# Patient Record
Sex: FEMALE | Race: BLACK OR AFRICAN AMERICAN | Employment: FULL TIME | URBAN - METROPOLITAN AREA
[De-identification: names, ages, dates, MRNs, and addresses within clinical notes are randomized per-mention and may not be internally consistent; named-entity substitution may affect disease eponyms.]

---

## 2017-01-09 ENCOUNTER — OFFICE VISIT (OUTPATIENT)
Dept: FAMILY MEDICINE CLINIC | Age: 47
End: 2017-01-09

## 2017-01-09 ENCOUNTER — OFFICE VISIT (OUTPATIENT)
Dept: ENDOCRINOLOGY | Age: 47
End: 2017-01-09

## 2017-01-09 VITALS
BODY MASS INDEX: 36.49 KG/M2 | HEIGHT: 65 IN | WEIGHT: 219 LBS | SYSTOLIC BLOOD PRESSURE: 108 MMHG | TEMPERATURE: 97.6 F | DIASTOLIC BLOOD PRESSURE: 78 MMHG | RESPIRATION RATE: 16 BRPM | HEART RATE: 70 BPM | OXYGEN SATURATION: 92 %

## 2017-01-09 VITALS
SYSTOLIC BLOOD PRESSURE: 114 MMHG | HEIGHT: 65 IN | BODY MASS INDEX: 36.19 KG/M2 | WEIGHT: 217.2 LBS | DIASTOLIC BLOOD PRESSURE: 74 MMHG | HEART RATE: 71 BPM

## 2017-01-09 DIAGNOSIS — E66.9 OBESITY (BMI 30-39.9): Primary | ICD-10-CM

## 2017-01-09 DIAGNOSIS — K51.90 ULCERATIVE COLITIS WITHOUT COMPLICATIONS, UNSPECIFIED LOCATION (HCC): ICD-10-CM

## 2017-01-09 DIAGNOSIS — Z79.01 ANTICOAGULATION MONITORING, INR RANGE 2.5-3.5: ICD-10-CM

## 2017-01-09 DIAGNOSIS — M06.4 INFLAMMATORY POLYARTHRITIS (HCC): ICD-10-CM

## 2017-01-09 DIAGNOSIS — Z87.19 HISTORY OF GI BLEED: ICD-10-CM

## 2017-01-09 DIAGNOSIS — I82.431 ACUTE DEEP VEIN THROMBOSIS (DVT) OF POPLITEAL VEIN OF RIGHT LOWER EXTREMITY (HCC): Primary | ICD-10-CM

## 2017-01-09 LAB
INR BLD: 1.9
PT POC: NORMAL SEC
VALID INTERNAL CONTROL?: YES

## 2017-01-09 RX ORDER — PHENTERMINE HYDROCHLORIDE 37.5 MG/1
TABLET ORAL
Qty: 100 TAB | Refills: 5 | Status: SHIPPED | OUTPATIENT
Start: 2017-01-09 | End: 2017-07-12 | Stop reason: SDUPTHER

## 2017-01-09 RX ORDER — WARFARIN SODIUM 5 MG/1
7.5 TABLET ORAL DAILY
Qty: 45 TAB | Refills: 2 | Status: SHIPPED | OUTPATIENT
Start: 2017-01-09 | End: 2017-02-07 | Stop reason: SDUPTHER

## 2017-01-09 RX ORDER — TOPIRAMATE 50 MG/1
50 TABLET, FILM COATED ORAL 2 TIMES DAILY
Qty: 60 TAB | Refills: 5 | Status: SHIPPED | OUTPATIENT
Start: 2017-01-09 | End: 2017-07-12 | Stop reason: SDUPTHER

## 2017-01-09 NOTE — PROGRESS NOTES
Subjective:     Chief Complaint   Patient presents with    Follow-up     PT/INR        She  is a 48 y.o. female who presents for evaluation of:  DVT - admitted for DVT after admission for pouchitis with rectal bleeding. DVT hx and noted after long hospital admission. Confirmed with LE dopplers and r/o PE with CT. Admitted and bridged with Lovenox and Coumadin. Hgb at d/c was 9.3 and has improved to > 11. In hospital, she saw Dr. Conrado Cason (H/O) and Dr. Solomon Mejia (Vasc surgery). Advised against IVC and rec for 3-6 months of anticoag. Given 2nd DVT (both provoked) and overall hx, will opt for 6 month course. Was advised not to use Eliquis or Xarelto d/t difficulty with reversal and one episode of rectal bleeding (presumed from pouchitis). Notes reviewed and overall appears that pouchitis should have low risk of rebleed. Rest per Anticoag tab    Psoriatic Arthritis & UC - Followed by Dr. Elizabeth Martinez. Stopped Embril & MTX for transaminitis and this resolved. Acute reaction at injection site to Humira so was switched back to MTX. Had trial of Remicaide but difficulty with IV access. Discussed options with possibility of getting PICC for remicaide infusion - she would have to have this for ~ 6 weeks. Advised her to discuss options further with Dr. Nacho Quezada and she was able to find an alternative for Psoriatic Arthritis and UC. She will start Cimzia (Certolizumab). ROS  Gen - no fever/chills  Resp - no cough or dyspnea  CV - no chest pain or C[place]  GI - per HPI   - CT showed resolution of R sided ovarian cyst and new large L sided ovarian cyst. US done (scheduled prior to getting CT) and showed L ovarian hemorrhagic cyst and 2 large fibroids in the uterus. Saw Dr. Jeremiah Tamez about this and considering sgy but concerns given abd sgy hx with pouch.   Rest per HPI     Objective:     Vitals:    01/09/17 1024   BP: 108/78   Pulse: 70   Resp: 16   Temp: 97.6 °F (36.4 °C)   TempSrc: Oral   SpO2: 92%   Weight: 219 lb (99.3 kg)   Height: 5' 5\" (1.651 m)     Physical Examination:  General appearance - alert, well appearing, and in no distress  Eyes -sclera anicteric  Chest - clear to auscultation, no wheezes, rales or rhonchi, symmetric air entry  Heart - normal rate, regular rhythm, normal S1, S2, no murmurs, rubs, clicks or gallops  Neurological - alert, oriented, no focal findings or movement disorder noted  Extr - RLE with trace edema     Past Medical History   Diagnosis Date    Asthma 3/12/2010    DDD (degenerative disc disease), lumbar     DJD (degenerative joint disease) 3/12/2010    DJD (degenerative joint disease) of knee     DVT [facility]deep venous thrombosis) [facility])      Rt leg    GERD (gastroesophageal reflux disease)     History of tuberculosis exposure 2013     pt states she has + PPD and was on Rx for 6 months; last dose either 11/13 or 12/13    Inflammatory polyarthritis ([address]      Psoriatic Arthitis    Morbid obesity ([address]     Pseudogout     Skin abrasion 1/28/14     After loosing 125#, Bilateral Inner thigh friction flareup monthly with skin breakdown    Thromboembolus ([address] 2008     Rt leg,  pt states she fell and had a plane ride home and developed blood clots    UC (ulcerative colitis) ([address] 3/12/2010     Past Surgical History   Procedure Laterality Date    Hx tonsillectomy      Hx colectomy  1992     Dr Alejandra Lagos Hx acl reconstruction       Rt    Hx orthopaedic  12/13     Rt foot / toes     Current Outpatient Prescriptions on File Prior to Visit   Medication Sig Dispense Refill    topiramate (TOPAMAX) 50 mg tablet Take 1 Tab by mouth two (2) times a day. 60 Tab 5    phentermine (ADIPEX-P) 37.5 mg tablet Take 1/2 tablet before breakfast and before dinner 100 Tab 5    furosemide (LASIX) 40 mg tablet TAKE 1 TABLET BY MOUTH TWICE DAILY AS NEEDED 180 Tab 0    mometasone (NASONEX) 50 mcg/actuation nasal spray 2 Sprays by Both Nostrils route daily.  1 Container 1    oxyCODONE-acetaminophen (PERCOCET) 5-325 mg per tablet Take 1 Tab by mouth every six (6) hours as needed for Pain. Max Daily Amount: 4 Tabs. ONLY FOR SEVERE PAIN 30 Tab 0    clotrimazole-betamethasone (LOTRISONE) topical cream Apply thin layer to affected area twice daily until 48 hours after resolution 15 g 0    Comp. Stocking,Thigh,Long,X-Sml misc Apply to leg in am and remove at bedtime. Please disp 1 stocking. 1 Each 0    methotrexate (RHEUMATREX) 2.5 mg tablet TAKE 8 TABLETS BY MOUTH ONCE A WEEK  0    ranitidine (ZANTAC) 150 mg tablet Take 150 mg by mouth two (2) times a day.  albuterol (PROVENTIL HFA, VENTOLIN HFA, PROAIR HFA) 90 mcg/actuation inhaler Take 1 Puff by inhalation every four (4) hours as needed for Wheezing. 1 Inhaler 5    acetaminophen (TYLENOL EXTRA STRENGTH) 500 mg tablet Take  by mouth every six (6) hours as needed for Pain. No current facility-administered medications on file prior to visit. Assessment/ Plan:   Cody Rodriguez was seen today for follow-up. Diagnoses and all orders for this visit:    Acute deep vein thrombosis (DVT) of popliteal vein of right lower extremity ([address]  Long term current use of anticoagulant  Hx of GI bleed  - INR subtherapeutic so incr dose while she ct eating appropriate greens  -     AMB POC PT/INR    Ulcerative colitis without complications, unspecified location [facility]) & Psoriatic Arthritis - per Rhuem. Starting trial of Cimzia. Also discussed considering OPIC for remicaide if not improving with Cimzia. I have discussed the diagnosis with the patient and the intended plan as seen in the above orders. The patient has received an after-visit summary and questions were answered concerning future plans. I have discussed medication side effects and warnings with the patient as well. The patient verbalizes understanding and agreement with the plan. Follow-up Disposition:  Return in about 2 weeks (around 1/23/2017), or if symptoms worsen or fail to improve.

## 2017-01-09 NOTE — MR AVS SNAPSHOT
Visit Information Date & Time Provider Department Dept. Phone Encounter #  
 1/9/2017 10:50 AM Monica Smart MD [facility] at [address] [id number] Follow-up Instructions Return in about 2 weeks (around 1/23/2017), or if symptoms worsen or fail to improve. Your Appointments 7/11/2017 10:50 AM  
Follow Up with Raimundo Jefferson MD  
[facility] Appt Note: f/u          dm/ weight loss        6 mon [employer] [address] [id number] [facility] Upcoming Health Maintenance Date Due COLONOSCOPY 6/8/2016 PAP AKA CERVICAL CYTOLOGY 11/19/2017 DTaP/Tdap/Td series (2 - Td) 5/17/2026 Allergies as of 1/9/2017  Review Complete On: 1/9/2017 By: Monica Smart MD  
  
 Severity Noted Reaction Type Reactions Sulfa (Sulfonamide Antibiotics) High 03/12/2010    Anaphylaxis Codeine Medium 03/17/2010    Itching Current Immunizations  Reviewed on 5/2/2016 Name Date Influenza Vaccine (Quad) PF 10/27/2016, 10/30/2015 Influenza Vaccine PF 10/11/2013 Influenza Vaccine Split 12/13/2012, 10/5/2011 Pneumococcal Polysaccharide (PPSV-23) 4/11/2016 11:40 AM  
  
 Not reviewed this visit You Were Diagnosed With   
  
 Codes Comments Acute deep vein thrombosis (DVT) of popliteal vein of right lower extremity (HCC)    -  Primary ICD-10-CM: R09.769 ICD-9-CM: 453.41 Anticoagulation monitoring, INR range 2.5-3.5     ICD-10-CM: Z79.01 
ICD-9-CM: V58.61 History of GI bleed     ICD-10-CM: Z87.19 ICD-9-CM: V12.79 Inflammatory polyarthritis [facility]     ICD-10-CM: M06.4 ICD-9-CM: 714.9 Vitals BP Pulse Temp Resp Height(growth percentile) Weight(growth percentile)  108/78 (BP 1 Location: Right arm, BP Patient Position: Sitting) 70 97.6 °F (36.4 °C) (Oral) 16 5' 5\" (1.651 m) 219 lb (99.3 kg) SpO2 BMI OB Status Smoking Status 92% 36.44 kg/m2 IUD Never Smoker Vitals History BMI and BSA Data Body Mass Index Body Surface Area  
 36.44 kg/m 2 2.13 m 2 Preferred Pharmacy Pharmacy Name Phone [facility], [address] [place] [address] [phone] Your Updated Medication List  
  
   
This list is accurate as of: 1/9/17 11:24 AM.  Always use your most recent med list.  
  
  
  
  
 albuterol 90 mcg/actuation inhaler Commonly known as:  PROVENTIL HFA, VENTOLIN HFA, PROAIR HFA Take 1 Puff by inhalation every four (4) hours as needed for Wheezing. clotrimazole-betamethasone topical cream  
Commonly known as:  Ammy Quarto Apply thin layer to affected area twice daily until 48 hours after resolution Comp. Stocking,Thigh,Long,X-Sml Misc Apply to leg in am and remove at bedtime. Please disp 1 stocking. furosemide 40 mg tablet Commonly known as:  LASIX TAKE 1 TABLET BY MOUTH TWICE DAILY AS NEEDED  
  
 methotrexate 2.5 mg tablet Commonly known as:  RHEUMATREX  
TAKE 8 TABLETS BY MOUTH ONCE A WEEK  
  
 mometasone 50 mcg/actuation nasal spray Commonly known as:  NASONEX  
2 Sprays by Both Nostrils route daily. oxyCODONE-acetaminophen 5-325 mg per tablet Commonly known as:  PERCOCET Take 1 Tab by mouth every six (6) hours as needed for Pain. Max Daily Amount: 4 Tabs. ONLY FOR SEVERE PAIN  
  
 phentermine 37.5 mg tablet Commonly known as:  ADIPEX-P Take 1/2 tablet before breakfast and before dinner  
  
 raNITIdine 150 mg tablet Commonly known as:  ZANTAC Take 150 mg by mouth two (2) times a day. topiramate 50 mg tablet Commonly known as:  TOPAMAX Take 1 Tab by mouth two (2) times a day. TYLENOL EXTRA STRENGTH 500 mg tablet Generic drug:  acetaminophen Take  by mouth every six (6) hours as needed for Pain. warfarin 5 mg tablet Commonly known as:  COUMADIN Take 1.5 Tabs by mouth daily. 7.5 mg Mon-Sat; 10 mg on Sunday January 2017 Details Sun Mon Tue Wed Thu Fri Sat  
  1  
  
  
  
   2  
  
  
  
   3  
  
  
  
   4  
  
  
  
   5  
  
  
  
   6  
  
  
  
   7  
  
  
  
  
  8  
  
  
  
   9  
1.9  
7.5 mg  
See details 10  
  
7.5 mg  
  
   11  
  
10 mg  
  
   12  
  
7.5 mg  
  
   13  
  
10 mg  
  
   14  
  
7.5 mg  
  
  
  15  
  
10 mg  
  
   16  
  
7.5 mg  
  
   17  
  
7.5 mg  
  
   18  
  
10 mg  
  
   19  
  
7.5 mg  
  
   20  
  
10 mg  
  
   21  
  
7.5 mg  
  
  
  22  
  
10 mg  
  
   23  
  
7.5 mg  
  
   24  
  
  
  
   25  
  
  
  
   26  
  
  
  
   27  
  
  
  
   28  
  
  
  
  
  29  
  
  
  
   30  
  
  
  
   31  
  
  
  
      
 Date Details 01/09 This INR check INR: 1.9 Date of next INR:  1/23/2017 How to take your warfarin dose To take:  7.5 mg Take 1.5 of the 5 mg tablets. To take:  10 mg Take 2 of the 5 mg tablets. We Performed the Following AMB POC PT/INR [54974 CPT(R)] Follow-up Instructions Return in about 2 weeks (around 1/23/2017), or if symptoms worsen or fail to improve. Introducing [facility] & HEALTH SERVICES! [employer] introduces [employer] patient portal. Now you can access parts of your medical record, email your doctor's office, and request medication refills online. 1. In your internet browser, go to [email or web address] 2. Click on the First Time User? Click Here link in the Sign In box. You will see the New Member Sign Up page. 3. Enter your SoThree Access Code exactly as it appears below. You will not need to use this code after youve completed the sign-up process. If you do not sign up before the expiration date, you must request a new code. · So[employer] Access Code: [email or web address] Expires: 1/25/2017  2:02 PM 
 
 4. Enter the last four digits of your Social Security Number (xxxx) and Date of Birth (mm/dd/yyyy) as indicated and click Submit. You will be taken to the next sign-up page. 5. Create a Bringrs ID. This will be your Bringrs login ID and cannot be changed, so think of one that is secure and easy to remember. 6. Create a Bringrs password. You can change your password at any time. 7. Enter your Password Reset Question and Answer. This can be used at a later time if you forget your password. 8. Enter your e-mail address. You will receive e-mail notification when new information is available in [address] 9. Click Sign Up. You can now view and download portions of your medical record. 10. Click the Download Summary menu link to download a portable copy of your medical information. If you have questions, please visit the Frequently Asked Questions section of the Bringrs website. Remember, Bringrs is NOT to be used for urgent needs. For medical emergencies, dial 911. Now available from your iPhone and Android! Please provide this summary of care documentation to your next provider. Your primary care clinician is listed as Renee Chatman. If you have any questions after today's visit, please call [phone].

## 2017-01-09 NOTE — MR AVS SNAPSHOT
Visit Information Date & Time Provider Department Dept. Phone Encounter #  
 1/9/2017  8:50 AM Stephy Leija MD [facility] [phone] Follow-up Instructions Return in about 6 months (around 7/9/2017). Your Appointments 1/9/2017 10:50 AM  
ROUTINE CARE with Camryn Smith MD  
[facility] Appt Note: 2wk fu  
 [address] [id number]  
[address] [facility] [address] Upcoming Health Maintenance Date Due COLONOSCOPY 6/8/2016 PAP AKA CERVICAL CYTOLOGY 11/19/2017 DTaP/Tdap/Td series (2 - Td) 5/17/2026 Allergies as of 1/9/2017  Review Complete On: 1/9/2017 By: Stephy Leija MD  
  
 Severity Noted Reaction Type Reactions Sulfa (Sulfonamide Antibiotics) High 03/12/2010    Anaphylaxis Codeine Medium 03/17/2010    Itching Current Immunizations  Reviewed on 5/2/2016 Name Date Influenza Vaccine (Quad) PF 10/27/2016, 10/30/2015 Influenza Vaccine PF 10/11/2013 Influenza Vaccine Split 12/13/2012, 10/5/2011 Pneumococcal Polysaccharide (PPSV-23) 4/11/2016 11:40 AM  
  
 Not reviewed this visit You Were Diagnosed With   
  
 Codes Comments Obesity (BMI 30-39.9)    -  Primary ICD-10-CM: X91.0 ICD-9-CM: 278.00 Vitals BP Pulse Height(growth percentile) Weight(growth percentile) BMI OB Status 114/74 71 5' 5\" (1.651 m) 217 lb 3.2 oz (98.5 kg) 36.14 kg/m2 IUD Smoking Status Never Smoker Vitals History BMI and BSA Data Body Mass Index Body Surface Area  
 36.14 kg/m 2 2.13 m 2 Preferred Pharmacy [facility] Phone [facility] [address] [place] [address] [phone] Your Updated Medication List  
  
   
This list is accurate as of: 1/9/17  9:28 AM.  Always use your most recent med list.  
  
  
  
  
 albuterol 90 mcg/actuation inhaler Commonly known as:  PROVENTIL HFA, VENTOLIN HFA, PROAIR HFA Take 1 Puff by inhalation every four (4) hours as needed for Wheezing. clotrimazole-betamethasone topical cream  
Commonly known as:  Radha Fiddler Apply thin layer to affected area twice daily until 48 hours after resolution Comp. Stocking,Thigh,Long,X-Sml Misc Apply to leg in am and remove at bedtime. Please disp 1 stocking. furosemide 40 mg tablet Commonly known as:  LASIX TAKE 1 TABLET BY MOUTH TWICE DAILY AS NEEDED  
  
 methotrexate 2.5 mg tablet Commonly known as:  RHEUMATREX  
TAKE 8 TABLETS BY MOUTH ONCE A WEEK  
  
 mometasone 50 mcg/actuation nasal spray Commonly known as:  NASONEX  
2 Sprays by Both Nostrils route daily. oxyCODONE-acetaminophen 5-325 mg per tablet Commonly known as:  PERCOCET Take 1 Tab by mouth every six (6) hours as needed for Pain. Max Daily Amount: 4 Tabs. ONLY FOR SEVERE PAIN  
  
 phentermine 37.5 mg tablet Commonly known as:  ADIPEX-P Take 1/2 tablet before breakfast and before dinner  
  
 raNITIdine 150 mg tablet Commonly known as:  ZANTAC Take 150 mg by mouth two (2) times a day. topiramate 50 mg tablet Commonly known as:  TOPAMAX Take 1 Tab by mouth two (2) times a day. TYLENOL EXTRA STRENGTH 500 mg tablet Generic drug:  acetaminophen Take  by mouth every six (6) hours as needed for Pain.  
  
 warfarin 5 mg tablet Commonly known as:  COUMADIN Take 1.5 Tabs by mouth daily. 7.5 mg Mon-Sat; 10 mg on Sunday Prescriptions Printed Refills  
 topiramate (TOPAMAX) 50 mg tablet 5 Sig: Take 1 Tab by mouth two (2) times a day. Class: Print Route: Oral  
 phentermine (ADIPEX-P) 37.5 mg tablet 5 Sig: Take 1/2 tablet before breakfast and before dinner Class: Print Follow-up Instructions Return in about 6 months (around 7/9/2017). Introducing [facility] & HEALTH SERVICES! [employer] introduces [employer] patient portal. Now you can access parts of your medical record, email your doctor's office, and request medication refills online. 1. In your internet browser, go to [email or web address] 2. Click on the First Time User? Click Here link in the Sign In box. You will see the New Member Sign Up page. 3. Enter your [employer] Access Code exactly as it appears below. You will not need to use this code after youve completed the sign-up process. If you do not sign up before the expiration date, you must request a new code. · [employer] Access Code: [email or web address] Expires: 1/25/2017  2:02 PM 
 
4. Enter the last four digits of your Social Security Number (xxxx) and Date of Birth (mm/dd/yyyy) as indicated and click Submit. You will be taken to the next sign-up page. 5. Create a [employer] ID. This will be your [employer] login ID and cannot be changed, so think of one that is secure and easy to remember. 6. Create a [employer] password. You can change your password at any time. 7. Enter your Password Reset Question and Answer. This can be used at a later time if you forget your password. 8. Enter your e-mail address. You will receive e-mail notification when new information is available in [address] 9. Click Sign Up. You can now view and download portions of your medical record. 10. Click the Download Summary menu link to download a portable copy of your medical information. If you have questions, please visit the Frequently Asked Questions section of the [employer] website. Remember, [employer] is NOT to be used for urgent needs. For medical emergencies, dial 911. Now available from your iPhone and Android! Please provide this summary of care documentation to your next provider. Your primary care clinician is listed as Eric Moseley. If you have any questions after today's visit, please call [phone].

## 2017-01-09 NOTE — PROGRESS NOTES
Chief Complaint   Patient presents with    Thyroid Problem     pcp and pharmacy verified    Weight Management   Notes and labs from PCP reviewed  History of Present Illness: Adela Mcgarry is a 48 y.o. female here for follow up of obesity. Pt is being followed by Dr. Isabela Sun (her PCP) who had perscribed for her Phentermine 37.5mg daily and pt note she felt it helped some by curbing her appetite but her PCP was not comfortable continuing the phentermine, so he referred her to me. Prior to our initial visit in January 2015 she had lost from 310 down to 160 but then gained back to 230s. In January 2015 I restarted Phentermine 18.75mg BID and Topiramate 50mg BID to help further her weight loss efforts. She has tolerated this regimen well with no issues of anxiety, CP, SOB, palpitations, N/V, HA, lightheadedness or dizziness. 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 She had also been taken off her Lasix and had some water gain. I continue her phentermine and Topiramate and restarted her on Lasix for edema. Her A1C was 5.3%. Pt has lost 30 pounds since July 2016 from 247 down to 217 and her BMI from 41 to 36. Pt has not been in the hospital since her DVT in May 2016, she is still following with Dr. Mikala Marie for PT/INR for coumadin. She is still seeing Dr. Larry Councilman of Rhematology for Rheumatoid arthritis and Dr. Debbie Esquivel and Wander Meredith of GI for UC. She has had complications to Embril (transaminitis) and Humira (injection site reaction). Right now she is still on the MTX and PRN steroids. She was prescribed steroids a few weeks ago, but she did not take them at this time. She notes Dr. Larry Councilman will be starting an new medication (Cimzia?) soon. Pt notes she has been fighting a \"GI bug\" for about 4-5 days. Pt is still taking the Phentermine 37.5mg, 1/2 tab BID and the Tapiramate 50mg BID.     Pt is eating 3 meals per day plus snacking. She notes that with the GI bug she has not been eating much because of nausea. If she can not keep food down, she has been drinking a protein smoothie. Since she is now on Cumadin she is not able to eat as many leafy greens. Pt was previously followed by the RD to help with nutrition for weight loss at [facility]. Pt has no hx of gastric bypass. Pt has a hx of UC and Inflammatory polyarthropathy,she is followed by her rheumatologist. For her UC she has been followed by Dr. Wander Meredith of GI. Past Medical History   Diagnosis Date    Asthma 3/12/2010    DDD (degenerative disc disease), lumbar     DJD (degenerative joint disease) 3/12/2010    DJD (degenerative joint disease) of knee     [facility]      Rt leg    GERD (gastroesophageal reflux disease)     History of tuberculosis exposure 2013     pt states she has + PPD and was on Rx for 6 months; last dose either 11/13 or 12/13    Inflammatory polyarthritis ([address]      Psoriatic Arthitis    Morbid obesity ([address]     Pseudogout     Skin abrasion 1/28/14     After loosing 125#, Bilateral Inner thigh friction flareup monthly with skin breakdown    Thromboembolus ([address] 2008     Rt leg,  pt states she fell and had a plane ride home and developed blood clots    UC (ulcerative colitis) ([address] 3/12/2010     Past Surgical History   Procedure Laterality Date    Hx tonsillectomy      Hx colectomy  1992     Dr Juan Carlos Dotson Hx acl reconstruction       Rt    Hx orthopaedic  12/13     Rt foot / toes     Current Outpatient Prescriptions   Medication Sig    topiramate (TOPAMAX) 50 mg tablet Take 1 Tab by mouth two (2) times a day.  phentermine (ADIPEX-P) 37.5 mg tablet Take 1/2 tablet before breakfast and before dinner    furosemide (LASIX) 40 mg tablet TAKE 1 TABLET BY MOUTH TWICE DAILY AS NEEDED    mometasone (NASONEX) 50 mcg/actuation nasal spray 2 Sprays by Both Nostrils route daily.     oxyCODONE-acetaminophen (PERCOCET) 5-325 mg per tablet Take 1 Tab by mouth every six (6) hours as needed for Pain. Max Daily Amount: 4 Tabs. ONLY FOR SEVERE PAIN    clotrimazole-betamethasone (LOTRISONE) topical cream Apply thin layer to affected area twice daily until 48 hours after resolution    Comp. Stocking,Thigh,Long,X-Sml misc Apply to leg in am and remove at bedtime. Please disp 1 stocking.  warfarin (COUMADIN) 5 mg tablet Take 1.5 Tabs by mouth daily. 7.5 mg Mon-Sat; 10 mg on Sunday (Patient taking differently: Take 7.5 mg by mouth daily. 7.5 mg Mon-Sat; 10 mg on Sunday and 10 mg on Wednesday)    methotrexate (RHEUMATREX) 2.5 mg tablet TAKE 8 TABLETS BY MOUTH ONCE A WEEK    ranitidine (ZANTAC) 150 mg tablet Take 150 mg by mouth two (2) times a day.  albuterol (PROVENTIL HFA, VENTOLIN HFA, PROAIR HFA) 90 mcg/actuation inhaler Take 1 Puff by inhalation every four (4) hours as needed for Wheezing.  acetaminophen (TYLENOL EXTRA STRENGTH) 500 mg tablet Take  by mouth every six (6) hours as needed for Pain. No current facility-administered medications for this visit. Allergies   Allergen Reactions    Sulfa (Sulfonamide Antibiotics) Anaphylaxis    Codeine Itching     Family History   Problem Relation Age of Onset    Hypertension Mother     Thyroid Disease Mother      Hypothyroid    Diabetes Mother     Elevated Lipids Mother     Alcohol abuse Father      brain tumor    Cancer Father      brain     Social History     Social History    Marital status:      Spouse name: N/A    Number of children: N/A    Years of education: N/A     Occupational History    Not on file.      Social History Main Topics    Smoking status: Never Smoker    Smokeless tobacco: Never Used    Alcohol use No      Comment: occasionally     Drug use: No    Sexual activity: Yes     Partners: Male     Other Topics Concern    Not on file     Social History Narrative     Review of Systems:  - Negative except as noted in HPI    Physical Examination:  Blood pressure 114/74, pulse 71, height 5' 5\" (1.651 m), weight 217 lb 3.2 oz (98.5 kg). - General: pleasant, no distress, good eye contact  - HEENT: no exopthalmos, no periorbital edema, no scleral/conjunctival injection, EOMI, no lid lag or stare  - Cardiovascular: regular, normal rate, normal S1 and S2, no murmurs/rubs/gallops, 2+ dorsalis pedis pulses bilaterally  - Respiratory: clear to auscultation bilaterally  - Musculoskeletal: no proximal muscle weakness in upper or lower extremities  - Integumentary: no acanthosis nigricans, no rashes, no edema,   - Neurological: reflexes 2+ at biceps, no tremor  - Psychiatric: normal mood and affect    Data Reviewed:       Assessment/Plan:   1) Obesity > Pt has lost 30 pounds since July 2016. Pt to continue the phentermine and Topiramate and she was encouraged to keep up the good work with her diet. I will refill her phentermine and topiramate. Pt voices understanding and agreement with the plan. Pain noted and pt was recommended to call her PCP for further evaluation and treatment, as needed    RTC 6 months      Follow-up Disposition:  Return in about 6 months (around 7/9/2017). Copy sent to:  Vivi Story and Nella Richards.

## 2017-01-23 ENCOUNTER — OFFICE VISIT (OUTPATIENT)
Dept: FAMILY MEDICINE CLINIC | Age: 47
End: 2017-01-23

## 2017-01-23 VITALS
OXYGEN SATURATION: 97 % | RESPIRATION RATE: 16 BRPM | DIASTOLIC BLOOD PRESSURE: 79 MMHG | HEART RATE: 93 BPM | TEMPERATURE: 97.7 F | BODY MASS INDEX: 36.99 KG/M2 | HEIGHT: 65 IN | WEIGHT: 222 LBS | SYSTOLIC BLOOD PRESSURE: 119 MMHG

## 2017-01-23 DIAGNOSIS — Z87.19 HISTORY OF GI BLEED: ICD-10-CM

## 2017-01-23 DIAGNOSIS — I82.431 ACUTE DEEP VEIN THROMBOSIS (DVT) OF POPLITEAL VEIN OF RIGHT LOWER EXTREMITY (HCC): Primary | ICD-10-CM

## 2017-01-23 DIAGNOSIS — Z79.01 ANTICOAGULATION MONITORING, INR RANGE 2.5-3.5: ICD-10-CM

## 2017-01-23 DIAGNOSIS — R79.1 SUPRATHERAPEUTIC INR: ICD-10-CM

## 2017-01-23 LAB
INR BLD: 4.4
PT POC: NORMAL SEC
VALID INTERNAL CONTROL?: YES

## 2017-01-23 NOTE — PROGRESS NOTES
Subjective:     Chief Complaint   Patient presents with    Follow-up     PT/INR        She  is a 48 y.o. female who presents for evaluation of:  DVT - admitted for DVT after admission for pouchitis with rectal bleeding. DVT hx and noted after long hospital admission. Confirmed with LE dopplers and r/o PE with CT. Admitted and bridged with Lovenox and Coumadin. Hgb at d/c was 9.3 and has improved to > 11. In hospital, she saw Dr. Eliz Hyman (H/O) and Dr. Sophie Villalta (Vasc surgery). Advised against IVC and rec for 3-6 months of anticoag. Given 2nd DVT (both provoked) and overall hx, will opt for 6 month course. Was advised not to use Eliquis or Xarelto d/t difficulty with reversal and one episode of rectal bleeding (presumed from pouchitis). Notes reviewed and overall appears that pouchitis should have low risk of rebleed. Rest per Anticoag tab    Psoriatic Arthritis & UC - Followed by Dr. Brandon Bennett. Stopped Embril & MTX for transaminitis and this resolved. Acute reaction at injection site to Humira so was switched back to MTX. Had trial of Remicaide but difficulty with IV access. Discussed options with possibility of getting PICC for remicaide infusion - she would have to have this for ~ 6 weeks. Advised her to discuss options further with Dr. Aster Jaimes and she was able to find an alternative for Psoriatic Arthritis and UC. Starting Cimzia (Certolizumab). ROS  Gen - no fever/chills  Resp - no cough or dyspnea  CV - no chest pain or C[place]  GI - per HPI   - CT showed resolution of R sided ovarian cyst and new large L sided ovarian cyst. US done (scheduled prior to getting CT) and showed L ovarian hemorrhagic cyst and 2 large fibroids in the uterus. Saw Dr. Elie Rojo about this and considering sgy but concerns given abd sgy hx with pouch.   Rest per HPI     Objective:     Vitals:    01/23/17 0832   BP: 119/79   Pulse: 93   Resp: 16   Temp: 97.7 °F (36.5 °C)   TempSrc: Oral   SpO2: 97%   Weight: 222 lb (100.7 kg) Height: 5' 5\" (1.651 m)     Physical Examination:  General appearance - alert, well appearing, and in no distress  Eyes -sclera anicteric  Chest - clear to auscultation, no wheezes, rales or rhonchi, symmetric air entry  Heart - normal rate, regular rhythm, normal S1, S2, no murmurs, rubs, clicks or gallops  Neurological - alert, oriented, no focal findings or movement disorder noted  Extr - RLE with trace edema     Past Medical History   Diagnosis Date    Asthma 3/12/2010    DDD (degenerative disc disease), lumbar     DJD (degenerative joint disease) 3/12/2010    DJD (degenerative joint disease) of knee     [facility] thrombosis[facility] ([facility])      Rt leg    GERD (gastroesophageal reflux disease)     History of tuberculosis exposure 2013     pt states she has + PPD and was on Rx for 6 months; last dose either 11/13 or 12/13    Inflammatory polyarthritis ([address]      Psoriatic Arthitis    Morbid obesity ([address]     Pseudogout     Skin abrasion 1/28/14     After loosing 125#, Bilateral Inner thigh friction flareup monthly with skin breakdown    Thromboembolus ([address] 2008     Rt leg,  pt states she fell and had a plane ride home and developed blood clots    UC (ulcerative colitis) ([address] 3/12/2010     Past Surgical History   Procedure Laterality Date    Hx tonsillectomy      Hx colectomy  1992     Dr Satish Eckert Hx acl reconstruction       Rt    Hx orthopaedic  12/13     Rt foot / toes     Current Outpatient Prescriptions on File Prior to Visit   Medication Sig Dispense Refill    topiramate (TOPAMAX) 50 mg tablet Take 1 Tab by mouth two (2) times a day. 60 Tab 5    phentermine (ADIPEX-P) 37.5 mg tablet Take 1/2 tablet before breakfast and before dinner 100 Tab 5    warfarin (COUMADIN) 5 mg tablet Take 1.5 Tabs by mouth daily.  7.5 mg Mon-Sat; 10 mg on Sunday, Wednesday, and Friday 45 Tab 2    furosemide (LASIX) 40 mg tablet TAKE 1 TABLET BY MOUTH TWICE DAILY AS NEEDED 180 Tab 0    mometasone (NASONEX) 50 mcg/actuation nasal spray 2 Sprays by Both Nostrils route daily. 1 Container 1    oxyCODONE-acetaminophen (PERCOCET) 5-325 mg per tablet Take 1 Tab by mouth every six (6) hours as needed for Pain. Max Daily Amount: 4 Tabs. ONLY FOR SEVERE PAIN 30 Tab 0    clotrimazole-betamethasone (LOTRISONE) topical cream Apply thin layer to affected area twice daily until 48 hours after resolution 15 g 0    Comp. Stocking,Thigh,Long,X-Sml misc Apply to leg in am and remove at bedtime. Please disp 1 stocking. 1 Each 0    methotrexate (RHEUMATREX) 2.5 mg tablet TAKE 8 TABLETS BY MOUTH ONCE A WEEK  0    ranitidine (ZANTAC) 150 mg tablet Take 150 mg by mouth two (2) times a day.  albuterol (PROVENTIL HFA, VENTOLIN HFA, PROAIR HFA) 90 mcg/actuation inhaler Take 1 Puff by inhalation every four (4) hours as needed for Wheezing. 1 Inhaler 5    acetaminophen (TYLENOL EXTRA STRENGTH) 500 mg tablet Take  by mouth every six (6) hours as needed for Pain. No current facility-administered medications on file prior to visit. Assessment/ Plan:   Bob Snowden was seen today for follow-up. Diagnoses and all orders for this visit:    Acute deep vein thrombosis (DVT) of popliteal vein of right lower extremity ([address]  Long term current use of anticoagulant  Hx of GI bleed  - INR supratherapeutic but no signs of bleeding so holding dose x 2 days and cutting down on weekly dose  -     AMB POC PT/INR    I have discussed the diagnosis with the patient and the intended plan as seen in the above orders. The patient has received an after-visit summary and questions were answered concerning future plans. I have discussed medication side effects and warnings with the patient as well. The patient verbalizes understanding and agreement with the plan. Follow-up Disposition:  Return in about 1 week (around 1/30/2017), or if symptoms worsen or fail to improve.

## 2017-01-23 NOTE — MR AVS SNAPSHOT
Visit Information Date & Time Provider Department Dept. Phone Encounter #  
 1/23/2017  8:30 AM Camryn Smith MD [facility] at [address] [id number] Follow-up Instructions Return in about 1 week (around 1/30/2017), or if symptoms worsen or fail to improve. Your Appointments 7/11/2017 10:50 AM  
Follow Up with Stephy Leija MD  
[facility] Appt Note: f/u          dm/ weight loss        [id number] [facility] Upcoming Health Maintenance Date Due COLONOSCOPY 6/8/2016 PAP AKA CERVICAL CYTOLOGY 11/19/2017 DTaP/Tdap/Td series (2 - Td) 5/17/2026 Allergies as of 1/23/2017  Review Complete On: 1/23/2017 By: Camryn Smith MD  
  
 Severity Noted Reaction Type Reactions Sulfa (Sulfonamide Antibiotics) High 03/12/2010    Anaphylaxis Codeine Medium 03/17/2010    Itching Current Immunizations  Reviewed on 5/2/2016 Name Date Influenza Vaccine (Quad) PF 10/27/2016, 10/30/2015 Influenza Vaccine PF 10/11/2013 Influenza Vaccine Split 12/13/2012, 10/5/2011 Pneumococcal Polysaccharide (PPSV-23) 4/11/2016 11:40 AM  
  
 Not reviewed this visit You Were Diagnosed With   
  
 Codes Comments Acute deep vein thrombosis (DVT) of popliteal vein of right lower extremity (HCC)    -  Primary ICD-10-CM: Y91.374 ICD-9-CM: 453.41 Anticoagulation monitoring, INR range 2.5-3.5     ICD-10-CM: Z79.01 
ICD-9-CM: V58.61 History of GI bleed     ICD-10-CM: Z87.19 ICD-9-CM: V12.79 Supratherapeutic INR     ICD-10-CM: R79.1 ICD-9-CM: 790.92 Vitals BP Pulse Temp Resp Height(growth percentile) Weight(growth percentile)  119/79 (BP 1 Location: Right arm, BP Patient Position: Sitting) 93 97.7 °F (36.5 °C) (Oral) 16 5' 5\" (1.651 m) 222 lb (100.7 kg) SpO2 BMI OB Status Smoking Status 97% 36.94 kg/m2 IUD Never Smoker Vitals History BMI and BSA Data Body Mass Index Body Surface Area  
 36.94 kg/m 2 2.15 m 2 Preferred Pharmacy Pharmacy Name Phone [facility] [address] [place] [address] [address] [phone] Your Updated Medication List  
  
   
This list is accurate as of: 1/23/17  8:53 AM.  Always use your most recent med list.  
  
  
  
  
 albuterol 90 mcg/actuation inhaler Commonly known as:  PROVENTIL HFA, VENTOLIN HFA, PROAIR HFA Take 1 Puff by inhalation every four (4) hours as needed for Wheezing. clotrimazole-betamethasone topical cream  
Commonly known as:  Gildardo Stalls Apply thin layer to affected area twice daily until 48 hours after resolution Comp. Stocking,Thigh,Long,X-Sml Misc Apply to leg in am and remove at bedtime. Please disp 1 stocking. furosemide 40 mg tablet Commonly known as:  LASIX TAKE 1 TABLET BY MOUTH TWICE DAILY AS NEEDED  
  
 methotrexate 2.5 mg tablet Commonly known as:  RHEUMATREX  
TAKE 8 TABLETS BY MOUTH ONCE A WEEK  
  
 mometasone 50 mcg/actuation nasal spray Commonly known as:  NASONEX  
2 Sprays by Both Nostrils route daily. oxyCODONE-acetaminophen 5-325 mg per tablet Commonly known as:  PERCOCET Take 1 Tab by mouth every six (6) hours as needed for Pain. Max Daily Amount: 4 Tabs. ONLY FOR SEVERE PAIN  
  
 phentermine 37.5 mg tablet Commonly known as:  ADIPEX-P Take 1/2 tablet before breakfast and before dinner  
  
 raNITIdine 150 mg tablet Commonly known as:  ZANTAC Take 150 mg by mouth two (2) times a day. topiramate 50 mg tablet Commonly known as:  TOPAMAX Take 1 Tab by mouth two (2) times a day. TYLENOL EXTRA STRENGTH 500 mg tablet Generic drug:  acetaminophen Take  by mouth every six (6) hours as needed for Pain. warfarin 5 mg tablet Commonly known as:  COUMADIN Take 1.5 Tabs by mouth daily. 7.5 mg Mon-Sat; 10 mg on Sunday, Wednesday, and Friday January 2017 Details Sun Mon Tue Wed Thu Fri Sat  
  1  
  
  
  
   2  
  
  
  
   3  
  
  
  
   4  
  
  
  
   5  
  
  
  
   6  
  
  
  
   7  
  
  
  
  
  8  
  
  
  
   9  
  
  
  
   10  
  
  
  
   11  
  
  
  
   12  
  
  
  
   13  
  
  
  
   14  
  
  
  
  
  15  
  
  
  
   16  
  
  
  
   17  
  
  
  
   18  
  
  
  
   19  
  
  
  
   20  
  
  
  
   21  
  
  
  
  
  22  
  
  
  
   23 4.4 Hold See details 24  
  
7.5 mg  
  
   25  
  
10 mg  
  
   26  
  
7.5 mg  
  
   27  
  
7.5 mg  
  
   28  
  
7.5 mg  
  
  
  29  
  
10 mg  
  
   30  
  
7.5 mg  
  
   31 Date Details 01/23 This INR check INR: 4.4 Date of next INR:  1/30/2017 How to take your warfarin dose To take:  7.5 mg Take 1.5 of the 5 mg tablets. To take:  10 mg Take 2 of the 5 mg tablets. Hold Do not take your warfarin dose. See the Details table to the right for additional instructions. We Performed the Following AMB POC PT/INR [31745 CPT(R)] Follow-up Instructions Return in about 1 week (around 1/30/2017), or if symptoms worsen or fail to improve. Introducing [facility]! [employer] introduces [employer] patient portal. Now you can access parts of your medical record, email your doctor's office, and request medication refills online. 1. In your internet browser, go to [email or web address] 2. Click on the First Time User? Click Here link in the Sign In box. You will see the New Member Sign Up page. 3. Enter your [employer] Access Code exactly as it appears below. You will not need to use this code after youve completed the sign-up process. If you do not sign up before the expiration date, you must request a new code. · [employer] Access Code: [email or web address] Expires: 1/25/2017  2:02 PM 
 
4. Enter the last four digits of your Social Security Number (xxxx) and Date of Birth (mm/dd/yyyy) as indicated and click Submit. You will be taken to the next sign-up page. 5. Create a [employer] ID. This will be your [employer] login ID and cannot be changed, so think of one that is secure and easy to remember. 6. Create a [employer] password. You can change your password at any time. 7. Enter your Password Reset Question and Answer. This can be used at a later time if you forget your password. 8. Enter your e-mail address. You will receive e-mail notification when new information is available in [address] 9. Click Sign Up. You can now view and download portions of your medical record. 10. Click the Download Summary menu link to download a portable copy of your medical information. If you have questions, please visit the Frequently Asked Questions section of the [employer] website. Remember, [employer] is NOT to be used for urgent needs. For medical emergencies, dial 911. Now available from your iPhone and Android! Please provide this summary of care documentation to your next provider. Your primary care clinician is listed as Oriana Verdugo. If you have any questions after today's visit, please call [phone].

## 2017-01-30 ENCOUNTER — OFFICE VISIT (OUTPATIENT)
Dept: FAMILY MEDICINE CLINIC | Age: 47
End: 2017-01-30

## 2017-01-30 VITALS
BODY MASS INDEX: 37.09 KG/M2 | SYSTOLIC BLOOD PRESSURE: 118 MMHG | OXYGEN SATURATION: 100 % | RESPIRATION RATE: 18 BRPM | TEMPERATURE: 97.8 F | WEIGHT: 222.6 LBS | DIASTOLIC BLOOD PRESSURE: 52 MMHG | HEIGHT: 65 IN | HEART RATE: 94 BPM

## 2017-01-30 DIAGNOSIS — Z87.19 HISTORY OF GI BLEED: ICD-10-CM

## 2017-01-30 DIAGNOSIS — Z51.81 ENCOUNTER FOR MONITORING COUMADIN THERAPY: ICD-10-CM

## 2017-01-30 DIAGNOSIS — I82.431 ACUTE DEEP VEIN THROMBOSIS (DVT) OF POPLITEAL VEIN OF RIGHT LOWER EXTREMITY (HCC): Primary | ICD-10-CM

## 2017-01-30 DIAGNOSIS — Z79.01 ENCOUNTER FOR MONITORING COUMADIN THERAPY: ICD-10-CM

## 2017-01-30 LAB
INR BLD: 2.3
PT POC: NORMAL SEC
VALID INTERNAL CONTROL?: YES

## 2017-01-30 NOTE — PROGRESS NOTES
Subjective:     Chief Complaint   Patient presents with    Follow-up     PT/INR        She  is a 48 y.o. female who presents for evaluation of:  DVT - admitted for DVT after admission for pouchitis with rectal bleeding. DVT hx and noted after long hospital admission. Confirmed with LE dopplers and r/o PE with CT. Admitted and bridged with Lovenox and Coumadin. Hgb at d/c was 9.3 and has improved to > 11. In hospital, she saw Dr. Dee Dee Medina (H/O) and Dr. Trena Chang (Vasc surgery). Advised against IVC and rec for 3-6 months of anticoag. Given 2nd DVT (both provoked) and overall hx, will opt for 6 month course. Was advised not to use Eliquis or Xarelto d/t difficulty with reversal and one episode of rectal bleeding (presumed from pouchitis). Notes reviewed and overall appears that pouchitis should have low risk of rebleed. Rest per Anticoag tab    Psoriatic Arthritis & UC - Followed by Dr. Eryn Perera. Stopped Embril & MTX for transaminitis and this resolved. Acute reaction at injection site to Humira so was switched back to MTX. Had trial of Remicaide but difficulty with IV access. Discussed options with possibility of getting PICC for remicaide infusion - she would have to have this for ~ 6 weeks. Advised her to discuss options further with Dr. Carmen Mac and she was able to find an alternative for Psoriatic Arthritis and UC. Started Cimzia (Certolizumab).     ROS  Gen - no fever/chills  Resp - no cough or dyspnea  CV - no chest pain or CARRASQUILLO  GI - per HPI  Rest per HPI     Objective:     Vitals:    01/30/17 0820   BP: 118/52   Pulse: 94   Resp: 18   Temp: 97.8 °F (36.6 °C)   TempSrc: Oral   SpO2: 100%   Weight: 222 lb 9.6 oz (101 kg)   Height: 5' 5\" (1.651 m)     Physical Examination:  General appearance - alert, well appearing, and in no distress  Eyes -sclera anicteric  Chest - clear to auscultation, no wheezes, rales or rhonchi, symmetric air entry  Heart - normal rate, regular rhythm, normal S1, S2, no murmurs, rubs, clicks or gallops  Neurological - alert, oriented, no focal findings or movement disorder noted  Extr - RLE with trace edema     Past Medical History   Diagnosis Date    Asthma 3/12/2010    DDD (degenerative disc disease), lumbar     DJD (degenerative joint disease) 3/12/2010    DJD (degenerative joint disease) of knee     [facility]      Rt leg    GERD (gastroesophageal reflux disease)     History of tuberculosis exposure 2013     pt states she has + PPD and was on Rx for 6 months; last dose either 11/13 or 12/13    Inflammatory polyarthritis ([address]      Psoriatic Arthitis    Morbid obesity ([address]     Pseudogout     Skin abrasion 1/28/14     After loosing 125#, Bilateral Inner thigh friction flareup monthly with skin breakdown    Thromboembolus ([address] 2008     Rt leg,  pt states she fell and had a plane ride home and developed blood clots    UC (ulcerative colitis) ([address] 3/12/2010     Past Surgical History   Procedure Laterality Date    Hx tonsillectomy      Hx colectomy  1992     Dr Juan Carlos Dotson Hx acl reconstruction       Rt    Hx orthopaedic  12/13     Rt foot / toes     Current Outpatient Prescriptions on File Prior to Visit   Medication Sig Dispense Refill    topiramate (TOPAMAX) 50 mg tablet Take 1 Tab by mouth two (2) times a day. 60 Tab 5    phentermine (ADIPEX-P) 37.5 mg tablet Take 1/2 tablet before breakfast and before dinner 100 Tab 5    warfarin (COUMADIN) 5 mg tablet Take 1.5 Tabs by mouth daily. 7.5 mg Mon-Sat; 10 mg on Sunday, Wednesday, and Friday 45 Tab 2    furosemide (LASIX) 40 mg tablet TAKE 1 TABLET BY MOUTH TWICE DAILY AS NEEDED 180 Tab 0    mometasone (NASONEX) 50 mcg/actuation nasal spray 2 Sprays by Both Nostrils route daily.  1 Container 1    clotrimazole-betamethasone (LOTRISONE) topical cream Apply thin layer to affected area twice daily until 48 hours after resolution 15 g 0    methotrexate (RHEUMATREX) 2.5 mg tablet TAKE 8 TABLETS BY MOUTH ONCE A WEEK  0    ranitidine (ZANTAC) 150 mg tablet Take 150 mg by mouth two (2) times a day.  albuterol (PROVENTIL HFA, VENTOLIN HFA, PROAIR HFA) 90 mcg/actuation inhaler Take 1 Puff by inhalation every four (4) hours as needed for Wheezing. 1 Inhaler 5    acetaminophen (TYLENOL EXTRA STRENGTH) 500 mg tablet Take  by mouth every six (6) hours as needed for Pain.  oxyCODONE-acetaminophen (PERCOCET) 5-325 mg per tablet Take 1 Tab by mouth every six (6) hours as needed for Pain. Max Daily Amount: 4 Tabs. ONLY FOR SEVERE PAIN 30 Tab 0    Comp. Stocking,Thigh,Long,X-Sml misc Apply to leg in am and remove at bedtime. Please disp 1 stocking. 1 Each 0     No current facility-administered medications on file prior to visit. Assessment/ Plan:   Manolo Alvares was seen today for follow-up. Diagnoses and all orders for this visit:    Acute deep vein thrombosis (DVT) of popliteal vein of right lower extremity ([address]  Long term current use of anticoagulant  Hx of GI bleed  - INR therapeutic after holding coumadin d/t supratherapeutic levels. -     AMB POC PT/INR    I have discussed the diagnosis with the patient and the intended plan as seen in the above orders. The patient has received an after-visit summary and questions were answered concerning future plans. I have discussed medication side effects and warnings with the patient as well. The patient verbalizes understanding and agreement with the plan. Follow-up Disposition:  Return in about 2 weeks (around 2/13/2017).

## 2017-01-30 NOTE — MR AVS SNAPSHOT
Visit Information Date & Time Provider Department Dept. Phone Encounter #  
 1/30/2017  8:30 AM Beverley Soto MD [facility] at [address] [id number] Follow-up Instructions Return in about 2 weeks (around 2/13/2017). Your Appointments 7/11/2017 10:50 AM  
Follow Up with Tricia Hus MD  
[facility] [address]) Appt Note: f/u          dm/ weight loss        6 mon [employer] [address] [phone] [facility] Upcoming Health Maintenance Date Due COLONOSCOPY 6/8/2016 PAP AKA CERVICAL CYTOLOGY 11/19/2017 DTaP/Tdap/Td series (2 - Td) 5/17/2026 Allergies as of 1/30/2017  Review Complete On: 1/30/2017 By: Shanae Boss LPN Severity Noted Reaction Type Reactions Sulfa (Sulfonamide Antibiotics) High 03/12/2010    Anaphylaxis Codeine Medium 03/17/2010    Itching Current Immunizations  Reviewed on 5/2/2016 Name Date Influenza Vaccine (Quad) PF 10/27/2016, 10/30/2015 Influenza Vaccine PF 10/11/2013 Influenza Vaccine Split 12/13/2012, 10/5/2011 Pneumococcal Polysaccharide (PPSV-23) 4/11/2016 11:40 AM  
  
 Not reviewed this visit You Were Diagnosed With   
  
 Codes Comments Acute deep vein thrombosis (DVT) of popliteal vein of right lower extremity (HCC)    -  Primary ICD-10-CM: Q53.340 ICD-9-CM: 453.41 Encounter for monitoring coumadin therapy     ICD-10-CM: Z51.81, Z79.01 
ICD-9-CM: V58.83, V58.61 History of GI bleed     ICD-10-CM: Z87.19 ICD-9-CM: V12.79 Vitals BP Pulse Temp Resp Height(growth percentile) Weight(growth percentile) 118/52 (BP 1 Location: Left arm, BP Patient Position: Sitting) 94 97.8 °F (36.6 °C) (Oral) 18 5' 5\" (1.651 m) 222 lb 9.6 oz (101 kg) SpO2 BMI OB Status Smoking Status 100% 37.04 kg/m2 IUD Never Smoker Vitals History BMI and BSA Data Body Mass Index Body Surface Area 37.04 kg/m 2 2.15 m 2 Preferred Pharmacy Pharmacy Name Phone [facility] Christiana Agrawal[address] [phone] Your Updated Medication List  
  
   
This list is accurate as of: 1/30/17  8:36 AM.  Always use your most recent med list.  
  
  
  
  
 albuterol 90 mcg/actuation inhaler Commonly known as:  PROVENTIL HFA, VENTOLIN HFA, PROAIR HFA Take 1 Puff by inhalation every four (4) hours as needed for Wheezing. clotrimazole-betamethasone topical cream  
Commonly known as:  Kim Hernandez Apply thin layer to affected area twice daily until 48 hours after resolution Comp. Stocking,Thigh,Long,X-Sml Misc Apply to leg in am and remove at bedtime. Please disp 1 stocking. furosemide 40 mg tablet Commonly known as:  LASIX TAKE 1 TABLET BY MOUTH TWICE DAILY AS NEEDED  
  
 methotrexate 2.5 mg tablet Commonly known as:  RHEUMATREX  
TAKE 8 TABLETS BY MOUTH ONCE A WEEK  
  
 mometasone 50 mcg/actuation nasal spray Commonly known as:  NASONEX  
2 Sprays by Both Nostrils route daily. oxyCODONE-acetaminophen 5-325 mg per tablet Commonly known as:  PERCOCET Take 1 Tab by mouth every six (6) hours as needed for Pain. Max Daily Amount: 4 Tabs. ONLY FOR SEVERE PAIN  
  
 phentermine 37.5 mg tablet Commonly known as:  ADIPEX-P Take 1/2 tablet before breakfast and before dinner  
  
 raNITIdine 150 mg tablet Commonly known as:  ZANTAC Take 150 mg by mouth two (2) times a day. topiramate 50 mg tablet Commonly known as:  TOPAMAX Take 1 Tab by mouth two (2) times a day. TYLENOL EXTRA STRENGTH 500 mg tablet Generic drug:  acetaminophen Take  by mouth every six (6) hours as needed for Pain.  
  
 warfarin 5 mg tablet Commonly known as:  COUMADIN  
 Take 1.5 Tabs by mouth daily. 7.5 mg Mon-Sat; 10 mg on Sunday, Wednesday, and Friday January 2017 Details Sun Mon Tue Wed Thu Fri Sat  
  1  
  
  
  
   2  
  
  
  
   3  
  
  
  
   4  
  
  
  
   5  
  
  
  
   6  
  
  
  
   7  
  
  
  
  
  8  
  
  
  
   9  
  
  
  
   10  
  
  
  
   11  
  
  
  
   12  
  
  
  
   13  
  
  
  
   14  
  
  
  
  
  15  
  
  
  
   16  
  
  
  
   17  
  
  
  
   18  
  
  
  
   19  
  
  
  
   20  
  
  
  
   21  
  
  
  
  
  22  
  
  
  
   23  
  
  
  
   24  
  
  
  
   25  
  
  
  
   26  
  
  
  
   27  
  
  
  
   28  
  
  
  
  
  29  
  
  
  
   30 2.3  
7.5 mg  
See details 31  
  
7.5 mg Date Details 01/30 This INR check INR: 2.3 How to take your warfarin dose To take:  7.5 mg Take 1.5 of the 5 mg tablets. February 2017 Details Lui Tulio Tue Wed Thu Fri Sat  
     1  
  
7.5 mg  
  
   2  
  
7.5 mg  
  
   3  
  
7.5 mg  
  
   4  
  
7.5 mg  
  
  
  5  
  
10 mg  
  
   6  
  
7.5 mg  
  
   7  
  
7.5 mg  
  
   8  
  
7.5 mg  
  
   9  
  
7.5 mg  
  
   10  
  
7.5 mg  
  
   11  
  
7.5 mg  
  
  
  12  
  
10 mg  
  
   13  
  
7.5 mg  
  
   14  
  
  
  
   15  
  
  
  
   16  
  
  
  
   17  
  
  
  
   18  
  
  
  
  
  19  
  
  
  
   20  
  
  
  
   21  
  
  
  
   22  
  
  
  
   23  
  
  
  
   24  
  
  
  
   25  
  
  
  
  
  26  
  
  
  
   27  
  
  
  
   28  
  
  
  
      
 Date Details No additional details Date of next INR:  2/13/2017 How to take your warfarin dose To take:  7.5 mg Take 1.5 of the 5 mg tablets. To take:  10 mg Take 2 of the 5 mg tablets. We Performed the Following AMB POC PT/INR [50713 CPT(R)] Follow-up Instructions Return in about 2 weeks (around 2/13/2017). Introducing [facility] & HEALTH SERVICES!    
 Tori Garrett introduces [employer] patient portal. Now you can access parts of your medical record, email your doctor's office, and request medication refills online. 1. In your internet browser, go to [email or web address] 2. Click on the First Time User? Click Here link in the Sign In box. You will see the New Member Sign Up page. 3. Enter your [employer] Access Code exactly as it appears below. You will not need to use this code after youve completed the sign-up process. If you do not sign up before the expiration date, you must request a new code. · [employer] Access Code: [email or web address] Expires: 4/30/2017  8:20 AM 
 
4. Enter the last four digits of your Social Security Number (xxxx) and Date of Birth (mm/dd/yyyy) as indicated and click Submit. You will be taken to the next sign-up page. 5. Create a [employer] ID. This will be your [employer] login ID and cannot be changed, so think of one that is secure and easy to remember. 6. Create a [employer] password. You can change your password at any time. 7. Enter your Password Reset Question and Answer. This can be used at a later time if you forget your password. 8. Enter your e-mail address. You will receive e-mail notification when new information is available in [address] 9. Click Sign Up. You can now view and download portions of your medical record. 10. Click the Download Summary menu link to download a portable copy of your medical information. If you have questions, please visit the Frequently Asked Questions section of the [employer] website. Remember, [employer] is NOT to be used for urgent needs. For medical emergencies, dial 911. Now available from your iPhone and Android! Please provide this summary of care documentation to your next provider. Your primary care clinician is listed as Milad Hsu. If you have any questions after today's visit, please call [phone].

## 2017-02-06 DIAGNOSIS — I82.431 ACUTE DEEP VEIN THROMBOSIS (DVT) OF POPLITEAL VEIN OF RIGHT LOWER EXTREMITY (HCC): ICD-10-CM

## 2017-02-07 RX ORDER — WARFARIN SODIUM 5 MG/1
TABLET ORAL
Qty: 45 TAB | Refills: 2 | Status: SHIPPED | OUTPATIENT
Start: 2017-02-07 | End: 2017-05-01 | Stop reason: SDUPTHER

## 2017-02-13 ENCOUNTER — OFFICE VISIT (OUTPATIENT)
Dept: FAMILY MEDICINE CLINIC | Age: 47
End: 2017-02-13

## 2017-02-13 VITALS
RESPIRATION RATE: 16 BRPM | HEART RATE: 77 BPM | BODY MASS INDEX: 36.62 KG/M2 | TEMPERATURE: 97.3 F | WEIGHT: 219.8 LBS | HEIGHT: 65 IN | DIASTOLIC BLOOD PRESSURE: 85 MMHG | OXYGEN SATURATION: 95 % | SYSTOLIC BLOOD PRESSURE: 124 MMHG

## 2017-02-13 DIAGNOSIS — Z87.19 HISTORY OF GI BLEED: ICD-10-CM

## 2017-02-13 DIAGNOSIS — I82.431 ACUTE DEEP VEIN THROMBOSIS (DVT) OF POPLITEAL VEIN OF RIGHT LOWER EXTREMITY (HCC): Primary | ICD-10-CM

## 2017-02-13 DIAGNOSIS — Z79.01 ANTICOAGULATION MONITORING, INR RANGE 2.5-3.5: ICD-10-CM

## 2017-02-13 DIAGNOSIS — J06.9 VIRAL URI: ICD-10-CM

## 2017-02-13 LAB
INR BLD: 2.3
PT POC: NORMAL SEC
VALID INTERNAL CONTROL?: YES

## 2017-02-13 RX ORDER — PROMETHAZINE HYDROCHLORIDE AND CODEINE PHOSPHATE 6.25; 1 MG/5ML; MG/5ML
1 SOLUTION ORAL
Qty: 120 ML | Refills: 0 | Status: SHIPPED | OUTPATIENT
Start: 2017-02-13 | End: 2017-03-13 | Stop reason: ALTCHOICE

## 2017-02-13 NOTE — MR AVS SNAPSHOT
Visit Information Date & Time Provider Department Dept. Phone Encounter #  
 2/13/2017  8:30 AM Toya Sailnas MD [facility] at [address] [id number] Follow-up Instructions Return in about 4 weeks (around 3/13/2017), or if symptoms worsen or fail to improve. Your Appointments 7/11/2017 10:50 AM  
Follow Up with Remedios Jara MD  
[facility] Appt Note: f/u          dm/ weight loss        6 mon [employer] [address] [phone] [address] Upcoming Health Maintenance Date Due COLONOSCOPY 6/8/2016 PAP AKA CERVICAL CYTOLOGY 11/19/2017 DTaP/Tdap/Td series (2 - Td) 5/17/2026 Allergies as of 2/13/2017  Review Complete On: 2/13/2017 By: Toya Salinas MD  
  
 Severity Noted Reaction Type Reactions Sulfa (Sulfonamide Antibiotics) High 03/12/2010    Anaphylaxis Codeine Medium 03/17/2010    Itching Current Immunizations  Reviewed on 5/2/2016 Name Date Influenza Vaccine (Quad) PF 10/27/2016, 10/30/2015 Influenza Vaccine PF 10/11/2013 Influenza Vaccine Split 12/13/2012, 10/5/2011 Pneumococcal Polysaccharide (PPSV-23) 4/11/2016 11:40 AM  
  
 Not reviewed this visit You Were Diagnosed With   
  
 Codes Comments Acute deep vein thrombosis (DVT) of popliteal vein of right lower extremity (HCC)    -  Primary ICD-10-CM: C27.961 ICD-9-CM: 453.41 Anticoagulation monitoring, INR range 2.5-3.5     ICD-10-CM: Z79.01 
ICD-9-CM: V58.61 History of GI bleed     ICD-10-CM: Z87.19 ICD-9-CM: V12.79 Vitals BP Pulse Temp Resp Height(growth percentile) Weight(growth percentile) 124/85 (BP 1 Location: Left arm, BP Patient Position: Sitting) 77 97.3 °F (36.3 °C) (Oral) 16 5' 5\" (1.651 m) 219 lb 12.8 oz (99.7 kg) SpO2 BMI OB Status Smoking Status 95% 36.58 kg/m2 IUD Never Smoker Vitals History BMI and BSA Data Body Mass Index Body Surface Area  
 36.58 kg/m 2 2.14 m 2 Preferred Pharmacy Pharmacy Name Phone [facility] Evan Noriega, [address] [place] [address] [phone] Your Updated Medication List  
  
   
This list is accurate as of: 2/13/17  8:39 AM.  Always use your most recent med list.  
  
  
  
  
 albuterol 90 mcg/actuation inhaler Commonly known as:  PROVENTIL HFA, VENTOLIN HFA, PROAIR HFA Take 1 Puff by inhalation every four (4) hours as needed for Wheezing. clotrimazole-betamethasone topical cream  
Commonly known as:  Carito Dexter Apply thin layer to affected area twice daily until 48 hours after resolution Comp. Stocking,Thigh,Long,X-Sml Misc Apply to leg in am and remove at bedtime. Please disp 1 stocking. furosemide 40 mg tablet Commonly known as:  LASIX TAKE 1 TABLET BY MOUTH TWICE DAILY AS NEEDED  
  
 methotrexate 2.5 mg tablet Commonly known as:  RHEUMATREX  
TAKE 8 TABLETS BY MOUTH ONCE A WEEK  
  
 mometasone 50 mcg/actuation nasal spray Commonly known as:  NASONEX  
2 Sprays by Both Nostrils route daily. oxyCODONE-acetaminophen 5-325 mg per tablet Commonly known as:  PERCOCET Take 1 Tab by mouth every six (6) hours as needed for Pain. Max Daily Amount: 4 Tabs. ONLY FOR SEVERE PAIN  
  
 phentermine 37.5 mg tablet Commonly known as:  ADIPEX-P Take 1/2 tablet before breakfast and before dinner  
  
 raNITIdine 150 mg tablet Commonly known as:  ZANTAC Take 150 mg by mouth two (2) times a day. topiramate 50 mg tablet Commonly known as:  TOPAMAX Take 1 Tab by mouth two (2) times a day. TYLENOL EXTRA STRENGTH 500 mg tablet Generic drug:  acetaminophen Take  by mouth every six (6) hours as needed for Pain.  
  
 warfarin 5 mg tablet Commonly known as:  COUMADIN  
 TAKE 1 & 1/2 TABLETS BY MOUTH ON MONDAY THROUGH SATURDAY. TAKE 2 TABLETS BY MOUTH ON SUNDAY February 2017 Details Sun Mon Tue Wed Thu Fri Sat  
     1  
  
  
  
   2  
  
  
  
   3  
  
  
  
   4  
  
  
  
  
  5  
  
  
  
   6  
  
  
  
   7  
  
  
  
   8  
  
  
  
   9  
  
  
  
   10  
  
  
  
   11  
  
  
  
  
  12  
  
  
  
   13 2.3  
7.5 mg  
See details 14  
  
7.5 mg  
  
   15  
  
7.5 mg  
  
   16  
  
7.5 mg  
  
   17  
  
7.5 mg  
  
   18  
  
7.5 mg  
  
  
  19  
  
10 mg  
  
   20  
  
7.5 mg  
  
   21  
  
7.5 mg  
  
   22  
  
7.5 mg  
  
   23  
  
7.5 mg  
  
   24  
  
7.5 mg  
  
   25  
  
7.5 mg  
  
  
  26  
  
10 mg  
  
   27  
  
7.5 mg  
  
   28  
  
7.5 mg Date Details 02/13 This INR check INR: 2.3 How to take your warfarin dose To take:  7.5 mg Take 1.5 of the 5 mg tablets. To take:  10 mg Take 2 of the 5 mg tablets. March 2017 Details Emir Samuel Tue Wed Thu Fri Sat  
     1  
  
7.5 mg  
  
   2  
  
7.5 mg  
  
   3  
  
7.5 mg  
  
   4  
  
7.5 mg  
  
  
  5  
  
10 mg  
  
   6  
  
7.5 mg  
  
   7  
  
7.5 mg  
  
   8  
  
7.5 mg  
  
   9  
  
7.5 mg  
  
   10  
  
7.5 mg  
  
   11  
  
7.5 mg  
  
  
  12  
  
10 mg  
  
   13  
  
7.5 mg  
  
   14  
  
  
  
   15  
  
  
  
   16  
  
  
  
   17  
  
  
  
   18  
  
  
  
  
  19  
  
  
  
   20  
  
  
  
   21  
  
  
  
   22  
  
  
  
   23  
  
  
  
   24  
  
  
  
   25  
  
  
  
  
  26  
  
  
  
   27  
  
  
  
   28  
  
  
  
   29  
  
  
  
   30  
  
  
  
   31  
  
  
  
   
 Date Details No additional details Date of next INR:  3/13/2017 How to take your warfarin dose To take:  7.5 mg Take 1.5 of the 5 mg tablets. To take:  10 mg Take 2 of the 5 mg tablets. We Performed the Following AMB POC PT/INR [86434 CPT(R)] Follow-up Instructions Return in about 4 weeks (around 3/13/2017), or if symptoms worsen or fail to improve. Introducing [facility]! [address] introduces [email or web address] patient portal. Now you can access parts of your medical record, email your doctor's office, and request medication refills online. 1. In your internet browser, go to [email or web address] 2. Click on the First Time User? Click Here link in the Sign In box. You will see the New Member Sign Up page. 3. Enter your [email or web address] Access Code exactly as it appears below. You will not need to use this code after youve completed the sign-up process. If you do not sign up before the expiration date, you must request a new code. · [email or web address] Access Code: [email or web address] Expires: 4/30/2017  8:20 AM 
 
4. Enter the last four digits of your Social Security Number (xxxx) and Date of Birth (mm/dd/yyyy) as indicated and click Submit. You will be taken to the next sign-up page. 5. Create a [email or web address] ID. This will be your [email or web address] login ID and cannot be changed, so think of one that is secure and easy to remember. 6. Create a [email or web address] password. You can change your password at any time. 7. Enter your Password Reset Question and Answer. This can be used at a later time if you forget your password. 8. Enter your e-mail address. You will receive e-mail notification when new information is available in [address] 9. Click Sign Up. You can now view and download portions of your medical record. 10. Click the Download Summary menu link to download a portable copy of your medical information. If you have questions, please visit the Frequently Asked Questions section of the [email or web address] website. Remember, [email or web address] is NOT to be used for urgent needs. For medical emergencies, dial 911. Now available from your iPhone and Android! Please provide this summary of care documentation to your next provider. Your primary care clinician is listed as Damon Cohens. If you have any questions after today's visit, please call [phone].

## 2017-02-13 NOTE — PROGRESS NOTES
Chief Complaint   Patient presents with    Follow-up     PT/INR   Discuss nasal congestion  Nosebleed this morning  Room 4

## 2017-02-13 NOTE — PROGRESS NOTES
Subjective:     Chief Complaint   Patient presents with    Follow-up     PT/INR      She  is a 48 y.o. female who presents for evaluation of:   DVT - admitted for DVT after admission for pouchitis with rectal bleeding. DVT hx and noted after long hospital admission. Confirmed with LE dopplers and r/o PE with CT. Admitted and bridged with Lovenox and Coumadin. Hgb at d/c was 9.3 and has improved to > 11. In hospital, she saw Dr. Soy Tuttle (H/O) and Dr. Yaokv Silva (Vasc surgery). Advised against IVC and rec for 3-6 months of anticoag. Given 2nd DVT (both provoked) and overall hx, will opt for 6 month course. Was advised not to use Eliquis or Xarelto d/t difficulty with reversal and one episode of rectal bleeding (presumed from pouchitis). Notes reviewed and overall appears that pouchitis should have low risk of rebleed. Rest per Anticoag tab    Psoriatic Arthritis & UC - Followed by Dr. Alvaro Schneider. Stopped Embril & MTX for transaminitis and this resolved. Acute reaction at injection site to Humira so was switched back to MTX. Had trial of Remicaide but difficulty with IV access. Discussed options with possibility of getting PICC for remicaide infusion - she would have to have this for ~ 6 weeks. Advised her to discuss options further with Dr. Yaya Prakash and she was able to find an alternative for Psoriatic Arthritis and UC. Started Cimzia (Certolizumab).     ROS  Gen - no fever/chills  ENT - URI sx x 1 week, had f/c initially but resolved, still struggling with coughing and hoarseness, not sleeping well  Resp - no cough or dyspnea  CV - no chest pain or CARRASQUILLO  GI - per HPI  Rest per HPI     Objective:     Vitals:    02/13/17 0829   BP: 124/85   Pulse: 77   Resp: 16   Temp: 97.3 °F (36.3 °C)   TempSrc: Oral   SpO2: 95%   Weight: 219 lb 12.8 oz (99.7 kg)   Height: 5' 5\" (1.651 m)     Physical Examination:  General appearance - alert, well appearing, and in no distress  Eyes -sclera anicteric  ENT - + turbinate swelling with drainage, mild erythema in post pharynx  Chest - clear to auscultation, no wheezes, rales or rhonchi, symmetric air entry  Heart - normal rate, regular rhythm, normal S1, S2, no murmurs, rubs, clicks or gallops  Neurological - alert, oriented, no focal findings or movement disorder noted  Extr - RLE with trace edema     Past Medical History   Diagnosis Date    Asthma 3/12/2010    DDD (degenerative disc disease), lumbar     DJD (degenerative joint disease) 3/12/2010    DJD (degenerative joint disease) of knee     DVT (deep venous thrombosis) (HCC)      Rt leg    GERD (gastroesophageal reflux disease)     History of tuberculosis exposure 2013     pt states she has + PPD and was on Rx for 6 months; last dose either 11/13 or 12/13    Inflammatory polyarthritis ([address]      Psoriatic Arthitis    Morbid obesity ([address]     Pseudogout     Skin abrasion 1/28/14     After loosing 125#, Bilateral Inner thigh friction flareup monthly with skin breakdown    Thromboembolus ([address] 2008     Rt leg,  pt states she fell and had a plane ride home and developed blood clots    UC (ulcerative colitis) ([address] 3/12/2010     Past Surgical History   Procedure Laterality Date    Hx tonsillectomy      Hx colectomy  1992     Dr Sherwin Croft Hx acl reconstruction       Rt    Hx orthopaedic  12/13     Rt foot / toes     Current Outpatient Prescriptions on File Prior to Visit   Medication Sig Dispense Refill    warfarin (COUMADIN) 5 mg tablet TAKE 1 & 1/2 TABLETS BY MOUTH ON MONDAY THROUGH SATURDAY. TAKE 2 TABLETS BY MOUTH ON SUNDAY 45 Tab 2    topiramate (TOPAMAX) 50 mg tablet Take 1 Tab by mouth two (2) times a day. 60 Tab 5    phentermine (ADIPEX-P) 37.5 mg tablet Take 1/2 tablet before breakfast and before dinner 100 Tab 5    furosemide (LASIX) 40 mg tablet TAKE 1 TABLET BY MOUTH TWICE DAILY AS NEEDED 180 Tab 0    mometasone (NASONEX) 50 mcg/actuation nasal spray 2 Sprays by Both Nostrils route daily.  1 Container 1    oxyCODONE-acetaminophen (PERCOCET) 5-325 mg per tablet Take 1 Tab by mouth every six (6) hours as needed for Pain. Max Daily Amount: 4 Tabs. ONLY FOR SEVERE PAIN 30 Tab 0    clotrimazole-betamethasone (LOTRISONE) topical cream Apply thin layer to affected area twice daily until 48 hours after resolution 15 g 0    Comp. Stocking,Thigh,Long,X-Sml misc Apply to leg in am and remove at bedtime. Please disp 1 stocking. 1 Each 0    methotrexate (RHEUMATREX) 2.5 mg tablet TAKE 8 TABLETS BY MOUTH ONCE A WEEK  0    ranitidine (ZANTAC) 150 mg tablet Take 150 mg by mouth two (2) times a day.  albuterol (PROVENTIL HFA, VENTOLIN HFA, PROAIR HFA) 90 mcg/actuation inhaler Take 1 Puff by inhalation every four (4) hours as needed for Wheezing. 1 Inhaler 5    acetaminophen (TYLENOL EXTRA STRENGTH) 500 mg tablet Take  by mouth every six (6) hours as needed for Pain. No current facility-administered medications on file prior to visit. Assessment/ Plan:   Cody Rodriguez was seen today for follow-up. Diagnoses and all orders for this visit:    Acute deep vein thrombosis (DVT) of popliteal vein of right lower extremity ([address]  Long term current use of anticoagulant  Hx of GI bleed  - INR therapeutic  -     AMB POC PT/INR    Viral URI - will use cough syrup, may ct Nasonex and can start Zyrtec to help. No indication for abx. I have discussed the diagnosis with the patient and the intended plan as seen in the above orders. The patient has received an after-visit summary and questions were answered concerning future plans. I have discussed medication side effects and warnings with the patient as well. The patient verbalizes understanding and agreement with the plan. Follow-up Disposition:  Return in about 4 weeks (around 3/13/2017), or if symptoms worsen or fail to improve.

## 2017-03-13 ENCOUNTER — HOSPITAL ENCOUNTER (OUTPATIENT)
Dept: GENERAL RADIOLOGY | Age: 47
Discharge: HOME | End: 2017-03-13
Payer: COMMERCIAL

## 2017-03-13 ENCOUNTER — OFFICE VISIT (OUTPATIENT)
Dept: FAMILY MEDICINE CLINIC | Age: 47
End: 2017-03-13

## 2017-03-13 VITALS
SYSTOLIC BLOOD PRESSURE: 112 MMHG | BODY MASS INDEX: 37.99 KG/M2 | HEIGHT: 65 IN | WEIGHT: 228 LBS | TEMPERATURE: 97.1 F | OXYGEN SATURATION: 92 % | DIASTOLIC BLOOD PRESSURE: 59 MMHG | RESPIRATION RATE: 16 BRPM | HEART RATE: 80 BPM

## 2017-03-13 DIAGNOSIS — I82.431 ACUTE DEEP VEIN THROMBOSIS (DVT) OF POPLITEAL VEIN OF RIGHT LOWER EXTREMITY (HCC): Primary | ICD-10-CM

## 2017-03-13 DIAGNOSIS — M17.0 PRIMARY OSTEOARTHRITIS OF BOTH KNEES: ICD-10-CM

## 2017-03-13 DIAGNOSIS — K51.90 ULCERATIVE COLITIS WITHOUT COMPLICATIONS, UNSPECIFIED LOCATION (HCC): ICD-10-CM

## 2017-03-13 DIAGNOSIS — K91.89 SMALL BOWEL ANASTOMOTIC STRICTURE: ICD-10-CM

## 2017-03-13 DIAGNOSIS — K59.00 CONSTIPATION, UNSPECIFIED CONSTIPATION TYPE: ICD-10-CM

## 2017-03-13 DIAGNOSIS — Z87.19 HISTORY OF GI BLEED: ICD-10-CM

## 2017-03-13 DIAGNOSIS — L40.50 PSORIATIC ARTHRITIS (HCC): ICD-10-CM

## 2017-03-13 DIAGNOSIS — R60.0 BILATERAL LOWER EXTREMITY EDEMA: ICD-10-CM

## 2017-03-13 DIAGNOSIS — K91.30 SMALL BOWEL ANASTOMOTIC STRICTURE: ICD-10-CM

## 2017-03-13 DIAGNOSIS — Z79.01 ANTICOAGULANT LONG-TERM USE: ICD-10-CM

## 2017-03-13 DIAGNOSIS — Z79.899 ENCOUNTER FOR LONG-TERM (CURRENT) USE OF MEDICATIONS: ICD-10-CM

## 2017-03-13 LAB
INR BLD: 2.4
PT POC: NORMAL SEC
VALID INTERNAL CONTROL?: YES

## 2017-03-13 PROCEDURE — 72050 X-RAY EXAM NECK SPINE 4/5VWS: CPT

## 2017-03-13 PROCEDURE — 73562 X-RAY EXAM OF KNEE 3: CPT

## 2017-03-13 RX ORDER — FUROSEMIDE 40 MG/1
TABLET ORAL
Qty: 180 TAB | Refills: 0 | Status: SHIPPED | COMMUNITY
Start: 2017-03-13 | End: 2017-06-01 | Stop reason: SDUPTHER

## 2017-03-13 RX ORDER — POLYETHYLENE GLYCOL 3350 17 G/17G
17 POWDER, FOR SOLUTION ORAL DAILY
Qty: 225 G | Refills: 0 | Status: SHIPPED | OUTPATIENT
Start: 2017-03-13 | End: 2017-06-05

## 2017-03-13 NOTE — MR AVS SNAPSHOT
Visit Information Date & Time Provider Department Dept. Phone Encounter #  
 3/13/2017  8:30 AM Eric Moseley MD [facility] at [address] [id number] Follow-up Instructions Return in about 4 weeks (around 4/10/2017). Your Appointments 7/11/2017 10:50 AM  
Follow Up with MD [facility]) Appt Note: f/u          dm/ weight loss        [address] mon [address] [facility] [id number] [address] [facility] Upcoming Health Maintenance Date Due  
 PAP AKA CERVICAL CYTOLOGY 11/19/2017 DTaP/Tdap/Td series (2 - Td) 5/17/2026 Allergies as of 3/13/2017  Review Complete On: 3/13/2017 By: Eric Moseley MD  
  
 Severity Noted Reaction Type Reactions Sulfa (Sulfonamide Antibiotics) High 03/12/2010    Anaphylaxis Codeine Medium 03/17/2010    Itching Current Immunizations  Reviewed on 5/2/2016 Name Date Influenza Vaccine (Quad) PF 10/27/2016, 10/30/2015 Influenza Vaccine PF 10/11/2013 Influenza Vaccine Split 12/13/2012, 10/5/2011 Pneumococcal Polysaccharide (PPSV-23) 4/11/2016 11:40 AM  
  
 Not reviewed this visit You Were Diagnosed With   
  
 Codes Comments Acute deep vein thrombosis (DVT) of popliteal vein of right lower extremity (HCC)    -  Primary ICD-10-CM: E15.115 ICD-9-CM: 453.41 Anticoagulant long-term use     ICD-10-CM: Z79.01 
ICD-9-CM: V58.61 History of GI bleed     ICD-10-CM: Z87.19 ICD-9-CM: V12.79 Encounter for long-term (current) use of medications     ICD-10-CM: Z79.899 ICD-9-CM: V58.69 Primary osteoarthritis of both knees     ICD-10-CM: M17.0 ICD-9-CM: 715.16 Psoriatic arthritis ([facility]     ICD-10-CM: L40.50 ICD-9-CM: 696.0 Constipation, unspecified constipation type     ICD-10-CM: K59.00 ICD-9-CM: 564.00   
 Ulcerative colitis without complications, unspecified location [facility])     ICD-10-CM: K51.90 ICD-9-CM: 556.9 Small bowel anastomotic stricture     ICD-10-CM: K91.89 ICD-9-CM: 997.49 Edema of right lower extremity     ICD-10-CM: R60.0 ICD-9-CM: 248. 3 Vitals BP Pulse Temp Resp Height(growth percentile) Weight(growth percentile) 112/59 (BP 1 Location: Left arm, BP Patient Position: Sitting) 80 97.1 °F (36.2 °C) (Oral) 16 5' 5\" (1.651 m) 228 lb (103.4 kg) SpO2 BMI OB Status Smoking Status 92% 37.94 kg/m2 IUD Never Smoker Vitals History BMI and BSA Data Body Mass Index Body Surface Area  
 37.94 kg/m 2 2.18 m 2 Preferred Pharmacy Pharmacy Name Phone [facility] [address] [place] [address] [phone] Your Updated Medication List  
  
   
This list is accurate as of: 3/13/17  9:33 AM.  Always use your most recent med list.  
  
  
  
  
 albuterol 90 mcg/actuation inhaler Commonly known as:  PROVENTIL HFA, VENTOLIN HFA, PROAIR HFA Take 1 Puff by inhalation every four (4) hours as needed for Wheezing. Comp. Stocking,Thigh,Long,X-Lrg Misc Apply to leg in am and remove at bedtime. Please disp 1 stocking. Comp. Stocking,Thigh,Long,X-Sml Misc Apply to leg in am and remove at bedtime. Please disp 1 stocking. furosemide 40 mg tablet Commonly known as:  LASIX TAKE 1 TABLET BY MOUTH TWICE DAILY AS NEEDED  
  
 methotrexate 2.5 mg tablet Commonly known as:  RHEUMATREX  
TAKE 8 TABLETS BY MOUTH ONCE A WEEK  
  
 mometasone 50 mcg/actuation nasal spray Commonly known as:  NASONEX  
2 Sprays by Both Nostrils route daily. oxyCODONE-acetaminophen 5-325 mg per tablet Commonly known as:  PERCOCET Take 1 Tab by mouth every six (6) hours as needed for Pain. Max Daily Amount: 4 Tabs. ONLY FOR SEVERE PAIN  
  
 phentermine 37.5 mg tablet Commonly known as:  ADIPEX-P  
 Take 1/2 tablet before breakfast and before dinner  
  
 polyethylene glycol 17 gram/dose powder Commonly known as:  Michelle Edwin Take 17 g by mouth daily. raNITIdine 150 mg tablet Commonly known as:  ZANTAC Take 150 mg by mouth two (2) times a day. topiramate 50 mg tablet Commonly known as:  TOPAMAX Take 1 Tab by mouth two (2) times a day. TYLENOL EXTRA STRENGTH 500 mg tablet Generic drug:  acetaminophen Take  by mouth every six (6) hours as needed for Pain.  
  
 warfarin 5 mg tablet Commonly known as:  COUMADIN  
TAKE 1 & 1/2 TABLETS BY MOUTH ON MONDAY THROUGH SATURDAY. TAKE 2 TABLETS BY MOUTH ON SUNDAY Prescriptions Printed Refills Comp. Stocking,Thigh,Long,X-Lrg misc 0 Sig: Apply to leg in am and remove at bedtime. Please disp 1 stocking. Class: Print Prescriptions Sent to Pharmacy Refills  
 polyethylene glycol (MIRALAX) 17 gram/dose powder 0 Sig: Take 17 g by mouth daily. Class: Normal  
 Pharmacy: [address] [place] [address], [address] [place] [address] Ph #: [phone] Route: Oral  
  
March 2017 Details Sun Mon Tue Wed Thu Fri Sat  
     1  
  
  
  
   2  
  
  
  
   3  
  
  
  
   4  
  
  
  
  
  5  
  
  
  
   6  
  
  
  
   7  
  
  
  
   8  
  
  
  
   9  
  
  
  
   10  
  
  
  
   11  
  
  
  
  
  12  
  
  
  
   13 2.4  
7.5 mg  
See details 14  
  
7.5 mg  
  
   15  
  
7.5 mg  
  
   16  
  
7.5 mg  
  
   17  
  
7.5 mg  
  
   18  
  
7.5 mg  
  
  
  19  
  
10 mg  
  
   20  
  
7.5 mg  
  
   21  
  
7.5 mg  
  
   22  
  
7.5 mg  
  
   23  
  
7.5 mg  
  
   24  
  
7.5 mg  
  
   25  
  
7.5 mg  
  
  
  26  
  
10 mg  
  
   27  
  
7.5 mg  
  
   28  
  
7.5 mg  
  
   29  
  
7.5 mg  
  
   30  
  
7.5 mg  
  
   31  
  
7.5 mg Date Details 03/13 This INR check INR: 2.4 How to take your warfarin dose To take:  7.5 mg Take 1.5 of the 5 mg tablets. To take:  10 mg Take 2 of the 5 mg tablets. April 2017 Details Charles Wall Wed Thu Fri Sat  
        1  
  
7.5 mg  
  
  
  2  
  
10 mg  
  
   3  
  
7.5 mg  
  
   4  
  
7.5 mg  
  
   5  
  
7.5 mg  
  
   6  
  
7.5 mg  
  
   7  
  
7.5 mg  
  
   8  
  
7.5 mg  
  
  
  9  
  
10 mg  
  
   10  
  
7.5 mg  
  
   11  
  
  
  
   12  
  
  
  
   13  
  
  
  
   14  
  
  
  
   15  
  
  
  
  
  16  
  
  
  
   17  
  
  
  
   18  
  
  
  
   19  
  
  
  
   20  
  
  
  
   21  
  
  
  
   22  
  
  
  
  
  23  
  
  
  
   24  
  
  
  
   25  
  
  
  
   26  
  
  
  
   27  
  
  
  
   28  
  
  
  
   29  
  
  
  
  
  30  
  
  
  
        
 Date Details No additional details Date of next INR:  4/10/2017 How to take your warfarin dose To take:  7.5 mg Take 1.5 of the 5 mg tablets. To take:  10 mg Take 2 of the 5 mg tablets. We Performed the Following AMB POC PT/INR [98630 CPT(R)] HEMOGLOBIN [id number] CPT(R)] METABOLIC PANEL, BASIC [40363 CPT(R)] Follow-up Instructions Return in about 4 weeks (around 4/10/2017). To-Do List   
 03/13/2017 Imaging:  XR KNEE LT 3 V   
  
 03/13/2017 Imaging:  XR KNEE RT 3 V   
  
 03/13/2017 Imaging:  XR SPINE CERV 4 OR 5 V Introducing [facility]! Cragford Rojas introduces [employer] patient portal. Now you can access parts of your medical record, email your doctor's office, and request medication refills online. 1. In your internet browser, go to [email or web address] 2. Click on the First Time User? Click Here link in the Sign In box. You will see the New Member Sign Up page. 3. Enter your [employer] Access Code exactly as it appears below. You will not need to use this code after youve completed the sign-up process. If you do not sign up before the expiration date, you must request a new code. · [employer] Access Code: [email or web address] Expires: 4/30/2017  9:20 AM 
 
4. Enter the last four digits of your Social Security Number (xxxx) and Date of Birth (mm/dd/yyyy) as indicated and click Submit. You will be taken to the next sign-up page. 5. Create a [employer] ID. This will be your [employer] login ID and cannot be changed, so think of one that is secure and easy to remember. 6. Create a [employer] password. You can change your password at any time. 7. Enter your Password Reset Question and Answer. This can be used at a later time if you forget your password. 8. Enter your e-mail address. You will receive e-mail notification when new information is available in [address] 9. Click Sign Up. You can now view and download portions of your medical record. 10. Click the Download Summary menu link to download a portable copy of your medical information. If you have questions, please visit the Frequently Asked Questions section of the [employer] website. Remember, [employer] is NOT to be used for urgent needs. For medical emergencies, dial 911. Now available from your iPhone and Android! Please provide this summary of care documentation to your next provider. Your primary care clinician is listed as Yani Woodall. If you have any questions after today's visit, please call [phone].

## 2017-03-13 NOTE — PROGRESS NOTES
Subjective:     Chief Complaint   Patient presents with    Follow-up     PT/INR      She  is a 48 y.o. female who presents for evaluation of:   DVT - admitted for DVT after admission for pouchitis with rectal bleeding. DVT hx and noted after long hospital admission. Confirmed with LE dopplers and r/o PE with CT. Admitted and bridged with Lovenox and Coumadin. Hgb at d/c was 9.3 and has improved to > 11. In hospital, she saw Dr. Arcelia Boss (H/O) and Dr. Leanne Pandya (Vasc surgery). Advised against IVC and rec for 3-6 months of anticoag. Given 2nd DVT (both provoked) and overall hx, will opt for 6 month course. Was advised not to use Eliquis or Xarelto d/t difficulty with reversal and one episode of rectal bleeding (presumed from pouchitis). Notes reviewed and overall appears that pouchitis should have low risk of rebleed. Rest per Anticoag tab    Psoriatic Arthritis & UC - Followed by Dr. Sarah Ca. Stopped Embril & MTX for transaminitis and this resolved. Acute reaction at injection site to Humira so was switched back to MTX. Had trial of Remicaide but difficulty with IV access. Discussed options with possibility of getting PICC for remicaide infusion - she would need this for ~ 6 weeks. Advised her to discuss options further with Dr. Francia Fuchs and she was able to find an alternative for Psoriatic Arthritis and UC. Started Cimzia (Certolizumab). ROS  Gen - no fever/chills  Resp - no cough or dyspnea  CV - no chest pain or CARRASQUILLO  GI - recently having some constipation. + flatus, Not taking pain meds routinely.   Rest per HPI     Objective:     Vitals:    03/13/17 0852   BP: 112/59   Pulse: 80   Resp: 16   Temp: 97.1 °F (36.2 °C)   TempSrc: Oral   SpO2: 92%   Weight: 228 lb (103.4 kg)   Height: 5' 5\" (1.651 m)     Physical Examination:  General appearance - alert, well appearing, and in no distress  Eyes -sclera anicteric  ENT - + turbinate swelling with drainage, mild erythema in post pharynx  Chest - clear to auscultation, no wheezes, rales or rhonchi, symmetric air entry  Heart - normal rate, regular rhythm, normal S1, S2, no murmurs, rubs, clicks or gallops  Neurological - alert, oriented, no focal findings or movement disorder noted  Extr - RLE with trace edema     Past Medical History:   Diagnosis Date    Asthma 3/12/2010    DDD (degenerative disc disease), lumbar     DJD (degenerative joint disease) 3/12/2010    DJD (degenerative joint disease) of knee     [facility]VT [facility]deep venous thrombosis) ([facility]     Rt leg    GERD (gastroesophageal reflux disease)     History of tuberculosis exposure 2013    pt states she has + PPD and was on Rx for 6 months; last dose either 11/13 or 12/13    Inflammatory polyarthritis ([address]     Psoriatic Arthitis    Morbid obesity ([address]     Pseudogout     Skin abrasion 1/28/14    After loosing 125#, Bilateral Inner thigh friction flareup monthly with skin breakdown    Thromboembolus ([address] 2008    Rt leg,  pt states she fell and had a plane ride home and developed blood clots    UC (ulcerative colitis) ([address] 3/12/2010     Past Surgical History:   Procedure Laterality Date    HX ACL RECONSTRUCTION      Rt    Reyna Pollock    Dr Cameron Canavan HX ORTHOPAEDIC  12/13    Rt foot / toes    HX TONSILLECTOMY       Current Outpatient Prescriptions on File Prior to Visit   Medication Sig Dispense Refill    warfarin (COUMADIN) 5 mg tablet TAKE 1 & 1/2 TABLETS BY MOUTH ON MONDAY THROUGH SATURDAY. TAKE 2 TABLETS BY MOUTH ON SUNDAY 45 Tab 2    topiramate (TOPAMAX) 50 mg tablet Take 1 Tab by mouth two (2) times a day. 60 Tab 5    phentermine (ADIPEX-P) 37.5 mg tablet Take 1/2 tablet before breakfast and before dinner 100 Tab 5    furosemide (LASIX) 40 mg tablet TAKE 1 TABLET BY MOUTH TWICE DAILY AS NEEDED 180 Tab 0    mometasone (NASONEX) 50 mcg/actuation nasal spray 2 Sprays by Both Nostrils route daily.  1 Container 1    oxyCODONE-acetaminophen (PERCOCET) 5-325 mg per tablet Take 1 Tab by mouth every six (6) hours as needed for Pain. Max Daily Amount: 4 Tabs. ONLY FOR SEVERE PAIN 30 Tab 0    methotrexate (RHEUMATREX) 2.5 mg tablet TAKE 8 TABLETS BY MOUTH ONCE A WEEK  0    ranitidine (ZANTAC) 150 mg tablet Take 150 mg by mouth two (2) times a day.  albuterol (PROVENTIL HFA, VENTOLIN HFA, PROAIR HFA) 90 mcg/actuation inhaler Take 1 Puff by inhalation every four (4) hours as needed for Wheezing. 1 Inhaler 5    acetaminophen (TYLENOL EXTRA STRENGTH) 500 mg tablet Take  by mouth every six (6) hours as needed for Pain.  clotrimazole-betamethasone (LOTRISONE) topical cream Apply thin layer to affected area twice daily until 48 hours after resolution 15 g 0    Comp. Stocking,Thigh,Long,X-Sml misc Apply to leg in am and remove at bedtime. Please disp 1 stocking. 1 Each 0     No current facility-administered medications on file prior to visit. Assessment/ Plan:   Shaista Vega was seen today for follow-up. Diagnoses and all orders for this visit:    Acute deep vein thrombosis (DVT) of popliteal vein of right lower extremity ([address]  Long term current use of anticoagulant  Hx of GI bleed  - INR therapeutic  -     AMB POC PT/INR    Encounter for long-term (current) use of medications  -     HEMOGLOBIN  -     METABOLIC PANEL, BASIC    Primary osteoarthritis of both knees  Psoriatic arthritis ([facility]  - X-rays ordered, copies to pt to give to Dr. Brandon Bennett  -     XR KNEE LT 3 V; Future  -     XR KNEE RT 3 V; Future  -     XR SPINE CERV 4 OR 5 V; Future    Constipation, unspecified constipation type - will try Miralax acutely. If not improving over next few days, needs to check in with Dr. Greta Kilgore given hx. -     polyethylene glycol (MIRALAX) 17 gram/dose powder; Take 17 g by mouth daily. Ulcerative colitis without complications, unspecified location [facility])  Small bowel anastomotic stricture    Edema of bilat lower extremity  -     Comp. Stocking,Thigh,Long,X-Lrg misc;  Apply to leg in am and remove at bedtime. Please disp 1 stocking. I have discussed the diagnosis with the patient and the intended plan as seen in the above orders. The patient has received an after-visit summary and questions were answered concerning future plans. I have discussed medication side effects and warnings with the patient as well. The patient verbalizes understanding and agreement with the plan. Follow-up Disposition:  Return in about 4 weeks (around 4/10/2017).      **Had Flex sig with Dr. Sia Lugo in 2/2015 and another Flex sig with Dr. Valentina Soto in 4/2016 during hospitalization so does not need another colonoscopy/flex sig any time soon

## 2017-03-13 NOTE — LETTER
3/13/2017 5:33 PM 
 
Ms. Sina Arrieta [id number] [facility] [place] [address] Dear Sina Berkowitz: 
 
Please find your most recent results below. Resulted Orders XR KNEE LT 3 V Narrative EXAM:  XR KNEE RT 3 V, XR KNEE LT 3 V 
 
INDICATION:   Primary osteoarthritis of both knees, Psoriatic arthritis. Medication change COMPARISON: 10.14.2011 right knee. FINDINGS:  
 
Three views of the right knee demonstrate no fracture or other acute osseous or 
articular abnormality. Tricompartmental osteophytosis is present. A right ACL 
interference screw is in place with mild lucency surrounding the screw, but no 
change in position of the screw. There is no effusion. Three views of the left knee demonstrate no fracture or other acute osseous or 
articular abnormality. There is tricompartmental osteophytosis. Irregularity of 
the posterior patella articular surface is also noted. There is no effusion. Impression IMPRESSION:   
 
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 XR SPINE CERV 4 OR 5 V Narrative EXAM:  XR SPINE CERV 4 OR 5 V 
 
INDICATION:  OA, Psoriatic arthritis, neck pain M17.0, L40.50 COMPARISON: 2/27/2008 radiographs. FINDINGS: AP, lateral, swimmer's, bilateral oblique and open mouth odontoid 
views  of the cervical spine were obtained. Bone mineralization of vertebral 
body height remain normal. In the interval, there is demonstration of moderate 
disc space narrowing at C5-6 associated with apposing endplate osteophytes 
including the uncovertebral regions bilaterally, with mild bilateral foraminal 
narrowing. The other disc heights remain normal.  No substantial facet 
arthrosis is shown. There is borderline retrolisthesis at C5-6 again noted and 
unchanged, with otherwise anatomic alignment. The odontoid process is intact and the C1-C2 relationship is normal. No prevertebral soft tissue swelling is 
shown. Impression IMPRESSION: Interval moderate degenerative disc space narrowing at C5-6. RECOMMENDATIONS: 
Knee x-rays show some arthritis bilaterally and your hardware was noted. Neck x-rays show moderate arthritis in the spine. You also have 1 vertebrae that is pushed back further than the others which may be contributing to your pain. Call with any questions.  
 
Thanks, 
Junior Flowers MD

## 2017-03-13 NOTE — LETTER
3/13/2017 5:31 PM 
 
Ms. Buddie Cushing [id number] [facility] [id number] Dear Buddie Cushing: 
 
Please find your most recent results below. Resulted Orders XR SPINE CERV 4 OR 5 V Narrative EXAM:  XR SPINE CERV 4 OR 5 V 
 
INDICATION:  OA, Psoriatic arthritis, neck pain M17.0, L40.50 COMPARISON: 2/27/2008 radiographs. FINDINGS: AP, lateral, swimmer's, bilateral oblique and open mouth odontoid 
views  of the cervical spine were obtained. Bone mineralization of vertebral 
body height remain normal. In the interval, there is demonstration of moderate 
disc space narrowing at C5-6 associated with apposing endplate osteophytes 
including the uncovertebral regions bilaterally, with mild bilateral foraminal 
narrowing. The other disc heights remain normal.  No substantial facet 
arthrosis is shown. There is borderline retrolisthesis at C5-6 again noted and 
unchanged, with otherwise anatomic alignment. The odontoid process is intact 
and the C1-C2 relationship is normal. No prevertebral soft tissue swelling is 
shown. Impression IMPRESSION: Interval moderate degenerative disc space narrowing at C5-6. RECOMMENDATIONS: 
Neck x-rays show moderate arthritis in the spine. You also have 1 vertebrae that is pushed further back than the others which may be partly responsibly for your pain. Please call me if you have any questions: [phone] Sincerely, 
 
 
Elinor Marquez MD

## 2017-03-14 LAB
BUN SERPL-MCNC: 12 MG/DL (ref 6–24)
BUN/CREAT SERPL: 16 (ref 9–23)
CALCIUM SERPL-MCNC: 8.8 MG/DL (ref 8.7–10.2)
CHLORIDE SERPL-SCNC: 98 MMOL/L (ref 96–106)
CO2 SERPL-SCNC: 26 MMOL/L (ref 18–29)
CREAT SERPL-MCNC: 0.76 MG/DL (ref 0.57–1)
GLUCOSE SERPL-MCNC: 87 MG/DL (ref 65–99)
HGB BLD-MCNC: 13.8 G/DL (ref 11.1–15.9)
POTASSIUM SERPL-SCNC: 3.9 MMOL/L (ref 3.5–5.2)
SODIUM SERPL-SCNC: 138 MMOL/L (ref 134–144)

## 2017-04-10 ENCOUNTER — OFFICE VISIT (OUTPATIENT)
Dept: FAMILY MEDICINE CLINIC | Age: 47
End: 2017-04-10

## 2017-04-10 VITALS
BODY MASS INDEX: 37.72 KG/M2 | HEART RATE: 78 BPM | WEIGHT: 226.4 LBS | DIASTOLIC BLOOD PRESSURE: 60 MMHG | OXYGEN SATURATION: 94 % | TEMPERATURE: 98.2 F | HEIGHT: 65 IN | SYSTOLIC BLOOD PRESSURE: 110 MMHG | RESPIRATION RATE: 16 BRPM

## 2017-04-10 DIAGNOSIS — Z87.19 HISTORY OF GI BLEED: ICD-10-CM

## 2017-04-10 DIAGNOSIS — Z79.01 ANTICOAGULATION MONITORING, INR RANGE 2.5-3.5: ICD-10-CM

## 2017-04-10 DIAGNOSIS — I82.431 ACUTE DEEP VEIN THROMBOSIS (DVT) OF POPLITEAL VEIN OF RIGHT LOWER EXTREMITY (HCC): Primary | ICD-10-CM

## 2017-04-10 DIAGNOSIS — M17.0 PRIMARY OSTEOARTHRITIS OF KNEES, BILATERAL: ICD-10-CM

## 2017-04-10 DIAGNOSIS — R60.0 BILATERAL LOWER EXTREMITY EDEMA: ICD-10-CM

## 2017-04-10 LAB
INR BLD: 2.5
PT POC: NORMAL SEC
VALID INTERNAL CONTROL?: YES

## 2017-04-10 NOTE — MR AVS SNAPSHOT
Visit Information Date & Time Provider Department Dept. Phone Encounter #  
 4/10/2017  8:30 AM Jorge A Moctezuma MD [facility] at [address] [id number] Follow-up Instructions Return in about 4 weeks (around 5/8/2017), or if symptoms worsen or fail to improve. Your Appointments 7/11/2017 10:50 AM  
Follow Up with Vivian Estevez MD  
[facility] Appt Note: f/u          dm/ weight loss        6 mon [employer] [phone] [id number] [address] [facility] Upcoming Health Maintenance Date Due  
 PAP AKA CERVICAL CYTOLOGY 11/19/2017 DTaP/Tdap/Td series (2 - Td) 5/17/2026 Allergies as of 4/10/2017  Review Complete On: 4/10/2017 By: Jorge A Moctezuma MD  
  
 Severity Noted Reaction Type Reactions Sulfa (Sulfonamide Antibiotics) High 03/12/2010    Anaphylaxis Codeine Medium 03/17/2010    Itching Current Immunizations  Reviewed on 5/2/2016 Name Date Influenza Vaccine (Quad) PF 10/27/2016, 10/30/2015 Influenza Vaccine PF 10/11/2013 Influenza Vaccine Split 12/13/2012, 10/5/2011 Pneumococcal Polysaccharide (PPSV-23) 4/11/2016 11:40 AM  
  
 Not reviewed this visit You Were Diagnosed With   
  
 Codes Comments Acute deep vein thrombosis (DVT) of popliteal vein of right lower extremity (HCC)    -  Primary ICD-10-CM: U72.602 ICD-9-CM: 453.41 Anticoagulation monitoring, INR range 2.5-3.5     ICD-10-CM: Z79.01 
ICD-9-CM: V58.61 History of GI bleed     ICD-10-CM: Z87.19 ICD-9-CM: V12.79 Bilateral lower extremity edema     ICD-10-CM: R60.0 ICD-9-CM: 782.3 Primary osteoarthritis of knees, bilateral     ICD-10-CM: M17.0 ICD-9-CM: 715.16 Vitals BP Pulse Temp Resp Height(growth percentile) Weight(growth percentile) 110/60 (BP 1 Location: Left arm, BP Patient Position: Sitting) 78 98.2 °F (36.8 °C) (Oral) 16 5' 5\" (1.651 m) 226 lb 6.4 oz (102.7 kg) SpO2 BMI OB Status Smoking Status 94% 37.67 kg/m2 IUD Never Smoker Vitals History BMI and BSA Data Body Mass Index Body Surface Area  
 37.67 kg/m 2 2.17 m 2 Preferred Pharmacy Pharmacy Name Phone [facility] [address] [place] [address] [phone] Your Updated Medication List  
  
   
This list is accurate as of: 4/10/17  9:05 AM.  Always use your most recent med list.  
  
  
  
  
 albuterol 90 mcg/actuation inhaler Commonly known as:  PROVENTIL HFA, VENTOLIN HFA, PROAIR HFA Take 1 Puff by inhalation every four (4) hours as needed for Wheezing. Comp. Stocking,Thigh,Long,X-Lrg Misc Apply to leg in am and remove at bedtime. Please disp 1 stocking. Comp. Stocking,Thigh,Long,X-Sml Misc Apply to leg in am and remove at bedtime. Please disp 1 stocking. furosemide 40 mg tablet Commonly known as:  LASIX TAKE 1 TABLET BY MOUTH TWICE DAILY AS NEEDED  
  
 methotrexate 2.5 mg tablet Commonly known as:  RHEUMATREX  
TAKE 8 TABLETS BY MOUTH ONCE A WEEK  
  
 mometasone 50 mcg/actuation nasal spray Commonly known as:  NASONEX  
2 Sprays by Both Nostrils route daily. oxyCODONE-acetaminophen 5-325 mg per tablet Commonly known as:  PERCOCET Take 1 Tab by mouth every six (6) hours as needed for Pain. Max Daily Amount: 4 Tabs. ONLY FOR SEVERE PAIN  
  
 phentermine 37.5 mg tablet Commonly known as:  ADIPEX-P Take 1/2 tablet before breakfast and before dinner  
  
 polyethylene glycol 17 gram/dose powder Commonly known as:  [place] Cost Take 17 g by mouth daily. raNITIdine 150 mg tablet Commonly known as:  ZANTAC Take 150 mg by mouth two (2) times a day. topiramate 50 mg tablet Commonly known as:  TOPAMAX Take 1 Tab by mouth two (2) times a day. TYLENOL EXTRA STRENGTH 500 mg tablet Generic drug:  acetaminophen Take  by mouth every six (6) hours as needed for Pain.  
  
 warfarin 5 mg tablet Commonly known as:  COUMADIN  
TAKE 1 & 1/2 TABLETS BY MOUTH ON MONDAY THROUGH SATURDAY. TAKE 2 TABLETS BY MOUTH ON SUNDAY April 2017 Details Sun Mon Tue Wed Thu Fri Sat  
        1  
  
  
  
  
  2  
  
  
  
   3  
  
  
  
   4  
  
  
  
   5  
  
  
  
   6  
  
  
  
   7  
  
  
  
   8  
  
  
  
  
  9 10 2.5  
7.5 mg  
See details 11  
  
7.5 mg  
  
   12  
  
7.5 mg  
  
   13  
  
7.5 mg  
  
   14  
  
7.5 mg  
  
   15  
  
7.5 mg  
  
  
  16  
  
10 mg  
  
   17  
  
7.5 mg  
  
   18  
  
7.5 mg  
  
   19  
  
7.5 mg  
  
   20  
  
7.5 mg  
  
   21  
  
7.5 mg  
  
   22  
  
7.5 mg  
  
  
  23  
  
10 mg  
  
   24  
  
7.5 mg  
  
   25  
  
7.5 mg  
  
   26  
  
7.5 mg  
  
   27  
  
7.5 mg  
  
   28  
  
7.5 mg  
  
   29  
  
7.5 mg  
  
  
  30  
  
10 mg Date Details 04/10 This INR check INR: 2.5 How to take your warfarin dose To take:  7.5 mg Take 1.5 of the 5 mg tablets. To take:  10 mg Take 2 of the 5 mg tablets. May 2017 Details Paul Jaquezon Tue Wed Thu Fri Sat  
   1  
  
7.5 mg  
  
   2  
  
7.5 mg  
  
   3  
  
7.5 mg  
  
   4  
  
7.5 mg  
  
   5  
  
7.5 mg  
  
   6  
  
7.5 mg  
  
  
  7  
  
10 mg  
  
   8  
  
7.5 mg  
  
   9  
  
  
  
   10  
  
  
  
   11  
  
  
  
   12  
  
  
  
   13  
  
  
  
  
  14  
  
  
  
   15  
  
  
  
   16  
  
  
  
   17  
  
  
  
   18  
  
  
  
   19  
  
  
  
   20  
  
  
  
  
  21  
  
  
  
   22  
  
  
  
   23  
  
  
  
   24  
  
  
  
   25  
  
  
  
   26  
  
  
  
   27  
  
  
  
  
  28  
  
  
  
   29  
  
  
  
   30  
  
  
  
   31  
  
  
  
     
 Date Details No additional details Date of next INR:  5/8/2017 How to take your warfarin dose To take:  7.5 mg Take 1.5 of the 5 mg tablets. To take:  10 mg Take 2 of the 5 mg tablets. We Performed the Following AMB POC PT/INR [37145 CPT(R)] Follow-up Instructions Return in about 4 weeks (around 5/8/2017), or if symptoms worsen or fail to improve. Introducing [facility]! Yudi Duran introduces [employer] patient portal. Now you can access parts of your medical record, email your doctor's office, and request medication refills online. 1. In your internet browser, go to [email or web address] 2. Click on the First Time User? Click Here link in the Sign In box. You will see the New Member Sign Up page. 3. Enter your [employer] Access Code exactly as it appears below. You will not need to use this code after youve completed the sign-up process. If you do not sign up before the expiration date, you must request a new code. · [employer] Access Code: [email or web address] Expires: 4/30/2017  9:20 AM 
 
4. Enter the last four digits of your Social Security Number (xxxx) and Date of Birth (mm/dd/yyyy) as indicated and click Submit. You will be taken to the next sign-up page. 5. Create a [employer] ID. This will be your [employer] login ID and cannot be changed, so think of one that is secure and easy to remember. 6. Create a [employer] password. You can change your password at any time. 7. Enter your Password Reset Question and Answer. This can be used at a later time if you forget your password. 8. Enter your e-mail address. You will receive e-mail notification when new information is available in [address] 9. Click Sign Up. You can now view and download portions of your medical record. 10. Click the Download Summary menu link to download a portable copy of your medical information. If you have questions, please visit the Frequently Asked Questions section of the [employer] website.  Remember, [employer] is NOT to be used for urgent needs. For medical emergencies, dial 911. Now available from your iPhone and Android! Please provide this summary of care documentation to your next provider. Your primary care clinician is listed as Cecil Miranda. If you have any questions after today's visit, please call [phone].

## 2017-04-10 NOTE — PROGRESS NOTES
Subjective:     Chief Complaint   Patient presents with    Follow-up     PT/INR      She  is a 48 y.o. female who presents for evaluation of:   Since last visit, she was in [place] x 5 days. She returned 1 week ago. Had sig swelling in RLE armani. Did a lot of walking while there. LE edema improved with raising leg and using Lasix. Did not have compression stockings while she was there. Eating high salt load while traveling too. She was having sig tenderness in RLE as well. 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 DVT hx and noted after long hospital admission. Confirmed with LE dopplers and r/o PE with CT. Admitted and bridged with Lovenox and Coumadin. Hgb at d/c was 9.3 and has improved to > 11. In hospital, she saw Dr. Kamla Hi (H/O) and Dr. Юлия Lind (Vasc surgery). Advised against IVC and rec for 3-6 months of anticoag. Given 2nd DVT (both provoked) and overall hx, will opt for 6 month course. Was advised not to use Eliquis or Xarelto d/t difficulty with reversal and one episode of rectal bleeding (presumed from pouchitis). Notes reviewed and overall appears that pouchitis should have low risk of rebleed. Rest per Anticoag tab    Psoriatic Arthritis & UC - Followed by Dr. Newman. Stopped Embril & MTX for transaminitis and this resolved. Acute reaction at injection site to Humira so was switched back to MTX. Had trial of Remicaide but difficulty with IV access. Discussed options with possibility of getting PICC for remicaide infusion - she would need this for ~ 6 weeks. Advised her to discuss options further with Dr. Aracely Kinsey and she was able to find an alternative for Psoriatic Arthritis and UC. Started Cimzia (Certolizumab).     ROS  Gen - no fever/chills  Resp - no cough or dyspnea  CV - no chest pain or CARRASQUILLO  Rest per HPI     Objective:     Vitals:    04/10/17 0834   BP: 110/60   Pulse: 78   Resp: 16   Temp: 98.2 °F (36.8 °C)   TempSrc: Oral   SpO2: 94% Weight: 226 lb 6.4 oz (102.7 kg)   Height: 5' 5\" (1.651 m)     Physical Examination:  General appearance - alert, well appearing, and in no distress  Eyes -sclera anicteric  Chest - clear to auscultation, no wheezes, rales or rhonchi, symmetric air entry  Heart - normal rate, regular rhythm, normal S1, S2, no murmurs, rubs, clicks or gallops  Neurological - alert, oriented, no focal findings or movement disorder noted  Extr - RLE with trace edema, mildly ttp in bilat LE over calf muscles, neg Homans bilat     Past Medical History:   Diagnosis Date    Asthma 3/12/2010    DDD (degenerative disc disease), lumbar     DJD (degenerative joint disease) 3/12/2010    DJD (degenerative joint disease) of knee     [facility]     Rt leg    GERD (gastroesophageal reflux disease)     History of tuberculosis exposure 2013    pt states she has + PPD and was on Rx for 6 months; last dose either 11/13 or 12/13    Inflammatory polyarthritis ([address]     Psoriatic Arthitis    Morbid obesity ([address]     Pseudogout     Skin abrasion 1/28/14    After loosing 125#, Bilateral Inner thigh friction flareup monthly with skin breakdown    Thromboembolus ([address] 2008    Rt leg,  pt states she fell and had a plane ride home and developed blood clots    UC (ulcerative colitis) ([address] 3/12/2010     Past Surgical History:   Procedure Laterality Date    HX ACL RECONSTRUCTION      Rt    Mima Shelby    Dr Karoline Segundo HX ORTHOPAEDIC  12/13    Rt foot / toes    HX TONSILLECTOMY       Current Outpatient Prescriptions on File Prior to Visit   Medication Sig Dispense Refill    furosemide (LASIX) 40 mg tablet TAKE 1 TABLET BY MOUTH TWICE DAILY AS NEEDED 180 Tab 0    polyethylene glycol (MIRALAX) 17 gram/dose powder Take 17 g by mouth daily. 225 g 0    warfarin (COUMADIN) 5 mg tablet TAKE 1 & 1/2 TABLETS BY MOUTH ON MONDAY THROUGH SATURDAY.  TAKE 2 TABLETS BY MOUTH ON SUNDAY 45 Tab 2    topiramate (TOPAMAX) 50 mg tablet Take 1 Tab by mouth two (2) times a day. 60 Tab 5    phentermine (ADIPEX-P) 37.5 mg tablet Take 1/2 tablet before breakfast and before dinner 100 Tab 5    mometasone (NASONEX) 50 mcg/actuation nasal spray 2 Sprays by Both Nostrils route daily. 1 Container 1    oxyCODONE-acetaminophen (PERCOCET) 5-325 mg per tablet Take 1 Tab by mouth every six (6) hours as needed for Pain. Max Daily Amount: 4 Tabs. ONLY FOR SEVERE PAIN 30 Tab 0    methotrexate (RHEUMATREX) 2.5 mg tablet TAKE 8 TABLETS BY MOUTH ONCE A WEEK  0    ranitidine (ZANTAC) 150 mg tablet Take 150 mg by mouth two (2) times a day.  albuterol (PROVENTIL HFA, VENTOLIN HFA, PROAIR HFA) 90 mcg/actuation inhaler Take 1 Puff by inhalation every four (4) hours as needed for Wheezing. 1 Inhaler 5    acetaminophen (TYLENOL EXTRA STRENGTH) 500 mg tablet Take  by mouth every six (6) hours as needed for Pain.  Comp. Stocking,Thigh,Long,X-Lrg misc Apply to leg in am and remove at bedtime. Please disp 1 stocking. 1 Each 0    Comp. Stocking,Thigh,Long,X-Sml misc Apply to leg in am and remove at bedtime. Please disp 1 stocking. 1 Each 0     No current facility-administered medications on file prior to visit. Assessment/ Plan:   Ainsley Theodore was seen today for follow-up. Diagnoses and all orders for this visit:    Acute deep vein thrombosis (DVT) of popliteal vein of right lower extremity ([facility]  Long term current use of anticoagulant  Hx of GI bleed  - INR therapeutic at 2.5 today and 2.4 last month. Does not appear c/w a new DVT at this point so will hold on getting dopplers today. -     AMB POC PT/INR    Bilateral lower extremity edema - restarting compression stockings    Primary osteoarthritis of knees, bilateral    I have discussed the diagnosis with the patient and the intended plan as seen in the above orders. The patient has received an after-visit summary and questions were answered concerning future plans.   I have discussed medication side effects and warnings with the patient as well. The patient verbalizes understanding and agreement with the plan. Follow-up Disposition:  Return in about 4 weeks (around 5/8/2017), or if symptoms worsen or fail to improve.      **Had Flex sig with Dr. Reyna Stauffer in 2/2015 and another Flex sig with Dr. Melani Wise in 4/2016 during hospitalization so does not need another colonoscopy/flex sig any time soon

## 2017-05-01 DIAGNOSIS — I82.431 ACUTE DEEP VEIN THROMBOSIS (DVT) OF POPLITEAL VEIN OF RIGHT LOWER EXTREMITY (HCC): ICD-10-CM

## 2017-05-01 RX ORDER — WARFARIN SODIUM 5 MG/1
TABLET ORAL
Qty: 45 TAB | Refills: 2 | Status: SHIPPED | OUTPATIENT
Start: 2017-05-01 | End: 2017-06-15 | Stop reason: ALTCHOICE

## 2017-05-08 NOTE — TELEPHONE ENCOUNTER
Last OV 1/9/17. Upcoming appt 7/11/17. [facility] only good for 6 mos. She has a Rx for phentermine on hold, but will need a new Rx. No refill needed at this time.

## 2017-05-11 ENCOUNTER — OFFICE VISIT (OUTPATIENT)
Dept: FAMILY MEDICINE CLINIC | Age: 47
End: 2017-05-11

## 2017-05-11 VITALS
TEMPERATURE: 98.4 F | WEIGHT: 224.5 LBS | BODY MASS INDEX: 37.4 KG/M2 | RESPIRATION RATE: 16 BRPM | DIASTOLIC BLOOD PRESSURE: 64 MMHG | SYSTOLIC BLOOD PRESSURE: 122 MMHG | OXYGEN SATURATION: 92 % | HEART RATE: 72 BPM | HEIGHT: 65 IN

## 2017-05-11 DIAGNOSIS — Z87.19 HISTORY OF GI BLEED: ICD-10-CM

## 2017-05-11 DIAGNOSIS — Z79.899 ENCOUNTER FOR LONG-TERM (CURRENT) USE OF MEDICATIONS: ICD-10-CM

## 2017-05-11 DIAGNOSIS — I82.431 ACUTE DEEP VEIN THROMBOSIS (DVT) OF POPLITEAL VEIN OF RIGHT LOWER EXTREMITY (HCC): Primary | ICD-10-CM

## 2017-05-11 DIAGNOSIS — Z79.01 ANTICOAGULATION MONITORING, INR RANGE 2.5-3.5: ICD-10-CM

## 2017-05-11 DIAGNOSIS — M10.9 PODAGRA: ICD-10-CM

## 2017-05-11 DIAGNOSIS — M62.831 MUSCLE SPASM OF CALF: ICD-10-CM

## 2017-05-11 LAB
INR BLD: 2.2
PT POC: NORMAL SEC
VALID INTERNAL CONTROL?: YES

## 2017-05-11 RX ORDER — LANOLIN ALCOHOL/MO/W.PET/CERES
400 CREAM (GRAM) TOPICAL DAILY
Qty: 30 TAB | Refills: 2 | Status: SHIPPED | OUTPATIENT
Start: 2017-05-11 | End: 2017-06-05

## 2017-05-11 NOTE — PROGRESS NOTES
Subjective:     Chief Complaint   Patient presents with    Follow-up     PT/INR      She  is a 48 y.o. female who presents for evaluation of:   DVT - admitted for DVT after admission for pouchitis with rectal bleeding. DVT hx and noted after long hospital admission. Confirmed with LE dopplers and r/o PE with CT. Admitted and bridged with Lovenox and Coumadin. Hgb at d/c was 9.3 and has improved to > 11. In hospital, she saw Dr. Bell Hyman (H/O) and Dr. Tania Palacios (Vasc surgery). Advised against IVC and rec for 3-6 months of anticoag. Given 2nd DVT (both provoked) and overall hx, will opt for 6 month course. Was advised not to use Eliquis or Xarelto d/t difficulty with reversal and one episode of rectal bleeding (presumed from pouchitis). Notes reviewed and overall appears that pouchitis should have low risk of rebleed. Rest per Anticoag tab    Psoriatic Arthritis & UC - Followed by Dr. Rj Kerr. Stopped Embril & MTX for transaminitis and this resolved. Acute reaction at injection site to Humira so was switched back to MTX. Had trial of Remicaide but difficulty with IV access. Discussed options with possibility of getting PICC for remicaide infusion - she would need this for ~ 6 weeks. Advised her to discuss options further with Dr. Alba Gregg and she was able to find an alternative for Psoriatic Arthritis and UC. Started Cimzia (Certolizumab).     ROS  Gen - no fever/chills  Resp - no cough or dyspnea  CV - no chest pain or CARRASQUILLO  Rest per HPI     Objective:     Vitals:    05/11/17 1054   BP: 122/64   Pulse: 72   Resp: 16   Temp: 98.4 °F (36.9 °C)   TempSrc: Oral   SpO2: 92%   Weight: 224 lb 8 oz (101.8 kg)   Height: 5' 5\" (1.651 m)     Physical Examination:  General appearance - alert, well appearing, and in no distress  Eyes -sclera anicteric  Chest - clear to auscultation, no wheezes, rales or rhonchi, symmetric air entry  Heart - normal rate, regular rhythm, normal S1, S2, no murmurs, rubs, clicks or gallops  Neurological - alert, oriented, no focal findings or movement disorder noted  Extr - RLE with trace edema, mildly ttp in bilat LE over calf muscles, neg Homans bilat, R toe with podagra appearance     Past Medical History:   Diagnosis Date    Asthma 3/12/2010    DDD (degenerative disc disease), lumbar     DJD (degenerative joint disease) 3/12/2010    DJD (degenerative joint disease) of knee     DVT (deep venous thrombosis) (HCC)     Rt leg    GERD (gastroesophageal reflux disease)     History of tuberculosis exposure 2013    pt states she has + PPD and was on Rx for 6 months; last dose either 11/13 or 12/13    Inflammatory polyarthritis ([address]     Psoriatic Arthitis    Morbid obesity ([address]     Pseudogout     Skin abrasion 1/28/14    After loosing 125#, Bilateral Inner thigh friction flareup monthly with skin breakdown    Thromboembolus ([address] 2008    Rt leg,  pt states she fell and had a plane ride home and developed blood clots    UC (ulcerative colitis) ([address] 3/12/2010     Past Surgical History:   Procedure Laterality Date    HX ACL RECONSTRUCTION      Rt    [place] Pates    Dr Hector Fowler HX ORTHOPAEDIC  12/13    Rt foot / toes    HX TONSILLECTOMY       Current Outpatient Prescriptions on File Prior to Visit   Medication Sig Dispense Refill    warfarin (COUMADIN) 5 mg tablet TAKE 1 & 1/2 TABLETS BY MOUTH ON MONDAY THROUGH SATURDAY. TAKE 2 TABLETS BY MOUTH ON SUNDAY 45 Tab 2    furosemide (LASIX) 40 mg tablet TAKE 1 TABLET BY MOUTH TWICE DAILY AS NEEDED 180 Tab 0    polyethylene glycol (MIRALAX) 17 gram/dose powder Take 17 g by mouth daily. 225 g 0    topiramate (TOPAMAX) 50 mg tablet Take 1 Tab by mouth two (2) times a day. 60 Tab 5    phentermine (ADIPEX-P) 37.5 mg tablet Take 1/2 tablet before breakfast and before dinner 100 Tab 5    mometasone (NASONEX) 50 mcg/actuation nasal spray 2 Sprays by Both Nostrils route daily.  1 Container 1    oxyCODONE-acetaminophen (PERCOCET) 5-325 mg per tablet Take 1 Tab by mouth every six (6) hours as needed for Pain. Max Daily Amount: 4 Tabs. ONLY FOR SEVERE PAIN 30 Tab 0    methotrexate (RHEUMATREX) 2.5 mg tablet TAKE 8 TABLETS BY MOUTH ONCE A WEEK  0    ranitidine (ZANTAC) 150 mg tablet Take 150 mg by mouth two (2) times a day.  albuterol (PROVENTIL HFA, VENTOLIN HFA, PROAIR HFA) 90 mcg/actuation inhaler Take 1 Puff by inhalation every four (4) hours as needed for Wheezing. 1 Inhaler 5    acetaminophen (TYLENOL EXTRA STRENGTH) 500 mg tablet Take  by mouth every six (6) hours as needed for Pain.  Comp. Stocking,Thigh,Long,X-Lrg misc Apply to leg in am and remove at bedtime. Please disp 1 stocking. 1 Each 0     No current facility-administered medications on file prior to visit. Assessment/ Plan:   Ronald Duenas was seen today for follow-up. Diagnoses and all orders for this visit:    Acute deep vein thrombosis (DVT) of popliteal vein of right lower extremity (HCC)  History of GI bleed  Anticoagulation monitoring, INR range 2.5-3.5  - INR therapeutic  -     AMB POC PT/INR    Muscle spasm of calf  -     magnesium oxide (MAG-OX) 400 mg tablet; Take 1 Tab by mouth daily.  -     HEMOGLOBIN  -     MAGNESIUM  -     METABOLIC PANEL, BASIC    Encounter for long-term (current) use of medications  -     HEMOGLOBIN  -     MAGNESIUM  -     METABOLIC PANEL, BASIC    Podagra - may have some gout vs. Pseudogout. Mild pain today so hold on pain meds. I have discussed the diagnosis with the patient and the intended plan as seen in the above orders. The patient has received an after-visit summary and questions were answered concerning future plans. I have discussed medication side effects and warnings with the patient as well. The patient verbalizes understanding and agreement with the plan. Follow-up Disposition:  Return in about 4 weeks (around 6/8/2017), or if symptoms worsen or fail to improve.

## 2017-05-12 LAB
BUN SERPL-MCNC: 16 MG/DL (ref 6–24)
BUN/CREAT SERPL: 15 (ref 9–23)
CALCIUM SERPL-MCNC: 9.1 MG/DL (ref 8.7–10.2)
CHLORIDE SERPL-SCNC: 102 MMOL/L (ref 96–106)
CO2 SERPL-SCNC: 23 MMOL/L (ref 18–29)
CREAT SERPL-MCNC: 1.1 MG/DL (ref 0.57–1)
GLUCOSE SERPL-MCNC: 84 MG/DL (ref 65–99)
HGB BLD-MCNC: 13.8 G/DL (ref 11.1–15.9)
MAGNESIUM SERPL-MCNC: 2.4 MG/DL (ref 1.6–2.3)
POTASSIUM SERPL-SCNC: 3.8 MMOL/L (ref 3.5–5.2)
SODIUM SERPL-SCNC: 138 MMOL/L (ref 134–144)

## 2017-06-01 ENCOUNTER — TELEPHONE (OUTPATIENT)
Dept: FAMILY MEDICINE CLINIC | Age: 47
End: 2017-06-01

## 2017-06-01 NOTE — TELEPHONE ENCOUNTER
Pls call Domonique @ [phone] [facility]     Calling to see if a pre-op clearance was received yesterday     Pt coming in 6-19-17 for procedure     Will need to come off of her coumadin

## 2017-06-01 NOTE — TELEPHONE ENCOUNTER
Advised Dr Ailin Goldberg out of office until 6/12/17.  Clearance was not received, Vaishnavi Mendez to refax paperwork

## 2017-06-05 ENCOUNTER — HOSPITAL ENCOUNTER (OUTPATIENT)
Dept: PREADMISSION TESTING | Age: 47
Discharge: HOME | End: 2017-06-05
Payer: COMMERCIAL

## 2017-06-05 VITALS
TEMPERATURE: 98.5 F | WEIGHT: 240.08 LBS | HEIGHT: 65 IN | HEART RATE: 72 BPM | DIASTOLIC BLOOD PRESSURE: 63 MMHG | BODY MASS INDEX: 40 KG/M2 | RESPIRATION RATE: 16 BRPM | SYSTOLIC BLOOD PRESSURE: 122 MMHG

## 2017-06-05 LAB
ABO + RH BLD: NORMAL
ANION GAP BLD CALC-SCNC: 4 MMOL/L (ref 5–15)
APPEARANCE UR: CLEAR
BACTERIA URNS QL MICRO: ABNORMAL /HPF
BILIRUB UR QL: NEGATIVE
BLOOD GROUP ANTIBODIES SERPL: NORMAL
BUN SERPL-MCNC: 16 MG/DL (ref 6–20)
BUN/CREAT SERPL: 18 (ref 12–20)
CALCIUM SERPL-MCNC: 8.6 MG/DL (ref 8.5–10.1)
CHLORIDE SERPL-SCNC: 105 MMOL/L (ref 97–108)
CO2 SERPL-SCNC: 28 MMOL/L (ref 21–32)
COLOR UR: ABNORMAL
CREAT SERPL-MCNC: 0.91 MG/DL (ref 0.55–1.02)
EPITH CASTS URNS QL MICRO: ABNORMAL /LPF
ERYTHROCYTE [DISTWIDTH] IN BLOOD BY AUTOMATED COUNT: 13.4 % (ref 11.5–14.5)
GLUCOSE SERPL-MCNC: 89 MG/DL (ref 65–100)
GLUCOSE UR STRIP.AUTO-MCNC: NEGATIVE MG/DL
HCT VFR BLD AUTO: 37.2 % (ref 35–47)
HGB BLD-MCNC: 12.8 G/DL (ref 11.5–16)
HGB UR QL STRIP: ABNORMAL
HYALINE CASTS URNS QL MICRO: ABNORMAL /LPF (ref 0–5)
KETONES UR QL STRIP.AUTO: NEGATIVE MG/DL
LEUKOCYTE ESTERASE UR QL STRIP.AUTO: ABNORMAL
MCH RBC QN AUTO: 32.9 PG (ref 26–34)
MCHC RBC AUTO-ENTMCNC: 34.4 G/DL (ref 30–36.5)
MCV RBC AUTO: 95.6 FL (ref 80–99)
NITRITE UR QL STRIP.AUTO: NEGATIVE
PH UR STRIP: 6 [PH] (ref 5–8)
PLATELET # BLD AUTO: 267 K/UL (ref 150–400)
POTASSIUM SERPL-SCNC: 3.9 MMOL/L (ref 3.5–5.1)
PROT UR STRIP-MCNC: NEGATIVE MG/DL
RBC # BLD AUTO: 3.89 M/UL (ref 3.8–5.2)
RBC #/AREA URNS HPF: ABNORMAL /HPF (ref 0–5)
SODIUM SERPL-SCNC: 137 MMOL/L (ref 136–145)
SP GR UR REFRACTOMETRY: 1.02 (ref 1–1.03)
SPECIMEN EXP DATE BLD: NORMAL
UA: UC IF INDICATED,UAUC: ABNORMAL
UROBILINOGEN UR QL STRIP.AUTO: 0.2 EU/DL (ref 0.2–1)
WBC # BLD AUTO: 6.1 K/UL (ref 3.6–11)
WBC URNS QL MICRO: ABNORMAL /HPF (ref 0–4)

## 2017-06-05 PROCEDURE — 93005 ELECTROCARDIOGRAM TRACING: CPT

## 2017-06-05 PROCEDURE — 86900 BLOOD TYPING SEROLOGIC ABO: CPT | Performed by: OBSTETRICS & GYNECOLOGY

## 2017-06-05 PROCEDURE — 36415 COLL VENOUS BLD VENIPUNCTURE: CPT | Performed by: OBSTETRICS & GYNECOLOGY

## 2017-06-05 PROCEDURE — 87086 URINE CULTURE/COLONY COUNT: CPT | Performed by: OBSTETRICS & GYNECOLOGY

## 2017-06-05 PROCEDURE — 85027 COMPLETE CBC AUTOMATED: CPT | Performed by: OBSTETRICS & GYNECOLOGY

## 2017-06-05 PROCEDURE — 80048 BASIC METABOLIC PNL TOTAL CA: CPT | Performed by: OBSTETRICS & GYNECOLOGY

## 2017-06-05 PROCEDURE — 81001 URINALYSIS AUTO W/SCOPE: CPT | Performed by: OBSTETRICS & GYNECOLOGY

## 2017-06-05 NOTE — PERIOP NOTES
Pt is leaving on 06/06/2017 and returning 06/11/2017. She will be out of the [place]. Called Dr Giorgio Drummond office and voice message left asking when is it okay for patient to stop her coumadin for her hysterectomy.

## 2017-06-05 NOTE — PERIOP NOTES
[facility]  Preoperative Instructions        Surgery Date 06/19/2017          Time of Arrival 0730 am Contact # [phone] home    1. On the day of your surgery, please report to the Surgical Services Registration Desk and sign in at your designated time. The Surgery Center is located to the right of the Emergency Room. 2. You must have someone with you to drive you home. You should not drive a car for 24 hours following surgery. Please make arrangements for a friend or family member to stay with you for the first 24 hours after your surgery. 3. Do not have anything to eat or drink (including water, gum, mints, coffee, juice) after midnight 06/18/2017. This may not apply to medications prescribed by your physician. Please note special instructions, if applicable. If you are currently taking Plavix, Coumadin, or other blood-thinning agents, contact your surgeon for instructions. 4. We recommend you do not drink any alcoholic beverages for 24 hours before and after your surgery. 5. Have a list of all current medications, including vitamins, herbal supplements and any other over the counter medications. Stop all Aspirin and non-steroidal anti-inflammatory drugs (I.e. Advil, Aleve), as directed by your surgeon's office. Stop all vitamins and herbal supplements seven days prior to your surgery. 6. Wear comfortable clothes. Wear glasses instead of contacts. Do not bring any money or jewelry. Please bring picture ID, insurance card, and any prearranged co-payment or hospital payment. Do not wear make-up, particularly mascara the morning of your surgery. Do not wear nail polish, particularly if you are having foot /hand surgery. Wear your hair loose or down, no ponytails, buns, martir pins or clips. All body piercings must be removed.   Please shower with antibacterial soap for three consecutive days before and on the morning of surgery, but do not apply any lotions, powders or deodorants after the shower on the day of surgery. Please use a fresh towels after each shower. Please sleep in clean clothes and change bed linens the night before surgery. Please do not shave for 48 hours prior to surgery. Shaving of the face is acceptable. 7. You should understand that if you do not follow these instructions your surgery may be cancelled. If your physical condition changes (I.e. fever, cold or flu) please contact your surgeon as soon as possible. 8. It is important that you be on time. If a situation occurs where you may be late, please call [phone] (OR Holding Area). 9. If you have any questions and or problems, please call [phone] (Pre-admission Testing). 10. Your surgery time may be subject to change. You will receive a phone call the evening prior if your time changes. 11.  If having outpatient surgery, you must have someone to drive you here, stay with you during the duration of your stay, and to drive you home at time of discharge. Special Instructions:find out when to stop coumadin    MEDICATIONS TO TAKE THE MORNING OF SURGERY WITH A SIP OF WATER:tylenol if needed, albuterol inhaler if needed, nasonex nasal spray, ranitidine(zantac), topamax       I understand a pre-operative phone call will be made to verify my surgery time. In the event that I am not available, I give permission for a message to be left on my answering service and/or with another person?   yes         ___________________      __________   _________    (Signature of Patient)             (Witness)                (Date and Time)

## 2017-06-06 ENCOUNTER — DOCUMENTATION ONLY (OUTPATIENT)
Dept: FAMILY MEDICINE CLINIC | Age: 47
End: 2017-06-06

## 2017-06-06 LAB
ATRIAL RATE: 72 BPM
CALCULATED P AXIS, ECG09: 69 DEGREES
CALCULATED R AXIS, ECG10: 4 DEGREES
CALCULATED T AXIS, ECG11: 20 DEGREES
DIAGNOSIS, 93000: NORMAL
P-R INTERVAL, ECG05: 166 MS
Q-T INTERVAL, ECG07: 372 MS
QRS DURATION, ECG06: 84 MS
QTC CALCULATION (BEZET), ECG08: 407 MS
VENTRICULAR RATE, ECG03: 72 BPM

## 2017-06-06 NOTE — PROGRESS NOTES
This writer received notification regarding patient admission to [facility] scheduled on 6/19/17. Will follow up with patient for transition of care upon discharge.  Patient is scheduled to see PCP for preop clearance on 6/15/17.

## 2017-06-07 LAB
BACTERIA SPEC CULT: NORMAL
CC UR VC: NORMAL
SERVICE CMNT-IMP: NORMAL

## 2017-06-07 RX ORDER — FUROSEMIDE 40 MG/1
TABLET ORAL
Qty: 60 TAB | Refills: 0 | Status: SHIPPED | OUTPATIENT
Start: 2017-06-07 | End: 2017-07-22 | Stop reason: SDUPTHER

## 2017-06-12 NOTE — PERIOP NOTES
LVM w Dr Orville Joyce office requesting notification if MD would like [phone] for urine culture that was sent last week. Office will fax/call back PAT.

## 2017-06-13 NOTE — TELEPHONE ENCOUNTER
Danny Catalan checking back from Pre-Admit testing     Would like to know about pt Coumadin     Best number to reach her is [phone]

## 2017-06-13 NOTE — PERIOP NOTES
Called Dr Brandie Alcazar office and asked when is okay for pt to stop coumadin. Office to leave message for Dr Curt Burger to call back about this.

## 2017-06-15 ENCOUNTER — TELEPHONE (OUTPATIENT)
Dept: FAMILY MEDICINE CLINIC | Age: 47
End: 2017-06-15

## 2017-06-15 ENCOUNTER — OFFICE VISIT (OUTPATIENT)
Dept: FAMILY MEDICINE CLINIC | Age: 47
End: 2017-06-15

## 2017-06-15 VITALS
WEIGHT: 242.6 LBS | HEIGHT: 65 IN | TEMPERATURE: 97.4 F | HEART RATE: 75 BPM | SYSTOLIC BLOOD PRESSURE: 130 MMHG | BODY MASS INDEX: 40.42 KG/M2 | DIASTOLIC BLOOD PRESSURE: 62 MMHG | RESPIRATION RATE: 16 BRPM

## 2017-06-15 DIAGNOSIS — D25.9 UTERINE LEIOMYOMA, UNSPECIFIED LOCATION: ICD-10-CM

## 2017-06-15 DIAGNOSIS — K51.90 ULCERATIVE COLITIS WITHOUT COMPLICATIONS, UNSPECIFIED LOCATION (HCC): ICD-10-CM

## 2017-06-15 DIAGNOSIS — Z01.818 PREOP EXAMINATION: Primary | ICD-10-CM

## 2017-06-15 DIAGNOSIS — Z87.19 HISTORY OF GI BLEED: ICD-10-CM

## 2017-06-15 DIAGNOSIS — Z90.49 S/P COLECTOMY: ICD-10-CM

## 2017-06-15 DIAGNOSIS — Z86.718 HISTORY OF DVT (DEEP VEIN THROMBOSIS): ICD-10-CM

## 2017-06-15 LAB
INR BLD: 2.7
PT POC: NORMAL SECONDS
VALID INTERNAL CONTROL?: YES

## 2017-06-15 NOTE — PROGRESS NOTES
Subjective:     Chief Complaint   Patient presents with    Follow-up     PT/INR      She  is a 48 y.o. female who presents for evaluation of:   Here today to f/u on anti-coag recs with upcoming supracervical hysterectomy with bilat salpingectomy d/t fibroid uterus. She is ready for surgery given pain and what is thought to be an ischemic uterus per notes from Dr. Nickolas Koroma. 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 DVT hx and noted after long hospital admission. Confirmed with LE dopplers and r/o PE with CT. Admitted and bridged with Lovenox and Coumadin. Hgb at d/c was 9.3 and has improved to > 11. In hospital, she saw Dr. Aron Smith (H/O) and Dr. Kevin aGr (Vasc surgery). Advised against IVC and rec for 3-6 months of anticoag. Given 2nd DVT (both provoked) and overall hx, opted for longer course. Was advised not to use Eliquis or Xarelto d/t difficulty with reversal and one episode of rectal bleeding (presumed from pouchitis). Notes reviewed and overall appears that pouchitis should have low risk of rebleed.   Rest per Anticoag tab    ROS  Gen - no fever/chills  Resp - no cough or dyspnea  CV - no chest pain or CARRASQUILLO  Rest per HPI     Objective:     Vitals:    06/15/17 0831   BP: 130/62   Pulse: 75   Resp: 16   Temp: 97.4 °F (36.3 °C)   TempSrc: Oral   Weight: 242 lb 9.6 oz (110 kg)   Height: 5' 5\" (1.651 m)     Physical Examination:  General appearance - alert, well appearing, and in no distress  Eyes -sclera anicteric  Chest - clear to auscultation, no wheezes, rales or rhonchi, symmetric air entry  Heart - normal rate, regular rhythm, normal S1, S2, no murmurs, rubs, clicks or gallops  Neurological - alert, oriented, no focal findings or movement disorder noted  Extr - RLE with trace edema     Past Medical History:   Diagnosis Date    Asthma 3/12/2010    DDD (degenerative disc disease), lumbar     DJD (degenerative joint disease) 3/12/2010    DJD (degenerative joint disease) of knee     [facility])     Rt leg    GERD (gastroesophageal reflux disease)     History of tuberculosis exposure 2013    pt states she has + PPD and was on Rx for 6 months; last dose either 11/13 or 12/13    Inflammatory polyarthritis ([address]     Psoriatic Arthitis    Morbid obesity ([address]     Pseudogout     PUD (peptic ulcer disease)     Skin abrasion 1/28/14    After loosing 125#, Bilateral Inner thigh friction flareup monthly with skin breakdown    Thromboembolus ([address] 2008    Rt leg,  pt states she fell and had a plane ride home and developed blood clots    UC (ulcerative colitis) ([address] 3/12/2010     Past Surgical History:   Procedure Laterality Date    HX ACL RECONSTRUCTION      Rt    Sundeep Amato HX GI      reopening of rectal pouch    HX ORTHOPAEDIC  12/13    Rt foot / toes    HX TONSILLECTOMY       Current Outpatient Prescriptions on File Prior to Visit   Medication Sig Dispense Refill    furosemide (LASIX) 40 mg tablet TAKE 1 TABLET BY MOUTH TWICE DAILY AS NEEDED 60 Tab 0    topiramate (TOPAMAX) 50 mg tablet Take 1 Tab by mouth two (2) times a day. 60 Tab 5    phentermine (ADIPEX-P) 37.5 mg tablet Take 1/2 tablet before breakfast and before dinner 100 Tab 5    oxyCODONE-acetaminophen (PERCOCET) 5-325 mg per tablet Take 1 Tab by mouth every six (6) hours as needed for Pain. Max Daily Amount: 4 Tabs. ONLY FOR SEVERE PAIN 30 Tab 0    methotrexate (RHEUMATREX) 2.5 mg tablet TAKE 8 TABLETS BY MOUTH ONCE A WEEK, takes on Saturdays  0    ranitidine (ZANTAC) 150 mg tablet Take 150 mg by mouth two (2) times a day.  albuterol (PROVENTIL HFA, VENTOLIN HFA, PROAIR HFA) 90 mcg/actuation inhaler Take 1 Puff by inhalation every four (4) hours as needed for Wheezing. 1 Inhaler 5    acetaminophen (TYLENOL EXTRA STRENGTH) 500 mg tablet Take 1,000 mg by mouth every six (6) hours as needed for Pain.  Comp. Stocking,Thigh,Long,X-Lrg misc Apply to leg in am and remove at bedtime. Please disp 1 stocking. 1 Each 0    mometasone (NASONEX) 50 mcg/actuation nasal spray 2 Sprays by Both Nostrils route daily. 1 Container 1     No current facility-administered medications on file prior to visit. Assessment/ Plan:   Keyshawn Middleton was seen today for follow-up. Diagnoses and all orders for this visit:    Preop examination  Fibroid uterus  - given overall hx and duration of anti-coagulation for previous DVT (has had 2 that were both provoked), I am in favor of stopping coumadin today. She will need Lovenox in the hospital after surgery and will need to closely monitor for VTE given 2 previous DVTs. History of DVT (deep vein thrombosis)  History of Pouchitis with bleeding  -     AMB POC PT/INR    Ulcerative colitis without complications, unspecified location (HCC) - stable on meds, following with Rheum. S/p colectomy    I have discussed the diagnosis with the patient and the intended plan as seen in the above orders. The patient has received an after-visit summary and questions were answered concerning future plans. I have discussed medication side effects and warnings with the patient as well. The patient verbalizes understanding and agreement with the plan. Follow-up Disposition:  Return in about 1 week (around 6/22/2017), or if symptoms worsen or fail to improve.

## 2017-06-15 NOTE — PROGRESS NOTES
Chief Complaint   Patient presents with    Follow-up     PT/INR   Discuss Adjusting Coumadin for surgery  Room 4

## 2017-06-15 NOTE — MR AVS SNAPSHOT
Visit Information Date & Time Provider Department Dept. Phone Encounter #  
 6/15/2017  8:30 AM Ambrocio Harmon MD [facility] at [address] [id number] Follow-up Instructions Return in about 1 week (around 6/22/2017), or if symptoms worsen or fail to improve. Your Appointments 7/11/2017 10:50 AM  
Follow Up with Jose Alberto Traylor MD  
[facility]) Appt Note: f/u          dm/ weight loss        6 mon [employer] [phone] [id number] [address] Upcoming Health Maintenance Date Due INFLUENZA AGE 9 TO ADULT 8/1/2017 PAP AKA CERVICAL CYTOLOGY 11/19/2017 DTaP/Tdap/Td series (2 - Td) 5/17/2026 Allergies as of 6/15/2017  Review Complete On: 6/15/2017 By: Ambrocio Harmon MD  
  
 Severity Noted Reaction Type Reactions Sulfa (Sulfonamide Antibiotics) High 03/12/2010    Anaphylaxis Codeine Medium 03/17/2010    Itching Current Immunizations  Reviewed on 5/2/2016 Name Date Influenza Vaccine (Quad) PF 10/27/2016, 10/30/2015 Influenza Vaccine PF 10/11/2013 Influenza Vaccine Split 12/13/2012, 10/5/2011 Pneumococcal Polysaccharide (PPSV-23) 4/11/2016 11:40 AM  
  
 Not reviewed this visit You Were Diagnosed With   
  
 Codes Comments Preop examination    -  Primary ICD-10-CM: K41.685 ICD-9-CM: V72.84 History of DVT (deep vein thrombosis)     ICD-10-CM: J38.762 ICD-9-CM: V12.51 History of GI bleed     ICD-10-CM: Z87.19 ICD-9-CM: V12.79 Anticoagulation monitoring, INR range 2.5-3.5     ICD-10-CM: Z79.01 
ICD-9-CM: V58.61 Ulcerative colitis without complications, unspecified location [facility])     ICD-10-CM: K51.90 ICD-9-CM: 556.9 S/P colectomy     ICD-10-CM: Z90.49 ICD-9-CM: V45.89 Vitals BP Pulse Temp Resp Height(growth percentile) Weight(growth percentile) 130/62 (BP 1 Location: Left arm, BP Patient Position: Sitting) 75 97.4 °F (36.3 °C) (Oral) 16 5' 5\" (1.651 m) 242 lb 9.6 oz (110 kg) BMI OB Status Smoking Status 40.37 kg/m2 IUD Never Smoker Vitals History BMI and BSA Data Body Mass Index Body Surface Area  
 40.37 kg/m 2 2.25 m 2 Preferred Pharmacy Pharmacy Name Phone [facility], [facility] [address] [place] [address] [phone] Your Updated Medication List  
  
   
This list is accurate as of: 6/15/17  8:55 AM.  Always use your most recent med list.  
  
  
  
  
 albuterol 90 mcg/actuation inhaler Commonly known as:  PROVENTIL HFA, VENTOLIN HFA, PROAIR HFA Take 1 Puff by inhalation every four (4) hours as needed for Wheezing. Comp. Stocking,Thigh,Long,X-Lrg Misc Apply to leg in am and remove at bedtime. Please disp 1 stocking. furosemide 40 mg tablet Commonly known as:  LASIX TAKE 1 TABLET BY MOUTH TWICE DAILY AS NEEDED  
  
 methotrexate 2.5 mg tablet Commonly known as:  RHEUMATREX  
TAKE 8 TABLETS BY MOUTH ONCE A WEEK, takes on Saturdays  
  
 mometasone 50 mcg/actuation nasal spray Commonly known as:  NASONEX  
2 Sprays by Both Nostrils route daily. oxyCODONE-acetaminophen 5-325 mg per tablet Commonly known as:  PERCOCET Take 1 Tab by mouth every six (6) hours as needed for Pain. Max Daily Amount: 4 Tabs. ONLY FOR SEVERE PAIN  
  
 phentermine 37.5 mg tablet Commonly known as:  ADIPEX-P Take 1/2 tablet before breakfast and before dinner  
  
 raNITIdine 150 mg tablet Commonly known as:  ZANTAC Take 150 mg by mouth two (2) times a day. topiramate 50 mg tablet Commonly known as:  TOPAMAX Take 1 Tab by mouth two (2) times a day. TYLENOL EXTRA STRENGTH 500 mg tablet Generic drug:  acetaminophen Take 1,000 mg by mouth every six (6) hours as needed for Pain. June 2017 Details Sun Mon Tue Wed Thu Fri Sat  
      1  
  
  
  
   2  
  
  
  
   3  
  
  
  
  
  4  
  
  
  
   5  
  
  
  
   6  
  
  
  
   7  
  
  
  
   8  
  
  
  
   9  
  
  
  
   10  
  
  
  
  
  11  
  
  
  
   12  
  
  
  
   13  
  
  
  
   14  
  
  
  
   15  
  
7.5 mg  
See details 16  
  
7.5 mg  
  
   17  
  
7.5 mg  
  
  
  18  
  
10 mg  
  
   19  
  
7.5 mg  
  
   20  
  
7.5 mg  
  
   21  
  
7.5 mg  
  
   22  
  
7.5 mg  
  
   23  
  
7.5 mg  
  
   24  
  
7.5 mg  
  
  
  25  
  
10 mg  
  
   26  
  
7.5 mg  
  
   27  
  
7.5 mg  
  
   28  
  
7.5 mg  
  
   29  
  
7.5 mg  
  
   30  
  
7.5 mg Date Details 06/15 This INR check INR: 2.7 Date of next INR: No date specified How to take your warfarin dose To take:  7.5 mg Take one and a half of the 5 mg tablets. To take:  10 mg Take two of the 5 mg tablets. We Performed the Following AMB POC PT/INR [58649 CPT(R)] Follow-up Instructions Return in about 1 week (around 6/22/2017), or if symptoms worsen or fail to improve. Introducing [facility]! [employer] introduces [employer] patient portal. Now you can access parts of your medical record, email your doctor's office, and request medication refills online. 1. In your internet browser, go to [email or web address]. [employer] 2. Click on the First Time User? Click Here link in the Sign In box. You will see the New Member Sign Up page. 3. Enter your [employer] Access Code exactly as it appears below. You will not need to use this code after youve completed the sign-up process. If you do not sign up before the expiration date, you must request a new code. · [employer] Access Code: [facility] Expires: 8/9/2017 10:54 AM 
 
4.  Enter the last four digits of your Social Security Number (xxxx) and Date of Birth (mm/dd/yyyy) as indicated and click Submit. You will be taken to the next sign-up page. 5. Create a [employer] ID. This will be your [employer] login ID and cannot be changed, so think of one that is secure and easy to remember. 6. Create a [employer] password. You can change your password at any time. 7. Enter your Password Reset Question and Answer. This can be used at a later time if you forget your password. 8. Enter your e-mail address. You will receive e-mail notification when new information is available in [address] 9. Click Sign Up. You can now view and download portions of your medical record. 10. Click the Download Summary menu link to download a portable copy of your medical information. If you have questions, please visit the Frequently Asked Questions section of the [employer] website. Remember, [employer] is NOT to be used for urgent needs. For medical emergencies, dial 911. Now available from your iPhone and Android! Please provide this summary of care documentation to your next provider. Your primary care clinician is listed as Nancy Rae. If you have any questions after today's visit, please call [phone].

## 2017-06-15 NOTE — TELEPHONE ENCOUNTER
Darby Bhandari calling -  @ [phone] - [facility]      Would like OV note from today's visit w/Dr. Doris Leon      Pt coming in 6-19-17 for procedure        Fax  [phone]

## 2017-06-15 NOTE — PERIOP NOTES
Pt seen today by Dr Stefania Kidd and he is okay to stop Coumadin as of today 06/15/2017. See office note from Dr Stefania Kidd.

## 2017-06-19 ENCOUNTER — ANESTHESIA EVENT (OUTPATIENT)
Dept: SURGERY | Age: 47
DRG: 742 | End: 2017-06-19
Payer: COMMERCIAL

## 2017-06-19 ENCOUNTER — HOSPITAL ENCOUNTER (INPATIENT)
Age: 47
LOS: 8 days | Discharge: HOME | DRG: 742 | End: 2017-06-27
Attending: OBSTETRICS & GYNECOLOGY | Admitting: OBSTETRICS & GYNECOLOGY
Payer: COMMERCIAL

## 2017-06-19 ENCOUNTER — ANESTHESIA (OUTPATIENT)
Dept: SURGERY | Age: 47
DRG: 742 | End: 2017-06-19
Payer: COMMERCIAL

## 2017-06-19 PROBLEM — D25.9 FIBROID UTERUS: Status: ACTIVE | Noted: 2017-06-19

## 2017-06-19 LAB
HCG UR QL: NEGATIVE
INR BLD: 1.2 (ref 0.9–1.2)

## 2017-06-19 PROCEDURE — 74011250636 HC RX REV CODE- 250/636

## 2017-06-19 PROCEDURE — 0UT90ZZ RESECTION OF UTERUS, OPEN APPROACH: ICD-10-PCS | Performed by: OBSTETRICS & GYNECOLOGY

## 2017-06-19 PROCEDURE — 77030018846 HC SOL IRR STRL H20 ICUM -A: Performed by: OBSTETRICS & GYNECOLOGY

## 2017-06-19 PROCEDURE — 88307 TISSUE EXAM BY PATHOLOGIST: CPT | Performed by: OBSTETRICS & GYNECOLOGY

## 2017-06-19 PROCEDURE — 77030002888 HC SUT CHRMC J&J -A: Performed by: OBSTETRICS & GYNECOLOGY

## 2017-06-19 PROCEDURE — 74011000258 HC RX REV CODE- 258: Performed by: OBSTETRICS & GYNECOLOGY

## 2017-06-19 PROCEDURE — 85610 PROTHROMBIN TIME: CPT

## 2017-06-19 PROCEDURE — 77030032490 HC SLV COMPR SCD KNE COVD -B: Performed by: OBSTETRICS & GYNECOLOGY

## 2017-06-19 PROCEDURE — 74011250636 HC RX REV CODE- 250/636: Performed by: OBSTETRICS & GYNECOLOGY

## 2017-06-19 PROCEDURE — 77030034849: Performed by: OBSTETRICS & GYNECOLOGY

## 2017-06-19 PROCEDURE — 77030010507 HC ADH SKN DERMBND J&J -B: Performed by: OBSTETRICS & GYNECOLOGY

## 2017-06-19 PROCEDURE — 76010000131 HC OR TIME 2 TO 2.5 HR: Performed by: OBSTETRICS & GYNECOLOGY

## 2017-06-19 PROCEDURE — 77030002974 HC SUT PLN J&J -A: Performed by: OBSTETRICS & GYNECOLOGY

## 2017-06-19 PROCEDURE — 77030002933 HC SUT MCRYL J&J -A: Performed by: OBSTETRICS & GYNECOLOGY

## 2017-06-19 PROCEDURE — 76210000016 HC OR PH I REC 1 TO 1.5 HR: Performed by: OBSTETRICS & GYNECOLOGY

## 2017-06-19 PROCEDURE — 77030003029 HC SUT VCRL J&J -B: Performed by: OBSTETRICS & GYNECOLOGY

## 2017-06-19 PROCEDURE — 74011250636 HC RX REV CODE- 250/636: Performed by: ANESTHESIOLOGY

## 2017-06-19 PROCEDURE — 77030011264 HC ELECTRD BLD EXT COVD -A: Performed by: OBSTETRICS & GYNECOLOGY

## 2017-06-19 PROCEDURE — 81025 URINE PREGNANCY TEST: CPT

## 2017-06-19 PROCEDURE — 77030011640 HC PAD GRND REM COVD -A: Performed by: OBSTETRICS & GYNECOLOGY

## 2017-06-19 PROCEDURE — 76060000035 HC ANESTHESIA 2 TO 2.5 HR: Performed by: OBSTETRICS & GYNECOLOGY

## 2017-06-19 PROCEDURE — 77030018836 HC SOL IRR NACL ICUM -A: Performed by: OBSTETRICS & GYNECOLOGY

## 2017-06-19 PROCEDURE — 77030002966 HC SUT PDS J&J -A: Performed by: OBSTETRICS & GYNECOLOGY

## 2017-06-19 PROCEDURE — 65270000029 HC RM PRIVATE

## 2017-06-19 PROCEDURE — 77030031139 HC SUT VCRL2 J&J -A: Performed by: OBSTETRICS & GYNECOLOGY

## 2017-06-19 PROCEDURE — 74011250637 HC RX REV CODE- 250/637: Performed by: OBSTETRICS & GYNECOLOGY

## 2017-06-19 PROCEDURE — 77030008684 HC TU ET CUF COVD -B: Performed by: ANESTHESIOLOGY

## 2017-06-19 PROCEDURE — 0DNW0ZZ RELEASE PERITONEUM, OPEN APPROACH: ICD-10-PCS | Performed by: OBSTETRICS & GYNECOLOGY

## 2017-06-19 PROCEDURE — 77030026438 HC STYL ET INTUB CARD -A: Performed by: ANESTHESIOLOGY

## 2017-06-19 PROCEDURE — 74011000250 HC RX REV CODE- 250

## 2017-06-19 RX ORDER — OXYCODONE AND ACETAMINOPHEN 5; 325 MG/1; MG/1
1 TABLET ORAL
Status: DISCONTINUED | OUTPATIENT
Start: 2017-06-19 | End: 2017-06-27 | Stop reason: HOSPADM

## 2017-06-19 RX ORDER — GLYCOPYRROLATE 0.2 MG/ML
INJECTION INTRAMUSCULAR; INTRAVENOUS AS NEEDED
Status: DISCONTINUED | OUTPATIENT
Start: 2017-06-19 | End: 2017-06-19 | Stop reason: HOSPADM

## 2017-06-19 RX ORDER — HYDROMORPHONE HYDROCHLORIDE 1 MG/ML
.2-.5 INJECTION, SOLUTION INTRAMUSCULAR; INTRAVENOUS; SUBCUTANEOUS
Status: DISCONTINUED | OUTPATIENT
Start: 2017-06-19 | End: 2017-06-19 | Stop reason: HOSPADM

## 2017-06-19 RX ORDER — DEXTROSE MONOHYDRATE AND SODIUM CHLORIDE 5; .9 G/100ML; G/100ML
100 INJECTION, SOLUTION INTRAVENOUS CONTINUOUS
Status: DISPENSED | OUTPATIENT
Start: 2017-06-19 | End: 2017-06-20

## 2017-06-19 RX ORDER — WARFARIN SODIUM 5 MG/1
5 TABLET ORAL DAILY
COMMUNITY
End: 2017-07-06 | Stop reason: ALTCHOICE

## 2017-06-19 RX ORDER — SODIUM CHLORIDE 0.9 % (FLUSH) 0.9 %
5-10 SYRINGE (ML) INJECTION AS NEEDED
Status: DISCONTINUED | OUTPATIENT
Start: 2017-06-19 | End: 2017-06-19 | Stop reason: HOSPADM

## 2017-06-19 RX ORDER — DEXAMETHASONE SODIUM PHOSPHATE 4 MG/ML
INJECTION, SOLUTION INTRA-ARTICULAR; INTRALESIONAL; INTRAMUSCULAR; INTRAVENOUS; SOFT TISSUE AS NEEDED
Status: DISCONTINUED | OUTPATIENT
Start: 2017-06-19 | End: 2017-06-19 | Stop reason: HOSPADM

## 2017-06-19 RX ORDER — HYDROMORPHONE HYDROCHLORIDE 2 MG/ML
INJECTION, SOLUTION INTRAMUSCULAR; INTRAVENOUS; SUBCUTANEOUS AS NEEDED
Status: DISCONTINUED | OUTPATIENT
Start: 2017-06-19 | End: 2017-06-19 | Stop reason: HOSPADM

## 2017-06-19 RX ORDER — ZOLPIDEM TARTRATE 5 MG/1
5 TABLET ORAL
Status: DISCONTINUED | OUTPATIENT
Start: 2017-06-19 | End: 2017-06-27 | Stop reason: HOSPADM

## 2017-06-19 RX ORDER — MIDAZOLAM HYDROCHLORIDE 1 MG/ML
INJECTION, SOLUTION INTRAMUSCULAR; INTRAVENOUS AS NEEDED
Status: DISCONTINUED | OUTPATIENT
Start: 2017-06-19 | End: 2017-06-19 | Stop reason: HOSPADM

## 2017-06-19 RX ORDER — TOPIRAMATE 25 MG/1
50 TABLET ORAL 2 TIMES DAILY
Status: DISCONTINUED | OUTPATIENT
Start: 2017-06-19 | End: 2017-06-27 | Stop reason: HOSPADM

## 2017-06-19 RX ORDER — SODIUM CHLORIDE 0.9 % (FLUSH) 0.9 %
5-10 SYRINGE (ML) INJECTION EVERY 8 HOURS
Status: DISCONTINUED | OUTPATIENT
Start: 2017-06-19 | End: 2017-06-27 | Stop reason: HOSPADM

## 2017-06-19 RX ORDER — SODIUM CHLORIDE, SODIUM LACTATE, POTASSIUM CHLORIDE, CALCIUM CHLORIDE 600; 310; 30; 20 MG/100ML; MG/100ML; MG/100ML; MG/100ML
25 INJECTION, SOLUTION INTRAVENOUS CONTINUOUS
Status: DISCONTINUED | OUTPATIENT
Start: 2017-06-19 | End: 2017-06-19 | Stop reason: HOSPADM

## 2017-06-19 RX ORDER — FENTANYL CITRATE 50 UG/ML
25 INJECTION, SOLUTION INTRAMUSCULAR; INTRAVENOUS
Status: DISCONTINUED | OUTPATIENT
Start: 2017-06-19 | End: 2017-06-19 | Stop reason: HOSPADM

## 2017-06-19 RX ORDER — DIPHENHYDRAMINE HYDROCHLORIDE 50 MG/ML
12.5 INJECTION, SOLUTION INTRAMUSCULAR; INTRAVENOUS AS NEEDED
Status: DISCONTINUED | OUTPATIENT
Start: 2017-06-19 | End: 2017-06-19 | Stop reason: HOSPADM

## 2017-06-19 RX ORDER — ONDANSETRON 2 MG/ML
4 INJECTION INTRAMUSCULAR; INTRAVENOUS AS NEEDED
Status: DISCONTINUED | OUTPATIENT
Start: 2017-06-19 | End: 2017-06-19 | Stop reason: HOSPADM

## 2017-06-19 RX ORDER — PROPOFOL 10 MG/ML
INJECTION, EMULSION INTRAVENOUS AS NEEDED
Status: DISCONTINUED | OUTPATIENT
Start: 2017-06-19 | End: 2017-06-19 | Stop reason: HOSPADM

## 2017-06-19 RX ORDER — ONDANSETRON 2 MG/ML
4 INJECTION INTRAMUSCULAR; INTRAVENOUS
Status: DISCONTINUED | OUTPATIENT
Start: 2017-06-19 | End: 2017-06-27 | Stop reason: HOSPADM

## 2017-06-19 RX ORDER — SODIUM CHLORIDE 0.9 % (FLUSH) 0.9 %
5-10 SYRINGE (ML) INJECTION AS NEEDED
Status: DISCONTINUED | OUTPATIENT
Start: 2017-06-19 | End: 2017-06-27 | Stop reason: HOSPADM

## 2017-06-19 RX ORDER — FENTANYL CITRATE 50 UG/ML
INJECTION, SOLUTION INTRAMUSCULAR; INTRAVENOUS AS NEEDED
Status: DISCONTINUED | OUTPATIENT
Start: 2017-06-19 | End: 2017-06-19 | Stop reason: HOSPADM

## 2017-06-19 RX ORDER — ONDANSETRON 2 MG/ML
INJECTION INTRAMUSCULAR; INTRAVENOUS
Status: COMPLETED
Start: 2017-06-19 | End: 2017-06-19

## 2017-06-19 RX ORDER — ONDANSETRON 2 MG/ML
INJECTION INTRAMUSCULAR; INTRAVENOUS AS NEEDED
Status: DISCONTINUED | OUTPATIENT
Start: 2017-06-19 | End: 2017-06-19 | Stop reason: HOSPADM

## 2017-06-19 RX ORDER — SODIUM CHLORIDE 0.9 % (FLUSH) 0.9 %
5-10 SYRINGE (ML) INJECTION EVERY 8 HOURS
Status: DISCONTINUED | OUTPATIENT
Start: 2017-06-19 | End: 2017-06-19 | Stop reason: HOSPADM

## 2017-06-19 RX ORDER — MORPHINE SULFATE 10 MG/ML
2 INJECTION, SOLUTION INTRAMUSCULAR; INTRAVENOUS
Status: DISCONTINUED | OUTPATIENT
Start: 2017-06-19 | End: 2017-06-19 | Stop reason: HOSPADM

## 2017-06-19 RX ORDER — HYDROMORPHONE HYDROCHLORIDE 1 MG/ML
1 INJECTION, SOLUTION INTRAMUSCULAR; INTRAVENOUS; SUBCUTANEOUS
Status: DISCONTINUED | OUTPATIENT
Start: 2017-06-19 | End: 2017-06-27 | Stop reason: HOSPADM

## 2017-06-19 RX ORDER — LIDOCAINE HYDROCHLORIDE 20 MG/ML
INJECTION, SOLUTION EPIDURAL; INFILTRATION; INTRACAUDAL; PERINEURAL AS NEEDED
Status: DISCONTINUED | OUTPATIENT
Start: 2017-06-19 | End: 2017-06-19 | Stop reason: HOSPADM

## 2017-06-19 RX ORDER — ENOXAPARIN SODIUM 100 MG/ML
40 INJECTION SUBCUTANEOUS EVERY 24 HOURS
Status: DISCONTINUED | OUTPATIENT
Start: 2017-06-20 | End: 2017-06-27 | Stop reason: HOSPADM

## 2017-06-19 RX ORDER — NEOSTIGMINE METHYLSULFATE 1 MG/ML
INJECTION INTRAVENOUS AS NEEDED
Status: DISCONTINUED | OUTPATIENT
Start: 2017-06-19 | End: 2017-06-19 | Stop reason: HOSPADM

## 2017-06-19 RX ORDER — ROCURONIUM BROMIDE 10 MG/ML
INJECTION, SOLUTION INTRAVENOUS AS NEEDED
Status: DISCONTINUED | OUTPATIENT
Start: 2017-06-19 | End: 2017-06-19 | Stop reason: HOSPADM

## 2017-06-19 RX ADMIN — ONDANSETRON 4 MG: 2 INJECTION INTRAMUSCULAR; INTRAVENOUS at 09:31

## 2017-06-19 RX ADMIN — HYDROMORPHONE HYDROCHLORIDE 0.5 MG: 2 INJECTION, SOLUTION INTRAMUSCULAR; INTRAVENOUS; SUBCUTANEOUS at 10:06

## 2017-06-19 RX ADMIN — PROPOFOL 160 MG: 10 INJECTION, EMULSION INTRAVENOUS at 09:37

## 2017-06-19 RX ADMIN — FENTANYL CITRATE 25 MCG: 50 INJECTION, SOLUTION INTRAMUSCULAR; INTRAVENOUS at 12:31

## 2017-06-19 RX ADMIN — SODIUM CHLORIDE, POTASSIUM CHLORIDE, SODIUM LACTATE AND CALCIUM CHLORIDE: 600; 310; 30; 20 INJECTION, SOLUTION INTRAVENOUS at 08:30

## 2017-06-19 RX ADMIN — MIDAZOLAM HYDROCHLORIDE 2 MG: 1 INJECTION, SOLUTION INTRAMUSCULAR; INTRAVENOUS at 09:31

## 2017-06-19 RX ADMIN — FENTANYL CITRATE 25 MCG: 50 INJECTION, SOLUTION INTRAMUSCULAR; INTRAVENOUS at 12:23

## 2017-06-19 RX ADMIN — DEXTROSE MONOHYDRATE AND SODIUM CHLORIDE 100 ML/HR: 5; .9 INJECTION, SOLUTION INTRAVENOUS at 22:00

## 2017-06-19 RX ADMIN — TOPIRAMATE 50 MG: 25 TABLET, FILM COATED ORAL at 17:17

## 2017-06-19 RX ADMIN — ONDANSETRON HYDROCHLORIDE 4 MG: 2 INJECTION, SOLUTION INTRAMUSCULAR; INTRAVENOUS at 12:05

## 2017-06-19 RX ADMIN — FENTANYL CITRATE 25 MCG: 50 INJECTION, SOLUTION INTRAMUSCULAR; INTRAVENOUS at 12:27

## 2017-06-19 RX ADMIN — GLYCOPYRROLATE 0.5 MG: 0.2 INJECTION INTRAMUSCULAR; INTRAVENOUS at 11:21

## 2017-06-19 RX ADMIN — ROCURONIUM BROMIDE 40 MG: 10 INJECTION, SOLUTION INTRAVENOUS at 09:38

## 2017-06-19 RX ADMIN — ROCURONIUM BROMIDE 30 MG: 10 INJECTION, SOLUTION INTRAVENOUS at 10:11

## 2017-06-19 RX ADMIN — ONDANSETRON 4 MG: 2 INJECTION INTRAMUSCULAR; INTRAVENOUS at 12:05

## 2017-06-19 RX ADMIN — FENTANYL CITRATE 25 MCG: 50 INJECTION, SOLUTION INTRAMUSCULAR; INTRAVENOUS at 12:17

## 2017-06-19 RX ADMIN — LIDOCAINE HYDROCHLORIDE 80 MG: 20 INJECTION, SOLUTION EPIDURAL; INFILTRATION; INTRACAUDAL; PERINEURAL at 09:37

## 2017-06-19 RX ADMIN — CEFAZOLIN 2 G: 1 INJECTION, POWDER, FOR SOLUTION INTRAMUSCULAR; INTRAVENOUS; PARENTERAL at 09:31

## 2017-06-19 RX ADMIN — HYDROMORPHONE HYDROCHLORIDE 0.5 MG: 2 INJECTION, SOLUTION INTRAMUSCULAR; INTRAVENOUS; SUBCUTANEOUS at 11:28

## 2017-06-19 RX ADMIN — Medication 10 ML: at 22:00

## 2017-06-19 RX ADMIN — HYDROMORPHONE HYDROCHLORIDE 1 MG: 1 INJECTION, SOLUTION INTRAMUSCULAR; INTRAVENOUS; SUBCUTANEOUS at 22:00

## 2017-06-19 RX ADMIN — Medication 10 ML: at 14:17

## 2017-06-19 RX ADMIN — DEXAMETHASONE SODIUM PHOSPHATE 8 MG: 4 INJECTION, SOLUTION INTRA-ARTICULAR; INTRALESIONAL; INTRAMUSCULAR; INTRAVENOUS; SOFT TISSUE at 09:43

## 2017-06-19 RX ADMIN — FENTANYL CITRATE 100 MCG: 50 INJECTION, SOLUTION INTRAMUSCULAR; INTRAVENOUS at 09:37

## 2017-06-19 RX ADMIN — HYDROMORPHONE HYDROCHLORIDE 1 MG: 1 INJECTION, SOLUTION INTRAMUSCULAR; INTRAVENOUS; SUBCUTANEOUS at 14:26

## 2017-06-19 RX ADMIN — DEXTROSE MONOHYDRATE AND SODIUM CHLORIDE 100 ML/HR: 5; .9 INJECTION, SOLUTION INTRAVENOUS at 11:50

## 2017-06-19 RX ADMIN — NEOSTIGMINE METHYLSULFATE 3 MG: 1 INJECTION INTRAVENOUS at 11:22

## 2017-06-19 NOTE — BRIEF OP NOTE
BRIEF OPERATIVE NOTE    Date of Procedure: 6/19/2017   Preoperative Diagnosis: FIBROIDS  Postoperative Diagnosis: FIBROIDS    Procedure(s):   ABDOMINAL SUPRACERVICAL HYSTERECTOMY  Surgeon(s) and Role:     * Brii Guy MD - Primary         Assistant Staff:       Surgical Staff:  Circ-1: Lelo Gaona RN  Circ-Relief: Talia Malone RN  Scrub Tech-1: Cosimo Cyphers  Scrub Tech-2: Homero Sarah White  Surg Asst-1: Gregg [place] McPhipps  Event Time In   Incision Start 1001   Incision Close      Anesthesia: General   Estimated Blood Loss: 50cc  Specimens:   ID Type Source Tests Collected by Time Destination   1 : uterus Preservative Uterus  Brii Guy MD 6/19/2017 1056 Pathology      Findings: extensive  Small bowel adhesions  Complications: none known  Implants: * No implants in log *

## 2017-06-19 NOTE — PERIOP NOTES
Handoff Report from Operating Room to PACU    Report received from THU Valero RN and SRAVAN Veloz CRNA regarding Rocael Adams. Surgeon(s):  Arabella Cason MD  And Procedure(s) (LRB):   ABDOMINAL SUPRACERVICAL HYSTERECTOMY (N/A)  confirmed   with allergies and dressings discussed. Anesthesia type, drugs, patient history, complications, estimated blood loss, vital signs, intake and output, and last pain medication, lines, reversal medications and temperature were reviewed.

## 2017-06-19 NOTE — PROGRESS NOTES
End of Shift Nursing Note    Bedside shift change report given to Mohit Billings (oncoming nurse) by Flaco Lazaro (offgoing nurse). Report included the following information [facility]. [facility] Phone:   [phone]    Significant changes during shift:    Surgery supracervical hysterectomy    Non-emergent issues for physician to address:        Number times ambulated in hallway past shift: 0      Number of times OOB to chair past shift: 0    POD #:      Vital Signs:    Temp: 98.2 °F (36.8 °C)     Pulse (Heart Rate): 92     BP: 139/75     Resp Rate: 16     O2 Sat (%): 99 %    Lines & Drains:     Urinary Catheter? Yes   Placement Date: 6/19/17   Medical Necessity: yes  Central Line? No   Placement Date:    Medical Necessity:   PICC Line? No   Placement Date:    Medical Necessity:     NG tube [] in [] removed [] not applicable   Drains [] in [] removed [] not applicable     Skin Integrity:      Wounds: yes   Dressings Present: yes    Wound Concerns: no      GI:    Current diet:  DIET CLEAR LIQUID    Nausea: NO  Vomiting: NO  Bowel Sounds: YES  Flatus: YES  Last Bowel Movement: yesterday   Appearance:     Respiratory:  Supplemental O2: No      Device:    via  Liters/min     Incentive Spirometer: YES  Volume:   Coughing and Deep Breathing: YES  Oral Care: YES  Understanding (patient/family education): YES   Getting out of bed: YES  Head of bed elevation: YES    Patient Safety:    Falls Score: 1  Mobility Score: 1  Bed Alarm On? No  Sitter? No      Opportunity for questions and clarification was given to oncoming nurse. Patient bed is in low position, side rails are up x 2, door & observation blinds open as needed, call bell within reach and patient not in distress.     Jocelin Reveles

## 2017-06-19 NOTE — PERIOP NOTES
in [facility] updated with stable patient condition & plan to admit to [address] when bed is assigned & patient is comfortable. Amaury Hutchinson

## 2017-06-19 NOTE — PERIOP NOTES
TRANSFER - OUT REPORT:    Verbal report given to [facility] RN(name) on Maura Saab  being transferred to [facility](unit) for routine post - op       Report consisted of patients Situation, Background, Assessment and   Recommendations(SBAR). Information from the following report(s) SBAR, OR Summary, Intake/Output and MAR was reviewed with the receiving nurse. Opportunity for questions and clarification was provided. Nurse states that room is not yet completely cleaned & she will call when patient can be transported to room. Patient transported with:   Tech        in [address] notified of transfer by Miah Beltran @ 1400.

## 2017-06-19 NOTE — PROGRESS NOTES
TRANSFER - IN REPORT:    Verbal report received from Maggi(name) on [address]  being received from [employer]) for routine post - op      Report consisted of patients Situation, Background, Assessment and   Recommendations(SBAR). Information from the following report(s) SBAR was reviewed with the receiving nurse. Opportunity for questions and clarification was provided. Assessment completed upon patients arrival to unit and care assumed.

## 2017-06-19 NOTE — PROGRESS NOTES
Primary Nurse Adriana Baig and Edmar Rodrigues, RN performed a dual skin assessment on this patient Impairment noted- see wound doc flow sheet  Heriberto score is 17  2 tattoos

## 2017-06-19 NOTE — ANESTHESIA PREPROCEDURE EVALUATION
Anesthetic History   No history of anesthetic complications            Review of Systems / Medical History  Patient summary reviewed, nursing notes reviewed and pertinent labs reviewed    Pulmonary            Asthma        Neuro/Psych   Within defined limits           Cardiovascular  Within defined limits                Exercise tolerance: >4 METS     GI/Hepatic/Renal     GERD      PUD     Endo/Other        Morbid obesity and arthritis     Other Findings   Comments: Fibroids   2016  Per pt Right leg DVT    UC         Physical Exam    Airway  Mallampati: III  TM Distance: 4 - 6 cm  Neck ROM: normal range of motion   Mouth opening: Normal     Cardiovascular  Regular rate and rhythm,  S1 and S2 normal,  no murmur, click, rub, or gallop             Dental  No notable dental hx       Pulmonary  Breath sounds clear to auscultation               Abdominal  GI exam deferred       Other Findings            Anesthetic Plan    ASA: 3  Anesthesia type: general            Anesthetic plan and risks discussed with: Patient

## 2017-06-19 NOTE — IP AVS SNAPSHOT
[address] [place] [address] 
[phone] Patient: Megan Traore MRN: [id number] PWI:[id number] You are allergic to the following Allergen Reactions Sulfa (Sulfonamide Antibiotics) Anaphylaxis Codeine Itching Recent Documentation Height Weight BMI OB Status Smoking Status 1.651 m 109.2 kg 40.06 kg/m2 IUD Never Smoker Emergency Contacts Name Discharge Info Relation Home Work Mobile [address] [facility] CAREGIVER [3] Mother [14] [phone] Tamme 61  Parent [1] [phone] About your hospitalization You were admitted on:  June 19, 2017 You last received care in the:  [facility] 2 GENERAL SURGERY You were discharged on:  June 27, 2017 Unit phone number:  [phone] Why you were hospitalized Your primary diagnosis was:  Not on File Your diagnoses also included:  Fibroid Uterus Providers Seen During Your Hospitalizations Provider Role Specialty Primary office phone Bonner Gaucher, MD Attending Provider Gynecology [phone] Your Primary Care Physician (PCP) Primary Care Physician Office Phone Office Fax [facility] [phone] Follow-up Information Follow up With Details Comments Contact Info Andrae Bryan MD   [address] [place] [address] 
[phone] Your Appointments Tuesday July 11, 2017 10:50 AM EDT Follow Up with MD [facility]) [address] [address] [phone] Current Discharge Medication List  
  
START taking these medications Dose & Instructions Dispensing Information Comments Morning Noon Evening Bedtime  
 ciprofloxacin HCl 500 mg tablet Commonly known as:  CIPRO Your last dose was: Your next dose is:    
   
   
 Dose:  500 mg Take 1 Tab by mouth two (2) times a day for 5 days. Indications: PROTEUS URINARY TRACT INFECTION Quantity:  10 Tab Refills:  0 CONTINUE these medications which have CHANGED Dose & Instructions Dispensing Information Comments Morning Noon Evening Bedtime * oxyCODONE-acetaminophen 5-325 mg per tablet Commonly known as:  PERCOCET What changed:  Another medication with the same name was added. Make sure you understand how and when to take each. Your last dose was: Your next dose is:    
   
   
 Dose:  1 Tab Take 1 Tab by mouth every six (6) hours as needed for Pain. Max Daily Amount: 4 Tabs. ONLY FOR SEVERE PAIN Quantity:  30 Tab Refills:  0  
     
   
   
   
  
 * oxyCODONE-acetaminophen 5-325 mg per tablet Commonly known as:  PERCOCET What changed: You were already taking a medication with the same name, and this prescription was added. Make sure you understand how and when to take each. Your last dose was: Your next dose is:    
   
   
 Dose:  1 Tab Take 1 Tab by mouth every four (4) hours as needed. Max Daily Amount: 6 Tabs. Indications: Pain Quantity:  30 Tab Refills:  0  
     
   
   
   
  
 * Notice: This list has 2 medication(s) that are the same as other medications prescribed for you. Read the directions carefully, and ask your doctor or other care provider to review them with you. CONTINUE these medications which have NOT CHANGED Dose & Instructions Dispensing Information Comments Morning Noon Evening Bedtime  
 albuterol 90 mcg/actuation inhaler Commonly known as:  PROVENTIL HFA, VENTOLIN HFA, PROAIR HFA Your last dose was: Your next dose is:    
   
   
 Dose:  1 Puff Take 1 Puff by inhalation every four (4) hours as needed for Wheezing. Quantity:  1 Inhaler Refills:  5 Comp.Stocking,Thigh,Long,X-Lrg Misc Your last dose was: Your next dose is:    
   
   
 Apply to leg in am and remove at bedtime. Please disp 1 stocking. Quantity:  1 Each Refills:  0  
     
   
   
   
  
 COUMADIN 5 mg tablet Generic drug:  warfarin Your last dose was: Your next dose is:    
   
   
 Dose:  5 mg Take 5 mg by mouth daily. Indications: DEEP VEIN THROMBOSIS PREVENTION Refills:  0  
     
   
   
   
  
 furosemide 40 mg tablet Commonly known as:  LASIX Your last dose was: Your next dose is: TAKE 1 TABLET BY MOUTH TWICE DAILY AS NEEDED Quantity:  60 Tab Refills:  0  
     
   
   
   
  
 methotrexate 2.5 mg tablet Commonly known as:  Karyn Creamer Your last dose was: Your next dose is: TAKE 8 TABLETS BY MOUTH ONCE A WEEK, takes on Saturdays Refills:  0  
     
   
   
   
  
 mometasone 50 mcg/actuation nasal spray Commonly known as:  Mancil Blow Your last dose was: Your next dose is:    
   
   
 Dose:  2 Spray 2 Sprays by Both Nostrils route daily. Quantity:  1 Container Refills:  1 phentermine 37.5 mg tablet Commonly known as:  ADIPEX-P Your last dose was: Your next dose is: Take 1/2 tablet before breakfast and before dinner Quantity:  100 Tab Refills:  5  
     
   
   
   
  
 raNITIdine 150 mg tablet Commonly known as:  ZANTAC Your last dose was: Your next dose is:    
   
   
 Dose:  150 mg Take 150 mg by mouth two (2) times a day. Refills:  0  
     
   
   
   
  
 topiramate 50 mg tablet Commonly known as:  TOPAMAX Your last dose was: Your next dose is:    
   
   
 Dose:  50 mg Take 1 Tab by mouth two (2) times a day. Quantity:  60 Tab Refills:  5  
     
   
   
   
  
 TYLENOL EXTRA STRENGTH 500 mg tablet Generic drug:  acetaminophen Your last dose was: Your next dose is:    
   
   
 Dose:  1000 mg Take 1,000 mg by mouth every six (6) hours as needed for Pain. Refills:  0 Where to Get Your Medications Information on where to get these meds will be given to you by the nurse or doctor. ! Ask your nurse or doctor about these medications  
  ciprofloxacin HCl 500 mg tablet  
 oxyCODONE-acetaminophen 5-325 mg per tablet Discharge Instructions Abdominal Hysterectomy Discharge Instructions Patient ID: 
Macey Josue [id number] 
48 y.o. 
1970 Take Home Medications What to do at [facility] Recommended diet: AS TOLERATED AVOID GASSY FOODS DRINK PLENTY OF LIQUIDS Recommended activity: GRADUALLY RESUME NORMAL ACTIVITIES OVER 4 WEEKS, LIMIT STAIRS AND LIFTING 
NO DRIVING FOR 2 WEEKS NOTHING IN VAGINA FOR 4 WEEKS If you experience any of the following symptoms. FEVER OR CHILLS 
HEAVY VAGINAL DRAINAGE OR SMELLY DISCHARGE REDNESS, BLEEDING OR DISCHARGE AT INCISION SITE 
PAIN OR SWELLING IN YOU LEGS Follow-up with Dr Valentina Mares in 3 weeks. Abdominal Hysterectomy: What to Expect at [facility] Your Recovery You can expect to feel better and stronger each day, although you may need pain medicine for a week or two. You may get tired easily or have less energy than usual. This may last for several weeks after surgery. You will probably notice that your belly is swollen and puffy. This is common. The swelling will take several weeks to go down. You may take 6 to 8 weeks to fully recover. It is important to avoid lifting while you are recovering so that you can heal. 
 
This care sheet gives you a general idea about how long it will take for you to recover. But each person recovers at a different pace. Follow the steps below to get better as quickly as possible. How can you care for yourself at home? Activity Rest when you feel tired. Getting enough sleep will help you recover. Try to walk each day. Start by walking a little more than you did the day before. Bit by bit, increase the amount you walk. Walking boosts blood flow and helps prevent pneumonia and constipation. Avoid lifting anything that would make you strain. This may include heavy grocery bags and milk containers, a heavy briefcase or backpack, cat litter or dog food bags, a child, or a vacuum . Avoid strenuous activities, such as biking, jogging, weight lifting, or aerobic exercise, until your doctor says it is okay. You may shower. Pat the cut (incision) dry. Do not take a bath for the first 2 weeks, or until your doctor tells you it is okay. You may drive when you are no longer taking prescription pain medicine and can quickly move your foot from the gas pedal to the brake. You must also be able to sit comfortably for a long period of time, even if you do not plan to go far. You might get caught in traffic. You will probably need to take 2 to 4 weeks off from work. It depends on the type of work you do and how you feel. Your doctor will tell you when you can have sex again. Diet You can eat your normal diet. If your stomach is upset, try bland, low-fat foods like plain rice, broiled chicken, toast, and yogurt. Drink plenty of fluids (unless your doctor tells you not to). You may notice that your bowel movements are not regular right after your surgery. This is common. Try to avoid constipation and straining with bowel movements. You may want to take a fiber supplement every day. If you have not had a bowel movement after a couple of days, ask your doctor about taking a mild laxative. Medicines Take pain medicines exactly as directed. If the doctor gave you a prescription medicine for pain, take it as prescribed.  
If you are not taking a prescription pain medicine, take an over-the-counter medicine such as acetaminophen (Tylenol), ibuprofen (Advil, Motrin), or naproxen (Aleve). Read and follow all instructions on the label. Do not take two or more pain medicines at the same time unless the doctor told you to. Many pain medicines have acetaminophen, which is Tylenol. Too much Tylenol can be harmful. If your doctor prescribed antibiotics, take them as directed. Do not stop taking them just because you feel better. You need to take the full course of antibiotics. If you think your pain medicine is making you sick to your stomach: Take your medicine after meals (unless your doctor has told you not to). Ask your doctor for a different pain medicine. Incision care If you have strips of tape on the cut (incision) the doctor made, leave the tape on for a week or until it falls off. Wash the area daily with warm, soapy water, and pat it dry. Other cleaning products, such as hydrogen peroxide, can make the wound heal more slowly. You may cover the area with a gauze bandage if it weeps or rubs against clothing. Change the bandage every day. Keep the area clean and dry. Other instructions You may have some light vaginal bleeding. Wear sanitary pads if needed. Do not douche or use tampons. Follow-up care is a key part of your treatment and safety. Be sure to make and go to all appointments, and call your doctor if you are having problems. It's also a good idea to know your test results and keep a list of the medicines you take. When should you call for help? Call 911 anytime you think you may need emergency care. For example, call if: You pass out (lose consciousness). You have sudden chest pain and shortness of breath, or you cough up blood. You have severe pain in your belly. Call your doctor now or seek immediate medical care if: 
You have bright red vaginal bleeding that soaks one or more pads in an hour, or you have large clots. You have foul-smelling discharge from your vagina. You are sick to your stomach or cannot keep fluids down. You have signs of infection, such as: Increased pain, swelling, warmth, or redness. Red streaks leading from the incision. Pus draining from the incision. Swollen lymph nodes in your neck, armpits, or groin. A fever. You have pain that does not get better after you take pain medicine. You have loose stitches, or your incision comes open. You have signs of a blood clot, such as: 
Pain in your calf, back of knee, thigh, or groin. Redness and swelling in your leg or groin. You have trouble passing urine or stool, especially if you have pain or swelling in your lower belly. You have hot flashes, sweating, flushing, or a fast or pounding heartbeat. Watch closely for changes in your health, and be sure to contact your doctor if: You do not have a bowel movement after taking a laxative. Where can you learn more? Go to [employer] Enter M280  in the search box to learn more about \"Abdominal Hysterectomy: What to Expect at Home\". © 6234-4670 Healthwise, Incorporated. Care instructions adapted under license by Chase Velez (which disclaims liability or warranty for this information). This care instruction is for use with your licensed healthcare professional. If you have questions about a medical condition or this instruction, always ask your healthcare professional. Sayra Cho any warranty or liability for your use of this information. Discharge Orders None [facility] Announcement We are excited to announce that we are making your provider's discharge notes available to you in Cloud[employer]. You will see these notes when they are completed and signed by the physician that discharged you from your recent hospital stay.   If you have any questions or concerns about any information you see in Cloud[employer], please call the Specific [employer] Department where you were seen or reach out to your Primary Care Provider for more information about your plan of care. Introducing [facility]! Ruth Loera introduces [employer] patient portal. Now you can access parts of your medical record, email your doctor's office, and request medication refills online. 1. In your internet browser, go to [email or web address] 2. Click on the First Time User? Click Here link in the Sign In box. You will see the New Member Sign Up page. 3. Enter your [employer] Access Code exactly as it appears below. You will not need to use this code after youve completed the sign-up process. If you do not sign up before the expiration date, you must request a new code. · [employer] Access Code: [facility] Expires: 8/9/2017 10:54 AM 
 
4. Enter the last four digits of your Social Security Number (xxxx) and Date of Birth (mm/dd/yyyy) as indicated and click Submit. You will be taken to the next sign-up page. 5. Create a [employer] ID. This will be your [employer] login ID and cannot be changed, so think of one that is secure and easy to remember. 6. Create a [employer] password. You can change your password at any time. 7. Enter your Password Reset Question and Answer. This can be used at a later time if you forget your password. 8. Enter your e-mail address. You will receive e-mail notification when new information is available in [address] 9. Click Sign Up. You can now view and download portions of your medical record. 10. Click the Download Summary menu link to download a portable copy of your medical information. If you have questions, please visit the Frequently Asked Questions section of the [employer] website. Remember, [employer] is NOT to be used for urgent needs. For medical emergencies, dial 911. Now available from your iPhone and Android! General Information Please provide this summary of care documentation to your next provider. Patient Signature:  ____________________________________________________________ Date:  ____________________________________________________________  
  
Araceli Lambing Provider Signature:  ____________________________________________________________ Date:  ____________________________________________________________

## 2017-06-19 NOTE — ANESTHESIA POSTPROCEDURE EVALUATION
Post-Anesthesia Evaluation and Assessment    Patient: Drea Raymundo MRN: [id number]  SSN: [id number]    YOB: 1970  Age: 48 y.o. Sex: female       Cardiovascular Function/Vital Signs  Visit Vitals    /73    Pulse 70    Temp 36.9 °C (98.4 °F)    Resp 17    Ht 5' 5\" (1.651 m)    Wt 109.2 kg (240 lb 11.9 oz)    SpO2 96%    BMI 40.06 kg/m2       Patient is status post general anesthesia for Procedure(s):   ABDOMINAL SUPRACERVICAL HYSTERECTOMY. Nausea/Vomiting: None    Postoperative hydration reviewed and adequate. Pain:  Pain Scale 1: FLACC (06/19/17 1300)  Pain Intensity 1: 0 (resting with eyes closed) (06/19/17 1300)   Managed    Neurological Status:   Neuro (WDL): Exceptions to WDL (06/19/17 1145)  Neuro  Neurologic State: Lethargic;Eyes open to stimulus (06/19/17 1145)  Orientation Level: Oriented to person;Oriented to situation (06/19/17 1145)  Cognition: Decreased attention/concentration; Follows commands (06/19/17 1145)  Speech: Delayed responses; Appropriate for age (06/19/17 1145)  Assessment L Pupil: Deferred (06/19/17 0848)  Assessment R Pupil: Deferred (06/19/17 0848)  LUE Motor Response: Purposeful (06/19/17 1145)  LLE Motor Response: Purposeful (06/19/17 1145)  RUE Motor Response: Purposeful (06/19/17 1145)  RLE Motor Response: Purposeful (06/19/17 1145)   At baseline    Mental Status and Level of Consciousness: Arousable    Pulmonary Status:   O2 Device: Room air (06/19/17 1300)   Adequate oxygenation and airway patent    Complications related to anesthesia: None    Post-anesthesia assessment completed.  No concerns    Signed By: Coretta Hampton MD     June 19, 2017

## 2017-06-20 LAB
ANION GAP BLD CALC-SCNC: 7 MMOL/L (ref 5–15)
BUN SERPL-MCNC: 8 MG/DL (ref 6–20)
BUN/CREAT SERPL: 13 (ref 12–20)
CALCIUM SERPL-MCNC: 7.7 MG/DL (ref 8.5–10.1)
CHLORIDE SERPL-SCNC: 108 MMOL/L (ref 97–108)
CO2 SERPL-SCNC: 22 MMOL/L (ref 21–32)
CREAT SERPL-MCNC: 0.64 MG/DL (ref 0.55–1.02)
GLUCOSE SERPL-MCNC: 121 MG/DL (ref 65–100)
HCT VFR BLD AUTO: 35.5 % (ref 35–47)
HGB BLD-MCNC: 11.9 G/DL (ref 11.5–16)
POTASSIUM SERPL-SCNC: 3.7 MMOL/L (ref 3.5–5.1)
SODIUM SERPL-SCNC: 137 MMOL/L (ref 136–145)

## 2017-06-20 PROCEDURE — 74011250636 HC RX REV CODE- 250/636: Performed by: OBSTETRICS & GYNECOLOGY

## 2017-06-20 PROCEDURE — 36415 COLL VENOUS BLD VENIPUNCTURE: CPT | Performed by: OBSTETRICS & GYNECOLOGY

## 2017-06-20 PROCEDURE — 85018 HEMOGLOBIN: CPT | Performed by: OBSTETRICS & GYNECOLOGY

## 2017-06-20 PROCEDURE — 65270000029 HC RM PRIVATE

## 2017-06-20 PROCEDURE — 80048 BASIC METABOLIC PNL TOTAL CA: CPT | Performed by: OBSTETRICS & GYNECOLOGY

## 2017-06-20 PROCEDURE — 74011250637 HC RX REV CODE- 250/637: Performed by: OBSTETRICS & GYNECOLOGY

## 2017-06-20 PROCEDURE — 74011000258 HC RX REV CODE- 258: Performed by: OBSTETRICS & GYNECOLOGY

## 2017-06-20 RX ADMIN — Medication 10 ML: at 21:19

## 2017-06-20 RX ADMIN — TOPIRAMATE 50 MG: 25 TABLET, FILM COATED ORAL at 18:02

## 2017-06-20 RX ADMIN — HYDROMORPHONE HYDROCHLORIDE 1 MG: 1 INJECTION, SOLUTION INTRAMUSCULAR; INTRAVENOUS; SUBCUTANEOUS at 14:20

## 2017-06-20 RX ADMIN — Medication 10 ML: at 01:58

## 2017-06-20 RX ADMIN — DEXTROSE MONOHYDRATE AND SODIUM CHLORIDE 100 ML/HR: 5; .9 INJECTION, SOLUTION INTRAVENOUS at 06:42

## 2017-06-20 RX ADMIN — HYDROMORPHONE HYDROCHLORIDE 1 MG: 1 INJECTION, SOLUTION INTRAMUSCULAR; INTRAVENOUS; SUBCUTANEOUS at 18:02

## 2017-06-20 RX ADMIN — TOPIRAMATE 50 MG: 25 TABLET, FILM COATED ORAL at 08:46

## 2017-06-20 RX ADMIN — HYDROMORPHONE HYDROCHLORIDE 1 MG: 1 INJECTION, SOLUTION INTRAMUSCULAR; INTRAVENOUS; SUBCUTANEOUS at 09:42

## 2017-06-20 RX ADMIN — HYDROMORPHONE HYDROCHLORIDE 1 MG: 1 INJECTION, SOLUTION INTRAMUSCULAR; INTRAVENOUS; SUBCUTANEOUS at 06:42

## 2017-06-20 RX ADMIN — Medication 10 ML: at 08:46

## 2017-06-20 RX ADMIN — ENOXAPARIN SODIUM 40 MG: 40 INJECTION SUBCUTANEOUS at 08:45

## 2017-06-20 RX ADMIN — HYDROMORPHONE HYDROCHLORIDE 1 MG: 1 INJECTION, SOLUTION INTRAMUSCULAR; INTRAVENOUS; SUBCUTANEOUS at 01:58

## 2017-06-20 NOTE — PROGRESS NOTES
Pt is a 47 y/o -American female admitted for fibroids. Pt lives with spouse and mother in a two story home with 19 steps to her bedroom. Pt has five steps to the entrance of her residence. Pt is independent with ADL's to include driving. Pt has previous [facility] (provider unknown) and no previous SNF provider. Pt has access to a cane if needed. Pt prefers to use [facility] at the intersection of [address] [place] [address] and [facility]. Pt will be transported at discharge by spouse or mother. CM met with pt to complete initial assessment. Pt is alert and oriented to person, place,time and situation. CM will continue to follow pt to assist with discharge plans as needed. Care Management Interventions  PCP Verified by CM: Yes ([facility] )  Mode of Transport at Discharge:  Other (see comment) (private vehicle )  Transition of Care Consult (CM Consult): Discharge Planning (CM to assist as needed )  Discharge Durable Medical Equipment: No (pt has access to cane )  Health Maintenance Reviewed: Yes  Physical Therapy Consult: No  Occupational Therapy Consult: No  Speech Therapy Consult: No  Current Support Network: Lives with Spouse (P tlives with spouse and mother )  Confirm Follow Up Transport: Self  Plan discussed with Pt/Family/Caregiver: Yes  Discharge Location  Discharge Placement: Home with family assistance    ALBERTO To, [address] [place] [address]   [phone]

## 2017-06-20 NOTE — PROGRESS NOTES
End of Shift Nursing Note    Bedside shift change report given to Kendell (oncoming nurse) by Kiki Murillo (offgoing nurse). Report included the following information SBAR. [facility] Phone:   [phone]    Significant changes during shift:    Up and walking in jimenez    Non-emergent issues for physician to address:        Number times ambulated in hallway past shift: 2      Number of times OOB to chair past shift: 1    POD #:      Vital Signs:    Temp: 98 °F (36.7 °C)     Pulse (Heart Rate): 63     BP: 148/70     Resp Rate: 16     O2 Sat (%): 100 %    Lines & Drains:     Urinary Catheter? No   Placement Date:    Medical Necessity:   Central Line? No   Placement Date:    Medical Necessity:   PICC Line? No   Placement Date:    Medical Necessity:     NG tube [] in [] removed [] not applicable   Drains [] in [] removed [] not applicable     Skin Integrity:      Wounds: yes   Dressings Present: yes    Wound Concerns: no      GI:    Current diet:  DIET FULL LIQUID    Nausea: NO  Vomiting: NO  Bowel Sounds: YES  Flatus: YES  Last Bowel Movement: several days ago   Appearance:     Respiratory:  Supplemental O2: No      Device:    via  Liters/min     Incentive Spirometer: YES  Volume: 800  Coughing and Deep Breathing: YES  Oral Care: YES  Understanding (patient/family education): YES   Getting out of bed: YES  Head of bed elevation: YES    Patient Safety:    Falls Score: 1  Mobility Score: 1  Bed Alarm On? No  Sitter? No      Opportunity for questions and clarification was given to oncoming nurse. Patient bed is in low position, side rails are up x 2, door & observation blinds open as needed, call bell within reach and patient not in distress.     Ute Caldwell

## 2017-06-20 NOTE — PROGRESS NOTES
Pharmacy Recommendation: LOVENOX    Please consider adjusting lovenox dose to 40mg SC q12h for crcl > 30 and BMI > 40 if appropriate.     Thank you,  Shanelle Dunn, PHARMD

## 2017-06-20 NOTE — PROGRESS NOTES
Interdisciplinary Rounds were completed on this patient. Rounds included nursing, clinical care leader, pharmacy, and case management. Patient was doing well without problems. Patient had the following concerns: none. Goals for the day will include: continue RX as is and mobilize.

## 2017-06-20 NOTE — CDMP QUERY
Patient is noted to have a BMI of 40. Please clarify if this patient is:     =>Morbidly obese (BMI ³ 40)  =>Obese (BMI 30 - 39.9)  =>Overweight (BMI 25 - 29.9)  =>Other explanation of clinical findings  =>Unable to determine (no explanation for clinical findings)    Presentation: 5'5\" 239 lbs = BMI 40    REFERENCE:  The [address] [place] [address] has issued a statement indicating that, \"Individuals who are overweight, obese, or morbidly obese are at an increased risk for certain medical conditions when compared to persons of normal weight. Therefore, these conditions are always clinically significant and reportable when documented by the provider. Please clarify and document your clinical opinion in the progress notes and discharge summary, including the definitive and or presumptive diagnosis, (suspected or probable), related to the above clinical findings. Please include clinical findings supporting your diagnosis.    Thanks,  Ranjan Escobar

## 2017-06-20 NOTE — PROGRESS NOTES
Gynecology Progress Note    Patient doing well post-op day 1 from Procedure(s):   ABDOMINAL SUPRACERVICAL HYSTERECTOMY without significant complaints. Pain controlled on current medication. Voiding without difficulty. Patient is not passing flatus. Vitals:  Blood pressure 127/68, pulse (!) 58, temperature 97.9 °F (36.6 °C), resp. rate 16, height 5' 5\" (1.651 m), weight 109.2 kg (240 lb 11.9 oz), SpO2 100 %. Temp (24hrs), Av.4 °F (36.9 °C), Min:97.9 °F (36.6 °C), Max:98.9 °F (37.2 °C)        Exam:  Patient without distress. Abdomen soft,  nontender. Incision dry and clean without erythema. Lower extremities are negative for swelling, cords, or tenderness. Lab/Data Review:  BMP:   Lab Results   Component Value Date/Time     2017 03:39 AM    K 3.7 2017 03:39 AM     2017 03:39 AM    CO2 22 2017 03:39 AM    AGAP 7 2017 03:39 AM     (H) 2017 03:39 AM    BUN 8 2017 03:39 AM    CREA 0.64 2017 03:39 AM    GFRAA >60 2017 03:39 AM    GFRNA >60 2017 03:39 AM     CBC:   Lab Results   Component Value Date/Time    HGB 11.9 2017 03:39 AM    HCT 35.5 2017 03:39 AM       Assessment and Plan:  Patient appears to be having uncomplicated post Procedure(s):   ABDOMINAL SUPRACERVICAL HYSTERECTOMY course. Continue routine post-op care.

## 2017-06-20 NOTE — PROGRESS NOTES
End of Shift Nursing Note    Bedside shift change report given to Benito Jay RN (oncoming nurse) by Lizzie Saleem RN (offgoing nurse). Report included the following information SBAR, Kardex, Intake/Output, MAR and Accordion. Zone Phone:       Significant changes during shift:    Natalio removed at 0545   Non-emergent issues for physician to address:        Number times ambulated in hallway past shift: 0  (bedrest ordered unti louise)    Number of times OOB to chair past shift: 0 (bedrest ordered until am)    POD #: 1     Vital Signs:    Temp: 97.9 °F (36.6 °C)     Pulse (Heart Rate): (!) 54     BP: 130/78     Resp Rate: 14     O2 Sat (%): 98 %    Lines & Drains:     Urinary Catheter? No (removed 6/20 at 8312)     Central Line? No     PICC Line? No       NG tube [] in [] removed [x] not applicable   Drains [] in [] removed [x] not applicable     Skin Integrity:      Wounds: yes  Dressings Present: yes    Wound Concerns: no      GI:    Current diet:  DIET FULL LIQUID    Nausea: NO  Vomiting: NO  Bowel Sounds: YES  Flatus: NO  Last Bowel Movement: several days ago   Appearance:     Respiratory:  Supplemental O2: No      Device: Room Air   via  Liters/min     Incentive Spirometer: YES  Volume: 1000  Coughing and Deep Breathing: YES  Oral Care: YES  Understanding (patient/family education): YES   Getting out of bed: YES  Head of bed elevation: YES    Patient Safety:    Falls Score: 1  Mobility Score: 1  Bed Alarm On? No  Sitter? No      Opportunity for questions and clarification was given to oncoming nurse. Patient bed is in low position, side rails are up x 2, door & observation blinds open as needed, call bell within reach and patient not in distress.     Lizzie Saleem RN

## 2017-06-21 PROCEDURE — 74011250637 HC RX REV CODE- 250/637: Performed by: OBSTETRICS & GYNECOLOGY

## 2017-06-21 PROCEDURE — 65270000029 HC RM PRIVATE

## 2017-06-21 PROCEDURE — 74011250636 HC RX REV CODE- 250/636: Performed by: OBSTETRICS & GYNECOLOGY

## 2017-06-21 RX ORDER — ADHESIVE BANDAGE
30 BANDAGE TOPICAL DAILY PRN
Status: DISCONTINUED | OUTPATIENT
Start: 2017-06-21 | End: 2017-06-27 | Stop reason: HOSPADM

## 2017-06-21 RX ADMIN — Medication 10 ML: at 14:41

## 2017-06-21 RX ADMIN — ONDANSETRON HYDROCHLORIDE 4 MG: 2 INJECTION, SOLUTION INTRAMUSCULAR; INTRAVENOUS at 17:52

## 2017-06-21 RX ADMIN — TOPIRAMATE 50 MG: 25 TABLET, FILM COATED ORAL at 09:02

## 2017-06-21 RX ADMIN — Medication 10 ML: at 05:43

## 2017-06-21 RX ADMIN — ZOLPIDEM TARTRATE 5 MG: 5 TABLET ORAL at 00:05

## 2017-06-21 RX ADMIN — Medication 10 ML: at 19:45

## 2017-06-21 RX ADMIN — ENOXAPARIN SODIUM 40 MG: 40 INJECTION SUBCUTANEOUS at 08:59

## 2017-06-21 RX ADMIN — HYDROMORPHONE HYDROCHLORIDE 1 MG: 1 INJECTION, SOLUTION INTRAMUSCULAR; INTRAVENOUS; SUBCUTANEOUS at 09:06

## 2017-06-21 RX ADMIN — HYDROMORPHONE HYDROCHLORIDE 1 MG: 1 INJECTION, SOLUTION INTRAMUSCULAR; INTRAVENOUS; SUBCUTANEOUS at 15:45

## 2017-06-21 RX ADMIN — MAGNESIUM HYDROXIDE 30 ML: 400 SUSPENSION ORAL at 08:59

## 2017-06-21 RX ADMIN — ONDANSETRON HYDROCHLORIDE 4 MG: 2 INJECTION, SOLUTION INTRAMUSCULAR; INTRAVENOUS at 08:59

## 2017-06-21 RX ADMIN — TOPIRAMATE 50 MG: 25 TABLET, FILM COATED ORAL at 17:52

## 2017-06-21 RX ADMIN — HYDROMORPHONE HYDROCHLORIDE 1 MG: 1 INJECTION, SOLUTION INTRAMUSCULAR; INTRAVENOUS; SUBCUTANEOUS at 19:45

## 2017-06-21 RX ADMIN — ONDANSETRON HYDROCHLORIDE 4 MG: 2 INJECTION, SOLUTION INTRAMUSCULAR; INTRAVENOUS at 14:47

## 2017-06-21 RX ADMIN — HYDROMORPHONE HYDROCHLORIDE 1 MG: 1 INJECTION, SOLUTION INTRAMUSCULAR; INTRAVENOUS; SUBCUTANEOUS at 06:06

## 2017-06-21 NOTE — PROGRESS NOTES
End of Shift Nursing Note    Bedside shift change report given to [address] [place] [address] (oncoming nurse) by Payal Rosenberg (offgoing nurse). Report included the following information SBAR. Zone Phone:       Significant changes during shift:       Non-emergent issues for physician to address:        Number times ambulated in hallway past shift: 0      Number of times ambulated to bathroom past shift: 3    Number of times OOB to chair past shift: 0    POD #:      Vital Signs:    Temp: 98.2 °F (36.8 °C)     Pulse (Heart Rate): 77     BP: 123/65     Resp Rate: 17     O2 Sat (%): 99 %    Lines & Drains:     Urinary Catheter? No   Placement Date:    Medical Necessity:   Central Line? No   Placement Date:    Medical Necessity:   PICC Line? No   Placement Date:    Medical Necessity:     NG tube [] in [] removed [x] not applicable   Drains [] in [] removed [x] not applicable     Skin Integrity:      Wounds: yes   Dressings Present: yes    Wound Concerns: no      GI:    Current diet:  DIET GI LITE (POST SURGICAL)    Nausea: NO  Vomiting: NO  Bowel Sounds: YES  Flatus: YES  Last Bowel Movement: several days ago   Appearance:     Respiratory:  Supplemental O2: No      Device:    via  Liters/min     Incentive Spirometer: YES  Volume: 750  Coughing and Deep Breathing: YES  Oral Care: YES  Understanding (patient/family education): YES   Getting out of bed: YES  Head of bed elevation: YES    Patient Safety:    Falls Score: 1  Mobility Score: 1  Bed Alarm On? No  Sitter? No      Opportunity for questions and clarification was given to oncoming nurse. Patient bed is in low position, side rails are up x 2, door & observation blinds open as needed, call bell within reach and patient not in distress.     Sachi Figueroa RN

## 2017-06-21 NOTE — PROGRESS NOTES
Gynecology Progress Note    Patient doing well post-op day 2 from Procedure(s):   ABDOMINAL SUPRACERVICAL HYSTERECTOMY without significant complaints. Pain controlled on current medication. Voiding without difficulty. Patient is not passing flatus. Vitals:  Blood pressure 123/65, pulse 77, temperature 98.2 °F (36.8 °C), resp. rate 17, height 5' 5\" (1.651 m), weight 109.2 kg (240 lb 11.9 oz), SpO2 99 %. Temp (24hrs), Av.3 °F (36.8 °C), Min:98 °F (36.7 °C), Max:98.8 °F (37.1 °C)        Exam:  Patient without distress. Abdomen soft,  nontender. Incision dry and clean without erythema. Lower extremities are negative for swelling, cords, or tenderness. Lab/Data Review: All lab results for the last 24 hours reviewed. Assessment and Plan:  Patient appears to be having uncomplicated post Procedure(s):   ABDOMINAL SUPRACERVICAL HYSTERECTOMY course. Continue routine post-op care. patient not passing gas,afebrile,ambulating ,sigrid diet,will give MOM re-evaluate for D/C later today

## 2017-06-21 NOTE — PROGRESS NOTES
End of Shift Nursing Note    Bedside shift change report given to rocio (oncoming nurse) by Joanne Tomlinson (offgoing nurse). Report included the following information [facility]. [facility] Phone:   [phone]    Significant changes during shift:       Non-emergent issues for physician to address:        Number times ambulated in hallway past shift: 0      Number of times OOB to chair past shift: 2    POD #:      Vital Signs:    Temp: 98.4 °F (36.9 °C)     Pulse (Heart Rate): 64     BP: 130/66     Resp Rate: 16     O2 Sat (%): 100 %    Lines & Drains:     Urinary Catheter? No   Placement Date:    Medical Necessity:   Central Line? No   Placement Date:    Medical Necessity:   PICC Line? No   Placement Date:    Medical Necessity:     NG tube [] in [] removed [] not applicable   Drains [] in [] removed [] not applicable     Skin Integrity:      Wounds: no   Dressings Present: no    Wound Concerns: no      GI:    Current diet:  DIET GI LITE (POST SURGICAL)    Nausea: YES  Vomiting: NO  Bowel Sounds: YES  Flatus: NO  Last Bowel Movement: several days ago   Appearance:     Respiratory:  Supplemental O2: No      Device:    via  Liters/min     Incentive Spirometer: YES  Volume: 1000  Coughing and Deep Breathing: YES  Oral Care: YES  Understanding (patient/family education): YES   Getting out of bed: YES  Head of bed elevation: YES    Patient Safety:    Falls Score: 1  Mobility Score: 1  Bed Alarm On? No  Sitter? No      Opportunity for questions and clarification was given to oncoming nurse. Patient bed is in low position, side rails are up x 2, door & observation blinds open as needed, call bell within reach and patient not in distress.     Renato Mejia

## 2017-06-22 PROCEDURE — 74011250636 HC RX REV CODE- 250/636: Performed by: OBSTETRICS & GYNECOLOGY

## 2017-06-22 PROCEDURE — 74011000258 HC RX REV CODE- 258: Performed by: OBSTETRICS & GYNECOLOGY

## 2017-06-22 PROCEDURE — 74011250637 HC RX REV CODE- 250/637: Performed by: OBSTETRICS & GYNECOLOGY

## 2017-06-22 PROCEDURE — 65270000029 HC RM PRIVATE

## 2017-06-22 RX ORDER — DEXTROSE MONOHYDRATE AND SODIUM CHLORIDE 5; .9 G/100ML; G/100ML
100 INJECTION, SOLUTION INTRAVENOUS CONTINUOUS
Status: DISPENSED | OUTPATIENT
Start: 2017-06-22 | End: 2017-06-23

## 2017-06-22 RX ADMIN — ZOLPIDEM TARTRATE 5 MG: 5 TABLET ORAL at 00:04

## 2017-06-22 RX ADMIN — ZOLPIDEM TARTRATE 5 MG: 5 TABLET ORAL at 23:18

## 2017-06-22 RX ADMIN — ONDANSETRON HYDROCHLORIDE 4 MG: 2 INJECTION, SOLUTION INTRAMUSCULAR; INTRAVENOUS at 00:04

## 2017-06-22 RX ADMIN — ENOXAPARIN SODIUM 40 MG: 40 INJECTION SUBCUTANEOUS at 09:06

## 2017-06-22 RX ADMIN — DEXTROSE MONOHYDRATE AND SODIUM CHLORIDE 100 ML/HR: 5; .9 INJECTION, SOLUTION INTRAVENOUS at 10:49

## 2017-06-22 RX ADMIN — Medication 10 ML: at 22:03

## 2017-06-22 RX ADMIN — Medication 10 ML: at 00:05

## 2017-06-22 RX ADMIN — MAGNESIUM HYDROXIDE 30 ML: 400 SUSPENSION ORAL at 04:48

## 2017-06-22 RX ADMIN — OXYCODONE HYDROCHLORIDE AND ACETAMINOPHEN 1 TABLET: 5; 325 TABLET ORAL at 20:01

## 2017-06-22 RX ADMIN — HYDROMORPHONE HYDROCHLORIDE 1 MG: 1 INJECTION, SOLUTION INTRAMUSCULAR; INTRAVENOUS; SUBCUTANEOUS at 00:04

## 2017-06-22 RX ADMIN — Medication 10 ML: at 09:08

## 2017-06-22 RX ADMIN — HYDROMORPHONE HYDROCHLORIDE 1 MG: 1 INJECTION, SOLUTION INTRAMUSCULAR; INTRAVENOUS; SUBCUTANEOUS at 23:13

## 2017-06-22 RX ADMIN — HYDROMORPHONE HYDROCHLORIDE 1 MG: 1 INJECTION, SOLUTION INTRAMUSCULAR; INTRAVENOUS; SUBCUTANEOUS at 15:02

## 2017-06-22 RX ADMIN — HYDROMORPHONE HYDROCHLORIDE 1 MG: 1 INJECTION, SOLUTION INTRAMUSCULAR; INTRAVENOUS; SUBCUTANEOUS at 11:09

## 2017-06-22 RX ADMIN — TOPIRAMATE 50 MG: 25 TABLET, FILM COATED ORAL at 09:06

## 2017-06-22 NOTE — PROGRESS NOTES
Gynecology Progress Note    Patient doing well post-op day 3 from Procedure(s):   ABDOMINAL SUPRACERVICAL HYSTERECTOMY without significant complaints. Pain controlled on current medication. Voiding without difficulty. Patient is not passing flatus. Vitals:  Blood pressure (!) 152/91, pulse 77, temperature 98.3 °F (36.8 °C), resp. rate 16, height 5' 5\" (1.651 m), weight 109.2 kg (240 lb 11.9 oz), SpO2 98 %. Temp (24hrs), Av.4 °F (36.9 °C), Min:98.1 °F (36.7 °C), Max:98.8 °F (37.1 °C)        Exam:  Patient without distress. Abdomen soft,  nontender. Slightly distended,hypoactive bowel sounds,no rebound               Incision dry and clean without erythema. Lower extremities are negative for swelling, cords, or tenderness. Lab/Data Review: All lab results for the last 24 hours reviewed. Assessment and Plan:  Patient appears to be having uncomplicated post Procedure(s):   ABDOMINAL SUPRACERVICAL HYSTERECTOMY course. Continue  post-op care. Patient with post op ileus,back on clear liquids and IV fluids.

## 2017-06-22 NOTE — PROGRESS NOTES
End of Shift Nursing Note    Bedside shift change report given to [address] [place] [address] (oncoming nurse) by Panchito Pryor (offgoing nurse). Report included the following information SBAR. Zone Phone:       Significant changes during shift:    N/V constantly. Zofran given 2x in the shift. Downgraded diet to clear liquid. Non-emergent issues for physician to address:        Number times ambulated in hallway past shift: 0      Number of times ambulated to bathroom past shift: 3    Number of times OOB to chair past shift: 0    POD #:      Vital Signs:    Temp: 98.8 °F (37.1 °C)     Pulse (Heart Rate): 89     BP: 130/63     Resp Rate: 16     O2 Sat (%): 97 %    Lines & Drains:     Urinary Catheter? No   Placement Date:    Medical Necessity:   Central Line? No   Placement Date:    Medical Necessity:   PICC Line? No   Placement Date:    Medical Necessity:     NG tube [] in [] removed [x] not applicable   Drains [] in [] removed [x] not applicable     Skin Integrity:      Wounds: yes   Dressings Present: yes    Wound Concerns: no      GI:    Current diet:  DIET CLEAR LIQUID    Nausea: YES  Vomiting: YES  Bowel Sounds: YES  Flatus: NO  Last Bowel Movement: several days ago   Appearance:     Respiratory:  Supplemental O2: No      Device:    via  Liters/min     Incentive Spirometer: YES  Volume: 750  Coughing and Deep Breathing: YES  Oral Care: YES  Understanding (patient/family education): YES   Getting out of bed: YES  Head of bed elevation: YES    Patient Safety:    Falls Score: 1  Mobility Score: 1  Bed Alarm On? No  Sitter? No      Opportunity for questions and clarification was given to oncoming nurse. Patient bed is in low position, side rails are up x 2, door & observation blinds open as needed, call bell within reach and patient not in distress.     Madi Weathers RN

## 2017-06-22 NOTE — PROGRESS NOTES
Patient has been having nausea and vomiting most of the day. Vomited a yellow liquid with broth like consistency. Zofran given 3x, milk of magnesia given 1x. Has not been passing flatus. Dr. Orville Joyce notified. Diet changed to clear liquid. No other orders given.

## 2017-06-22 NOTE — PROGRESS NOTES
Interdisciplinary Rounds were completed on this patient. Rounds included nursing, clinical care leader, pharmacy. Patient was resting in bed. Patient had the following concerns: pain and nausea overnight. Goals for the day will include: mobilize and incentive spirometer.

## 2017-06-23 PROCEDURE — 74011000258 HC RX REV CODE- 258: Performed by: OBSTETRICS & GYNECOLOGY

## 2017-06-23 PROCEDURE — 74011250637 HC RX REV CODE- 250/637: Performed by: OBSTETRICS & GYNECOLOGY

## 2017-06-23 PROCEDURE — 74011250636 HC RX REV CODE- 250/636: Performed by: OBSTETRICS & GYNECOLOGY

## 2017-06-23 PROCEDURE — 65270000029 HC RM PRIVATE

## 2017-06-23 RX ADMIN — TOPIRAMATE 50 MG: 25 TABLET, FILM COATED ORAL at 17:29

## 2017-06-23 RX ADMIN — Medication 10 ML: at 19:37

## 2017-06-23 RX ADMIN — HYDROMORPHONE HYDROCHLORIDE 1 MG: 1 INJECTION, SOLUTION INTRAMUSCULAR; INTRAVENOUS; SUBCUTANEOUS at 04:12

## 2017-06-23 RX ADMIN — HYDROMORPHONE HYDROCHLORIDE 1 MG: 1 INJECTION, SOLUTION INTRAMUSCULAR; INTRAVENOUS; SUBCUTANEOUS at 07:04

## 2017-06-23 RX ADMIN — ZOLPIDEM TARTRATE 5 MG: 5 TABLET ORAL at 21:43

## 2017-06-23 RX ADMIN — ENOXAPARIN SODIUM 40 MG: 40 INJECTION SUBCUTANEOUS at 08:27

## 2017-06-23 RX ADMIN — HYDROMORPHONE HYDROCHLORIDE 1 MG: 1 INJECTION, SOLUTION INTRAMUSCULAR; INTRAVENOUS; SUBCUTANEOUS at 11:20

## 2017-06-23 RX ADMIN — Medication 10 ML: at 07:04

## 2017-06-23 RX ADMIN — OXYCODONE HYDROCHLORIDE AND ACETAMINOPHEN 1 TABLET: 5; 325 TABLET ORAL at 21:43

## 2017-06-23 RX ADMIN — OXYCODONE HYDROCHLORIDE AND ACETAMINOPHEN 1 TABLET: 5; 325 TABLET ORAL at 14:01

## 2017-06-23 RX ADMIN — TOPIRAMATE 50 MG: 25 TABLET, FILM COATED ORAL at 08:27

## 2017-06-23 RX ADMIN — DEXTROSE MONOHYDRATE AND SODIUM CHLORIDE 100 ML/HR: 5; .9 INJECTION, SOLUTION INTRAVENOUS at 00:52

## 2017-06-23 RX ADMIN — HYDROMORPHONE HYDROCHLORIDE 1 MG: 1 INJECTION, SOLUTION INTRAMUSCULAR; INTRAVENOUS; SUBCUTANEOUS at 19:37

## 2017-06-23 NOTE — PROGRESS NOTES
Patient requesting GI lite diet, clearly upset. I put call in to Dr. Dhrvu Rodriges office. Spoke with on call Dr Heraclio Head. She said to give patient pack of saltine crackers and if she was able to tolerate she can be advanced to GI lite for dinner.

## 2017-06-23 NOTE — PROGRESS NOTES
Gynecology Progress Note    Patient doing well post-op day 4 from Procedure(s):   ABDOMINAL SUPRACERVICAL HYSTERECTOMY without significant complaints. Pain controlled on current medication. Voiding without difficulty. Patient is not passing flatus. Vitals:  Blood pressure 130/68, pulse 67, temperature 98.4 °F (36.9 °C), resp. rate 18, height 5' 5\" (1.651 m), weight 109.2 kg (240 lb 11.9 oz), SpO2 97 %. Temp (24hrs), Av.3 °F (36.8 °C), Min:98.1 °F (36.7 °C), Max:98.5 °F (36.9 °C)        Exam:  Patient without distress. Abdomen soft,  nontender. Incision dry and clean without erythema. Lower extremities are negative for swelling, cords, or tenderness. Lab/Data Review: All lab results for the last 24 hours reviewed. Assessment and Plan:  Patient appears to be having uncomplicated post Procedure(s):   ABDOMINAL SUPRACERVICAL HYSTERECTOMY course. Continue routine post-op care. Had small BM last chasity but still distended. Bowel sound still hypoactive. still with nausea but no vomiting

## 2017-06-23 NOTE — PROGRESS NOTES
End of Shift Nursing Note    Bedside shift change report given to Katalina Reagan RN (oncoming nurse) by Sina Vines RN (offgoing nurse). Report included the following information SBAR, Kardex and Intake/Output. Zone Phone:       Significant changes during shift:    none   Non-emergent issues for physician to address:   none     Number times ambulated in hallway past shift: 2      Number of times OOB to chair past shift: 2    POD #:      Vital Signs:    Temp: 98.5 °F (36.9 °C)     Pulse (Heart Rate): 73     BP: 116/57     Resp Rate: 18     O2 Sat (%): 100 %    Lines & Drains:     Urinary Catheter? No   Placement Date:    Medical Necessity:   Central Line? No   Placement Date:    Medical Necessity:   PICC Line? No   Placement Date:    Medical Necessity:     NG tube [] in [] removed [x] not applicable   Drains [] in [] removed [x] not applicable     Skin Integrity:      Wounds: yes   Dressings Present: yes    Wound Concerns: no      GI:    Current diet:  DIET CLEAR LIQUID    Nausea: NO  Vomiting: NO  Bowel Sounds: YES, hypoactive  Flatus: YES, a few times  Last Bowel Movement: today   Appearance: small    Respiratory:  Supplemental O2: No      Device:    via  Liters/min     Incentive Spirometer: YES  Volume:   Coughing and Deep Breathing: YES  Oral Care: YES  Understanding (patient/family education): YES   Getting out of bed: YES  Head of bed elevation: YES    Patient Safety:    Falls Score: 1  Mobility Score: 1  Bed Alarm On? No  Sitter? No      Opportunity for questions and clarification was given to oncoming nurse. Patient bed is in low position, side rails are up x 2, door & observation blinds open as needed, call bell within reach and patient not in distress.     Jaki Rebolledo

## 2017-06-24 LAB
AMORPH CRY URNS QL MICRO: ABNORMAL
APPEARANCE UR: ABNORMAL
BACTERIA URNS QL MICRO: ABNORMAL /HPF
BILIRUB UR QL CFM: NEGATIVE
COLOR UR: ABNORMAL
EPITH CASTS URNS QL MICRO: ABNORMAL /LPF
GLUCOSE UR STRIP.AUTO-MCNC: NEGATIVE MG/DL
HGB UR QL STRIP: ABNORMAL
KETONES UR QL STRIP.AUTO: NEGATIVE MG/DL
LEUKOCYTE ESTERASE UR QL STRIP.AUTO: ABNORMAL
NITRITE UR QL STRIP.AUTO: NEGATIVE
PH UR STRIP: 7 [PH] (ref 5–8)
PROT UR STRIP-MCNC: ABNORMAL MG/DL
RBC #/AREA URNS HPF: ABNORMAL /HPF (ref 0–5)
SP GR UR REFRACTOMETRY: 1.02 (ref 1–1.03)
UA: UC IF INDICATED,UAUC: ABNORMAL
UROBILINOGEN UR QL STRIP.AUTO: 1 EU/DL (ref 0.2–1)
WBC URNS QL MICRO: ABNORMAL /HPF (ref 0–4)

## 2017-06-24 PROCEDURE — 81001 URINALYSIS AUTO W/SCOPE: CPT | Performed by: OBSTETRICS & GYNECOLOGY

## 2017-06-24 PROCEDURE — 74011250636 HC RX REV CODE- 250/636: Performed by: OBSTETRICS & GYNECOLOGY

## 2017-06-24 PROCEDURE — 87186 SC STD MICRODIL/AGAR DIL: CPT | Performed by: OBSTETRICS & GYNECOLOGY

## 2017-06-24 PROCEDURE — 74011000250 HC RX REV CODE- 250: Performed by: OBSTETRICS & GYNECOLOGY

## 2017-06-24 PROCEDURE — 74011250637 HC RX REV CODE- 250/637: Performed by: OBSTETRICS & GYNECOLOGY

## 2017-06-24 PROCEDURE — 87086 URINE CULTURE/COLONY COUNT: CPT | Performed by: OBSTETRICS & GYNECOLOGY

## 2017-06-24 PROCEDURE — 65270000029 HC RM PRIVATE

## 2017-06-24 PROCEDURE — 87077 CULTURE AEROBIC IDENTIFY: CPT | Performed by: OBSTETRICS & GYNECOLOGY

## 2017-06-24 RX ADMIN — FAMOTIDINE 20 MG: 10 INJECTION, SOLUTION INTRAVENOUS at 13:00

## 2017-06-24 RX ADMIN — ONDANSETRON HYDROCHLORIDE 4 MG: 2 INJECTION, SOLUTION INTRAMUSCULAR; INTRAVENOUS at 21:40

## 2017-06-24 RX ADMIN — TOPIRAMATE 50 MG: 25 TABLET, FILM COATED ORAL at 08:23

## 2017-06-24 RX ADMIN — ZOLPIDEM TARTRATE 5 MG: 5 TABLET ORAL at 22:18

## 2017-06-24 RX ADMIN — TOPIRAMATE 50 MG: 25 TABLET, FILM COATED ORAL at 17:05

## 2017-06-24 RX ADMIN — HYDROMORPHONE HYDROCHLORIDE 1 MG: 1 INJECTION, SOLUTION INTRAMUSCULAR; INTRAVENOUS; SUBCUTANEOUS at 13:00

## 2017-06-24 RX ADMIN — Medication 10 ML: at 13:01

## 2017-06-24 RX ADMIN — HYDROMORPHONE HYDROCHLORIDE 1 MG: 1 INJECTION, SOLUTION INTRAMUSCULAR; INTRAVENOUS; SUBCUTANEOUS at 21:34

## 2017-06-24 RX ADMIN — Medication 10 ML: at 21:38

## 2017-06-24 RX ADMIN — FAMOTIDINE 20 MG: 10 INJECTION, SOLUTION INTRAVENOUS at 21:35

## 2017-06-24 RX ADMIN — HYDROMORPHONE HYDROCHLORIDE 1 MG: 1 INJECTION, SOLUTION INTRAMUSCULAR; INTRAVENOUS; SUBCUTANEOUS at 17:05

## 2017-06-24 RX ADMIN — ENOXAPARIN SODIUM 40 MG: 40 INJECTION SUBCUTANEOUS at 08:23

## 2017-06-24 RX ADMIN — Medication 10 ML: at 04:25

## 2017-06-24 RX ADMIN — HYDROMORPHONE HYDROCHLORIDE 1 MG: 1 INJECTION, SOLUTION INTRAMUSCULAR; INTRAVENOUS; SUBCUTANEOUS at 04:25

## 2017-06-24 NOTE — PROGRESS NOTES
End of Shift Nursing Note    Bedside shift change report given to Che Michelle RN (oncoming nurse) by Iman Romero RN (offgoing nurse). Report included the following information SBAR, Kardex and Intake/Output. Zone Phone:       Significant changes during shift:    none   Non-emergent issues for physician to address:   none     Number times ambulated in hallway past shift: 2      Number of times OOB to chair past shift: 1    POD #:      Vital Signs:    Temp: 98.7 °F (37.1 °C)     Pulse (Heart Rate): 66     BP: 142/74     Resp Rate: 16     O2 Sat (%): 99 %    Lines & Drains:     Urinary Catheter? No   Placement Date:    Medical Necessity:   Central Line? No   Placement Date:    Medical Necessity:   PICC Line? No   Placement Date:    Medical Necessity:     NG tube [] in [] removed [x] not applicable   Drains [] in [] removed [x] not applicable     Skin Integrity:      Wounds: yes   Dressings Present: yes    Wound Concerns: no      GI:    Current diet:  DIET GI LITE (POST SURGICAL)    Nausea: NO  Vomiting: NO  Bowel Sounds: YES  Flatus: YES  Last Bowel Movement: today   Appearance:     Respiratory:  Supplemental O2: No      Device:    via  Liters/min     Incentive Spirometer: YES  Volume:   Coughing and Deep Breathing: YES  Oral Care: YES  Understanding (patient/family education): YES   Getting out of bed: YES  Head of bed elevation: YES    Patient Safety:    Falls Score: 1  Mobility Score: 1  Bed Alarm On? No  Sitter? No      Opportunity for questions and clarification was given to oncoming nurse. Patient bed is in low position, side rails are up x 2, door & observation blinds open as needed, call bell within reach and patient not in distress.     Yesy Sol

## 2017-06-24 NOTE — PROGRESS NOTES
End of Shift Nursing Note    Bedside shift change report given to Keyona Maurice RN (oncoming nurse) by Kirstie Pat RN (offgoing nurse). Report included the following information SBAR, Kardex and Intake/Output. Zone Phone:       Significant changes during shift:    none   Non-emergent issues for physician to address:   none     Number times ambulated in hallway past shift: 1      Number of times OOB to chair past shift: 1    POD #:      Vital Signs:    Temp: 98.2 °F (36.8 °C)     Pulse (Heart Rate): 78     BP: 123/73     Resp Rate: 14     O2 Sat (%): 98 %    Lines & Drains:     Urinary Catheter? No   Placement Date:    Medical Necessity:   Central Line? No   Placement Date:    Medical Necessity:   PICC Line? No   Placement Date:    Medical Necessity:     NG tube [] in [] removed [x] not applicable   Drains [] in [] removed [x] not applicable     Skin Integrity:      Wounds: yes   Dressings Present: yes    Wound Concerns: no      GI:    Current diet:  DIET CLEAR LIQUID    Nausea: NO  Vomiting: NO  Bowel Sounds: YES, hypoactive  Flatus: YES, a few times  Last Bowel Movement: yesterday   Appearance: small    Respiratory:  Supplemental O2: No      Device:    via  Liters/min     Incentive Spirometer: YES  Volume:   Coughing and Deep Breathing: YES  Oral Care: YES  Understanding (patient/family education): YES   Getting out of bed: YES  Head of bed elevation: YES    Patient Safety:    Falls Score: 1  Mobility Score: 1  Bed Alarm On? No  Sitter? No      Opportunity for questions and clarification was given to oncoming nurse. Patient bed is in low position, side rails are up x 2, door & observation blinds open as needed, call bell within reach and patient not in distress.     Kirstie Pat RN

## 2017-06-24 NOTE — PROGRESS NOTES
Patient's urine rhea, cloudy and malodorous with sediment. Noticed patient had 1+ bacteria in urine from 6/5 pre-op lab work; patient states she was not treated at that time. Call placed to Dr. Danuta Gordillo to see if he would like us to send culture to lab.    3152 - order placed for urinalysis w/ reflex. Will send with patient's next void. 6/25 0805 - urinalysis 2+ bacteria, no note that Dr was notified. Call placed to Dr. Danuta Gordillo.     5861 - Order placed for Macrobid 100 mg BID x 5 days

## 2017-06-24 NOTE — PROGRESS NOTES
Gynecology Progress Note    Patient doing well post-op day 4 from Procedure(s):   ABDOMINAL SUPRACERVICAL HYSTERECTOMY without significant complaints. Pain controlled on current medication. Voiding without difficulty. Patient is passing flatus. In the past 24 hours, she reports 3 BMs that are loose; however her baseline BM habits are consistent with loose BM. No nausea; overall pain is well controlled but she notes some reflux discomfort. She is ambulating and voiding. Vitals:  Blood pressure 142/74, pulse 66, temperature 98.7 °F (37.1 °C), resp. rate 16, height 5' 5\" (1.651 m), weight 109.2 kg (240 lb 11.9 oz), SpO2 99 %. Temp (24hrs), Av.4 °F (36.9 °C), Min:98.2 °F (36.8 °C), Max:98.8 °F (37.1 °C)        Exam:  Patient without distress. Abdomen soft, no rebound, mildly distended. Incision dry and clean without erythema. Lower extremities are negative for swelling, cords, or tenderness. Labs: No results found for this or any previous visit (from the past 24 hour(s)). Assessment and Plan:  Patient appears to be having uncomplicated post Procedure(s):   ABDOMINAL SUPRACERVICAL HYSTERECTOMY course. Continue routine post-op care. Gynecology Progress Note    Patient doing well post-op day 4 from Procedure(s):   ABDOMINAL SUPRACERVICAL HYSTERECTOMY without significant complaints. Pain controlled on current medication. Voiding without difficulty. Patient is passing flatus. Vitals:  Blood pressure 142/74, pulse 66, temperature 98.7 °F (37.1 °C), resp. rate 16, height 5' 5\" (1.651 m), weight 109.2 kg (240 lb 11.9 oz), SpO2 99 %. Temp (24hrs), Av.4 °F (36.9 °C), Min:98.2 °F (36.8 °C), Max:98.8 °F (37.1 °C)        Exam:  Patient without distress. Abdomen soft,  nontender. Incision dry and clean without erythema. Lower extremities are negative for swelling, cords, or tenderness.     Labs: No results found for this or any previous visit (from the past 24 hour(s)). Assessment and Plan:  Patient appears to be having uncomplicated post Procedure(s):   ABDOMINAL SUPRACERVICAL HYSTERECTOMY course. Continue routine post-op care. - Will order pepcid 20mg bid IV  - advance diet to GI diet.

## 2017-06-25 PROCEDURE — 74011250637 HC RX REV CODE- 250/637: Performed by: OBSTETRICS & GYNECOLOGY

## 2017-06-25 PROCEDURE — 65270000029 HC RM PRIVATE

## 2017-06-25 PROCEDURE — 74011000250 HC RX REV CODE- 250: Performed by: OBSTETRICS & GYNECOLOGY

## 2017-06-25 PROCEDURE — 74011250636 HC RX REV CODE- 250/636: Performed by: OBSTETRICS & GYNECOLOGY

## 2017-06-25 RX ORDER — RANITIDINE 150 MG/1
150 TABLET, FILM COATED ORAL 2 TIMES DAILY
Status: DISCONTINUED | OUTPATIENT
Start: 2017-06-25 | End: 2017-06-27 | Stop reason: HOSPADM

## 2017-06-25 RX ORDER — NITROFURANTOIN 25; 75 MG/1; MG/1
100 CAPSULE ORAL 2 TIMES DAILY WITH MEALS
Status: DISCONTINUED | OUTPATIENT
Start: 2017-06-25 | End: 2017-06-27 | Stop reason: HOSPADM

## 2017-06-25 RX ORDER — SIMETHICONE 80 MG
80 TABLET,CHEWABLE ORAL
Status: DISCONTINUED | OUTPATIENT
Start: 2017-06-25 | End: 2017-06-27 | Stop reason: HOSPADM

## 2017-06-25 RX ADMIN — HYDROMORPHONE HYDROCHLORIDE 1 MG: 1 INJECTION, SOLUTION INTRAMUSCULAR; INTRAVENOUS; SUBCUTANEOUS at 21:46

## 2017-06-25 RX ADMIN — SIMETHICONE 80 MG: 80 TABLET, CHEWABLE ORAL at 17:31

## 2017-06-25 RX ADMIN — HYDROMORPHONE HYDROCHLORIDE 1 MG: 1 INJECTION, SOLUTION INTRAMUSCULAR; INTRAVENOUS; SUBCUTANEOUS at 15:55

## 2017-06-25 RX ADMIN — ENOXAPARIN SODIUM 40 MG: 40 INJECTION SUBCUTANEOUS at 08:24

## 2017-06-25 RX ADMIN — TOPIRAMATE 50 MG: 25 TABLET, FILM COATED ORAL at 08:24

## 2017-06-25 RX ADMIN — FAMOTIDINE 20 MG: 10 INJECTION, SOLUTION INTRAVENOUS at 08:24

## 2017-06-25 RX ADMIN — Medication 10 ML: at 21:46

## 2017-06-25 RX ADMIN — SIMETHICONE 80 MG: 80 TABLET, CHEWABLE ORAL at 10:26

## 2017-06-25 RX ADMIN — RANITIDINE 150 MG: 150 TABLET, FILM COATED ORAL at 17:28

## 2017-06-25 RX ADMIN — OXYCODONE HYDROCHLORIDE AND ACETAMINOPHEN 1 TABLET: 5; 325 TABLET ORAL at 00:04

## 2017-06-25 RX ADMIN — Medication 10 ML: at 13:22

## 2017-06-25 RX ADMIN — SODIUM CHLORIDE 500 ML: 900 INJECTION, SOLUTION INTRAVENOUS at 10:26

## 2017-06-25 RX ADMIN — ZOLPIDEM TARTRATE 5 MG: 5 TABLET ORAL at 21:46

## 2017-06-25 RX ADMIN — OXYCODONE HYDROCHLORIDE AND ACETAMINOPHEN 1 TABLET: 5; 325 TABLET ORAL at 17:31

## 2017-06-25 RX ADMIN — ONDANSETRON HYDROCHLORIDE 4 MG: 2 INJECTION, SOLUTION INTRAMUSCULAR; INTRAVENOUS at 22:59

## 2017-06-25 RX ADMIN — TOPIRAMATE 50 MG: 25 TABLET, FILM COATED ORAL at 17:27

## 2017-06-25 RX ADMIN — NITROFURANTOIN MONOHYDRATE/MACROCRYSTALLINE 100 MG: 25; 75 CAPSULE ORAL at 17:27

## 2017-06-25 RX ADMIN — HYDROMORPHONE HYDROCHLORIDE 1 MG: 1 INJECTION, SOLUTION INTRAMUSCULAR; INTRAVENOUS; SUBCUTANEOUS at 10:26

## 2017-06-25 RX ADMIN — OXYCODONE HYDROCHLORIDE AND ACETAMINOPHEN 1 TABLET: 5; 325 TABLET ORAL at 13:21

## 2017-06-25 RX ADMIN — NITROFURANTOIN MONOHYDRATE/MACROCRYSTALLINE 100 MG: 25; 75 CAPSULE ORAL at 08:32

## 2017-06-25 NOTE — PROGRESS NOTES
Gynecology Progress Note    Patient doing well post-op day 5 from Procedure(s):   ABDOMINAL SUPRACERVICAL HYSTERECTOMY without significant complaints. Pain controlled on current medication. Voiding without difficulty. Patient is passing flatus. One loose BM yesterday; able to tolerate small amount of po food with lunch and a small amount with dinner. No overt nausea but took anti-emetic yesterday x 1 (\"just in case\"). No emesis and + flatus. Some gas pain, mild dysuria but no fevers or chills. No leg/calf tenderness and ambulating with Keyona Maurice, her nurse. Vitals:  Blood pressure 146/83, pulse 73, temperature 98.9 °F (37.2 °C), resp. rate 18, height 5' 5\" (1.651 m), weight 109.2 kg (240 lb 11.9 oz), SpO2 99 %. Temp (24hrs), Av.9 °F (37.2 °C), Min:98.3 °F (36.8 °C), Max:99.4 °F (37.4 °C)        Exam:  Patient without distress. Abdomen softly distended but + BS. Incision dry and clean without erythema. Lower extremities are negative for swelling, cords, or tenderness. Lab/Data Review: All lab results for the last 24 hours reviewed. Assessment and Plan:  Patient appears to be having uncomplicated post Procedure(s):   ABDOMINAL SUPRACERVICAL HYSTERECTOMY course. Continue routine post-op care. - Cont with gentle GI diet as tolerated  - restart home ranitidine 150mg bid  - simethicone for abd gas pain  - macrobid 100mg po bid starting today for dysuria and bacteria on UA.   - cont to monitor

## 2017-06-25 NOTE — PROGRESS NOTES
End of Shift Nursing Note    Bedside shift change report given to Zeb Olson RN (oncoming nurse) by Chio Thomas RN (offgoing nurse). Report included the following information SBAR, Kardex and Intake/Output. Zone Phone:       Significant changes during shift:    none   Non-emergent issues for physician to address:   none     Number times ambulated in hallway past shift: 2      Number of times OOB to chair past shift: 2    POD #:      Vital Signs:    Temp: 98.8 °F (37.1 °C)     Pulse (Heart Rate): 66     BP: 128/84     Resp Rate: 18     O2 Sat (%): 99 %    Lines & Drains:     Urinary Catheter? No   Placement Date:    Medical Necessity:   Central Line? No   Placement Date:    Medical Necessity:   PICC Line? No   Placement Date:    Medical Necessity:     NG tube [] in [] removed [x] not applicable   Drains [] in [] removed [x] not applicable     Skin Integrity:      Wounds: yes   Dressings Present: yes    Wound Concerns: no      GI:    Current diet:  DIET GI LITE (POST SURGICAL)    Nausea: NO  Vomiting: NO  Bowel Sounds: YES  Flatus: YES  Last Bowel Movement: today   Appearance:     Respiratory:  Supplemental O2: No      Device:    via  Liters/min     Incentive Spirometer: YES  Volume:   Coughing and Deep Breathing: YES  Oral Care: YES  Understanding (patient/family education): YES   Getting out of bed: YES  Head of bed elevation: YES    Patient Safety:    Falls Score: 1  Mobility Score: 1  Bed Alarm On? No  Sitter? No      Opportunity for questions and clarification was given to oncoming nurse. Patient bed is in low position, side rails are up x 2, door & observation blinds open as needed, call bell within reach and patient not in distress.     Jennifer Lott

## 2017-06-25 NOTE — PROGRESS NOTES
End of Shift Nursing Note    Bedside shift change report given to Constanza Jaimes (oncoming nurse) by Che Michelle (offgoing nurse). Report included the following information SBAR, Kardex, Procedure Summary and Recent Results. Zone Phone:   [phone]    Significant changes during shift:    none   Non-emergent issues for physician to address:   none     Number times ambulated in hallway past shift: up ad noah      Number of times OOB to chair past shift: up ad noah    POD #: 5     Vital Signs:    Temp: 99.4 °F (37.4 °C)     Pulse (Heart Rate): 77     BP: 118/69     Resp Rate: 20     O2 Sat (%): 100 %    Lines & Drains:     Urinary Catheter? No   Placement Date:    Medical Necessity:   Central Line? No   Placement Date:    Medical Necessity:   PICC Line? No   Placement Date:    Medical Necessity:     NG tube [] in [] removed [x] not applicable   Drains [] in [] removed [x] not applicable     Skin Integrity:      Wounds: no   Dressings Present: no    Wound Concerns: no      GI:    Current diet:  DIET GI LITE (POST SURGICAL)    Nausea: YES  Vomiting: NO  Bowel Sounds: YES  Flatus: NO  Last Bowel Movement: today   Appearance:     Respiratory:  Supplemental O2: No      Device:    via  Liters/min     Incentive Spirometer: YES  Volume:   Coughing and Deep Breathing: YES  Oral Care: NO  Understanding (patient/family education): YES   Getting out of bed: YES  Head of bed elevation: YES    Patient Safety:    Falls Score: 1  Mobility Score: 1  Bed Alarm On? No  Sitter? No      Opportunity for questions and clarification was given to oncoming nurse. Patient bed is in low position, side rails are up x 2, door & observation blinds open as needed, call bell within reach and patient not in distress.     Jas Teran RN

## 2017-06-26 ENCOUNTER — TELEPHONE (OUTPATIENT)
Dept: FAMILY MEDICINE CLINIC | Age: 47
End: 2017-06-26

## 2017-06-26 PROCEDURE — 74011250636 HC RX REV CODE- 250/636: Performed by: OBSTETRICS & GYNECOLOGY

## 2017-06-26 PROCEDURE — 74011250637 HC RX REV CODE- 250/637: Performed by: OBSTETRICS & GYNECOLOGY

## 2017-06-26 PROCEDURE — 65270000029 HC RM PRIVATE

## 2017-06-26 RX ADMIN — TOPIRAMATE 50 MG: 25 TABLET, FILM COATED ORAL at 18:03

## 2017-06-26 RX ADMIN — HYDROMORPHONE HYDROCHLORIDE 1 MG: 1 INJECTION, SOLUTION INTRAMUSCULAR; INTRAVENOUS; SUBCUTANEOUS at 21:45

## 2017-06-26 RX ADMIN — TOPIRAMATE 50 MG: 25 TABLET, FILM COATED ORAL at 08:41

## 2017-06-26 RX ADMIN — Medication 10 ML: at 05:27

## 2017-06-26 RX ADMIN — Medication 10 ML: at 15:18

## 2017-06-26 RX ADMIN — HYDROMORPHONE HYDROCHLORIDE 1 MG: 1 INJECTION, SOLUTION INTRAMUSCULAR; INTRAVENOUS; SUBCUTANEOUS at 03:09

## 2017-06-26 RX ADMIN — HYDROMORPHONE HYDROCHLORIDE 1 MG: 1 INJECTION, SOLUTION INTRAMUSCULAR; INTRAVENOUS; SUBCUTANEOUS at 15:18

## 2017-06-26 RX ADMIN — NITROFURANTOIN MONOHYDRATE/MACROCRYSTALLINE 100 MG: 25; 75 CAPSULE ORAL at 08:42

## 2017-06-26 RX ADMIN — RANITIDINE 150 MG: 150 TABLET, FILM COATED ORAL at 08:46

## 2017-06-26 RX ADMIN — ENOXAPARIN SODIUM 40 MG: 40 INJECTION SUBCUTANEOUS at 08:42

## 2017-06-26 RX ADMIN — HYDROMORPHONE HYDROCHLORIDE 1 MG: 1 INJECTION, SOLUTION INTRAMUSCULAR; INTRAVENOUS; SUBCUTANEOUS at 10:09

## 2017-06-26 RX ADMIN — Medication 10 ML: at 21:45

## 2017-06-26 RX ADMIN — NITROFURANTOIN MONOHYDRATE/MACROCRYSTALLINE 100 MG: 25; 75 CAPSULE ORAL at 18:03

## 2017-06-26 RX ADMIN — ONDANSETRON HYDROCHLORIDE 4 MG: 2 INJECTION, SOLUTION INTRAMUSCULAR; INTRAVENOUS at 10:08

## 2017-06-26 RX ADMIN — RANITIDINE 150 MG: 150 TABLET, FILM COATED ORAL at 18:04

## 2017-06-26 NOTE — TELEPHONE ENCOUNTER
----- Message from Ernesto Fuentes sent at 6/26/2017 12:36 PM EDT -----  Regarding: Dr. Sloan Hannah  Pt is at [facility] for a week, will reschedule appt. At later date.

## 2017-06-26 NOTE — TELEPHONE ENCOUNTER
Unable to leave message. Patient needs to be seen within three days of discharge per Dr Alessandra Chen.

## 2017-06-26 NOTE — PROGRESS NOTES
End of Shift Nursing Note    Bedside shift change report given to Asaf Poe (oncoming nurse) by Megha Sheppard (offgoing nurse). Report included the following information SBAR, Kardex, MAR and Recent Results. Zone Phone:   [phone]    Significant changes during shift:    Uneventful night   Non-emergent issues for physician to address:   None     Number times ambulated in hallway past shift: 0      Number of times OOB to chair past shift: 1    POD #:      Vital Signs:    Temp: 98.4 °F (36.9 °C)     Pulse (Heart Rate): 68     BP: 115/67     Resp Rate: 16     O2 Sat (%): 100 %    Lines & Drains:     Urinary Catheter? No   Placement Date:    Medical Necessity:   Central Line? No   Placement Date:    Medical Necessity:   PICC Line? No   Placement Date:    Medical Necessity:     NG tube [] in [] removed [] not applicable   Drains [] in [] removed [] not applicable     Skin Integrity:      Wounds: no   Dressings Present: no    Wound Concerns: no      GI:    Current diet:  DIET GI LITE (POST SURGICAL)    Nausea: NO  Vomiting: NO  Bowel Sounds: YES  Flatus: YES  Last Bowel Movement: yesterday   Appearance: Unobserved    Respiratory:  Supplemental O2: No      Device:    via  Liters/min     Incentive Spirometer: NO  Volume:  Coughing and Deep Breathing: YES  Oral Care: YES  Understanding (patient/family education): YES   Getting out of bed: YES  Head of bed elevation: YES    Patient Safety:    Falls Score: 1  Mobility Score: 5  Bed Alarm On? No  Sitter? No      Opportunity for questions and clarification was given to oncoming nurse. Patient bed is in low position, side rails are up x 2, door & observation blinds open as needed, call bell within reach and patient not in distress.     [facility]

## 2017-06-26 NOTE — PROGRESS NOTES
CM reviewed the chart and met the patient. She states she does not know when she will be discharged yet. She states that when she first tries to get up from the bed she feels unsteady. Her  works during the day and and she will be alone during the day. She states home health nursing and therapy came to her house last time and were very helpful. CM did an [place] requesting PT and OT consult prior to discharge. CM will follow for any discharge needs.  Ayaka Reddy RN #[id number]

## 2017-06-26 NOTE — PROGRESS NOTES
Gynecology Progress Note    Patient doing well post-op day 6 from Procedure(s):   ABDOMINAL SUPRACERVICAL HYSTERECTOMY without significant complaints. Pain controlled on current medication. Voiding without difficulty. Patient is passing flatus. Vitals:  Blood pressure 141/82, pulse 80, temperature 98.1 °F (36.7 °C), resp. rate 16, height 5' 5\" (1.651 m), weight 109.2 kg (240 lb 11.9 oz), SpO2 100 %. Temp (24hrs), Av.3 °F (36.8 °C), Min:98 °F (36.7 °C), Max:98.6 °F (37 °C)        Exam:  Patient without distress. Abdomen soft,  nontender. Incision dry and clean without erythema. Lower extremities are negative for swelling, cords, or tenderness. Lab/Data Review:  BMP: No results found for: NA, K, CL, CO2, AGAP, GLU, BUN, CREA, GFRAA, GFRNA  CBC: No results found for: WBC, HGB, HGBEXT, HCT, HCTEXT, PLT, PLTEXT, HGBEXT, HCTEXT, PLTEXT    Assessment and Plan:  Patient appears to be having uncomplicated post Procedure(s):Pt had good BM today sigrid diet, plan for D/C home tomorrow if remains stable   ABDOMINAL SUPRACERVICAL HYSTERECTOMY course. Continue routine post-op care.

## 2017-06-27 VITALS
TEMPERATURE: 98.3 F | WEIGHT: 240.74 LBS | HEART RATE: 60 BPM | OXYGEN SATURATION: 99 % | RESPIRATION RATE: 16 BRPM | SYSTOLIC BLOOD PRESSURE: 128 MMHG | BODY MASS INDEX: 40.11 KG/M2 | DIASTOLIC BLOOD PRESSURE: 75 MMHG | HEIGHT: 65 IN

## 2017-06-27 LAB
ANION GAP BLD CALC-SCNC: 8 MMOL/L (ref 5–15)
BACTERIA SPEC CULT: ABNORMAL
BACTERIA SPEC CULT: ABNORMAL
BUN SERPL-MCNC: 13 MG/DL (ref 6–20)
BUN/CREAT SERPL: 18 (ref 12–20)
CALCIUM SERPL-MCNC: 9 MG/DL (ref 8.5–10.1)
CC UR VC: ABNORMAL
CHLORIDE SERPL-SCNC: 107 MMOL/L (ref 97–108)
CO2 SERPL-SCNC: 22 MMOL/L (ref 21–32)
CREAT SERPL-MCNC: 0.73 MG/DL (ref 0.55–1.02)
ERYTHROCYTE [DISTWIDTH] IN BLOOD BY AUTOMATED COUNT: 13.3 % (ref 11.5–14.5)
GLUCOSE SERPL-MCNC: 100 MG/DL (ref 65–100)
HCT VFR BLD AUTO: 37.1 % (ref 35–47)
HGB BLD-MCNC: 12.7 G/DL (ref 11.5–16)
MCH RBC QN AUTO: 31.8 PG (ref 26–34)
MCHC RBC AUTO-ENTMCNC: 34.2 G/DL (ref 30–36.5)
MCV RBC AUTO: 92.8 FL (ref 80–99)
PLATELET # BLD AUTO: 338 K/UL (ref 150–400)
POTASSIUM SERPL-SCNC: 3.4 MMOL/L (ref 3.5–5.1)
RBC # BLD AUTO: 4 M/UL (ref 3.8–5.2)
SERVICE CMNT-IMP: ABNORMAL
SODIUM SERPL-SCNC: 137 MMOL/L (ref 136–145)
WBC # BLD AUTO: 7.2 K/UL (ref 3.6–11)

## 2017-06-27 PROCEDURE — 74011250636 HC RX REV CODE- 250/636: Performed by: OBSTETRICS & GYNECOLOGY

## 2017-06-27 PROCEDURE — 74011250637 HC RX REV CODE- 250/637: Performed by: OBSTETRICS & GYNECOLOGY

## 2017-06-27 PROCEDURE — 80048 BASIC METABOLIC PNL TOTAL CA: CPT | Performed by: OBSTETRICS & GYNECOLOGY

## 2017-06-27 PROCEDURE — 85027 COMPLETE CBC AUTOMATED: CPT | Performed by: OBSTETRICS & GYNECOLOGY

## 2017-06-27 RX ORDER — CIPROFLOXACIN 500 MG/1
500 TABLET ORAL 2 TIMES DAILY
Qty: 10 TAB | Refills: 0 | Status: SHIPPED | OUTPATIENT
Start: 2017-06-27 | End: 2017-07-02

## 2017-06-27 RX ORDER — OXYCODONE AND ACETAMINOPHEN 5; 325 MG/1; MG/1
1 TABLET ORAL
Qty: 30 TAB | Refills: 0 | Status: SHIPPED | OUTPATIENT
Start: 2017-06-27 | End: 2017-07-12

## 2017-06-27 RX ADMIN — NITROFURANTOIN MONOHYDRATE/MACROCRYSTALLINE 100 MG: 25; 75 CAPSULE ORAL at 09:03

## 2017-06-27 RX ADMIN — TOPIRAMATE 50 MG: 25 TABLET, FILM COATED ORAL at 09:03

## 2017-06-27 RX ADMIN — RANITIDINE 150 MG: 150 TABLET, FILM COATED ORAL at 09:04

## 2017-06-27 RX ADMIN — ENOXAPARIN SODIUM 40 MG: 40 INJECTION SUBCUTANEOUS at 09:03

## 2017-06-27 RX ADMIN — Medication 10 ML: at 06:00

## 2017-06-27 RX ADMIN — OXYCODONE HYDROCHLORIDE AND ACETAMINOPHEN 1 TABLET: 5; 325 TABLET ORAL at 04:17

## 2017-06-27 NOTE — PROGRESS NOTES
Gynecology Progress Note    Patient doing well post-op day 7 from Procedure(s):   ABDOMINAL SUPRACERVICAL HYSTERECTOMY without significant complaints. Pain controlled on current medication. Voiding without difficulty. Patient is passing flatus. Vitals:  Blood pressure 130/79, pulse 65, temperature 98.4 °F (36.9 °C), resp. rate 16, height 5' 5\" (1.651 m), weight 109.2 kg (240 lb 11.9 oz), SpO2 98 %. Temp (24hrs), Av.4 °F (36.9 °C), Min:98.1 °F (36.7 °C), Max:98.9 °F (37.2 °C)        Exam:  Patient without distress. Abdomen soft,  nontender. Incision dry and clean without erythema. Lower extremities are negative for swelling, cords, or tenderness. Lab/Data Review: All lab results for the last 24 hours reviewed. Assessment and Plan:  Patient appears to be having uncomplicated post Procedure(s):   ABDOMINAL SUPRACERVICAL HYSTERECTOMY course. Continue routine post-op care.  Ready for D/C home

## 2017-06-27 NOTE — PROGRESS NOTES
End of Shift Nursing Note    Bedside shift change report given to Gabby Jaime (oncoming nurse) by Gloria Phelps (offgoing nurse). Report included the following information SBAR, Kardex, Intake/Output, MAR and Recent Results. Zone Phone:       Significant changes during shift:    Multiple loose bowel movement, taking Percocet  Instead of Dilaudid for pain control. Non-emergent issues for physician to address:   None     Number times ambulated in hallway past shift: 0      Number of times OOB to chair past shift: Multiple    POD #:      Vital Signs:    Temp: 98.3 °F (36.8 °C)     Pulse (Heart Rate): 63     BP: 133/84     Resp Rate: 16     O2 Sat (%): 99 %    Lines & Drains:     Urinary Catheter? No   Placement Date:    Medical Necessity:   Central Line? No   Placement Date:    Medical Necessity:   PICC Line? No   Placement Date:    Medical Necessity:     NG tube [] in [] removed [] not applicable   Drains [] in [] removed [] not applicable     Skin Integrity:      Wounds: no   Dressings Present: no    Wound Concerns: no      GI:    Current diet:  DIET GI LITE (POST SURGICAL)    Nausea: NO  Vomiting: NO  Bowel Sounds: YES  Flatus: YES  Last Bowel Movement: today   Appearance: loose    Respiratory:  Supplemental O2: No      Device:    via  Liters/min     Incentive Spirometer: YES  Volume:   Coughing and Deep Breathing: YES  Oral Care: YES  Understanding (patient/family education): YES   Getting out of bed: YES  Head of bed elevation: YES    Patient Safety:    Falls Score: 0  Mobility Score: 5  Bed Alarm On? No  Sitter? No      Opportunity for questions and clarification was given to oncoming nurse. Patient bed is in low position, side rails are up x 2, door & observation blinds open as needed, call bell within reach and patient not in distress.     Arlet Vaughan

## 2017-06-27 NOTE — PROGRESS NOTES
Interdisciplinary Rounds were completed on this patient. Rounds included nursing, clinical care leader, pharmacy, and case management. Patient was doing well without problems, resting in bed. Patient had the following concerns: poor appetite and reflux, pain overnight. Goals for the day will include: mobilize and incentive spirometer, upright in chair for all meals.

## 2017-06-27 NOTE — DISCHARGE INSTRUCTIONS
Abdominal Hysterectomy Discharge Instructions    Patient ID:  Jaylon Gregg  [id number]  48 y.o.  1970    Take Home Medications           What to do at Home    Recommended diet: AS TOLERATED                                    AVOID GASSY FOODS                                    DRINK PLENTY OF LIQUIDS    Recommended activity: GRADUALLY RESUME NORMAL ACTIVITIES OVER 4 WEEKS, LIMIT STAIRS AND LIFTING  NO DRIVING FOR 2 WEEKS  NOTHING IN VAGINA FOR 4 WEEKS      If you experience any of the following symptoms. FEVER OR CHILLS  HEAVY VAGINAL DRAINAGE OR SMELLY DISCHARGE  REDNESS, BLEEDING OR DISCHARGE AT INCISION SITE  PAIN OR SWELLING IN YOU LEGS    Follow-up with Dr Robbie Wood in 3 weeks. Abdominal Hysterectomy: What to Expect at [address] can expect to feel better and stronger each day, although you may need pain medicine for a week or two. You may get tired easily or have less energy than usual. This may last for several weeks after surgery. You will probably notice that your belly is swollen and puffy. This is common. The swelling will take several weeks to go down. You may take 6 to 8 weeks to fully recover. It is important to avoid lifting while you are recovering so that you can heal.    This care sheet gives you a general idea about how long it will take for you to recover. But each person recovers at a different pace. Follow the steps below to get better as quickly as possible. How can you care for yourself at home? Activity  Rest when you feel tired. Getting enough sleep will help you recover. Try to walk each day. Start by walking a little more than you did the day before. Bit by bit, increase the amount you walk. Walking boosts blood flow and helps prevent pneumonia and constipation. Avoid lifting anything that would make you strain.  This may include heavy grocery bags and milk containers, a heavy briefcase or backpack, cat litter or dog food bags, a child, or a vacuum . Avoid strenuous activities, such as biking, jogging, weight lifting, or aerobic exercise, until your doctor says it is okay. You may shower. Pat the cut (incision) dry. Do not take a bath for the first 2 weeks, or until your doctor tells you it is okay. You may drive when you are no longer taking prescription pain medicine and can quickly move your foot from the gas pedal to the brake. You must also be able to sit comfortably for a long period of time, even if you do not plan to go far. You might get caught in traffic. You will probably need to take 2 to 4 weeks off from work. It depends on the type of work you do and how you feel. Your doctor will tell you when you can have sex again. Diet  You can eat your normal diet. If your stomach is upset, try bland, low-fat foods like plain rice, broiled chicken, toast, and yogurt. Drink plenty of fluids (unless your doctor tells you not to). You may notice that your bowel movements are not regular right after your surgery. This is common. Try to avoid constipation and straining with bowel movements. You may want to take a fiber supplement every day. If you have not had a bowel movement after a couple of days, ask your doctor about taking a mild laxative. Medicines  Take pain medicines exactly as directed. If the doctor gave you a prescription medicine for pain, take it as prescribed. If you are not taking a prescription pain medicine, take an over-the-counter medicine such as acetaminophen (Tylenol), ibuprofen (Advil, Motrin), or naproxen (Aleve). Read and follow all instructions on the label. Do not take two or more pain medicines at the same time unless the doctor told you to. Many pain medicines have acetaminophen, which is Tylenol. Too much Tylenol can be harmful. If your doctor prescribed antibiotics, take them as directed. Do not stop taking them just because you feel better. You need to take the full course of antibiotics.   If you think your pain medicine is making you sick to your stomach: Take your medicine after meals (unless your doctor has told you not to). Ask your doctor for a different pain medicine. Incision care  If you have strips of tape on the cut (incision) the doctor made, leave the tape on for a week or until it falls off. Wash the area daily with warm, soapy water, and pat it dry. Other cleaning products, such as hydrogen peroxide, can make the wound heal more slowly. You may cover the area with a gauze bandage if it weeps or rubs against clothing. Change the bandage every day. Keep the area clean and dry. Other instructions  You may have some light vaginal bleeding. Wear sanitary pads if needed. Do not douche or use tampons. Follow-up care is a key part of your treatment and safety. Be sure to make and go to all appointments, and call your doctor if you are having problems. It's also a good idea to know your test results and keep a list of the medicines you take. When should you call for help? Call 911 anytime you think you may need emergency care. For example, call if:  You pass out (lose consciousness). You have sudden chest pain and shortness of breath, or you cough up blood. You have severe pain in your belly. Call your doctor now or seek immediate medical care if:  You have bright red vaginal bleeding that soaks one or more pads in an hour, or you have large clots. You have foul-smelling discharge from your vagina. You are sick to your stomach or cannot keep fluids down. You have signs of infection, such as: Increased pain, swelling, warmth, or redness. Red streaks leading from the incision. Pus draining from the incision. Swollen lymph nodes in your neck, armpits, or groin. A fever. You have pain that does not get better after you take pain medicine. You have loose stitches, or your incision comes open.   You have signs of a blood clot, such as:  Pain in your calf, back of knee, thigh, or groin.  Redness and swelling in your leg or groin. You have trouble passing urine or stool, especially if you have pain or swelling in your lower belly. You have hot flashes, sweating, flushing, or a fast or pounding heartbeat. Watch closely for changes in your health, and be sure to contact your doctor if:  You do not have a bowel movement after taking a laxative. Where can you learn more? Go to [employer]amba[employer]    Enter M280  in the search box to learn more about \"Abdominal Hysterectomy: What to Expect at Home\". © 6103-3167 [employer]wise, Incorporated. Care instructions adapted under license by Jessica Carpenter (which disclaims liability or warranty for this information). This care instruction is for use with your licensed healthcare professional. If you have questions about a medical condition or this instruction, always ask your healthcare professional. Karen Pack any warranty or liability for your use of this information.

## 2017-06-27 NOTE — DISCHARGE SUMMARY
Gynecology Surgical Discharge Summary     Name: Rasta Gunter MRN: [id number]  SSN: [id number]    YOB: 1970  Age: 48 y.o. Sex: female      Admit date: 6/19/2017    Discharge Date: 6/27/2017      Attending Physician: Cayla Salazar MD     Admission Diagnoses: Chronic pelvic pain and Fibroids    Discharge Diagnoses: Active Problems:    Fibroid uterus (6/19/2017)         Procedures: Supracervical Abdominal Hysterectomy without Salpingo-Oophorectomy    Hospital Course: Hospital course was complicated by ileus. Significant Diagnostic Studies:   Recent Results (from the past 24 hour(s))   CBC W/O DIFF    Collection Time: 06/27/17  4:24 AM   Result Value Ref Range    WBC 7.2 3.6 - 11.0 K/uL    RBC 4.00 3.80 - 5.20 M/uL    HGB 12.7 11.5 - 16.0 g/dL    HCT 37.1 35.0 - 47.0 %    MCV 92.8 80.0 - 99.0 FL    MCH 31.8 26.0 - 34.0 PG    MCHC 34.2 30.0 - 36.5 g/dL    RDW 13.3 11.5 - 14.5 %    PLATELET 483 399 - 526 K/uL   METABOLIC PANEL, BASIC    Collection Time: 06/27/17  4:24 AM   Result Value Ref Range    Sodium 137 136 - 145 mmol/L    Potassium 3.4 (L) 3.5 - 5.1 mmol/L    Chloride 107 97 - 108 mmol/L    CO2 22 21 - 32 mmol/L    Anion gap 8 5 - 15 mmol/L    Glucose 100 65 - 100 mg/dL    BUN 13 6 - 20 MG/DL    Creatinine 0.73 0.55 - 1.02 MG/DL    BUN/Creatinine ratio 18 12 - 20      GFR est AA >60 >60 ml/min/1.73m2    GFR est non-AA >60 >60 ml/min/1.73m2    Calcium 9.0 8.5 - 10.1 MG/DL       Patient Instructions:   Current Discharge Medication List      START taking these medications    Details   !! oxyCODONE-acetaminophen (PERCOCET) 5-325 mg per tablet Take 1 Tab by mouth every four (4) hours as needed. Max Daily Amount: 6 Tabs. Indications: Pain  Qty: 30 Tab, Refills: 0      ciprofloxacin HCl (CIPRO) 500 mg tablet Take 1 Tab by mouth two (2) times a day for 5 days. Indications: PROTEUS URINARY TRACT INFECTION  Qty: 10 Tab, Refills: 0       !! - Potential duplicate medications found.  Please discuss with provider. CONTINUE these medications which have NOT CHANGED    Details   warfarin (COUMADIN) 5 mg tablet Take 5 mg by mouth daily. Indications: DEEP VEIN THROMBOSIS PREVENTION      furosemide (LASIX) 40 mg tablet TAKE 1 TABLET BY MOUTH TWICE DAILY AS NEEDED  Qty: 60 Tab, Refills: 0      topiramate (TOPAMAX) 50 mg tablet Take 1 Tab by mouth two (2) times a day. Qty: 60 Tab, Refills: 5      phentermine (ADIPEX-P) 37.5 mg tablet Take 1/2 tablet before breakfast and before dinner  Qty: 100 Tab, Refills: 5      methotrexate (RHEUMATREX) 2.5 mg tablet TAKE 8 TABLETS BY MOUTH ONCE A WEEK, takes on Saturdays  Refills: 0      ranitidine (ZANTAC) 150 mg tablet Take 150 mg by mouth two (2) times a day. albuterol (PROVENTIL HFA, VENTOLIN HFA, PROAIR HFA) 90 mcg/actuation inhaler Take 1 Puff by inhalation every four (4) hours as needed for Wheezing. Qty: 1 Inhaler, Refills: 5    Associated Diagnoses: Laryngitis; Mild intermittent asthma with acute exacerbation      acetaminophen (TYLENOL EXTRA STRENGTH) 500 mg tablet Take 1,000 mg by mouth every six (6) hours as needed for Pain. Comp. Stocking,Thigh,Long,X-Lrg misc Apply to leg in am and remove at bedtime. Please disp 1 stocking. Qty: 1 Each, Refills: 0    Associated Diagnoses: Bilateral lower extremity edema      mometasone (NASONEX) 50 mcg/actuation nasal spray 2 Sprays by Both Nostrils route daily. Qty: 1 Container, Refills: 1    Associated Diagnoses: Allergic rhinitis due to other allergic trigger      !! oxyCODONE-acetaminophen (PERCOCET) 5-325 mg per tablet Take 1 Tab by mouth every six (6) hours as needed for Pain. Max Daily Amount: 4 Tabs. ONLY FOR SEVERE PAIN  Qty: 30 Tab, Refills: 0    Associated Diagnoses: Uterine leiomyoma, unspecified location       !! - Potential duplicate medications found. Please discuss with provider. Activity: No sex, douching, or tampons for 6 weeks or as directed by your physician. No heavy lifting for 6 weeks.  No driving while taking pain medication.   Diet: Resume pre-hospital diet  Wound Care: Keep wound clean and dry    Follow-up Appointments   Procedures    FOLLOW UP VISIT Appointment in: One Month     Standing Status:   Standing     Number of Occurrences:   1     Order Specific Question:   Appointment in     Answer:   One Month        Signed By:  Tequila Ybarra MD     June 27, 2017

## 2017-06-28 ENCOUNTER — TELEPHONE (OUTPATIENT)
Dept: FAMILY MEDICINE CLINIC | Age: 47
End: 2017-06-28

## 2017-06-28 NOTE — TELEPHONE ENCOUNTER
Pt just got out of hospital   Has set an apptmnt w/PCP   States she is having pain \"down there\"     She will check her discharge papers as to what she should be doing   And would like a call from Dr. Arnav Ortiz office     Best number to reach her is [phone] or [phone]

## 2017-07-06 ENCOUNTER — OFFICE VISIT (OUTPATIENT)
Dept: FAMILY MEDICINE CLINIC | Age: 47
End: 2017-07-06

## 2017-07-06 VITALS
HEIGHT: 65 IN | BODY MASS INDEX: 39.39 KG/M2 | HEART RATE: 57 BPM | WEIGHT: 236.4 LBS | SYSTOLIC BLOOD PRESSURE: 116 MMHG | OXYGEN SATURATION: 98 % | DIASTOLIC BLOOD PRESSURE: 59 MMHG | TEMPERATURE: 97.6 F | RESPIRATION RATE: 16 BRPM

## 2017-07-06 DIAGNOSIS — K51.90 ULCERATIVE COLITIS WITHOUT COMPLICATIONS, UNSPECIFIED LOCATION (HCC): ICD-10-CM

## 2017-07-06 DIAGNOSIS — K62.5 RECTAL BLEEDING: ICD-10-CM

## 2017-07-06 DIAGNOSIS — K59.01 SLOW TRANSIT CONSTIPATION: ICD-10-CM

## 2017-07-06 DIAGNOSIS — Z90.49 S/P COLECTOMY: ICD-10-CM

## 2017-07-06 DIAGNOSIS — Z90.710 S/P HYSTERECTOMY: ICD-10-CM

## 2017-07-06 DIAGNOSIS — K21.9 GASTROESOPHAGEAL REFLUX DISEASE, ESOPHAGITIS PRESENCE NOT SPECIFIED: ICD-10-CM

## 2017-07-06 DIAGNOSIS — Z09 HOSPITAL DISCHARGE FOLLOW-UP: Primary | ICD-10-CM

## 2017-07-06 LAB
BILIRUB UR QL STRIP: NEGATIVE
GLUCOSE UR-MCNC: NEGATIVE MG/DL
KETONES P FAST UR STRIP-MCNC: NEGATIVE MG/DL
PH UR STRIP: 5.5 [PH] (ref 4.6–8)
PROT UR QL STRIP: NEGATIVE MG/DL
SP GR UR STRIP: 1.03 (ref 1–1.03)
UA UROBILINOGEN AMB POC: NORMAL (ref 0.2–1)
URINALYSIS CLARITY POC: NORMAL
URINALYSIS COLOR POC: YELLOW
URINE BLOOD POC: NORMAL
URINE LEUKOCYTES POC: NEGATIVE
URINE NITRITES POC: NEGATIVE

## 2017-07-06 RX ORDER — PANTOPRAZOLE SODIUM 40 MG/1
40 TABLET, DELAYED RELEASE ORAL DAILY
Qty: 30 TAB | Refills: 0 | Status: SHIPPED | OUTPATIENT
Start: 2017-07-06 | End: 2017-08-03 | Stop reason: SDUPTHER

## 2017-07-06 NOTE — Clinical Note
I've asked Ms. Lopez to check in with both of you after the hysterectomy for her rectal bleeding. Suspect this is from hemorrhoids with constipation, but wanted to be sure since she had a colectomy for UC and issues with pouchitis back in 4/2016.   Thanks, Austin Lutz

## 2017-07-06 NOTE — PROGRESS NOTES
Subjective:     Chief Complaint   Patient presents with   [facility] Follow Up     Discharged 6/27/17        She  is a 48 y.o. female who presents for evaluation of:  Hospital d/c f/u - Had Supracervical Abdominal Hysterectomy without Salpingo-Oophorectomy with Dr. Edison Riggs and discharged on 6/27/17. Had some vomiting and slow moving bowels so was kept in the hospital for a longer duration. Pain still present in RLQ. Having some blood in stool with straining x 1 episode after hospitalization. Last BM was today and was painful. Hx of UC s/p colectomy. Feeling miserable with GERD sx. Using Zantac with no relief. Also feels like her everything is very tight around her rectum/anus - had to have dilation of anal sphincter in past for similar issue.     ROS  Gen - no fever/chills  Resp - no dyspnea or cough  CV - no chest pain or [place]  Rest per HPI    Past Medical History:   Diagnosis Date    Asthma 3/12/2010    DDD (degenerative disc disease), lumbar     DJD (degenerative joint disease) 3/12/2010    DJD (degenerative joint disease) of knee     [facility])     Rt leg    GERD (gastroesophageal reflux disease)     History of tuberculosis exposure 2013    pt states she has + PPD and was on Rx for 6 months; last dose either 11/13 or 12/13    Inflammatory polyarthritis ([address]     Psoriatic Arthitis    Morbid obesity ([address]     Pseudogout     PUD (peptic ulcer disease)     Skin abrasion 1/28/14    After loosing 125#, Bilateral Inner thigh friction flareup monthly with skin breakdown    Thromboembolus ([address] 2008    Rt leg,  pt states she fell and had a plane ride home and developed blood clots    UC (ulcerative colitis) ([address] 3/12/2010     Past Surgical History:   Procedure Laterality Date    HX ACL RECONSTRUCTION      Rt    Maximo Waggoner HX GI      reopening of rectal pouch    HX GYN  06/19/2017    Hysterectomy    HX ORTHOPAEDIC  12/13    Rt foot / toes    HX TONSILLECTOMY       Current Outpatient Prescriptions on File Prior to Visit   Medication Sig Dispense Refill    oxyCODONE-acetaminophen (PERCOCET) 5-325 mg per tablet Take 1 Tab by mouth every four (4) hours as needed. Max Daily Amount: 6 Tabs. Indications: Pain 30 Tab 0    furosemide (LASIX) 40 mg tablet TAKE 1 TABLET BY MOUTH TWICE DAILY AS NEEDED 60 Tab 0    methotrexate (RHEUMATREX) 2.5 mg tablet TAKE 8 TABLETS BY MOUTH ONCE A WEEK, takes on Saturdays  0    ranitidine (ZANTAC) 150 mg tablet Take 150 mg by mouth two (2) times a day.  albuterol (PROVENTIL HFA, VENTOLIN HFA, PROAIR HFA) 90 mcg/actuation inhaler Take 1 Puff by inhalation every four (4) hours as needed for Wheezing. 1 Inhaler 5    acetaminophen (TYLENOL EXTRA STRENGTH) 500 mg tablet Take 1,000 mg by mouth every six (6) hours as needed for Pain.  topiramate (TOPAMAX) 50 mg tablet Take 1 Tab by mouth two (2) times a day. 60 Tab 5    phentermine (ADIPEX-P) 37.5 mg tablet Take 1/2 tablet before breakfast and before dinner 100 Tab 5     No current facility-administered medications on file prior to visit. Objective:     Vitals:    07/06/17 1309   BP: 116/59   Pulse: (!) 57   Resp: 16   Temp: 97.6 °F (36.4 °C)   TempSrc: Oral   SpO2: 98%   Weight: 236 lb 6.4 oz (107.2 kg)   Height: 5' 5\" (1.651 m)     Physical Examination:  General appearance - alert, well appearing, and in no distress  Eyes -sclera anicteric  Neck - supple, no significant adenopathy, no thyromegaly, no bruits  Chest - clear to auscultation, no wheezes, rales or rhonchi, symmetric air entry  Heart - normal rate, regular rhythm, normal S1, S2, no murmurs, rubs, clicks or gallops  Abd - soft, ttp over RLQ, hypoechoic bs, no rebound/guarding  Neurological - alert, oriented, no focal findings or movement disorder noted  Extr - no edema or tenderness    Assessment/ Plan:   Elsie Opitz was seen today for hospital follow up.     Diagnoses and all orders for this visit:    Hospital discharge follow-up - labs to ensure no infection or anemia. UTI tx in hospital so rechecking UA today as well. -     CBC W/O DIFF  -     METABOLIC PANEL, COMPREHENSIVE  -     AMB POC URINALYSIS DIP STICK AUTO W/O MICRO    S/P hysterectomy  S/P colectomy  Ulcerative colitis without complications, unspecified location [facility])  Rectal bleeding  Slow transit constipation  - Given hx of bleeding from rectum after hysterectomy and abnormal bowel pattern (tightness around rectum/anus) in pt with hx of colectomy for UC, I have asked her to check in with Urogyn and Colorectal surgery. Unable to get her an appt, so set up with GI for eval.    Gastroesophageal reflux disease, esophagitis presence not specified  -     pantoprazole (PROTONIX) 40 mg tablet; Take 1 Tab by mouth daily. I have discussed the diagnosis with the patient and the intended plan as seen in the above orders. The patient has received an after-visit summary and questions were answered concerning future plans. I have discussed medication side effects and warnings with the patient as well. The patient verbalizes understanding and agreement with the plan. Follow-up Disposition:  Return in about 4 weeks (around 8/3/2017), or if symptoms worsen or fail to improve.

## 2017-07-06 NOTE — PROGRESS NOTES
Chief Complaint   Patient presents with   [facility] Follow Up     Discharged 6/27/17   Discuss constipation and rectal bleeding  Zantac not helping reflux  Room 4  Appointment scheduled with Dr Eveline Mehta 7/7/17 @ 2:30 pm

## 2017-07-06 NOTE — MR AVS SNAPSHOT
Visit Information Date & Time Provider Department Dept. Phone Encounter #  
 7/6/2017  1:00 PM Lisbet Espino MD [facility] at [address] [id number] Follow-up Instructions Return in about 4 weeks (around 8/3/2017), or if symptoms worsen or fail to improve. Your Appointments 7/11/2017 10:50 AM  
Follow Up with Grace Ramachandran MD  
[facility] [address]) Appt Note: f/u          dm/ weight loss        6 mon [employer] [address] [id number] [address] [facility] Upcoming Health Maintenance Date Due INFLUENZA AGE 9 TO ADULT 8/1/2017 PAP AKA CERVICAL CYTOLOGY 11/19/2017 DTaP/Tdap/Td series (2 - Td) 5/17/2026 Allergies as of 7/6/2017  Review Complete On: 7/6/2017 By: Lisbet Espino MD  
  
 Severity Noted Reaction Type Reactions Sulfa (Sulfonamide Antibiotics) High 03/12/2010    Anaphylaxis Codeine Medium 03/17/2010    Itching Current Immunizations  Reviewed on 5/2/2016 Name Date Influenza Vaccine (Quad) PF 10/27/2016, 10/30/2015 Influenza Vaccine PF 10/11/2013 Influenza Vaccine Split 12/13/2012, 10/5/2011 Pneumococcal Polysaccharide (PPSV-23) 4/11/2016 11:40 AM  
  
 Not reviewed this visit You Were Diagnosed With   
  
 Codes Comments Hospital discharge follow-up    -  Primary ICD-10-CM: [address] ICD-9-CM: V67.59 S/P hysterectomy     ICD-10-CM: Z90.710 ICD-9-CM: V88.01 Ulcerative colitis without complications, unspecified location [facility])     ICD-10-CM: K51.90 ICD-9-CM: 556.9 S/P colectomy     ICD-10-CM: Z90.49 ICD-9-CM: V45.89 Vitals BP Pulse Temp Resp Height(growth percentile) Weight(growth percentile) 116/59 (BP 1 Location: Left arm, BP Patient Position: Sitting) (!) 57 97.6 °F (36.4 °C) (Oral) 16 5' 5\" (1.651 m) 236 lb 6.4 oz (107.2 kg) LMP SpO2 BMI OB Status Smoking Status (LMP Unknown) 98% 39.34 kg/m2 Hysterectomy Never Smoker BMI and BSA Data Body Mass Index Body Surface Area  
 39.34 kg/m 2 2.22 m 2 Preferred Pharmacy Pharmacy Name Phone [facility] [address] [place] [address] [phone] Your Updated Medication List  
  
   
This list is accurate as of: 7/6/17  1:56 PM.  Always use your most recent med list.  
  
  
  
  
 albuterol 90 mcg/actuation inhaler Commonly known as:  PROVENTIL HFA, VENTOLIN HFA, PROAIR HFA Take 1 Puff by inhalation every four (4) hours as needed for Wheezing. furosemide 40 mg tablet Commonly known as:  LASIX TAKE 1 TABLET BY MOUTH TWICE DAILY AS NEEDED  
  
 methotrexate 2.5 mg tablet Commonly known as:  RHEUMATREX  
TAKE 8 TABLETS BY MOUTH ONCE A WEEK, takes on Saturdays  
  
 oxyCODONE-acetaminophen 5-325 mg per tablet Commonly known as:  PERCOCET Take 1 Tab by mouth every four (4) hours as needed. Max Daily Amount: 6 Tabs. Indications: Pain  
  
 phentermine 37.5 mg tablet Commonly known as:  ADIPEX-P Take 1/2 tablet before breakfast and before dinner  
  
 raNITIdine 150 mg tablet Commonly known as:  ZANTAC Take 150 mg by mouth two (2) times a day. topiramate 50 mg tablet Commonly known as:  TOPAMAX Take 1 Tab by mouth two (2) times a day. TYLENOL EXTRA STRENGTH 500 mg tablet Generic drug:  acetaminophen Take 1,000 mg by mouth every six (6) hours as needed for Pain. We Performed the Following CBC W/O DIFF [96769 CPT(R)] METABOLIC PANEL, COMPREHENSIVE [00630 CPT(R)] Follow-up Instructions Return in about 4 weeks (around 8/3/2017), or if symptoms worsen or fail to improve. Introducing [facility]!    
 Keyshawn Kramer introduces GENELINK patient portal. Now you can access parts of your medical record, email your doctor's office, and request medication refills online. 1. In your internet browser, go to [email or web address] 2. Click on the First Time User? Click Here link in the Sign In box. You will see the New Member Sign Up page. 3. Enter your [employer] Access Code exactly as it appears below. You will not need to use this code after youve completed the sign-up process. If you do not sign up before the expiration date, you must request a new code. · [employer] Access Code: [facility] Expires: 8/9/2017 10:54 AM 
 
4. Enter the last four digits of your Social Security Number (xxxx) and Date of Birth (mm/dd/yyyy) as indicated and click Submit. You will be taken to the next sign-up page. 5. Create a [employer] ID. This will be your V[employer]ilent login ID and cannot be changed, so think of one that is secure and easy to remember. 6. Create a V[employer]ilent password. You can change your password at any time. 7. Enter your Password Reset Question and Answer. This can be used at a later time if you forget your password. 8. Enter your e-mail address. You will receive e-mail notification when new information is available in [address] 9. Click Sign Up. You can now view and download portions of your medical record. 10. Click the Download Summary menu link to download a portable copy of your medical information. If you have questions, please visit the Frequently Asked Questions section of the V[employer] website. Remember, V[employer]ilent is NOT to be used for urgent needs. For medical emergencies, dial 911. Now available from your iPhone and Android! Please provide this summary of care documentation to your next provider. Your primary care clinician is listed as Karthik Navarrete. If you have any questions after today's visit, please call [phone].

## 2017-07-07 LAB
ALBUMIN SERPL-MCNC: 3.6 G/DL (ref 3.5–5.5)
ALBUMIN/GLOB SERPL: 1.2 {RATIO} (ref 1.2–2.2)
ALP SERPL-CCNC: 66 IU/L (ref 39–117)
ALT SERPL-CCNC: 8 IU/L (ref 0–32)
AST SERPL-CCNC: 11 IU/L (ref 0–40)
BILIRUB SERPL-MCNC: 0.4 MG/DL (ref 0–1.2)
BUN SERPL-MCNC: 13 MG/DL (ref 6–24)
BUN/CREAT SERPL: 19 (ref 9–23)
CALCIUM SERPL-MCNC: 9.1 MG/DL (ref 8.7–10.2)
CHLORIDE SERPL-SCNC: 105 MMOL/L (ref 96–106)
CO2 SERPL-SCNC: 22 MMOL/L (ref 18–29)
CREAT SERPL-MCNC: 0.7 MG/DL (ref 0.57–1)
ERYTHROCYTE [DISTWIDTH] IN BLOOD BY AUTOMATED COUNT: 13.4 % (ref 12.3–15.4)
GLOBULIN SER CALC-MCNC: 2.9 G/DL (ref 1.5–4.5)
GLUCOSE SERPL-MCNC: 80 MG/DL (ref 65–99)
HCT VFR BLD AUTO: 38 % (ref 34–46.6)
HGB BLD-MCNC: 12.2 G/DL (ref 11.1–15.9)
MCH RBC QN AUTO: 31 PG (ref 26.6–33)
MCHC RBC AUTO-ENTMCNC: 32.1 G/DL (ref 31.5–35.7)
MCV RBC AUTO: 97 FL (ref 79–97)
PLATELET # BLD AUTO: 424 X10E3/UL (ref 150–379)
POTASSIUM SERPL-SCNC: 4.7 MMOL/L (ref 3.5–5.2)
PROT SERPL-MCNC: 6.5 G/DL (ref 6–8.5)
RBC # BLD AUTO: 3.93 X10E6/UL (ref 3.77–5.28)
SODIUM SERPL-SCNC: 143 MMOL/L (ref 134–144)
WBC # BLD AUTO: 7.3 X10E3/UL (ref 3.4–10.8)

## 2017-07-07 NOTE — PROGRESS NOTES
Pls call - labs look good. No signs of infection or excessive bleeding. Kidney and liver labs are normal.  Can f/u in 4 weeks. Still would rec checking in with Dr. Valentine Sandhu after surgery earlier armani if any further episodes of rectal bleeding (suspecting from hemorrhoids flaring).

## 2017-07-10 ENCOUNTER — TELEPHONE (OUTPATIENT)
Dept: FAMILY MEDICINE CLINIC | Age: 47
End: 2017-07-10

## 2017-07-10 NOTE — PROGRESS NOTES
Patient advised labs look good. No signs of infection or excessive bleeding. Kidney and liver labs are normal. Follow-up in 4 weeks. Check with Dr Ysabel Gracia office. Patient saw Dr Romel Mcneill and started on antibiotic. Schedule follow-up two weeks.

## 2017-07-10 NOTE — TELEPHONE ENCOUNTER
Patient having some calf discomfort and tightness. Will take fluid pill tonight and call office in am if no improvement. She advised for any shortness of breath to call 911 or go to nearest ER.

## 2017-07-10 NOTE — TELEPHONE ENCOUNTER
----- Message from Taina Eastman sent at 7/10/2017  2:29 PM EDT -----  Regarding: /Telephone  Pt called requesting a call to discuss medical forms. Pt best contact number is c [phone].

## 2017-07-11 ENCOUNTER — HOSPITAL ENCOUNTER (OUTPATIENT)
Dept: ULTRASOUND IMAGING | Age: 47
Discharge: HOME | End: 2017-07-11
Attending: FAMILY MEDICINE
Payer: COMMERCIAL

## 2017-07-11 ENCOUNTER — TELEPHONE (OUTPATIENT)
Dept: FAMILY MEDICINE CLINIC | Age: 47
End: 2017-07-11

## 2017-07-11 ENCOUNTER — OFFICE VISIT (OUTPATIENT)
Dept: FAMILY MEDICINE CLINIC | Age: 47
End: 2017-07-11

## 2017-07-11 VITALS
SYSTOLIC BLOOD PRESSURE: 108 MMHG | WEIGHT: 237 LBS | HEART RATE: 76 BPM | TEMPERATURE: 98.2 F | RESPIRATION RATE: 16 BRPM | DIASTOLIC BLOOD PRESSURE: 59 MMHG | HEIGHT: 65 IN | OXYGEN SATURATION: 98 % | BODY MASS INDEX: 39.49 KG/M2

## 2017-07-11 DIAGNOSIS — I82.431 ACUTE DEEP VEIN THROMBOSIS (DVT) OF POPLITEAL VEIN OF RIGHT LOWER EXTREMITY (HCC): ICD-10-CM

## 2017-07-11 DIAGNOSIS — I82.431 ACUTE DEEP VEIN THROMBOSIS (DVT) OF POPLITEAL VEIN OF RIGHT LOWER EXTREMITY (HCC): Primary | ICD-10-CM

## 2017-07-11 PROCEDURE — 93970 EXTREMITY STUDY: CPT

## 2017-07-11 RX ORDER — WARFARIN SODIUM 5 MG/1
5 TABLET ORAL DAILY
Qty: 45 TAB | Refills: 1 | Status: SHIPPED | OUTPATIENT
Start: 2017-07-11 | End: 2017-07-11 | Stop reason: ALTCHOICE

## 2017-07-11 NOTE — PROGRESS NOTES
Subjective:     Chief Complaint   Patient presents with    Leg Pain     Right calf        She  is a 48 y.o. female who presents for evaluation of:  Since last appt, was able to see Dr. 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 Doing better on Flagyl. Still having some straining with BMs. May still need to have dilation. Noticed R calf pain and swelling x 1 day. No concerns with dyspnea or palpitations. This is the same leg she had a DVT in x 2 in past.  We stopped coumadin prior to her surgery since she had finished a > 6 month course. ROS  Gen - no fever/chills  Resp - no dyspnea or cough  CV - no chest pain or CARRASQUILLO  Rest per HPI    Past Medical History:   Diagnosis Date    Asthma 3/12/2010    DDD (degenerative disc disease), lumbar     DJD (degenerative joint disease) 3/12/2010    DJD (degenerative joint disease) of knee     DVT (deep venous thrombosis) (HCC)     Rt leg    GERD (gastroesophageal reflux disease)     History of tuberculosis exposure 2013    pt states she has + PPD and was on Rx for 6 months; last dose either 11/13 or 12/13    Inflammatory polyarthritis ([address]     Psoriatic Arthitis    Morbid obesity ([address]     Pseudogout     PUD (peptic ulcer disease)     Skin abrasion 1/28/14    After loosing 125#, Bilateral Inner thigh friction flareup monthly with skin breakdown    Thromboembolus ([address] 2008    Rt leg,  pt states she fell and had a plane ride home and developed blood clots    UC (ulcerative colitis) ([address] 3/12/2010     Past Surgical History:   Procedure Laterality Date    HX ACL RECONSTRUCTION      Rt    Guillaume Jeffrey HX GI      reopening of rectal pouch    HX GYN  06/19/2017    Hysterectomy    HX ORTHOPAEDIC  12/13    Rt foot / toes    HX TONSILLECTOMY       Current Outpatient Prescriptions on File Prior to Visit   Medication Sig Dispense Refill    pantoprazole (PROTONIX) 40 mg tablet Take 1 Tab by mouth daily.  30 Tab 0    oxyCODONE-acetaminophen (PERCOCET) 5-325 mg per tablet Take 1 Tab by mouth every four (4) hours as needed. Max Daily Amount: 6 Tabs. Indications: Pain 30 Tab 0    furosemide (LASIX) 40 mg tablet TAKE 1 TABLET BY MOUTH TWICE DAILY AS NEEDED 60 Tab 0    methotrexate (RHEUMATREX) 2.5 mg tablet TAKE 8 TABLETS BY MOUTH ONCE A WEEK, takes on Saturdays  0    ranitidine (ZANTAC) 150 mg tablet Take 150 mg by mouth two (2) times a day.  albuterol (PROVENTIL HFA, VENTOLIN HFA, PROAIR HFA) 90 mcg/actuation inhaler Take 1 Puff by inhalation every four (4) hours as needed for Wheezing. 1 Inhaler 5    acetaminophen (TYLENOL EXTRA STRENGTH) 500 mg tablet Take 1,000 mg by mouth every six (6) hours as needed for Pain.  topiramate (TOPAMAX) 50 mg tablet Take 1 Tab by mouth two (2) times a day. 60 Tab 5    phentermine (ADIPEX-P) 37.5 mg tablet Take 1/2 tablet before breakfast and before dinner 100 Tab 5     No current facility-administered medications on file prior to visit. Objective:     Vitals:    07/11/17 1004   BP: 108/59   Pulse: 76   Resp: 16   Temp: 98.2 °F (36.8 °C)   TempSrc: Oral   SpO2: 98%   Weight: 237 lb (107.5 kg)   Height: 5' 5\" (1.651 m)     Physical Examination:  General appearance - alert, well appearing, and in no distress  Eyes -sclera anicteric  Chest - clear to auscultation, no wheezes, rales or rhonchi, symmetric air entry  Heart - normal rate, regular rhythm, normal S1, S2, no murmurs, rubs, clicks or gallops  Extr - RLE with edema and ttp          Assessment/ Plan:   [facility] was seen today for leg pain. Diagnoses and all orders for this visit:    Acute deep vein thrombosis (DVT) of popliteal vein of right lower extremity ([address] - suspected based on hx, swelling and ttp in leg with prev DVTs in this leg. Initially planned on coumadin with Xarelto but Dopplers came back negative for DVT so no need to anticoagulate. -     DUPLEX LOWER EXT VENOUS BILAT;  Future    I have discussed the diagnosis with the patient and the intended plan as seen in the above orders. The patient has received an after-visit summary and questions were answered concerning future plans. I have discussed medication side effects and warnings with the patient as well. The patient verbalizes understanding and agreement with the plan. Follow-up Disposition:  Return in about 2 days (around 7/13/2017), or if symptoms worsen or fail to improve.

## 2017-07-11 NOTE — PROGRESS NOTES
Chief Complaint   Patient presents with    Leg Pain     Right calf   Started yesterday,took fluid pill last night pain not improved  Room 4

## 2017-07-11 NOTE — PATIENT INSTRUCTIONS
For coumadin, start taking 10 mg (2 tab of the 5mg) daily x 3 days and then we can re-evaluate your dose. Since we discussed the options and you would like to proceed with the Xarelto instead of the Lovenox, please take the Xarelto 15 mg twice daily while we bridge you with coumadin. If you have any signs of bleeding from the rectum, stop taking the medication and go to the ER.

## 2017-07-12 ENCOUNTER — OFFICE VISIT (OUTPATIENT)
Dept: ENDOCRINOLOGY | Age: 47
End: 2017-07-12

## 2017-07-12 VITALS
HEIGHT: 65 IN | BODY MASS INDEX: 39.15 KG/M2 | WEIGHT: 235 LBS | HEART RATE: 69 BPM | SYSTOLIC BLOOD PRESSURE: 116 MMHG | DIASTOLIC BLOOD PRESSURE: 67 MMHG

## 2017-07-12 DIAGNOSIS — E66.01 MORBID OBESITY, UNSPECIFIED OBESITY TYPE (HCC): Primary | ICD-10-CM

## 2017-07-12 DIAGNOSIS — R73.02 IGT (IMPAIRED GLUCOSE TOLERANCE): ICD-10-CM

## 2017-07-12 LAB — HBA1C MFR BLD HPLC: 4.7 %

## 2017-07-12 RX ORDER — METRONIDAZOLE 250 MG/1
TABLET ORAL 4 TIMES DAILY
COMMUNITY
End: 2017-07-24 | Stop reason: ALTCHOICE

## 2017-07-12 RX ORDER — TOPIRAMATE 50 MG/1
50 TABLET, FILM COATED ORAL 2 TIMES DAILY
Qty: 60 TAB | Refills: 5 | Status: SHIPPED | OUTPATIENT
Start: 2017-07-12 | End: 2017-10-12 | Stop reason: SDUPTHER

## 2017-07-12 RX ORDER — PHENTERMINE HYDROCHLORIDE 37.5 MG/1
TABLET ORAL
Qty: 100 TAB | Refills: 5 | Status: SHIPPED | OUTPATIENT
Start: 2017-07-12 | End: 2017-10-12 | Stop reason: SDUPTHER

## 2017-07-12 NOTE — PROGRESS NOTES
Chief Complaint   Patient presents with    Weight Management     pcp and pharmacy verified. Records since last visit reviewed. History of Present Illness: Soren Roberts is a 48 y.o. female here for follow up of obesity. Pt is being followed by Dr. Emely Slater (her PCP) who had perscribed for her Phentermine 37.5mg daily and pt note she felt it helped some by curbing her appetite but her PCP was not comfortable continuing the phentermine, so he referred her to me. Prior to our initial visit in January 2015 she had lost from 310 down to 160 but then gained back to 230s. In January 2015 I restarted Phentermine 18.75mg BID and Topiramate 50mg BID to help further her weight loss efforts. She has tolerated this regimen well with no issues of anxiety, CP, SOB, palpitations, N/V, HA, lightheadedness or dizziness. At her last visit in January 2017 she had lost 30 pounds down to 217 on Phentermine 18.75mg BID and Topiramate 50mg BID. Pt was encouraged to keep up the good work. She has been struggling with issues of pelvic pain and her Gyn Dr. Renuka Schuster felt is was due to an ischemic uterus. On 6/20/17 she had Supracervical Abdominal Hysterectomy without Salpingo-Oophorectomy. She has post-op complications of ileus which prolonged her hospitalization. She had post-op abdominal pain and rectal bleeding. She saw Dr. Jet Day who treated her for pouchitis and her pain has improve. She then developed LLE swelling and pain. She has hx of DVT x2 so Dr. Josh Vargas ordered doppler studies yesterday, which came back negative for DVT. She is no longer on the coumadin. Her weight today was 235. Pt notes that she has been off the phentermine and Topiramate a week prior to her surgery. She notes her weight has been fluctuating ever since. Her A1C today was 4.7%. Pt is currently eating 2-3 meals per day. She has breakfast most morning. This AM she had a BLT and water.   She will have lunch most days, yesterday she had turkey and cheese sandwich and chips and water. She has dinner around 5-6PM, last night she had taco meat and chips and iced tea and water. Pt has a hx of UC and Inflammatory polyarthropathy. She is still seeing Dr. Reynold Jameson of Rhematology for Rheumatoid arthritis and Dr. Iman Delgado and Brooklynn Law of GI for UC. She has had complications to Embril (transaminitis) and Humira (injection site reaction). Right now she is off the MTX. Pt was previously followed by the RD to help with nutrition [facility]. Pt has no hx of gastric bypass. Past Medical History:   Diagnosis Date    Asthma 3/12/2010    DDD (degenerative disc disease), lumbar     DJD (degenerative joint disease) 3/12/2010    DJD (degenerative joint disease) of knee     [facility]     Rt leg    GERD (gastroesophageal reflux disease)     History of tuberculosis exposure 2013    pt states she has + PPD and was on Rx for 6 months; last dose either 11/13 or 12/13    Inflammatory polyarthritis ([address]     Psoriatic Arthitis    Morbid obesity ([address]     Pseudogout     PUD (peptic ulcer disease)     Skin abrasion 1/28/14    After loosing 125#, Bilateral Inner thigh friction flareup monthly with skin breakdown    Thromboembolus ([address] 2008    Rt leg,  pt states she fell and had a plane ride home and developed blood clots    UC (ulcerative colitis) ([address] 3/12/2010     Past Surgical History:   Procedure Laterality Date    HX ACL RECONSTRUCTION      Rt    Guillaume Jeffrey HX GI      reopening of rectal pouch    HX GYN  06/19/2017    Hysterectomy    HX ORTHOPAEDIC  12/13    Rt foot / toes    HX TONSILLECTOMY       Current Outpatient Prescriptions   Medication Sig    metroNIDAZOLE (FLAGYL) 250 mg tablet Take  by mouth four (4) times daily.     phentermine (ADIPEX-P) 37.5 mg tablet Take 1/2 tablet before breakfast and before dinner    topiramate (TOPAMAX) 50 mg tablet Take 1 Tab by mouth two (2) times a day.  pantoprazole (PROTONIX) 40 mg tablet Take 1 Tab by mouth daily.  furosemide (LASIX) 40 mg tablet TAKE 1 TABLET BY MOUTH TWICE DAILY AS NEEDED    ranitidine (ZANTAC) 150 mg tablet Take 150 mg by mouth two (2) times a day.  albuterol (PROVENTIL HFA, VENTOLIN HFA, PROAIR HFA) 90 mcg/actuation inhaler Take 1 Puff by inhalation every four (4) hours as needed for Wheezing.  acetaminophen (TYLENOL EXTRA STRENGTH) 500 mg tablet Take 1,000 mg by mouth every six (6) hours as needed for Pain. No current facility-administered medications for this visit. Allergies   Allergen Reactions    Sulfa (Sulfonamide Antibiotics) Anaphylaxis    Codeine Itching     Family History   Problem Relation Age of Onset    Hypertension Mother     Thyroid Disease Mother      Hypothyroid    Diabetes Mother     Elevated Lipids Mother     Cancer Father      brain     Social History     Social History    Marital status:      Spouse name: N/A    Number of children: N/A    Years of education: N/A     Occupational History    Not on file. Social History Main Topics    Smoking status: Never Smoker    Smokeless tobacco: Never Used    Alcohol use Yes      Comment: occasionally- very rare    Drug use: No    Sexual activity: Yes     Partners: Male     Other Topics Concern    Not on file     Social History Narrative     Review of Systems:  - Negative except as noted in HPI    Physical Examination:  Blood pressure 116/67, pulse 69, height 5' 5\" (1.651 m), weight 235 lb (106.6 kg).   - General: pleasant, no distress, good eye contact  - HEENT: no exopthalmos, no periorbital edema, no scleral/conjunctival injection, EOMI, no lid lag or stare  - Cardiovascular: regular, normal rate, normal S1 and S2, no murmurs/rubs/gallops, 2+ dorsalis pedis pulses bilaterally  - Respiratory: clear to auscultation bilaterally  - Musculoskeletal: no proximal muscle weakness in upper or lower extremities  - Integumentary: no acanthosis nigricans, no rashes, no edema,   - Neurological: reflexes 2+ at biceps, no tremor  - Psychiatric: normal mood and affect    Data Reviewed:   Component      Latest Ref Rng & Units 7/6/2017 7/6/2017           3:19 PM  3:19 PM   Glucose      65 - 99 mg/dL 80    BUN      6 - 24 mg/dL 13    Creatinine      0.57 - 1.00 mg/dL 0.70    GFR est non-AA      >59 mL/min/1.73 103    GFR est AA      >59 mL/min/1.73 118    BUN/Creatinine ratio      9 - 23 19    Sodium      134 - 144 mmol/L 143    Potassium      3.5 - 5.2 mmol/L 4.7    Chloride      96 - 106 mmol/L 105    CO2      18 - 29 mmol/L 22    Calcium      8.7 - 10.2 mg/dL 9.1    Protein, total      6.0 - 8.5 g/dL 6.5    Albumin      3.5 - 5.5 g/dL 3.6    GLOBULIN, TOTAL      1.5 - 4.5 g/dL 2.9    A-G Ratio      1.2 - 2.2 1.2    Bilirubin, total      0.0 - 1.2 mg/dL 0.4    Alk. phosphatase      39 - 117 IU/L 66    AST      0 - 40 IU/L 11    ALT (SGPT)      0 - 32 IU/L 8    WBC      3.4 - 10.8 x10E3/uL  7.3   RBC      3.77 - 5.28 x10E6/uL  3.93   HGB      11.1 - 15.9 g/dL  12.2   HCT      34.0 - 46.6 %  38.0   MCV      79 - 97 fL  97   MCH      26.6 - 33.0 pg  31.0   MCHC      31.5 - 35.7 g/dL  32.1   RDW      12.3 - 15.4 %  13.4   PLATELET      377 - 657 x10E3/uL  424 (H)       Assessment/Plan:   1) Obesity > Pt has gained about 15 pounds since January 2017. Since she has just gone through major surgery, is still in recovery and is having complications of her UC we agreed to stay off the phentermine and Topiramate till she has had a change to recover. I will call Ms Grandville Meigs in one month to check on her and if she is recovered well, then we agreed to restart the Phentermine and Topiramate. I will refill her phentermine and topiramate. 2) IGT > Her A1C today was 4.7%. Pt voices understanding and agreement with the plan.   Pain noted and pt was recommended to call her PCP for further evaluation and treatment, as needed    RTC 3 months      Follow-up Disposition:  Return in about 3 months (around 10/12/2017). Copy sent to:  V[facility] Meeks and [facility]

## 2017-07-13 LAB
ALBUMIN/CREAT UR: 7.3 MG/G CREAT (ref 0–30)
CREAT UR-MCNC: 151.6 MG/DL
MICROALBUMIN UR-MCNC: 11.1 UG/ML

## 2017-07-14 NOTE — OP NOTES
[address], [facility]   OP NOTE       Name:  Alma Delia Bryant   MR#:  [id number]   :  1970   Account #:  [de-identified]    Surgery Date:  2017   Date of Adm:  2017       PREOPERATIVE DIAGNOSIS:Fibroid uterus. POSTOPERATIVE DIAGNOSES:     1. Fibroid uterus. 2. Extensive abdominal adhesions. PROCEDURES PERFORMED    1. Abdominal supracervical hysterectomy. 2. Extensive lysis of adhesions. SURGEON:  Ena Valero MD    ANESTHESIA:  General anesthesia with endotracheal intubation. ESTIMATED BLOOD LOSS: 30 mL. SPECIMENS REMOVED: Uterus. COMPLICATIONS: None known. PRESENTATION: The patient is a 67-year-old having significant   bleeding and pain from a fibroid uterus. She has had a prior total   colectomy and the decision was made to proceed with open operation,   rather than laparoscopic, to help with lysis of adhesions. DESCRIPTION OF PROCEDURE:  A midline incision was used, after   placing a Lance catheter in her bladder. The peritoneum was entered   without difficulty. There were extensive adhesions which were taken   down where needed. The small intestine was wrapped around the   uterus in several places and this was teased off. Eventually, I was able   to cross clamp and suture ligate the utero-ovarian ligament vessels   and the round ligaments on each side. The incision was carried down   through the broad ligament and a bladder flap was created and the   cervix was amputated with Bovie cautery. I could not get through the   adnexal structures for the salpingectomy because of further extensive   scar tissue. With the specimen removed, the cervix was inspected and   was noted to be hemostatic and the pelvis was irrigated.  The   peritoneum was closed with 2-0 chromic, the fascia was closed with 0   PDS, one from each end of the incision, meeting in the midline and the   subcutaneous tissue was closed with a running 4-0 Monocryl and the   incision sealed with Dermabond. She was returned to the recovery room in good   condition.         MD ALEXX Pearson / AMAURY   D:  07/13/2017   10:06   T:  07/14/2017   08:45   Job #:  [id number]

## 2017-07-14 NOTE — PROGRESS NOTES
Reviewed Dopplers and called pt day of results. No coumadin or Xarelto needed. Ct Flagyl for pouchitis. Monitor leg swelling sx.

## 2017-07-24 ENCOUNTER — OFFICE VISIT (OUTPATIENT)
Dept: FAMILY MEDICINE CLINIC | Age: 47
End: 2017-07-24

## 2017-07-24 VITALS
HEART RATE: 80 BPM | HEIGHT: 65 IN | WEIGHT: 233 LBS | SYSTOLIC BLOOD PRESSURE: 123 MMHG | BODY MASS INDEX: 38.82 KG/M2 | DIASTOLIC BLOOD PRESSURE: 74 MMHG | OXYGEN SATURATION: 95 % | RESPIRATION RATE: 16 BRPM | TEMPERATURE: 98.6 F

## 2017-07-24 DIAGNOSIS — M79.89 PAIN AND SWELLING OF RIGHT LOWER LEG: Primary | ICD-10-CM

## 2017-07-24 DIAGNOSIS — M79.661 PAIN AND SWELLING OF RIGHT LOWER LEG: Primary | ICD-10-CM

## 2017-07-24 RX ORDER — FUROSEMIDE 40 MG/1
TABLET ORAL
Qty: 60 TAB | Refills: 0 | Status: SHIPPED | OUTPATIENT
Start: 2017-07-24 | End: 2017-07-24 | Stop reason: SDUPTHER

## 2017-07-24 RX ORDER — FUROSEMIDE 40 MG/1
TABLET ORAL
Qty: 30 TAB | Refills: 0 | Status: SHIPPED | OUTPATIENT
Start: 2017-07-24 | End: 2017-07-25 | Stop reason: SDUPTHER

## 2017-07-24 RX ORDER — MELOXICAM 15 MG/1
15 TABLET ORAL
Qty: 30 TAB | Refills: 0 | Status: SHIPPED | OUTPATIENT
Start: 2017-07-24 | End: 2017-08-03 | Stop reason: RX

## 2017-07-24 NOTE — PROGRESS NOTES
Subjective:     Chief Complaint   Patient presents with    Leg Pain     Right calf        She  is a 48 y.o. female who presents for evaluation of:  Noticed R calf pain and swelling x 2 weeks  No concerns with dyspnea, chest pain, or palpitations. This is the same leg she had a DVT in x 2 in past.  We stopped coumadin prior to her surgery since she had finished a > 6 month course. Got Dopplers 2 weeks ago after 1 day of sx and neg for DVT. Pouchitis nearly resolved after course of Flagyl. To see Dr. Sofi Gutierrez for f/u soon. May still need anal dilation. ROS  Gen - no fever/chills  Resp - no dyspnea or cough  CV - no chest pain or CARRASQUILLO  Rest per HPI    Past Medical History:   Diagnosis Date    Asthma 3/12/2010    DDD (degenerative disc disease), lumbar     DJD (degenerative joint disease) 3/12/2010    DJD (degenerative joint disease) of knee     DVT (deep venous thrombosis) (HCC)     Rt leg    GERD (gastroesophageal reflux disease)     History of tuberculosis exposure 2013    pt states she has + PPD and was on Rx for 6 months; last dose either 11/13 or 12/13    Inflammatory polyarthritis ([address]     Psoriatic Arthitis    Morbid obesity ([address]     Pseudogout     PUD (peptic ulcer disease)     Skin abrasion 1/28/14    After loosing 125#, Bilateral Inner thigh friction flareup monthly with skin breakdown    Thromboembolus ([address] 2008    Rt leg,  pt states she fell and had a plane ride home and developed blood clots    UC (ulcerative colitis) ([address] 3/12/2010     Past Surgical History:   Procedure Laterality Date    HX ACL RECONSTRUCTION      Rt    Abdiaziz Delgadillo HX GI      reopening of rectal pouch    HX GYN  06/19/2017    Hysterectomy    HX ORTHOPAEDIC  12/13    Rt foot / toes    HX TONSILLECTOMY       Current Outpatient Prescriptions on File Prior to Visit   Medication Sig Dispense Refill    pantoprazole (PROTONIX) 40 mg tablet Take 1 Tab by mouth daily.  30 Tab 0    ranitidine (ZANTAC) 150 mg tablet Take 150 mg by mouth two (2) times a day.  albuterol (PROVENTIL HFA, VENTOLIN HFA, PROAIR HFA) 90 mcg/actuation inhaler Take 1 Puff by inhalation every four (4) hours as needed for Wheezing. 1 Inhaler 5    acetaminophen (TYLENOL EXTRA STRENGTH) 500 mg tablet Take 1,000 mg by mouth every six (6) hours as needed for Pain.  phentermine (ADIPEX-P) 37.5 mg tablet Take 1/2 tablet before breakfast and before dinner 100 Tab 5    topiramate (TOPAMAX) 50 mg tablet Take 1 Tab by mouth two (2) times a day. 60 Tab 5     No current facility-administered medications on file prior to visit. Objective:     Vitals:    07/24/17 1046   BP: 123/74   Pulse: 80   Resp: 16   Temp: 98.6 °F (37 °C)   TempSrc: Oral   SpO2: 95%   Weight: 233 lb (105.7 kg)   Height: 5' 5\" (1.651 m)     Physical Examination:  General appearance - alert, well appearing, and in no distress  Eyes -sclera anicteric  Chest - clear to auscultation, no wheezes, rales or rhonchi, symmetric air entry  Heart - normal rate, regular rhythm, normal S1, S2, no murmurs, rubs, clicks or gallops  Extr - RLE with edema and ttp but improved from last visit. Neg Homans, + varicose veins    Assessment/ Plan:   Neena Carrero was seen today for leg pain. Diagnoses and all orders for this visit:    Pain and swelling of right lower leg - swelling and ttp in leg with prev DVTs. Had neg dopplers. Ct management with Lasix and will add on Mobic x 1 week. Initially planned on coumadin with Xarelto but Dopplers came back negative for DVT. If not improving in next 1-2 weeks, will consider repeating dopplers given higher risk. I have discussed the diagnosis with the patient and the intended plan as seen in the above orders. The patient has received an after-visit summary and questions were answered concerning future plans. I have discussed medication side effects and warnings with the patient as well.   The patient verbalizes understanding and agreement with the plan. Follow-up Disposition:  Return in about 2 weeks (around 8/7/2017), or if symptoms worsen or fail to improve, for leg pain.

## 2017-07-24 NOTE — MR AVS SNAPSHOT
Visit Information Date & Time Provider Department Dept. Phone Encounter #  
 7/24/2017 10:30 AM Lacy Bellamy MD [facility] at [address] [id number] Follow-up Instructions Return in about 2 weeks (around 8/7/2017), or if symptoms worsen or fail to improve, for leg pain. Your Appointments 8/7/2017 10:30 AM  
ROUTINE CARE with Lacy Bellamy MD  
[facility] at [facility]) Appt Note: 4wk fu  
 [address] [facility] [address] [address]  
[facility]  
  
    
 10/19/2017 11:10 AM  
Follow Up with Shawn Fuentes MD  
[facility]) Appt Note: f/u                  [address] [phone] [address] [facility] Upcoming Health Maintenance Date Due  
 PAP AKA CERVICAL CYTOLOGY 11/19/2017 INFLUENZA AGE 9 TO ADULT 8/1/2017 DTaP/Tdap/Td series (2 - Td) 5/17/2026 Allergies as of 7/24/2017  Review Complete On: 7/24/2017 By: Lacy Bellamy MD  
  
 Severity Noted Reaction Type Reactions Sulfa (Sulfonamide Antibiotics) High 03/12/2010    Anaphylaxis Codeine Medium 03/17/2010    Itching Current Immunizations  Reviewed on 5/2/2016 Name Date Influenza Vaccine (Quad) PF 10/27/2016, 10/30/2015 Influenza Vaccine PF 10/11/2013 Influenza Vaccine Split 12/13/2012, 10/5/2011 Pneumococcal Polysaccharide (PPSV-23) 4/11/2016 11:40 AM  
  
 Not reviewed this visit You Were Diagnosed With   
  
 Codes Comments Pain and swelling of right lower leg    -  Primary ICD-10-CM: M79.661, M79.89 ICD-9-CM: 729.5, 729.81 Vitals BP Pulse Temp Resp Height(growth percentile) Weight(growth percentile)  123/74 (BP 1 Location: Left arm, BP Patient Position: Sitting) 80 98.6 °F (37 °C) (Oral) 16 5' 5\" (1.651 m) 233 lb (105.7 kg) LMP SpO2 BMI OB Status Smoking Status (LMP Unknown) 95% 38.77 kg/m2 Hysterectomy Never Smoker BMI and BSA Data Body Mass Index Body Surface Area 38.77 kg/m 2 2.2 m 2 Preferred Pharmacy [facility]acy Name Phone [facility] [address] [place] [address] [phone] Your Updated Medication List  
  
   
This list is accurate as of: 7/24/17 11:42 AM.  Always use your most recent med list.  
  
  
  
  
 albuterol 90 mcg/actuation inhaler Commonly known as:  PROVENTIL HFA, VENTOLIN HFA, PROAIR HFA Take 1 Puff by inhalation every four (4) hours as needed for Wheezing. furosemide 40 mg tablet Commonly known as:  LASIX TAKE 1 TABLET BY MOUTH TWICE DAILY AS NEEDED  
  
 meloxicam 15 mg tablet Commonly known as:  MOBIC Take 1 Tab by mouth daily (after breakfast). Take x 1 week and then stop. May use daily after this as needed. pantoprazole 40 mg tablet Commonly known as:  PROTONIX Take 1 Tab by mouth daily. phentermine 37.5 mg tablet Commonly known as:  ADIPEX-P Take 1/2 tablet before breakfast and before dinner  
  
 raNITIdine 150 mg tablet Commonly known as:  ZANTAC Take 150 mg by mouth two (2) times a day. topiramate 50 mg tablet Commonly known as:  TOPAMAX Take 1 Tab by mouth two (2) times a day. TYLENOL EXTRA STRENGTH 500 mg tablet Generic drug:  acetaminophen Take 1,000 mg by mouth every six (6) hours as needed for Pain. Prescriptions Sent to Pharmacy Refills  
 meloxicam (MOBIC) 15 mg tablet 0 Sig: Take 1 Tab by mouth daily (after breakfast). Take x 1 week and then stop. May use daily after this as needed. Class: Normal  
 Pharmacy: [address] [place] [address] #: [phone] Route: Oral  
  
Follow-up Instructions Return in about 2 weeks (around 8/7/2017), or if symptoms worsen or fail to improve, for leg pain. Introducing [facility] & [facility]! Dear Gilbert Felder: Thank you for requesting a [employer] account. Our records indicate that you already have an active [employer] account. You can access your account anytime at [email or web address]/"[email or web address] Did you know that you can access your hospital and ER discharge instructions at any time in [employer]? You can also review all of your test results from your hospital stay or ER visit. Additional Information If you have questions, please visit the Frequently Asked Questions section of the [employer] website at [email or web address]/. Remember, [employer] is NOT to be used for urgent needs. For medical emergencies, dial 911. Now available from your iPhone and Android! Please provide this summary of care documentation to your next provider. Your primary care clinician is listed as Lucy White. If you have any questions after today's visit, please call [phone].

## 2017-07-24 NOTE — PROGRESS NOTES
Chief Complaint   Patient presents with    Leg Pain     Right calf   some improvement x 1 week ,never resolved but worse in last week  Room 2

## 2017-07-25 DIAGNOSIS — M79.661 PAIN AND SWELLING OF RIGHT LOWER LEG: ICD-10-CM

## 2017-07-25 DIAGNOSIS — M79.89 PAIN AND SWELLING OF RIGHT LOWER LEG: ICD-10-CM

## 2017-07-25 RX ORDER — FUROSEMIDE 40 MG/1
TABLET ORAL
Qty: 180 TAB | Refills: 1 | Status: SHIPPED | OUTPATIENT
Start: 2017-07-25 | End: 2018-02-11 | Stop reason: SDUPTHER

## 2017-07-31 ENCOUNTER — TELEPHONE (OUTPATIENT)
Dept: FAMILY MEDICINE CLINIC | Age: 47
End: 2017-07-31

## 2017-07-31 DIAGNOSIS — M79.89 PAIN AND SWELLING OF LOWER EXTREMITY, RIGHT: Primary | ICD-10-CM

## 2017-07-31 DIAGNOSIS — M79.604 PAIN AND SWELLING OF LOWER EXTREMITY, RIGHT: Primary | ICD-10-CM

## 2017-07-31 NOTE — TELEPHONE ENCOUNTER
Patient called and states leg pain has returned with swelling. Has appointment scheduled for Thursday but would like to have doppler study repeated.

## 2017-07-31 NOTE — TELEPHONE ENCOUNTER
Pls call pt about her leg - still having pain, thinks it could be worse than when she was seen for it       Best number to reach her is [phone]

## 2017-08-01 NOTE — TELEPHONE ENCOUNTER
Placed order for repeat dopplers. Do not suspect DVT at this point but given pt hx and recent surgery and being off coumadin, will go ahead and repeat dopplers to ensure no DVT.

## 2017-08-03 ENCOUNTER — HOSPITAL ENCOUNTER (OUTPATIENT)
Dept: VASCULAR SURGERY | Age: 47
Discharge: HOME | End: 2017-08-03
Attending: FAMILY MEDICINE
Payer: COMMERCIAL

## 2017-08-03 ENCOUNTER — OFFICE VISIT (OUTPATIENT)
Dept: FAMILY MEDICINE CLINIC | Age: 47
End: 2017-08-03

## 2017-08-03 VITALS
WEIGHT: 235 LBS | HEART RATE: 73 BPM | HEIGHT: 65 IN | TEMPERATURE: 98 F | DIASTOLIC BLOOD PRESSURE: 60 MMHG | RESPIRATION RATE: 16 BRPM | OXYGEN SATURATION: 100 % | SYSTOLIC BLOOD PRESSURE: 123 MMHG | BODY MASS INDEX: 39.15 KG/M2

## 2017-08-03 DIAGNOSIS — K21.9 GASTROESOPHAGEAL REFLUX DISEASE, ESOPHAGITIS PRESENCE NOT SPECIFIED: ICD-10-CM

## 2017-08-03 DIAGNOSIS — M79.604 PAIN AND SWELLING OF LOWER EXTREMITY, RIGHT: ICD-10-CM

## 2017-08-03 DIAGNOSIS — I82.431 ACUTE DEEP VEIN THROMBOSIS (DVT) OF POPLITEAL VEIN OF RIGHT LOWER EXTREMITY (HCC): Primary | ICD-10-CM

## 2017-08-03 DIAGNOSIS — M79.89 PAIN AND SWELLING OF LOWER EXTREMITY, RIGHT: ICD-10-CM

## 2017-08-03 PROCEDURE — 93970 EXTREMITY STUDY: CPT

## 2017-08-03 RX ORDER — ENOXAPARIN SODIUM 100 MG/ML
1 INJECTION SUBCUTANEOUS EVERY 12 HOURS
Qty: 10 ML | Refills: 0 | Status: SHIPPED | OUTPATIENT
Start: 2017-08-03 | End: 2017-08-07 | Stop reason: SDUPTHER

## 2017-08-03 RX ORDER — WARFARIN SODIUM 5 MG/1
5 TABLET ORAL DAILY
Qty: 45 TAB | Refills: 1 | Status: SHIPPED | OUTPATIENT
Start: 2017-08-03 | End: 2017-08-28 | Stop reason: SDUPTHER

## 2017-08-03 RX ORDER — PANTOPRAZOLE SODIUM 40 MG/1
TABLET, DELAYED RELEASE ORAL
Qty: 30 TAB | Refills: 0 | Status: SHIPPED | OUTPATIENT
Start: 2017-08-03 | End: 2017-08-30 | Stop reason: SDUPTHER

## 2017-08-03 NOTE — PROGRESS NOTES
Chief Complaint   Patient presents with    Follow-up     swelling and pain returned last Thursday   Doppler scheduled for today @ 11 am  Room 2

## 2017-08-03 NOTE — PROGRESS NOTES
Subjective:     Chief Complaint   Patient presents with    Follow-up     swelling and pain returned last Thursday      She  is a 50 y.o. female who presents for evaluation of:  Right leg pain - noticed R calf pain and swelling x 4 weeks. No concerns with dyspnea, chest pain, or palpitations. This is the same leg she had a DVT in x 2 in past.  We stopped coumadin prior to her surgery since she had finished a > 6 month course. Got Dopplers a few weeks ago and neg for DVT. Pouchitis nearly resolved after course of Flagyl. To see Dr. Yanni Lopez for f/u soon. May still need anal dilation. Recently having diarrhea.     ROS  Gen - no fever/chills  Resp - no dyspnea or cough  CV - no chest pain or CARRASQUILLO  Rest per HPI    Past Medical History:   Diagnosis Date    Asthma 3/12/2010    DDD (degenerative disc disease), lumbar     DJD (degenerative joint disease) 3/12/2010    DJD (degenerative joint disease) of knee     DVT (deep venous thrombosis) (HCC)     Rt leg    GERD (gastroesophageal reflux disease)     History of tuberculosis exposure 2013    pt states she has + PPD and was on Rx for 6 months; last dose either 11/13 or 12/13    Inflammatory polyarthritis ([address]     Psoriatic Arthitis    Morbid obesity ([address]     Pseudogout     PUD (peptic ulcer disease)     Skin abrasion 1/28/14    After loosing 125#, Bilateral Inner thigh friction flareup monthly with skin breakdown    Thromboembolus ([address] 2008    Rt leg,  pt states she fell and had a plane ride home and developed blood clots    UC (ulcerative colitis) ([address] 3/12/2010     Past Surgical History:   Procedure Laterality Date    HX ACL RECONSTRUCTION      Rt    Sandra Chaudhari HX GI      reopening of rectal pouch    HX GYN  06/19/2017    Hysterectomy    HX ORTHOPAEDIC  12/13    Rt foot / toes    HX TONSILLECTOMY       Current Outpatient Prescriptions on File Prior to Visit   Medication Sig Dispense Refill    furosemide (LASIX) 40 mg tablet TAKE 1 TABLET BY MOUTH TWICE DAILY AS NEEDED 180 Tab 1    ranitidine (ZANTAC) 150 mg tablet Take 150 mg by mouth two (2) times a day.  albuterol (PROVENTIL HFA, VENTOLIN HFA, PROAIR HFA) 90 mcg/actuation inhaler Take 1 Puff by inhalation every four (4) hours as needed for Wheezing. 1 Inhaler 5    acetaminophen (TYLENOL EXTRA STRENGTH) 500 mg tablet Take 1,000 mg by mouth every six (6) hours as needed for Pain.  phentermine (ADIPEX-P) 37.5 mg tablet Take 1/2 tablet before breakfast and before dinner 100 Tab 5    topiramate (TOPAMAX) 50 mg tablet Take 1 Tab by mouth two (2) times a day. 60 Tab 5     No current facility-administered medications on file prior to visit. Objective:     Vitals:    08/03/17 0836   BP: 123/60   Pulse: 73   Resp: 16   Temp: 98 °F (36.7 °C)   TempSrc: Oral   SpO2: 100%   Weight: 235 lb (106.6 kg)   Height: 5' 5\" (1.651 m)     Physical Examination:  General appearance - alert, well appearing, and in no distress  Eyes -sclera anicteric  Chest - clear to auscultation, no wheezes, rales or rhonchi, symmetric air entry  Heart - normal rate, regular rhythm, normal S1, S2, no murmurs, rubs, clicks or gallops  Extr - RLE with edema and very ttp over lower medial extremity. Neg Homans, + varicose veins          Assessment/ Plan:   Diagnoses and all orders for this visit:    1. Acute deep vein thrombosis (DVT) of popliteal vein of right lower extremity (HCC)    - swelling and ttp in leg with prev DVTs and recent surgery. Had neg dopplers at prev appt but no improvement with Lasix and Mobic. Repeat Dopplers today showed sig LE DVT per prelim report. This is her 3rd DVT which was again provoked - she will need to stay on blood thinners for life. - After long discussion, pt ok with Lovenox bridge. At prev hospitalization, it was rec that she not use NOACs d/t pouchitis and risk of bleeding related to this but she is unable to perform Lovenox injections on her own.   To ER for any complications over the weekend. I have discussed the diagnosis with the patient and the intended plan as seen in the above orders. The patient has received an after-visit summary and questions were answered concerning future plans. I have discussed medication side effects and warnings with the patient as well. The patient verbalizes understanding and agreement with the plan. Follow-up Disposition:  Return in about 4 days (around 8/7/2017), or if symptoms worsen or fail to improve.

## 2017-08-03 NOTE — PROCEDURES
[facility]  *** FINAL REPORT ***    Name: Luis Romero  MRN: [id number]    Outpatient  : 03 Aug 1970  HIS Order #: [id number]  [address] [facility] Visit #: [id number]  Date: 03 Aug 2017    TYPE OF TEST: Peripheral Venous Testing    REASON FOR TEST  Pain in limb, Limb swelling    Right Leg:-  Deep venous thrombosis:           Yes  Proximal extent of thrombus:      Popliteal Below The Knee  Superficial venous thrombosis:    No  Deep venous insufficiency:        No  Superficial venous insufficiency: No    Left Leg:-  Deep venous thrombosis:           No  Superficial venous thrombosis:    No  Deep venous insufficiency:        No  Superficial venous insufficiency: No      INTERPRETATION/FINDINGS  PROCEDURE:  Venous duplex examination using B-mode, color flow and  spectral Doppler of the lower extremity veins. Right leg :  1. Deep vein(s) visualized include the common femoral, proximal  femoral, mid femoral, distal femoral, popliteal(above knee),  popliteal(fossa), popliteal(below knee), posterior tibial and peroneal   veins. 2. Deep venous thrombosis identified in the popliteal(below knee) and  posterior tibial veins. 3. Thrombus appears acute. 4. Superficial vein(s) visualized include the great saphenous vein. 5. No evidence of superficial thrombosis detected. Left leg :  1. Deep vein(s) visualized include the common femoral, proximal  femoral, mid femoral, distal femoral, popliteal(above knee),  popliteal(fossa), popliteal(below knee), posterior tibial and peroneal   veins. 2. No evidence of deep venous thrombosis detected in the veins  visualized. 3. Superficial vein(s) visualized include the great saphenous vein. 4. No evidence of superficial thrombosis detected. ADDITIONAL COMMENTS    Right varicose vein arising from mid GSV thrombosis. Technologist preliminary findings called to doctors office.     I have personally reviewed the data relevant to the interpretation of  this  study. TECHNOLOGIST: Kenroy Washington.  LU Zaragoza, LAURIE  Signed: 08/03/2017 01:34 PM    PHYSICIAN: Nivia Ortega MD  Signed: 08/05/2017 09:06 AM

## 2017-08-03 NOTE — MR AVS SNAPSHOT
Visit Information Date & Time Provider Department Dept. Phone Encounter #  
 8/3/2017  8:30 AM Daryle Adolf, MD [facility] at [address] [id number] Follow-up Instructions Return in about 4 days (around 8/7/2017), or if symptoms worsen or fail to improve. Follow-up and Disposition History Your Appointments 8/21/2017  9:30 AM  
ROUTINE CARE with Daryle Adolf, MD  
[facility] at [address] [facility]) Appt Note: 2wk fu  
 [facility] [address] [phone]  
[facility]  
  
    
 10/19/2017 11:10 AM  
Follow Up with Alica Schaumann, MD  
[facility]) Appt Note: f/u                  [address] [id number] [address] [place] [address] Upcoming Health Maintenance Date Due INFLUENZA AGE 9 TO ADULT 8/1/2017 PAP AKA CERVICAL CYTOLOGY 11/19/2017 DTaP/Tdap/Td series (2 - Td) 5/17/2026 Allergies as of 8/3/2017  Review Complete On: 8/3/2017 By: Daryle Adolf, MD  
  
 Severity Noted Reaction Type Reactions Sulfa (Sulfonamide Antibiotics) High 03/12/2010    Anaphylaxis Codeine Medium 03/17/2010    Itching Current Immunizations  Reviewed on 5/2/2016 Name Date Influenza Vaccine (Quad) PF 10/27/2016, 10/30/2015 Influenza Vaccine PF 10/11/2013 Influenza Vaccine Split 12/13/2012, 10/5/2011 Pneumococcal Polysaccharide (PPSV-23) 4/11/2016 11:40 AM  
  
 Not reviewed this visit You Were Diagnosed With   
  
 Codes Comments Acute deep vein thrombosis (DVT) of popliteal vein of right lower extremity (HCC)    -  Primary ICD-10-CM: P85.832 ICD-9-CM: 453.41 Vitals BP Pulse Temp Resp Height(growth percentile) Weight(growth percentile) 123/60 (BP 1 Location: Left arm, BP Patient Position: Sitting) 73 98 °F (36.7 °C) (Oral) 16 5' 5\" (1.651 m) 235 lb (106.6 kg) LMP SpO2 BMI OB Status Smoking Status (LMP Unknown) 100% 39.11 kg/m2 Hysterectomy Never Smoker Vitals History BMI and BSA Data Body Mass Index Body Surface Area  
 39.11 kg/m 2 2.21 m 2 Preferred Pharmacy [facility] Name Phone [facility], [address] [place] [address] [phone] Your Updated Medication List  
  
   
This list is accurate as of: 8/3/17  1:25 PM.  Always use your most recent med list.  
  
  
  
  
 albuterol 90 mcg/actuation inhaler Commonly known as:  PROVENTIL HFA, VENTOLIN HFA, PROAIR HFA Take 1 Puff by inhalation every four (4) hours as needed for Wheezing. enoxaparin 100 mg/mL Commonly known as:  LOVENOX  
110 mg by SubCUTAneous route every twelve (12) hours. furosemide 40 mg tablet Commonly known as:  LASIX TAKE 1 TABLET BY MOUTH TWICE DAILY AS NEEDED  
  
 pantoprazole 40 mg tablet Commonly known as:  PROTONIX  
TAKE 1 TAB BY MOUTH DAILY. phentermine 37.5 mg tablet Commonly known as:  ADIPEX-P Take 1/2 tablet before breakfast and before dinner  
  
 raNITIdine 150 mg tablet Commonly known as:  ZANTAC Take 150 mg by mouth two (2) times a day. topiramate 50 mg tablet Commonly known as:  TOPAMAX Take 1 Tab by mouth two (2) times a day. TYLENOL EXTRA STRENGTH 500 mg tablet Generic drug:  acetaminophen Take 1,000 mg by mouth every six (6) hours as needed for Pain.  
  
 warfarin 5 mg tablet Commonly known as:  COUMADIN Take 1 Tab by mouth daily. Indications: DEEP VEIN THROMBOSIS PREVENTION Prescriptions Sent to Pharmacy Refills  
 warfarin (COUMADIN) 5 mg tablet 1 Sig: Take 1 Tab by mouth daily. Indications: DEEP VEIN THROMBOSIS PREVENTION  Class: Normal  
 Pharmacy: [address] [place] [address] Ph #: [phone] Route: Oral  
 enoxaparin (LOVENOX) 100 mg/mL 0 Si mg by SubCUTAneous route every twelve (12) hours. Class: Normal  
 Pharmacy: [address] [place] [address] Ph #: [phone] Route: SubCUTAneous 2017 Details Rima Zazueta Tue Wed Thu Fri Sat  
    1  
  
  
  
   2  
  
  
  
   3  
  
10 mg See details 4  
  
10 mg  
  
   5  
  
10 mg  
  
  
  6  
  
5 mg  
  
   7  
  
5 mg  
  
   8  
  
  
  
   9  
  
  
  
   10  
  
  
  
   11  
  
  
  
   12  
  
  
  
  
  13  
  
  
  
   14  
  
  
  
   15  
  
  
  
   16  
  
  
  
   17  
  
  
  
   18  
  
  
  
   19  
  
  
  
  
  20  
  
  
  
   21  
  
  
  
   22  
  
  
  
   23  
  
  
  
   24  
  
  
  
   25  
  
  
  
   26  
  
  
  
  
  27  
  
  
  
   28  
  
  
  
   29  
  
  
  
   30  
  
  
  
   31  
  
  
  
    
 Date Details  This INR check Date of next INR:  2017 How to take your warfarin dose To take:  5 mg Take one of the 5 mg tablets. To take:  10 mg Take two of the 5 mg tablets. Follow-up Instructions Return in about 4 days (around 2017), or if symptoms worsen or fail to improve. Introducing [facility] & HEALTH SERVICES! Dear Imtiaz Roy: Thank you for requesting a [employer] account. Our records indicate that you already have an active [employer] account. You can access your account anytime at [email or web address] Did you know that you can access your hospital and ER discharge instructions at any time in [employer]? You can also review all of your test results from your hospital stay or ER visit. Additional Information If you have questions, please visit the Frequently Asked Questions section of the [employer] website at [email or web address] Remember, MyChart is NOT to be used for urgent needs. For medical emergencies, dial 911. Now available from your iPhone and Android! Please provide this summary of care documentation to your next provider. Your primary care clinician is listed as Manolo Siu. If you have any questions after today's visit, please call [phone].

## 2017-08-03 NOTE — LETTER
NOTIFICATION RETURN TO WORK / SCHOOL 
 
8/3/2017 9:01 AM 
 
Ms. Antonina Munguia [facility] [address] [facility] [phone] To Whom It May Concern: 
 
Antonina Munguia is currently under the care of Gaurav Ramos. She will return to work/school on: 8/8/2017 If there are questions or concerns please have the patient contact our office.  
 
 
 
Sincerely, 
 
 
Any Dao MD

## 2017-08-03 NOTE — Clinical Note
FYI - Just saw our mutual patient Ms. Lopez and repeat dopplers show a new DVT. I'm restarting her on Coumadin and bridging with Lovenox. She is still having some diarrhea and abdominal pain after recent tx with Flagyl for pouchitis.   Thanks, Sarai Nino MD

## 2017-08-03 NOTE — PROGRESS NOTES
[address] [address] [facility]  Preliminary Report:  Venous Duplex Bilateral Leg    Bilateral leg venous duplex was performed. Right Popliteal Vein and Posterior Tibial Vein and a varicose vein arising from mid great saphenous vein segment is Partially Compressible with Reduced Doppler signals, suggesting Occlusive venous obstruction of the aforementioned vessel(s). Left leg no dvt or superficial clot. Final report to follow.

## 2017-08-07 ENCOUNTER — HOSPITAL ENCOUNTER (OUTPATIENT)
Dept: CT IMAGING | Age: 47
Discharge: HOME | End: 2017-08-07
Attending: FAMILY MEDICINE
Payer: COMMERCIAL

## 2017-08-07 ENCOUNTER — OFFICE VISIT (OUTPATIENT)
Dept: FAMILY MEDICINE CLINIC | Age: 47
End: 2017-08-07

## 2017-08-07 VITALS
HEIGHT: 65 IN | BODY MASS INDEX: 39.22 KG/M2 | HEART RATE: 70 BPM | TEMPERATURE: 97.5 F | OXYGEN SATURATION: 98 % | WEIGHT: 235.4 LBS | RESPIRATION RATE: 18 BRPM | DIASTOLIC BLOOD PRESSURE: 71 MMHG | SYSTOLIC BLOOD PRESSURE: 106 MMHG

## 2017-08-07 DIAGNOSIS — R06.00 DYSPNEA, UNSPECIFIED TYPE: ICD-10-CM

## 2017-08-07 DIAGNOSIS — R07.89 CHEST TIGHTNESS: ICD-10-CM

## 2017-08-07 DIAGNOSIS — I82.431 ACUTE DEEP VEIN THROMBOSIS (DVT) OF POPLITEAL VEIN OF RIGHT LOWER EXTREMITY (HCC): ICD-10-CM

## 2017-08-07 DIAGNOSIS — I82.401 ACUTE DEEP VEIN THROMBOSIS (DVT) OF RIGHT LOWER EXTREMITY, UNSPECIFIED VEIN (HCC): ICD-10-CM

## 2017-08-07 DIAGNOSIS — I82.401 ACUTE DEEP VEIN THROMBOSIS (DVT) OF RIGHT LOWER EXTREMITY, UNSPECIFIED VEIN (HCC): Primary | ICD-10-CM

## 2017-08-07 LAB
INR BLD: 1
PT POC: NORMAL SECONDS
VALID INTERNAL CONTROL?: YES

## 2017-08-07 PROCEDURE — 74011000258 HC RX REV CODE- 258: Performed by: RADIOLOGY

## 2017-08-07 PROCEDURE — 74011636320 HC RX REV CODE- 636/320: Performed by: RADIOLOGY

## 2017-08-07 PROCEDURE — 71275 CT ANGIOGRAPHY CHEST: CPT

## 2017-08-07 RX ORDER — SODIUM CHLORIDE 0.9 % (FLUSH) 0.9 %
10 SYRINGE (ML) INJECTION
Status: COMPLETED | OUTPATIENT
Start: 2017-08-07 | End: 2017-08-07

## 2017-08-07 RX ORDER — MELOXICAM 15 MG/1
TABLET ORAL
Refills: 0 | COMMUNITY
Start: 2017-07-24 | End: 2017-08-07 | Stop reason: ALTCHOICE

## 2017-08-07 RX ORDER — OXYCODONE AND ACETAMINOPHEN 5; 325 MG/1; MG/1
1 TABLET ORAL
Qty: 30 TAB | Refills: 0 | Status: SHIPPED | OUTPATIENT
Start: 2017-08-07 | End: 2017-09-30

## 2017-08-07 RX ORDER — ENOXAPARIN SODIUM 100 MG/ML
1 INJECTION SUBCUTANEOUS EVERY 12 HOURS
Qty: 10 ML | Refills: 0 | Status: SHIPPED | OUTPATIENT
Start: 2017-08-07 | End: 2017-08-15 | Stop reason: ALTCHOICE

## 2017-08-07 RX ADMIN — IOPAMIDOL 80 ML: 755 INJECTION, SOLUTION INTRAVENOUS at 12:57

## 2017-08-07 RX ADMIN — SODIUM CHLORIDE 100 ML: 900 INJECTION, SOLUTION INTRAVENOUS at 12:58

## 2017-08-07 RX ADMIN — Medication 10 ML: at 12:58

## 2017-08-07 NOTE — PROGRESS NOTES
Chief Complaint   Patient presents with    Anticoagulation    Leg Pain     Check INR.  Right leg still hurting

## 2017-08-07 NOTE — PROGRESS NOTES
Subjective:     Chief Complaint   Patient presents with    Anticoagulation    Leg Pain      She  is a 50 y.o. female who presents for evaluation of:  DVT - noticed R calf pain and swelling x 4 weeks. Over the weekend, reports feeling some dyspnea and chest tightness. Denies palpitations. This is the same leg she had a DVT in x 2 in past.  We stopped coumadin prior to her surgery since she had finished a > 9 month course (recs from specialists for 6-9 months). Dopplers neg initially and repeat showed acute DVT. She has done well taking the Lovenox shots twice daily. However she did not realize she was supposed to be taking the Coumadin while taking Lovenox shots. Rest per Anti-coag tab.     ROS  Gen - no fever/chills  Resp - no dyspnea or cough  CV - per hpi  Rest per HPI    Past Medical History:   Diagnosis Date    Asthma 3/12/2010    DDD (degenerative disc disease), lumbar     DJD (degenerative joint disease) 3/12/2010    DJD (degenerative joint disease) of knee     DVT (deep venous thrombosis) (HCC)     Rt leg    GERD (gastroesophageal reflux disease)     History of tuberculosis exposure 2013    pt states she has + PPD and was on Rx for 6 months; last dose either 11/13 or 12/13    Inflammatory polyarthritis ([address]     Psoriatic Arthitis    Morbid obesity ([address]     Pseudogout     PUD (peptic ulcer disease)     Skin abrasion 1/28/14    After loosing 125#, Bilateral Inner thigh friction flareup monthly with skin breakdown    Thromboembolus ([address] 2008    Rt leg,  pt states she fell and had a plane ride home and developed blood clots    UC (ulcerative colitis) ([address] 3/12/2010     Past Surgical History:   Procedure Laterality Date    HX ACL RECONSTRUCTION      Rt    Vola Old    Dr Cristopher Ching HX GI      reopening of rectal pouch    HX GYN  06/19/2017    Hysterectomy    HX ORTHOPAEDIC  12/13    Rt foot / toes    HX TONSILLECTOMY       Current Outpatient Prescriptions on File Prior to Visit   Medication Sig Dispense Refill    pantoprazole (PROTONIX) 40 mg tablet TAKE 1 TAB BY MOUTH DAILY. 30 Tab 0    enoxaparin (LOVENOX) 100 mg/mL 110 mg by SubCUTAneous route every twelve (12) hours. 10 mL 0    furosemide (LASIX) 40 mg tablet TAKE 1 TABLET BY MOUTH TWICE DAILY AS NEEDED 180 Tab 1    ranitidine (ZANTAC) 150 mg tablet Take 150 mg by mouth two (2) times a day.  albuterol (PROVENTIL HFA, VENTOLIN HFA, PROAIR HFA) 90 mcg/actuation inhaler Take 1 Puff by inhalation every four (4) hours as needed for Wheezing. 1 Inhaler 5    warfarin (COUMADIN) 5 mg tablet Take 1 Tab by mouth daily. Indications: DEEP VEIN THROMBOSIS PREVENTION 45 Tab 1    phentermine (ADIPEX-P) 37.5 mg tablet Take 1/2 tablet before breakfast and before dinner 100 Tab 5    topiramate (TOPAMAX) 50 mg tablet Take 1 Tab by mouth two (2) times a day. 60 Tab 5    acetaminophen (TYLENOL EXTRA STRENGTH) 500 mg tablet Take 1,000 mg by mouth every six (6) hours as needed for Pain. No current facility-administered medications on file prior to visit. Objective:     Vitals:    08/07/17 0818   BP: 106/71   Pulse: 70   Resp: 18   Temp: 97.5 °F (36.4 °C)   TempSrc: Oral   SpO2: 98%   Weight: 235 lb 6.4 oz (106.8 kg)   Height: 5' 5\" (1.651 m)     Physical Examination:  General appearance - alert, well appearing, and in no distress  Eyes -sclera anicteric  Chest - clear to auscultation, no wheezes, rales or rhonchi, symmetric air entry  Heart - normal rate, regular rhythm, normal S1, S2, no murmurs, rubs, clicks or gallops  Extr - RLE with edema and very ttp over lower medial extremity. Neg Homans, + varicose veins    Assessment/ Plan:   Diagnoses and all orders for this visit:    1. Acute deep vein thrombosis (DVT) of popliteal vein of right lower extremity (HCC)  - noted after recent Gyn surgery. Had neg dopplers but repeat Dopplers last week showed sig RLE DVT.   This is her 3rd DVT which was again provoked so she will need to stay on blood thinners for life. - She she now reports having some chest discomfort and dyspnea. Will get chest CT stat. Continue anticoagulation.    - Coumadin adjusted since INR still 1.0 today but did not start Coumadin as prev discussed. Will ct Lovenox bridge.    -Stopping Mobic. For pain control will use Percocet acutely. I have discussed the diagnosis with the patient and the intended plan as seen in the above orders. The patient has received an after-visit summary and questions were answered concerning future plans. I have discussed medication side effects and warnings with the patient as well. The patient verbalizes understanding and agreement with the plan. Follow-up Disposition:  Return in about 3 days (around 8/10/2017), or if symptoms worsen or fail to improve.

## 2017-08-07 NOTE — MR AVS SNAPSHOT
Visit Information Date & Time Provider Department Dept. Phone Encounter #  
 8/7/2017  8:15 AM Elias Gilbert MD [facility] at [address] [id number] Follow-up Instructions Return in about 3 days (around 8/10/2017), or if symptoms worsen or fail to improve. Your Appointments 8/21/2017  9:30 AM  
ROUTINE CARE with Elias Gilbert MD  
[facility]) Appt Note: 2wk fu  
 [address] [facility] [address] [phone]  
[facility]  
  
    
 10/19/2017 11:10 AM  
Follow Up with Jacek Shah MD  
[address] [facility] Endocrinology [facility]) Appt Note: f/u                  [address] [phone] [address] [place] [address] Upcoming Health Maintenance Date Due INFLUENZA AGE 9 TO ADULT 8/1/2017 PAP AKA CERVICAL CYTOLOGY 11/19/2017 DTaP/Tdap/Td series (2 - Td) 5/17/2026 Allergies as of 8/7/2017  Review Complete On: 8/7/2017 By: Elias Gilbert MD  
  
 Severity Noted Reaction Type Reactions Sulfa (Sulfonamide Antibiotics) High 03/12/2010    Anaphylaxis Codeine Medium 03/17/2010    Itching Current Immunizations  Reviewed on 5/2/2016 Name Date Influenza Vaccine (Quad) PF 10/27/2016, 10/30/2015 Influenza Vaccine PF 10/11/2013 Influenza Vaccine Split 12/13/2012, 10/5/2011 Pneumococcal Polysaccharide (PPSV-23) 4/11/2016 11:40 AM  
  
 Not reviewed this visit You Were Diagnosed With   
  
 Codes Comments Acute deep vein thrombosis (DVT) of right lower extremity, unspecified vein (HCC)    -  Primary ICD-10-CM: I82.401 ICD-9-CM: 453.40 Vitals BP Pulse Temp Resp Height(growth percentile) Weight(growth percentile) 106/71 (BP 1 Location: Right arm, BP Patient Position: Sitting) 70 97.5 °F (36.4 °C) (Oral) 18 5' 5\" (1.651 m) 235 lb 6.4 oz (106.8 kg) LMP SpO2 BMI OB Status Smoking Status (LMP Unknown) 98% 39.17 kg/m2 Hysterectomy Never Smoker BMI and BSA Data Body Mass Index Body Surface Area  
 39.17 kg/m 2 2.21 m 2 Preferred Pharmacy Pharmacy Name Phone [facility] [address] [place] [address] [phone] Your Updated Medication List  
  
   
This list is accurate as of: 8/7/17  8:33 AM.  Always use your most recent med list.  
  
  
  
  
 albuterol 90 mcg/actuation inhaler Commonly known as:  PROVENTIL HFA, VENTOLIN HFA, PROAIR HFA Take 1 Puff by inhalation every four (4) hours as needed for Wheezing. enoxaparin 100 mg/mL Commonly known as:  LOVENOX  
110 mg by SubCUTAneous route every twelve (12) hours. furosemide 40 mg tablet Commonly known as:  LASIX TAKE 1 TABLET BY MOUTH TWICE DAILY AS NEEDED  
  
 meloxicam 15 mg tablet Commonly known as:  MOBIC  
TAKE 1 TABLET EVERY DAY AFTER BREAKFAST FOR 1 WEEK THEN AS NEEDED  
  
 pantoprazole 40 mg tablet Commonly known as:  PROTONIX  
TAKE 1 TAB BY MOUTH DAILY. phentermine 37.5 mg tablet Commonly known as:  ADIPEX-P Take 1/2 tablet before breakfast and before dinner  
  
 raNITIdine 150 mg tablet Commonly known as:  ZANTAC Take 150 mg by mouth two (2) times a day. topiramate 50 mg tablet Commonly known as:  TOPAMAX Take 1 Tab by mouth two (2) times a day. TYLENOL EXTRA STRENGTH 500 mg tablet Generic drug:  acetaminophen Take 1,000 mg by mouth every six (6) hours as needed for Pain.  
  
 warfarin 5 mg tablet Commonly known as:  COUMADIN Take 1 Tab by mouth daily. Indications: DEEP VEIN THROMBOSIS PREVENTION August 2017 Details Toño Joe Tue Wed Thu Fri Sat  
    1  
  
  
  
   2  
  
  
  
   3  
  
  
  
   4  
  
  
  
   5  
  
  
 6  
  
  
  
   7  
  
10 mg See details 8  
  
10 mg  
  
   9  
  
10 mg  
  
   10  
  
5 mg  
  
   11  
  
  
  
   12  
  
  
  
  
  13  
  
  
  
   14  
  
  
  
   15  
  
  
  
   16  
  
  
  
   17  
  
  
  
   18  
  
  
  
   19  
  
  
  
  
  20  
  
  
  
   21  
  
  
  
   22  
  
  
  
   23  
  
  
  
   24  
  
  
  
   25  
  
  
  
   26  
  
  
  
  
  27  
  
  
  
   28  
  
  
  
   29  
  
  
  
   30  
  
  
  
   31  
  
  
  
    
 Date Details 08/07 This INR check INR: 1.0 Date of next INR:  8/10/2017 How to take your warfarin dose To take:  5 mg Take one of the 5 mg tablets. To take:  10 mg Take two of the 5 mg tablets. We Performed the Following AMB POC PT/INR [91779 CPT(R)] Follow-up Instructions Return in about 3 days (around 8/10/2017), or if symptoms worsen or fail to improve. Introducing [facility]! Dear Eda Lopez: Thank you for requesting a Mobakids account. Our records indicate that you already have an active Mobakids account. You can access your account anytime at [email or web address] Did you know that you can access your hospital and ER discharge instructions at any time in Mobakids? You can also review all of your test results from your hospital stay or ER visit. Additional Information If you have questions, please visit the Frequently Asked Questions section of the Mobakids website at [email or web address] Remember, Mobakids is NOT to be used for urgent needs. For medical emergencies, dial 911. Now available from your iPhone and Android! Please provide this summary of care documentation to your next provider. Your primary care clinician is listed as Lacy Bellamy. If you have any questions after today's visit, please call [phone].

## 2017-08-10 ENCOUNTER — OFFICE VISIT (OUTPATIENT)
Dept: FAMILY MEDICINE CLINIC | Age: 47
End: 2017-08-10

## 2017-08-10 VITALS
TEMPERATURE: 98 F | RESPIRATION RATE: 16 BRPM | BODY MASS INDEX: 39.65 KG/M2 | DIASTOLIC BLOOD PRESSURE: 57 MMHG | HEART RATE: 73 BPM | SYSTOLIC BLOOD PRESSURE: 122 MMHG | WEIGHT: 238 LBS | OXYGEN SATURATION: 99 % | HEIGHT: 65 IN

## 2017-08-10 DIAGNOSIS — I82.401 ACUTE DEEP VEIN THROMBOSIS (DVT) OF RIGHT LOWER EXTREMITY, UNSPECIFIED VEIN (HCC): Primary | ICD-10-CM

## 2017-08-10 LAB
INR BLD: 1.8
PT POC: NORMAL SECONDS
VALID INTERNAL CONTROL?: YES

## 2017-08-10 NOTE — MR AVS SNAPSHOT
Visit Information Date & Time Provider Department Dept. Phone Encounter #  
 8/10/2017  8:30 AM Nancy Carvajal MD [facility] at [address] [id number] Follow-up Instructions Return in about 4 days (around 8/14/2017), or if symptoms worsen or fail to improve. Your Appointments 8/21/2017  9:30 AM  
ROUTINE CARE with Nancy Carvajal MD  
[facility] at [address] [facility]) Appt Note: 2wk fu  
 [address] [facility] [address] [address]  
[facility] [address]  
  
    
 10/19/2017 11:10 AM  
Follow Up with Mendy Velarde MD  
[place] [facility]) Appt Note: f/u                  [address] [phone] [address] Upcoming Health Maintenance Date Due INFLUENZA AGE 9 TO ADULT 8/1/2017 PAP AKA CERVICAL CYTOLOGY 11/19/2017 DTaP/Tdap/Td series (2 - Td) 5/17/2026 Allergies as of 8/10/2017  Review Complete On: 8/10/2017 By: Nancy Carvajal MD  
  
 Severity Noted Reaction Type Reactions Sulfa (Sulfonamide Antibiotics) High 03/12/2010    Anaphylaxis Codeine Medium 03/17/2010    Itching Current Immunizations  Reviewed on 5/2/2016 Name Date Influenza Vaccine (Quad) PF 10/27/2016, 10/30/2015 Influenza Vaccine PF 10/11/2013 Influenza Vaccine Split 12/13/2012, 10/5/2011 Pneumococcal Polysaccharide (PPSV-23) 4/11/2016 11:40 AM  
  
 Not reviewed this visit You Were Diagnosed With   
  
 Codes Comments Acute deep vein thrombosis (DVT) of right lower extremity, unspecified vein (HCC)    -  Primary ICD-10-CM: I82.401 ICD-9-CM: 453.40 Vitals BP Pulse Temp Resp Height(growth percentile) Weight(growth percentile) 122/57 (BP 1 Location: Left arm, BP Patient Position: Sitting) 73 98 °F (36.7 °C) (Oral) 16 5' 5\" (1.651 m) 238 lb (108 kg) LMP SpO2 BMI OB Status Smoking Status (LMP Unknown) 99% 39.61 kg/m2 Hysterectomy Never Smoker Vitals History BMI and BSA Data Body Mass Index Body Surface Area  
 39.61 kg/m 2 2.23 m 2 Preferred Pharmacy Pharmacy Name Phone [facility] [address] [place] [address] [phone] Your Updated Medication List  
  
   
This list is accurate as of: 8/10/17  8:48 AM.  Always use your most recent med list.  
  
  
  
  
 albuterol 90 mcg/actuation inhaler Commonly known as:  PROVENTIL HFA, VENTOLIN HFA, PROAIR HFA Take 1 Puff by inhalation every four (4) hours as needed for Wheezing. enoxaparin 100 mg/mL Commonly known as:  LOVENOX  
110 mg by SubCUTAneous route every twelve (12) hours. furosemide 40 mg tablet Commonly known as:  LASIX TAKE 1 TABLET BY MOUTH TWICE DAILY AS NEEDED  
  
 oxyCODONE-acetaminophen 5-325 mg per tablet Commonly known as:  PERCOCET Take 1 Tab by mouth every four (4) hours as needed for Pain. Max Daily Amount: 6 Tabs. pantoprazole 40 mg tablet Commonly known as:  PROTONIX  
TAKE 1 TAB BY MOUTH DAILY. phentermine 37.5 mg tablet Commonly known as:  ADIPEX-P Take 1/2 tablet before breakfast and before dinner  
  
 raNITIdine 150 mg tablet Commonly known as:  ZANTAC Take 150 mg by mouth two (2) times a day. topiramate 50 mg tablet Commonly known as:  TOPAMAX Take 1 Tab by mouth two (2) times a day. TYLENOL EXTRA STRENGTH 500 mg tablet Generic drug:  acetaminophen Take 1,000 mg by mouth every six (6) hours as needed for Pain.  
  
 warfarin 5 mg tablet Commonly known as:  COUMADIN Take 1 Tab by mouth daily. Indications: DEEP VEIN THROMBOSIS PREVENTION August 2017 Details Zeyad Katz Tue Wed Thu Fri Sat  
    1  
  
  
  
   2  
  
 3  
  
  
  
   4  
  
  
  
   5  
  
  
  
  
  6  
  
  
  
   7  
  
  
  
   8  
  
  
  
   9  
  
  
  
   10  
  
10 mg See details 11  
  
5 mg  
  
   12  
  
10 mg  
  
  
  13  
  
10 mg  
  
   14  
  
5 mg  
  
   15  
  
  
  
   16  
  
  
  
   17  
  
  
  
   18  
  
  
  
   19  
  
  
  
  
  20  
  
  
  
   21  
  
  
  
   22  
  
  
  
   23  
  
  
  
   24  
  
  
  
   25  
  
  
  
   26  
  
  
  
  
  27  
  
  
  
   28  
  
  
  
   29  
  
  
  
   30  
  
  
  
   31  
  
  
  
    
 Date Details 08/10 This INR check INR: 1.8 Date of next INR:  8/14/2017 How to take your warfarin dose To take:  5 mg Take one of the 5 mg tablets. To take:  10 mg Take two of the 5 mg tablets. We Performed the Following AMB POC PT/INR [41696 CPT(R)] Follow-up Instructions Return in about 4 days (around 8/14/2017), or if symptoms worsen or fail to improve. Introducing [facility]! Dear Imtiaz Roy: Thank you for requesting a Biocept account. Our records indicate that you already have an active Biocept account. You can access your account anytime at [email or web address] Did you know that you can access your hospital and ER discharge instructions at any time in Biocept? You can also review all of your test results from your hospital stay or ER visit. Additional Information If you have questions, please visit the Frequently Asked Questions section of the Biocept website at [email or web address] Remember, Biocept is NOT to be used for urgent needs. For medical emergencies, dial 911. Now available from your iPhone and Android! Please provide this summary of care documentation to your next provider. Your primary care clinician is listed as Bhavana Huitron. If you have any questions after today's visit, please call [phone].

## 2017-08-10 NOTE — LETTER
NOTIFICATION RETURN TO WORK / SCHOOL 
 
8/10/2017 9:10 AM 
 
Ms. Vinicio Wellington [facility] [id number] [facility] [phone] To Whom It May Concern: 
 
Vinicio Wellington is currently under the care of Gaurav Ramos. She will return to work/school on: 8/16/17 If there are questions or concerns please have the patient contact our office.  
 
 
 
Sincerely, 
 
 
Esther Us MD

## 2017-08-10 NOTE — PROGRESS NOTES
Subjective:     Chief Complaint   Patient presents with    Follow-up     DVT      She  is a 50 y.o. female who presents for evaluation of:  DVT - noticed R calf pain and swelling. Pt did feel some dyspnea and chest tightness so got Chest CTA and neg for PE. Denies palpitations. This is the same leg she had a DVT in x 2 in past.  We stopped coumadin prior to her surgery since she had finished a > 9 month course (recs from specialists for 6-9 months during last hospitalization). Dopplers neg initially and repeat showed acute DVT. She has done well taking the Lovenox shots twice daily and is now bridging with Coumadin. Rest per Anti-coag tab.     ROS  Gen - no fever/chills  Resp - no dyspnea or cough  CV - per hpi  Rest per HPI    Past Medical History:   Diagnosis Date    Asthma 3/12/2010    DDD (degenerative disc disease), lumbar     DJD (degenerative joint disease) 3/12/2010    DJD (degenerative joint disease) of knee     DVT (deep venous thrombosis) (HCC)     Rt leg    GERD (gastroesophageal reflux disease)     History of tuberculosis exposure 2013    pt states she has + PPD and was on Rx for 6 months; last dose either 11/13 or 12/13    Inflammatory polyarthritis ([address]     Psoriatic Arthitis    Morbid obesity ([address]     Pseudogout     PUD (peptic ulcer disease)     Skin abrasion 1/28/14    After loosing 125#, Bilateral Inner thigh friction flareup monthly with skin breakdown    Thromboembolus ([address] 2008    Rt leg,  pt states she fell and had a plane ride home and developed blood clots    UC (ulcerative colitis) ([address] 3/12/2010     Past Surgical History:   Procedure Laterality Date    HX ACL RECONSTRUCTION      Rt    Kaila Close    Dr Cecille Park HX GI      reopening of rectal pouch    HX GYN  06/19/2017    Hysterectomy    HX ORTHOPAEDIC  12/13    Rt foot / toes    HX TONSILLECTOMY       Current Outpatient Prescriptions on File Prior to Visit   Medication Sig Dispense Refill    oxyCODONE-acetaminophen (PERCOCET) 5-325 mg per tablet Take 1 Tab by mouth every four (4) hours as needed for Pain. Max Daily Amount: 6 Tabs. 30 Tab 0    enoxaparin (LOVENOX) 100 mg/mL 110 mg by SubCUTAneous route every twelve (12) hours. 10 mL 0    pantoprazole (PROTONIX) 40 mg tablet TAKE 1 TAB BY MOUTH DAILY. 30 Tab 0    warfarin (COUMADIN) 5 mg tablet Take 1 Tab by mouth daily. Indications: DEEP VEIN THROMBOSIS PREVENTION (Patient taking differently: Take 10 mg by mouth daily. Indications: DEEP VEIN THROMBOSIS PREVENTION) 45 Tab 1    furosemide (LASIX) 40 mg tablet TAKE 1 TABLET BY MOUTH TWICE DAILY AS NEEDED 180 Tab 1    ranitidine (ZANTAC) 150 mg tablet Take 150 mg by mouth two (2) times a day.  albuterol (PROVENTIL HFA, VENTOLIN HFA, PROAIR HFA) 90 mcg/actuation inhaler Take 1 Puff by inhalation every four (4) hours as needed for Wheezing. 1 Inhaler 5    acetaminophen (TYLENOL EXTRA STRENGTH) 500 mg tablet Take 1,000 mg by mouth every six (6) hours as needed for Pain.  phentermine (ADIPEX-P) 37.5 mg tablet Take 1/2 tablet before breakfast and before dinner 100 Tab 5    topiramate (TOPAMAX) 50 mg tablet Take 1 Tab by mouth two (2) times a day. 60 Tab 5     No current facility-administered medications on file prior to visit. Objective:     Vitals:    08/10/17 0828   BP: 122/57   Pulse: 73   Resp: 16   Temp: 98 °F (36.7 °C)   TempSrc: Oral   SpO2: 99%   Weight: 238 lb (108 kg)   Height: 5' 5\" (1.651 m)     Physical Examination:  General appearance - alert, well appearing, and in no distress  Eyes -sclera anicteric  Chest - clear to auscultation, no wheezes, rales or rhonchi, symmetric air entry  Heart - normal rate, regular rhythm, normal S1, S2, no murmurs, rubs, clicks or gallops  Extr - RLE with edema and very ttp over lower medial extremity. + varicose veins    Assessment/ Plan:   Diagnoses and all orders for this visit:    1.  Acute deep vein thrombosis (DVT) of popliteal vein of right lower extremity ([address]  - noted after recent Gyn surgery. Had neg dopplers but repeat Dopplers showed sig RLE DVT. This is her 3rd DVT which was again provoked so she will need to stay on blood thinners for life. - Got Chest CTA given some chest discomfort and dyspnea and this was neg for PE.    - Coumadin adjusted and INR 1.8 today. Will ct Lovenox bridge. - Stopped NSAIDs. For pain control will use Percocet acutely. I have discussed the diagnosis with the patient and the intended plan as seen in the above orders. The patient has received an after-visit summary and questions were answered concerning future plans. I have discussed medication side effects and warnings with the patient as well. The patient verbalizes understanding and agreement with the plan. Follow-up Disposition:  Return in about 4 days (around 8/14/2017), or if symptoms worsen or fail to improve.

## 2017-08-15 ENCOUNTER — OFFICE VISIT (OUTPATIENT)
Dept: FAMILY MEDICINE CLINIC | Age: 47
End: 2017-08-15

## 2017-08-15 VITALS
TEMPERATURE: 97.9 F | WEIGHT: 244.8 LBS | BODY MASS INDEX: 40.79 KG/M2 | SYSTOLIC BLOOD PRESSURE: 124 MMHG | HEART RATE: 75 BPM | DIASTOLIC BLOOD PRESSURE: 70 MMHG | OXYGEN SATURATION: 96 % | RESPIRATION RATE: 18 BRPM | HEIGHT: 65 IN

## 2017-08-15 DIAGNOSIS — I82.401 ACUTE DEEP VEIN THROMBOSIS (DVT) OF RIGHT LOWER EXTREMITY, UNSPECIFIED VEIN (HCC): Primary | ICD-10-CM

## 2017-08-15 LAB
INR BLD: 2.3
PT POC: NORMAL SECONDS
VALID INTERNAL CONTROL?: YES

## 2017-08-15 NOTE — MR AVS SNAPSHOT
Visit Information Date & Time Provider Department Dept. Phone Encounter #  
 8/15/2017  8:15 AM Alexandra Borrego MD [facility] at [address] [id number] Follow-up Instructions Return in about 1 week (around 8/22/2017). Your Appointments 8/21/2017  9:30 AM  
ROUTINE CARE with Alexandra Borrego MD  
[facility] at [facility] [address]) Appt Note: 2wk fu  
 [address] [facility] [address] [id number]  
[facility]  
  
    
 10/19/2017 11:10 AM  
Follow Up with Marla Romero MD  
[facility] [address]) Appt Note: f/u                  [address] [id number] [address] [place] [address] Upcoming Health Maintenance Date Due INFLUENZA AGE 9 TO ADULT 8/1/2017 PAP AKA CERVICAL CYTOLOGY 11/19/2017 DTaP/Tdap/Td series (2 - Td) 5/17/2026 Allergies as of 8/15/2017  Review Complete On: 8/15/2017 By: Alexandra Borrego MD  
  
 Severity Noted Reaction Type Reactions Sulfa (Sulfonamide Antibiotics) High 03/12/2010    Anaphylaxis Codeine Medium 03/17/2010    Itching Current Immunizations  Reviewed on 5/2/2016 Name Date Influenza Vaccine (Quad) PF 10/27/2016, 10/30/2015 Influenza Vaccine PF 10/11/2013 Influenza Vaccine Split 12/13/2012, 10/5/2011 Pneumococcal Polysaccharide (PPSV-23) 4/11/2016 11:40 AM  
  
 Not reviewed this visit You Were Diagnosed With   
  
 Codes Comments Acute deep vein thrombosis (DVT) of right lower extremity, unspecified vein (HCC)    -  Primary ICD-10-CM: I82.401 ICD-9-CM: 453.40 Vitals BP Pulse Temp Resp Height(growth percentile) Weight(growth percentile)  124/70 (BP 1 Location: Left arm, BP Patient Position: Sitting) 75 97.9 °F (36.6 °C) (Oral) 18 5' 5\" (1.651 m) 244 lb 12.8 oz (111 kg) LMP SpO2 BMI OB Status Smoking Status (LMP Unknown) 96% 40.74 kg/m2 Hysterectomy Never Smoker Vitals History BMI and BSA Data Body Mass Index Body Surface Area 40.74 kg/m 2 2.26 m 2 Preferred Pharmacy Pharmacy Name Phone [facility] [address] [phone] Your Updated Medication List  
  
   
This list is accurate as of: 8/15/17  8:47 AM.  Always use your most recent med list.  
  
  
  
  
 albuterol 90 mcg/actuation inhaler Commonly known as:  PROVENTIL HFA, VENTOLIN HFA, PROAIR HFA Take 1 Puff by inhalation every four (4) hours as needed for Wheezing. furosemide 40 mg tablet Commonly known as:  LASIX TAKE 1 TABLET BY MOUTH TWICE DAILY AS NEEDED  
  
 oxyCODONE-acetaminophen 5-325 mg per tablet Commonly known as:  PERCOCET Take 1 Tab by mouth every four (4) hours as needed for Pain. Max Daily Amount: 6 Tabs. pantoprazole 40 mg tablet Commonly known as:  PROTONIX  
TAKE 1 TAB BY MOUTH DAILY. phentermine 37.5 mg tablet Commonly known as:  ADIPEX-P Take 1/2 tablet before breakfast and before dinner  
  
 raNITIdine 150 mg tablet Commonly known as:  ZANTAC Take 150 mg by mouth two (2) times a day. topiramate 50 mg tablet Commonly known as:  TOPAMAX Take 1 Tab by mouth two (2) times a day. TYLENOL EXTRA STRENGTH 500 mg tablet Generic drug:  acetaminophen Take 1,000 mg by mouth every six (6) hours as needed for Pain.  
  
 warfarin 5 mg tablet Commonly known as:  COUMADIN Take 1 Tab by mouth daily. Indications: DEEP VEIN THROMBOSIS PREVENTION August 2017 Details Sun Mon Tue Wed Thu Fri Sat  
    1  
  
  
  
   2  
  
  
  
   3  
  
  
  
   4  
  
  
  
   5  
  
  
  
  
  6  
  
  
  
   7  
  
  
  
   8  
  
  
  
   9  
  
  
  
   10  
  
  
  
   11  
  
  
  
   12 13  
  
  
  
   14  
  
  
  
   15  
  
5 mg See details 16  
  
5 mg  
  
   17  
  
5 mg  
  
   18  
  
5 mg 19  
  
5 mg  
  
  
  20  
  
5 mg  
  
   21  
  
5 mg  
  
   22  
  
5 mg  
  
   23  
  
  
  
   24  
  
  
  
   25  
  
  
  
   26  
  
  
  
  
  27  
  
  
  
   28  
  
  
  
   29  
  
  
  
   30  
  
  
  
   31  
  
  
  
    
 Date Details 08/15 This INR check INR: 2.3 Date of next INR:  8/22/2017 How to take your warfarin dose To take:  5 mg Take one of the 5 mg tablets. We Performed the Following AMB POC PT/INR [65935 CPT(R)] Follow-up Instructions Return in about 1 week (around 8/22/2017). Introducing [facility]! Dear Darden Hodgkins: Thank you for requesting a KLab account. Our records indicate that you already have an active KLab account. You can access your account anytime at [email or web address] Did you know that you can access your hospital and ER discharge instructions at any time in KLab? You can also review all of your test results from your hospital stay or ER visit. Additional Information If you have questions, please visit the Frequently Asked Questions section of the KLab website at [email or web address] Remember, KLab is NOT to be used for urgent needs. For medical emergencies, dial 911. Now available from your iPhone and Android! Please provide this summary of care documentation to your next provider. Your primary care clinician is listed as Ambrosio Pablo. If you have any questions after today's visit, please call [phone].

## 2017-08-15 NOTE — PROGRESS NOTES
Subjective:     Chief Complaint   Patient presents with    Follow-up     PT/INR      She  is a 50 y.o. female who presents for evaluation of:  DVT - noticed R calf pain and swelling. Pt did feel some dyspnea and chest tightness so got Chest CTA and neg for PE. Denies palpitations. This is the same leg she had a DVT in x 2 in past.  We stopped coumadin prior to her surgery since she had finished a > 9 month course (recs from specialists for 6-9 months during last hospitalization). Dopplers neg initially and repeat showed acute DVT. She has done well taking the Lovenox shots twice daily and is now bridging with Coumadin. Rest per Anti-coag tab.     ROS  Gen - no fever/chills  Resp - no dyspnea or cough  CV - per hpi  Rest per HPI    Past Medical History:   Diagnosis Date    Asthma 3/12/2010    DDD (degenerative disc disease), lumbar     DJD (degenerative joint disease) 3/12/2010    DJD (degenerative joint disease) of knee     DVT (deep venous thrombosis) (HCC)     Rt leg    GERD (gastroesophageal reflux disease)     History of tuberculosis exposure 2013    pt states she has + PPD and was on Rx for 6 months; last dose either 11/13 or 12/13    Inflammatory polyarthritis ([address]     Psoriatic Arthitis    Morbid obesity ([address]     Pseudogout     PUD (peptic ulcer disease)     Skin abrasion 1/28/14    After loosing 125#, Bilateral Inner thigh friction flareup monthly with skin breakdown    Thromboembolus ([address] 2008    Rt leg,  pt states she fell and had a plane ride home and developed blood clots    UC (ulcerative colitis) ([address] 3/12/2010     Past Surgical History:   Procedure Laterality Date    HX ACL RECONSTRUCTION      Rt    Alexis Tavarez HX GI      reopening of rectal pouch    HX GYN  06/19/2017    Hysterectomy    HX ORTHOPAEDIC  12/13    Rt foot / toes    HX TONSILLECTOMY       Current Outpatient Prescriptions on File Prior to Visit   Medication Sig Dispense Refill    oxyCODONE-acetaminophen (PERCOCET) 5-325 mg per tablet Take 1 Tab by mouth every four (4) hours as needed for Pain. Max Daily Amount: 6 Tabs. 30 Tab 0    pantoprazole (PROTONIX) 40 mg tablet TAKE 1 TAB BY MOUTH DAILY. 30 Tab 0    warfarin (COUMADIN) 5 mg tablet Take 1 Tab by mouth daily. Indications: DEEP VEIN THROMBOSIS PREVENTION 45 Tab 1    furosemide (LASIX) 40 mg tablet TAKE 1 TABLET BY MOUTH TWICE DAILY AS NEEDED 180 Tab 1    ranitidine (ZANTAC) 150 mg tablet Take 150 mg by mouth two (2) times a day.  albuterol (PROVENTIL HFA, VENTOLIN HFA, PROAIR HFA) 90 mcg/actuation inhaler Take 1 Puff by inhalation every four (4) hours as needed for Wheezing. 1 Inhaler 5    phentermine (ADIPEX-P) 37.5 mg tablet Take 1/2 tablet before breakfast and before dinner 100 Tab 5    topiramate (TOPAMAX) 50 mg tablet Take 1 Tab by mouth two (2) times a day. 60 Tab 5    acetaminophen (TYLENOL EXTRA STRENGTH) 500 mg tablet Take 1,000 mg by mouth every six (6) hours as needed for Pain. No current facility-administered medications on file prior to visit. Objective:     Vitals:    08/15/17 0822   BP: 124/70   Pulse: 75   Resp: 18   Temp: 97.9 °F (36.6 °C)   TempSrc: Oral   SpO2: 96%   Weight: 244 lb 12.8 oz (111 kg)   Height: 5' 5\" (1.651 m)     Physical Examination:  General appearance - alert, well appearing, and in no distress  Eyes -sclera anicteric  Chest - clear to auscultation, no wheezes, rales or rhonchi, symmetric air entry  Heart - normal rate, regular rhythm, normal S1, S2, no murmurs, rubs, clicks or gallops  Extr - RLE with edema and very ttp over lower medial extremity. + varicose veins    Assessment/ Plan:   Diagnoses and all orders for this visit:    1. Acute deep vein thrombosis (DVT) of popliteal vein of right lower extremity (HCC)  - noted after recent Gyn surgery. Had neg dopplers but repeat Dopplers showed sig RLE DVT.   This is her 3rd DVT which was again provoked so she will need to stay on blood thinners for life. - Got Chest CTA given some chest discomfort and dyspnea and this was neg for PE.    - Coumadin adjusted and INR therapeutic today. Has finished Lovenox and was likely therapeutic with coumadin over the weekend (x > 48 hrs). Given her difficulty with needle sticks, will not ct Lovenox at this point.    - Stopped NSAIDs. For pain control will use Percocet acutely. - FMLA paperwork done today    I have discussed the diagnosis with the patient and the intended plan as seen in the above orders. The patient has received an after-visit summary and questions were answered concerning future plans. I have discussed medication side effects and warnings with the patient as well. The patient verbalizes understanding and agreement with the plan. Follow-up Disposition:  Return in about 1 week (around 8/22/2017).

## 2017-08-21 ENCOUNTER — OFFICE VISIT (OUTPATIENT)
Dept: FAMILY MEDICINE CLINIC | Age: 47
End: 2017-08-21

## 2017-08-21 VITALS
RESPIRATION RATE: 18 BRPM | DIASTOLIC BLOOD PRESSURE: 75 MMHG | OXYGEN SATURATION: 100 % | SYSTOLIC BLOOD PRESSURE: 128 MMHG | BODY MASS INDEX: 40.48 KG/M2 | TEMPERATURE: 98.4 F | WEIGHT: 243 LBS | HEART RATE: 83 BPM | HEIGHT: 65 IN

## 2017-08-21 DIAGNOSIS — I82.401 ACUTE DEEP VEIN THROMBOSIS (DVT) OF RIGHT LOWER EXTREMITY, UNSPECIFIED VEIN (HCC): Primary | ICD-10-CM

## 2017-08-21 LAB
INR BLD: 1.7
PT POC: NORMAL SECONDS
VALID INTERNAL CONTROL?: YES

## 2017-08-21 NOTE — LETTER
NOTIFICATION RETURN TO WORK / SCHOOL 
 
8/21/2017 9:54 AM 
 
Ms. Rachana Little [facility] [id number] [facility] [address] To Whom It May Concern: 
 
Rachana Little is currently under the care of Gaurav Ramos. I am recommending that she work 4 hour half days from 8/21/2017 through 8/28/2017 due to her medical condition which can be life-threatening. Her next appointment with me is 8/28/2017. If there are questions or concerns please have the patient contact our office.  
 
 
 
Sincerely, 
 
 
Felipe Coronado MD

## 2017-08-21 NOTE — PROGRESS NOTES
Subjective:     Chief Complaint   Patient presents with    Follow-up     PT/INR      She  is a 50 y.o. female who presents for evaluation of:  DVT - noticed R calf pain and swelling. Pt did feel some dyspnea and chest tightness so got Chest CTA and neg for PE. Denies palpitations. This is the same leg she had a DVT in x 2 in past.  We stopped coumadin prior to her surgery since she had finished a > 9 month course (recs from specialists for 6-9 months during last hospitalization). Dopplers neg initially and repeat showed acute DVT. On Coumadin and was bridged with Lovenox. Rest per Anti-coag tab.     ROS  Gen - no fever/chills  Resp - no dyspnea or cough  CV - per hpi  Rest per HPI    Past Medical History:   Diagnosis Date    Asthma 3/12/2010    DDD (degenerative disc disease), lumbar     DJD (degenerative joint disease) 3/12/2010    DJD (degenerative joint disease) of knee     DVT (deep venous thrombosis) (HCC)     Rt leg    GERD (gastroesophageal reflux disease)     History of tuberculosis exposure 2013    pt states she has + PPD and was on Rx for 6 months; last dose either 11/13 or 12/13    Inflammatory polyarthritis ([address]     Psoriatic Arthitis    Morbid obesity ([address]     Pseudogout     PUD (peptic ulcer disease)     Skin abrasion 1/28/14    After loosing 125#, Bilateral Inner thigh friction flareup monthly with skin breakdown    Thromboembolus ([address] 2008    Rt leg,  pt states she fell and had a plane ride home and developed blood clots    UC (ulcerative colitis) ([address] 3/12/2010     Past Surgical History:   Procedure Laterality Date    HX ACL RECONSTRUCTION      Rt    Maricarmen Burnett HX GI      reopening of rectal pouch    HX GYN  06/19/2017    Hysterectomy    HX ORTHOPAEDIC  12/13    Rt foot / toes    HX TONSILLECTOMY       Current Outpatient Prescriptions on File Prior to Visit   Medication Sig Dispense Refill    oxyCODONE-acetaminophen (PERCOCET) 5-325 mg per tablet Take 1 Tab by mouth every four (4) hours as needed for Pain. Max Daily Amount: 6 Tabs. 30 Tab 0    pantoprazole (PROTONIX) 40 mg tablet TAKE 1 TAB BY MOUTH DAILY. 30 Tab 0    warfarin (COUMADIN) 5 mg tablet Take 1 Tab by mouth daily. Indications: DEEP VEIN THROMBOSIS PREVENTION 45 Tab 1    furosemide (LASIX) 40 mg tablet TAKE 1 TABLET BY MOUTH TWICE DAILY AS NEEDED 180 Tab 1    ranitidine (ZANTAC) 150 mg tablet Take 150 mg by mouth two (2) times a day.  albuterol (PROVENTIL HFA, VENTOLIN HFA, PROAIR HFA) 90 mcg/actuation inhaler Take 1 Puff by inhalation every four (4) hours as needed for Wheezing. 1 Inhaler 5    acetaminophen (TYLENOL EXTRA STRENGTH) 500 mg tablet Take 1,000 mg by mouth every six (6) hours as needed for Pain.  phentermine (ADIPEX-P) 37.5 mg tablet Take 1/2 tablet before breakfast and before dinner 100 Tab 5    topiramate (TOPAMAX) 50 mg tablet Take 1 Tab by mouth two (2) times a day. 60 Tab 5     No current facility-administered medications on file prior to visit. Objective:     Vitals:    08/21/17 0935   BP: 128/75   Pulse: 83   Resp: 18   Temp: 98.4 °F (36.9 °C)   TempSrc: Oral   SpO2: 100%   Weight: 243 lb (110.2 kg)   Height: 5' 5\" (1.651 m)     Physical Examination:  General appearance - alert, well appearing, and in no distress  Eyes -sclera anicteric  Chest - clear to auscultation, no wheezes, rales or rhonchi, symmetric air entry  Heart - normal rate, regular rhythm, normal S1, S2, no murmurs, rubs, clicks or gallops  Extr - RLE with edema and very ttp over lower medial extremity. + varicose veins    Assessment/ Plan:   Diagnoses and all orders for this visit:    1. Acute deep vein thrombosis (DVT) of popliteal vein of right lower extremity (HCC)  - noted after recent Gyn surgery. Had neg dopplers but repeat Dopplers showed sig RLE DVT. This is her 3rd DVT which was again provoked so she will need to stay on blood thinners for life.   - Coumadin adjusted and INR therapeutic today  - Got Chest CTA given some chest discomfort and dyspnea and this was neg for PE.  - Stopped NSAIDs. For pain control will use Percocet acutely. I have discussed the diagnosis with the patient and the intended plan as seen in the above orders. The patient has received an after-visit summary and questions were answered concerning future plans. I have discussed medication side effects and warnings with the patient as well. The patient verbalizes understanding and agreement with the plan. Follow-up Disposition:  Return in about 1 week (around 8/28/2017).

## 2017-08-21 NOTE — MR AVS SNAPSHOT
Visit Information Date & Time Provider Department Dept. Phone Encounter #  
 8/21/2017  9:30 AM Ray Perkins MD [facility] at [address] [id number] Follow-up Instructions Return in about 1 week (around 8/28/2017). Your Appointments 10/19/2017 11:10 AM  
Follow Up with MD [facility] [address] [place] [address]) Appt Note: f/u                  [address] [facility] [phone] [address] [place] [address] Upcoming Health Maintenance Date Due INFLUENZA AGE 9 TO ADULT 8/1/2017 PAP AKA CERVICAL CYTOLOGY 11/19/2017 DTaP/Tdap/Td series (2 - Td) 5/17/2026 Allergies as of 8/21/2017  Review Complete On: 8/21/2017 By: Ray Perkins MD  
  
 Severity Noted Reaction Type Reactions Sulfa (Sulfonamide Antibiotics) High 03/12/2010    Anaphylaxis Codeine Medium 03/17/2010    Itching Current Immunizations  Reviewed on 5/2/2016 Name Date Influenza Vaccine (Quad) PF 10/27/2016, 10/30/2015 Influenza Vaccine PF 10/11/2013 Influenza Vaccine Split 12/13/2012, 10/5/2011 Pneumococcal Polysaccharide (PPSV-23) 4/11/2016 11:40 AM  
  
 Not reviewed this visit You Were Diagnosed With   
  
 Codes Comments Acute deep vein thrombosis (DVT) of right lower extremity, unspecified vein (HCC)    -  Primary ICD-10-CM: I82.401 ICD-9-CM: 453.40 Vitals BP Pulse Temp Resp Height(growth percentile) Weight(growth percentile) 128/75 (BP 1 Location: Left arm, BP Patient Position: Sitting) 83 98.4 °F (36.9 °C) (Oral) 18 5' 5\" (1.651 m) 243 lb (110.2 kg) LMP SpO2 BMI OB Status Smoking Status (LMP Unknown) 100% 40.44 kg/m2 Hysterectomy Never Smoker Vitals History BMI and BSA Data Body Mass Index Body Surface Area 40.44 kg/m 2 2.25 m 2 Preferred Pharmacy Pharmacy Name Phone [facility], [facility] [address] [facility] [phone] Your Updated Medication List  
  
   
This list is accurate as of: 8/21/17  9:54 AM.  Always use your most recent med list.  
  
  
  
  
 albuterol 90 mcg/actuation inhaler Commonly known as:  PROVENTIL HFA, VENTOLIN HFA, PROAIR HFA Take 1 Puff by inhalation every four (4) hours as needed for Wheezing. furosemide 40 mg tablet Commonly known as:  LASIX TAKE 1 TABLET BY MOUTH TWICE DAILY AS NEEDED  
  
 oxyCODONE-acetaminophen 5-325 mg per tablet Commonly known as:  PERCOCET Take 1 Tab by mouth every four (4) hours as needed for Pain. Max Daily Amount: 6 Tabs. pantoprazole 40 mg tablet Commonly known as:  PROTONIX  
TAKE 1 TAB BY MOUTH DAILY. phentermine 37.5 mg tablet Commonly known as:  ADIPEX-P Take 1/2 tablet before breakfast and before dinner  
  
 raNITIdine 150 mg tablet Commonly known as:  ZANTAC Take 150 mg by mouth two (2) times a day. topiramate 50 mg tablet Commonly known as:  TOPAMAX Take 1 Tab by mouth two (2) times a day. TYLENOL EXTRA STRENGTH 500 mg tablet Generic drug:  acetaminophen Take 1,000 mg by mouth every six (6) hours as needed for Pain.  
  
 warfarin 5 mg tablet Commonly known as:  COUMADIN Take 1 Tab by mouth daily. Indications: DEEP VEIN THROMBOSIS PREVENTION August 2017 Details Sun Mon Tue Wed Thu Fri Sat  
    1  
  
  
  
   2  
  
  
  
   3  
  
  
  
   4  
  
  
  
   5  
  
  
  
  
  6  
  
  
  
   7  
  
  
  
   8  
  
  
  
   9  
  
  
  
   10  
  
  
  
   11  
  
  
  
   12  
  
  
  
  
  13  
  
  
  
   14  
  
  
  
   15  
  
  
  
   16  
  
  
  
   17  
  
  
  
   18  
  
  
  
   19  
  
  
  
  
  20  
  
  
  
   21  
  
7.5 mg  
See details 22  
  
7.5 mg  
  
   23  
  
7.5 mg  
  
   24  
  
7.5 mg  
  
   25  
  
7.5 mg  
  
   26  
  
7.5 mg  
  
  
  27 7.5 mg  
  
   28  
  
7.5 mg  
  
   29  
  
  
  
   30  
  
  
  
   31  
  
  
  
    
 Date Details 08/21 This INR check INR: 1.7 Date of next INR:  8/28/2017 How to take your warfarin dose To take:  7.5 mg Take one and a half of the 5 mg tablets. We Performed the Following AMB POC PT/INR [04993 CPT(R)] Follow-up Instructions Return in about 1 week (around 8/28/2017). Introducing [facility]! Dear Leandro Carr: Thank you for requesting a Machinio account. Our records indicate that you already have an active Machinio account. You can access your account anytime at [email or web address] Did you know that you can access your hospital and ER discharge instructions at any time in Machinio? You can also review all of your test results from your hospital stay or ER visit. Additional Information If you have questions, please visit the Frequently Asked Questions section of the Machinio website at [email or web address] Remember, Machinio is NOT to be used for urgent needs. For medical emergencies, dial 911. Now available from your iPhone and Android! Please provide this summary of care documentation to your next provider. Your primary care clinician is listed as Felipe Coronado. If you have any questions after today's visit, please call [phone].

## 2017-08-28 ENCOUNTER — OFFICE VISIT (OUTPATIENT)
Dept: FAMILY MEDICINE CLINIC | Age: 47
End: 2017-08-28

## 2017-08-28 VITALS
DIASTOLIC BLOOD PRESSURE: 55 MMHG | OXYGEN SATURATION: 95 % | TEMPERATURE: 98 F | RESPIRATION RATE: 16 BRPM | BODY MASS INDEX: 40.59 KG/M2 | SYSTOLIC BLOOD PRESSURE: 111 MMHG | WEIGHT: 243.6 LBS | HEART RATE: 62 BPM | HEIGHT: 65 IN

## 2017-08-28 DIAGNOSIS — I82.431 ACUTE DEEP VEIN THROMBOSIS (DVT) OF POPLITEAL VEIN OF RIGHT LOWER EXTREMITY (HCC): Primary | ICD-10-CM

## 2017-08-28 LAB
INR BLD: 2.5
PT POC: NORMAL SECONDS
VALID INTERNAL CONTROL?: YES

## 2017-08-28 RX ORDER — WARFARIN SODIUM 5 MG/1
10 TABLET ORAL DAILY
Qty: 60 TAB | Refills: 3 | Status: SHIPPED | OUTPATIENT
Start: 2017-08-28 | End: 2018-03-01 | Stop reason: SDUPTHER

## 2017-08-28 NOTE — MR AVS SNAPSHOT
Visit Information Date & Time Provider Department Dept. Phone Encounter #  
 8/28/2017  8:15 AM Lucy White MD [facility] at [address] [id number] Follow-up Instructions Return in about 2 weeks (around 9/11/2017), or if symptoms worsen or fail to improve. Your Appointments 10/19/2017 11:10 AM  
Follow Up with Adam Alfaro MD  
[facility] Appt Note: f/u                  3 mon One [employer] [address] [phone] [facility] Upcoming Health Maintenance Date Due INFLUENZA AGE 9 TO ADULT 8/1/2017 PAP AKA CERVICAL CYTOLOGY 11/19/2017 DTaP/Tdap/Td series (2 - Td) 5/17/2026 Allergies as of 8/28/2017  Review Complete On: 8/28/2017 By: Irma Sheriff LPN Severity Noted Reaction Type Reactions Sulfa (Sulfonamide Antibiotics) High 03/12/2010    Anaphylaxis Codeine Medium 03/17/2010    Itching Current Immunizations  Reviewed on 5/2/2016 Name Date Influenza Vaccine (Quad) PF 10/27/2016, 10/30/2015 Influenza Vaccine PF 10/11/2013 Influenza Vaccine Split 12/13/2012, 10/5/2011 Pneumococcal Polysaccharide (PPSV-23) 4/11/2016 11:40 AM  
  
 Not reviewed this visit You Were Diagnosed With   
  
 Codes Comments Acute deep vein thrombosis (DVT) of popliteal vein of right lower extremity (HCC)    -  Primary ICD-10-CM: M65.977 ICD-9-CM: 453.41 Vitals BP Pulse Temp Resp Height(growth percentile) Weight(growth percentile) 111/55 (BP 1 Location: Left arm, BP Patient Position: Sitting) 62 98 °F (36.7 °C) (Oral) 16 5' 5\" (1.651 m) 243 lb 9.6 oz (110.5 kg) LMP SpO2 BMI OB Status Smoking Status (LMP Unknown) 95% 40.54 kg/m2 Hysterectomy Never Smoker Vitals History BMI and BSA Data Body Mass Index Body Surface Area 40.54 kg/m 2 2.25 m 2 Preferred Pharmacy Pharmacy Name Phone [facility] [address] [phone] Your Updated Medication List  
  
   
This list is accurate as of: 8/28/17  8:27 AM.  Always use your most recent med list.  
  
  
  
  
 albuterol 90 mcg/actuation inhaler Commonly known as:  PROVENTIL HFA, VENTOLIN HFA, PROAIR HFA Take 1 Puff by inhalation every four (4) hours as needed for Wheezing. furosemide 40 mg tablet Commonly known as:  LASIX TAKE 1 TABLET BY MOUTH TWICE DAILY AS NEEDED  
  
 oxyCODONE-acetaminophen 5-325 mg per tablet Commonly known as:  PERCOCET Take 1 Tab by mouth every four (4) hours as needed for Pain. Max Daily Amount: 6 Tabs. pantoprazole 40 mg tablet Commonly known as:  PROTONIX  
TAKE 1 TAB BY MOUTH DAILY. phentermine 37.5 mg tablet Commonly known as:  ADIPEX-P Take 1/2 tablet before breakfast and before dinner  
  
 raNITIdine 150 mg tablet Commonly known as:  ZANTAC Take 150 mg by mouth two (2) times a day. topiramate 50 mg tablet Commonly known as:  TOPAMAX Take 1 Tab by mouth two (2) times a day. TYLENOL EXTRA STRENGTH 500 mg tablet Generic drug:  acetaminophen Take 1,000 mg by mouth every six (6) hours as needed for Pain.  
  
 warfarin 5 mg tablet Commonly known as:  COUMADIN Take 2 Tabs by mouth daily. Indications: DEEP VEIN THROMBOSIS PREVENTION Prescriptions Sent to Pharmacy Refills  
 warfarin (COUMADIN) 5 mg tablet 3 Sig: Take 2 Tabs by mouth daily. Indications: DEEP VEIN THROMBOSIS PREVENTION Class: Normal  
 Pharmacy: [address] [place] [address] #: [phone] Route: Oral  
  
August 2017 Details Sun Mon Tue Wed Thu Fri Sat  
    1  
  
  
  
   2  
  
  
  
   3  
  
  
  
   4  
  
  
  
   5  
  
  
  
  
  6  
  
  
  
   7  
  
  
  
   8  
  
  
  
   9  
 10  
  
  
  
   11  
  
  
  
   12  
  
  
  
  
  13  
  
  
  
   14  
  
  
  
   15  
  
  
  
   16  
  
  
  
   17  
  
  
  
   18  
  
  
  
   19  
  
  
  
  
  20  
  
  
  
   21  
  
  
  
   22  
  
  
  
   23  
  
  
  
   24  
  
  
  
   25  
  
  
  
   26  
  
  
  
  
  27  
  
  
  
   28  
  
7.5 mg  
See details 29  
  
7.5 mg  
  
   30  
  
7.5 mg  
  
   31  
  
7.5 mg Date Details 08/28 This INR check INR: 2.5 How to take your warfarin dose To take:  7.5 mg Take one and a half of the 5 mg tablets. September 2017 Details [place] Stands Tue Wed Thu Fri Sat  
       1  
  
7.5 mg  
  
   2  
  
7.5 mg  
  
  
  3  
  
7.5 mg  
  
   4  
  
7.5 mg  
  
   5  
  
7.5 mg  
  
   6  
  
7.5 mg  
  
   7  
  
7.5 mg  
  
   8  
  
7.5 mg  
  
   9  
  
7.5 mg  
  
  
  10  
  
7.5 mg  
  
   11  
  
7.5 mg  
  
   12  
  
  
  
   13  
  
  
  
   14  
  
  
  
   15  
  
  
  
   16  
  
  
  
  
  17  
  
  
  
   18  
  
  
  
   19  
  
  
  
   20  
  
  
  
   21  
  
  
  
   22  
  
  
  
   23  
  
  
  
  
  24  
  
  
  
   25  
  
  
  
   26  
  
  
  
   27  
  
  
  
   28  
  
  
  
   29  
  
  
  
   30  
  
  
  
  
 Date Details No additional details Date of next INR:  9/11/2017 How to take your warfarin dose To take:  7.5 mg Take one and a half of the 5 mg tablets. We Performed the Following AMB POC PT/INR [38859 CPT(R)] Follow-up Instructions Return in about 2 weeks (around 9/11/2017), or if symptoms worsen or fail to improve. Introducing [facility] & HEALTH SERVICES! Dear Cyrus Mayfield: Thank you for requesting a [employer] account. Our records indicate that you already have an active [employer] account. You can access your account anytime at [email or web address] Did you know that you can access your hospital and ER discharge instructions at any time in [employer]? You can also review all of your test results from your hospital stay or ER visit. Additional Information If you have questions, please visit the Frequently Asked Questions section of the [employer] website at [email or web address] Remember, [employer] is NOT to be used for urgent needs. For medical emergencies, dial 911. Now available from your iPhone and Android! Please provide this summary of care documentation to your next provider. Your primary care clinician is listed as Shayy Boo. If you have any questions after today's visit, please call [phone].

## 2017-08-28 NOTE — LETTER
NOTIFICATION RETURN TO WORK / SCHOOL 
 
8/28/2017 8:38 AM 
 
Ms. Dev Castellanos [id number] [facility] [phone] To Whom It May Concern: 
 
Dev Castellanos is currently under the care of Gaurav Ramos. She will return to work/school on: 8/28/17 I recommend that she work 6 hours per day from 8/28/17 through 9/4/17 due to her current medical condition. Starting on 9/5/2017, I recommend that she will be able to return to work 8 hours per day again with no limitations. If there are questions or concerns please have the patient contact our office.  
 
 
 
Sincerely, 
 
 
Christian Leyva MD

## 2017-08-28 NOTE — PROGRESS NOTES
Subjective:     Chief Complaint   Patient presents with    Follow-up     PT/INR      She  is a 50 y.o. female who presents for evaluation of:  DVT - noticed R calf pain and swelling. Pt did feel some dyspnea and chest tightness so got Chest CTA and neg for PE. Denies palpitations. This is the same leg she had a DVT in x 2 in past.  We stopped coumadin prior to her surgery since she had finished a > 9 month course (recs from specialists for 6-9 months during last hospitalization). Dopplers neg initially and repeat showed acute DVT. On Coumadin and was bridged with Lovenox. Rest per Anti-coag tab.     ROS  Gen - no fever/chills  Resp - no dyspnea or cough  CV - per hpi  Rest per HPI    Past Medical History:   Diagnosis Date    Asthma 3/12/2010    DDD (degenerative disc disease), lumbar     DJD (degenerative joint disease) 3/12/2010    DJD (degenerative joint disease) of knee     DVT (deep venous thrombosis) (HCC)     Rt leg    GERD (gastroesophageal reflux disease)     History of tuberculosis exposure 2013    pt states she has + PPD and was on Rx for 6 months; last dose either 11/13 or 12/13    Inflammatory polyarthritis ([address]     Psoriatic Arthitis    Morbid obesity ([address]     Pseudogout     PUD (peptic ulcer disease)     Skin abrasion 1/28/14    After loosing 125#, Bilateral Inner thigh friction flareup monthly with skin breakdown    Thromboembolus ([address] 2008    Rt leg,  pt states she fell and had a plane ride home and developed blood clots    UC (ulcerative colitis) ([address] 3/12/2010     Past Surgical History:   Procedure Laterality Date    HX ACL RECONSTRUCTION      Rt    Abdiaziz Delgadillo HX GI      reopening of rectal pouch    HX GYN  06/19/2017    Hysterectomy    HX ORTHOPAEDIC  12/13    Rt foot / toes    HX TONSILLECTOMY       Current Outpatient Prescriptions on File Prior to Visit   Medication Sig Dispense Refill    oxyCODONE-acetaminophen (PERCOCET) 5-325 mg per tablet Take 1 Tab by mouth every four (4) hours as needed for Pain. Max Daily Amount: 6 Tabs. 30 Tab 0    pantoprazole (PROTONIX) 40 mg tablet TAKE 1 TAB BY MOUTH DAILY. 30 Tab 0    furosemide (LASIX) 40 mg tablet TAKE 1 TABLET BY MOUTH TWICE DAILY AS NEEDED 180 Tab 1    ranitidine (ZANTAC) 150 mg tablet Take 150 mg by mouth two (2) times a day.  albuterol (PROVENTIL HFA, VENTOLIN HFA, PROAIR HFA) 90 mcg/actuation inhaler Take 1 Puff by inhalation every four (4) hours as needed for Wheezing. 1 Inhaler 5    acetaminophen (TYLENOL EXTRA STRENGTH) 500 mg tablet Take 1,000 mg by mouth every six (6) hours as needed for Pain.  phentermine (ADIPEX-P) 37.5 mg tablet Take 1/2 tablet before breakfast and before dinner 100 Tab 5    topiramate (TOPAMAX) 50 mg tablet Take 1 Tab by mouth two (2) times a day. 60 Tab 5     No current facility-administered medications on file prior to visit. Objective:     Vitals:    08/28/17 0810   BP: 111/55   Pulse: 62   Resp: 16   Temp: 98 °F (36.7 °C)   TempSrc: Oral   SpO2: 95%   Weight: 243 lb 9.6 oz (110.5 kg)   Height: 5' 5\" (1.651 m)     Physical Examination:  General appearance - alert, well appearing, and in no distress  Eyes -sclera anicteric  Chest - clear to auscultation, no wheezes, rales or rhonchi, symmetric air entry  Heart - normal rate, regular rhythm, normal S1, S2, no murmurs, rubs, clicks or gallops  Extr - RLE with edema and ttp over lower medial extremity. + varicose veins    Assessment/ Plan:   Diagnoses and all orders for this visit:    1. Acute deep vein thrombosis (DVT) of popliteal vein of right lower extremity (HCC)  - noted after recent Gyn surgery. Had neg dopplers but repeat Dopplers showed sig RLE DVT. This is her 3rd DVT which was again provoked so she will need to stay on blood thinners for life.   - Coumadin adjusted and INR therapeutic today  - Got Chest CTA given some chest discomfort and dyspnea and this was neg for PE.  - Stopped NSAIDs. For pain control will use Percocet acutely. I have discussed the diagnosis with the patient and the intended plan as seen in the above orders. The patient has received an after-visit summary and questions were answered concerning future plans. I have discussed medication side effects and warnings with the patient as well. The patient verbalizes understanding and agreement with the plan. Follow-up Disposition:  Return in about 2 weeks (around 9/11/2017), or if symptoms worsen or fail to improve.

## 2017-08-30 DIAGNOSIS — K21.9 GASTROESOPHAGEAL REFLUX DISEASE, ESOPHAGITIS PRESENCE NOT SPECIFIED: ICD-10-CM

## 2017-08-30 RX ORDER — PANTOPRAZOLE SODIUM 40 MG/1
TABLET, DELAYED RELEASE ORAL
Qty: 30 TAB | Refills: 0 | Status: SHIPPED | OUTPATIENT
Start: 2017-08-30 | End: 2017-10-23 | Stop reason: SDUPTHER

## 2017-09-14 ENCOUNTER — OFFICE VISIT (OUTPATIENT)
Dept: FAMILY MEDICINE CLINIC | Age: 47
End: 2017-09-14

## 2017-09-14 VITALS
RESPIRATION RATE: 18 BRPM | TEMPERATURE: 96.8 F | BODY MASS INDEX: 40.48 KG/M2 | OXYGEN SATURATION: 90 % | SYSTOLIC BLOOD PRESSURE: 133 MMHG | HEIGHT: 65 IN | HEART RATE: 73 BPM | DIASTOLIC BLOOD PRESSURE: 71 MMHG | WEIGHT: 243 LBS

## 2017-09-14 DIAGNOSIS — Z23 ENCOUNTER FOR IMMUNIZATION: ICD-10-CM

## 2017-09-14 DIAGNOSIS — I87.001 POST-THROMBOTIC SYNDROME OF RIGHT LOWER EXTREMITY: ICD-10-CM

## 2017-09-14 DIAGNOSIS — I82.401 ACUTE DEEP VEIN THROMBOSIS (DVT) OF RIGHT LOWER EXTREMITY, UNSPECIFIED VEIN (HCC): Primary | ICD-10-CM

## 2017-09-14 LAB
INR BLD: 2.2
PT POC: NORMAL SECONDS
VALID INTERNAL CONTROL?: YES

## 2017-09-14 NOTE — PROGRESS NOTES
Subjective:     Chief Complaint   Patient presents with    Follow-up     PT/INR      She  is a 50 y.o. female who presents for evaluation of:  DVT - noticed R calf pain and swelling. Pt did feel some dyspnea and chest tightness so got Chest CTA and neg for PE. Denies palpitations. This is the same leg she had a DVT in x 2 in past.  We stopped coumadin prior to her surgery since she had finished a > 9 month course (recs from specialists for 6-9 months during last hospitalization). Dopplers neg initially and repeat showed acute DVT. On Coumadin and was bridged with Lovenox. Rest per Anti-coag tab.     ROS  Gen - no fever/chills  Resp - no dyspnea or cough  CV - per hpi  Rest per HPI    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    Pseudogout     PUD (peptic ulcer disease)     Skin abrasion 1/28/14    After loosing 125#, Bilateral Inner thigh friction flareup monthly with skin breakdown    Thromboembolus ([address] 2008    Rt leg,  pt states she fell and had a plane ride home and developed blood clots    UC (ulcerative colitis) ([address] 3/12/2010     Past Surgical History:   Procedure Laterality Date    HX ACL RECONSTRUCTION      Rt    Lloyd Ansari HX GI      reopening of rectal pouch    HX GYN  06/19/2017    Hysterectomy    HX ORTHOPAEDIC  12/13    Rt foot / toes    HX TONSILLECTOMY       Current Outpatient Prescriptions on File Prior to Visit   Medication Sig Dispense Refill    pantoprazole (PROTONIX) 40 mg tablet TAKE 1 TAB BY MOUTH DAILY. 30 Tab 0    warfarin (COUMADIN) 5 mg tablet Take 2 Tabs by mouth daily. Indications: DEEP VEIN THROMBOSIS PREVENTION 60 Tab 3    oxyCODONE-acetaminophen (PERCOCET) 5-325 mg per tablet Take 1 Tab by mouth every four (4) hours as needed for Pain. Max Daily Amount: 6 Tabs. 30 Tab 0    furosemide (LASIX) 40 mg tablet TAKE 1 TABLET BY MOUTH TWICE DAILY AS NEEDED 180 Tab 1    ranitidine (ZANTAC) 150 mg tablet Take 150 mg by mouth two (2) times a day.  albuterol (PROVENTIL HFA, VENTOLIN HFA, PROAIR HFA) 90 mcg/actuation inhaler Take 1 Puff by inhalation every four (4) hours as needed for Wheezing. 1 Inhaler 5    acetaminophen (TYLENOL EXTRA STRENGTH) 500 mg tablet Take 1,000 mg by mouth every six (6) hours as needed for Pain.  phentermine (ADIPEX-P) 37.5 mg tablet Take 1/2 tablet before breakfast and before dinner 100 Tab 5    topiramate (TOPAMAX) 50 mg tablet Take 1 Tab by mouth two (2) times a day. 60 Tab 5     No current facility-administered medications on file prior to visit. Objective:     Vitals:    09/14/17 0809   BP: 133/71   Pulse: 73   Resp: 18   Temp: 96.8 °F (36 °C)   TempSrc: Oral   SpO2: 90%   Weight: 243 lb (110.2 kg)   Height: 5' 5\" (1.651 m)     Physical Examination:  General appearance - alert, well appearing, and in no distress  Eyes -sclera anicteric  Chest - clear to auscultation, no wheezes, rales or rhonchi, symmetric air entry  Heart - normal rate, regular rhythm, normal S1, S2, no murmurs, rubs, clicks or gallops  Extr - RLE with edema and ttp over lower medial extremity. + varicose veins    Assessment/ Plan:   Diagnoses and all orders for this visit:    1. Acute deep vein thrombosis (DVT) of popliteal vein of right lower extremity (HCC)  - noted after recent Gyn surgery. Had neg dopplers but repeat Dopplers showed sig RLE DVT.   This is her 3rd DVT which was again provoked so she will need to stay on blood thinners for life. - Coumadin adjusted and INR therapeutic today    2. Post-thrombotic syndrome of right lower extremity - ct exercises, leg elevation, new rx for compression stockings, and sending to PT for this. -     COMPR. STOCKING,THIGH,REG,X-LRG (COMP. STOCKING,THIGH,REG,X-LRG) misc; For daily use, take off at bedtime. Please disp 1 pair    3. Encounter for immunization  -     Influenza virus vaccine (QUADRIVALENT PRES FREE SYRINGE) IM ([id number])    I have discussed the diagnosis with the patient and the intended plan as seen in the above orders. The patient has received an after-visit summary and questions were answered concerning future plans. I have discussed medication side effects and warnings with the patient as well. The patient verbalizes understanding and agreement with the plan. Follow-up Disposition:  Return in about 4 weeks (around 10/12/2017), or if symptoms worsen or fail to improve.

## 2017-09-14 NOTE — PATIENT INSTRUCTIONS
For your leg swelling and pain, please try compression, exercise, elevating your legs, and escin (hoarse chestnut seed extract).   You may continue the Lasix and try to rarely use the percocet

## 2017-09-14 NOTE — PROGRESS NOTES
Chief Complaint   Patient presents with    Follow-up     PT/INR   Right Leg swelling and pain last two days  Room 7  Flu vaccine given without reaction after patient in office for 15 minutes.

## 2017-09-14 NOTE — MR AVS SNAPSHOT
Visit Information Date & Time Provider Department Dept. Phone Encounter #  
 9/14/2017  8:00 AM Ray Perkins MD [facility] at [address] [id number] Follow-up Instructions Return in about 4 weeks (around 10/12/2017), or if symptoms worsen or fail to improve. Your Appointments 10/19/2017 11:10 AM  
Follow Up with Meggan Smith MD  
[facility] [address] [facility]) Appt Note: f/u                  3 mon One [employer] [address] [phone] [facility] Upcoming Health Maintenance Date Due INFLUENZA AGE 9 TO ADULT 8/1/2017 PAP AKA CERVICAL CYTOLOGY 11/19/2017 DTaP/Tdap/Td series (2 - Td) 5/17/2026 Allergies as of 9/14/2017  Review Complete On: 9/14/2017 By: Ray Perkins MD  
  
 Severity Noted Reaction Type Reactions Sulfa (Sulfonamide Antibiotics) High 03/12/2010    Anaphylaxis Codeine Medium 03/17/2010    Itching Current Immunizations  Reviewed on 5/2/2016 Name Date Influenza Vaccine (Quad) PF  Incomplete, 10/27/2016, 10/30/2015 Influenza Vaccine PF 10/11/2013 Influenza Vaccine Split 12/13/2012, 10/5/2011 Pneumococcal Polysaccharide (PPSV-23) 4/11/2016 11:40 AM  
  
 Not reviewed this visit You Were Diagnosed With   
  
 Codes Comments Acute deep vein thrombosis (DVT) of right lower extremity, unspecified vein (HCC)    -  Primary ICD-10-CM: I82.401 ICD-9-CM: 453.40 Post-thrombotic syndrome of right lower extremity     ICD-10-CM: I87.001 ICD-9-CM: 459.10 Encounter for immunization     ICD-10-CM: T66 ICD-9-CM: V03.89 Vitals BP Pulse Temp Resp Height(growth percentile) Weight(growth percentile) 133/71 (BP 1 Location: Right arm, BP Patient Position: Sitting) 73 96.8 °F (36 °C) (Oral) 18 5' 5\" (1.651 m) 243 lb (110.2 kg) LMP SpO2 BMI OB Status Smoking Status (LMP Unknown) 90% 40.44 kg/m2 Hysterectomy Never Smoker Vitals History BMI and BSA Data Body Mass Index Body Surface Area 40.44 kg/m 2 2.25 m 2 Preferred Pharmacy Pharmacy Name Phone [facility], [address] [place] [address] [phone] Your Updated Medication List  
  
   
This list is accurate as of: 9/14/17  8:31 AM.  Always use your most recent med list.  
  
  
  
  
 albuterol 90 mcg/actuation inhaler Commonly known as:  PROVENTIL HFA, VENTOLIN HFA, PROAIR HFA Take 1 Puff by inhalation every four (4) hours as needed for Wheezing. furosemide 40 mg tablet Commonly known as:  LASIX TAKE 1 TABLET BY MOUTH TWICE DAILY AS NEEDED  
  
 oxyCODONE-acetaminophen 5-325 mg per tablet Commonly known as:  PERCOCET Take 1 Tab by mouth every four (4) hours as needed for Pain. Max Daily Amount: 6 Tabs. pantoprazole 40 mg tablet Commonly known as:  PROTONIX  
TAKE 1 TAB BY MOUTH DAILY. phentermine 37.5 mg tablet Commonly known as:  ADIPEX-P Take 1/2 tablet before breakfast and before dinner  
  
 raNITIdine 150 mg tablet Commonly known as:  ZANTAC Take 150 mg by mouth two (2) times a day. topiramate 50 mg tablet Commonly known as:  TOPAMAX Take 1 Tab by mouth two (2) times a day. TYLENOL EXTRA STRENGTH 500 mg tablet Generic drug:  acetaminophen Take 1,000 mg by mouth every six (6) hours as needed for Pain.  
  
 warfarin 5 mg tablet Commonly known as:  COUMADIN Take 2 Tabs by mouth daily. Indications: DEEP VEIN THROMBOSIS PREVENTION September 2017 Details Sun Mon Tue Wed Thu Fri Sat  
       1  
  
  
  
   2  
  
  
  
  
  3  
  
  
  
   4  
  
  
  
   5  
  
  
  
   6  
  
  
  
   7  
  
  
  
   8  
  
  
  
   9  
  
  
  
  
  10  
  
  
  
   11  
  
  
  
   12  
  
  
  
   13  
  
  
  
   14  
  
7.5 mg  
See details    15  
 7.5 mg  
  
   16  
  
7.5 mg  
  
  
  17  
  
7.5 mg  
  
   18  
  
7.5 mg  
  
   19  
  
7.5 mg  
  
   20  
  
7.5 mg  
  
   21  
  
7.5 mg  
  
   22  
  
7.5 mg  
  
   23  
  
7.5 mg  
  
  
  24  
  
7.5 mg  
  
   25  
  
7.5 mg  
  
   26  
  
7.5 mg  
  
   27  
  
7.5 mg  
  
   28  
  
7.5 mg  
  
   29  
  
7.5 mg  
  
   30  
  
7.5 mg Date Details 09/14 This INR check INR: 2.2 How to take your warfarin dose To take:  7.5 mg Take one and a half of the 5 mg tablets. October 2017 Details Anabelle Wall Wed Thu Fri Sat  
  1  
  
7.5 mg  
  
   2  
  
7.5 mg  
  
   3  
  
7.5 mg  
  
   4  
  
7.5 mg  
  
   5  
  
7.5 mg  
  
   6  
  
7.5 mg  
  
   7  
  
7.5 mg  
  
  
  8  
  
7.5 mg  
  
   9  
  
7.5 mg  
  
   10  
  
7.5 mg  
  
   11  
  
7.5 mg  
  
   12  
  
7.5 mg  
  
   13  
  
  
  
   14  
  
  
  
  
  15  
  
  
  
   16  
  
  
  
   17  
  
  
  
   18  
  
  
  
   19  
  
  
  
   20  
  
  
  
   21  
  
  
  
  
  22  
  
  
  
   23  
  
  
  
   24  
  
  
  
   25  
  
  
  
   26  
  
  
  
   27  
  
  
  
   28  
  
  
  
  
  29  
  
  
  
   30  
  
  
  
   31  
  
  
  
      
 Date Details No additional details Date of next INR:  10/12/2017 How to take your warfarin dose To take:  7.5 mg Take one and a half of the 5 mg tablets. We Performed the Following AMB POC PT/INR [08002 CPT(R)] INFLUENZA VIRUS VAC QUAD,SPLIT,PRESV FREE SYRINGE IM [id number] CPT(R)] Follow-up Instructions Return in about 4 weeks (around 10/12/2017), or if symptoms worsen or fail to improve. Patient Instructions For your leg swelling and pain, please try compression, exercise, elevating your legs, and escin (hoarse chestnut seed extract). You may continue the Lasix and try to rarely use the percocet Introducing [facility] & HEALTH SERVICES! Dear Gwendolyn Oviedo: Thank you for requesting a [employer] account. Our records indicate that you already have an active [employer] account. You can access your account anytime at [email or web address] Did you know that you can access your hospital and ER discharge instructions at any time in [employer]? You can also review all of your test results from your hospital stay or ER visit. Additional Information If you have questions, please visit the Frequently Asked Questions section of the [employer] website at [email or web address] Remember, [employer] is NOT to be used for urgent needs. For medical emergencies, dial 911. Now available from your iPhone and Android! Please provide this summary of care documentation to your next provider. Your primary care clinician is listed as Taty Zamora. If you have any questions after today's visit, please call [phone].

## 2017-09-30 ENCOUNTER — APPOINTMENT (OUTPATIENT)
Dept: CT IMAGING | Age: 47
End: 2017-09-30
Attending: EMERGENCY MEDICINE
Payer: COMMERCIAL

## 2017-09-30 ENCOUNTER — HOSPITAL ENCOUNTER (EMERGENCY)
Age: 47
Discharge: HOME | End: 2017-09-30
Attending: EMERGENCY MEDICINE
Payer: COMMERCIAL

## 2017-09-30 VITALS
RESPIRATION RATE: 18 BRPM | HEART RATE: 60 BPM | SYSTOLIC BLOOD PRESSURE: 145 MMHG | WEIGHT: 235.89 LBS | BODY MASS INDEX: 39.3 KG/M2 | HEIGHT: 65 IN | TEMPERATURE: 98.3 F | OXYGEN SATURATION: 98 % | DIASTOLIC BLOOD PRESSURE: 81 MMHG

## 2017-09-30 DIAGNOSIS — K52.9 ENTERITIS: Primary | ICD-10-CM

## 2017-09-30 DIAGNOSIS — K51.919 ULCERATIVE COLITIS WITH COMPLICATION, UNSPECIFIED LOCATION (HCC): ICD-10-CM

## 2017-09-30 DIAGNOSIS — K56.7 ILEUS, UNSPECIFIED (HCC): ICD-10-CM

## 2017-09-30 LAB
ALBUMIN SERPL-MCNC: 4.2 G/DL (ref 3.5–5)
ALBUMIN/GLOB SERPL: 0.8 {RATIO} (ref 1.1–2.2)
ALP SERPL-CCNC: 80 U/L (ref 45–117)
ALT SERPL-CCNC: 24 U/L (ref 12–78)
ANION GAP SERPL CALC-SCNC: 9 MMOL/L (ref 5–15)
APPEARANCE UR: ABNORMAL
AST SERPL-CCNC: 9 U/L (ref 15–37)
BACTERIA URNS QL MICRO: ABNORMAL /HPF
BASOPHILS # BLD: 0 K/UL (ref 0–0.1)
BASOPHILS NFR BLD: 0 % (ref 0–1)
BILIRUB SERPL-MCNC: 1.3 MG/DL (ref 0.2–1)
BILIRUB UR QL: NEGATIVE
BUN SERPL-MCNC: 18 MG/DL (ref 6–20)
BUN/CREAT SERPL: 17 (ref 12–20)
CALCIUM SERPL-MCNC: 9.5 MG/DL (ref 8.5–10.1)
CHLORIDE SERPL-SCNC: 106 MMOL/L (ref 97–108)
CO2 SERPL-SCNC: 21 MMOL/L (ref 21–32)
COLOR UR: ABNORMAL
CREAT SERPL-MCNC: 1.03 MG/DL (ref 0.55–1.02)
EOSINOPHIL # BLD: 0 K/UL (ref 0–0.4)
EOSINOPHIL NFR BLD: 0 % (ref 0–7)
EPITH CASTS URNS QL MICRO: ABNORMAL /LPF
ERYTHROCYTE [DISTWIDTH] IN BLOOD BY AUTOMATED COUNT: 13.1 % (ref 11.5–14.5)
GLOBULIN SER CALC-MCNC: 5.2 G/DL (ref 2–4)
GLUCOSE SERPL-MCNC: 102 MG/DL (ref 65–100)
GLUCOSE UR STRIP.AUTO-MCNC: NEGATIVE MG/DL
HCT VFR BLD AUTO: 46.8 % (ref 35–47)
HGB BLD-MCNC: 16.6 G/DL (ref 11.5–16)
HGB UR QL STRIP: ABNORMAL
INR BLD: 1.8 (ref 0.9–1.2)
KETONES UR QL STRIP.AUTO: NEGATIVE MG/DL
LEUKOCYTE ESTERASE UR QL STRIP.AUTO: NEGATIVE
LIPASE SERPL-CCNC: 147 U/L (ref 73–393)
LYMPHOCYTES # BLD: 1.3 K/UL (ref 0.8–3.5)
LYMPHOCYTES NFR BLD: 20 % (ref 12–49)
MCH RBC QN AUTO: 31.3 PG (ref 26–34)
MCHC RBC AUTO-ENTMCNC: 35.5 G/DL (ref 30–36.5)
MCV RBC AUTO: 88.1 FL (ref 80–99)
MONOCYTES # BLD: 0.3 K/UL (ref 0–1)
MONOCYTES NFR BLD: 4 % (ref 5–13)
NEUTS SEG # BLD: 5.1 K/UL (ref 1.8–8)
NEUTS SEG NFR BLD: 76 % (ref 32–75)
NITRITE UR QL STRIP.AUTO: NEGATIVE
PH UR STRIP: 6 [PH] (ref 5–8)
PLATELET # BLD AUTO: 330 K/UL (ref 150–400)
POTASSIUM SERPL-SCNC: 3.4 MMOL/L (ref 3.5–5.1)
PROT SERPL-MCNC: 9.4 G/DL (ref 6.4–8.2)
PROT UR STRIP-MCNC: NEGATIVE MG/DL
RBC # BLD AUTO: 5.31 M/UL (ref 3.8–5.2)
RBC #/AREA URNS HPF: ABNORMAL /HPF (ref 0–5)
SODIUM SERPL-SCNC: 136 MMOL/L (ref 136–145)
SP GR UR REFRACTOMETRY: 1.03 (ref 1–1.03)
UROBILINOGEN UR QL STRIP.AUTO: 0.2 EU/DL (ref 0.2–1)
WBC # BLD AUTO: 6.8 K/UL (ref 3.6–11)
WBC URNS QL MICRO: ABNORMAL /HPF (ref 0–4)

## 2017-09-30 PROCEDURE — 96374 THER/PROPH/DIAG INJ IV PUSH: CPT

## 2017-09-30 PROCEDURE — 99285 EMERGENCY DEPT VISIT HI MDM: CPT

## 2017-09-30 PROCEDURE — 85025 COMPLETE CBC W/AUTO DIFF WBC: CPT | Performed by: EMERGENCY MEDICINE

## 2017-09-30 PROCEDURE — 83690 ASSAY OF LIPASE: CPT | Performed by: EMERGENCY MEDICINE

## 2017-09-30 PROCEDURE — 85610 PROTHROMBIN TIME: CPT

## 2017-09-30 PROCEDURE — 80053 COMPREHEN METABOLIC PANEL: CPT | Performed by: EMERGENCY MEDICINE

## 2017-09-30 PROCEDURE — 36415 COLL VENOUS BLD VENIPUNCTURE: CPT | Performed by: EMERGENCY MEDICINE

## 2017-09-30 PROCEDURE — 96376 TX/PRO/DX INJ SAME DRUG ADON: CPT

## 2017-09-30 PROCEDURE — 96375 TX/PRO/DX INJ NEW DRUG ADDON: CPT

## 2017-09-30 PROCEDURE — 74011636320 HC RX REV CODE- 636/320: Performed by: EMERGENCY MEDICINE

## 2017-09-30 PROCEDURE — 74011250636 HC RX REV CODE- 250/636: Performed by: EMERGENCY MEDICINE

## 2017-09-30 PROCEDURE — 81001 URINALYSIS AUTO W/SCOPE: CPT | Performed by: EMERGENCY MEDICINE

## 2017-09-30 PROCEDURE — 74177 CT ABD & PELVIS W/CONTRAST: CPT

## 2017-09-30 PROCEDURE — 74011250637 HC RX REV CODE- 250/637: Performed by: EMERGENCY MEDICINE

## 2017-09-30 RX ORDER — METRONIDAZOLE 500 MG/1
500 TABLET ORAL 2 TIMES DAILY
Qty: 14 TAB | Refills: 0 | Status: SHIPPED | OUTPATIENT
Start: 2017-09-30 | End: 2018-06-07 | Stop reason: SDUPTHER

## 2017-09-30 RX ORDER — MORPHINE SULFATE 10 MG/ML
4 INJECTION, SOLUTION INTRAMUSCULAR; INTRAVENOUS ONCE
Status: COMPLETED | OUTPATIENT
Start: 2017-09-30 | End: 2017-09-30

## 2017-09-30 RX ORDER — OXYCODONE AND ACETAMINOPHEN 5; 325 MG/1; MG/1
1 TABLET ORAL
Qty: 10 TAB | Refills: 0 | Status: SHIPPED | OUTPATIENT
Start: 2017-09-30 | End: 2017-10-12

## 2017-09-30 RX ORDER — MORPHINE SULFATE 10 MG/ML
4 INJECTION, SOLUTION INTRAMUSCULAR; INTRAVENOUS
Status: COMPLETED | OUTPATIENT
Start: 2017-09-30 | End: 2017-09-30

## 2017-09-30 RX ORDER — METRONIDAZOLE 250 MG/1
500 TABLET ORAL
Status: COMPLETED | OUTPATIENT
Start: 2017-09-30 | End: 2017-09-30

## 2017-09-30 RX ORDER — CIPROFLOXACIN 500 MG/1
500 TABLET ORAL 2 TIMES DAILY
Qty: 20 TAB | Refills: 0 | Status: SHIPPED | OUTPATIENT
Start: 2017-09-30 | End: 2017-10-10

## 2017-09-30 RX ORDER — SODIUM CHLORIDE 0.9 % (FLUSH) 0.9 %
10 SYRINGE (ML) INJECTION
Status: COMPLETED | OUTPATIENT
Start: 2017-09-30 | End: 2017-09-30

## 2017-09-30 RX ORDER — ONDANSETRON 2 MG/ML
4 INJECTION INTRAMUSCULAR; INTRAVENOUS
Status: COMPLETED | OUTPATIENT
Start: 2017-09-30 | End: 2017-09-30

## 2017-09-30 RX ORDER — CIPROFLOXACIN 500 MG/1
500 TABLET ORAL
Status: COMPLETED | OUTPATIENT
Start: 2017-09-30 | End: 2017-09-30

## 2017-09-30 RX ORDER — SODIUM CHLORIDE 9 MG/ML
50 INJECTION, SOLUTION INTRAVENOUS
Status: COMPLETED | OUTPATIENT
Start: 2017-09-30 | End: 2017-09-30

## 2017-09-30 RX ADMIN — IOPAMIDOL 100 ML: 755 INJECTION, SOLUTION INTRAVENOUS at 08:37

## 2017-09-30 RX ADMIN — METRONIDAZOLE 500 MG: 250 TABLET ORAL at 12:42

## 2017-09-30 RX ADMIN — ONDANSETRON 4 MG: 2 INJECTION INTRAMUSCULAR; INTRAVENOUS at 08:12

## 2017-09-30 RX ADMIN — MORPHINE SULFATE 4 MG: 10 INJECTION INTRAMUSCULAR; INTRAVENOUS; SUBCUTANEOUS at 11:31

## 2017-09-30 RX ADMIN — CIPROFLOXACIN HYDROCHLORIDE 500 MG: 500 TABLET, FILM COATED ORAL at 12:42

## 2017-09-30 RX ADMIN — Medication 10 ML: at 08:37

## 2017-09-30 RX ADMIN — SODIUM CHLORIDE 50 ML/HR: 900 INJECTION, SOLUTION INTRAVENOUS at 08:37

## 2017-09-30 RX ADMIN — MORPHINE SULFATE 4 MG: 10 INJECTION INTRAMUSCULAR; INTRAVENOUS; SUBCUTANEOUS at 08:11

## 2017-09-30 NOTE — ED NOTES
Pt reports pain has increased since being back from imaging, no certain number given. No vomiting noted or reported. Resting in bed, watching tv. Side rails up x 2 and call bell within reach.

## 2017-09-30 NOTE — ED NOTES
Dr. Nicole Coombs reviewed discharge instructions with the patient. The patient verbalized understanding. Pt is alert and oriented. Left ambulatory with family.

## 2017-09-30 NOTE — ED NOTES
MD at bedside for Ultrasound guided IV insertion. Pt resting alert and oriented. Sipping on water.  at bedside.

## 2017-09-30 NOTE — ED NOTES
Bedside shift change report given to ELLIS Mazariegos (oncoming nurse) by Tc Luis RN (offgoing nurse). Report included the following information SBAR, Kardex, ED Summary, Procedure Summary and Intake/Output.

## 2017-09-30 NOTE — DISCHARGE INSTRUCTIONS
Colitis: Care Instructions  Your Care Instructions  Colitis is the medical term for swelling (inflammation) of the intestine. It can be caused by different things, such as an infection or loss of blood flow in the intestine. Other causes are problems like Crohn's disease or ulcerative colitis. Symptoms may include fever, diarrhea that may be bloody, or belly pain. Sometimes symptoms go away without treatment. But you may need treatment or more tests, such as blood tests or a stool test. Or you may need imaging tests like a CT scan or a colonoscopy. In some cases, the doctor may want to test a sample of tissue from the intestine. This test is called a biopsy. The doctor has checked you carefully, but problems can develop later. If you notice any problems or new symptoms, get medical treatment right away. Follow-up care is a key part of your treatment and safety. Be sure to make and go to all appointments, and call your doctor if you are having problems. It's also a good idea to know your test results and keep a list of the medicines you take. How can you care for yourself at home? · Rest until you feel better. · Your doctor may recommend that you eat bland foods. These include rice, dry toast or crackers, bananas, and applesauce. · To prevent dehydration, drink plenty of fluids. Choose water and other caffeine-free clear liquids until you feel better. If you have kidney, heart, or liver disease and have to limit fluids, talk with your doctor before you increase the amount of fluids you drink. · Be safe with medicines. Take your medicines exactly as prescribed. Call your doctor if you think you are having a problem with your medicine. You will get more details on the specific medicines your doctor prescribes. When should you call for help? Call 911 anytime you think you may need emergency care. For example, call if:  · You passed out (lost consciousness).   · You vomit blood or what looks like coffee grounds. · Your stools are maroon or very bloody. Call your doctor now or seek immediate medical care if:  · You have new or worse pain. · You have a new or higher fever. · You have new or worse symptoms. · You cannot keep fluids or medicines down. Watch closely for changes in your health, and be sure to contact your doctor if:  · You do not get better as expected. Where can you learn more? Go to [employer]  Enter [id number] in the search box to learn more about \"Colitis: Care Instructions. \"   © 2456-5788 [employer], [employer]orporated. Care instructions adapted under license by Sofi Bellamy (which disclaims liability or warranty for this information). This care instruction is for use with your licensed healthcare professional. If you have questions about a medical condition or this instruction, always ask your healthcare professional. [address] any warranty or liability for your use of this information.   Content Version: 48.6.479215; Current as of: November 20, 2015

## 2017-09-30 NOTE — CONSULTS
Surgery      Surgical consult requested in this 49 yo female with hx of Surgery for her Ulceraive Colitis in the past who now presents with abd pian, decreased output and mucousy discharge. CT scan is read as:\"Gastrointestinal tract: The patient is status post total colectomy. Fluid-filled  and slightly distended loops of small bowel distally with air-fluid levels  IMPRESSION:   1. The patient is status post total colectomy. There are fluid-filled and  distended loops of small bowel with air-fluid levels distally. Findings are  nonspecific and could represent ileus/enteritis. There is no visible point of  obstruction; however, this cannot be entirely excluded. \"    ER specifically asking if this is obstruction vs ileus/enteritis. Pt has not been vomiting. Some recent liquid stools but decreased output in past day or so. Abd is sl distended, diffuse abd tenderness but no guarding or rebound. WBC wnl    Presentation, history, imaging and exam seem consistent with ileus/enteritis but obviously obstruction can not be ruled out with complete certainty. Patient is followed by Dr Remi Salgdao from GI and has been seen by DR Reyna Escobar for colorectal as recently as April of last year. Suggest management by GI with Colorectal Surgery Consultation. Would defer to Colorectal Surgery in any case. Will sign off case.       Mere Ardon  [facility]

## 2017-09-30 NOTE — ED PROVIDER NOTES
HPI Comments: Claudell Rang is a 50 y.o. female with PMhx significant for UC, GERD, DVT on coumadin, pouchitis, and PUD who presents ambulatory to the ED with cc of constant lower abdominal pain with nausea and diarrhea x 2 days. Pt notes hx of similar symptoms secondary to pmhx, but states never to this extent. She indicates the pain has been waking her from her sleep throughout the night. Pt states she avoids taking analgesics, but endorses taking tramadol when necessary to some relief. Pt denies any recent abx use. She reports rare EtOH use, but denies tobacco and illicit drug use. Pt specifically denies fever, chills, vomiting, blood in the stool, dysuria, and hematuria. Surgical hx: colectomy, hysterectomy    PCP: Galdino Lazo MD    There are no other complaints, changes or physical findings at this time. The history is provided by the patient.         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    Pseudogout     PUD (peptic ulcer disease)     Skin abrasion 1/28/14    After loosing 125#, Bilateral Inner thigh friction flareup monthly with skin breakdown    Thromboembolus ([address] 2008    Rt leg,  pt states she fell and had a plane ride home and developed blood clots    UC (ulcerative colitis) ([address] 3/12/2010       Past Surgical History:   Procedure Laterality Date    HX ACL RECONSTRUCTION      Rt    Aliyah Jaimes HX GI      reopening of rectal pouch  HX GYN  06/19/2017    Hysterectomy    HX ORTHOPAEDIC  12/13    Rt foot / toes    HX TONSILLECTOMY           Family History:   Problem Relation Age of Onset    Hypertension Mother     Thyroid Disease Mother      Hypothyroid    Diabetes Mother     Elevated Lipids Mother     Cancer Father      brain       Social History     Social History    Marital status:      Spouse name: N/A    Number of children: N/A    Years of education: N/A     Occupational History    Not on file. Social History Main Topics    Smoking status: Never Smoker    Smokeless tobacco: Never Used    Alcohol use Yes      Comment: occasionally- very rare    Drug use: No    Sexual activity: Yes     Partners: Male     Other Topics Concern    Not on file     Social History Narrative         ALLERGIES: Sulfa (sulfonamide antibiotics) and Codeine    Review of Systems   Constitutional: Negative for chills and fever. HENT: Negative for congestion and sore throat. Eyes: Negative for visual disturbance. Respiratory: Negative for cough and shortness of breath. Cardiovascular: Negative for chest pain and leg swelling. Gastrointestinal: Positive for abdominal pain (lower), diarrhea and nausea. Negative for blood in stool and vomiting. Endocrine: Negative for polyuria. Genitourinary: Negative for dysuria, flank pain, hematuria, vaginal bleeding and vaginal discharge. Musculoskeletal: Negative for myalgias. Skin: Negative for rash. Allergic/Immunologic: Negative for immunocompromised state. Neurological: Negative for weakness and headaches. Psychiatric/Behavioral: Negative for dysphoric mood.      Patient Vitals for the past 12 hrs:   Temp Pulse Resp BP SpO2   09/30/17 1045 - - - 107/80 98 %   09/30/17 1030 - - - 134/68 -   09/30/17 1015 - - - 140/90 99 %   09/30/17 1000 - - - (!) 147/100 -   09/30/17 0930 - - - 136/73 100 %   09/30/17 0911 - - - - 100 %   09/30/17 0900 - - - 123/71 -   09/30/17 0855 - - - 118/70 -   09/30/17 0818 - - - 111/76 -   09/30/17 0542 98.3 °F (36.8 °C) 60 18 140/87 98 %            Physical Exam   Constitutional: She is oriented to person, place, and time. She appears well-developed and well-nourished. HENT:   Head: Normocephalic and atraumatic. Moist mucous membranes   Eyes: Conjunctivae are normal. Pupils are equal, round, and reactive to light. Right eye exhibits no discharge. Left eye exhibits no discharge. Neck: Normal range of motion. Neck supple. No tracheal deviation present. Cardiovascular: Normal rate, regular rhythm and normal heart sounds. No murmur heard. Pulmonary/Chest: Effort normal and breath sounds normal. No respiratory distress. She has no wheezes. She has no rales. Abdominal: Soft. Bowel sounds are normal. There is tenderness (diffuse). There is no rebound and no guarding. Musculoskeletal: Normal range of motion. She exhibits no edema, tenderness or deformity. Neurological: She is alert and oriented to person, place, and time. Skin: Skin is warm and dry. No rash noted. No erythema. Psychiatric: Her behavior is normal.   Nursing note and vitals reviewed. MDM  Number of Diagnoses or Management Options  Enteritis:   Diagnosis management comments: Patient presents with abdominal pain. DDx: Gastroenteritis, SBO, appendicitis, colitis, IBD, diverticulitis, mesenteric ischemia, AAA or descending dissection, ACS, ureteral  Stone,  pathology. Will get labs and serial abdominal exams to determine if a CT is warranted. Upon re-examination, the patient has no focal abdominal tenderness to palpation. The patient has a normal WBC and signs and symptoms are consistent with a non-surgical cause of abdominal pain. The patient has been instructed to return to the ER if the abdominal pain has not improved within 24 hours or if they have any further change in condition for a CT scan of the abdomen and pelvis.   The diagnosis, test results, medications, return instructions and follow up have been discussed with the patient. The patient has been given the opportunity to ask questions. The patient agrees and expresses understanding of the diagnosis, follow up and return instructions. The patient expresses understanding that although testing today is negative that a surgical issue could still develop and that follow up for a CT scan is essential if the symptoms have not improved in 24 hours. Amount and/or Complexity of Data Reviewed  Clinical lab tests: ordered and reviewed  Tests in the radiology section of CPT®: ordered and reviewed  Review and summarize past medical records: yes  Discuss the patient with other providers: yes (General Surgery, GI)  Independent visualization of images, tracings, or specimens: yes    Patient Progress  Patient progress: stable    ED Course       Procedures     Procedure Note- Peripheral IV Access  8:00 AM  Performed by: DO Virginia Mcrae DO gained IV access using butterfly needle for blood draw. Then placed  22 gauge needle because the patient had no vascular access. After cleaning the site with alcohol prep, the Right AC vein was localized with ultrasound guidance in an anterior approach. Line confirmation was obtained by direct visualization and good blood return. No anaesthetic was used. The line was successfully flushed with normal saline and was secured with transparent tape. Estimated blood loss: scant  The procedure took 1-15 minutes, and pt tolerated well. Written by Arminda Cardoza ED Scribe, as dictated by Virginia Hutchins DO. SIGN OUT:  9:56 AM  Patient's presentation, labs/imaging and plan of care was reviewed with Milan Snowden MD as part of sign out. They will continue care as part of the plan discussed with the patient. Milan Snowden MD's assistance in completion of this plan is greatly appreciated but it should be noted that I will be the provider of record for this patient.     Virginia Hutchins DO    ATTESTATION  This note is prepared by Norbert Munoz, acting as Scribe for [employer] DO. [employer], DO: The scribe's documentation has been prepared under my direction and personally reviewed by me in its entirety. I confirm that the note above accurately reflects all work, treatment, procedures, and medical decision making performed by me. CONSULT NOTE:   9:53 AM  Milan Ruiz MD spoke with Dr. Vilma Benito,   Specialty: General Surgery  Discussed pt's hx, disposition, and available diagnostic and imaging results. Reviewed care plans. Consultant agrees with plans as outlined. Dr. Vilma Benito to evaluate the pt in the ED in ~ one hour. Written by ROSMERY Bennett, as dictated by Shilpa Rivas. Alicja Ruiz MD.    Progress Note:  10:40 AM  Dr. Vilma Benito reports he only sees dilated bowel, no appreciable bowel obstruction. He recommends evaluation by colorectal or GI. Written by ROSMERY Bennett, as dictated by Shilpa Rivas. Alicja Ruiz MD.     CONSULT NOTE:   11:44 AM  Shilpa Rivas. Alicja Ruiz MD spoke with Dr. Len Healy,   Specialty: GI  Discussed pt's hx, disposition, and available diagnostic and imaging results. Reviewed care plans. Consultant agrees with plans as outlined. Dr. Len Healy states the pt can be treated as an outpatient with Abx. Written by ROSMERY Bennett, as dictated by Shilpa Rivas. Alicja Ruiz MD.    Progress Note:  12:00 PM  Pt re-evaluated. She states her pain has improved. Will discharge her home with GI F/U with Dr. Josefina Garcia per instructions by Dr. Len Healy. Written by ROSMERY Bennett, as dictated by Shilpa Rivas.  Alicja Ruiz MD.     LABORATORY TESTS:  Recent Results (from the past 12 hour(s))   METABOLIC PANEL, COMPREHENSIVE    Collection Time: 09/30/17  7:47 AM   Result Value Ref Range    Sodium 136 136 - 145 mmol/L    Potassium 3.4 (L) 3.5 - 5.1 mmol/L    Chloride 106 97 - 108 mmol/L    CO2 21 21 - 32 mmol/L    Anion gap 9 5 - 15 mmol/L    Glucose 102 (H) 65 - 100 mg/dL BUN 18 6 - 20 MG/DL    Creatinine 1.03 (H) 0.55 - 1.02 MG/DL    BUN/Creatinine ratio 17 12 - 20      GFR est AA >60 >60 ml/min/1.73m2    GFR est non-AA 57 (L) >60 ml/min/1.73m2    Calcium 9.5 8.5 - 10.1 MG/DL    Bilirubin, total 1.3 (H) 0.2 - 1.0 MG/DL    ALT (SGPT) 24 12 - 78 U/L    AST (SGOT) 9 (L) 15 - 37 U/L    Alk. phosphatase 80 45 - 117 U/L    Protein, total 9.4 (H) 6.4 - 8.2 g/dL    Albumin 4.2 3.5 - 5.0 g/dL    Globulin 5.2 (H) 2.0 - 4.0 g/dL    A-G Ratio 0.8 (L) 1.1 - 2.2     LIPASE    Collection Time: 09/30/17  7:47 AM   Result Value Ref Range    Lipase 147 73 - 393 U/L   CBC WITH AUTOMATED DIFF    Collection Time: 09/30/17  7:48 AM   Result Value Ref Range    WBC 6.8 3.6 - 11.0 K/uL    RBC 5.31 (H) 3.80 - 5.20 M/uL    HGB 16.6 (H) 11.5 - 16.0 g/dL    HCT 46.8 35.0 - 47.0 %    MCV 88.1 80.0 - 99.0 FL    MCH 31.3 26.0 - 34.0 PG    MCHC 35.5 30.0 - 36.5 g/dL    RDW 13.1 11.5 - 14.5 %    PLATELET 440 750 - 629 K/uL    NEUTROPHILS 76 (H) 32 - 75 %    LYMPHOCYTES 20 12 - 49 %    MONOCYTES 4 (L) 5 - 13 %    EOSINOPHILS 0 0 - 7 %    BASOPHILS 0 0 - 1 %    ABS. NEUTROPHILS 5.1 1.8 - 8.0 K/UL    ABS. LYMPHOCYTES 1.3 0.8 - 3.5 K/UL    ABS. MONOCYTES 0.3 0.0 - 1.0 K/UL    ABS. EOSINOPHILS 0.0 0.0 - 0.4 K/UL    ABS.  BASOPHILS 0.0 0.0 - 0.1 K/UL   URINALYSIS W/ RFLX MICROSCOPIC    Collection Time: 09/30/17  7:48 AM   Result Value Ref Range    Color YELLOW/STRAW      Appearance CLOUDY (A) CLEAR      Specific gravity 1.029 1.003 - 1.030      pH (UA) 6.0 5.0 - 8.0      Protein NEGATIVE  NEG mg/dL    Glucose NEGATIVE  NEG mg/dL    Ketone NEGATIVE  NEG mg/dL    Bilirubin NEGATIVE  NEG      Blood MODERATE (A) NEG      Urobilinogen 0.2 0.2 - 1.0 EU/dL    Nitrites NEGATIVE  NEG      Leukocyte Esterase NEGATIVE  NEG      WBC 0-4 0 - 4 /hpf    RBC 5-10 0 - 5 /hpf    Epithelial cells FEW FEW /lpf    Bacteria 1+ (A) NEG /hpf   POC INR    Collection Time: 09/30/17  9:59 AM   Result Value Ref Range    INR (POC) 1.8 (H) <1.2 IMAGING RESULTS:  EXAM:  CT ABDOMEN PELVIS WITH CONTRAST  INDICATION:  Abdominal pain  COMPARISON: CT of the abdomen and pelvis, 4/10/2016. Katherene Means TECHNIQUE:   Multislice helical CT was performed from the diaphragm to the symphysis pubis  with oral and intravenous contrast administration. Contiguous 5 mm axial images  were reconstructed and lung and soft tissue windows were generated. Coronal and  sagittal reformations were generated. CT dose reduction was achieved through use of a standardized protocol tailored  for this examination and automatic exposure control for dose modulation. Katherene Means FINDINGS:  INCIDENTALLY IMAGED CHEST:  Heart/vessels: Within normal limits. Lungs/Pleura: Within normal limits. .  ABDOMEN:  Liver: Within normal limits. Gallbladder/Biliary: Within normal limits. Spleen: Within normal limits. Pancreas: Within normal limits. Adrenals: Within normal limits. Kidneys: Small low-attenuation lesions in the upper pole of each kidney which  are unchanged and of likely no clinical significance. Peritoneum/Mesenteries: Within normal limits. Extraperitoneum: Within normal limits. Gastrointestinal tract: The patient is status post total colectomy. Fluid-filled  and slightly distended loops of small bowel distally with air-fluid levels  Vascular: Within normal limits. Katherene Means PELVIS:  Extraperitoneum: Within normal limits. Ureters: Within normal limits. Bladder: Within normal limits. Reproductive System: Status post hysterectomy. There appears to be a retained  left ovary with likely physiologic appearing cysts. .  MSK:   Degenerative disc disease at L4/L5. Katherene Means IMPRESSION  IMPRESSION:   1. The patient is status post total colectomy. There are fluid-filled and  distended loops of small bowel with air-fluid levels distally. Findings are  nonspecific and could represent ileus/enteritis. There is no visible point of  obstruction; however, this cannot be entirely excluded.   2. Incidental findings as above.             MEDICATIONS GIVEN:  Medications   ciprofloxacin HCl (CIPRO) tablet 500 mg (not administered)   metroNIDAZOLE (FLAGYL) tablet 500 mg (not administered)   morphine injection 4 mg (4 mg IntraVENous Given 9/30/17 0811)   ondansetron (ZOFRAN) injection 4 mg (4 mg IntraVENous Given 9/30/17 0812)   0.9% sodium chloride infusion (0 mL/hr IntraVENous IV Completed 9/30/17 0900)   iopamidol (ISOVUE-370) 76 % injection 100 mL (100 mL IntraVENous Given 9/30/17 0837)   sodium chloride (NS) flush 10 mL (10 mL IntraVENous Given 9/30/17 0837)   morphine injection 4 mg (4 mg IntraVENous Given 9/30/17 1131)       IMPRESSION:  1. Enteritis    2. Ulcerative colitis with complication, unspecified location ([facility]    3. Ileus, unspecified ([facility]        PLAN:  1. Current Discharge Medication List      START taking these medications    Details   ciprofloxacin HCl (CIPRO) 500 mg tablet Take 1 Tab by mouth two (2) times a day for 10 days. Qty: 20 Tab, Refills: 0      metroNIDAZOLE (FLAGYL) 500 mg tablet Take 1 Tab by mouth two (2) times a day for 7 days. Qty: 14 Tab, Refills: 0         CONTINUE these medications which have CHANGED    Details   oxyCODONE-acetaminophen (PERCOCET) 5-325 mg per tablet Take 1 Tab by mouth every four (4) hours as needed for Pain. Max Daily Amount: 6 Tabs. Qty: 10 Tab, Refills: 0           2. Follow-up Information     Follow up With Details Comments Lorraine Miranda MD Call in 2 days  [address]  [phone]          Return to ED if worse     DISCHARGE NOTE  12:06 PM  The patient has been re-evaluated and is ready for discharge. Reviewed available results with patient. Counseled pt on diagnosis and care plan. Pt has expressed understanding, and all questions have been answered. Pt agrees with plan and agrees to F/U as recommended, or return to the ED if their sxs worsen.  Discharge instructions have been provided and explained to the pt, along with reasons to return to the ED. Written in part by Alondra Maria, ED Scribe, as dictated by Bradley Morocho. Capo Melchor MD.    This note is prepared in part by Alondra Maria, acting as Scribe for Bradley Morocho. Capo Melchor, [address] [facility] Capo Melchor MD: The scribe's documentation has been prepared under my direction and personally reviewed by me in its entirety. I confirm that the note above accurately reflects all work, treatment, procedures, and medical decision making performed by me.

## 2017-10-12 ENCOUNTER — OFFICE VISIT (OUTPATIENT)
Dept: FAMILY MEDICINE CLINIC | Age: 47
End: 2017-10-12

## 2017-10-12 ENCOUNTER — OFFICE VISIT (OUTPATIENT)
Dept: ENDOCRINOLOGY | Age: 47
End: 2017-10-12

## 2017-10-12 VITALS
HEART RATE: 79 BPM | HEIGHT: 65 IN | WEIGHT: 239.6 LBS | DIASTOLIC BLOOD PRESSURE: 82 MMHG | BODY MASS INDEX: 39.92 KG/M2 | SYSTOLIC BLOOD PRESSURE: 125 MMHG

## 2017-10-12 VITALS
WEIGHT: 241 LBS | OXYGEN SATURATION: 93 % | TEMPERATURE: 97.6 F | RESPIRATION RATE: 18 BRPM | BODY MASS INDEX: 40.15 KG/M2 | HEART RATE: 81 BPM | SYSTOLIC BLOOD PRESSURE: 122 MMHG | HEIGHT: 65 IN | DIASTOLIC BLOOD PRESSURE: 74 MMHG

## 2017-10-12 DIAGNOSIS — R73.02 IGT (IMPAIRED GLUCOSE TOLERANCE): Primary | ICD-10-CM

## 2017-10-12 DIAGNOSIS — I82.401 ACUTE DEEP VEIN THROMBOSIS (DVT) OF RIGHT LOWER EXTREMITY, UNSPECIFIED VEIN (HCC): Primary | ICD-10-CM

## 2017-10-12 DIAGNOSIS — E66.01 OBESITIES, MORBID (HCC): ICD-10-CM

## 2017-10-12 DIAGNOSIS — I87.001 POST-THROMBOTIC SYNDROME OF RIGHT LOWER EXTREMITY: ICD-10-CM

## 2017-10-12 LAB
INR BLD: 2.5
PT POC: NORMAL SECONDS
VALID INTERNAL CONTROL?: YES

## 2017-10-12 RX ORDER — TOPIRAMATE 50 MG/1
50 TABLET, FILM COATED ORAL 2 TIMES DAILY
Qty: 60 TAB | Refills: 5 | Status: SHIPPED | OUTPATIENT
Start: 2017-10-12 | End: 2018-01-30 | Stop reason: SDUPTHER

## 2017-10-12 RX ORDER — PHENTERMINE HYDROCHLORIDE 37.5 MG/1
TABLET ORAL
Qty: 100 TAB | Refills: 5 | Status: SHIPPED | OUTPATIENT
Start: 2017-10-12 | End: 2018-01-30 | Stop reason: SDUPTHER

## 2017-10-12 NOTE — PROGRESS NOTES
Chief Complaint   Patient presents with    Weight Management     and IGT    pcp and pharmacy verified   Records since last visit reviewed. History of Present Illness: Guido Otoole is a 50 y.o. female here for follow up of obesity. Prior to our initial visit in January 2015 she had lost from 310 down to 160 but then gained back to 230s. In January 2015 I restarted Phentermine 18.75mg BID and Topiramate 50mg BID to help further her weight loss efforts. She has tolerated this regimen well with no issues of anxiety, CP, SOB, palpitations, N/V, HA, lightheadedness or dizziness. Pt has a hx of UC and Inflammatory polyarthropathy. She is still seeing Dr. Sapna Xie of Rhematology for Rheumatoid arthritis and Dr. Carol Chew and Therese Barr of  for UC. She has had complications to Embril (transaminitis) and Humira (injection site reaction). She had been struggling with issues of pelvic pain and her Gyn Dr. Maday Valentin felt is was due to an ischemic uterus. On 6/20/17 she had Supracervical Abdominal Hysterectomy without Salpingo-Oophorectomy. She has post-op complications of ileus which prolonged her hospitalization. She had post-op abdominal pain and rectal bleeding. She saw Dr. Carol hCew who treated her for pouchitis and her pain has improve. She then developed LLE swelling and pain. She has hx of DVT x3. Her A1C in July 2017 was 4.7%. On 9/30/17 she was seen in the ED with abdominal pain, diarrhea and increased mucous production. She was diagnosed with pouchitis and treated with Flagyl and Cipro by Dr. Carol Chew. She notes her abdominal pain has improved greatly. She was also started on a probiotic, that is to help. She continues to be on Coumadin for multiple and recurrent DVTs. She is followed by Dr. Karyle Barba for INR checks. Her weight today was 239, which is down from 244 in August 2017  She has been taking the phentermine and Topiramate since early September 2017. She is tolerating the medications well.    She she denies palpitations, tremors, CP, SOB, HA, AMS, or numbness. Pt is currently eating 2-3 meals per day. She has breakfast most morning. This AM she had cereal and milk and water. She will have lunch most days, yesterday she had grilled chicken, with rice and black beans and water. She has dinner around 5-6PM, last night she snacked on pistachios rather than a true \"dinner\". Pt was previously followed by the RD to help with nutrition for weight loss at [facility]. Pt has no hx of gastric bypass. She is interested in seeing a nutritionist again. She notes when she was following a vegetarian/vegan diet she did not have the pouchitis/GI issues.     Past Medical History:   Diagnosis Date    Asthma 3/12/2010    DDD (degenerative disc disease), lumbar     DJD (degenerative joint disease) 3/12/2010    DJD (degenerative joint disease) of knee     [facility]deep venous thrombosis) ([facility]     Rt leg    GERD (gastroesophageal reflux disease)     History of tuberculosis exposure 2013    pt states she has + PPD and was on Rx for 6 months; last dose either 11/13 or 12/13    Inflammatory polyarthritis ([address]     Psoriatic Arthitis    Morbid obesity ([address]     Post-thrombotic syndrome of right lower extremity 9/14/2017    Pseudogout     PUD (peptic ulcer disease)     Skin abrasion 1/28/14    After loosing 125#, Bilateral Inner thigh friction flareup monthly with skin breakdown    Thromboembolus ([address] 2008    Rt leg,  pt states she fell and had a plane ride home and developed blood clots    UC (ulcerative colitis) ([address] 3/12/2010     Past Surgical History:   Procedure Laterality Date    HX ACL RECONSTRUCTION      Rt    Marie Charles HX GI      reopening of rectal pouch    HX GYN  06/19/2017    Hysterectomy    HX ORTHOPAEDIC  12/13    Rt foot / toes    HX TONSILLECTOMY       Current Outpatient Prescriptions   Medication Sig    phentermine (ADIPEX-P) 37.5 mg tablet Take 1/2 tablet before breakfast and before dinner    topiramate (TOPAMAX) 50 mg tablet Take 1 Tab by mouth two (2) times a day.  pantoprazole (PROTONIX) 40 mg tablet TAKE 1 TAB BY MOUTH DAILY.  warfarin (COUMADIN) 5 mg tablet Take 2 Tabs by mouth daily. Indications: DEEP VEIN THROMBOSIS PREVENTION (Patient taking differently: Take 7.5 mg by mouth daily. Indications: DEEP VEIN THROMBOSIS PREVENTION)    furosemide (LASIX) 40 mg tablet TAKE 1 TABLET BY MOUTH TWICE DAILY AS NEEDED    ranitidine (ZANTAC) 150 mg tablet Take 150 mg by mouth two (2) times a day.  albuterol (PROVENTIL HFA, VENTOLIN HFA, PROAIR HFA) 90 mcg/actuation inhaler Take 1 Puff by inhalation every four (4) hours as needed for Wheezing.  acetaminophen (TYLENOL EXTRA STRENGTH) 500 mg tablet Take 1,000 mg by mouth every six (6) hours as needed for Pain. No current facility-administered medications for this visit. Allergies   Allergen Reactions    Sulfa (Sulfonamide Antibiotics) Anaphylaxis    Codeine Itching     Family History   Problem Relation Age of Onset    Hypertension Mother     Thyroid Disease Mother      Hypothyroid    Diabetes Mother     Elevated Lipids Mother     Cancer Father      brain     Social History     Social History    Marital status:      Spouse name: N/A    Number of children: N/A    Years of education: N/A     Occupational History    Not on file. Social History Main Topics    Smoking status: Never Smoker    Smokeless tobacco: Never Used    Alcohol use Yes      Comment: occasionally- very rare    Drug use: No    Sexual activity: Yes     Partners: Male     Other Topics Concern    Not on file     Social History Narrative     Review of Systems:  - Negative except as noted in HPI    Physical Examination:  Blood pressure 125/82, pulse 79, height 5' 5\" (1.651 m), weight 239 lb 9.6 oz (108.7 kg).   - General: pleasant, no distress, good eye contact  - HEENT: no exopthalmos, no periorbital edema, no scleral/conjunctival injection, EOMI, no lid lag or stare  - Cardiovascular: regular, normal rate, normal S1 and S2, no murmurs/rubs/gallops, 2+ dorsalis pedis pulses bilaterally  - Respiratory: clear to auscultation bilaterally  - Musculoskeletal: no proximal muscle weakness in upper or lower extremities  - Integumentary: no acanthosis nigricans, no rashes, no edema,   - Neurological: reflexes 2+ at biceps, no tremor  - Psychiatric: normal mood and affect    Data Reviewed:   Component      Latest Ref Rng & Units 9/30/2017 9/30/2017 9/30/2017 9/30/2017           7:48 AM  7:48 AM  7:47 AM  7:47 AM   Sodium      136 - 145 mmol/L    136   Potassium      3.5 - 5.1 mmol/L    3.4 (L)   Chloride      97 - 108 mmol/L    106   CO2      21 - 32 mmol/L    21   Anion gap      5 - 15 mmol/L    9   Glucose      65 - 100 mg/dL    102 (H)   BUN      6 - 20 MG/DL    18   Creatinine      0.55 - 1.02 MG/DL    1.03 (H)   BUN/Creatinine ratio      12 - 20      17   GFR est AA      >60 ml/min/1.73m2    >60   GFR est non-AA      >60 ml/min/1.73m2    57 (L)   Calcium      8.5 - 10.1 MG/DL    9.5   Bilirubin, total      0.2 - 1.0 MG/DL    1.3 (H)   ALT (SGPT)      12 - 78 U/L    24   AST      15 - 37 U/L    9 (L)   Alk. phosphatase      45 - 117 U/L    80   Protein, total      6.4 - 8.2 g/dL    9.4 (H)   Albumin      3.5 - 5.0 g/dL    4.2   Globulin      2.0 - 4.0 g/dL    5.2 (H)   A-G Ratio      1.1 - 2.2      0.8 (L)   WBC      3.6 - 11.0 K/uL  6.8     RBC      3.80 - 5.20 M/uL  5.31 (H)     HGB      11.5 - 16.0 g/dL  16.6 (H)     HCT      35.0 - 47.0 %  46.8     MCV      80.0 - 99.0 FL  88.1     MCH      26.0 - 34.0 PG  31.3     MCHC      30.0 - 36.5 g/dL  35.5     RDW      11.5 - 14.5 %  13.1     PLATELET      288 - 546 K/uL  330     NEUTROPHILS      32 - 75 %  76 (H)     LYMPHOCYTES      12 - 49 %  20     MONOCYTES      5 - 13 %  4 (L)     EOSINOPHILS      0 - 7 %  0     BASOPHILS      0 - 1 %  0     ABS.  NEUTROPHILS      1.8 - 8.0 K/UL  5.1     ABS. LYMPHOCYTES      0.8 - 3.5 K/UL  1.3     ABS. MONOCYTES      0.0 - 1.0 K/UL  0.3     ABS. EOSINOPHILS      0.0 - 0.4 K/UL  0.0     ABS. BASOPHILS      0.0 - 0.1 K/UL  0.0     Color       YELLOW/STRAW      Appearance      CLEAR   CLOUDY (A)      Specific gravity      1.003 - 1.030   1.029      pH (UA)      5.0 - 8.0   6.0      Protein      NEG mg/dL NEGATIVE      Glucose      NEG mg/dL NEGATIVE      Ketone      NEG mg/dL NEGATIVE      Bilirubin      NEG   NEGATIVE      Blood      NEG   MODERATE (A)      Urobilinogen      0.2 - 1.0 EU/dL 0.2      Nitrites      NEG   NEGATIVE      Leukocyte Esterase      NEG   NEGATIVE      WBC      0 - 4 /hpf 0-4      RBC      0 - 5 /hpf 5-10      Epithelial cells      FEW /lpf FEW      Bacteria      NEG /hpf 1+ (A)      Lipase      73 - 393 U/L   147        Assessment/Plan:   1) Obesity > Pt has lost about 5 pounds since she restarted the phentermine and topiramate. She is tolerating it well. Will have pt continue and will refer her to Will Cronin of nutrition for counseling. I will refill her phentermine and topiramate. 2) IGT > Her A1C was 4.7% in July 2017. Pt voices understanding and agreement with the plan. Pain noted and pt was recommended to call her PCP for further evaluation and treatment, as needed    RTC 3 months      Follow-up Disposition:  Return in about 3 months (around 1/12/2018). Copy sent to:  Vivi Brandon and Annie Zhou.

## 2017-10-12 NOTE — PROGRESS NOTES
Chief Complaint   Patient presents with    Follow-up     PT/INR   Seen in ER for abdominal pain  Discuss right foot pain  Room 7

## 2017-10-12 NOTE — PROGRESS NOTES
Subjective:     Chief Complaint   Patient presents with    Follow-up     PT/INR      She  is a 50 y.o. female who presents for evaluation of:  Seen in ER for abd pain recently and then seen by Dr. Remi Salgado after this and tx with Flagyl for pouchitis and sx resolved again. DVT in R leg and currently on coumadin. Has had DVT in same leg x 2 in past.   Ct to struggle with post thrombotic syndrome from numerous DVTs. Pain flares up at different times and is much worse with edema. She continues to manage this by resting her leg and elevating it. Rest per Anti-coag tab.     ROS  Gen - no fever/chills  Resp - no dyspnea or cough  CV - per hpi  Rest per HPI    Past Medical History:   Diagnosis Date    Asthma 3/12/2010    DDD (degenerative disc disease), lumbar     DJD (degenerative joint disease) 3/12/2010    DJD (degenerative joint disease) of knee     [facility]VT [facility]deep venous thrombosis) ([facility])     Rt leg    GERD (gastroesophageal reflux disease)     History of tuberculosis exposure 2013    pt states she has + PPD and was on Rx for 6 months; last dose either 11/13 or 12/13    Inflammatory polyarthritis ([address]     Psoriatic Arthitis    Morbid obesity ([address]     Post-thrombotic syndrome of right lower extremity 9/14/2017    Pseudogout     PUD (peptic ulcer disease)     Skin abrasion 1/28/14    After loosing 125#, Bilateral Inner thigh friction flareup monthly with skin breakdown    Thromboembolus ([address] 2008    Rt leg,  pt states she fell and had a plane ride home and developed blood clots    UC (ulcerative colitis) ([address] 3/12/2010     Past Surgical History:   Procedure Laterality Date    HX ACL RECONSTRUCTION      Rt    [facility]    Dr Payton Chatterjee HX GI      reopening of rectal pouch    HX GYN  06/19/2017    Hysterectomy    HX ORTHOPAEDIC  12/13    Rt foot / toes    HX TONSILLECTOMY       Current Outpatient Prescriptions on File Prior to Visit   Medication Sig Dispense Refill    pantoprazole (PROTONIX) 40 mg tablet TAKE 1 TAB BY MOUTH DAILY. 30 Tab 0    warfarin (COUMADIN) 5 mg tablet Take 2 Tabs by mouth daily. Indications: DEEP VEIN THROMBOSIS PREVENTION (Patient taking differently: Take 7.5 mg by mouth daily. Indications: DEEP VEIN THROMBOSIS PREVENTION) 60 Tab 3    furosemide (LASIX) 40 mg tablet TAKE 1 TABLET BY MOUTH TWICE DAILY AS NEEDED 180 Tab 1    ranitidine (ZANTAC) 150 mg tablet Take 150 mg by mouth two (2) times a day.  albuterol (PROVENTIL HFA, VENTOLIN HFA, PROAIR HFA) 90 mcg/actuation inhaler Take 1 Puff by inhalation every four (4) hours as needed for Wheezing. 1 Inhaler 5    acetaminophen (TYLENOL EXTRA STRENGTH) 500 mg tablet Take 1,000 mg by mouth every six (6) hours as needed for Pain. No current facility-administered medications on file prior to visit. Objective:     Vitals:    10/12/17 0813   BP: 122/74   Pulse: 81   Resp: 18   Temp: 97.6 °F (36.4 °C)   TempSrc: Oral   SpO2: 93%   Weight: 241 lb (109.3 kg)   Height: 5' 5\" (1.651 m)     Physical Examination:  General appearance - alert, well appearing, and in no distress  Eyes -sclera anicteric  Chest - clear to auscultation, no wheezes, rales or rhonchi, symmetric air entry  Heart - normal rate, regular rhythm, normal S1, S2, no murmurs, rubs, clicks or gallops  Extr - RLE with edema and mildly ttp over shin. + varicose veins    Assessment/ Plan:   Diagnoses and all orders for this visit:    1. Acute deep vein thrombosis (DVT) of popliteal vein of right lower extremity (HCC)  - noted after recent Gyn surgery. Had neg dopplers but repeat Dopplers showed sig RLE DVT. This is her 3rd DVT which was again provoked so plan to stay on blood thinners for life. -  INR therapeutic today    2. Post-thrombotic syndrome of right lower extremity - ct exercises, leg elevation, compression stockings, and to PT. I have discussed the diagnosis with the patient and the intended plan as seen in the above orders.   The patient has received an after-visit summary and questions were answered concerning future plans. I have discussed medication side effects and warnings with the patient as well. The patient verbalizes understanding and agreement with the plan. Follow-up Disposition:  Return in about 4 weeks (around 11/9/2017), or if symptoms worsen or fail to improve.

## 2017-10-23 DIAGNOSIS — K21.9 GASTROESOPHAGEAL REFLUX DISEASE, ESOPHAGITIS PRESENCE NOT SPECIFIED: ICD-10-CM

## 2017-10-23 RX ORDER — PANTOPRAZOLE SODIUM 40 MG/1
TABLET, DELAYED RELEASE ORAL
Qty: 30 TAB | Refills: 0 | Status: SHIPPED | OUTPATIENT
Start: 2017-10-23 | End: 2017-12-10 | Stop reason: SDUPTHER

## 2017-11-06 ENCOUNTER — TELEPHONE (OUTPATIENT)
Dept: FAMILY MEDICINE CLINIC | Age: 47
End: 2017-11-06

## 2017-11-06 NOTE — TELEPHONE ENCOUNTER
----- Message from Bee Buenrostro sent at 11/6/2017  9:19 AM EST -----  Regarding: Dr. Moses Starr, mother, is requesting a sooner appt than scheduled due to \"constant\" diarrhea. Pt best contact [phone].

## 2017-11-13 ENCOUNTER — OFFICE VISIT (OUTPATIENT)
Dept: FAMILY MEDICINE CLINIC | Age: 47
End: 2017-11-13

## 2017-11-13 VITALS
DIASTOLIC BLOOD PRESSURE: 79 MMHG | WEIGHT: 238.2 LBS | BODY MASS INDEX: 39.69 KG/M2 | RESPIRATION RATE: 16 BRPM | HEIGHT: 65 IN | TEMPERATURE: 97 F | HEART RATE: 74 BPM | SYSTOLIC BLOOD PRESSURE: 130 MMHG

## 2017-11-13 DIAGNOSIS — R19.7 DIARRHEA, UNSPECIFIED TYPE: ICD-10-CM

## 2017-11-13 DIAGNOSIS — E55.9 VITAMIN D DEFICIENCY: ICD-10-CM

## 2017-11-13 DIAGNOSIS — Z79.01 ANTICOAGULATION MONITORING, INR RANGE 2.5-3.5: ICD-10-CM

## 2017-11-13 DIAGNOSIS — I87.001 POST-THROMBOTIC SYNDROME OF RIGHT LOWER EXTREMITY: ICD-10-CM

## 2017-11-13 DIAGNOSIS — I82.401 ACUTE DEEP VEIN THROMBOSIS (DVT) OF RIGHT LOWER EXTREMITY, UNSPECIFIED VEIN (HCC): Primary | ICD-10-CM

## 2017-11-13 LAB
INR BLD: 1.8
PT POC: NORMAL SECONDS
VALID INTERNAL CONTROL?: YES

## 2017-11-13 RX ORDER — ERGOCALCIFEROL 1.25 MG/1
CAPSULE ORAL
COMMUNITY
Start: 2017-11-13 | End: 2017-11-13 | Stop reason: SDUPTHER

## 2017-11-13 RX ORDER — ERGOCALCIFEROL 1.25 MG/1
CAPSULE ORAL
Qty: 12 CAP | Refills: 0 | Status: SHIPPED | OUTPATIENT
Start: 2017-11-13 | End: 2018-03-03 | Stop reason: SDUPTHER

## 2017-11-13 NOTE — LETTER
NOTIFICATION RETURN TO WORK / SCHOOL 
 
11/13/2017 12:24 PM 
 
Ms. Zena Gar [address] [facility] [id number] To Whom It May Concern: 
 
Zena Gar is currently under the care of Gaurav Ramos. She will return to work/school on: 11/13/17 If there are questions or concerns please have the patient contact our office.  
 
 
 
Sincerely, 
 
 
Brittany Pal MD

## 2017-11-13 NOTE — PROGRESS NOTES
Chief Complaint   Patient presents with    Blood Clot     4 week follow-up     1. Have you been to the ER, urgent care clinic since your last visit? Hospitalized since your last visit? 2. Have you seen or consulted any other health care providers outside of the [address] [place] [address] since your last visit? Include any pap smears or colon screening.                  room

## 2017-11-13 NOTE — PROGRESS NOTES
Chief Complaint   Patient presents with    Follow-up     PT/INR   1. Have you been to the ER, urgent care clinic since your last visit? Hospitalized since your last visit? No    2. Have you seen or consulted any other health care providers outside of the [address] [place] [address] since your last visit? Include any pap smears or colon screening.  No  Last week had episode of diarrhea  Room 8

## 2017-11-13 NOTE — MR AVS SNAPSHOT
Visit Information Date & Time Provider Department Dept. Phone Encounter #  
 11/13/2017 11:00 AM Lloyd Becker MD [facility] at [address] [id number] Follow-up Instructions Return in about 2 weeks (around 11/27/2017). Your Appointments 1/30/2018  9:10 AM  
Follow Up with Nika Montana MD  
[facility]) Appt Note: 3 month f/u  
 [address] [facility] [address] [id number] [address] [employer] Upcoming Health Maintenance Date Due  
 PAP AKA CERVICAL CYTOLOGY 11/19/2017 DTaP/Tdap/Td series (2 - Td) 5/17/2026 Allergies as of 11/13/2017  Review Complete On: 11/13/2017 By: Lloyd Becker MD  
  
 Severity Noted Reaction Type Reactions Sulfa (Sulfonamide Antibiotics) High 03/12/2010    Anaphylaxis Codeine Medium 03/17/2010    Itching Current Immunizations  Reviewed on 5/2/2016 Name Date Influenza Vaccine (Quad) PF 9/14/2017, 10/27/2016, 10/30/2015 Influenza Vaccine PF 10/11/2013 Influenza Vaccine Split 12/13/2012, 10/5/2011 Pneumococcal Polysaccharide (PPSV-23) 4/11/2016 11:40 AM  
  
 Not reviewed this visit You Were Diagnosed With   
  
 Codes Comments Acute deep vein thrombosis (DVT) of right lower extremity, unspecified vein (HCC)    -  Primary ICD-10-CM: I82.401 ICD-9-CM: 453.40 Anticoagulation monitoring, INR range 2.5-3.5     ICD-10-CM: Z79.01 
ICD-9-CM: V58.61 Post-thrombotic syndrome of right lower extremity     ICD-10-CM: I87.001 ICD-9-CM: 459.10 Diarrhea, unspecified type     ICD-10-CM: R19.7 ICD-9-CM: 787.91 Vitamin D deficiency     ICD-10-CM: E55.9 ICD-9-CM: 268.9 Vitals BP Pulse Temp Resp Height(growth percentile) Weight(growth percentile)  130/79 (BP 1 Location: Left arm, BP Patient Position: Sitting) 74 97 °F (36.1 °C) (Oral) 16 5' 5\" (1.651 m) 238 lb 3.2 oz (108 kg) LMP BMI OB Status Smoking Status (LMP Unknown) 39.64 kg/m2 Hysterectomy Never Smoker Vitals History BMI and BSA Data Body Mass Index Body Surface Area  
 39.64 kg/m 2 2.23 m 2 Preferred Pharmacy Pharmacy Name Phone [facility] [address] [phone] Your Updated Medication List  
  
   
This list is accurate as of: 11/13/17 12:24 PM.  Always use your most recent med list.  
  
  
  
  
 albuterol 90 mcg/actuation inhaler Commonly known as:  PROVENTIL HFA, VENTOLIN HFA, PROAIR HFA Take 1 Puff by inhalation every four (4) hours as needed for Wheezing.  
  
 ergocalciferol 50,000 unit capsule Commonly known as:  ERGOCALCIFEROL Take 1 tab by mouth ONCE PER WEEK for a total of 8 weeks. Then change to TWICE PER MONTH.  
  
 furosemide 40 mg tablet Commonly known as:  LASIX TAKE 1 TABLET BY MOUTH TWICE DAILY AS NEEDED  
  
 pantoprazole 40 mg tablet Commonly known as:  PROTONIX  
TAKE 1 TAB BY MOUTH DAILY. phentermine 37.5 mg tablet Commonly known as:  ADIPEX-P Take 1/2 tablet before breakfast and before dinner  
  
 raNITIdine 150 mg tablet Commonly known as:  ZANTAC Take 150 mg by mouth two (2) times a day. topiramate 50 mg tablet Commonly known as:  TOPAMAX Take 1 Tab by mouth two (2) times a day. TYLENOL EXTRA STRENGTH 500 mg tablet Generic drug:  acetaminophen Take 1,000 mg by mouth every six (6) hours as needed for Pain.  
  
 warfarin 5 mg tablet Commonly known as:  COUMADIN Take 2 Tabs by mouth daily. Indications: DEEP VEIN THROMBOSIS PREVENTION Prescriptions Sent to Pharmacy Refills  
 ergocalciferol (ERGOCALCIFEROL) 50,000 unit capsule 0 Sig: Take 1 tab by mouth ONCE PER WEEK for a total of 8 weeks. Then change to TWICE PER MONTH.   
 Class: Normal  
 Pharmacy: [id number], [address] [place] [address] #: [phone] November 2017 Details Sun Mon Tue Wed Thu Fri Sat  
     1  
  
  
  
   2  
  
  
  
   3  
  
  
  
   4  
  
  
  
  
  5  
  
  
  
   6  
  
  
  
   7  
  
  
  
   8  
  
  
  
   9  
  
  
  
   10  
  
  
  
   11  
  
  
  
  
  12  
  
  
  
   13  
  
7.5 mg  
See details 14  
  
7.5 mg  
  
   15  
  
7.5 mg  
  
   16  
  
7.5 mg  
  
   17  
  
7.5 mg  
  
   18  
  
7.5 mg  
  
  
  19  
  
7.5 mg  
  
   20  
  
7.5 mg  
  
   21  
  
7.5 mg  
  
   22  
  
7.5 mg  
  
   23  
  
7.5 mg  
  
   24  
  
7.5 mg  
  
   25  
  
7.5 mg  
  
  
  26  
  
7.5 mg  
  
   27  
  
7.5 mg  
  
   28  
  
  
  
   29  
  
  
  
   30  
  
  
  
    
 Date Details 11/13 This INR check INR: 1.8 Date of next INR:  11/27/2017 How to take your warfarin dose To take:  7.5 mg Take one and a half of the 5 mg tablets. We Performed the Following AMB POC PT/INR [27385 CPT(R)] Follow-up Instructions Return in about 2 weeks (around 11/27/2017). Introducing [facility] SERVICES! Dear Mary Pate: Thank you for requesting a [employer] account. Our records indicate that you already have an active [employer] account. You can access your account anytime at [email or web address] Did you know that you can access your hospital and ER discharge instructions at any time in [employer]? You can also review all of your test results from your hospital stay or ER visit. Additional Information If you have questions, please visit the Frequently Asked Questions section of the [employer] website at [email or web address] Remember, [employer] is NOT to be used for urgent needs. For medical emergencies, dial 911. Now available from your iPhone and Android! Please provide this summary of care documentation to your next provider. Your primary care clinician is listed as Coretta Eckert. If you have any questions after today's visit, please call [phone].

## 2017-11-13 NOTE — PROGRESS NOTES
Subjective:     Chief Complaint   Patient presents with    Follow-up     PT/INR      She  is a 50 y.o. female who presents for evaluation of:  She reports having diarrhea last week is improved on its own. Denies any melena or hematochezia. 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 Having trouble with feeling like she has to strain for BMs even when diarrhea results. Has required dilation of rectum in past. Has an appt with GI next week. DVT in R leg and currently on coumadin. Has had DVT in same leg x 2 in past.   Ct to struggle with post thrombotic syndrome from numerous DVTs. Pain flares up at different times and is much worse with edema. She continues to manage this by resting her leg and elevating it. Rest per Anti-coag tab.     ROS  Gen - no fever/chills  Resp - no dyspnea or cough  CV - per hpi  Rest per HPI    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    Pseudogout     PUD (peptic ulcer disease)     Skin abrasion 1/28/14    After loosing 125#, Bilateral Inner thigh friction flareup monthly with skin breakdown    Thromboembolus ([address] 2008    Rt leg,  pt states she fell and had a plane ride home and developed blood clots    UC (ulcerative colitis) ([address] 3/12/2010     Past Surgical History:   Procedure Laterality Date    HX ACL RECONSTRUCTION      Rt    HX COLECTOMY  1992    Dr Madalynn Oppenheim HX GI      reopening of rectal pouch    HX GYN  06/19/2017    Hysterectomy    HX ORTHOPAEDIC  12/13    Rt foot / toes    HX TONSILLECTOMY       Current Outpatient Prescriptions on File Prior to Visit   Medication Sig Dispense Refill    pantoprazole (PROTONIX) 40 mg tablet TAKE 1 TAB BY MOUTH DAILY. 30 Tab 0    phentermine (ADIPEX-P) 37.5 mg tablet Take 1/2 tablet before breakfast and before dinner 100 Tab 5    topiramate (TOPAMAX) 50 mg tablet Take 1 Tab by mouth two (2) times a day. 60 Tab 5    warfarin (COUMADIN) 5 mg tablet Take 2 Tabs by mouth daily. Indications: DEEP VEIN THROMBOSIS PREVENTION (Patient taking differently: Take 7.5 mg by mouth daily. Indications: DEEP VEIN THROMBOSIS PREVENTION) 60 Tab 3    furosemide (LASIX) 40 mg tablet TAKE 1 TABLET BY MOUTH TWICE DAILY AS NEEDED 180 Tab 1    ranitidine (ZANTAC) 150 mg tablet Take 150 mg by mouth two (2) times a day.  albuterol (PROVENTIL HFA, VENTOLIN HFA, PROAIR HFA) 90 mcg/actuation inhaler Take 1 Puff by inhalation every four (4) hours as needed for Wheezing. 1 Inhaler 5    acetaminophen (TYLENOL EXTRA STRENGTH) 500 mg tablet Take 1,000 mg by mouth every six (6) hours as needed for Pain. No current facility-administered medications on file prior to visit. Objective:     Vitals:    11/13/17 1130   BP: 130/79   Pulse: 74   Resp: 16   Temp: 97 °F (36.1 °C)   TempSrc: Oral   Weight: 238 lb 3.2 oz (108 kg)   Height: 5' 5\" (1.651 m)     Physical Examination:  General appearance - alert, well appearing, and in no distress  Eyes -sclera anicteric  Chest - clear to auscultation, no wheezes, rales or rhonchi, symmetric air entry  Heart - normal rate, regular rhythm, normal S1, S2, no murmurs, rubs, clicks or gallops  Abdomen- soft, nondistended, diffusely mildly tender to palpation, no rebounding or guarding  Extr - RLE with edema and mildly ttp over shin. + varicose veins    Assessment/ Plan:   Diagnoses and all orders for this visit:    1.  Acute deep vein thrombosis (DVT) of popliteal vein of right lower extremity ([facility]  2. Anticoagulation monitoring, INR range 2.5-3.5  - noted after recent Gyn surgery. Had neg dopplers but repeat Dopplers showed sig RLE DVT. This is her 3rd DVT which was again provoked so plan to stay on blood thinners for life. -  INR slightly subtherapeutic today previously therapeutic so rechecking in 2 weeks  -     AMB POC PT/INR    3. Post-thrombotic syndrome of right lower extremity- ct exercises, leg elevation, compression stockings, and to PT. 4. Diarrhea, unspecified type- resolved. To see GI. Would plan for sig w/u in 1 week if other GI sx are not resolving armani with straining for BMs given hx.    5. Vitamin D deficiency  -     ergocalciferol (ERGOCALCIFEROL) 50,000 unit capsule; Take 1 tab by mouth ONCE PER WEEK for a total of 8 weeks. Then change to TWICE PER MONTH. I have discussed the diagnosis with the patient and the intended plan as seen in the above orders. The patient has received an after-visit summary and questions were answered concerning future plans. I have discussed medication side effects and warnings with the patient as well. The patient verbalizes understanding and agreement with the plan. Follow-up Disposition:  Return in about 2 weeks (around 11/27/2017).

## 2017-11-27 ENCOUNTER — OFFICE VISIT (OUTPATIENT)
Dept: FAMILY MEDICINE CLINIC | Age: 47
End: 2017-11-27

## 2017-11-27 VITALS
HEART RATE: 76 BPM | RESPIRATION RATE: 16 BRPM | BODY MASS INDEX: 38.95 KG/M2 | HEIGHT: 65 IN | WEIGHT: 233.8 LBS | SYSTOLIC BLOOD PRESSURE: 118 MMHG | TEMPERATURE: 98 F | DIASTOLIC BLOOD PRESSURE: 54 MMHG

## 2017-11-27 DIAGNOSIS — Z51.81 MONITORING FOR LONG-TERM ANTICOAGULANT USE: ICD-10-CM

## 2017-11-27 DIAGNOSIS — Z79.01 MONITORING FOR LONG-TERM ANTICOAGULANT USE: ICD-10-CM

## 2017-11-27 DIAGNOSIS — I82.401 ACUTE DEEP VEIN THROMBOSIS (DVT) OF RIGHT LOWER EXTREMITY, UNSPECIFIED VEIN (HCC): Primary | ICD-10-CM

## 2017-11-27 DIAGNOSIS — I87.001 POST-THROMBOTIC SYNDROME OF RIGHT LOWER EXTREMITY: ICD-10-CM

## 2017-11-27 LAB
INR BLD: 2.4
PT POC: NORMAL SECONDS
VALID INTERNAL CONTROL?: YES

## 2017-11-27 RX ORDER — CIPROFLOXACIN 500 MG/1
TABLET ORAL
Refills: 0 | COMMUNITY
Start: 2017-11-21 | End: 2018-01-03 | Stop reason: ALTCHOICE

## 2017-11-27 RX ORDER — METHOTREXATE 2.5 MG/1
TABLET ORAL
Refills: 2 | COMMUNITY
Start: 2017-11-21

## 2017-11-27 NOTE — MR AVS SNAPSHOT
Visit Information Date & Time Provider Department Dept. Phone Encounter #  
 11/27/2017  8:30 AM Ludmila Viramontes MD [facility] at [address] [id number] Follow-up Instructions Return in about 3 weeks (around 12/18/2017). Your Appointments 1/30/2018  9:10 AM  
Follow Up with MD [facility] [address]) Appt Note: 3 month f/u  
 [address] [id number] [address] [facility] Upcoming Health Maintenance Date Due  
 PAP AKA CERVICAL CYTOLOGY 11/19/2017 DTaP/Tdap/Td series (2 - Td) 5/17/2026 Allergies as of 11/27/2017  Review Complete On: 11/27/2017 By: Farzad Mckenzie LPN Severity Noted Reaction Type Reactions Sulfa (Sulfonamide Antibiotics) High 03/12/2010    Anaphylaxis Codeine Medium 03/17/2010    Itching Current Immunizations  Reviewed on 5/2/2016 Name Date Influenza Vaccine (Quad) PF 9/14/2017, 10/27/2016, 10/30/2015 Influenza Vaccine PF 10/11/2013 Influenza Vaccine Split 12/13/2012, 10/5/2011 Pneumococcal Polysaccharide (PPSV-23) 4/11/2016 11:40 AM  
  
 Not reviewed this visit You Were Diagnosed With   
  
 Codes Comments Acute deep vein thrombosis (DVT) of right lower extremity, unspecified vein (HCC)    -  Primary ICD-10-CM: I82.401 ICD-9-CM: 453.40 Monitoring for long-term anticoagulant use     ICD-10-CM: Z51.81, Z79.01 
ICD-9-CM: V58.61 Post-thrombotic syndrome of right lower extremity     ICD-10-CM: I87.001 ICD-9-CM: 459.10 Vitals BP Pulse Temp Resp Height(growth percentile) Weight(growth percentile) 118/54 (BP 1 Location: Left arm, BP Patient Position: Sitting) 76 98 °F (36.7 °C) (Oral) 16 5' 5\" (1.651 m) 233 lb 12.8 oz (106.1 kg) LMP BMI OB Status Smoking Status (LMP Unknown) 38.91 kg/m2 Hysterectomy Never Smoker Vitals History BMI and BSA Data Body Mass Index Body Surface Area  
 38.91 kg/m 2 2.21 m 2 Preferred Pharmacy Pharmacy [facility] Phone [facility] [address] [phone] Your Updated Medication List  
  
   
This list is accurate as of: 11/27/17  8:48 AM.  Always use your most recent med list.  
  
  
  
  
 albuterol 90 mcg/actuation inhaler Commonly known as:  PROVENTIL HFA, VENTOLIN HFA, PROAIR HFA Take 1 Puff by inhalation every four (4) hours as needed for Wheezing. ciprofloxacin HCl 500 mg tablet Commonly known as:  CIPRO TAKE ONE TABLET BY MOUTH TWICE A DAY  
  
 ergocalciferol 50,000 unit capsule Commonly known as:  ERGOCALCIFEROL Take 1 tab by mouth ONCE PER WEEK for a total of 8 weeks. Then change to TWICE PER MONTH.  
  
 furosemide 40 mg tablet Commonly known as:  LASIX TAKE 1 TABLET BY MOUTH TWICE DAILY AS NEEDED  
  
 methotrexate 2.5 mg tablet Commonly known as:  RHEUMATREX  
TAKE 8 TABLETS BY MOUTH ONCE PER WEEK  
  
 pantoprazole 40 mg tablet Commonly known as:  PROTONIX  
TAKE 1 TAB BY MOUTH DAILY. phentermine 37.5 mg tablet Commonly known as:  ADIPEX-P Take 1/2 tablet before breakfast and before dinner  
  
 raNITIdine 150 mg tablet Commonly known as:  ZANTAC Take 150 mg by mouth two (2) times a day. topiramate 50 mg tablet Commonly known as:  TOPAMAX Take 1 Tab by mouth two (2) times a day. TYLENOL EXTRA STRENGTH 500 mg tablet Generic drug:  acetaminophen Take 1,000 mg by mouth every six (6) hours as needed for Pain.  
  
 warfarin 5 mg tablet Commonly known as:  COUMADIN Take 2 Tabs by mouth daily. Indications: DEEP VEIN THROMBOSIS PREVENTION November 2017 Details Prince Antunez Tue Wed Thu Fri Sat  
     1  
  
  
  
   2  
  
  
  
   3  
  
  
  
   4  
  
  
  
  
  5  
  
  
  
   6  
  
  
 7  
  
  
  
   8  
  
  
  
   9  
  
  
  
   10  
  
  
  
   11  
  
  
  
  
  12  
  
  
  
   13  
  
  
  
   14  
  
  
  
   15  
  
  
  
   16  
  
  
  
   17  
  
  
  
   18  
  
  
  
  
  19  
  
  
  
   20  
  
  
  
   21  
  
  
  
   22  
  
  
  
   23  
  
  
  
   24  
  
  
  
   25  
  
  
  
  
  26  
  
  
  
   27  
  
7.5 mg  
See details 28  
  
7.5 mg  
  
   29  
  
7.5 mg  
  
   30  
  
7.5 mg Date Details 11/27 This INR check INR: 2.4 How to take your warfarin dose To take:  7.5 mg Take one and a half of the 5 mg tablets. December 2017 Details Emir Samuel Tue Wed Thu Fri Sat  
       1  
  
7.5 mg  
  
   2  
  
7.5 mg  
  
  
  3  
  
7.5 mg  
  
   4  
  
7.5 mg  
  
   5  
  
7.5 mg  
  
   6  
  
7.5 mg  
  
   7  
  
7.5 mg  
  
   8  
  
7.5 mg  
  
   9  
  
7.5 mg  
  
  
  10  
  
7.5 mg  
  
   11  
  
7.5 mg  
  
   12  
  
7.5 mg  
  
   13  
  
7.5 mg  
  
   14  
  
7.5 mg  
  
   15  
  
7.5 mg  
  
   16  
  
7.5 mg  
  
  
  17  
  
7.5 mg  
  
   18  
  
7.5 mg  
  
   19  
  
  
  
   20  
  
  
  
   21  
  
  
  
   22  
  
  
  
   23  
  
  
  
  
  24  
  
  
  
   25  
  
  
  
   26  
  
  
  
   27  
  
  
  
   28  
  
  
  
   29  
  
  
  
   30  
  
  
  
  
  31  
  
  
  
        
 Date Details No additional details Date of next INR:  12/18/2017 How to take your warfarin dose To take:  7.5 mg Take one and a half of the 5 mg tablets. We Performed the Following AMB POC PT/INR [27502 CPT(R)] Follow-up Instructions Return in about 3 weeks (around 12/18/2017). Introducing hospitals & HEALTH SERVICES! Dear Sarah Tony: Thank you for requesting a [employer] account. Our records indicate that you already have an active [employer] account. You can access your account anytime at [email or web address] Did you know that you can access your hospital and ER discharge instructions at any time in [employer]? You can also review all of your test results from your hospital stay or ER visit. Additional Information If you have questions, please visit the Frequently Asked Questions section of the [employer] website at [email or web address] Remember, [employer] is NOT to be used for urgent needs. For medical emergencies, dial 911. Now available from your iPhone and Android! Please provide this summary of care documentation to your next provider. Your primary care clinician is listed as Aubrey Navarro. If you have any questions after today's visit, please call [phone].

## 2017-11-27 NOTE — PROGRESS NOTES
Subjective:     Chief Complaint   Patient presents with    Follow-up     PT/INR      She  is a 50 y.o. female who presents for evaluation of:  Since last appt, saw GI and planning for colonoscopy. Was put on Cipro and taking x 3 days now    DVT in R leg and currently on coumadin. Has had DVT in same leg x 2 in past.   Ct to struggle with post thrombotic syndrome from numerous DVTs. Pain flares up at different times and is much worse with edema. She continues to manage this by resting her leg and elevating it. Rest per Anti-coag tab.     ROS  Gen - no fever/chills  Resp - no dyspnea or cough  CV - per hpi  Rest per HPI    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    Pseudogout     PUD (peptic ulcer disease)     Skin abrasion 1/28/14    After loosing 125#, Bilateral Inner thigh friction flareup monthly with skin breakdown    Thromboembolus ([address] 2008    Rt leg,  pt states she fell and had a plane ride home and developed blood clots    UC (ulcerative colitis) ([address] 3/12/2010     Past Surgical History:   Procedure Laterality Date    HX ACL RECONSTRUCTION      Rt    Sofie Stauffer HX GI      reopening of rectal pouch    HX GYN  06/19/2017    Hysterectomy    HX ORTHOPAEDIC  12/13    Rt foot / toes    HX TONSILLECTOMY       Current Outpatient Prescriptions on File Prior to Visit   Medication Sig Dispense Refill    ergocalciferol (ERGOCALCIFEROL) 50,000 unit capsule Take 1 tab by mouth ONCE PER WEEK for a total of 8 weeks. Then change to TWICE PER MONTH. 12 Cap 0    pantoprazole (PROTONIX) 40 mg tablet TAKE 1 TAB BY MOUTH DAILY. 30 Tab 0    phentermine (ADIPEX-P) 37.5 mg tablet Take 1/2 tablet before breakfast and before dinner 100 Tab 5    topiramate (TOPAMAX) 50 mg tablet Take 1 Tab by mouth two (2) times a day. 60 Tab 5    warfarin (COUMADIN) 5 mg tablet Take 2 Tabs by mouth daily. Indications: DEEP VEIN THROMBOSIS PREVENTION (Patient taking differently: Take 7.5 mg by mouth daily. Indications: DEEP VEIN THROMBOSIS PREVENTION) 60 Tab 3    furosemide (LASIX) 40 mg tablet TAKE 1 TABLET BY MOUTH TWICE DAILY AS NEEDED 180 Tab 1    ranitidine (ZANTAC) 150 mg tablet Take 150 mg by mouth two (2) times a day.  albuterol (PROVENTIL HFA, VENTOLIN HFA, PROAIR HFA) 90 mcg/actuation inhaler Take 1 Puff by inhalation every four (4) hours as needed for Wheezing. 1 Inhaler 5    acetaminophen (TYLENOL EXTRA STRENGTH) 500 mg tablet Take 1,000 mg by mouth every six (6) hours as needed for Pain. No current facility-administered medications on file prior to visit. Objective:     Vitals:    11/27/17 0830   BP: 118/54   Pulse: 76   Resp: 16   Temp: 98 °F (36.7 °C)   TempSrc: Oral   Weight: 233 lb 12.8 oz (106.1 kg)   Height: 5' 5\" (1.651 m)     Physical Examination:  General appearance - alert, well appearing, and in no distress  Eyes -sclera anicteric  Chest - clear to auscultation, no wheezes, rales or rhonchi, symmetric air entry  Heart - normal rate, regular rhythm, normal S1, S2, no murmurs, rubs, clicks or gallops  Abdomen- soft, nondistended, diffusely mildly tender to palpation, no rebounding or guarding  Extr - RLE with edema and mildly ttp over shin. + varicose veins    Assessment/ Plan:   Diagnoses and all orders for this visit:    1. Acute deep vein thrombosis (DVT) of right lower extremity, unspecified vein (HCC)  2.  Monitoring for long-term anticoagulant use  - noted after Gyn surgery. Had neg dopplers but repeat Dopplers showed sig RLE DVT. This is her 3rd DVT which was again provoked so plan to stay on blood thinners for life. -  INR therapeutic so rechecking in 3 weeks (prior to [place])  -     AMB POC PT/INR    3. Post-thrombotic syndrome of right lower extremity- ct exercises, leg elevation, compression stockings, and to PT. To have colonoscopy done soon as well. Would consider Xarelto or another NOAC to help with rapid onset of blood thinning but prev had concerns from Vascular & Colorectal standpoint given pouchitis issues - see prev hospitalization notes. Will hold Coumadin dose today and on day of Colonoscopy since INR is 2.4 today after 3 days of Cipro    I have discussed the diagnosis with the patient and the intended plan as seen in the above orders. The patient has received an after-visit summary and questions were answered concerning future plans. I have discussed medication side effects and warnings with the patient as well. The patient verbalizes understanding and agreement with the plan. Follow-up Disposition:  Return in about 3 weeks (around 12/18/2017).

## 2017-11-27 NOTE — PROGRESS NOTES
Chief Complaint   Patient presents with    Follow-up     PT/INR   1. Have you been to the ER, urgent care clinic since your last visit? Hospitalized since your last visit? No    2. Have you seen or consulted any other health care providers outside of the [address] [place] [address] since your last visit? Include any pap smears or colon screening.  Yes Where: Last Tuesday [facility]   Room 8

## 2017-12-10 DIAGNOSIS — K21.9 GASTROESOPHAGEAL REFLUX DISEASE, ESOPHAGITIS PRESENCE NOT SPECIFIED: ICD-10-CM

## 2017-12-10 RX ORDER — PANTOPRAZOLE SODIUM 40 MG/1
TABLET, DELAYED RELEASE ORAL
Qty: 30 TAB | Refills: 0 | Status: SHIPPED | OUTPATIENT
Start: 2017-12-10 | End: 2018-01-30

## 2017-12-18 ENCOUNTER — OFFICE VISIT (OUTPATIENT)
Dept: FAMILY MEDICINE CLINIC | Age: 47
End: 2017-12-18

## 2017-12-18 VITALS
OXYGEN SATURATION: 98 % | WEIGHT: 237.8 LBS | HEIGHT: 65 IN | RESPIRATION RATE: 20 BRPM | SYSTOLIC BLOOD PRESSURE: 132 MMHG | HEART RATE: 80 BPM | TEMPERATURE: 97.1 F | DIASTOLIC BLOOD PRESSURE: 81 MMHG | BODY MASS INDEX: 39.62 KG/M2

## 2017-12-18 DIAGNOSIS — I87.001 POST-THROMBOTIC SYNDROME OF RIGHT LOWER EXTREMITY: ICD-10-CM

## 2017-12-18 DIAGNOSIS — Z79.01 MONITORING FOR LONG-TERM ANTICOAGULANT USE: ICD-10-CM

## 2017-12-18 DIAGNOSIS — J01.00 ACUTE NON-RECURRENT MAXILLARY SINUSITIS: ICD-10-CM

## 2017-12-18 DIAGNOSIS — K51.919 ULCERATIVE COLITIS WITH COMPLICATION, UNSPECIFIED LOCATION (HCC): ICD-10-CM

## 2017-12-18 DIAGNOSIS — I82.401 ACUTE DEEP VEIN THROMBOSIS (DVT) OF RIGHT LOWER EXTREMITY, UNSPECIFIED VEIN (HCC): Primary | ICD-10-CM

## 2017-12-18 DIAGNOSIS — Z51.81 MONITORING FOR LONG-TERM ANTICOAGULANT USE: ICD-10-CM

## 2017-12-18 LAB
INR BLD: 1.8
PT POC: NORMAL SECONDS
VALID INTERNAL CONTROL?: YES

## 2017-12-18 RX ORDER — AMOXICILLIN AND CLAVULANATE POTASSIUM 875; 125 MG/1; MG/1
1 TABLET, FILM COATED ORAL 2 TIMES DAILY
Qty: 14 TAB | Refills: 0 | Status: SHIPPED | OUTPATIENT
Start: 2017-12-18 | End: 2017-12-25

## 2017-12-18 NOTE — MR AVS SNAPSHOT
Visit Information Date & Time Provider Department Dept. Phone Encounter #  
 12/18/2017  8:30 AM Hugo Andrade MD [facility] at [address] [id number] Follow-up Instructions Return in about 2 weeks (around 1/1/2018), or if symptoms worsen or fail to improve. Your Appointments 1/30/2018  9:10 AM  
Follow Up with Kelsey Bernal MD  
[place] [address] [facility]) Appt Note: 3 month f/u  
 [address] [facility] [address] [phone] [facility] Upcoming Health Maintenance Date Due  
 PAP AKA CERVICAL CYTOLOGY 11/19/2017 DTaP/Tdap/Td series (2 - Td) 5/17/2026 Allergies as of 12/18/2017  Review Complete On: 11/27/2017 By: Washington Dexter LPN Severity Noted Reaction Type Reactions Sulfa (Sulfonamide Antibiotics) High 03/12/2010    Anaphylaxis Codeine Medium 03/17/2010    Itching Current Immunizations  Reviewed on 5/2/2016 Name Date Influenza Vaccine (Quad) PF 9/14/2017, 10/27/2016, 10/30/2015 Influenza Vaccine PF 10/11/2013 Influenza Vaccine Split 12/13/2012, 10/5/2011 Pneumococcal Polysaccharide (PPSV-23) 4/11/2016 11:40 AM  
  
 Not reviewed this visit You Were Diagnosed With   
  
 Codes Comments Acute deep vein thrombosis (DVT) of right lower extremity, unspecified vein (HCC)    -  Primary ICD-10-CM: I82.401 ICD-9-CM: 453.40 Monitoring for long-term anticoagulant use     ICD-10-CM: Z51.81, Z79.01 
ICD-9-CM: V58.61 Post-thrombotic syndrome of right lower extremity     ICD-10-CM: I87.001 ICD-9-CM: 459.10 Acute non-recurrent maxillary sinusitis     ICD-10-CM: J01.00 ICD-9-CM: 461.0 Ulcerative colitis with complication, unspecified location [facility])     ICD-10-CM: B46.456 ICD-9-CM: 015. 9 Vitals BP Pulse Temp Resp Height(growth percentile) Weight(growth percentile) 132/81 80 97.1 °F (36.2 °C) (Oral) 20 5' 5\" (1.651 m) 237 lb 12.8 oz (107.9 kg) LMP SpO2 BMI OB Status Smoking Status (LMP Unknown) 98% 39.57 kg/m2 Hysterectomy Never Smoker Vitals History BMI and BSA Data Body Mass Index Body Surface Area  
 39.57 kg/m 2 2.22 m 2 Preferred Pharmacy [facility] Phone [facility] [address] [place] [address] [place], [address] [place] [address] [phone] Your Updated Medication List  
  
   
This list is accurate as of: 12/18/17  9:06 AM.  Always use your most recent med list.  
  
  
  
  
 albuterol 90 mcg/actuation inhaler Commonly known as:  PROVENTIL HFA, VENTOLIN HFA, PROAIR HFA Take 1 Puff by inhalation every four (4) hours as needed for Wheezing. amoxicillin-clavulanate 875-125 mg per tablet Commonly known as:  AUGMENTIN Take 1 Tab by mouth two (2) times a day for 7 days. When starting this, hold your coumadin for the first 3 days of taking augmentin  
  
 ciprofloxacin HCl 500 mg tablet Commonly known as:  CIPRO TAKE ONE TABLET BY MOUTH TWICE A DAY  
  
 ergocalciferol 50,000 unit capsule Commonly known as:  ERGOCALCIFEROL Take 1 tab by mouth ONCE PER WEEK for a total of 8 weeks. Then change to TWICE PER MONTH.  
  
 furosemide 40 mg tablet Commonly known as:  LASIX TAKE 1 TABLET BY MOUTH TWICE DAILY AS NEEDED  
  
 methotrexate 2.5 mg tablet Commonly known as:  RHEUMATREX  
TAKE 8 TABLETS BY MOUTH ONCE PER WEEK  
  
 pantoprazole 40 mg tablet Commonly known as:  PROTONIX  
TAKE 1 TAB BY MOUTH DAILY. phentermine 37.5 mg tablet Commonly known as:  ADIPEX-P Take 1/2 tablet before breakfast and before dinner  
  
 raNITIdine 150 mg tablet Commonly known as:  ZANTAC Take 150 mg by mouth two (2) times a day. topiramate 50 mg tablet Commonly known as:  TOPAMAX Take 1 Tab by mouth two (2) times a day. TYLENOL EXTRA STRENGTH 500 mg tablet Generic drug:  acetaminophen Take 1,000 mg by mouth every six (6) hours as needed for Pain.  
  
 warfarin 5 mg tablet Commonly known as:  COUMADIN Take 2 Tabs by mouth daily. Indications: DEEP VEIN THROMBOSIS PREVENTION Prescriptions Printed Refills  
 amoxicillin-clavulanate (AUGMENTIN) 875-125 mg per tablet 0 Sig: Take 1 Tab by mouth two (2) times a day for 7 days. When starting this, hold your coumadin for the first 3 days of taking augmentin Class: Print Route: Oral  
  
December 2017 Details Sun Mon Tue Wed Thu Fri Sat  
       1  
  
  
  
   2  
  
  
  
  
  3  
  
  
  
   4  
  
  
  
   5  
  
  
  
   6  
  
  
  
   7  
  
  
  
   8  
  
  
  
   9  
  
  
  
  
  10  
  
  
  
   11  
  
  
  
   12  
  
  
  
   13  
  
  
  
   14  
  
  
  
   15  
  
  
  
   16  
  
  
  
  
  17  
  
  
  
   18  
  
7.5 mg  
See details 19  
  
7.5 mg  
  
   20  
  
10 mg  
  
   21  
  
7.5 mg  
  
   22  
  
7.5 mg  
  
   23  
  
7.5 mg  
  
  
  24  
  
7.5 mg  
  
   25  
  
7.5 mg  
  
   26  
  
7.5 mg  
  
   27  
  
10 mg  
  
   28  
  
7.5 mg  
  
   29  
  
7.5 mg  
  
   30  
  
7.5 mg  
  
  
  31  
  
7.5 mg Date Details 12/18 This INR check INR: 1.8 How to take your warfarin dose To take:  7.5 mg Take one and a half of the 5 mg tablets. To take:  10 mg Take two of the 5 mg tablets. January 2018 Details Jarvis Paul Tue Wed Thu Fri Sat  
   1  
  
7.5 mg  
  
   2  
  
7.5 mg  
  
   3  
  
  
  
   4  
  
  
  
   5  
  
  
  
   6  
  
  
  
  
  7  
  
  
  
   8  
  
  
  
   9  
  
  
  
   10  
  
  
  
   11  
  
  
  
   12  
  
  
  
   13  
  
  
  
  
  14  
  
  
  
   15  
  
  
  
   16  
  
  
  
   17  
  
  
  
   18  
  
  
  
   19  
  
  
  
   20  
  
  
  
  
  21  
  
  
  
   22  
  
  
  
   23  
  
  
  
   24  
  
  
  
   25  
  
  
  
 26  
  
  
  
   27  
  
  
  
  
  28  
  
  
  
   29  
  
  
  
   30  
  
  
  
   31  
  
  
  
     
 Date Details No additional details Date of next INR:  1/2/2018 How to take your warfarin dose To take:  7.5 mg Take one and a half of the 5 mg tablets. We Performed the Following AMB POC PT/INR [64718 CPT(R)] Follow-up Instructions Return in about 2 weeks (around 1/1/2018), or if symptoms worsen or fail to improve. Introducing [facility]! Dear Cynthia Fajardo: Thank you for requesting a [employer] account. Our records indicate that you already have an active [employer] account. You can access your account anytime at [email or web address] Did you know that you can access your hospital and ER discharge instructions at any time in [employer]? You can also review all of your test results from your hospital stay or ER visit. Additional Information If you have questions, please visit the Frequently Asked Questions section of the [employer] website at [email or web address] Remember, [employer] is NOT to be used for urgent needs. For medical emergencies, dial 911. Now available from your iPhone and Android! Please provide this summary of care documentation to your next provider. Your primary care clinician is listed as Beverley Soto. If you have any questions after today's visit, please call [phone].

## 2017-12-18 NOTE — PROGRESS NOTES
Subjective:     Chief Complaint   Patient presents with    Anticoagulation      She  is a 50 y.o. female who presents for evaluation of:  Since last appt, doing well. Back pain flaring up slightly but responding to heat and rest.  To see Rheum tomorrow. Also reports having some trouble with sinuses flaring up. L maxillary sinus with pain. No tooth pain. Some HAs. Some productive mucus in ams. No sig f/c. DVT in R leg and currently on coumadin. Has had DVT in same leg x 2 in past.   Ct to struggle with post thrombotic syndrome from numerous DVTs. Pain flares up at different times and is much worse with edema. She continues to manage this by resting her leg and elevating it. Rest per Anti-coag tab.     ROS  Gen - no fever/chills  Resp - no dyspnea or cough  CV - per hpi  Rest per HPI    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    Pseudogout     PUD (peptic ulcer disease)     Skin abrasion 1/28/14    After loosing 125#, Bilateral Inner thigh friction flareup monthly with skin breakdown    Thromboembolus ([address] 2008    Rt leg,  pt states she fell and had a plane ride home and developed blood clots    UC (ulcerative colitis) ([address] 3/12/2010     Past Surgical History:   Procedure Laterality Date    HX ACL RECONSTRUCTION      Rt    HX COLECTOMY  1992    Dr Cameron Canavan HX GI      reopening of rectal pouch    HX GYN  06/19/2017    Hysterectomy    HX ORTHOPAEDIC  12/13    Rt foot / toes    HX TONSILLECTOMY       Current Outpatient Prescriptions on File Prior to Visit   Medication Sig Dispense Refill    pantoprazole (PROTONIX) 40 mg tablet TAKE 1 TAB BY MOUTH DAILY. 30 Tab 0    ciprofloxacin HCl (CIPRO) 500 mg tablet TAKE ONE TABLET BY MOUTH TWICE A DAY  0    methotrexate (RHEUMATREX) 2.5 mg tablet TAKE 8 TABLETS BY MOUTH ONCE PER WEEK  2    ergocalciferol (ERGOCALCIFEROL) 50,000 unit capsule Take 1 tab by mouth ONCE PER WEEK for a total of 8 weeks. Then change to TWICE PER MONTH. 12 Cap 0    phentermine (ADIPEX-P) 37.5 mg tablet Take 1/2 tablet before breakfast and before dinner 100 Tab 5    topiramate (TOPAMAX) 50 mg tablet Take 1 Tab by mouth two (2) times a day. 60 Tab 5    warfarin (COUMADIN) 5 mg tablet Take 2 Tabs by mouth daily. Indications: DEEP VEIN THROMBOSIS PREVENTION (Patient taking differently: Take 7.5 mg by mouth daily. Indications: DEEP VEIN THROMBOSIS PREVENTION) 60 Tab 3    furosemide (LASIX) 40 mg tablet TAKE 1 TABLET BY MOUTH TWICE DAILY AS NEEDED 180 Tab 1    ranitidine (ZANTAC) 150 mg tablet Take 150 mg by mouth two (2) times a day.  albuterol (PROVENTIL HFA, VENTOLIN HFA, PROAIR HFA) 90 mcg/actuation inhaler Take 1 Puff by inhalation every four (4) hours as needed for Wheezing. 1 Inhaler 5    acetaminophen (TYLENOL EXTRA STRENGTH) 500 mg tablet Take 1,000 mg by mouth every six (6) hours as needed for Pain. No current facility-administered medications on file prior to visit.          Objective:     Vitals:    12/18/17 0833   BP: 132/81   Pulse: 80   Resp: 20   Temp: 97.1 °F (36.2 °C)   TempSrc: Oral   SpO2: 98%   Weight: 237 lb 12.8 oz (107.9 kg)   Height: 5' 5\" (1.651 m)     Physical Examination:  General appearance - alert, well appearing, and in no distress  Eyes -sclera anicteric  ENT-bilateral TMs normal, left turbinates inflamed, left maxillary sinus tenderness to palpation, oropharynx mostly normal with mild erythema and no exudate  Chest - clear to auscultation, no wheezes, rales or rhonchi, symmetric air entry  Heart - normal rate, regular rhythm, normal S1, S2, no murmurs, rubs, clicks or gallops  Extr - RLE with edema and mildly ttp over shin. + varicose veins    Assessment/ Plan:   Diagnoses and all orders for this visit:    1. Acute deep vein thrombosis (DVT) of right lower extremity, unspecified vein (HCC)  2. Monitoring for long-term anticoagulant use  3. Post-thrombotic syndrome of right lower extremity  - noted after Gyn surgery. Had neg dopplers but repeat Dopplers showed sig RLE DVT. This is her 3rd DVT which was again provoked so plan to stay on blood thinners for life. -  INR subtherapeutic due to increased creams. Will recheck INR in 2 weeks after making adjustment. - ct exercises, leg elevation, compression stockings, and to PT.  -     AMB POC PT/INR    4. Acute non-recurrent maxillary sinusitis - giving printed rx for Augmentin to start if not improving in 4-5 days since she is immunosuppressed. -     amoxicillin-clavulanate (AUGMENTIN) 875-125 mg per tablet; Take 1 Tab by mouth two (2) times a day for 7 days. When starting this, hold your coumadin for the first 3 days of taking augmentin    5. Ulcerative colitis with complication, unspecified location ([facility] - stable with meds    I have discussed the diagnosis with the patient and the intended plan as seen in the above orders. The patient has received an after-visit summary and questions were answered concerning future plans. I have discussed medication side effects and warnings with the patient as well. The patient verbalizes understanding and agreement with the plan. Follow-up Disposition:  Return in about 2 weeks (around 1/1/2018), or if symptoms worsen or fail to improve.

## 2018-01-03 ENCOUNTER — OFFICE VISIT (OUTPATIENT)
Dept: FAMILY MEDICINE CLINIC | Age: 48
End: 2018-01-03

## 2018-01-03 VITALS
BODY MASS INDEX: 39.82 KG/M2 | HEIGHT: 65 IN | TEMPERATURE: 97.2 F | DIASTOLIC BLOOD PRESSURE: 70 MMHG | WEIGHT: 239 LBS | HEART RATE: 82 BPM | SYSTOLIC BLOOD PRESSURE: 123 MMHG | RESPIRATION RATE: 18 BRPM | OXYGEN SATURATION: 94 %

## 2018-01-03 DIAGNOSIS — Z79.01 MONITORING FOR LONG-TERM ANTICOAGULANT USE: ICD-10-CM

## 2018-01-03 DIAGNOSIS — M79.89 RIGHT LEG SWELLING: ICD-10-CM

## 2018-01-03 DIAGNOSIS — I82.501 CHRONIC DEEP VEIN THROMBOSIS (DVT) OF RIGHT LOWER EXTREMITY, UNSPECIFIED VEIN (HCC): Primary | ICD-10-CM

## 2018-01-03 DIAGNOSIS — Z51.81 MONITORING FOR LONG-TERM ANTICOAGULANT USE: ICD-10-CM

## 2018-01-03 DIAGNOSIS — I87.001 POST-THROMBOTIC SYNDROME OF RIGHT LOWER EXTREMITY: ICD-10-CM

## 2018-01-03 LAB
INR BLD: 2.1
PT POC: NORMAL SECONDS
VALID INTERNAL CONTROL?: YES

## 2018-01-03 NOTE — PROGRESS NOTES
Subjective:     Chief Complaint   Patient presents with    Follow-up     PT/INR      She  is a 50 y.o. female who presents for evaluation of:  DVT in R leg after gyn sgy and currently on coumadin. Has had DVT in same leg x 2 in past.   Ct to struggle with post thrombotic syndrome from numerous DVTs. Pain flares up at different times and is much worse with edema. She continues to manage this by resting her leg and elevating it. Rest per Anti-coag tab. ROS  Gen - no fever/chills  Resp - no dyspnea or cough  CV - per hpi  Rest per HPI    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    Pseudogout     PUD (peptic ulcer disease)     Skin abrasion 1/28/14    After loosing 125#, Bilateral Inner thigh friction flareup monthly with skin breakdown    Thromboembolus ([address] 2008    Rt leg,  pt states she fell and had a plane ride home and developed blood clots    UC (ulcerative colitis) ([address] 3/12/2010     Past Surgical History:   Procedure Laterality Date    HX ACL RECONSTRUCTION      Rt    Dom Borden HX GI      reopening of rectal pouch    HX GYN  06/19/2017    Hysterectomy    HX ORTHOPAEDIC  12/13    Rt foot / toes    HX TONSILLECTOMY       Current Outpatient Prescriptions on File Prior to Visit   Medication Sig Dispense Refill    pantoprazole (PROTONIX) 40 mg tablet TAKE 1 TAB BY MOUTH DAILY.  30 Tab 0    methotrexate (RHEUMATREX) 2.5 mg tablet TAKE 8 TABLETS BY MOUTH ONCE PER WEEK  2    ergocalciferol (ERGOCALCIFEROL) 50,000 unit capsule Take 1 tab by mouth ONCE PER WEEK for a total of 8 weeks. Then change to TWICE PER MONTH. 12 Cap 0    phentermine (ADIPEX-P) 37.5 mg tablet Take 1/2 tablet before breakfast and before dinner 100 Tab 5    topiramate (TOPAMAX) 50 mg tablet Take 1 Tab by mouth two (2) times a day. 60 Tab 5    warfarin (COUMADIN) 5 mg tablet Take 2 Tabs by mouth daily. Indications: DEEP VEIN THROMBOSIS PREVENTION (Patient taking differently: Take 7.5 mg by mouth daily. Indications: DEEP VEIN THROMBOSIS PREVENTION) 60 Tab 3    furosemide (LASIX) 40 mg tablet TAKE 1 TABLET BY MOUTH TWICE DAILY AS NEEDED 180 Tab 1    ranitidine (ZANTAC) 150 mg tablet Take 150 mg by mouth two (2) times a day.  albuterol (PROVENTIL HFA, VENTOLIN HFA, PROAIR HFA) 90 mcg/actuation inhaler Take 1 Puff by inhalation every four (4) hours as needed for Wheezing. 1 Inhaler 5    acetaminophen (TYLENOL EXTRA STRENGTH) 500 mg tablet Take 1,000 mg by mouth every six (6) hours as needed for Pain. No current facility-administered medications on file prior to visit. Objective:     Vitals:    01/03/18 0812   BP: 123/70   Pulse: 82   Resp: 18   Temp: 97.2 °F (36.2 °C)   TempSrc: Oral   SpO2: 94%   Weight: 239 lb (108.4 kg)   Height: 5' 5\" (1.651 m)     Physical Examination:  General appearance - alert, well appearing, and in no distress  Eyes -sclera anicteric  Chest - clear to auscultation, no wheezes, rales or rhonchi, symmetric air entry  Heart - normal rate, regular rhythm, normal S1, S2, no murmurs, rubs, clicks or gallops  Extr - RLE with edema and ttp over shin. + varicose veins    Assessment/ Plan:   Diagnoses and all orders for this visit:    1. Chronic deep vein thrombosis (DVT) of right lower extremity, unspecified vein (HCC)  2. Monitoring for long-term anticoagulant use  3. Post-thrombotic syndrome of right lower extremity  4.  Right leg swelling  - DVT noted after Gyn surgery. Had neg dopplers but repeat Dopplers showed sig RLE DVT. This is her 3rd DVT which was again provoked so plan to stay on blood thinners for life. Repeating dopplers to ensure no repeat DVT given slight worsened pain and swelling. On coumadin d/t concerns with use of NOACs with her pouchitis and bleeding risk. - INR therapeutic  - ct exercises, leg elevation, compression stockings, and to PT.   -     AMB POC PT/INR  -     DUPLEX LOWER EXT VENOUS BILAT; Future    I have discussed the diagnosis with the patient and the intended plan as seen in the above orders. The patient has received an after-visit summary and questions were answered concerning future plans. I have discussed medication side effects and warnings with the patient as well. The patient verbalizes understanding and agreement with the plan. Follow-up Disposition:  Return in about 4 weeks (around 1/31/2018), or if symptoms worsen or fail to improve.

## 2018-01-03 NOTE — MR AVS SNAPSHOT
Visit Information Date & Time Provider Department Dept. Phone Encounter #  
 1/3/2018  8:30 AM Charlie Villafana MD [facility] at [address] [id number] Follow-up Instructions Return in about 4 weeks (around 1/31/2018), or if symptoms worsen or fail to improve. Your Appointments 1/30/2018  9:10 AM  
Follow Up with Abraham Jarquin MD  
[facility] Appt Note: 3 month f/u  
 [address] [phone] [id number] [facility] Upcoming Health Maintenance Date Due  
 PAP AKA CERVICAL CYTOLOGY 11/19/2017 DTaP/Tdap/Td series (2 - Td) 5/17/2026 Allergies as of 1/3/2018  Review Complete On: 1/3/2018 By: Charlie Villafana MD  
  
 Severity Noted Reaction Type Reactions Sulfa (Sulfonamide Antibiotics) High 03/12/2010    Anaphylaxis Codeine Medium 03/17/2010    Itching Current Immunizations  Reviewed on 5/2/2016 Name Date Influenza Vaccine (Quad) PF 9/14/2017, 10/27/2016, 10/30/2015 Influenza Vaccine PF 10/11/2013 Influenza Vaccine Split 12/13/2012, 10/5/2011 Pneumococcal Polysaccharide (PPSV-23) 4/11/2016 11:40 AM  
  
 Not reviewed this visit You Were Diagnosed With   
  
 Codes Comments Acute deep vein thrombosis (DVT) of right lower extremity, unspecified vein (HCC)    -  Primary ICD-10-CM: I82.401 ICD-9-CM: 453.40 Monitoring for long-term anticoagulant use     ICD-10-CM: Z51.81, Z79.01 
ICD-9-CM: V58.61 Post-thrombotic syndrome of right lower extremity     ICD-10-CM: I87.001 ICD-9-CM: 459.10 Vitals BP Pulse Temp Resp Height(growth percentile) Weight(growth percentile) 123/70 (BP 1 Location: Left arm, BP Patient Position: Sitting) 82 97.2 °F (36.2 °C) (Oral) 18 5' 5\" (1.651 m) 239 lb (108.4 kg) LMP SpO2 BMI OB Status Smoking Status (LMP Unknown) 94% 39.77 kg/m2 Hysterectomy Never Smoker Vitals History BMI and BSA Data Body Mass Index Body Surface Area  
 39.77 kg/m 2 2.23 m 2 Preferred Pharmacy Pharmacy Name Phone [facility] [address] [phone] Your Updated Medication List  
  
   
This list is accurate as of: 1/3/18  8:43 AM.  Always use your most recent med list.  
  
  
  
  
 albuterol 90 mcg/actuation inhaler Commonly known as:  PROVENTIL HFA, VENTOLIN HFA, PROAIR HFA Take 1 Puff by inhalation every four (4) hours as needed for Wheezing.  
  
 ergocalciferol 50,000 unit capsule Commonly known as:  ERGOCALCIFEROL Take 1 tab by mouth ONCE PER WEEK for a total of 8 weeks. Then change to TWICE PER MONTH.  
  
 furosemide 40 mg tablet Commonly known as:  LASIX TAKE 1 TABLET BY MOUTH TWICE DAILY AS NEEDED  
  
 methotrexate 2.5 mg tablet Commonly known as:  RHEUMATREX  
TAKE 8 TABLETS BY MOUTH ONCE PER WEEK  
  
 pantoprazole 40 mg tablet Commonly known as:  PROTONIX  
TAKE 1 TAB BY MOUTH DAILY. phentermine 37.5 mg tablet Commonly known as:  ADIPEX-P Take 1/2 tablet before breakfast and before dinner  
  
 raNITIdine 150 mg tablet Commonly known as:  ZANTAC Take 150 mg by mouth two (2) times a day. topiramate 50 mg tablet Commonly known as:  TOPAMAX Take 1 Tab by mouth two (2) times a day. TYLENOL EXTRA STRENGTH 500 mg tablet Generic drug:  acetaminophen Take 1,000 mg by mouth every six (6) hours as needed for Pain.  
  
 warfarin 5 mg tablet Commonly known as:  COUMADIN Take 2 Tabs by mouth daily. Indications: DEEP VEIN THROMBOSIS PREVENTION January 2018 Details Rosa Sher Tumiriam Wed Thu Fri Sat  
   1  
  
  
  
   2  
  
  
  
   3  
  
10 mg See details    4  
  
7.5 mg  
  
   5  
  
7.5 mg  
  
   6  
  
7.5 mg  
  
  
  7  
  
7.5 mg  
  
   8  
  
7.5 mg  
  
   9  
  
7.5 mg  
  
 10  
  
10 mg  
  
   11  
  
7.5 mg  
  
   12  
  
7.5 mg  
  
   13  
  
7.5 mg  
  
  
  14  
  
7.5 mg  
  
   15  
  
7.5 mg  
  
   16  
  
7.5 mg  
  
   17  
  
10 mg  
  
   18  
  
7.5 mg  
  
   19  
  
7.5 mg  
  
   20  
  
7.5 mg  
  
  
  21  
  
7.5 mg  
  
   22  
  
7.5 mg  
  
   23  
  
7.5 mg  
  
   24  
  
10 mg  
  
   25  
  
7.5 mg  
  
   26  
  
7.5 mg  
  
   27  
  
7.5 mg  
  
  
  28  
  
7.5 mg  
  
   29  
  
7.5 mg  
  
   30  
  
7.5 mg  
  
   31  
  
10 mg Date Details 01/03 This INR check INR: 2.1 Date of next INR:  1/31/2018 How to take your warfarin dose To take:  7.5 mg Take one and a half of the 5 mg tablets. To take:  10 mg Take two of the 5 mg tablets. We Performed the Following AMB POC PT/INR [65334 CPT(R)] Follow-up Instructions Return in about 4 weeks (around 1/31/2018), or if symptoms worsen or fail to improve. Introducing [facility] SERVICES! Dear Elise Lake: Thank you for requesting a [email or web address] account. Our records indicate that you already have an active [email or web address] account. You can access your account anytime at [email or web address] Did you know that you can access your hospital and ER discharge instructions at any time in [email or web address]? You can also review all of your test results from your hospital stay or ER visit. Additional Information If you have questions, please visit the Frequently Asked Questions section of the [email or web address] website at [email or web address]. Remember, [email or web address] is NOT to be used for urgent needs. For medical emergencies, dial 911. Now available from your iPhone and Android! Please provide this summary of care documentation to your next provider. Your primary care clinician is listed as Kelly Lr. If you have any questions after today's visit, please call [phone].

## 2018-01-03 NOTE — PROGRESS NOTES
Chief Complaint   Patient presents with    Follow-up     PT/INR   1. Have you been to the ER, urgent care clinic since your last visit? Hospitalized since your last visit? No    2. Have you seen or consulted any other health care providers outside of the [address] [place] [address] since your last visit? Include any pap smears or colon screening.  No  Sinus pressure started today  Room 8

## 2018-01-15 ENCOUNTER — HOSPITAL ENCOUNTER (OUTPATIENT)
Dept: ULTRASOUND IMAGING | Age: 48
Discharge: HOME | End: 2018-01-15
Attending: FAMILY MEDICINE
Payer: COMMERCIAL

## 2018-01-15 DIAGNOSIS — I82.501 CHRONIC DEEP VEIN THROMBOSIS (DVT) OF RIGHT LOWER EXTREMITY, UNSPECIFIED VEIN (HCC): ICD-10-CM

## 2018-01-15 DIAGNOSIS — M79.89 RIGHT LEG SWELLING: ICD-10-CM

## 2018-01-15 PROCEDURE — 93970 EXTREMITY STUDY: CPT

## 2018-01-30 ENCOUNTER — OFFICE VISIT (OUTPATIENT)
Dept: ENDOCRINOLOGY | Age: 48
End: 2018-01-30

## 2018-01-30 VITALS
DIASTOLIC BLOOD PRESSURE: 81 MMHG | HEIGHT: 65 IN | HEART RATE: 86 BPM | BODY MASS INDEX: 37.55 KG/M2 | WEIGHT: 225.4 LBS | SYSTOLIC BLOOD PRESSURE: 123 MMHG

## 2018-01-30 DIAGNOSIS — E66.01 OBESITIES, MORBID (HCC): Primary | ICD-10-CM

## 2018-01-30 DIAGNOSIS — R73.02 IGT (IMPAIRED GLUCOSE TOLERANCE): ICD-10-CM

## 2018-01-30 LAB — HBA1C MFR BLD HPLC: 4.8 %

## 2018-01-30 RX ORDER — PHENTERMINE HYDROCHLORIDE 37.5 MG/1
TABLET ORAL
Qty: 100 TAB | Refills: 5 | Status: SHIPPED | OUTPATIENT
Start: 2018-01-30 | End: 2018-08-20 | Stop reason: SDUPTHER

## 2018-01-30 RX ORDER — TOPIRAMATE 50 MG/1
50 TABLET, FILM COATED ORAL 2 TIMES DAILY
Qty: 60 TAB | Refills: 5 | Status: SHIPPED | OUTPATIENT
Start: 2018-01-30 | End: 2018-08-20 | Stop reason: SDUPTHER

## 2018-01-30 NOTE — PROGRESS NOTES
Chief Complaint   Patient presents with    Other     IGT   PCP and pharmacy verifieed    Weight Management     refill meds   Records since last visit reviewed. History of Present Illness: Marilin Logan is a 50 y.o. female here for follow up of obesity. Prior to our initial visit in January 2015 she had lost from 310 down to 160 but then gained back to 230s. In January 2015 I restarted Phentermine 18.75mg BID and Topiramate 50mg BID to help further her weight loss efforts. She has tolerated this regimen well with no issues of anxiety, CP, SOB, palpitations, N/V, HA, lightheadedness or dizziness. Pt has a hx of UC and Inflammatory polyarthropathy. She is still seeing Dr. Claudette Husbands of Rhematology for Rheumatoid arthritis and Dr. Leonid Mccallum and Alesha Ac of GI for UC. She has had complications to Embril (transaminitis) and Humira (injection site reaction). She had been struggling with issues of pelvic pain and her Gyn Dr. Oscar Ferguson felt is was due to an ischemic uterus. On 6/20/17 she had Supracervical Abdominal Hysterectomy without Salpingo-Oophorectomy. She has post-op complications of ileus which prolonged her hospitalization. She had post-op abdominal pain and rectal bleeding. She saw Dr. Leonid Mccallum who treated her for pouchitis and her pain has improve. She then developed LLE swelling and pain. She has hx of DVT x3. Her A1C today was 4.8%. She continues to be on Coumadin for multiple and recurrent DVTs. She is followed by Dr. Cecil Colvin for INR checks. She has not had any DVTs since October 2017. 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 She continues to follow with Dr. Leonid Mccallum. Her weight today was 225, which is down from 241 in August 2017  She has been taking the phentermine and Topiramate since early September 2017. She is tolerating the medications well. She she denies palpitations, tremors, CP, SOB, HA, AMS, or numbness. Pt is currently eating 3 meals per day. She has breakfast most morning. Yesterday she had a grilled pork chop with eggs and water. She will have lunch most days, yesterday she had beef and vegetable stir galeas and \"riced\" colli flower. She has dinner around 5-6PM, last night she had turkey pepperonis and cheese and 3 strawberries. Pt was previously followed by the RD to help with nutrition for [facility]. Pt has no hx of gastric bypass. She is looking into the \"keto-genic\" diet. Past Medical History:   Diagnosis Date    Asthma 3/12/2010    DDD (degenerative disc disease), lumbar     DJD (degenerative joint disease) 3/12/2010    DJD (degenerative joint disease) of knee     DVT [facility]deep venous thrombosis) ([facility])     Rt leg    GERD (gastroesophageal reflux disease)     History of tuberculosis exposure 2013    pt states she has + PPD and was on Rx for 6 months; last dose either 11/13 or 12/13    Inflammatory polyarthritis ([address]     Psoriatic Arthitis    Morbid obesity ([address]     Post-thrombotic syndrome of right lower extremity 9/14/2017    Pseudogout     PUD (peptic ulcer disease)     Skin abrasion 1/28/14    After loosing 125#, Bilateral Inner thigh friction flareup monthly with skin breakdown    Thromboembolus ([address] 2008    Rt leg,  pt states she fell and had a plane ride home and developed blood clots    UC (ulcerative colitis) ([address] 3/12/2010     Past Surgical History:   Procedure Laterality Date    HX ACL RECONSTRUCTION      Rt    Nancy Standard    Dr Arash Vo HX GI      reopening of rectal pouch    HX GYN  06/19/2017    Hysterectomy    HX ORTHOPAEDIC  12/13    Rt foot / toes    HX TONSILLECTOMY       Current Outpatient Prescriptions   Medication Sig    CERTOLIZUMAB PEGOL (CIMZIA SC) by SubCUTAneous route. Injection:  Every 4 weeks    phentermine (ADIPEX-P) 37.5 mg tablet Take 1/2 tablet before breakfast and before dinner    topiramate (TOPAMAX) 50 mg tablet Take 1 Tab by mouth two (2) times a day.     methotrexate (RHEUMATREX) 2.5 mg tablet TAKE 8 TABLETS BY MOUTH ONCE PER WEEK    ergocalciferol (ERGOCALCIFEROL) 50,000 unit capsule Take 1 tab by mouth ONCE PER WEEK for a total of 8 weeks. Then change to TWICE PER MONTH.  warfarin (COUMADIN) 5 mg tablet Take 2 Tabs by mouth daily. Indications: DEEP VEIN THROMBOSIS PREVENTION (Patient taking differently: Take 7.5 mg by mouth daily. Indications: DEEP VEIN THROMBOSIS PREVENTION)    furosemide (LASIX) 40 mg tablet TAKE 1 TABLET BY MOUTH TWICE DAILY AS NEEDED    ranitidine (ZANTAC) 150 mg tablet Take 150 mg by mouth two (2) times a day.  albuterol (PROVENTIL HFA, VENTOLIN HFA, PROAIR HFA) 90 mcg/actuation inhaler Take 1 Puff by inhalation every four (4) hours as needed for Wheezing.  acetaminophen (TYLENOL EXTRA STRENGTH) 500 mg tablet Take 1,000 mg by mouth every six (6) hours as needed for Pain. No current facility-administered medications for this visit. Allergies   Allergen Reactions    Sulfa (Sulfonamide Antibiotics) Anaphylaxis    Codeine Itching     Family History   Problem Relation Age of Onset    Hypertension Mother     Thyroid Disease Mother      Hypothyroid    Diabetes Mother     Elevated Lipids Mother     Cancer Father      brain     Social History     Social History    Marital status:      Spouse name: N/A    Number of children: N/A    Years of education: N/A     Occupational History    Not on file. Social History Main Topics    Smoking status: Never Smoker    Smokeless tobacco: Never Used    Alcohol use Yes      Comment: occasionally- very rare    Drug use: No    Sexual activity: Yes     Partners: Male     Other Topics Concern    Not on file     Social History Narrative     Review of Systems:  - Negative except as noted in HPI    Physical Examination:  Blood pressure 123/81, pulse 86, height 5' 5\" (1.651 m), weight 225 lb 6.4 oz (102.2 kg).   - General: pleasant, no distress, good eye contact  - HEENT: no exopthalmos, no periorbital edema, no scleral/conjunctival injection, EOMI, no lid lag or stare  - Cardiovascular: regular, normal rate, normal S1 and S2, no murmurs/rubs/gallops, 2+ dorsalis pedis pulses bilaterally  - Respiratory: clear to auscultation bilaterally  - Musculoskeletal: no proximal muscle weakness in upper or lower extremities  - Integumentary: no acanthosis nigricans, no rashes, no edema,   - Neurological: reflexes 2+ at biceps, no tremor  - Psychiatric: normal mood and affect    Data Reviewed:   Her A1C today was 4.8%    Assessment/Plan:   1) Obesity > Pt continues to have good weight loss. She is down to 225 pounds today. Pt to continue the phentermine and topiramate, which she is tolerating well. Pt to continue the 2759-2813 calories per day. I will refill her phentermine and topiramate. 2) IGT > Her A1C was 4.8% today, pt encouraged to keep up the excellent work. Pt voices understanding and agreement with the plan. RTC 6 months      Follow-up Disposition:  Return in about 6 months (around 7/30/2018). Copy sent to:  [facility]

## 2018-02-01 ENCOUNTER — OFFICE VISIT (OUTPATIENT)
Dept: FAMILY MEDICINE CLINIC | Age: 48
End: 2018-02-01

## 2018-02-01 VITALS
HEART RATE: 75 BPM | BODY MASS INDEX: 38.42 KG/M2 | TEMPERATURE: 97.1 F | RESPIRATION RATE: 18 BRPM | HEIGHT: 65 IN | OXYGEN SATURATION: 98 % | DIASTOLIC BLOOD PRESSURE: 77 MMHG | SYSTOLIC BLOOD PRESSURE: 123 MMHG | WEIGHT: 230.6 LBS

## 2018-02-01 DIAGNOSIS — I82.5Z1 CHRONIC DEEP VEIN THROMBOSIS (DVT) OF DISTAL VEIN OF RIGHT LOWER EXTREMITY (HCC): Primary | ICD-10-CM

## 2018-02-01 DIAGNOSIS — Z51.81 MONITORING FOR LONG-TERM ANTICOAGULANT USE: ICD-10-CM

## 2018-02-01 DIAGNOSIS — Z79.01 MONITORING FOR LONG-TERM ANTICOAGULANT USE: ICD-10-CM

## 2018-02-01 LAB
INR BLD: 1.2
PT POC: NORMAL SECONDS
VALID INTERNAL CONTROL?: YES

## 2018-02-01 NOTE — PROGRESS NOTES
Chief Complaint   Patient presents with    Follow-up     PT/INR   1. Have you been to the ER, urgent care clinic since your last visit? Hospitalized since your last visit? No    2. Have you seen or consulted any other health care providers outside of the [address] [place] [address] since your last visit? Include any pap smears or colon screening.  Yes Where: Dr Frances Whitlock nasal congestion  Room 9

## 2018-02-01 NOTE — PROGRESS NOTES
Subjective:     Chief Complaint   Patient presents with    Follow-up     PT/INR      She  is a 50 y.o. female who presents for evaluation of:  DVT in R leg after gyn sgy and chronically on coumadin now. Has had DVT in same leg x 2 in past.   Ct to struggle with post thrombotic syndrome from numerous DVTs. Pain flares up at different times and is much worse with edema. She continues to manage this by resting her leg and elevating it. Rest per Anti-coag tab. ROS  Gen - no fever/chills  Resp - no dyspnea or cough  CV - per hpi  Rest per HPI    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    Pseudogout     PUD (peptic ulcer disease)     Skin abrasion 1/28/14    After loosing 125#, Bilateral Inner thigh friction flareup monthly with skin breakdown    Thromboembolus ([address] 2008    Rt leg,  pt states she fell and had a plane ride home and developed blood clots    UC (ulcerative colitis) ([address] 3/12/2010     Past Surgical History:   Procedure Laterality Date    HX ACL RECONSTRUCTION      Rt    Shabana Wheat HX GI      reopening of rectal pouch    HX GYN  06/19/2017    Hysterectomy    HX ORTHOPAEDIC  12/13    Rt foot / toes    HX TONSILLECTOMY       Current Outpatient Prescriptions on File Prior to Visit   Medication Sig Dispense Refill    CERTOLIZUMAB PEGOL (CIMZIA SC) by SubCUTAneous route.  Injection:  Every 4 weeks      phentermine (ADIPEX-P) 37.5 mg tablet Take 1/2 tablet before breakfast and before dinner 100 Tab 5    topiramate (TOPAMAX) 50 mg tablet Take 1 Tab by mouth two (2) times a day. 60 Tab 5    methotrexate (RHEUMATREX) 2.5 mg tablet TAKE 8 TABLETS BY MOUTH ONCE PER WEEK  2    ergocalciferol (ERGOCALCIFEROL) 50,000 unit capsule Take 1 tab by mouth ONCE PER WEEK for a total of 8 weeks. Then change to TWICE PER MONTH. 12 Cap 0    warfarin (COUMADIN) 5 mg tablet Take 2 Tabs by mouth daily. Indications: DEEP VEIN THROMBOSIS PREVENTION (Patient taking differently: Take 7.5 mg by mouth daily. Indications: DEEP VEIN THROMBOSIS PREVENTION) 60 Tab 3    furosemide (LASIX) 40 mg tablet TAKE 1 TABLET BY MOUTH TWICE DAILY AS NEEDED 180 Tab 1    ranitidine (ZANTAC) 150 mg tablet Take 150 mg by mouth two (2) times a day.  albuterol (PROVENTIL HFA, VENTOLIN HFA, PROAIR HFA) 90 mcg/actuation inhaler Take 1 Puff by inhalation every four (4) hours as needed for Wheezing. 1 Inhaler 5    acetaminophen (TYLENOL EXTRA STRENGTH) 500 mg tablet Take 1,000 mg by mouth every six (6) hours as needed for Pain. No current facility-administered medications on file prior to visit. Objective:     Vitals:    02/01/18 1051   BP: 123/77   Pulse: 75   Resp: 18   Temp: 97.1 °F (36.2 °C)   TempSrc: Oral   SpO2: 98%   Weight: 230 lb 9.6 oz (104.6 kg)   Height: 5' 5\" (1.651 m)     Physical Examination:  General appearance - alert, well appearing, and in no distress  Eyes -sclera anicteric  Chest - clear to auscultation, no wheezes, rales or rhonchi, symmetric air entry  Heart - normal rate, regular rhythm, normal S1, S2, no murmurs, rubs, clicks or gallops  Extr - RLE mildly ttp over shin    Assessment/ Plan:   Diagnoses and all orders for this visit:    1. Chronic deep vein thrombosis (DVT) of right lower extremity, unspecified vein (HCC)  2.  Monitoring for long-term anticoagulant use  - INR subtherapeutic so adjusting today  - ct exercises, leg elevation, compression stockings, and to PT.   -     AMB POC PT/INR    I have discussed the diagnosis with the patient and the intended plan as seen in the above orders. The patient has received an after-visit summary and questions were answered concerning future plans. I have discussed medication side effects and warnings with the patient as well. The patient verbalizes understanding and agreement with the plan. Follow-up Disposition:  Return in about 1 week (around 2/8/2018) for Regular Follow up.

## 2018-02-01 NOTE — MR AVS SNAPSHOT
[address] [place] [facility]. 
[phone] Patient: Teresita De La Garza MRN:  [id number] Visit Information Date & Time Provider Department Dept. Phone Encounter #  
 2/1/2018 10:30 AM Yousif Rivera MD [facility] at [address] [id number] Follow-up Instructions Return in about 1 week (around 2/8/2018) for Regular Follow up. Your Appointments 7/31/2018  8:50 AM  
Follow Up with Erin Giang MD  
[facility] Endocr[facility]) Appt Note: 6 month f/u  
 [address] [facility] [address] [phone] [address] [facility] Upcoming Health Maintenance Date Due  
 PAP AKA CERVICAL CYTOLOGY 11/19/2017 DTaP/Tdap/Td series (2 - Td) 5/17/2026 Allergies as of 2/1/2018  Review Complete On: 2/1/2018 By: Enrique Ott LPN Severity Noted Reaction Type Reactions Sulfa (Sulfonamide Antibiotics) High 03/12/2010    Anaphylaxis Codeine Medium 03/17/2010    Itching Current Immunizations  Reviewed on 5/2/2016 Name Date Influenza Vaccine (Quad) PF 9/14/2017, 10/27/2016, 10/30/2015 Influenza Vaccine PF 10/11/2013 Influenza Vaccine Split 12/13/2012, 10/5/2011 Pneumococcal Polysaccharide (PPSV-23) 4/11/2016 11:40 AM  
  
 Not reviewed this visit You Were Diagnosed With   
  
 Codes Comments Chronic deep vein thrombosis (DVT) of distal vein of right lower extremity (HCC)    -  Primary ICD-10-CM: D38.9E3 ICD-9-CM: 453.52 Monitoring for long-term anticoagulant use     ICD-10-CM: Z51.81, Z79.01 
ICD-9-CM: V58.61 Vitals BP Pulse Temp Resp Height(growth percentile) Weight(growth percentile)  123/77 (BP 1 Location: Left arm, BP Patient Position: Sitting) 75 97.1 °F (36.2 °C) (Oral) 18 5' 5\" (1.651 m) 230 lb 9.6 oz (104.6 kg) LMP SpO2 BMI OB Status Smoking Status (LMP Unknown) 98% 38.37 kg/m2 Hysterectomy Never Smoker Vitals History BMI and BSA Data Body Mass Index Body Surface Area  
 38.37 kg/m 2 2.19 m 2 Preferred Pharmacy Pharmacy Name Phone [facility] [address] [place] [address] [phone] Your Updated Medication List  
  
   
This list is accurate as of: 2/1/18 11:28 AM.  Always use your most recent med list.  
  
  
  
  
 albuterol 90 mcg/actuation inhaler Commonly known as:  PROVENTIL HFA, VENTOLIN HFA, PROAIR HFA Take 1 Puff by inhalation every four (4) hours as needed for Wheezing. CIMZIA SC  
by SubCUTAneous route. Injection:  Every 4 weeks  
  
 ergocalciferol 50,000 unit capsule Commonly known as:  ERGOCALCIFEROL Take 1 tab by mouth ONCE PER WEEK for a total of 8 weeks. Then change to TWICE PER MONTH.  
  
 furosemide 40 mg tablet Commonly known as:  LASIX TAKE 1 TABLET BY MOUTH TWICE DAILY AS NEEDED  
  
 methotrexate 2.5 mg tablet Commonly known as:  RHEUMATREX  
TAKE 8 TABLETS BY MOUTH ONCE PER WEEK  
  
 phentermine 37.5 mg tablet Commonly known as:  ADIPEX-P Take 1/2 tablet before breakfast and before dinner  
  
 raNITIdine 150 mg tablet Commonly known as:  ZANTAC Take 150 mg by mouth two (2) times a day. topiramate 50 mg tablet Commonly known as:  TOPAMAX Take 1 Tab by mouth two (2) times a day. TYLENOL EXTRA STRENGTH 500 mg tablet Generic drug:  acetaminophen Take 1,000 mg by mouth every six (6) hours as needed for Pain.  
  
 warfarin 5 mg tablet Commonly known as:  COUMADIN Take 2 Tabs by mouth daily. Indications: DEEP VEIN THROMBOSIS PREVENTION February 2018 Details Anna  Tue Wed Thu Fri Sat  
      1  
  
7.5 mg  
See details 2  
  
7.5 mg  
  
   3  
  
7.5 mg  
  
  
  4  
  
10 mg  
  
   5 7.5 mg  
  
   6  
  
7.5 mg  
  
   7  
  
10 mg  
  
   8  
  
7.5 mg  
  
   9  
  
  
  
   10  
  
  
  
  
  11  
  
  
  
   12  
  
  
  
   13  
  
  
  
   14  
  
  
  
   15  
  
  
  
   16  
  
  
  
   17  
  
  
  
  
  18  
  
  
  
   19  
  
  
  
   20  
  
  
  
   21  
  
  
  
   22  
  
  
  
   23  
  
  
  
   24  
  
  
  
  
  25  
  
  
  
   26  
  
  
  
   27  
  
  
  
   28  
  
  
  
     
 Date Details 02/01 This INR check INR: 1.2 Date of next INR:  2/8/2018 How to take your warfarin dose To take:  7.5 mg Take one and a half of the 5 mg tablets. To take:  10 mg Take two of the 5 mg tablets. We Performed the Following AMB POC PT/INR [74716 CPT(R)] Follow-up Instructions Return in about 1 week (around 2/8/2018) for Regular Follow up. Introducing [facility] & [facility]! Dear Yasmany Quiroz: Thank you for requesting a [employer] account. Our records indicate that you already have an active [employer] account. You can access your account anytime at [email or web address] Did you know that you can access your hospital and ER discharge instructions at any time in Si[employer]? You can also review all of your test results from your hospital stay or ER visit. Additional Information If you have questions, please visit the Frequently Asked Questions section of the Si[employer] website at [email or web address] Remember, Si[employer] is NOT to be used for urgent needs. For medical emergencies, dial 911. Now available from your iPhone and Android! Please provide this summary of care documentation to your next provider. Your primary care clinician is listed as Kathy Bunn. If you have any questions after today's visit, please call [phone].

## 2018-02-08 ENCOUNTER — OFFICE VISIT (OUTPATIENT)
Dept: FAMILY MEDICINE CLINIC | Age: 48
End: 2018-02-08

## 2018-02-08 VITALS
BODY MASS INDEX: 38.62 KG/M2 | SYSTOLIC BLOOD PRESSURE: 118 MMHG | DIASTOLIC BLOOD PRESSURE: 68 MMHG | HEART RATE: 72 BPM | WEIGHT: 231.8 LBS | TEMPERATURE: 97.2 F | HEIGHT: 65 IN | RESPIRATION RATE: 16 BRPM

## 2018-02-08 DIAGNOSIS — I82.5Z1 CHRONIC DEEP VEIN THROMBOSIS (DVT) OF DISTAL VEIN OF RIGHT LOWER EXTREMITY (HCC): Primary | ICD-10-CM

## 2018-02-08 DIAGNOSIS — Z79.01 MONITORING FOR LONG-TERM ANTICOAGULANT USE: ICD-10-CM

## 2018-02-08 DIAGNOSIS — R10.2 SUPRAPUBIC PAIN, ACUTE: ICD-10-CM

## 2018-02-08 DIAGNOSIS — Z51.81 MONITORING FOR LONG-TERM ANTICOAGULANT USE: ICD-10-CM

## 2018-02-08 LAB
BILIRUB UR QL STRIP: NEGATIVE
GLUCOSE UR-MCNC: NEGATIVE MG/DL
INR BLD: 1.3
KETONES P FAST UR STRIP-MCNC: NEGATIVE MG/DL
PH UR STRIP: 6 [PH] (ref 4.6–8)
PROT UR QL STRIP: NEGATIVE
PT POC: NORMAL SECONDS
SP GR UR STRIP: 1.02 (ref 1–1.03)
UA UROBILINOGEN AMB POC: NORMAL (ref 0.2–1)
URINALYSIS CLARITY POC: CLEAR
URINALYSIS COLOR POC: YELLOW
URINE BLOOD POC: NORMAL
URINE LEUKOCYTES POC: NEGATIVE
URINE NITRITES POC: NEGATIVE
VALID INTERNAL CONTROL?: YES

## 2018-02-08 NOTE — MR AVS SNAPSHOT
[address] [facility] 
[phone] Patient: Evlira Alfonso MRN:  [id number] Visit Information Date & Time Provider Department Dept. Phone Encounter #  
 2/8/2018  8:15 AM Efe Confucianist, MD [facility] at [address] [facility] [id number] Follow-up Instructions Return in about 1 week (around 2/15/2018) for Regular Follow up. Follow-up and Disposition History Your Appointments 2/15/2018  9:30 AM  
ROUTINE CARE with MD Angelia Johnson at [facility] [address] [facility]) Appt Note: Check PT  
 [address] [id number]  
[place]  
  
    
 7/31/2018  8:50 AM  
Follow Up with Arcadio Bland MD  
[facility] [address] [facility]) Appt Note: 6 month f/u  
 [address] [facility] [address] [phone] [address] [place] [address] Upcoming Health Maintenance Date Due  
 PAP AKA CERVICAL CYTOLOGY 11/19/2017 DTaP/Tdap/Td series (2 - Td) 5/17/2026 Allergies as of 2/8/2018  Review Complete On: 2/8/2018 By: Efe Zavala MD  
  
 Severity Noted Reaction Type Reactions Sulfa (Sulfonamide Antibiotics) High 03/12/2010    Anaphylaxis Codeine Medium 03/17/2010    Itching Current Immunizations  Reviewed on 5/2/2016 Name Date Influenza Vaccine (Quad) PF 9/14/2017, 10/27/2016, 10/30/2015 Influenza Vaccine PF 10/11/2013 Influenza Vaccine Split 12/13/2012, 10/5/2011 Pneumococcal Polysaccharide (PPSV-23) 4/11/2016 11:40 AM  
  
 Not reviewed this visit You Were Diagnosed With   
  
 Codes Comments Chronic deep vein thrombosis (DVT) of distal vein of right lower extremity (HCC)    -  Primary ICD-10-CM: X86.8F6 ICD-9-CM: 453.52   
 Monitoring for long-term anticoagulant use     ICD-10-CM: Z51.81, Z79.01 
ICD-9-CM: V58.61 Suprapubic pain, acute     ICD-10-CM: R10.2 ICD-9-CM: 789.09, 338.19 Vitals BP Pulse Temp Resp Height(growth percentile) Weight(growth percentile)  
 118/68 (BP 1 Location: Left arm, BP Patient Position: Sitting) 72 97.2 °F (36.2 °C) (Oral) 16 5' 5\" (1.651 m) 231 lb 12.8 oz (105.1 kg) LMP BMI OB Status Smoking Status (LMP Unknown) 38.57 kg/m2 Hysterectomy Never Smoker BMI and BSA Data Body Mass Index Body Surface Area 38.57 kg/m 2 2.2 m 2 Preferred Pharmacy [facility]acy Name Phone [facility] [address] [place] [address] [phone] Your Updated Medication List  
  
   
This list is accurate as of: 2/8/18 11:59 PM.  Always use your most recent med list.  
  
  
  
  
 albuterol 90 mcg/actuation inhaler Commonly known as:  PROVENTIL HFA, VENTOLIN HFA, PROAIR HFA Take 1 Puff by inhalation every four (4) hours as needed for Wheezing. CIMZIA SC  
by SubCUTAneous route. Injection:  Every 4 weeks  
  
 ergocalciferol 50,000 unit capsule Commonly known as:  ERGOCALCIFEROL Take 1 tab by mouth ONCE PER WEEK for a total of 8 weeks. Then change to TWICE PER MONTH.  
  
 furosemide 40 mg tablet Commonly known as:  LASIX TAKE 1 TABLET BY MOUTH TWICE DAILY AS NEEDED  
  
 methotrexate 2.5 mg tablet Commonly known as:  RHEUMATREX  
TAKE 8 TABLETS BY MOUTH ONCE PER WEEK  
  
 phentermine 37.5 mg tablet Commonly known as:  ADIPEX-P Take 1/2 tablet before breakfast and before dinner  
  
 raNITIdine 150 mg tablet Commonly known as:  ZANTAC Take 150 mg by mouth two (2) times a day. topiramate 50 mg tablet Commonly known as:  TOPAMAX Take 1 Tab by mouth two (2) times a day. TYLENOL EXTRA STRENGTH 500 mg tablet Generic drug:  acetaminophen Take 1,000 mg by mouth every six (6) hours as needed for Pain. warfarin 5 mg tablet Commonly known as:  COUMADIN Take 2 Tabs by mouth daily. Indications: DEEP VEIN THROMBOSIS PREVENTION February 2018 Details Sun Mon Tue Wed Thu Fri Sat  
      1  
  
  
  
   2  
  
  
  
   3  
  
  
  
  
  4  
  
  
  
   5  
  
  
  
   6  
  
  
  
   7  
  
  
  
   8  
  
7.5 mg  
See details 9  
  
10 mg  
  
   10  
  
7.5 mg  
  
  
  11  
  
10 mg  
  
   12  
  
7.5 mg  
  
   13  
  
10 mg  
  
   14  
  
7.5 mg  
  
   15  
  
10 mg  
  
   16  
  
7.5 mg  
  
   17  
  
10 mg  
  
  
  18  
  
7.5 mg  
  
   19  
  
10 mg  
  
   20  
  
7.5 mg  
  
   21  
  
10 mg  
  
   22  
  
7.5 mg  
  
   23  
  
10 mg  
  
   24  
  
7.5 mg  
  
  
  25  
  
10 mg  
  
   26  
  
7.5 mg  
  
   27  
  
10 mg  
  
   28  
  
7.5 mg Date Details 02/08 This INR check INR: 1.3 Date of next INR: No date specified How to take your warfarin dose To take:  7.5 mg Take one and a half of the 5 mg tablets. To take:  10 mg Take two of the 5 mg tablets. We Performed the Following AMB POC PT/INR [39966 CPT(R)] AMB POC URINALYSIS DIP STICK AUTO W/O MICRO [55319 CPT(R)] Follow-up Instructions Return in about 1 week (around 2/15/2018) for Regular Follow up. Introducing [facility] & HE[facility] SERVICES! Dear Leonard Vargas: Thank you for requesting a [employer] account. Our records indicate that you already have an active [employer] account. You can access your account anytime at [email or web address] Did you know that you can access your hospital and ER discharge instructions at any time in [employer]? You can also review all of your test results from your hospital stay or ER visit. Additional Information If you have questions, please visit the Frequently Asked Questions section of the [employer] website at [email or web address] Remember, [employer] is NOT to be used for urgent needs.  For medical emergencies, dial 911. Now available from your iPhone and Android! Please provide this summary of care documentation to your next provider. Your primary care clinician is listed as Jovan Edwards. If you have any questions after today's visit, please call [phone].

## 2018-02-08 NOTE — PROGRESS NOTES
Subjective:     Chief Complaint   Patient presents with    Anticoagulation     1 wk follow-up INR Check      She  is a 50 y.o. female who presents for evaluation of:  DVT in R leg after gyn sgy and chronically on coumadin now. Has had DVT in same leg x 2 in past.   Ct to struggle with post thrombotic syndrome from numerous DVTs. Pain flares up at different times and is much worse with edema. She continues to manage this by resting her leg and elevating it. Rest per Anti-coag tab. Today, she c/o mild suprapubic pain and R flank pain. Also has some dysuria and frequency. Does use Lasix on occasion. Hx of UC and episodic pouchitis.     ROS  Gen - no fever/chills  Resp - no dyspnea or cough  CV - per hpi  Rest per HPI    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    Pseudogout     PUD (peptic ulcer disease)     Skin abrasion 1/28/14    After loosing 125#, Bilateral Inner thigh friction flareup monthly with skin breakdown    Thromboembolus ([address] 2008    Rt leg,  pt states she fell and had a plane ride home and developed blood clots    UC (ulcerative colitis) ([address] 3/12/2010     Past Surgical History:   Procedure Laterality Date    HX ACL RECONSTRUCTION      Rt    Berle Grout    Dr Wilmar Hernandez HX GI      reopening of rectal pouch    HX GYN  06/19/2017    Hysterectomy    HX ORTHOPAEDIC  12/13    Rt foot / toes    HX TONSILLECTOMY       Current Outpatient Prescriptions on File Prior to Visit   Medication Sig Dispense Refill    CERTOLIZUMAB PEGOL (CIMZIA SC) by SubCUTAneous route. Injection:  Every 4 weeks      phentermine (ADIPEX-P) 37.5 mg tablet Take 1/2 tablet before breakfast and before dinner 100 Tab 5    topiramate (TOPAMAX) 50 mg tablet Take 1 Tab by mouth two (2) times a day. 60 Tab 5    methotrexate (RHEUMATREX) 2.5 mg tablet TAKE 8 TABLETS BY MOUTH ONCE PER WEEK  2    ergocalciferol (ERGOCALCIFEROL) 50,000 unit capsule Take 1 tab by mouth ONCE PER WEEK for a total of 8 weeks. Then change to TWICE PER MONTH. 12 Cap 0    warfarin (COUMADIN) 5 mg tablet Take 2 Tabs by mouth daily. Indications: DEEP VEIN THROMBOSIS PREVENTION (Patient taking differently: Take 7.5 mg by mouth daily. Indications: DEEP VEIN THROMBOSIS PREVENTION, 7.5 mg every day except Wednesday & Sunday 10mg) 60 Tab 3    furosemide (LASIX) 40 mg tablet TAKE 1 TABLET BY MOUTH TWICE DAILY AS NEEDED 180 Tab 1    ranitidine (ZANTAC) 150 mg tablet Take 150 mg by mouth two (2) times a day.  albuterol (PROVENTIL HFA, VENTOLIN HFA, PROAIR HFA) 90 mcg/actuation inhaler Take 1 Puff by inhalation every four (4) hours as needed for Wheezing. 1 Inhaler 5    acetaminophen (TYLENOL EXTRA STRENGTH) 500 mg tablet Take 1,000 mg by mouth every six (6) hours as needed for Pain. No current facility-administered medications on file prior to visit.          Objective:     Vitals:    02/08/18 0815   BP: 118/68   Pulse: 72   Resp: 16   Temp: 97.2 °F (36.2 °C)   TempSrc: Oral   Weight: 231 lb 12.8 oz (105.1 kg)   Height: 5' 5\" (1.651 m)     Physical Examination:  General appearance - alert, well appearing, and in no distress  Eyes -sclera anicteric  Chest - clear to auscultation, no wheezes, rales or rhonchi, symmetric air entry  Heart - normal rate, regular rhythm, normal S1, S2, no murmurs, rubs, clicks or gallops  Abd - soft, + suprapubic tenderness  Extr - RLE mildly ttp over shin    Assessment/ Plan:   Diagnoses and all orders for this visit:    1. Chronic deep vein thrombosis (DVT) of right lower extremity, unspecified vein (HCC)  2. Monitoring for long-term anticoagulant use  - INR subtherapeutic so adjusting further today  - ct exercises, leg elevation, compression stockings, and to PT.   -     AMB POC PT/INR    3. Suprapubic pain, acute - UA with some blood but otherwise neg so will hold on abx. F/u in 1 week and pt to call if sx worsen. -     AMB POC URINALYSIS DIP STICK AUTO W/O MICRO    I have discussed the diagnosis with the patient and the intended plan as seen in the above orders. The patient has received an after-visit summary and questions were answered concerning future plans. I have discussed medication side effects and warnings with the patient as well. The patient verbalizes understanding and agreement with the plan. Follow-up Disposition:  Return in about 1 week (around 2/15/2018) for Regular Follow up.

## 2018-02-08 NOTE — PROGRESS NOTES
Chief Complaint   Patient presents with    Anticoagulation     1 wk follow-up INR Check   1. Have you been to the ER, urgent care clinic since your last visit? Hospitalized since your last visit? No    2. Have you seen or consulted any other health care providers outside of the [address] [place] [address] since your last visit? Include any pap smears or colon screening. No    Patient complaining of having right flank and RLQ ABD.  Pain    Rm #8

## 2018-02-11 DIAGNOSIS — M79.89 PAIN AND SWELLING OF RIGHT LOWER LEG: ICD-10-CM

## 2018-02-11 DIAGNOSIS — M79.661 PAIN AND SWELLING OF RIGHT LOWER LEG: ICD-10-CM

## 2018-02-11 RX ORDER — FUROSEMIDE 40 MG/1
TABLET ORAL
Qty: 180 TAB | Refills: 1 | Status: SHIPPED | OUTPATIENT
Start: 2018-02-11 | End: 2018-08-29 | Stop reason: SDUPTHER

## 2018-02-15 ENCOUNTER — OFFICE VISIT (OUTPATIENT)
Dept: FAMILY MEDICINE CLINIC | Age: 48
End: 2018-02-15

## 2018-02-15 VITALS
TEMPERATURE: 97.6 F | HEIGHT: 65 IN | DIASTOLIC BLOOD PRESSURE: 81 MMHG | WEIGHT: 224.4 LBS | HEART RATE: 81 BPM | RESPIRATION RATE: 16 BRPM | BODY MASS INDEX: 37.39 KG/M2 | SYSTOLIC BLOOD PRESSURE: 133 MMHG | OXYGEN SATURATION: 99 %

## 2018-02-15 DIAGNOSIS — Z79.01 MONITORING FOR LONG-TERM ANTICOAGULANT USE: ICD-10-CM

## 2018-02-15 DIAGNOSIS — Z51.81 MONITORING FOR LONG-TERM ANTICOAGULANT USE: ICD-10-CM

## 2018-02-15 DIAGNOSIS — I82.5Z1 CHRONIC DEEP VEIN THROMBOSIS (DVT) OF DISTAL VEIN OF RIGHT LOWER EXTREMITY (HCC): Primary | ICD-10-CM

## 2018-02-15 DIAGNOSIS — M06.4 INFLAMMATORY POLYARTHRITIS (HCC): ICD-10-CM

## 2018-02-15 DIAGNOSIS — E66.01 OBESITIES, MORBID (HCC): ICD-10-CM

## 2018-02-15 DIAGNOSIS — K51.919 ULCERATIVE COLITIS WITH COMPLICATION, UNSPECIFIED LOCATION (HCC): ICD-10-CM

## 2018-02-15 LAB
INR BLD: 1.4
PT POC: NORMAL SECONDS
VALID INTERNAL CONTROL?: YES

## 2018-02-15 NOTE — MR AVS SNAPSHOT
[address] 
[phone] Patient: Alma Garcia MRN:  [id number] Visit Information Date & Time Provider Department Dept. Phone Encounter #  
 2/15/2018  9:30 AM Gómez Perera MD [facility] at [address] [id number] Follow-up Instructions Return in about 2 weeks (around 3/1/2018), or if symptoms worsen or fail to improve, for Regular Follow up. Your Appointments 2/15/2018  9:30 AM  
ROUTINE CARE with Gómez Perera MD  
[facility] at [address]) Appt Note: Check PT  
 [address] [phone]  
[facility] [address]  
  
    
 7/31/2018  8:50 AM  
Follow Up with Leonardo Coffey MD  
[facility] [address] [facility]) Appt Note: 6 month f/u  
 [address] [phone] [address] [place] [address] Upcoming Health Maintenance Date Due  
 PAP AKA CERVICAL CYTOLOGY 11/19/2017 DTaP/Tdap/Td series (2 - Td) 5/17/2026 Allergies as of 2/15/2018  Review Complete On: 2/15/2018 By: Lalo Rosado Severity Noted Reaction Type Reactions Sulfa (Sulfonamide Antibiotics) High 03/12/2010    Anaphylaxis Codeine Medium 03/17/2010    Itching Current Immunizations  Reviewed on 5/2/2016 Name Date Influenza Vaccine (Quad) PF 9/14/2017, 10/27/2016, 10/30/2015 Influenza Vaccine PF 10/11/2013 Influenza Vaccine Split 12/13/2012, 10/5/2011 Pneumococcal Polysaccharide (PPSV-23) 4/11/2016 11:40 AM  
  
 Not reviewed this visit You Were Diagnosed With   
  
 Codes Comments Chronic deep vein thrombosis (DVT) of distal vein of right lower extremity (HCC)    -  Primary ICD-10-CM: O11.3A7 ICD-9-CM: 453.52   
 Monitoring for long-term anticoagulant use     ICD-10-CM: Z51.81, Z79.01 
ICD-9-CM: V58.61 Vitals BP Pulse Temp Resp Height(growth percentile) Weight(growth percentile) 133/81 (BP 1 Location: Right arm, BP Patient Position: Sitting) 81 97.6 °F (36.4 °C) (Oral) 16 5' 5\" (1.651 m) 224 lb 6.4 oz (101.8 kg) LMP SpO2 BMI OB Status Smoking Status (LMP Unknown) 99% 37.34 kg/m2 Hysterectomy Never Smoker Vitals History BMI and BSA Data Body Mass Index Body Surface Area  
 37.34 kg/m 2 2.16 m 2 Preferred Pharmacy Pharmacy Name Phone [facility] [address] [place] [address] [phone] Your Updated Medication List  
  
   
This list is accurate as of: 2/15/18  8:36 AM.  Always use your most recent med list.  
  
  
  
  
 albuterol 90 mcg/actuation inhaler Commonly known as:  PROVENTIL HFA, VENTOLIN HFA, PROAIR HFA Take 1 Puff by inhalation every four (4) hours as needed for Wheezing. CIMZIA SC  
by SubCUTAneous route. Injection:  Every 4 weeks  
  
 ergocalciferol 50,000 unit capsule Commonly known as:  ERGOCALCIFEROL Take 1 tab by mouth ONCE PER WEEK for a total of 8 weeks. Then change to TWICE PER MONTH.  
  
 furosemide 40 mg tablet Commonly known as:  LASIX TAKE 1 TABLET BY MOUTH TWICE DAILY AS NEEDED  
  
 methotrexate 2.5 mg tablet Commonly known as:  RHEUMATREX  
TAKE 8 TABLETS BY MOUTH ONCE PER WEEK  
  
 phentermine 37.5 mg tablet Commonly known as:  ADIPEX-P Take 1/2 tablet before breakfast and before dinner  
  
 raNITIdine 150 mg tablet Commonly known as:  ZANTAC Take 150 mg by mouth two (2) times a day. topiramate 50 mg tablet Commonly known as:  TOPAMAX Take 1 Tab by mouth two (2) times a day. TYLENOL EXTRA STRENGTH 500 mg tablet Generic drug:  acetaminophen Take 1,000 mg by mouth every six (6) hours as needed for Pain.  
  
 warfarin 5 mg tablet Commonly known as:  COUMADIN Take 2 Tabs by mouth daily. Indications: DEEP VEIN THROMBOSIS PREVENTION February 2018 Details Sun Mon Tue Wed Thu Fri Sat  
      1  
  
  
  
   2  
  
  
  
   3  
  
  
  
  
  4  
  
  
  
   5  
  
  
  
   6  
  
  
  
   7  
  
  
  
   8  
  
  
  
   9  
  
  
  
   10  
  
  
  
  
  11  
  
  
  
   12  
  
  
  
   13  
  
  
  
   14  
  
  
  
   15  
  
10 mg See details 16  
  
10 mg  
  
   17  
  
10 mg  
  
  
  18  
  
10 mg  
  
   19  
  
10 mg  
  
   20  
  
10 mg  
  
   21  
  
10 mg  
  
   22  
  
10 mg  
  
   23  
  
10 mg  
  
   24  
  
10 mg  
  
  
  25  
  
10 mg  
  
   26  
  
10 mg  
  
   27  
  
10 mg  
  
   28  
  
10 mg Date Details 02/15 This INR check INR: 1.4 How to take your warfarin dose To take:  10 mg Take two of the 5 mg tablets. March 2018 Details Sun Mon Tue Wed Thu Fri Sat  
      1  
  
10 mg  
  
   2  
  
  
  
   3  
  
  
  
  
  4  
  
  
  
   5  
  
  
  
   6  
  
  
  
   7  
  
  
  
   8  
  
  
  
   9  
  
  
  
   10  
  
  
  
  
  11  
  
  
  
   12  
  
  
  
   13  
  
  
  
   14  
  
  
  
   15  
  
  
  
   16  
  
  
  
   17  
  
  
  
  
  18  
  
  
  
   19  
  
  
  
   20  
  
  
  
   21  
  
  
  
   22  
  
  
  
   23  
  
  
  
   24  
  
  
  
  
  25  
  
  
  
   26  
  
  
  
   27  
  
  
  
   28  
  
  
  
   29  
  
  
  
   30  
  
  
  
   31  
  
  
  
  
 Date Details No additional details Date of next INR:  3/1/2018 How to take your warfarin dose To take:  10 mg Take two of the 5 mg tablets. We Performed the Following AMB POC PT/INR [36089 CPT(R)] Follow-up Instructions Return in about 2 weeks (around 3/1/2018), or if symptoms worsen or fail to improve, for Regular Follow up. Introducing [facility] & HEALTH SERVICES! Dear Bashir Mathur: Thank you for requesting a [employer] account. Our records indicate that you already have an active [employer] account. You can access your account anytime at [email or web address] Did you know that you can access your hospital and ER discharge instructions at any time in [employer]? You can also review all of your test results from your hospital stay or ER visit. Additional Information If you have questions, please visit the Frequently Asked Questions section of the [employer] website at [email or web address] Remember, [employer] is NOT to be used for urgent needs. For medical emergencies, dial 911. Now available from your iPhone and Android! Please provide this summary of care documentation to your next provider. Your primary care clinician is listed as Andreas Vickers. If you have any questions after today's visit, please call [phone].

## 2018-02-15 NOTE — ASSESSMENT & PLAN NOTE
Improving, based on history, physical exam and review of pertinent labs, studies and medications; meds reconciled; continue current treatment plan., This condition is managed by Specialist.  Key Obesity Meds             furosemide (LASIX) 40 mg tablet  (Taking) TAKE 1 TABLET BY MOUTH TWICE DAILY AS NEEDED    phentermine (ADIPEX-P) 37.5 mg tablet  (Taking) Take 1/2 tablet before breakfast and before dinner

## 2018-02-15 NOTE — PROGRESS NOTES
Subjective:     Chief Complaint   Patient presents with    Anticoagulation     INR Check      She  is a 50 y.o. female who presents for evaluation of:  DVT in R leg after gyn sgy and chronically on coumadin now. Has had DVT in same leg x 2 in past.   Ct to struggle with post thrombotic syndrome from numerous DVTs. Pain flares up at different times and is much worse with edema. She continues to manage this by resting her leg and elevating it. Rest per Anti-coag tab.     ROS  Gen - no fever/chills  Resp - no dyspnea or cough  CV - per hpi  Rest per HPI    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    Pseudogout     PUD (peptic ulcer disease)     Skin abrasion 1/28/14    After loosing 125#, Bilateral Inner thigh friction flareup monthly with skin breakdown    Thromboembolus ([address] 2008    Rt leg,  pt states she fell and had a plane ride home and developed blood clots    UC (ulcerative colitis) ([address] 3/12/2010     Past Surgical History:   Procedure Laterality Date    HX ACL RECONSTRUCTION      Rt    Berle Grout    Dr Wilmar Hernandez HX GI      reopening of rectal pouch    HX GYN  06/19/2017    Hysterectomy    HX ORTHOPAEDIC  12/13    Rt foot / toes    HX TONSILLECTOMY       Current Outpatient Prescriptions on File Prior to Visit   Medication Sig Dispense Refill    furosemide (LASIX) 40 mg tablet TAKE 1 TABLET BY MOUTH TWICE DAILY AS NEEDED 811 Tab 1    CERTOLIZUMAB PEGOL (Franki Ardon SC) by SubCUTAneous route. Injection:  Every 4 weeks      phentermine (ADIPEX-P) 37.5 mg tablet Take 1/2 tablet before breakfast and before dinner 100 Tab 5    topiramate (TOPAMAX) 50 mg tablet Take 1 Tab by mouth two (2) times a day. 60 Tab 5    methotrexate (RHEUMATREX) 2.5 mg tablet TAKE 8 TABLETS BY MOUTH ONCE PER WEEK  2    ergocalciferol (ERGOCALCIFEROL) 50,000 unit capsule Take 1 tab by mouth ONCE PER WEEK for a total of 8 weeks. Then change to TWICE PER MONTH. 12 Cap 0    warfarin (COUMADIN) 5 mg tablet Take 2 Tabs by mouth daily. Indications: DEEP VEIN THROMBOSIS PREVENTION (Patient taking differently: Take 7.5 mg by mouth daily. Indications: DEEP VEIN THROMBOSIS PREVENTION, taking as directed (see chart)) 60 Tab 3    ranitidine (ZANTAC) 150 mg tablet Take 150 mg by mouth two (2) times a day.  albuterol (PROVENTIL HFA, VENTOLIN HFA, PROAIR HFA) 90 mcg/actuation inhaler Take 1 Puff by inhalation every four (4) hours as needed for Wheezing. 1 Inhaler 5    acetaminophen (TYLENOL EXTRA STRENGTH) 500 mg tablet Take 1,000 mg by mouth every six (6) hours as needed for Pain. No current facility-administered medications on file prior to visit. Objective:     Vitals:    02/15/18 0820   BP: 133/81   Pulse: 81   Resp: 16   Temp: 97.6 °F (36.4 °C)   TempSrc: Oral   SpO2: 99%   Weight: 224 lb 6.4 oz (101.8 kg)   Height: 5' 5\" (1.651 m)     Physical Examination:  General appearance - alert, well appearing, and in no distress  Eyes -sclera anicteric  Chest - clear to auscultation, no wheezes, rales or rhonchi, symmetric air entry  Heart - normal rate, regular rhythm, normal S1, S2, no murmurs, rubs, clicks or gallops  Abd - soft, + suprapubic tenderness  Extr - RLE mildly ttp over shin    Assessment/ Plan:   Diagnoses and all orders for this visit:    1. Chronic deep vein thrombosis (DVT) of right lower extremity, unspecified vein (HCC)  2.  Monitoring for long-term anticoagulant use  - INR subtherapeutic so adjusting further today  - ct exercises, leg elevation, compression stockings, and to PT.   - INR subtherapeutic so increased coumadin dose again today -largely appears to be related to diet changes with a healthier diet. -     AMB POC PT/INR  Assessment & Plan:  Stable, based on history, physical exam and review of pertinent labs, studies and medications; meds reconciled; continue current treatment plan., on chronic coumadin  Key Peripheral Vascular Disease Meds             warfarin (COUMADIN) 5 mg tablet  (Taking) Take 2 Tabs by mouth daily. Indications: DEEP VEIN THROMBOSIS PREVENTION        3. Ulcerative colitis with complication, unspecified location [facility]) -followed by Dr. Petty Novoa: This condition is managed by Specialist.      4. Obesities, morbid (HCC)-followed by Dr. Leno Joshi:  Improving, based on history, physical exam and review of pertinent labs, studies and medications; meds reconciled; continue current treatment plan., This condition is managed by Specialist.  Key Obesity Meds             furosemide (LASIX) 40 mg tablet  (Taking) TAKE 1 TABLET BY MOUTH TWICE DAILY AS NEEDED    phentermine (ADIPEX-P) 37.5 mg tablet  (Taking) Take 1/2 tablet before breakfast and before dinner          5. Inflammatory polyarthritis (HCC)-followed by Dr. Mouna Lopez: This condition is managed by Specialist.  Lab Results   Component Value Date/Time    WBC 6.8 09/30/2017 07:48 AM    HGB 16.6 09/30/2017 07:48 AM    HCT 46.8 09/30/2017 07:48 AM    PLATELET 007 76/44/8176 07:48 AM    Creatinine 1.03 09/30/2017 07:47 AM    BUN 18 09/30/2017 07:47 AM    Potassium 3.4 09/30/2017 07:47 AM    INR (POC) 1.8 09/30/2017 09:59 AM    INR POC 1.4 02/15/2018 08:28 AM       I have discussed the diagnosis with the patient and the intended plan as seen in the above orders. The patient has received an after-visit summary and questions were answered concerning future plans. I have discussed medication side effects and warnings with the patient as well. The patient verbalizes understanding and agreement with the plan. Follow-up Disposition:  Return in about 2 weeks (around 3/1/2018), or if symptoms worsen or fail to improve, for Regular Follow up.

## 2018-02-15 NOTE — ASSESSMENT & PLAN NOTE
Stable, based on history, physical exam and review of pertinent labs, studies and medications; meds reconciled; continue current treatment plan., on chronic coumadin  Key Peripheral Vascular Disease Meds             warfarin (COUMADIN) 5 mg tablet  (Taking) Take 2 Tabs by mouth daily.  Indications: DEEP VEIN THROMBOSIS PREVENTION

## 2018-02-15 NOTE — PROGRESS NOTES
Chief Complaint   Patient presents with    Anticoagulation     INR Check   1. Have you been to the ER, urgent care clinic since your last visit? Hospitalized since your last visit? No    2. Have you seen or consulted any other health care providers outside of the [address] [place] [address] since your last visit? Include any pap smears or colon screening. No  Patient would like to discuss 2 days ago onset of midline lower Quadrant ABD.  Pain  Room #7

## 2018-03-01 ENCOUNTER — OFFICE VISIT (OUTPATIENT)
Dept: FAMILY MEDICINE CLINIC | Age: 48
End: 2018-03-01

## 2018-03-01 VITALS
TEMPERATURE: 96.6 F | BODY MASS INDEX: 37.02 KG/M2 | RESPIRATION RATE: 18 BRPM | WEIGHT: 222.2 LBS | HEIGHT: 65 IN | HEART RATE: 71 BPM | OXYGEN SATURATION: 93 % | SYSTOLIC BLOOD PRESSURE: 116 MMHG | DIASTOLIC BLOOD PRESSURE: 72 MMHG

## 2018-03-01 DIAGNOSIS — R10.2 SUPRAPUBIC ABDOMINAL PAIN: ICD-10-CM

## 2018-03-01 DIAGNOSIS — N93.9 VAGINAL BLEEDING: ICD-10-CM

## 2018-03-01 DIAGNOSIS — Z51.81 MONITORING FOR LONG-TERM ANTICOAGULANT USE: ICD-10-CM

## 2018-03-01 DIAGNOSIS — R31.9 HEMATURIA, UNSPECIFIED TYPE: ICD-10-CM

## 2018-03-01 DIAGNOSIS — Z90.710 S/P HYSTERECTOMY: ICD-10-CM

## 2018-03-01 DIAGNOSIS — Z79.01 MONITORING FOR LONG-TERM ANTICOAGULANT USE: ICD-10-CM

## 2018-03-01 DIAGNOSIS — I82.5Z1 CHRONIC DEEP VEIN THROMBOSIS (DVT) OF DISTAL VEIN OF RIGHT LOWER EXTREMITY (HCC): Primary | ICD-10-CM

## 2018-03-01 DIAGNOSIS — I82.431 ACUTE DEEP VEIN THROMBOSIS (DVT) OF POPLITEAL VEIN OF RIGHT LOWER EXTREMITY (HCC): ICD-10-CM

## 2018-03-01 LAB
BILIRUB UR QL STRIP: NEGATIVE
GLUCOSE UR-MCNC: NEGATIVE MG/DL
INR BLD: 1.8
KETONES P FAST UR STRIP-MCNC: NEGATIVE MG/DL
PH UR STRIP: 6.5 [PH] (ref 4.6–8)
PROT UR QL STRIP: NEGATIVE
PT POC: NORMAL SECONDS
SP GR UR STRIP: 1.02 (ref 1–1.03)
UA UROBILINOGEN AMB POC: NORMAL (ref 0.2–1)
URINALYSIS CLARITY POC: CLEAR
URINALYSIS COLOR POC: YELLOW
URINE BLOOD POC: NORMAL
URINE LEUKOCYTES POC: NEGATIVE
URINE NITRITES POC: NEGATIVE
VALID INTERNAL CONTROL?: YES

## 2018-03-01 RX ORDER — WARFARIN SODIUM 5 MG/1
TABLET ORAL
Qty: 45 TAB | Refills: 1 | Status: SHIPPED | OUTPATIENT
Start: 2018-03-01 | End: 2018-05-19 | Stop reason: SDUPTHER

## 2018-03-01 NOTE — MR AVS SNAPSHOT
[address] [facility] 
[phone] Patient: Carmela Gardner MRN:  [id number] Visit Information Date & Time Provider Department Dept. Phone Encounter #  
 3/1/2018  9:30 AM Niraj Sherman MD [facility] at [address] [id number] Follow-up Instructions Return in about 4 days (around 3/5/2018) for Regular Follow up. Your Appointments 3/1/2018  9:30 AM  
ROUTINE CARE with Niraj Sherman MD  
[facility] at [address]) Appt Note: routine f/u  
 [facility] [address] [phone]  
[facility]  
  
    
 7/31/2018  8:50 AM  
Follow Up with Emely Abbott MD  
[facility] [address] [facility]) Appt Note: 6 month f/u  
 [address] [facility] [address] [phone] [address] [employer] Upcoming Health Maintenance Date Due  
 PAP AKA CERVICAL CYTOLOGY 11/19/2017 DTaP/Tdap/Td series (2 - Td) 5/17/2026 Allergies as of 3/1/2018  Review Complete On: 3/1/2018 By: Niraj Sherman MD  
  
 Severity Noted Reaction Type Reactions Sulfa (Sulfonamide Antibiotics) High 03/12/2010    Anaphylaxis Codeine Medium 03/17/2010    Itching Current Immunizations  Reviewed on 5/2/2016 Name Date Influenza Vaccine (Quad) PF 9/14/2017, 10/27/2016, 10/30/2015 Influenza Vaccine PF 10/11/2013 Influenza Vaccine Split 12/13/2012, 10/5/2011 Pneumococcal Polysaccharide (PPSV-23) 4/11/2016 11:40 AM  
  
 Not reviewed this visit You Were Diagnosed With   
  
 Codes Comments Chronic deep vein thrombosis (DVT) of distal vein of right lower extremity (HCC)    -  Primary ICD-10-CM: G48.1H1 ICD-9-CM: 453.52   
 Monitoring for long-term anticoagulant use     ICD-10-CM: Z51.81, Z79.01 
ICD-9-CM: V58.61 Vaginal bleeding     ICD-10-CM: N93.9 ICD-9-CM: 623.8 S/P hysterectomy     ICD-10-CM: Z90.710 ICD-9-CM: V88.01 Vitals BP Pulse Temp Resp Height(growth percentile) Weight(growth percentile) 116/72 (BP 1 Location: Left arm, BP Patient Position: Sitting) 71 96.6 °F (35.9 °C) (Oral) 18 5' 5\" (1.651 m) 222 lb 3.2 oz (100.8 kg) LMP SpO2 BMI OB Status Smoking Status (LMP Unknown) 93% 36.98 kg/m2 Hysterectomy Never Smoker Vitals History BMI and BSA Data Body Mass Index Body Surface Area  
 36.98 kg/m 2 2.15 m 2 Preferred Pharmacy Pharmacy Name Phone [facility] [address] [place] [address] [phone] Your Updated Medication List  
  
   
This list is accurate as of 3/1/18  8:51 AM.  Always use your most recent med list.  
  
  
  
  
 albuterol 90 mcg/actuation inhaler Commonly known as:  PROVENTIL HFA, VENTOLIN HFA, PROAIR HFA Take 1 Puff by inhalation every four (4) hours as needed for Wheezing. CIMZIA SC  
by SubCUTAneous route. Injection:  Every 4 weeks  
  
 ergocalciferol 50,000 unit capsule Commonly known as:  ERGOCALCIFEROL Take 1 tab by mouth ONCE PER WEEK for a total of 8 weeks. Then change to TWICE PER MONTH.  
  
 furosemide 40 mg tablet Commonly known as:  LASIX TAKE 1 TABLET BY MOUTH TWICE DAILY AS NEEDED  
  
 methotrexate 2.5 mg tablet Commonly known as:  RHEUMATREX  
TAKE 8 TABLETS BY MOUTH ONCE PER WEEK  
  
 phentermine 37.5 mg tablet Commonly known as:  ADIPEX-P Take 1/2 tablet before breakfast and before dinner  
  
 raNITIdine 150 mg tablet Commonly known as:  ZANTAC Take 150 mg by mouth two (2) times a day. topiramate 50 mg tablet Commonly known as:  TOPAMAX Take 1 Tab by mouth two (2) times a day. TYLENOL EXTRA STRENGTH 500 mg tablet Generic drug:  acetaminophen Take 1,000 mg by mouth every six (6) hours as needed for Pain.  
  
 warfarin 5 mg tablet Commonly known as:  COUMADIN Take 2 Tabs by mouth daily. Indications: DEEP VEIN THROMBOSIS PREVENTION March 2018 Details Bryan Cook Tue Wed Thu Fri Sat  
      1 Hold See details 2 Hold  
  
   3 Hold  
  
  
  4 Hold 5  
  
10 mg  
  
   6  
  
  
  
   7  
  
  
  
   8  
  
  
  
   9  
  
  
  
   10  
  
  
  
  
  11  
  
  
  
   12  
  
  
  
   13  
  
  
  
   14  
  
  
  
   15  
  
  
  
   16  
  
  
  
   17  
  
  
  
  
  18  
  
  
  
   19  
  
  
  
   20  
  
  
  
   21  
  
  
  
   22  
  
  
  
   23  
  
  
  
   24  
  
  
  
  
  25  
  
  
  
   26  
  
  
  
   27  
  
  
  
   28  
  
  
  
   29  
  
  
  
   30  
  
  
  
   31  
  
  
  
  
 Date Details 03/01 This INR check INR: 1.8 Date of next INR:  3/5/2018 How to take your warfarin dose To take:  10 mg Take two of the 5 mg tablets. Hold Do not take your warfarin dose. See the Details table to the right for additional instructions. We Performed the Following AMB POC PT/INR [73858 CPT(R)] Follow-up Instructions Return in about 4 days (around 3/5/2018) for Regular Follow up. Introducing [facility] & HEALTH SERVICES! Dear Erasto Durant: Thank you for requesting a [employer] account. Our records indicate that you already have an active [employer] account. You can access your account anytime at [email or web address] Did you know that you can access your hospital and ER discharge instructions at any time in [employer]? You can also review all of your test results from your hospital stay or ER visit. Additional Information If you have questions, please visit the Frequently Asked Questions section of the [employer] website at [email or web address] Remember, [employer] is NOT to be used for urgent needs.  For medical emergencies, dial 911. Now available from your iPhone and Android! Please provide this summary of care documentation to your next provider. Your primary care clinician is listed as Jennifer July. If you have any questions after today's visit, please call [phone].

## 2018-03-01 NOTE — PROGRESS NOTES
Subjective:     Chief Complaint   Patient presents with    Follow-up     PT/INR      She  is a 50 y.o. female who presents for evaluation of:  INR check - On coumadin and reports recently having some vaginal bleeding despite previous hysterectomy with Dr. Clarence Orozco. Has not seen him yet. No active bleeding today. Sx x 1 day. DVT in R leg after gyn sgy and chronically on coumadin now. Has had DVT in same leg x 2 in past.   Ct to struggle with post thrombotic syndrome from numerous DVTs. Pain flares up at different times and is much worse with edema. She continues to manage this by resting her leg and elevating it. Rest per Anti-coag tab.     ROS  Gen - no fever/chills  Resp - no dyspnea or cough  CV - per hpi  Rest per HPI    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    Pseudogout     PUD (peptic ulcer disease)     Skin abrasion 1/28/14    After loosing 125#, Bilateral Inner thigh friction flareup monthly with skin breakdown    Thromboembolus ([address] 2008    Rt leg,  pt states she fell and had a plane ride home and developed blood clots    UC (ulcerative colitis) ([address] 3/12/2010     Past Surgical History:   Procedure Laterality Date    HX ACL RECONSTRUCTION      Rt    Shabana Marxl    Dr Adam Wheat HX GI      reopening of rectal pouch    HX GYN  06/19/2017    Hysterectomy    HX ORTHOPAEDIC  12/13    Rt foot / toes    HX TONSILLECTOMY       Current Outpatient Prescriptions on File Prior to Visit   Medication Sig Dispense Refill    furosemide (LASIX) 40 mg tablet TAKE 1 TABLET BY MOUTH TWICE DAILY AS NEEDED 821 Tab 1    CERTOLIZUMAB PEGOL (CIMZIA SC) by SubCUTAneous route. Injection:  Every 4 weeks      phentermine (ADIPEX-P) 37.5 mg tablet Take 1/2 tablet before breakfast and before dinner 100 Tab 5    topiramate (TOPAMAX) 50 mg tablet Take 1 Tab by mouth two (2) times a day. 60 Tab 5    methotrexate (RHEUMATREX) 2.5 mg tablet TAKE 8 TABLETS BY MOUTH ONCE PER WEEK  2    ergocalciferol (ERGOCALCIFEROL) 50,000 unit capsule Take 1 tab by mouth ONCE PER WEEK for a total of 8 weeks. Then change to TWICE PER MONTH. 12 Cap 0    warfarin (COUMADIN) 5 mg tablet Take 2 Tabs by mouth daily. Indications: DEEP VEIN THROMBOSIS PREVENTION (Patient taking differently: Take 7.5 mg by mouth daily. Indications: DEEP VEIN THROMBOSIS PREVENTION, taking as directed (see chart)) 60 Tab 3    ranitidine (ZANTAC) 150 mg tablet Take 150 mg by mouth two (2) times a day.  albuterol (PROVENTIL HFA, VENTOLIN HFA, PROAIR HFA) 90 mcg/actuation inhaler Take 1 Puff by inhalation every four (4) hours as needed for Wheezing. 1 Inhaler 5    acetaminophen (TYLENOL EXTRA STRENGTH) 500 mg tablet Take 1,000 mg by mouth every six (6) hours as needed for Pain. No current facility-administered medications on file prior to visit.          Objective:     Vitals:    03/01/18 0815   BP: 116/72   Pulse: 71   Resp: 18   Temp: 96.6 °F (35.9 °C)   TempSrc: Oral   SpO2: 93%   Weight: 222 lb 3.2 oz (100.8 kg)   Height: 5' 5\" (1.651 m)     Physical Examination:  General appearance - alert, well appearing, and in no distress  Eyes -sclera anicteric  Chest - clear to auscultation, no wheezes, rales or rhonchi, symmetric air entry  Heart - normal rate, regular rhythm, normal S1, S2, no murmurs, rubs, clicks or gallops  Abd - soft, + suprapubic tenderness, + BS, ND  Extr -no sig edema    Assessment/ Plan:   Diagnoses and all orders for this visit:    1. Chronic deep vein thrombosis (DVT) of distal vein of right lower extremity (HCC)  2. Monitoring for long-term anticoagulant use  -     AMB POC PT/INR    3. Vaginal bleeding  4. S/P hysterectomy  5. Suprapubic abd pain  7. Hematuria  - Stopping Coumadin x 4 days and UA with 2+ blood and neg for LE or Nitrites. Sending for UCx. Pt does not think there is any rectal bleeding and no active bleeding today. If not improving by Monday, will get CT and send to GYN. If pain or bleeding worsen, pt to go to ER. I have discussed the diagnosis with the patient and the intended plan as seen in the above orders. The patient has received an after-visit summary and questions were answered concerning future plans. I have discussed medication side effects and warnings with the patient as well. The patient verbalizes understanding and agreement with the plan. Follow-up Disposition:  Return in about 4 weeks (around 3/29/2018) for Regular Follow up.

## 2018-03-01 NOTE — PROGRESS NOTES
Chief Complaint   Patient presents with    Follow-up     PT/INR   1. Have you been to the ER, urgent care clinic since your last visit? Hospitalized since your last visit? No    2. Have you seen or consulted any other health care providers outside of the [address] [place] [address] since your last visit? Include any pap smears or colon screening.  No   Started having vaginal bleeding yesterday  Room 7

## 2018-03-03 DIAGNOSIS — I82.401 ACUTE DEEP VEIN THROMBOSIS (DVT) OF RIGHT LOWER EXTREMITY, UNSPECIFIED VEIN (HCC): ICD-10-CM

## 2018-03-03 DIAGNOSIS — E55.9 VITAMIN D DEFICIENCY: ICD-10-CM

## 2018-03-03 LAB — BACTERIA UR CULT: NORMAL

## 2018-03-05 ENCOUNTER — OFFICE VISIT (OUTPATIENT)
Dept: FAMILY MEDICINE CLINIC | Age: 48
End: 2018-03-05

## 2018-03-05 VITALS
BODY MASS INDEX: 38.72 KG/M2 | HEIGHT: 65 IN | WEIGHT: 232.4 LBS | HEART RATE: 81 BPM | OXYGEN SATURATION: 96 % | RESPIRATION RATE: 20 BRPM | DIASTOLIC BLOOD PRESSURE: 68 MMHG | SYSTOLIC BLOOD PRESSURE: 115 MMHG | TEMPERATURE: 98.6 F

## 2018-03-05 DIAGNOSIS — M79.89 RIGHT LEG SWELLING: ICD-10-CM

## 2018-03-05 DIAGNOSIS — N93.9 VAGINAL BLEEDING: ICD-10-CM

## 2018-03-05 DIAGNOSIS — Z90.710 S/P HYSTERECTOMY: ICD-10-CM

## 2018-03-05 DIAGNOSIS — I82.5Z1 CHRONIC DEEP VEIN THROMBOSIS (DVT) OF DISTAL VEIN OF RIGHT LOWER EXTREMITY (HCC): Primary | ICD-10-CM

## 2018-03-05 DIAGNOSIS — Z51.81 MONITORING FOR LONG-TERM ANTICOAGULANT USE: ICD-10-CM

## 2018-03-05 DIAGNOSIS — Z79.01 MONITORING FOR LONG-TERM ANTICOAGULANT USE: ICD-10-CM

## 2018-03-05 DIAGNOSIS — R31.9 HEMATURIA, UNSPECIFIED TYPE: ICD-10-CM

## 2018-03-05 RX ORDER — ERGOCALCIFEROL 1.25 MG/1
CAPSULE ORAL
Qty: 12 CAP | Refills: 0 | Status: SHIPPED | OUTPATIENT
Start: 2018-03-05 | End: 2018-06-01 | Stop reason: SDUPTHER

## 2018-03-05 NOTE — PROGRESS NOTES
Chief Complaint   Patient presents with    Follow-up     1 wk evaluation of Vaginal Bleeding   Patient Continues to see a small amount of Bleeding  1. Have you been to the ER, urgent care clinic since your last visit? Hospitalized since your last visit? No    2. Have you seen or consulted any other health care providers outside of the [address] [place] [address] since your last visit? Include any pap smears or colon screening.  No   Room #7

## 2018-03-05 NOTE — PROGRESS NOTES
Subjective:     Chief Complaint   Patient presents with    Follow-up     1 wk evaluation of Vaginal Bleeding      She  is a 50 y.o. female who presents for evaluation of:  INR check - Taken off coumadin x 3 days d/t vaginal bleeding despite previous hysterectomy with Dr. Esther Hamilton. Has appt with him today. No active bleeding today but still seeing blood on tp.  UA showed blood and UCx was neg. DVT in R leg after gyn sgy and chronically on coumadin now. Has had DVT in same leg x 2 in past.   Ct to struggle with post thrombotic syndrome from numerous DVTs. Pain flares up at different times and is much worse with edema. She continues to manage this by resting her leg and elevating it. Rest per Anti-coag tab.     ROS  Gen - no fever/chills  Resp - no dyspnea or cough  CV - per hpi  Rest per HPI    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    Pseudogout     PUD (peptic ulcer disease)     Skin abrasion 1/28/14    After loosing 125#, Bilateral Inner thigh friction flareup monthly with skin breakdown    Thromboembolus ([address] 2008    Rt leg,  pt states she fell and had a plane ride home and developed blood clots    UC (ulcerative colitis) ([address] 3/12/2010     Past Surgical History:   Procedure Laterality Date    HX ACL RECONSTRUCTION      Rt    HX COLECTOMY  1992    Dr Yoly Rankin HX GI      reopening of rectal pouch    HX GYN  06/19/2017    Hysterectomy    HX ORTHOPAEDIC  12/13    Rt foot / toes    HX TONSILLECTOMY       Current Outpatient Prescriptions on File Prior to Visit   Medication Sig Dispense Refill    ergocalciferol (ERGOCALCIFEROL) 50,000 unit capsule TAKE 1 CAPSULE BY MOUTH ONCE PER WEEK FOR A TOTAL OF 8 WEEKS. THEN CHANGE TO TWICE PER MONTH. 12 Cap 0    warfarin (COUMADIN) 5 mg tablet TAKE 1 TAB BY MOUTH DAILY. INDICATIONS: DEEP VEIN THROMBOSIS PREVENTION 45 Tab 1    furosemide (LASIX) 40 mg tablet TAKE 1 TABLET BY MOUTH TWICE DAILY AS NEEDED 179 Tab 1    CERTOLIZUMAB PEGOL (CIMZIA SC) by SubCUTAneous route. Injection:  Every 4 weeks      phentermine (ADIPEX-P) 37.5 mg tablet Take 1/2 tablet before breakfast and before dinner 100 Tab 5    topiramate (TOPAMAX) 50 mg tablet Take 1 Tab by mouth two (2) times a day. 60 Tab 5    methotrexate (RHEUMATREX) 2.5 mg tablet TAKE 8 TABLETS BY MOUTH ONCE PER WEEK  2    ranitidine (ZANTAC) 150 mg tablet Take 150 mg by mouth two (2) times a day.  albuterol (PROVENTIL HFA, VENTOLIN HFA, PROAIR HFA) 90 mcg/actuation inhaler Take 1 Puff by inhalation every four (4) hours as needed for Wheezing. 1 Inhaler 5    acetaminophen (TYLENOL EXTRA STRENGTH) 500 mg tablet Take 1,000 mg by mouth every six (6) hours as needed for Pain. No current facility-administered medications on file prior to visit. Objective:     Vitals:    03/05/18 1357   BP: 115/68   Pulse: 81   Resp: 20   Temp: 98.6 °F (37 °C)   TempSrc: Oral   SpO2: 96%   Weight: 232 lb 6.4 oz (105.4 kg)   Height: 5' 5\" (1.651 m)     Physical Examination:  General appearance - alert, well appearing, and in no distress  Eyes -sclera anicteric  Chest - clear to auscultation, no wheezes, rales or rhonchi, symmetric air entry  Heart - normal rate, regular rhythm, normal S1, S2, no murmurs, rubs, clicks or gallops  Extr -bilat LE with 1 + edema, mildly ttp bilat over calves, no cords palpated    Assessment/ Plan:   Diagnoses and all orders for this visit:    1.  Chronic deep vein thrombosis (DVT) of distal vein of right lower extremity ([address]  2. Monitoring for long-term anticoagulant use  3. Right leg swelling  - Holding coumadin d/t  bleeding. Mild edema and pain in legs so will get Dopplers as well. Do not suspect DVT already but higher risk pt. Discussed using lasix to help with edema and red flag sx to go to ER.  -     DUPLEX LOWER EXT VENOUS BILAT; Future    4. Vaginal bleeding  5. S/P hysterectomy  6. Hematuria  - Stopping Coumadin x 3 more days given UA with 2+ blood at last appt and ct mild vaginal bleeding/spotting. Has appt with GYN today. If pain or bleeding worsen, pt to go to ER. May order CT abd/pelvis if not clear etiology of  bleeding by next appt. I have discussed the diagnosis with the patient and the intended plan as seen in the above orders. The patient has received an after-visit summary and questions were answered concerning future plans. I have discussed medication side effects and warnings with the patient as well. The patient verbalizes understanding and agreement with the plan. Follow-up Disposition:  Return in about 3 days (around 3/8/2018).

## 2018-03-05 NOTE — MR AVS SNAPSHOT
[address] [facility]. 
[phone] Patient: Lowell Serrato MRN:  [id number] Visit Information Date & Time Provider Department Dept. Phone Encounter #  
 3/5/2018  1:30 PM Anupama Awad MD [facility] at [address] [id number] Follow-up Instructions Return in about 3 days (around 3/8/2018). Your Appointments 7/31/2018  8:50 AM  
Follow Up with Amado Mahan MD  
[address] [place] [address] Diabetes and Endocrinology [facility]) Appt Note: 6 month f/u  
 [phone] [address] Upcoming Health Maintenance Date Due  
 PAP AKA CERVICAL CYTOLOGY 11/19/2017 DTaP/Tdap/Td series (2 - Td) 5/17/2026 Allergies as of 3/5/2018  Review Complete On: 3/5/2018 By: Anupama Awad MD  
  
 Severity Noted Reaction Type Reactions Sulfa (Sulfonamide Antibiotics) High 03/12/2010    Anaphylaxis Codeine Medium 03/17/2010    Itching Current Immunizations  Reviewed on 5/2/2016 Name Date Influenza Vaccine (Quad) PF 9/14/2017, 10/27/2016, 10/30/2015 Influenza Vaccine PF 10/11/2013 Influenza Vaccine Split 12/13/2012, 10/5/2011 Pneumococcal Polysaccharide (PPSV-23) 4/11/2016 11:40 AM  
  
 Not reviewed this visit You Were Diagnosed With   
  
 Codes Comments Chronic deep vein thrombosis (DVT) of distal vein of right lower extremity (HCC)    -  Primary ICD-10-CM: X69.8D6 ICD-9-CM: 453.52 Monitoring for long-term anticoagulant use     ICD-10-CM: Z51.81, Z79.01 
ICD-9-CM: V58.61 Vaginal bleeding     ICD-10-CM: N93.9 ICD-9-CM: 623.8 S/P hysterectomy     ICD-10-CM: Z90.710 ICD-9-CM: V88.01 Hematuria, unspecified type     ICD-10-CM: R31.9 ICD-9-CM: 599.70  Right leg swelling     ICD-10-CM: M79.89 
 ICD-9-CM: 729.81 Vitals BP Pulse Temp Resp Height(growth percentile) Weight(growth percentile)  
 115/68 (BP 1 Location: Left arm, BP Patient Position: Sitting) 81 98.6 °F (37 °C) (Oral) 20 5' 5\" (1.651 m) 232 lb 6.4 oz (105.4 kg) LMP SpO2 BMI OB Status Smoking Status (LMP Unknown) 96% 38.67 kg/m2 Hysterectomy Never Smoker Vitals History BMI and BSA Data Body Mass Index Body Surface Area  
 38.67 kg/m 2 2.2 m 2 Preferred Pharmacy Pharmacy Name Phone [facility] [address] [place] [address] [phone] Your Updated Medication List  
  
   
This list is accurate as of 3/5/18  2:24 PM.  Always use your most recent med list.  
  
  
  
  
 albuterol 90 mcg/actuation inhaler Commonly known as:  PROVENTIL HFA, VENTOLIN HFA, PROAIR HFA Take 1 Puff by inhalation every four (4) hours as needed for Wheezing. CIMZIA SC  
by SubCUTAneous route. Injection:  Every 4 weeks  
  
 ergocalciferol 50,000 unit capsule Commonly known as:  ERGOCALCIFEROL  
TAKE 1 CAPSULE BY MOUTH ONCE PER WEEK FOR A TOTAL OF 8 WEEKS. THEN CHANGE TO TWICE PER MONTH.  
  
 furosemide 40 mg tablet Commonly known as:  LASIX TAKE 1 TABLET BY MOUTH TWICE DAILY AS NEEDED  
  
 methotrexate 2.5 mg tablet Commonly known as:  RHEUMATREX  
TAKE 8 TABLETS BY MOUTH ONCE PER WEEK  
  
 phentermine 37.5 mg tablet Commonly known as:  ADIPEX-P Take 1/2 tablet before breakfast and before dinner  
  
 raNITIdine 150 mg tablet Commonly known as:  ZANTAC Take 150 mg by mouth two (2) times a day. topiramate 50 mg tablet Commonly known as:  TOPAMAX Take 1 Tab by mouth two (2) times a day. TYLENOL EXTRA STRENGTH 500 mg tablet Generic drug:  acetaminophen Take 1,000 mg by mouth every six (6) hours as needed for Pain.  
  
 warfarin 5 mg tablet Commonly known as:  COUMADIN  
TAKE 1 TAB BY MOUTH DAILY.  INDICATIONS: DEEP VEIN THROMBOSIS PREVENTION  
 Follow-up Instructions Return in about 3 days (around 3/8/2018). To-Do List   
 03/05/2018 Imaging:  DUPLEX LOWER EXT VENOUS BILAT Introducing [facility] SERVICES! Dear Bashir Jonas: Thank you for requesting a [employer] account. Our records indicate that you already have an active [employer] account. You can access your account anytime at [email or web address] Did you know that you can access your hospital and ER discharge instructions at any time in [employer]? You can also review all of your test results from your hospital stay or ER visit. Additional Information If you have questions, please visit the Frequently Asked Questions section of the [employer] website at [email or web address] Remember, [employer] is NOT to be used for urgent needs. For medical emergencies, dial 911. Now available from your iPhone and Android! Please provide this summary of care documentation to your next provider. Your primary care clinician is listed as Daniel Guerrier. If you have any questions after today's visit, please call [phone].

## 2018-03-08 ENCOUNTER — OFFICE VISIT (OUTPATIENT)
Dept: FAMILY MEDICINE CLINIC | Age: 48
End: 2018-03-08

## 2018-03-08 VITALS
DIASTOLIC BLOOD PRESSURE: 73 MMHG | BODY MASS INDEX: 36.9 KG/M2 | TEMPERATURE: 98.2 F | SYSTOLIC BLOOD PRESSURE: 125 MMHG | RESPIRATION RATE: 16 BRPM | OXYGEN SATURATION: 100 % | HEIGHT: 65 IN | HEART RATE: 84 BPM | WEIGHT: 221.5 LBS

## 2018-03-08 DIAGNOSIS — Z90.710 S/P HYSTERECTOMY: ICD-10-CM

## 2018-03-08 DIAGNOSIS — M79.89 RIGHT LEG SWELLING: ICD-10-CM

## 2018-03-08 DIAGNOSIS — Z79.01 MONITORING FOR LONG-TERM ANTICOAGULANT USE: ICD-10-CM

## 2018-03-08 DIAGNOSIS — Z51.81 MONITORING FOR LONG-TERM ANTICOAGULANT USE: ICD-10-CM

## 2018-03-08 DIAGNOSIS — N93.9 VAGINAL BLEEDING: ICD-10-CM

## 2018-03-08 DIAGNOSIS — I82.5Z1 CHRONIC DEEP VEIN THROMBOSIS (DVT) OF DISTAL VEIN OF RIGHT LOWER EXTREMITY (HCC): Primary | ICD-10-CM

## 2018-03-08 DIAGNOSIS — R31.9 HEMATURIA, UNSPECIFIED TYPE: ICD-10-CM

## 2018-03-08 NOTE — PROGRESS NOTES
Subjective:     Chief Complaint   Patient presents with    Follow-up     evaluation of Hematuria      She  is a 50 y.o. female who presents for evaluation of:  INR check - Taken off coumadin x 3 days d/t vaginal bleeding despite previous hysterectomy with Dr. Hiro Marie. Had an appt with him earlier this week and pt tells me that there were no major concerns and that he did a pap smear since she had a partial hysterectomy with cervix and ovaries left in place. No active bleeding today but still having some mild spotting. UA showed blood and UCx was neg. DVT in R leg after gyn sgy and chronically on coumadin now. Has had DVT in same leg x 2 in past.   Ct to struggle with post thrombotic syndrome from numerous DVTs. Pain flares up at different times and is much worse with edema. She continues to manage this by resting her leg and elevating it. At last appt, had more swelling and pain armani in R leg. Improved slightly with Lasix but still some pain. To have Dopplers soon. Rest per Anti-coag tab.     ROS  Gen - no fever/chills  Resp - no dyspnea or cough  CV - per hpi  Rest per HPI    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    Pseudogout     PUD (peptic ulcer disease)     Skin abrasion 1/28/14    After loosing 125#, Bilateral Inner thigh friction flareup monthly with skin breakdown    Thromboembolus ([address] 2008    Rt leg,  pt states she fell and had a plane ride home and developed blood clots    [facility] 3/12/2010     Past Surgical History:   Procedure Laterality Date    HX ACL RECONSTRUCTION      Rt    Dom Borden HX GI      reopening of rectal pouch    HX GYN  06/19/2017    Hysterectomy    HX ORTHOPAEDIC  12/13    Rt foot / toes    HX TONSILLECTOMY       Current Outpatient Prescriptions on File Prior to Visit   Medication Sig Dispense Refill    ergocalciferol (ERGOCALCIFEROL) 50,000 unit capsule TAKE 1 CAPSULE BY MOUTH ONCE PER WEEK FOR A TOTAL OF 8 WEEKS. THEN CHANGE TO TWICE PER MONTH. 12 Cap 0    furosemide (LASIX) 40 mg tablet TAKE 1 TABLET BY MOUTH TWICE DAILY AS NEEDED 328 Tab 1    CERTOLIZUMAB PEGOL (CIMZIA SC) by SubCUTAneous route. Injection:  Every 4 weeks      phentermine (ADIPEX-P) 37.5 mg tablet Take 1/2 tablet before breakfast and before dinner 100 Tab 5    topiramate (TOPAMAX) 50 mg tablet Take 1 Tab by mouth two (2) times a day. 60 Tab 5    methotrexate (RHEUMATREX) 2.5 mg tablet TAKE 8 TABLETS BY MOUTH ONCE PER WEEK  2    ranitidine (ZANTAC) 150 mg tablet Take 150 mg by mouth two (2) times a day.  albuterol (PROVENTIL HFA, VENTOLIN HFA, PROAIR HFA) 90 mcg/actuation inhaler Take 1 Puff by inhalation every four (4) hours as needed for Wheezing. 1 Inhaler 5    acetaminophen (TYLENOL EXTRA STRENGTH) 500 mg tablet Take 1,000 mg by mouth every six (6) hours as needed for Pain.  warfarin (COUMADIN) 5 mg tablet TAKE 1 TAB BY MOUTH DAILY. INDICATIONS: DEEP VEIN THROMBOSIS PREVENTION 45 Tab 1     No current facility-administered medications on file prior to visit.          Objective:     Vitals:    03/08/18 1009   BP: 125/73   Pulse: 84   Resp: 16   Temp: 98.2 °F (36.8 °C)   TempSrc: Oral   SpO2: 100%   Weight: 221 lb 8 oz (100.5 kg)   Height: 5' 5\" (1.651 m)     Physical Examination:  General appearance - alert, well appearing, and in no distress  Eyes -sclera anicteric  Chest - clear to auscultation, no wheezes, rales or rhonchi, symmetric air entry  Heart - normal rate, regular rhythm, normal S1, S2, no murmurs, rubs, clicks or gallops  Extr -bilat LE with tradce edema, mildly ttp bilat over calves, + cords palpated bilat over calves, + Homans    Assessment/ Plan:   Diagnoses and all orders for this visit:    1. Chronic deep vein thrombosis (DVT) of distal vein of right lower extremity (HCC)  2. Monitoring for long-term anticoagulant use  3. Right leg swelling  - Restarting Coumadin today. Will also use 2 days worth of Xarelto to help bridge. Mild edema and pain in legs so will get Dopplers as well. Pt is higher risk pt for DVT based on hx and some concern on exam today. Discussed using lasix to help with edema and red flag sx to go to ER. 4. Vaginal bleeding  5. S/P hysterectomy  6. Hematuria  - Stopping Coumadin x 3 more days given UA with 2+ blood at last appt and ct mild vaginal bleeding/spotting. Has appt with GYN today. If pain or bleeding worsen, pt to go to ER. May order CT abd/pelvis if not clear etiology of  bleeding by next appt. I have discussed the diagnosis with the patient and the intended plan as seen in the above orders. The patient has received an after-visit summary and questions were answered concerning future plans. I have discussed medication side effects and warnings with the patient as well. The patient verbalizes understanding and agreement with the plan. Follow-up Disposition:  Return in about 5 days (around 3/13/2018), or if symptoms worsen or fail to improve, for Regular Follow up.

## 2018-03-08 NOTE — MR AVS SNAPSHOT
[address] [facility] 
[phone] Patient: Susanne Valladares MRN:  [id number] Visit Information Date & Time Provider Department Dept. Phone Encounter #  
 3/8/2018  9:30 AM Edison Haro MD [facility] at [address] [id number] Follow-up Instructions Return in about 5 days (around 3/13/2018), or if symptoms worsen or fail to improve, for Regular Follow up. Your Appointments 7/31/2018  8:50 AM  
Follow Up with Sotero Lopez MD  
[facility] [address] [facility]) Appt Note: 6 month f/u  
 [address] [facility] [address] [phone] [address] [facility] Upcoming Health Maintenance Date Due  
 PAP AKA CERVICAL CYTOLOGY 11/19/2017 DTaP/Tdap/Td series (2 - Td) 5/17/2026 Allergies as of 3/8/2018  Review Complete On: 3/8/2018 By: Coirnna Owens Severity Noted Reaction Type Reactions Sulfa (Sulfonamide Antibiotics) High 03/12/2010    Anaphylaxis Codeine Medium 03/17/2010    Itching Current Immunizations  Reviewed on 5/2/2016 Name Date Influenza Vaccine (Quad) PF 9/14/2017, 10/27/2016, 10/30/2015 Influenza Vaccine PF 10/11/2013 Influenza Vaccine Split 12/13/2012, 10/5/2011 Pneumococcal Polysaccharide (PPSV-23) 4/11/2016 11:40 AM  
  
 Not reviewed this visit You Were Diagnosed With   
  
 Codes Comments Chronic deep vein thrombosis (DVT) of distal vein of right lower extremity (HCC)    -  Primary ICD-10-CM: L74.5X4 ICD-9-CM: 453.52 Monitoring for long-term anticoagulant use     ICD-10-CM: Z51.81, Z79.01 
ICD-9-CM: V58.61 Right leg swelling     ICD-10-CM: M79.89 ICD-9-CM: 729.81 Vaginal bleeding     ICD-10-CM: N93.9 ICD-9-CM: 623.8 S/P hysterectomy     ICD-10-CM: Z90.710 ICD-9-CM: V88.01   
 Hematuria, unspecified type     ICD-10-CM: R31.9 ICD-9-CM: 599.70 Vitals BP Pulse Temp Resp Height(growth percentile) Weight(growth percentile) 125/73 (BP 1 Location: Left arm, BP Patient Position: Sitting) 84 98.2 °F (36.8 °C) (Oral) 16 5' 5\" (1.651 m) 221 lb 8 oz (100.5 kg) LMP SpO2 BMI OB Status Smoking Status (LMP Unknown) 100% 36.86 kg/m2 Hysterectomy Never Smoker Vitals History BMI and BSA Data Body Mass Index Body Surface Area  
 36.86 kg/m 2 2.15 m 2 Preferred Pharmacy Pharmacy Name Phone [facility] [address] [place] [address] [phone] Your Updated Medication List  
  
   
This list is accurate as of 3/8/18 10:53 AM.  Always use your most recent med list.  
  
  
  
  
 albuterol 90 mcg/actuation inhaler Commonly known as:  PROVENTIL HFA, VENTOLIN HFA, PROAIR HFA Take 1 Puff by inhalation every four (4) hours as needed for Wheezing. CIMZIA SC  
by SubCUTAneous route. Injection:  Every 4 weeks  
  
 ergocalciferol 50,000 unit capsule Commonly known as:  ERGOCALCIFEROL  
TAKE 1 CAPSULE BY MOUTH ONCE PER WEEK FOR A TOTAL OF 8 WEEKS. THEN CHANGE TO TWICE PER MONTH.  
  
 furosemide 40 mg tablet Commonly known as:  LASIX TAKE 1 TABLET BY MOUTH TWICE DAILY AS NEEDED  
  
 methotrexate 2.5 mg tablet Commonly known as:  RHEUMATREX  
TAKE 8 TABLETS BY MOUTH ONCE PER WEEK  
  
 phentermine 37.5 mg tablet Commonly known as:  ADIPEX-P Take 1/2 tablet before breakfast and before dinner  
  
 raNITIdine 150 mg tablet Commonly known as:  ZANTAC Take 150 mg by mouth two (2) times a day. topiramate 50 mg tablet Commonly known as:  TOPAMAX Take 1 Tab by mouth two (2) times a day. TYLENOL EXTRA STRENGTH 500 mg tablet Generic drug:  acetaminophen Take 1,000 mg by mouth every six (6) hours as needed for Pain.  
  
 warfarin 5 mg tablet Commonly known as:  COUMADIN  
 TAKE 1 TAB BY MOUTH DAILY. INDICATIONS: DEEP VEIN THROMBOSIS PREVENTION March 2018 Details Sun Mon Tue Wed Thu Fri Sat  
      1  
  
  
  
   2  
  
  
  
   3  
  
  
  
  
  4  
  
  
  
   5  
  
  
  
   6  
  
  
  
   7  
  
  
  
   8  
  
15 mg See details 9  
  
15 mg  
  
   10  
  
10 mg  
  
  
  11  
  
10 mg  
  
   12  
  
10 mg  
  
   13  
  
10 mg  
  
   14  
  
  
  
   15  
  
  
  
   16  
  
  
  
   17  
  
  
  
  
  18  
  
  
  
   19  
  
  
  
   20  
  
  
  
   21  
  
  
  
   22  
  
  
  
   23  
  
  
  
   24  
  
  
  
  
  25  
  
  
  
   26  
  
  
  
   27  
  
  
  
   28  
  
  
  
   29  
  
  
  
   30  
  
  
  
   31  
  
  
  
  
 Date Details 03/08 This INR check Date of next INR:  3/13/2018 How to take your warfarin dose To take:  10 mg Take two of the 5 mg tablets. To take:  15 mg Take three of the 5 mg tablets. Follow-up Instructions Return in about 5 days (around 3/13/2018), or if symptoms worsen or fail to improve, for Regular Follow up. Introducing [facility]! Dear Rosemarie Duty: Thank you for requesting a [employer] account. Our records indicate that you already have an active [employer] account. You can access your account anytime at [email or web address] Did you know that you can access your hospital and ER discharge instructions at any time in [employer]? You can also review all of your test results from your hospital stay or ER visit. Additional Information If you have questions, please visit the Frequently Asked Questions section of the [employer] website at [email or web address] Remember, [employer] is NOT to be used for urgent needs. For medical emergencies, dial 911. Now available from your iPhone and Android! Please provide this summary of care documentation to your next provider. Your primary care clinician is listed as Jennifer July. If you have any questions after today's visit, please call [phone].

## 2018-03-08 NOTE — PROGRESS NOTES
Chief Complaint   Patient presents with    Follow-up     evaluation of Hematuria   Patient informed she continues to have a small amount of bleeding   1. Have you been to the ER, urgent care clinic since your last visit? Hospitalized since your last visit? No    2. Have you seen or consulted any other health care providers outside of the [address] [place] [address] since your last visit? Include any pap smears or colon screening.  Yes When: seen by GYN 3/5/2018  Room #7

## 2018-03-09 ENCOUNTER — HOSPITAL ENCOUNTER (OUTPATIENT)
Dept: ULTRASOUND IMAGING | Age: 48
Discharge: HOME | End: 2018-03-09
Attending: FAMILY MEDICINE
Payer: COMMERCIAL

## 2018-03-09 DIAGNOSIS — M79.89 RIGHT LEG SWELLING: ICD-10-CM

## 2018-03-09 DIAGNOSIS — I82.5Z1 CHRONIC DEEP VEIN THROMBOSIS (DVT) OF DISTAL VEIN OF RIGHT LOWER EXTREMITY (HCC): ICD-10-CM

## 2018-03-09 PROCEDURE — 93970 EXTREMITY STUDY: CPT

## 2018-03-13 ENCOUNTER — OFFICE VISIT (OUTPATIENT)
Dept: FAMILY MEDICINE CLINIC | Age: 48
End: 2018-03-13

## 2018-03-13 VITALS
SYSTOLIC BLOOD PRESSURE: 127 MMHG | WEIGHT: 221.4 LBS | TEMPERATURE: 97.2 F | BODY MASS INDEX: 36.89 KG/M2 | HEART RATE: 75 BPM | HEIGHT: 65 IN | RESPIRATION RATE: 18 BRPM | OXYGEN SATURATION: 97 % | DIASTOLIC BLOOD PRESSURE: 74 MMHG

## 2018-03-13 DIAGNOSIS — Z79.01 MONITORING FOR LONG-TERM ANTICOAGULANT USE: ICD-10-CM

## 2018-03-13 DIAGNOSIS — I87.001 POST-THROMBOTIC SYNDROME OF RIGHT LOWER EXTREMITY: ICD-10-CM

## 2018-03-13 DIAGNOSIS — I82.5Z1 CHRONIC DEEP VEIN THROMBOSIS (DVT) OF DISTAL VEIN OF RIGHT LOWER EXTREMITY (HCC): Primary | ICD-10-CM

## 2018-03-13 DIAGNOSIS — Z51.81 MONITORING FOR LONG-TERM ANTICOAGULANT USE: ICD-10-CM

## 2018-03-13 LAB
INR BLD: 1.7
PT POC: NORMAL SECONDS
VALID INTERNAL CONTROL?: YES

## 2018-03-13 NOTE — PROGRESS NOTES
Subjective:     Chief Complaint   Patient presents with    Follow-up     PT/INR      She  is a 50 y.o. female who presents for evaluation of:  INR check - Since last appt, vag bleeding still present but very mild and also having berenice min rectal bleeding (very common for her with her UC, fissures, and hemorrhoids). Saw Dr. Naima Shook and had pap. Min concern with vaginal bleeding as thought to be related to hormonal changes. DVT in R leg after gyn sgy and chronically on coumadin now. Has had DVT in same leg x 2 in past.   Ct to struggle with post thrombotic syndrome from numerous DVTs. Pain flares up at different times and is much worse with edema. She continues to manage this by resting her leg and elevating it. Prev had more swelling and pain armani in R leg. Improved slightly with Lasix but still some pain - got dopplers since off coumadin for 1 week and these came back negative for new DVT. Rest per Anti-coag tab.     ROS  Gen - no fever/chills  Resp - no dyspnea or cough  CV - per hpi  Rest per HPI    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    Pseudogout     PUD (peptic ulcer disease)     Skin abrasion 1/28/14    After loosing 125#, Bilateral Inner thigh friction flareup monthly with skin breakdown    Thromboembolus ([address] 2008    Rt leg,  pt states she fell and had a plane ride home and developed blood clots    UC (ulcerative colitis) ([address] 3/12/2010     Past Surgical History: Procedure Laterality Date    HX ACL RECONSTRUCTION      Rt    Tinnie Son    Dr Ilene Noel HX GI      reopening of rectal pouch    HX GYN  06/19/2017    Hysterectomy    HX ORTHOPAEDIC  12/13    Rt foot / toes    HX TONSILLECTOMY       Current Outpatient Prescriptions on File Prior to Visit   Medication Sig Dispense Refill    ergocalciferol (ERGOCALCIFEROL) 50,000 unit capsule TAKE 1 CAPSULE BY MOUTH ONCE PER WEEK FOR A TOTAL OF 8 WEEKS. THEN CHANGE TO TWICE PER MONTH. 12 Cap 0    warfarin (COUMADIN) 5 mg tablet TAKE 1 TAB BY MOUTH DAILY. INDICATIONS: DEEP VEIN THROMBOSIS PREVENTION 45 Tab 1    furosemide (LASIX) 40 mg tablet TAKE 1 TABLET BY MOUTH TWICE DAILY AS NEEDED 992 Tab 1    CERTOLIZUMAB PEGOL (CIMZIA SC) by SubCUTAneous route. Injection:  Every 4 weeks      phentermine (ADIPEX-P) 37.5 mg tablet Take 1/2 tablet before breakfast and before dinner 100 Tab 5    topiramate (TOPAMAX) 50 mg tablet Take 1 Tab by mouth two (2) times a day. 60 Tab 5    methotrexate (RHEUMATREX) 2.5 mg tablet TAKE 8 TABLETS BY MOUTH ONCE PER WEEK  2    ranitidine (ZANTAC) 150 mg tablet Take 150 mg by mouth two (2) times a day.  albuterol (PROVENTIL HFA, VENTOLIN HFA, PROAIR HFA) 90 mcg/actuation inhaler Take 1 Puff by inhalation every four (4) hours as needed for Wheezing. 1 Inhaler 5    acetaminophen (TYLENOL EXTRA STRENGTH) 500 mg tablet Take 1,000 mg by mouth every six (6) hours as needed for Pain. No current facility-administered medications on file prior to visit.          Objective:     Vitals:    03/13/18 0740   BP: 127/74   Pulse: 75   Resp: 18   Temp: 97.2 °F (36.2 °C)   TempSrc: Oral   SpO2: 97%   Weight: 221 lb 6.4 oz (100.4 kg)   Height: 5' 5\" (1.651 m)     Physical Examination:  General appearance - alert, well appearing, and in no distress  Eyes -sclera anicteric  Chest - clear to auscultation, no wheezes, rales or rhonchi, symmetric air entry  Heart - normal rate, regular rhythm, normal S1, S2, no murmurs, rubs, clicks or gallops  Extr -bilat LE with tradce edema, mildly ttp armani over R tibia    Assessment/ Plan:   Diagnoses and all orders for this visit:    1. Chronic deep vein thrombosis (DVT) of distal vein of right lower extremity (HCC)  2. Post-thrombotic syndrome of right lower extremity  3. Monitoring for long-term anticoagulant use  - INR nearing therapeutic levels back on coumadin. Vag bleeding improving and has f/u with Dr. Brock Bishop already. Dopplers neg for new DVT while off coumadin. To check in with Dr. Whiteside Eye soon. -     AMB POC PT/INR    I have discussed the diagnosis with the patient and the intended plan as seen in the above orders. The patient has received an after-visit summary and questions were answered concerning future plans. I have discussed medication side effects and warnings with the patient as well. The patient verbalizes understanding and agreement with the plan. Follow-up Disposition:  Return in about 2 weeks (around 3/27/2018), or if symptoms worsen or fail to improve, for Regular Follow up.

## 2018-03-13 NOTE — MR AVS SNAPSHOT
[address] [facility] 
[phone] Patient: Geovanna Dey MRN:  [id number] Visit Information Date & Time Provider Department Dept. Phone Encounter #  
 3/13/2018  7:30 AM Jovan Edwards MD [facility] at [address] [id number] Follow-up Instructions Return in about 2 weeks (around 3/27/2018), or if symptoms worsen or fail to improve, for Regular Follow up. Your Appointments 7/31/2018  8:50 AM  
Follow Up with MD [facility] [address]) Appt Note: 6 month f/u  
 [address] [facility] [address] [phone] 570 [facility] Upcoming Health Maintenance Date Due  
 PAP AKA CERVICAL CYTOLOGY 11/19/2017 DTaP/Tdap/Td series (2 - Td) 5/17/2026 Allergies as of 3/13/2018  Review Complete On: 3/13/2018 By: Padma Kennedy LPN Severity Noted Reaction Type Reactions Sulfa (Sulfonamide Antibiotics) High 03/12/2010    Anaphylaxis Codeine Medium 03/17/2010    Itching Current Immunizations  Reviewed on 5/2/2016 Name Date Influenza Vaccine (Quad) PF 9/14/2017, 10/27/2016, 10/30/2015 Influenza Vaccine PF 10/11/2013 Influenza Vaccine Split 12/13/2012, 10/5/2011 Pneumococcal Polysaccharide (PPSV-23) 4/11/2016 11:40 AM  
  
 Not reviewed this visit You Were Diagnosed With   
  
 Codes Comments Chronic deep vein thrombosis (DVT) of distal vein of right lower extremity (HCC)    -  Primary ICD-10-CM: U05.7M9 ICD-9-CM: 453.52 Post-thrombotic syndrome of right lower extremity     ICD-10-CM: I87.001 ICD-9-CM: 459.10 Monitoring for long-term anticoagulant use     ICD-10-CM: Z51.81, Z79.01 
ICD-9-CM: V58.61 Vitals BP Pulse Temp Resp Height(growth percentile) Weight(growth percentile) 127/74 (BP 1 Location: Left arm, BP Patient Position: Sitting) 75 97.2 °F (36.2 °C) (Oral) 18 5' 5\" (1.651 m) 221 lb 6.4 oz (100.4 kg) LMP SpO2 BMI OB Status Smoking Status (LMP Unknown) 97% 36.84 kg/m2 Hysterectomy Never Smoker Vitals History BMI and BSA Data Body Mass Index Body Surface Area  
 36.84 kg/m 2 2.15 m 2 Preferred Pharmacy Pharmacy Name Phone [facility] [address] [phone] Your Updated Medication List  
  
   
This list is accurate as of 3/13/18  8:01 AM.  Always use your most recent med list.  
  
  
  
  
 albuterol 90 mcg/actuation inhaler Commonly known as:  PROVENTIL HFA, VENTOLIN HFA, PROAIR HFA Take 1 Puff by inhalation every four (4) hours as needed for Wheezing. CIMZIA SC  
by SubCUTAneous route. Injection:  Every 4 weeks  
  
 ergocalciferol 50,000 unit capsule Commonly known as:  ERGOCALCIFEROL  
TAKE 1 CAPSULE BY MOUTH ONCE PER WEEK FOR A TOTAL OF 8 WEEKS. THEN CHANGE TO TWICE PER MONTH.  
  
 furosemide 40 mg tablet Commonly known as:  LASIX TAKE 1 TABLET BY MOUTH TWICE DAILY AS NEEDED  
  
 methotrexate 2.5 mg tablet Commonly known as:  RHEUMATREX  
TAKE 8 TABLETS BY MOUTH ONCE PER WEEK  
  
 phentermine 37.5 mg tablet Commonly known as:  ADIPEX-P Take 1/2 tablet before breakfast and before dinner  
  
 raNITIdine 150 mg tablet Commonly known as:  ZANTAC Take 150 mg by mouth two (2) times a day. topiramate 50 mg tablet Commonly known as:  TOPAMAX Take 1 Tab by mouth two (2) times a day. TYLENOL EXTRA STRENGTH 500 mg tablet Generic drug:  acetaminophen Take 1,000 mg by mouth every six (6) hours as needed for Pain.  
  
 warfarin 5 mg tablet Commonly known as:  COUMADIN  
TAKE 1 TAB BY MOUTH DAILY. INDICATIONS: DEEP VEIN THROMBOSIS PREVENTION March 2018 Details Sun Mon Tue Wed Thu Fri Sat  
      1  
  
  
  
   2  
  
  
  
   3  
  
  
  
  
  4  
  
  
  
   5  
  
  
  
   6  
  
  
  
   7  
  
  
  
   8  
  
  
  
   9  
  
  
  
   10  
  
  
  
  
  11  
  
  
  
   12  
  
  
  
   13  
  
10 mg See details 14  
  
10 mg  
  
   15  
  
10 mg  
  
   16  
  
10 mg  
  
   17  
  
10 mg  
  
  
  18  
  
10 mg  
  
   19  
  
10 mg  
  
   20  
  
10 mg  
  
   21  
  
10 mg  
  
   22  
  
10 mg  
  
   23  
  
10 mg  
  
   24  
  
10 mg  
  
  
  25  
  
10 mg  
  
   26  
  
10 mg  
  
   27  
  
10 mg  
  
   28  
  
  
  
   29  
  
  
  
   30  
  
  
  
   31  
  
  
  
  
 Date Details 03/13 This INR check INR: 1.7 Date of next INR:  3/27/2018 How to take your warfarin dose To take:  10 mg Take two of the 5 mg tablets. We Performed the Following AMB POC PT/INR [64552 CPT(R)] Follow-up Instructions Return in about 2 weeks (around 3/27/2018), or if symptoms worsen or fail to improve, for Regular Follow up. Introducing [facility] & HEALTH SERVICES! Dear Rosemarie Duty: Thank you for requesting a [employer] account. Our records indicate that you already have an active [employer] account. You can access your account anytime at [email or web address] Did you know that you can access your hospital and ER discharge instructions at any time in [employer]? You can also review all of your test results from your hospital stay or ER visit. Additional Information If you have questions, please visit the Frequently Asked Questions section of the [employer] website at [email or web address] Remember, [employer] is NOT to be used for urgent needs. For medical emergencies, dial 911. Now available from your iPhone and Android! Please provide this summary of care documentation to your next provider. Your primary care clinician is listed as Von Saldaña.  If you have any questions after today's visit, please call [phone].

## 2018-03-13 NOTE — PROGRESS NOTES
Chief Complaint   Patient presents with    Follow-up     PT/INR   1. Have you been to the ER, urgent care clinic since your last visit? Hospitalized since your last visit? No    2. Have you seen or consulted any other health care providers outside of the [address] [place] [address] since your last visit? Include any pap smears or colon screening.  No   Room 8

## 2018-03-27 ENCOUNTER — OFFICE VISIT (OUTPATIENT)
Dept: FAMILY MEDICINE CLINIC | Age: 48
End: 2018-03-27

## 2018-03-27 VITALS
WEIGHT: 226.6 LBS | DIASTOLIC BLOOD PRESSURE: 53 MMHG | TEMPERATURE: 97.1 F | RESPIRATION RATE: 16 BRPM | HEIGHT: 65 IN | HEART RATE: 66 BPM | OXYGEN SATURATION: 95 % | BODY MASS INDEX: 37.75 KG/M2 | SYSTOLIC BLOOD PRESSURE: 107 MMHG

## 2018-03-27 DIAGNOSIS — Z79.01 MONITORING FOR LONG-TERM ANTICOAGULANT USE: ICD-10-CM

## 2018-03-27 DIAGNOSIS — I87.001 POST-THROMBOTIC SYNDROME OF RIGHT LOWER EXTREMITY: ICD-10-CM

## 2018-03-27 DIAGNOSIS — Z51.81 MONITORING FOR LONG-TERM ANTICOAGULANT USE: ICD-10-CM

## 2018-03-27 DIAGNOSIS — I82.5Z1 CHRONIC DEEP VEIN THROMBOSIS (DVT) OF DISTAL VEIN OF RIGHT LOWER EXTREMITY (HCC): Primary | ICD-10-CM

## 2018-03-27 LAB
INR BLD: 1.8
PT POC: NORMAL SECONDS
VALID INTERNAL CONTROL?: YES

## 2018-03-27 RX ORDER — POTASSIUM CHLORIDE 1500 MG/1
TABLET, EXTENDED RELEASE ORAL
Refills: 0 | COMMUNITY
Start: 2018-03-22 | End: 2019-02-05 | Stop reason: SDUPTHER

## 2018-03-27 RX ORDER — FOLIC ACID 1 MG/1
TABLET ORAL
Refills: 6 | COMMUNITY
Start: 2018-03-22 | End: 2021-03-19 | Stop reason: SDUPTHER

## 2018-03-27 RX ORDER — METHYLPREDNISOLONE 4 MG/1
TABLET ORAL
Refills: 0 | COMMUNITY
Start: 2018-03-22 | End: 2018-04-17 | Stop reason: ALTCHOICE

## 2018-03-27 NOTE — PROGRESS NOTES
Chief Complaint   Patient presents with    Follow-up     PT/INR   1. Have you been to the ER, urgent care clinic since your last visit? Hospitalized since your last visit? No    2. Have you seen or consulted any other health care providers outside of the [address] [place] [address] since your last visit? Include any pap smears or colon screening.  No   Room 7

## 2018-03-27 NOTE — MR AVS SNAPSHOT
Karen Hagan [address] [facility] 
[phone] Patient: Marianela Cage MRN:   Visit Information Date & Time Provider Department Dept. Phone Encounter #  
 3/27/2018  7:50 AM Mario Glover MD [facility] at [address] [id number] Follow-up Instructions Return in about 4 weeks (around 2018), or if symptoms worsen or fail to improve, for Regular Follow up. Your Appointments 2018  8:50 AM  
Follow Up with Pipo Parker MD  
[facility] Diabetes and Endocrinology [address] [facility]) Appt Note: 6 month f/u  
 [address] [facility] [address] [facility] [id number] [address] [facility] Upcoming Health Maintenance Date Due  
 PAP AKA CERVICAL CYTOLOGY 2017 DTaP/Tdap/Td series (2 - Td) 2026 Allergies as of 3/27/2018  Review Complete On: 3/27/2018 By: Mario Glover MD  
  
 Severity Noted Reaction Type Reactions Sulfa (Sulfonamide Antibiotics) High 2010    Anaphylaxis Codeine Medium 2010    Itching Current Immunizations  Reviewed on 2016 Name Date Influenza Vaccine (Quad) PF 2017, 10/27/2016, 10/30/2015 Influenza Vaccine PF 10/11/2013 Influenza Vaccine Split 2012, 10/5/2011 Pneumococcal Polysaccharide (PPSV-23) 2016 11:40 AM  
  
 Not reviewed this visit You Were Diagnosed With   
  
 Codes Comments Chronic deep vein thrombosis (DVT) of distal vein of right lower extremity (HCC)    -  Primary ICD-10-CM: Z74.5B6 ICD-9-CM: 453.52 Post-thrombotic syndrome of right lower extremity     ICD-10-CM: I87.001 ICD-9-CM: 459.10 Monitoring for long-term anticoagulant use     ICD-10-CM: Z51.81, Z79.01 
ICD-9-CM: V58.61 Vitals BP Pulse Temp Resp Height(growth percentile) Weight(growth percentile) 107/53 (BP 1 Location: Left arm, BP Patient Position: Sitting) 66 97.1 °F (36.2 °C) (Oral) 16 5' 5\" (1.651 m) 226 lb 9.6 oz (102.8 kg) LMP SpO2 BMI OB Status Smoking Status (LMP Unknown) 95% 37.71 kg/m2 Hysterectomy Never Smoker Vitals History BMI and BSA Data Body Mass Index Body Surface Area  
 37.71 kg/m 2 2.17 m 2 Preferred Pharmacy Pharmacy [facility] Phone [facility] [address] [place] [address] [phone] Your Updated Medication List  
  
   
This list is accurate as of 3/27/18  8:36 AM.  Always use your most recent med list.  
  
  
  
  
 albuterol 90 mcg/actuation inhaler Commonly known as:  PROVENTIL HFA, VENTOLIN HFA, PROAIR HFA Take 1 Puff by inhalation every four (4) hours as needed for Wheezing. CIMZIA SC  
by SubCUTAneous route. Injection:  Every 4 weeks  
  
 ergocalciferol 50,000 unit capsule Commonly known as:  ERGOCALCIFEROL  
TAKE 1 CAPSULE BY MOUTH ONCE PER WEEK FOR A TOTAL OF 8 WEEKS. THEN CHANGE TO TWICE PER MONTH.  
  
 folic acid 1 mg tablet Commonly known as:  FOLVITE  
TAKE 1 TABLET BY MOUTH ONCE A DAY  
  
 furosemide 40 mg tablet Commonly known as:  LASIX TAKE 1 TABLET BY MOUTH TWICE DAILY AS NEEDED  
  
 KLOR-CON M20 20 mEq tablet Generic drug:  potassium chloride TAKE 1 TABLET BY MOUTH TWICE A DAY  
  
 methotrexate 2.5 mg tablet Commonly known as:  RHEUMATREX  
TAKE 8 TABLETS BY MOUTH ONCE PER WEEK  
  
 methylPREDNISolone 4 mg tablet Commonly known as:  MEDROL DOSEPACK  
AS DIRECTED AS DIRECTED ORALLY 6 DAYS phentermine 37.5 mg tablet Commonly known as:  ADIPEX-P Take 1/2 tablet before breakfast and before dinner  
  
 raNITIdine 150 mg tablet Commonly known as:  ZANTAC Take 150 mg by mouth two (2) times a day. topiramate 50 mg tablet Commonly known as:  TOPAMAX Take 1 Tab by mouth two (2) times a day. TYLENOL EXTRA STRENGTH 500 mg tablet Generic drug:  acetaminophen Take 1,000 mg by mouth every six (6) hours as needed for Pain.  
  
 warfarin 5 mg tablet Commonly known as:  COUMADIN  
TAKE 1 TAB BY MOUTH DAILY. INDICATIONS: DEEP VEIN THROMBOSIS PREVENTION March 2018 Details Sun Mon Tue Wed Thu Fri Sat  
      1  
  
  
  
   2  
  
  
  
   3  
  
  
  
  
  4  
  
  
  
   5  
  
  
  
   6  
  
  
  
   7  
  
  
  
   8  
  
  
  
   9  
  
  
  
   10  
  
  
  
  
  11  
  
  
  
   12  
  
  
  
   13  
  
  
  
   14  
  
  
  
   15  
  
  
  
   16  
  
  
  
   17  
  
  
  
  
  18  
  
  
  
   19  
  
  
  
   20  
  
  
  
   21  
  
  
  
   22  
  
  
  
   23  
  
  
  
   24  
  
  
  
  
  25  
  
  
  
   26  
  
  
  
   27  
  
10 mg See details 28  
  
10 mg  
  
   29  
  
10 mg  
  
   30  
  
10 mg  
  
   31  
  
10 mg Date Details 03/27 This INR check INR: 1.8 How to take your warfarin dose To take:  10 mg Take two of the 5 mg tablets. April 2018 Details Cely Mcneil Tue Wed Thu Fri Sat  
  1  
  
12.5 mg  
  
   2  
  
10 mg  
  
   3  
  
10 mg  
  
   4  
  
10 mg  
  
   5  
  
10 mg  
  
   6  
  
10 mg  
  
   7  
  
10 mg  
  
  
  8  
  
12.5 mg  
  
   9  
  
10 mg  
  
   10  
  
10 mg  
  
   11  
  
10 mg  
  
   12  
  
10 mg  
  
   13  
  
10 mg  
  
   14  
  
10 mg  
  
  
  15  
  
12.5 mg  
  
   16  
  
10 mg  
  
   17  
  
10 mg  
  
   18  
  
10 mg  
  
   19  
  
10 mg  
  
   20  
  
10 mg  
  
   21  
  
10 mg  
  
  
  22  
  
12.5 mg  
  
   23  
  
10 mg  
  
   24  
  
10 mg  
  
   25  
  
  
  
   26  
  
  
  
   27  
  
  
  
   28  
  
  
  
  
  29  
  
  
  
   30  
  
  
  
       
 Date Details No additional details Date of next INR:  4/24/2018 How to take your warfarin dose To take:  10 mg Take two of the 5 mg tablets. To take:  12.5 mg Take two and a half of the 5 mg tablets. We Performed the Following AMB POC PT/INR [91907 CPT(R)] Follow-up Instructions Return in about 4 weeks (around 4/24/2018), or if symptoms worsen or fail to improve, for Regular Follow up. Introducing [facility] & HEALTH SERVICES! Dear Pillo Keenan: Thank you for requesting a [employer] account. Our records indicate that you already have an active [employer] account. You can access your account anytime at [email or web address] Did you know that you can access your hospital and ER discharge instructions at any time in [employer]? You can also review all of your test results from your hospital stay or ER visit. Additional Information If you have questions, please visit the Frequently Asked Questions section of the [employer] website at [email or web address] Remember, [employer] is NOT to be used for urgent needs. For medical emergencies, dial 911. Now available from your iPhone and Android! Please provide this summary of care documentation to your next provider. Your primary care clinician is listed as Efe Zavala. If you have any questions after today's visit, please call [phone].

## 2018-03-27 NOTE — PROGRESS NOTES
Subjective:     Chief Complaint   Patient presents with    Follow-up     PT/INR      She  is a 50 y.o. female who presents for evaluation of:  INR check - Since last appt, bleeding concerns are resolved. Saw Dr. Lamin Yan and had pap. Min concern with vaginal bleeding as thought to be related to hormonal changes. DVT in R leg after gyn sgy and chronically on coumadin now. Has had DVT in same leg x 2 in past.   Ct to struggle with post thrombotic syndrome from numerous DVTs. Pain flares up at different times and is much worse with edema. She continues to manage this by resting her leg and elevating it. Prev had more swelling and pain armani in R leg. Improved slightly with Lasix but still some pain - got dopplers since off coumadin for 1 week and these came back negative for new DVT. Rest per Anti-coag tab.     ROS  Gen - no fever/chills  Resp - no dyspnea or cough  CV - per hpi  Rest per HPI    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    Pseudogout     PUD (peptic ulcer disease)     Skin abrasion 1/28/14    After loosing 125#, Bilateral Inner thigh friction flareup monthly with skin breakdown    Thromboembolus ([address] 2008    Rt leg,  pt states she fell and had a plane ride home and developed blood clots    UC (ulcerative colitis) ([address] 3/12/2010     Past Surgical History:   Procedure Laterality Date    HX ACL RECONSTRUCTION      Rt    Holly Bernstein Arrow HX GI reopening of rectal pouch    HX GYN  06/19/2017    Hysterectomy    HX ORTHOPAEDIC  12/13    Rt foot / toes    HX TONSILLECTOMY       Current Outpatient Prescriptions on File Prior to Visit   Medication Sig Dispense Refill    ergocalciferol (ERGOCALCIFEROL) 50,000 unit capsule TAKE 1 CAPSULE BY MOUTH ONCE PER WEEK FOR A TOTAL OF 8 WEEKS. THEN CHANGE TO TWICE PER MONTH. 12 Cap 0    warfarin (COUMADIN) 5 mg tablet TAKE 1 TAB BY MOUTH DAILY. INDICATIONS: DEEP VEIN THROMBOSIS PREVENTION 45 Tab 1    furosemide (LASIX) 40 mg tablet TAKE 1 TABLET BY MOUTH TWICE DAILY AS NEEDED 177 Tab 1    CERTOLIZUMAB PEGOL (CIMZIA SC) by SubCUTAneous route. Injection:  Every 4 weeks      phentermine (ADIPEX-P) 37.5 mg tablet Take 1/2 tablet before breakfast and before dinner 100 Tab 5    topiramate (TOPAMAX) 50 mg tablet Take 1 Tab by mouth two (2) times a day. 60 Tab 5    methotrexate (RHEUMATREX) 2.5 mg tablet TAKE 8 TABLETS BY MOUTH ONCE PER WEEK  2    ranitidine (ZANTAC) 150 mg tablet Take 150 mg by mouth two (2) times a day.  albuterol (PROVENTIL HFA, VENTOLIN HFA, PROAIR HFA) 90 mcg/actuation inhaler Take 1 Puff by inhalation every four (4) hours as needed for Wheezing. 1 Inhaler 5    acetaminophen (TYLENOL EXTRA STRENGTH) 500 mg tablet Take 1,000 mg by mouth every six (6) hours as needed for Pain. No current facility-administered medications on file prior to visit.          Objective:     Vitals:    03/27/18 0759   BP: 107/53   Pulse: 66   Resp: 16   Temp: 97.1 °F (36.2 °C)   TempSrc: Oral   SpO2: 95%   Weight: 226 lb 9.6 oz (102.8 kg)   Height: 5' 5\" (1.651 m)     Physical Examination:  General appearance - alert, well appearing, and in no distress  Eyes -sclera anicteric  Chest - clear to auscultation, no wheezes, rales or rhonchi, symmetric air entry  Heart - normal rate, regular rhythm, normal S1, S2, no murmurs, rubs, clicks or gallops  Extr -bilat LE with tradce edema, no ttp    Assessment/ Plan: Diagnoses and all orders for this visit:    1. Chronic deep vein thrombosis (DVT) of distal vein of right lower extremity (HCC)  2. Post-thrombotic syndrome of right lower extremity  3. Monitoring for long-term anticoagulant use  - INR nearing therapeutic levels back on coumadin. Vag bleeding improving and has f/u with Dr. Jack Philip already. Dopplers neg for new DVT while off coumadin. To check in with Dr. Eliezer De Jesus soon. -     AMB POC PT/INR    I have discussed the diagnosis with the patient and the intended plan as seen in the above orders. The patient has received an after-visit summary and questions were answered concerning future plans. I have discussed medication side effects and warnings with the patient as well. The patient verbalizes understanding and agreement with the plan. Follow-up Disposition:  Return in about 4 weeks (around 4/24/2018), or if symptoms worsen or fail to improve, for Regular Follow up.

## 2018-04-17 ENCOUNTER — OFFICE VISIT (OUTPATIENT)
Dept: FAMILY MEDICINE CLINIC | Age: 48
End: 2018-04-17

## 2018-04-17 VITALS
SYSTOLIC BLOOD PRESSURE: 107 MMHG | OXYGEN SATURATION: 98 % | RESPIRATION RATE: 18 BRPM | BODY MASS INDEX: 36.15 KG/M2 | WEIGHT: 217 LBS | TEMPERATURE: 97.3 F | HEIGHT: 65 IN | HEART RATE: 70 BPM | DIASTOLIC BLOOD PRESSURE: 70 MMHG

## 2018-04-17 DIAGNOSIS — Z51.81 MONITORING FOR LONG-TERM ANTICOAGULANT USE: ICD-10-CM

## 2018-04-17 DIAGNOSIS — K51.919 ULCERATIVE COLITIS WITH COMPLICATION, UNSPECIFIED LOCATION (HCC): ICD-10-CM

## 2018-04-17 DIAGNOSIS — I82.5Z1 CHRONIC DEEP VEIN THROMBOSIS (DVT) OF DISTAL VEIN OF RIGHT LOWER EXTREMITY (HCC): Primary | ICD-10-CM

## 2018-04-17 DIAGNOSIS — Z79.01 MONITORING FOR LONG-TERM ANTICOAGULANT USE: ICD-10-CM

## 2018-04-17 DIAGNOSIS — I87.001 POST-THROMBOTIC SYNDROME OF RIGHT LOWER EXTREMITY: ICD-10-CM

## 2018-04-17 LAB
INR BLD: 1.8
PT POC: NORMAL SECONDS
VALID INTERNAL CONTROL?: YES

## 2018-04-17 NOTE — MR AVS SNAPSHOT
[address] [facility] 
[phone] Patient: Nhung Singh MRN:  [id number] Visit Information Date & Time Provider Department Dept. Phone Encounter #  
 4/17/2018  7:30 AM Monica Gomez MD [facility] at [address] [id number] Follow-up Instructions Return in about 2 weeks (around 5/1/2018) for Regular Follow up. Your Appointments 7/31/2018  8:50 AM  
Follow Up with Ana Unger MD  
[facility] Diabetes and Endocrinology [address] [facility]) Appt Note: 6 month f/u  
 [address] [facility] [address] [id number] [address] [facility] Upcoming Health Maintenance Date Due  
 PAP AKA CERVICAL CYTOLOGY 11/19/2017 DTaP/Tdap/Td series (2 - Td) 5/17/2026 Allergies as of 4/17/2018  Review Complete On: 4/17/2018 By: Monica Gomez MD  
  
 Severity Noted Reaction Type Reactions Sulfa (Sulfonamide Antibiotics) High 03/12/2010    Anaphylaxis Codeine Medium 03/17/2010    Itching Current Immunizations  Reviewed on 5/2/2016 Name Date Influenza Vaccine (Quad) PF 9/14/2017, 10/27/2016, 10/30/2015 Influenza Vaccine PF 10/11/2013 Influenza Vaccine Split 12/13/2012, 10/5/2011 Pneumococcal Polysaccharide (PPSV-23) 4/11/2016 11:40 AM  
  
 Not reviewed this visit You Were Diagnosed With   
  
 Codes Comments Chronic deep vein thrombosis (DVT) of distal vein of right lower extremity (HCC)    -  Primary ICD-10-CM: D83.0P0 ICD-9-CM: 453.52 Post-thrombotic syndrome of right lower extremity     ICD-10-CM: I87.001 ICD-9-CM: 459.10 Monitoring for long-term anticoagulant use     ICD-10-CM: Z51.81, Z79.01 
ICD-9-CM: V58.61 Ulcerative colitis with complication, unspecified location [facility])     ICD-10-CM: F56.655 ICD-9-CM: 177. 9 Vitals BP Pulse Temp Resp Height(growth percentile) Weight(growth percentile) 107/70 (BP 1 Location: Left arm, BP Patient Position: Sitting) 70 97.3 °F (36.3 °C) (Oral) 18 5' 5\" (1.651 m) 217 lb (98.4 kg) LMP SpO2 BMI OB Status Smoking Status (LMP Unknown) 98% 36.11 kg/m2 Hysterectomy Never Smoker Vitals History BMI and BSA Data Body Mass Index Body Surface Area  
 36.11 kg/m 2 2.12 m 2 Preferred Pharmacy [facility]acy Name Phone [facility] [address] [place] [address] [phone] Your Updated Medication List  
  
   
This list is accurate as of 4/17/18  8:05 AM.  Always use your most recent med list.  
  
  
  
  
 albuterol 90 mcg/actuation inhaler Commonly known as:  PROVENTIL HFA, VENTOLIN HFA, PROAIR HFA Take 1 Puff by inhalation every four (4) hours as needed for Wheezing. CIMZIA SC  
by SubCUTAneous route. Injection:  Every 4 weeks  
  
 ergocalciferol 50,000 unit capsule Commonly known as:  ERGOCALCIFEROL  
TAKE 1 CAPSULE BY MOUTH ONCE PER WEEK FOR A TOTAL OF 8 WEEKS. THEN CHANGE TO TWICE PER MONTH.  
  
 folic acid 1 mg tablet Commonly known as:  FOLVITE  
TAKE 1 TABLET BY MOUTH ONCE A DAY  
  
 furosemide 40 mg tablet Commonly known as:  LASIX TAKE 1 TABLET BY MOUTH TWICE DAILY AS NEEDED  
  
 KLOR-CON M20 20 mEq tablet Generic drug:  potassium chloride TAKE 1 TABLET BY MOUTH TWICE A DAY  
  
 methotrexate 2.5 mg tablet Commonly known as:  RHEUMATREX  
TAKE 8 TABLETS BY MOUTH ONCE PER WEEK  
  
 phentermine 37.5 mg tablet Commonly known as:  ADIPEX-P Take 1/2 tablet before breakfast and before dinner  
  
 raNITIdine 150 mg tablet Commonly known as:  ZANTAC Take 150 mg by mouth two (2) times a day. topiramate 50 mg tablet Commonly known as:  TOPAMAX Take 1 Tab by mouth two (2) times a day. TYLENOL EXTRA STRENGTH 500 mg tablet Generic drug:  acetaminophen Take 1,000 mg by mouth every six (6) hours as needed for Pain.  
  
 warfarin 5 mg tablet Commonly known as:  COUMADIN  
TAKE 1 TAB BY MOUTH DAILY. INDICATIONS: DEEP VEIN THROMBOSIS PREVENTION April 2018 Details Sun Mon Tue Wed Thu Fri Sat  
  1  
  
  
  
   2  
  
  
  
   3  
  
  
  
   4  
  
  
  
   5  
  
  
  
   6  
  
  
  
   7  
  
  
  
  
  8  
  
  
  
   9  
  
  
  
   10  
  
  
  
   11  
  
  
  
   12  
  
  
  
   13  
  
  
  
   14  
  
  
  
  
  15  
  
  
  
   16  
  
  
  
   17  
  
10 mg See details 18  
  
12.5 mg  
  
   19  
  
10 mg  
  
   20  
  
10 mg  
  
   21  
  
10 mg  
  
  
  22  
  
12.5 mg  
  
   23  
  
10 mg  
  
   24  
  
10 mg  
  
   25  
  
12.5 mg  
  
   26  
  
10 mg  
  
   27  
  
10 mg  
  
   28  
  
10 mg  
  
  
  29  
  
12.5 mg  
  
   30  
  
10 mg Date Details 04/17 This INR check INR: 1.8 How to take your warfarin dose To take:  10 mg Take two of the 5 mg tablets. To take:  12.5 mg Take two and a half of the 5 mg tablets. May 2018 Details Sun Mon Tue Wed Thu Fri Sat  
    1  
  
10 mg  
  
   2  
  
  
  
   3  
  
  
  
   4  
  
  
  
   5  
  
  
  
  
  6  
  
  
  
   7  
  
  
  
   8  
  
  
  
   9  
  
  
  
   10  
  
  
  
   11  
  
  
  
   12  
  
  
  
  
  13  
  
  
  
   14  
  
  
  
   15  
  
  
  
   16  
  
  
  
   17  
  
  
  
   18  
  
  
  
   19  
  
  
  
  
  20  
  
  
  
   21  
  
  
  
   22  
  
  
  
   23  
  
  
  
   24  
  
  
  
   25  
  
  
  
   26  
  
  
  
  
  27  
  
  
  
   28  
  
  
  
   29  
  
  
  
   30  
  
  
  
   31  
  
  
  
    
 Date Details No additional details Date of next INR:  5/1/2018 How to take your warfarin dose To take:  10 mg Take two of the 5 mg tablets. We Performed the Following AMB POC PT/INR [28106 CPT(R)] Follow-up Instructions Return in about 2 weeks (around 5/1/2018) for Regular Follow up. Introducing [facility] & HEALTH SERVICES! Dear Gilbert Felder: Thank you for requesting a [employer] account. Our records indicate that you already have an active [employer] account. You can access your account anytime at [email or web address] Did you know that you can access your hospital and ER discharge instructions at any time in [employer]? You can also review all of your test results from your hospital stay or ER visit. Additional Information If you have questions, please visit the Frequently Asked Questions section of the [employer] website at [email or web address] Remember, [employer] is NOT to be used for urgent needs. For medical emergencies, dial 911. Now available from your iPhone and Android! Please provide this summary of care documentation to your next provider. Your primary care clinician is listed as Lucy White. If you have any questions after today's visit, please call [phone].

## 2018-04-17 NOTE — PROGRESS NOTES
Subjective:     Chief Complaint   Patient presents with    Follow-up     PT/INR      She  is a 50 y.o. female who presents for evaluation of:  INR check - Since last appt, bleeding concerns are resolved. DVT in R leg after gyn sgy and chronically on coumadin now. Has had DVT in same leg x 2 in past.   Ct to struggle with post thrombotic syndrome from numerous DVTs. Pain flares up at different times and is much worse with edema. She continues to manage this by resting her leg and elevating it. Rest per Anti-coag tab. She reports having some diarrhea x 2 days with some mild abd pain. No fevers.     ROS  Gen - no fever/chills  Resp - no dyspnea or cough  CV - per hpi  Rest per HPI    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    Pseudogout     PUD (peptic ulcer disease)     Skin abrasion 1/28/14    After loosing 125#, Bilateral Inner thigh friction flareup monthly with skin breakdown    Thromboembolus ([address] 2008    Rt leg,  pt states she fell and had a plane ride home and developed blood clots    UC (ulcerative colitis) ([address] 3/12/2010     Past Surgical History:   Procedure Laterality Date    HX ACL RECONSTRUCTION      Rt    Sandra Chaudhari HX GI      reopening of rectal pouch    HX GYN  06/19/2017    Hysterectomy    HX ORTHOPAEDIC  12/13    Rt foot / toes    HX TONSILLECTOMY       Current Outpatient Prescriptions on File Prior to Visit   Medication Sig Dispense Refill    folic acid (FOLVITE) 1 mg tablet TAKE 1 TABLET BY MOUTH ONCE A DAY  6    KLOR-CON M20 20 mEq tablet TAKE 1 TABLET BY MOUTH TWICE A DAY  0    ergocalciferol (ERGOCALCIFEROL) 50,000 unit capsule TAKE 1 CAPSULE BY MOUTH ONCE PER WEEK FOR A TOTAL OF 8 WEEKS. THEN CHANGE TO TWICE PER MONTH. 12 Cap 0    warfarin (COUMADIN) 5 mg tablet TAKE 1 TAB BY MOUTH DAILY. INDICATIONS: DEEP VEIN THROMBOSIS PREVENTION 45 Tab 1    furosemide (LASIX) 40 mg tablet TAKE 1 TABLET BY MOUTH TWICE DAILY AS NEEDED 000 Tab 1    CERTOLIZUMAB PEGOL (CIMZIA SC) by SubCUTAneous route. Injection:  Every 4 weeks      phentermine (ADIPEX-P) 37.5 mg tablet Take 1/2 tablet before breakfast and before dinner 100 Tab 5    topiramate (TOPAMAX) 50 mg tablet Take 1 Tab by mouth two (2) times a day. 60 Tab 5    methotrexate (RHEUMATREX) 2.5 mg tablet TAKE 8 TABLETS BY MOUTH ONCE PER WEEK  2    ranitidine (ZANTAC) 150 mg tablet Take 150 mg by mouth two (2) times a day.  albuterol (PROVENTIL HFA, VENTOLIN HFA, PROAIR HFA) 90 mcg/actuation inhaler Take 1 Puff by inhalation every four (4) hours as needed for Wheezing. 1 Inhaler 5    acetaminophen (TYLENOL EXTRA STRENGTH) 500 mg tablet Take 1,000 mg by mouth every six (6) hours as needed for Pain. No current facility-administered medications on file prior to visit. Objective:     Vitals:    04/17/18 0744   BP: 107/70   Pulse: 70   Resp: 18   Temp: 97.3 °F (36.3 °C)   TempSrc: Oral   SpO2: 98%   Weight: 217 lb (98.4 kg)   Height: 5' 5\" (1.651 m)     Physical Examination:  General appearance - alert, well appearing, and in no distress  Eyes -sclera anicteric  Chest - clear to auscultation, no wheezes, rales or rhonchi, symmetric air entry  Heart - normal rate, regular rhythm, normal S1, S2, no murmurs, rubs, clicks or gallops  Extr -bilat LE with tradce edema, no ttp    Assessment/ Plan:   Diagnoses and all orders for this visit:    1.  Chronic deep vein thrombosis (DVT) of distal vein of right lower extremity ([address]  2. Post-thrombotic syndrome of right lower extremity  3. Monitoring for long-term anticoagulant use  - INR slightly subtherapeutic. Increasing coumadin dose slightly and recheck in 2 weeks. -     AMB POC PT/INR    4. Ulcerative colitis with complication, unspecified location [facility]) - may be flaring slightly with diarrhea. Advised her to monitor sx at this point. I have discussed the diagnosis with the patient and the intended plan as seen in the above orders. The patient has received an after-visit summary and questions were answered concerning future plans. I have discussed medication side effects and warnings with the patient as well. The patient verbalizes understanding and agreement with the plan. Follow-up Disposition:  Return in about 2 weeks (around 5/1/2018) for Regular Follow up.

## 2018-04-17 NOTE — PROGRESS NOTES
Chief Complaint   Patient presents with    Follow-up     PT/INR   Diarrhea x two days and abdominal pain  Room 4

## 2018-04-30 ENCOUNTER — OFFICE VISIT (OUTPATIENT)
Dept: FAMILY MEDICINE CLINIC | Age: 48
End: 2018-04-30

## 2018-04-30 VITALS
HEIGHT: 65 IN | RESPIRATION RATE: 16 BRPM | SYSTOLIC BLOOD PRESSURE: 119 MMHG | OXYGEN SATURATION: 99 % | TEMPERATURE: 97.7 F | BODY MASS INDEX: 35.75 KG/M2 | WEIGHT: 214.6 LBS | DIASTOLIC BLOOD PRESSURE: 76 MMHG | HEART RATE: 76 BPM

## 2018-04-30 DIAGNOSIS — I87.001 POST-THROMBOTIC SYNDROME OF RIGHT LOWER EXTREMITY: ICD-10-CM

## 2018-04-30 DIAGNOSIS — Z51.81 MONITORING FOR LONG-TERM ANTICOAGULANT USE: ICD-10-CM

## 2018-04-30 DIAGNOSIS — I82.5Z1 CHRONIC DEEP VEIN THROMBOSIS (DVT) OF DISTAL VEIN OF RIGHT LOWER EXTREMITY (HCC): Primary | ICD-10-CM

## 2018-04-30 DIAGNOSIS — Z79.01 MONITORING FOR LONG-TERM ANTICOAGULANT USE: ICD-10-CM

## 2018-04-30 LAB
INR BLD: 1.7
PT POC: NORMAL SECONDS
VALID INTERNAL CONTROL?: YES

## 2018-04-30 NOTE — PROGRESS NOTES
Chief Complaint   Patient presents with    Anticoagulation     PT/INR Check   1. Have you been to the ER, urgent care clinic since your last visit? Hospitalized since your last visit? No    2. Have you seen or consulted any other health care providers outside of the [address] [place] [address] since your last visit? Include any pap smears or colon screening.  No    Patient complaining of onset on yesterday of right neck and right knee pain  Room #4

## 2018-04-30 NOTE — PROGRESS NOTES
Subjective:     Chief Complaint   Patient presents with    Anticoagulation     PT/INR Check      She  is a 50 y.o. female who presents for evaluation of:  INR check - Since last appt, bleeding concerns are resolved. DVT in R leg after gyn sgy and chronically on coumadin now. Has had DVT in same leg x 2 in past.   Ct to struggle with post thrombotic syndrome from numerous DVTs. Pain flares up at different times and is much worse with edema. She continues to manage this by resting her leg and elevating it. Rest per Anti-coag tab. Doing well today with some mild neck pain.     ROS  Gen - no fever/chills  Resp - no dyspnea or cough  CV - per hpi  Rest per HPI    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    Pseudogout     PUD (peptic ulcer disease)     Skin abrasion 1/28/14    After loosing 125#, Bilateral Inner thigh friction flareup monthly with skin breakdown    Thromboembolus ([address] 2008    Rt leg,  pt states she fell and had a plane ride home and developed blood clots    UC (ulcerative colitis) ([address] 3/12/2010     Past Surgical History:   Procedure Laterality Date    HX ACL RECONSTRUCTION      Rt    Sofia Elise HX GI      reopening of rectal pouch    HX GYN  06/19/2017    Hysterectomy    HX ORTHOPAEDIC  12/13    Rt foot / toes    HX TONSILLECTOMY       Current Outpatient Prescriptions on File Prior to Visit   Medication Sig Dispense Refill    folic acid (FOLVITE) 1 mg tablet TAKE 1 TABLET BY MOUTH ONCE A DAY  6    KLOR-CON M20 20 mEq tablet TAKE 1 TABLET BY MOUTH TWICE A DAY  0    ergocalciferol (ERGOCALCIFEROL) 50,000 unit capsule TAKE 1 CAPSULE BY MOUTH ONCE PER WEEK FOR A TOTAL OF 8 WEEKS. THEN CHANGE TO TWICE PER MONTH. 12 Cap 0    warfarin (COUMADIN) 5 mg tablet TAKE 1 TAB BY MOUTH DAILY. INDICATIONS: DEEP VEIN THROMBOSIS PREVENTION (Patient taking differently: TAKE 2 TAB BY MOUTH DAILY M,T,Th&SA & 2.5 tab Wed & Sun. INDICATIONS: DEEP VEIN THROMBOSIS PREVENTION) 45 Tab 1    furosemide (LASIX) 40 mg tablet TAKE 1 TABLET BY MOUTH TWICE DAILY AS NEEDED 830 Tab 1    CERTOLIZUMAB PEGOL (CIMZIA SC) by SubCUTAneous route. Injection:  Every 4 weeks      phentermine (ADIPEX-P) 37.5 mg tablet Take 1/2 tablet before breakfast and before dinner 100 Tab 5    topiramate (TOPAMAX) 50 mg tablet Take 1 Tab by mouth two (2) times a day. 60 Tab 5    methotrexate (RHEUMATREX) 2.5 mg tablet TAKE 8 TABLETS BY MOUTH ONCE PER WEEK  2    ranitidine (ZANTAC) 150 mg tablet Take 150 mg by mouth two (2) times a day.  albuterol (PROVENTIL HFA, VENTOLIN HFA, PROAIR HFA) 90 mcg/actuation inhaler Take 1 Puff by inhalation every four (4) hours as needed for Wheezing. 1 Inhaler 5    acetaminophen (TYLENOL EXTRA STRENGTH) 500 mg tablet Take 1,000 mg by mouth every six (6) hours as needed for Pain. No current facility-administered medications on file prior to visit.          Objective:     Vitals:    04/30/18 0817   BP: 119/76   Pulse: 76   Resp: 16   Temp: 97.7 °F (36.5 °C)   TempSrc: Oral   SpO2: 99%   Weight: 214 lb 9.6 oz (97.3 kg)   Height: 5' 5\" (1.651 m)     Physical Examination:  General appearance - alert, well appearing, and in no distress  Eyes -sclera anicteric  Chest - clear to auscultation, no wheezes, rales or rhonchi, symmetric air entry  Heart - normal rate, regular rhythm, normal S1, S2, no murmurs, rubs, clicks or gallops  Extr -bilat LE with tradce edema, no ttp    Assessment/ Plan:   Diagnoses and all orders for this visit:    1. Chronic deep vein thrombosis (DVT) of distal vein of right lower extremity (HCC)  2. Post-thrombotic syndrome of right lower extremity  3. Monitoring for long-term anticoagulant use  - INR slightly subtherapeutic. Increasing coumadin dose slightly and recheck in 2 weeks. -     AMB POC PT/INR    I have discussed the diagnosis with the patient and the intended plan as seen in the above orders. The patient has received an after-visit summary and questions were answered concerning future plans. I have discussed medication side effects and warnings with the patient as well. The patient verbalizes understanding and agreement with the plan. Follow-up Disposition:  Return in about 2 weeks (around 5/14/2018), or if symptoms worsen or fail to improve, for Regular Follow up.

## 2018-05-14 ENCOUNTER — OFFICE VISIT (OUTPATIENT)
Dept: FAMILY MEDICINE CLINIC | Age: 48
End: 2018-05-14

## 2018-05-14 VITALS
SYSTOLIC BLOOD PRESSURE: 121 MMHG | DIASTOLIC BLOOD PRESSURE: 88 MMHG | HEIGHT: 65 IN | WEIGHT: 210.2 LBS | RESPIRATION RATE: 18 BRPM | BODY MASS INDEX: 35.02 KG/M2 | TEMPERATURE: 97.2 F | HEART RATE: 81 BPM

## 2018-05-14 DIAGNOSIS — I87.001 POST-THROMBOTIC SYNDROME OF RIGHT LOWER EXTREMITY: ICD-10-CM

## 2018-05-14 DIAGNOSIS — Z79.01 MONITORING FOR LONG-TERM ANTICOAGULANT USE: ICD-10-CM

## 2018-05-14 DIAGNOSIS — I82.5Z1 CHRONIC DEEP VEIN THROMBOSIS (DVT) OF DISTAL VEIN OF RIGHT LOWER EXTREMITY (HCC): Primary | ICD-10-CM

## 2018-05-14 DIAGNOSIS — Z51.81 MONITORING FOR LONG-TERM ANTICOAGULANT USE: ICD-10-CM

## 2018-05-14 LAB
INR BLD: 2.4
PT POC: NORMAL SECONDS
VALID INTERNAL CONTROL?: YES

## 2018-05-14 NOTE — MR AVS SNAPSHOT
Karen Delgado [address] [facility] [address]. 
[phone] Patient: Abundio Keys MRN:  [id number] Visit Information Date & Time Provider Department Dept. Phone Encounter #  
 5/14/2018  8:30 AM Shayy Boo MD [facility] at [address] [id number] Follow-up Instructions Return in about 4 weeks (around 6/11/2018) for Regular Follow up. Your Appointments 7/31/2018  8:50 AM  
Follow Up with Florentino Archibald MD  
[facility]) Appt Note: 6 month f/u  
 [address] [facility] [address] [id number] [address] [facility] Upcoming Health Maintenance Date Due  
 PAP AKA CERVICAL CYTOLOGY 11/19/2017 Influenza Age 5 to Adult 8/1/2018 DTaP/Tdap/Td series (2 - Td) 5/17/2026 Allergies as of 5/14/2018  Review Complete On: 5/14/2018 By: Shayy Boo MD  
  
 Severity Noted Reaction Type Reactions Sulfa (Sulfonamide Antibiotics) High 03/12/2010    Anaphylaxis Codeine Medium 03/17/2010    Itching Current Immunizations  Reviewed on 5/2/2016 Name Date Influenza Vaccine (Quad) PF 9/14/2017, 10/27/2016, 10/30/2015 Influenza Vaccine PF 10/11/2013 Influenza Vaccine Split 12/13/2012, 10/5/2011 Pneumococcal Polysaccharide (PPSV-23) 4/11/2016 11:40 AM  
  
 Not reviewed this visit You Were Diagnosed With   
  
 Codes Comments Chronic deep vein thrombosis (DVT) of distal vein of right lower extremity (HCC)    -  Primary ICD-10-CM: E04.2K9 ICD-9-CM: 453.52 Post-thrombotic syndrome of right lower extremity     ICD-10-CM: I87.001 ICD-9-CM: 459.10 Monitoring for long-term anticoagulant use     ICD-10-CM: Z51.81, Z79.01 
ICD-9-CM: V58.61 Vitals BP Pulse Temp Resp Height(growth percentile) Weight(growth percentile) 121/88 (BP 1 Location: Left arm, BP Patient Position: Sitting) 81 97.2 °F (36.2 °C) (Oral) 18 5' 5\" (1.651 m) 210 lb 3.2 oz (95.3 kg) LMP BMI OB Status Smoking Status (LMP Unknown) 34.98 kg/m2 Hysterectomy Never Smoker Vitals History BMI and BSA Data Body Mass Index Body Surface Area 34.98 kg/m 2 2.09 m 2 Preferred Pharmacy Pharmacy Name Phone [facility] [address] [place] [address] [phone] Your Updated Medication List  
  
   
This list is accurate as of 5/14/18  8:39 AM.  Always use your most recent med list.  
  
  
  
  
 albuterol 90 mcg/actuation inhaler Commonly known as:  PROVENTIL HFA, VENTOLIN HFA, PROAIR HFA Take 1 Puff by inhalation every four (4) hours as needed for Wheezing. CIMZIA SC  
by SubCUTAneous route. Injection:  Every 4 weeks  
  
 ergocalciferol 50,000 unit capsule Commonly known as:  ERGOCALCIFEROL  
TAKE 1 CAPSULE BY MOUTH ONCE PER WEEK FOR A TOTAL OF 8 WEEKS. THEN CHANGE TO TWICE PER MONTH.  
  
 folic acid 1 mg tablet Commonly known as:  FOLVITE  
TAKE 1 TABLET BY MOUTH ONCE A DAY  
  
 furosemide 40 mg tablet Commonly known as:  LASIX TAKE 1 TABLET BY MOUTH TWICE DAILY AS NEEDED  
  
 KLOR-CON M20 20 mEq tablet Generic drug:  potassium chloride TAKE 1 TABLET BY MOUTH TWICE A DAY  
  
 methotrexate 2.5 mg tablet Commonly known as:  RHEUMATREX  
TAKE 8 TABLETS BY MOUTH ONCE PER WEEK  
  
 phentermine 37.5 mg tablet Commonly known as:  ADIPEX-P Take 1/2 tablet before breakfast and before dinner  
  
 raNITIdine 150 mg tablet Commonly known as:  ZANTAC Take 150 mg by mouth two (2) times a day. topiramate 50 mg tablet Commonly known as:  TOPAMAX Take 1 Tab by mouth two (2) times a day. TYLENOL EXTRA STRENGTH 500 mg tablet Generic drug:  acetaminophen Take 1,000 mg by mouth every six (6) hours as needed for Pain.  
  
 warfarin 5 mg tablet Commonly known as:  COUMADIN  
TAKE 1 TAB BY MOUTH DAILY. INDICATIONS: DEEP VEIN THROMBOSIS PREVENTION May 2018 Details [facility] Tue Wed Thu Fri Sat  
    1  
  
  
  
   2  
  
  
  
   3  
  
  
  
   4  
  
  
  
   5  
  
  
  
  
  6  
  
  
  
   7  
  
  
  
   8  
  
  
  
   9  
  
  
  
   10  
  
  
  
   11  
  
  
  
   12  
  
  
  
  
  13  
  
  
  
   14  
  
10 mg See details 15  
  
10 mg  
  
   16  
  
12.5 mg  
  
   17  
  
10 mg  
  
   18  
  
12.5 mg  
  
   19  
  
10 mg  
  
  
  20  
  
12.5 mg  
  
   21  
  
10 mg  
  
   22  
  
10 mg  
  
   23  
  
12.5 mg  
  
   24  
  
10 mg  
  
   25  
  
12.5 mg  
  
   26  
  
10 mg  
  
  
  27  
  
12.5 mg  
  
   28  
  
10 mg  
  
   29  
  
10 mg  
  
   30  
  
12.5 mg  
  
   31  
  
10 mg Date Details 05/14 This INR check INR: 2.4 How to take your warfarin dose To take:  10 mg Take two of the 5 mg tablets. To take:  12.5 mg Take two and a half of the 5 mg tablets. June 2018 Details [facility] Tue Wed Thu Fri Sat  
       1  
  
12.5 mg  
  
   2  
  
10 mg  
  
  
  3  
  
12.5 mg  
  
   4  
  
10 mg  
  
   5  
  
10 mg  
  
   6  
  
12.5 mg  
  
   7  
  
10 mg  
  
   8  
  
12.5 mg  
  
   9  
  
10 mg  
  
  
  10  
  
12.5 mg  
  
   11  
  
10 mg  
  
   12  
  
  
  
   13  
  
  
  
   14  
  
  
  
   15  
  
  
  
   16  
  
  
  
  
  17  
  
  
  
   18  
  
  
  
   19  
  
  
  
   20  
  
  
  
   21  
  
  
  
   22  
  
  
  
   23  
  
  
  
  
  24  
  
  
  
   25  
  
  
  
   26  
  
  
  
   27  
  
  
  
   28  
  
  
  
   29  
  
  
  
   30  
  
  
  
  
 Date Details No additional details Date of next INR:  6/11/2018 How to take your warfarin dose To take:  10 mg Take two of the 5 mg tablets. To take:  12.5 mg Take two and a half of the 5 mg tablets. We Performed the Following AMB POC PT/INR [97967 CPT(R)] Follow-up Instructions Return in about 4 weeks (around 6/11/2018) for Regular Follow up. Introducing [address]! Dear Anna Watters: Thank you for requesting a [employer] account. Our records indicate that you already have an active [employer] account. You can access your account anytime at [email or web address] Did you know that you can access your hospital and ER discharge instructions at any time in [employer]? You can also review all of your test results from your hospital stay or ER visit. Additional Information If you have questions, please visit the Frequently Asked Questions section of the [employer] website at [email or web address] Remember, [employer] is NOT to be used for urgent needs. For medical emergencies, dial 911. Now available from your iPhone and Android! Please provide this summary of care documentation to your next provider. Your primary care clinician is listed as Petty French. If you have any questions after today's visit, please call [phone].

## 2018-05-14 NOTE — PROGRESS NOTES
Subjective:     Chief Complaint   Patient presents with    Follow-up     PT/INR      She  is a 50 y.o. female who presents for evaluation of:  INR check - Since last appt, bleeding concerns are resolved. DVT in R leg after gyn sgy and chronically on coumadin now. Has had DVT in same leg x 2 in past.   Ct to struggle with post thrombotic syndrome from numerous DVTs. Pain flares up at different times and is much worse with edema. She continues to manage this by resting her leg and elevating it. Rest per Anti-coag tab.     ROS  Gen - no fever/chills  Resp - no dyspnea or cough  CV - per hpi  Rest per HPI    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    Pseudogout     PUD (peptic ulcer disease)     Skin abrasion 1/28/14    After loosing 125#, Bilateral Inner thigh friction flareup monthly with skin breakdown    Thromboembolus ([address] 2008    Rt leg,  pt states she fell and had a plane ride home and developed blood clots    UC (ulcerative colitis) ([address] 3/12/2010     Past Surgical History:   Procedure Laterality Date    HX ACL RECONSTRUCTION      Rt    Karen Marley HX GI      reopening of rectal pouch    HX GYN  06/19/2017    Hysterectomy    HX ORTHOPAEDIC  12/13    Rt foot / toes    HX TONSILLECTOMY       Current Outpatient Prescriptions on File Prior to Visit   Medication Sig Dispense Refill    folic acid (FOLVITE) 1 mg tablet TAKE 1 TABLET BY MOUTH ONCE A DAY  6  KLOR-CON M20 20 mEq tablet TAKE 1 TABLET BY MOUTH TWICE A DAY  0    ergocalciferol (ERGOCALCIFEROL) 50,000 unit capsule TAKE 1 CAPSULE BY MOUTH ONCE PER WEEK FOR A TOTAL OF 8 WEEKS. THEN CHANGE TO TWICE PER MONTH. 12 Cap 0    warfarin (COUMADIN) 5 mg tablet TAKE 1 TAB BY MOUTH DAILY. INDICATIONS: DEEP VEIN THROMBOSIS PREVENTION (Patient taking differently: TAKE 2 TAB BY MOUTH DAILY M,T,Th&SA & 2.5 tab Wed & Sun. INDICATIONS: DEEP VEIN THROMBOSIS PREVENTION) 45 Tab 1    furosemide (LASIX) 40 mg tablet TAKE 1 TABLET BY MOUTH TWICE DAILY AS NEEDED 825 Tab 1    CERTOLIZUMAB PEGOL (CIMZIA SC) by SubCUTAneous route. Injection:  Every 4 weeks      phentermine (ADIPEX-P) 37.5 mg tablet Take 1/2 tablet before breakfast and before dinner 100 Tab 5    topiramate (TOPAMAX) 50 mg tablet Take 1 Tab by mouth two (2) times a day. 60 Tab 5    methotrexate (RHEUMATREX) 2.5 mg tablet TAKE 8 TABLETS BY MOUTH ONCE PER WEEK  2    ranitidine (ZANTAC) 150 mg tablet Take 150 mg by mouth two (2) times a day.  albuterol (PROVENTIL HFA, VENTOLIN HFA, PROAIR HFA) 90 mcg/actuation inhaler Take 1 Puff by inhalation every four (4) hours as needed for Wheezing. 1 Inhaler 5    acetaminophen (TYLENOL EXTRA STRENGTH) 500 mg tablet Take 1,000 mg by mouth every six (6) hours as needed for Pain. No current facility-administered medications on file prior to visit.          Objective:     Vitals:    05/14/18 0819   BP: 121/88   Pulse: 81   Resp: 18   Temp: 97.2 °F (36.2 °C)   TempSrc: Oral   Weight: 210 lb 3.2 oz (95.3 kg)   Height: 5' 5\" (1.651 m)     Physical Examination:  General appearance - alert, well appearing, and in no distress  Eyes -sclera anicteric  Chest - clear to auscultation, no wheezes, rales or rhonchi, symmetric air entry  Heart - normal rate, regular rhythm, normal S1, S2, no murmurs, rubs, clicks or gallops  Extr -bilat LE with tradce edema, no ttp    Assessment/ Plan:   Diagnoses and all orders for this visit: 1. Chronic deep vein thrombosis (DVT) of distal vein of right lower extremity (HCC)  2. Post-thrombotic syndrome of right lower extremity  3. Monitoring for long-term anticoagulant use  - INR slightly therapeutic. Recheck in 4 weeks. -     AMB POC PT/INR    Pt to have Rheum send notes and labs soon. I have discussed the diagnosis with the patient and the intended plan as seen in the above orders. The patient has received an after-visit summary and questions were answered concerning future plans. I have discussed medication side effects and warnings with the patient as well. The patient verbalizes understanding and agreement with the plan. Follow-up Disposition:  Return in about 4 weeks (around 6/11/2018) for Regular Follow up.

## 2018-05-19 DIAGNOSIS — I82.431 ACUTE DEEP VEIN THROMBOSIS (DVT) OF POPLITEAL VEIN OF RIGHT LOWER EXTREMITY (HCC): ICD-10-CM

## 2018-05-19 RX ORDER — WARFARIN SODIUM 5 MG/1
TABLET ORAL
Qty: 45 TAB | Refills: 1 | Status: SHIPPED | OUTPATIENT
Start: 2018-05-19 | End: 2018-05-24 | Stop reason: SDUPTHER

## 2018-05-24 ENCOUNTER — PATIENT MESSAGE (OUTPATIENT)
Dept: FAMILY MEDICINE CLINIC | Age: 48
End: 2018-05-24

## 2018-05-24 DIAGNOSIS — I82.431 ACUTE DEEP VEIN THROMBOSIS (DVT) OF POPLITEAL VEIN OF RIGHT LOWER EXTREMITY (HCC): ICD-10-CM

## 2018-05-24 RX ORDER — WARFARIN 10 MG/1
10 TABLET ORAL DAILY
Qty: 30 TAB | Refills: 0 | Status: SHIPPED | OUTPATIENT
Start: 2018-05-24 | End: 2018-07-24 | Stop reason: ALTCHOICE

## 2018-05-24 RX ORDER — WARFARIN SODIUM 5 MG/1
10 TABLET ORAL DAILY
Qty: 60 TAB | Refills: 1 | Status: SHIPPED | OUTPATIENT
Start: 2018-05-24 | End: 2018-07-24 | Stop reason: SDUPTHER

## 2018-05-24 NOTE — TELEPHONE ENCOUNTER
----- Message from Charissa Deshpande sent at 5/24/2018  1:43 PM EDT -----  Regarding: /Telephone  Pt is requesting a call back ASAP in reference to her needing her blood clot medication, and the Pharmacy is stating that it's to soon for an refill. Best contact number is  [phone].

## 2018-05-24 NOTE — TELEPHONE ENCOUNTER
Spoke with patient and pharmacist at [facility]. She is unable to get Warfarin until next week according to pharmacist .

## 2018-06-01 DIAGNOSIS — E55.9 VITAMIN D DEFICIENCY: ICD-10-CM

## 2018-06-01 DIAGNOSIS — I82.401 ACUTE DEEP VEIN THROMBOSIS (DVT) OF RIGHT LOWER EXTREMITY, UNSPECIFIED VEIN (HCC): ICD-10-CM

## 2018-06-01 RX ORDER — ERGOCALCIFEROL 1.25 MG/1
CAPSULE ORAL
Qty: 12 CAP | Refills: 0 | Status: SHIPPED | OUTPATIENT
Start: 2018-06-01 | End: 2018-08-24 | Stop reason: SDUPTHER

## 2018-06-07 ENCOUNTER — OFFICE VISIT (OUTPATIENT)
Dept: FAMILY MEDICINE CLINIC | Age: 48
End: 2018-06-07

## 2018-06-07 VITALS
TEMPERATURE: 98.1 F | SYSTOLIC BLOOD PRESSURE: 107 MMHG | WEIGHT: 218.4 LBS | HEIGHT: 65 IN | RESPIRATION RATE: 18 BRPM | HEART RATE: 83 BPM | BODY MASS INDEX: 36.39 KG/M2 | DIASTOLIC BLOOD PRESSURE: 64 MMHG | OXYGEN SATURATION: 98 %

## 2018-06-07 DIAGNOSIS — I82.5Z1 CHRONIC DEEP VEIN THROMBOSIS (DVT) OF DISTAL VEIN OF RIGHT LOWER EXTREMITY (HCC): Primary | ICD-10-CM

## 2018-06-07 DIAGNOSIS — E66.01 OBESITIES, MORBID (HCC): ICD-10-CM

## 2018-06-07 DIAGNOSIS — K91.850 POUCHITIS (HCC): ICD-10-CM

## 2018-06-07 DIAGNOSIS — G89.29 CHRONIC RIGHT SHOULDER PAIN: ICD-10-CM

## 2018-06-07 DIAGNOSIS — M25.511 CHRONIC RIGHT SHOULDER PAIN: ICD-10-CM

## 2018-06-07 DIAGNOSIS — Z79.01 MONITORING FOR LONG-TERM ANTICOAGULANT USE: ICD-10-CM

## 2018-06-07 DIAGNOSIS — D84.9 IMMUNOSUPPRESSED STATUS (HCC): ICD-10-CM

## 2018-06-07 DIAGNOSIS — I87.001 POST-THROMBOTIC SYNDROME OF RIGHT LOWER EXTREMITY: ICD-10-CM

## 2018-06-07 DIAGNOSIS — K51.919 ULCERATIVE COLITIS WITH COMPLICATION, UNSPECIFIED LOCATION (HCC): ICD-10-CM

## 2018-06-07 DIAGNOSIS — M06.4 INFLAMMATORY POLYARTHRITIS (HCC): ICD-10-CM

## 2018-06-07 DIAGNOSIS — R31.9 HEMATURIA, UNSPECIFIED TYPE: ICD-10-CM

## 2018-06-07 DIAGNOSIS — Z51.81 MONITORING FOR LONG-TERM ANTICOAGULANT USE: ICD-10-CM

## 2018-06-07 DIAGNOSIS — K21.9 GASTROESOPHAGEAL REFLUX DISEASE, ESOPHAGITIS PRESENCE NOT SPECIFIED: ICD-10-CM

## 2018-06-07 DIAGNOSIS — Z00.00 WELL ADULT EXAM: ICD-10-CM

## 2018-06-07 LAB
INR BLD: 1.8
PT POC: NORMAL SECONDS
VALID INTERNAL CONTROL?: YES

## 2018-06-07 RX ORDER — METRONIDAZOLE 500 MG/1
500 TABLET ORAL 3 TIMES DAILY
Qty: 21 TAB | Refills: 0 | Status: SHIPPED | OUTPATIENT
Start: 2018-06-07 | End: 2018-06-14

## 2018-06-07 RX ORDER — PANTOPRAZOLE SODIUM 40 MG/1
40 TABLET, DELAYED RELEASE ORAL DAILY
Qty: 30 TAB | Refills: 0 | Status: SHIPPED | OUTPATIENT
Start: 2018-06-07 | End: 2018-07-06 | Stop reason: SDUPTHER

## 2018-06-07 RX ORDER — CIPROFLOXACIN 500 MG/1
TABLET ORAL
Qty: 14 TAB | Refills: 0 | Status: SHIPPED | OUTPATIENT
Start: 2018-06-07 | End: 2018-06-21 | Stop reason: ALTCHOICE

## 2018-06-07 RX ORDER — METRONIDAZOLE 500 MG/1
500 TABLET ORAL 2 TIMES DAILY
Qty: 14 TAB | Refills: 0 | Status: SHIPPED | OUTPATIENT
Start: 2018-06-07 | End: 2018-06-07 | Stop reason: SDUPTHER

## 2018-06-07 NOTE — PROGRESS NOTES
Subjective:     Chief Complaint   Patient presents with    Follow-up     PT/INR      She  is a 50 y.o. female who presents for evaluation of:  INR check - Since last appt, bleeding concerns are resolved. DVT in R leg after gyn sgy and chronically on coumadin now. Has had DVT in same leg x 2 in past.   Ct to struggle with post thrombotic syndrome from numerous DVTs. Pain flares up at different times and is much worse with edema. She continues to manage this by resting her leg and elevating it. Rest per Anti-coag tab. Today, she also reports having some abd pain with soreness. Increased diarrhea. Having nearly 6-8 BMs per day. No bleeding currently. Having some fevers armani at night. Has known UC and hx of pouchitis. Waking up to have diarrhea as well. Also feeling GERD sx. Taking Zantac regularly but does not seem to be helping much. Feels like food is getting stuck and having some regurgitation. Worse with laying down. R shoulder with pain too. Sx x 2 weeks. Worse with raising arm up. Tried heat and has not helped. No recent injury. Known C spine DDD armani at C5-6.     ROS  Gen - per HPI  Resp - no dyspnea or cough  CV - per hpi  GI - per HPI  Rest per HPI    Past Medical History:   Diagnosis Date    Asthma 3/12/2010    DDD (degenerative disc disease), lumbar     DJD (degenerative joint disease) 3/12/2010    DJD (degenerative joint disease) of knee     DVT [facility]deep venous thrombosis) [facility])     Rt leg    GERD (gastroesophageal reflux disease)     History of tuberculosis exposure 2013    pt states she has + PPD and was on Rx for 6 months; last dose either 11/13 or 12/13    Inflammatory polyarthritis ([address]     Psoriatic Arthitis    Morbid obesity ([address]     Post-thrombotic syndrome of right lower extremity 9/14/2017    Pseudogout     PUD (peptic ulcer disease)     Skin abrasion 1/28/14    After loosing 125#, Bilateral Inner thigh friction flareup monthly with skin breakdown    [facility] 2008    Rt leg,  pt states she fell and had a plane ride home and developed blood clots    UC (ulcerative colitis) ([facility] 3/12/2010     Past Surgical History:   Procedure Laterality Date    HX ACL RECONSTRUCTION      Rt    Kasandra Traci    Dr Kourtney Charles HX GI      reopening of rectal pouch    HX GYN  06/19/2017    Hysterectomy    HX ORTHOPAEDIC  12/13    Rt foot / toes    HX TONSILLECTOMY       Current Outpatient Prescriptions on File Prior to Visit   Medication Sig Dispense Refill    ergocalciferol (ERGOCALCIFEROL) 50,000 unit capsule TAKE 1 CAPSULE BY MOUTH ONCE PER WEEK FOR A TOTAL OF 8 WEEKS. THEN CHANGE TO TWICE PER MONTH. 12 Cap 0    warfarin (COUMADIN) 5 mg tablet Take 2 Tabs by mouth daily. Indications: deep venous thrombosis 60 Tab 1    warfarin (COUMADIN) 10 mg tablet Take 1 Tab by mouth daily. 30 Tab 0    folic acid (FOLVITE) 1 mg tablet TAKE 1 TABLET BY MOUTH ONCE A DAY  6    KLOR-CON M20 20 mEq tablet TAKE 1 TABLET BY MOUTH TWICE A DAY  0    furosemide (LASIX) 40 mg tablet TAKE 1 TABLET BY MOUTH TWICE DAILY AS NEEDED 622 Tab 1    CERTOLIZUMAB PEGOL (CIMZIA SC) by SubCUTAneous route. Injection:  Every 4 weeks      phentermine (ADIPEX-P) 37.5 mg tablet Take 1/2 tablet before breakfast and before dinner 100 Tab 5    topiramate (TOPAMAX) 50 mg tablet Take 1 Tab by mouth two (2) times a day. 60 Tab 5    methotrexate (RHEUMATREX) 2.5 mg tablet TAKE 8 TABLETS BY MOUTH ONCE PER WEEK  2    ranitidine (ZANTAC) 150 mg tablet Take 150 mg by mouth two (2) times a day.  albuterol (PROVENTIL HFA, VENTOLIN HFA, PROAIR HFA) 90 mcg/actuation inhaler Take 1 Puff by inhalation every four (4) hours as needed for Wheezing. 1 Inhaler 5    acetaminophen (TYLENOL EXTRA STRENGTH) 500 mg tablet Take 1,000 mg by mouth every six (6) hours as needed for Pain. No current facility-administered medications on file prior to visit.          Objective:     Vitals:    06/07/18 0809 BP: 107/64   Pulse: 83   Resp: 18   Temp: 98.1 °F (36.7 °C)   TempSrc: Oral   SpO2: 98%   Weight: 218 lb 6.4 oz (99.1 kg)   Height: 5' 5\" (1.651 m)     Physical Examination:  General appearance - alert, well appearing, and in no distress  Eyes -sclera anicteric  Chest - clear to auscultation, no wheezes, rales or rhonchi, symmetric air entry  Heart - normal rate, regular rhythm, normal S1, S2, no murmurs, rubs, clicks or gallops  Abd - soft, +ttp armani over right side, no rebound/guarding  Msk - R shoulder with restricted passive ROM, no ttp  Extr -bilat LE with tradce edema, no ttp    Assessment/ Plan:   Diagnoses and all orders for this visit:    1. Chronic deep vein thrombosis (DVT) of distal vein of right lower extremity (HCC)  2. Post-thrombotic syndrome of right lower extremity  3. Monitoring for long-term anticoagulant use  - INR slightly subtherapeutic. Recheck in 2 weeks since prev at goal.  Adjusting for use of antibiotics as well  -     AMB POC PT/INR    4. Ulcer[facility] with complication, unspecified [facility]  5. Inflammatory polyarthritis ([facility]  - working with GI and Rheum  -     SED RATE (ESR)  -     C REACTIVE PROTEIN, QT    6. Obesities, morbid ([facility] - ct working on this with exercise and diet. -     LIPID PANEL  -     TSH 3RD GENERATION    7. Chronic right shoulder pain - likely OA, getting x-rays  -     XR SHOULDER RT AP/LAT MIN 2 V; Future    8. Gastroesophageal reflux disease, esophagitis presence not specified  -     pantoprazole (PROTONIX) 40 mg tablet; Take 1 Tab by mouth daily. 9. Well adult exam - labs today  -     CBC WITH AUTOMATED DIFF  -     METABOLIC PANEL, COMPREHENSIVE  -     LIPID PANEL  -     TSH 3RD GENERATION  -     AMB POC URINALYSIS DIP STICK AUTO W/O MICRO  -     SED RATE (ESR)  -     C REACTIVE PROTEIN, QT    10.  Pouchitis ([facility] - suspected based on hx and exam.  Tx with abx given prev episodes and immunosuppression.  -     ciprofloxacin HCl (CIPRO) 500 mg tablet; TAKE ONE TABLET BY MOUTH TWICE A DAY  -     metroNIDAZOLE (FLAGYL) 500 mg tablet; Take 1 Tab by mouth 3 times a day for 7 days. 11. Immunosuppressed status ([facility]    I have discussed the diagnosis with the patient and the intended plan as seen in the above orders. The patient has received an after-visit summary and questions were answered concerning future plans. I have discussed medication side effects and warnings with the patient as well. The patient verbalizes understanding and agreement with the plan. Follow-up Disposition:  Return in about 2 weeks (around 6/21/2018), or if symptoms worsen or fail to improve.

## 2018-06-07 NOTE — MR AVS SNAPSHOT
Karen Delgado [address] [facility] 83. 
[phone] Patient: Jaylon Gregg MRN:  [id number] Visit Information Date & Time Provider Department Dept. Phone Encounter #  
 6/7/2018  8:30 AM Elias Gilbert MD [facility] at [address] [id number] Follow-up Instructions Return in about 2 weeks (around 6/21/2018), or if symptoms worsen or fail to improve. Your Appointments 7/31/2018  8:50 AM  
Follow Up with MD Danyell [facility]inal[facility]) Appt Note: 6 month f/u  
 [address] [facility] [address] [phone] [address] [place] [address] Upcoming Health Maintenance Date Due  
 PAP AKA CERVICAL CYTOLOGY 11/19/2017 Influenza Age 5 to Adult 8/1/2018 DTaP/Tdap/Td series (2 - Td) 5/17/2026 Allergies as of 6/7/2018  Review Complete On: 6/7/2018 By: Elias Gilbert MD  
  
 Severity Noted Reaction Type Reactions Sulfa (Sulfonamide Antibiotics) High 03/12/2010    Anaphylaxis Codeine Medium 03/17/2010    Itching Current Immunizations  Reviewed on 5/2/2016 Name Date Influenza Vaccine (Quad) PF 9/14/2017, 10/27/2016, 10/30/2015 Influenza Vaccine PF 10/11/2013 Influenza Vaccine Split 12/13/2012, 10/5/2011 Pneumococcal Polysaccharide (PPSV-23) 4/11/2016 11:40 AM  
  
 Not reviewed this visit You Were Diagnosed With   
  
 Codes Comments Chronic deep vein thrombosis (DVT) of distal vein of right lower extremity (HCC)    -  Primary ICD-10-CM: C00.5G4 ICD-9-CM: 453.52 Post-thrombotic syndrome of right lower extremity     ICD-10-CM: I87.001 ICD-9-CM: 459.10 Monitoring for long-term anticoagulant use     ICD-10-CM: Z51.81, Z79.01 
ICD-9-CM: V58.61  Ulcerative colitis with complication, unspecified location ([facility] ICD-10-CM: F23.315 ICD-9-CM: 556.9 Inflammatory polyarthritis ([facility]     ICD-10-CM: M06.4 ICD-9-CM: 714.9 Obesities, morbid ([facility]     ICD-10-CM: E66.01 
ICD-9-CM: 278.01 Chronic right shoulder pain     ICD-10-CM: M25.511, G89.29 ICD-9-CM: 719.41, 338.29 Gastroesophageal reflux disease, esophagitis presence not specified     ICD-10-CM: K21.9 ICD-9-CM: 530.81 Well adult exam     ICD-10-CM: Z00.00 ICD-9-CM: V70.0 Pouchitis ([facility]     ICD-10-CM: N94.172 ICD-9-CM: 569.71 Immunosuppressed status ([facility]     ICD-10-CM: D89.9 ICD-9-CM: 279.9 Vitals BP Pulse Temp Resp Height(growth percentile) Weight(growth percentile) 107/64 (BP 1 Location: Right arm, BP Patient Position: Sitting) 83 98.1 °F (36.7 °C) (Oral) 18 5' 5\" (1.651 m) 218 lb 6.4 oz (99.1 kg) LMP SpO2 BMI OB Status Smoking Status (LMP Unknown) 98% 36.34 kg/m2 Hysterectomy Never Smoker Vitals History BMI and BSA Data Body Mass Index Body Surface Area  
 36.34 kg/m 2 2.13 m 2 Preferred Pharmacy Pharmacy Name Phone [facility] [address] [place] [address] [phone] Your Updated Medication List  
  
   
This list is accurate as of 6/7/18  9:34 AM.  Always use your most recent med list.  
  
  
  
  
 albuterol 90 mcg/actuation inhaler Commonly known as:  PROVENTIL HFA, VENTOLIN HFA, PROAIR HFA Take 1 Puff by inhalation every four (4) hours as needed for Wheezing. CIMZIA SC  
by SubCUTAneous route. Injection:  Every 4 weeks  
  
 ciprofloxacin HCl 500 mg tablet Commonly known as:  CIPRO TAKE ONE TABLET BY MOUTH TWICE A DAY  
  
 ergocalciferol 50,000 unit capsule Commonly known as:  ERGOCALCIFEROL  
TAKE 1 CAPSULE BY MOUTH ONCE PER WEEK FOR A TOTAL OF 8 WEEKS. THEN CHANGE TO TWICE PER MONTH.  
  
 folic acid 1 mg tablet Commonly known as:  FOLVITE  
TAKE 1 TABLET BY MOUTH ONCE A DAY  
  
 furosemide 40 mg tablet Commonly known as:  LASIX TAKE 1 TABLET BY MOUTH TWICE DAILY AS NEEDED  
  
 KLOR-CON M20 20 mEq tablet Generic drug:  potassium chloride TAKE 1 TABLET BY MOUTH TWICE A DAY  
  
 methotrexate 2.5 mg tablet Commonly known as:  RHEUMATREX  
TAKE 8 TABLETS BY MOUTH ONCE PER WEEK  
  
 metroNIDAZOLE 500 mg tablet Commonly known as:  FLAGYL Take 1 Tab by mouth three (3) times daily for 7 days. pantoprazole 40 mg tablet Commonly known as:  PROTONIX Take 1 Tab by mouth daily. phentermine 37.5 mg tablet Commonly known as:  ADIPEX-P Take 1/2 tablet before breakfast and before dinner  
  
 raNITIdine 150 mg tablet Commonly known as:  ZANTAC Take 150 mg by mouth two (2) times a day. topiramate 50 mg tablet Commonly known as:  TOPAMAX Take 1 Tab by mouth two (2) times a day. TYLENOL EXTRA STRENGTH 500 mg tablet Generic drug:  acetaminophen Take 1,000 mg by mouth every six (6) hours as needed for Pain. * warfarin 5 mg tablet Commonly known as:  COUMADIN Take 2 Tabs by mouth daily. Indications: deep venous thrombosis * warfarin 10 mg tablet Commonly known as:  COUMADIN Take 1 Tab by mouth daily. * Notice: This list has 2 medication(s) that are the same as other medications prescribed for you. Read the directions carefully, and ask your doctor or other care provider to review them with you. Prescriptions Sent to Pharmacy Refills  
 pantoprazole (PROTONIX) 40 mg tablet 0 Sig: Take 1 Tab by mouth daily. Class: Normal  
 Pharmacy: [address] [place] [address] Ph #: [phone] Route: Oral  
 ciprofloxacin HCl (CIPRO) 500 mg tablet 0 Sig: TAKE ONE TABLET BY MOUTH TWICE A DAY  Class: Normal  
 Pharmacy: [facility]pharm[facility] [place] [address] Ph #: [phone]  
 metroNIDAZOLE (FLAGYL) 500 mg tablet 0  
 Sig: Take 1 Tab by mouth three (3) times daily for 7 days. Class: Normal  
 Pharmacy: [address] [facility], [address] [place] [address] #: [phone] Route: Oral  
  
June 2018 Details Sun Mon Tue Wed Thu Fri Sat  
       1  
  
  
  
   2  
  
  
  
  
  3  
  
  
  
   4  
  
  
  
   5  
  
  
  
   6  
  
  
  
   7  
  
Hold See details 8 Hold  
  
   9 Hold  
  
  
  10  
  
12.5 mg  
  
   11  
  
10 mg  
  
   12  
  
10 mg  
  
   13  
  
12.5 mg  
  
   14  
  
10 mg  
  
   15  
  
12.5 mg  
  
   16  
  
10 mg  
  
  
  17  
  
12.5 mg  
  
   18  
  
10 mg  
  
   19  
  
10 mg  
  
   20  
  
12.5 mg  
  
   21  
  
10 mg  
  
   22  
  
12.5 mg  
  
   23  
  
10 mg  
  
  
  24  
  
12.5 mg  
  
   25  
  
10 mg  
  
   26  
  
10 mg  
  
   27  
  
12.5 mg  
  
   28  
  
10 mg  
  
   29  
  
12.5 mg  
  
   30  
  
10 mg Date Details 06/07 This INR check INR: 1.8 Date of next INR: No date specified How to take your warfarin dose To take:  10 mg Take two of the 5 mg tablets. To take:  12.5 mg Take two and a half of the 5 mg tablets. Hold Do not take your warfarin dose. See the Details table to the right for additional instructions. We Performed the Following AMB POC PT/INR [85528 CPT(R)] AMB POC URINALYSIS DIP STICK AUTO W/O MICRO [66769 CPT(R)] C REACTIVE PROTEIN, QT [86744 CPT(R)] CBC WITH AUTOMATED DIFF [92935 CPT(R)] LIPID PANEL [69291 CPT(R)] METABOLIC PANEL, COMPREHENSIVE [93385 CPT(R)] SED RATE (ESR) [id number] CPT(R)] TSH 3RD GENERATION [12322 CPT(R)] Follow-up Instructions Return in about 2 weeks (around 6/21/2018), or if symptoms worsen or fail to improve. To-Do List   
 06/07/2018 Imaging:  XR SHOULDER RT AP/LAT MIN 2 V Introducing [facility] & HEALTH SERVICES! Dear Cyrus Mayfield: Thank you for requesting a Eurotrit account.   Our records indicate that you already have an active Offerama account. You can access your account anytime at [email or web address] Did you know that you can access your hospital and ER discharge instructions at any time in Offerama? You can also review all of your test results from your hospital stay or ER visit. Additional Information If you have questions, please visit the Frequently Asked Questions section of the Offerama website at [email or web address] Remember, Offerama is NOT to be used for urgent needs. For medical emergencies, dial 911. Now available from your iPhone and Android! Please provide this summary of care documentation to your next provider. Your primary care clinician is listed as Any Dao. If you have any questions after today's visit, please call [phone].

## 2018-06-07 NOTE — PROGRESS NOTES
Chief Complaint   Patient presents with    Follow-up     PT/INR   Discuss abdominal and right shoulder pain  Having diarrhea x 7 days  Room 7

## 2018-06-08 ENCOUNTER — HOSPITAL ENCOUNTER (OUTPATIENT)
Dept: GENERAL RADIOLOGY | Age: 48
Discharge: HOME | End: 2018-06-08
Payer: COMMERCIAL

## 2018-06-08 ENCOUNTER — TELEPHONE (OUTPATIENT)
Dept: FAMILY MEDICINE CLINIC | Age: 48
End: 2018-06-08

## 2018-06-08 DIAGNOSIS — M25.511 CHRONIC RIGHT SHOULDER PAIN: ICD-10-CM

## 2018-06-08 DIAGNOSIS — G89.29 CHRONIC RIGHT SHOULDER PAIN: ICD-10-CM

## 2018-06-08 LAB
BILIRUB UR QL STRIP: NEGATIVE
GLUCOSE UR-MCNC: NEGATIVE MG/DL
KETONES P FAST UR STRIP-MCNC: NEGATIVE MG/DL
PH UR STRIP: 7 [PH] (ref 4.6–8)
PROT UR QL STRIP: NEGATIVE
SP GR UR STRIP: 1.01 (ref 1–1.03)
UA UROBILINOGEN AMB POC: NORMAL (ref 0.2–1)
URINALYSIS CLARITY POC: NORMAL
URINALYSIS COLOR POC: YELLOW
URINE BLOOD POC: NORMAL
URINE LEUKOCYTES POC: NORMAL
URINE NITRITES POC: NEGATIVE

## 2018-06-08 PROCEDURE — 73030 X-RAY EXAM OF SHOULDER: CPT

## 2018-06-09 LAB
ALBUMIN SERPL-MCNC: 4.5 G/DL (ref 3.5–5.5)
ALBUMIN/GLOB SERPL: 1.6 {RATIO} (ref 1.2–2.2)
ALP SERPL-CCNC: 47 IU/L (ref 39–117)
ALT SERPL-CCNC: 11 IU/L (ref 0–32)
AST SERPL-CCNC: 15 IU/L (ref 0–40)
BASOPHILS # BLD AUTO: 0 X10E3/UL (ref 0–0.2)
BASOPHILS NFR BLD AUTO: 1 %
BILIRUB SERPL-MCNC: 0.9 MG/DL (ref 0–1.2)
BUN SERPL-MCNC: 14 MG/DL (ref 6–24)
BUN/CREAT SERPL: 18 (ref 9–23)
CALCIUM SERPL-MCNC: 9.6 MG/DL (ref 8.7–10.2)
CHLORIDE SERPL-SCNC: 106 MMOL/L (ref 96–106)
CHOLEST SERPL-MCNC: 218 MG/DL (ref 100–199)
CO2 SERPL-SCNC: 21 MMOL/L (ref 18–29)
CREAT SERPL-MCNC: 0.79 MG/DL (ref 0.57–1)
CRP SERPL-MCNC: 1.4 MG/L (ref 0–4.9)
EOSINOPHIL # BLD AUTO: 0 X10E3/UL (ref 0–0.4)
EOSINOPHIL NFR BLD AUTO: 1 %
ERYTHROCYTE [DISTWIDTH] IN BLOOD BY AUTOMATED COUNT: 14.6 % (ref 12.3–15.4)
ERYTHROCYTE [SEDIMENTATION RATE] IN BLOOD BY WESTERGREN METHOD: 14 MM/HR (ref 0–32)
GFR SERPLBLD CREATININE-BSD FMLA CKD-EPI: 102 ML/MIN/1.73
GFR SERPLBLD CREATININE-BSD FMLA CKD-EPI: 88 ML/MIN/1.73
GLOBULIN SER CALC-MCNC: 2.9 G/DL (ref 1.5–4.5)
GLUCOSE SERPL-MCNC: 84 MG/DL (ref 65–99)
HCT VFR BLD AUTO: 40.5 % (ref 34–46.6)
HDLC SERPL-MCNC: 91 MG/DL
HGB BLD-MCNC: 13.3 G/DL (ref 11.1–15.9)
IMM GRANULOCYTES # BLD: 0 X10E3/UL (ref 0–0.1)
IMM GRANULOCYTES NFR BLD: 0 %
LDLC SERPL CALC-MCNC: 115 MG/DL (ref 0–99)
LYMPHOCYTES # BLD AUTO: 1.3 X10E3/UL (ref 0.7–3.1)
LYMPHOCYTES NFR BLD AUTO: 38 %
MCH RBC QN AUTO: 31.2 PG (ref 26.6–33)
MCHC RBC AUTO-ENTMCNC: 32.8 G/DL (ref 31.5–35.7)
MCV RBC AUTO: 95 FL (ref 79–97)
MONOCYTES # BLD AUTO: 0.3 X10E3/UL (ref 0.1–0.9)
MONOCYTES NFR BLD AUTO: 7 %
NEUTROPHILS # BLD AUTO: 1.8 X10E3/UL (ref 1.4–7)
NEUTROPHILS NFR BLD AUTO: 53 %
PLATELET # BLD AUTO: 265 X10E3/UL (ref 150–379)
POTASSIUM SERPL-SCNC: 4.1 MMOL/L (ref 3.5–5.2)
PROT SERPL-MCNC: 7.4 G/DL (ref 6–8.5)
RBC # BLD AUTO: 4.26 X10E6/UL (ref 3.77–5.28)
SODIUM SERPL-SCNC: 141 MMOL/L (ref 134–144)
TRIGL SERPL-MCNC: 62 MG/DL (ref 0–149)
TSH SERPL DL<=0.005 MIU/L-ACNC: 0.89 UIU/ML (ref 0.45–4.5)
VLDLC SERPL CALC-MCNC: 12 MG/DL (ref 5–40)
WBC # BLD AUTO: 3.5 X10E3/UL (ref 3.4–10.8)

## 2018-06-11 LAB — BACTERIA UR CULT: NORMAL

## 2018-06-18 ENCOUNTER — OFFICE VISIT (OUTPATIENT)
Dept: FAMILY MEDICINE CLINIC | Age: 48
End: 2018-06-18

## 2018-06-18 VITALS
BODY MASS INDEX: 36.85 KG/M2 | OXYGEN SATURATION: 98 % | HEART RATE: 69 BPM | RESPIRATION RATE: 16 BRPM | HEIGHT: 65 IN | SYSTOLIC BLOOD PRESSURE: 110 MMHG | WEIGHT: 221.2 LBS | DIASTOLIC BLOOD PRESSURE: 72 MMHG

## 2018-06-18 DIAGNOSIS — Z51.81 MONITORING FOR LONG-TERM ANTICOAGULANT USE: ICD-10-CM

## 2018-06-18 DIAGNOSIS — I87.001 POST-THROMBOTIC SYNDROME OF RIGHT LOWER EXTREMITY: ICD-10-CM

## 2018-06-18 DIAGNOSIS — Z79.01 MONITORING FOR LONG-TERM ANTICOAGULANT USE: ICD-10-CM

## 2018-06-18 DIAGNOSIS — M25.511 ACUTE PAIN OF RIGHT SHOULDER: ICD-10-CM

## 2018-06-18 DIAGNOSIS — R79.1 SUPRATHERAPEUTIC INR: ICD-10-CM

## 2018-06-18 DIAGNOSIS — I82.5Z1 CHRONIC DEEP VEIN THROMBOSIS (DVT) OF DISTAL VEIN OF RIGHT LOWER EXTREMITY (HCC): ICD-10-CM

## 2018-06-18 DIAGNOSIS — R04.0 EPISTAXIS: Primary | ICD-10-CM

## 2018-06-18 LAB
INR BLD: 73.6
PT POC: 6.1 SECONDS
VALID INTERNAL CONTROL?: YES

## 2018-06-18 RX ORDER — OXYCODONE AND ACETAMINOPHEN 5; 325 MG/1; MG/1
1 TABLET ORAL
Qty: 30 TAB | Refills: 0 | Status: SHIPPED | OUTPATIENT
Start: 2018-06-18 | End: 2018-10-17 | Stop reason: ALTCHOICE

## 2018-06-18 NOTE — MR AVS SNAPSHOT
[facility] [address] [facility] 
[phone] Patient: Nhung Singh MRN:  [id number] Visit Information Date & Time Provider Department Dept. Phone Encounter #  
 6/18/2018  3:50 PM Monica Gomez MD [facility] at [address] [id number] Follow-up Instructions Return in about 3 days (around 6/21/2018) for Regular Follow up. Your Appointments 6/21/2018  8:30 AM  
ROUTINE CARE with Monica Gomez MD  
[facility] at [facility]) Appt Note: 2w fu  
 [address] [facility] [address] [address]  
[address]  
  
    
 7/31/2018  8:50 AM  
Follow Up with Ana Unger MD  
[place] [facility]) Appt Note: 6 month f/u  
 [address] [phone] [address] [facility] Upcoming Health Maintenance Date Due Pneumococcal 19-64 Highest Risk (2 of 3 - PCV13) 4/11/2017 PAP AKA CERVICAL CYTOLOGY 11/19/2017 Influenza Age 5 to Adult 8/1/2018 DTaP/Tdap/Td series (2 - Td) 5/17/2026 Allergies as of 6/18/2018  Review Complete On: 6/18/2018 By: Jennifer Alfaro LPN Severity Noted Reaction Type Reactions Sulfa (Sulfonamide Antibiotics) High 03/12/2010    Anaphylaxis Codeine Medium 03/17/2010    Itching Current Immunizations  Reviewed on 6/18/2018 Name Date Influenza Vaccine (Quad) PF 9/14/2017, 10/27/2016, 10/30/2015 Influenza Vaccine PF 10/11/2013 Influenza Vaccine Split 12/13/2012, 10/5/2011 Pneumococcal Polysaccharide (PPSV-23) 4/11/2016 11:40 AM  
  
 Reviewed by Jennifer Alfaro LPN on 3/19/1418 at  4:21 PM  
You Were Diagnosed With   
  
 Codes Comments Epistaxis    -  Primary ICD-10-CM: R04.0 ICD-9-CM: 784.7 Supratherapeutic INR     ICD-10-CM: R79.1 ICD-9-CM: 790.92 Chronic deep vein thrombosis (DVT) of distal vein of right lower extremity (HCC)     ICD-10-CM: V09.9M8 ICD-9-CM: 453.52 Monitoring for long-term anticoagulant use     ICD-10-CM: Z51.81, Z79.01 
ICD-9-CM: V58.61 Post-thrombotic syndrome of right lower extremity     ICD-10-CM: I87.001 ICD-9-CM: 459.10 Acute pain of right shoulder     ICD-10-CM: M25.511 ICD-9-CM: 719.41 Vitals BP Pulse Resp Height(growth percentile) Weight(growth percentile) LMP  
 110/72 (BP 1 Location: Left arm, BP Patient Position: Sitting) 69 16 5' 5\" (1.651 m) 221 lb 3.2 oz (100.3 kg) (LMP Unknown) SpO2 BMI OB Status Smoking Status 98% 36.81 kg/m2 Hysterectomy Never Smoker Vitals History BMI and BSA Data Body Mass Index Body Surface Area  
 36.81 kg/m 2 2.14 m 2 Preferred Pharmacy [facility] Phone [facility] [address] [place], [place] [phone] Your Updated Medication List  
  
   
This list is accurate as of 6/18/18  4:59 PM.  Always use your most recent med list.  
  
  
  
  
 albuterol 90 mcg/actuation inhaler Commonly known as:  PROVENTIL HFA, VENTOLIN HFA, PROAIR HFA Take 1 Puff by inhalation every four (4) hours as needed for Wheezing. CIMZIA SC  
by SubCUTAneous route. Injection:  Every 4 weeks  
  
 ciprofloxacin HCl 500 mg tablet Commonly known as:  CIPRO TAKE ONE TABLET BY MOUTH TWICE A DAY  
  
 ergocalciferol 50,000 unit capsule Commonly known as:  ERGOCALCIFEROL  
TAKE 1 CAPSULE BY MOUTH ONCE PER WEEK FOR A TOTAL OF 8 WEEKS. THEN CHANGE TO TWICE PER MONTH.  
  
 folic acid 1 mg tablet Commonly known as:  FOLVITE  
TAKE 1 TABLET BY MOUTH ONCE A DAY  
  
 furosemide 40 mg tablet Commonly known as:  LASIX TAKE 1 TABLET BY MOUTH TWICE DAILY AS NEEDED  
  
 KLOR-CON M20 20 mEq tablet Generic drug:  potassium chloride TAKE 1 TABLET BY MOUTH TWICE A DAY  
  
 methotrexate 2.5 mg tablet Commonly known as:  RHEUMATREX  
TAKE 8 TABLETS BY MOUTH ONCE PER WEEK  
  
 oxyCODONE-acetaminophen 5-325 mg per tablet Commonly known as:  PERCOCET Take 1 Tab by mouth every four (4) hours as needed. Max Daily Amount: 6 Tabs. Indications: Pain  
  
 pantoprazole 40 mg tablet Commonly known as:  PROTONIX Take 1 Tab by mouth daily. phentermine 37.5 mg tablet Commonly known as:  ADIPEX-P Take 1/2 tablet before breakfast and before dinner  
  
 raNITIdine 150 mg tablet Commonly known as:  ZANTAC Take 150 mg by mouth two (2) times a day. topiramate 50 mg tablet Commonly known as:  TOPAMAX Take 1 Tab by mouth two (2) times a day. TYLENOL EXTRA STRENGTH 500 mg tablet Generic drug:  acetaminophen Take 1,000 mg by mouth every six (6) hours as needed for Pain. * warfarin 5 mg tablet Commonly known as:  COUMADIN Take 2 Tabs by mouth daily. Indications: deep venous thrombosis * warfarin 10 mg tablet Commonly known as:  COUMADIN Take 1 Tab by mouth daily. * Notice: This list has 2 medication(s) that are the same as other medications prescribed for you. Read the directions carefully, and ask your doctor or other care provider to review them with you. Prescriptions Printed Refills  
 oxyCODONE-acetaminophen (PERCOCET) 5-325 mg per tablet 0 Sig: Take 1 Tab by mouth every four (4) hours as needed. Max Daily Amount: 6 Tabs. Indications: Pain Class: Print Route: Oral  
  
June 2018 Details Sun Mon Tue Wed Thu Fri Sat  
       1  
  
  
  
   2  
  
  
  
  
  3  
  
  
  
   4  
  
  
  
   5  
  
  
  
   6  
  
  
  
   7  
  
  
  
   8  
  
  
  
   9  
  
  
  
  
  10  
  
  
  
   11  
  
  
  
   12  
  
  
  
   13  
  
  
  
   14  
  
  
  
   15  
  
  
  
   16  
  
  
  
  
  17  
  
  
 18  
  
Hold See details 23 Hold  
  
   20 Hold 21  
  
10 mg  
  
   22  
  
  
  
   23  
  
  
  
  
  24  
  
  
  
   25  
  
  
  
   26  
  
  
  
   27  
  
  
  
   28  
  
  
  
   29  
  
  
  
   30  
  
  
  
  
 Date Details 06/18 This INR check INR: 73.6 Date of next INR:  6/21/2018 How to take your warfarin dose To take:  10 mg Take two of the 5 mg tablets. Hold Do not take your warfarin dose. See the Details table to the right for additional instructions. We Performed the Following AMB POC PT/INR [90596 CPT(R)] Follow-up Instructions Return in about 3 days (around 6/21/2018) for Regular Follow up. Introducing [facility] SERVICES! Dear Abdirahman Rayo: Thank you for requesting a Boxed account. Our records indicate that you already have an active Boxed account. You can access your account anytime at [email or web address] Did you know that you can access your hospital and ER discharge instructions at any time in Boxed? You can also review all of your test results from your hospital stay or ER visit. Additional Information If you have questions, please visit the Frequently Asked Questions section of the Boxed website at [email or web address] Remember, Boxed is NOT to be used for urgent needs. For medical emergencies, dial 911. Now available from your iPhone and Android! Please provide this summary of care documentation to your next provider. Your primary care clinician is listed as Elias Gilbert. If you have any questions after today's visit, please call [phone].

## 2018-06-18 NOTE — PROGRESS NOTES
Subjective:     Chief Complaint   Patient presents with    Epistaxis     Nose bleeds started last night,shoulder  right is also been hurting        She  is a 50 y.o. female who presents for evaluation of:  Epistaxis - noted sig nose bleed last night. She is using Cipro and Flagyl for pouchitis and INR up to 6.1 today. Able to get bleeding to stop with pressure. Still having severe pain in R shoulder. X-rays were normal.  Waking her up at night. Has not had much relief from Hydrocodone recently that she had from a previous rx.     ROS  Gen - no fever/chills  Resp - no dyspnea or cough  CV - no chest pain or CARRASQUILLO  Rest per HPI    Past Medical History:   Diagnosis Date    Asthma 3/12/2010    DDD (degenerative disc disease), lumbar     DJD (degenerative joint disease) 3/12/2010    DJD (degenerative joint disease) of knee     DVT (deep venous thrombosis) ([facility])     Rt leg    GERD (gastroesophageal reflux disease)     History of tuberculosis exposure 2013    pt states she has + PPD and was on Rx for 6 months; last dose either 11/13 or 12/13    Inflammatory polyarthritis ([address]     Psoriatic Arthitis    Morbid obesity ([address]     Post-thrombotic syndrome of right lower extremity 9/14/2017    Pseudogout     PUD (peptic ulcer disease)     Skin abrasion 1/28/14    After loosing 125#, Bilateral Inner thigh friction flareup monthly with skin breakdown    Thromboembolus ([address] 2008    Rt leg,  pt states she fell and had a plane ride home and developed blood clots    UC (ulcerative colitis) ([address] 3/12/2010     Past Surgical History:   Procedure Laterality Date    HX ACL RECONSTRUCTION      Rt    Sofia Elise HX GI      reopening of rectal pouch    HX GYN  06/19/2017    Hysterectomy    HX ORTHOPAEDIC  12/13    Rt foot / toes    HX TONSILLECTOMY       Current Outpatient Prescriptions on File Prior to Visit   Medication Sig Dispense Refill    pantoprazole (PROTONIX) 40 mg tablet Take 1 Tab by mouth daily. 30 Tab 0    ciprofloxacin HCl (CIPRO) 500 mg tablet TAKE ONE TABLET BY MOUTH TWICE A DAY 14 Tab 0    ergocalciferol (ERGOCALCIFEROL) 50,000 unit capsule TAKE 1 CAPSULE BY MOUTH ONCE PER WEEK FOR A TOTAL OF 8 WEEKS. THEN CHANGE TO TWICE PER MONTH. 12 Cap 0    warfarin (COUMADIN) 5 mg tablet Take 2 Tabs by mouth daily. Indications: deep venous thrombosis 60 Tab 1    warfarin (COUMADIN) 10 mg tablet Take 1 Tab by mouth daily. 30 Tab 0    folic acid (FOLVITE) 1 mg tablet TAKE 1 TABLET BY MOUTH ONCE A DAY  6    KLOR-CON M20 20 mEq tablet TAKE 1 TABLET BY MOUTH TWICE A DAY  0    furosemide (LASIX) 40 mg tablet TAKE 1 TABLET BY MOUTH TWICE DAILY AS NEEDED 071 Tab 1    CERTOLIZUMAB PEGOL (CIMZIA SC) by SubCUTAneous route. Injection:  Every 4 weeks      phentermine (ADIPEX-P) 37.5 mg tablet Take 1/2 tablet before breakfast and before dinner 100 Tab 5    topiramate (TOPAMAX) 50 mg tablet Take 1 Tab by mouth two (2) times a day. 60 Tab 5    methotrexate (RHEUMATREX) 2.5 mg tablet TAKE 8 TABLETS BY MOUTH ONCE PER WEEK  2    ranitidine (ZANTAC) 150 mg tablet Take 150 mg by mouth two (2) times a day.  albuterol (PROVENTIL HFA, VENTOLIN HFA, PROAIR HFA) 90 mcg/actuation inhaler Take 1 Puff by inhalation every four (4) hours as needed for Wheezing. 1 Inhaler 5    acetaminophen (TYLENOL EXTRA STRENGTH) 500 mg tablet Take 1,000 mg by mouth every six (6) hours as needed for Pain. No current facility-administered medications on file prior to visit.          Objective:     Vitals:    06/18/18 1618   BP: 110/72   Pulse: 69   Resp: 16   SpO2: 98%   Weight: 221 lb 3.2 oz (100.3 kg)   Height: 5' 5\" (1.651 m)       Physical Examination:  General appearance - alert, well appearing, and in no distress  Eyes -sclera anicteric  Neck - supple, no significant adenopathy, no thyromegaly, no bruits  Chest - clear to auscultation, no wheezes, rales or rhonchi, symmetric air entry  Heart - normal rate, regular rhythm, normal S1, S2, no murmurs, rubs, clicks or gallops  Neurological - alert, oriented, no focal findings or movement disorder noted  Msk - R shoulder with restricted ROM  Extr - trace edema    Assessment/ Plan:   Diagnoses and all orders for this visit:    1. Epistaxis  2. Supratherapeutic INR  3. Chronic deep vein thrombosis (DVT) of distal vein of right lower extremity (HCC)  4. Monitoring for long-term anticoagulant use  - stopping coumadin for 3 days, expected her to be slightly supratherapeutic d/t abx use but concerning given epistaxis. Discussed going to ER if this happens again.  -     AMB POC PT/INR    5. Post-thrombotic syndrome of right lower extremity  6. Acute pain of right shoulder  - Normal x-rays. To sort out further at next appt with further imaging or starting PT. Main concerns as above  -     oxyCODONE-acetaminophen (PERCOCET) 5-325 mg per tablet; Take 1 Tab by mouth every four (4) hours as needed. Max Daily Amount: 6 Tabs. Indications: Pain     I have discussed the diagnosis with the patient and the intended plan as seen in the above orders. The patient has received an after-visit summary and questions were answered concerning future plans. I have discussed medication side effects and warnings with the patient as well. The patient verbalizes understanding and agreement with the plan. Follow-up Disposition:  Return in about 3 days (around 6/21/2018) for Regular Follow up.

## 2018-06-18 NOTE — PROGRESS NOTES
Jyalon Gregg is a 50 y.o. female  Chief Complaint   Patient presents with    Epistaxis     Nose bleeds started last night,shoulder  right is also been hurting     1. Have you been to the ER, urgent care clinic since your last visit? Hospitalized since your last visit?no    2. Have you seen or consulted any other health care providers outside of the [address] [place] [address] since your last visit? Include any pap smears or colon screening.  no  Room   #4

## 2018-06-19 ENCOUNTER — TELEPHONE (OUTPATIENT)
Dept: FAMILY MEDICINE CLINIC | Age: 48
End: 2018-06-19

## 2018-06-19 NOTE — TELEPHONE ENCOUNTER
----- Message from Malcom Hall sent at 6/19/2018  8:24 AM EDT -----  Regarding: Dr. Alberto Encarnacion, mother called on behalf of pt to let Dr. Carolyn Corrales know that the pt had another nose bleed last night. Mrs. Reginaldo Amato can be reached at (p) [phone] or the pt can be contacted at [phone].

## 2018-06-19 NOTE — TELEPHONE ENCOUNTER
Spoke with patient and advised that Dr Jazzy Arce would like her to go to ER if she has another nosebleed. She was advised to keep appointment on Thursday.

## 2018-06-21 ENCOUNTER — OFFICE VISIT (OUTPATIENT)
Dept: FAMILY MEDICINE CLINIC | Age: 48
End: 2018-06-21

## 2018-06-21 VITALS
HEART RATE: 85 BPM | DIASTOLIC BLOOD PRESSURE: 57 MMHG | TEMPERATURE: 97.5 F | OXYGEN SATURATION: 95 % | HEIGHT: 65 IN | WEIGHT: 217.4 LBS | SYSTOLIC BLOOD PRESSURE: 117 MMHG | BODY MASS INDEX: 36.22 KG/M2 | RESPIRATION RATE: 16 BRPM

## 2018-06-21 DIAGNOSIS — M75.01 ADHESIVE CAPSULITIS OF RIGHT SHOULDER: ICD-10-CM

## 2018-06-21 DIAGNOSIS — R79.1 SUPRATHERAPEUTIC INR: Primary | ICD-10-CM

## 2018-06-21 DIAGNOSIS — M06.4 INFLAMMATORY POLYARTHRITIS (HCC): ICD-10-CM

## 2018-06-21 DIAGNOSIS — R04.0 EPISTAXIS: ICD-10-CM

## 2018-06-21 DIAGNOSIS — Z51.81 MONITORING FOR LONG-TERM ANTICOAGULANT USE: ICD-10-CM

## 2018-06-21 DIAGNOSIS — I82.5Z1 CHRONIC DEEP VEIN THROMBOSIS (DVT) OF DISTAL VEIN OF RIGHT LOWER EXTREMITY (HCC): ICD-10-CM

## 2018-06-21 DIAGNOSIS — Z79.01 MONITORING FOR LONG-TERM ANTICOAGULANT USE: ICD-10-CM

## 2018-06-21 LAB
INR BLD: 4.4
PT POC: NORMAL SECONDS
VALID INTERNAL CONTROL?: YES

## 2018-06-21 NOTE — MR AVS SNAPSHOT
Karen Delgado [address] [facility] 
[phone] Patient: Ayan Valdez MRN:  [id number] Visit Information Date & Time Provider Department Dept. Phone Encounter #  
 6/21/2018  8:30 AM Jody Andujar MD [facility] at [address] [id number] Follow-up Instructions Return in about 4 days (around 6/25/2018), or if symptoms worsen or fail to improve, for Regular Follow up. Your Appointments 7/31/2018  8:50 AM  
Follow Up with Diana Abebe MD  
[address] [place] [address] Diabetes and Endocrinology [address] [facility]) Appt Note: 6 month f/u  
 [address] [facility] [address] [id number] [address] [facility] Upcoming Health Maintenance Date Due Pneumococcal 19-64 Highest Risk (2 of 3 - PCV13) 4/11/2017 PAP AKA CERVICAL CYTOLOGY 11/19/2017 Influenza Age 5 to Adult 8/1/2018 DTaP/Tdap/Td series (2 - Td) 5/17/2026 Allergies as of 6/21/2018  Review Complete On: 6/21/2018 By: Maninder Koch LPN Severity Noted Reaction Type Reactions Sulfa (Sulfonamide Antibiotics) High 03/12/2010    Anaphylaxis Codeine Medium 03/17/2010    Itching Current Immunizations  Reviewed on 6/18/2018 Name Date Influenza Vaccine (Quad) PF 9/14/2017, 10/27/2016, 10/30/2015 Influenza Vaccine PF 10/11/2013 Influenza Vaccine Split 12/13/2012, 10/5/2011 Pneumococcal Polysaccharide (PPSV-23) 4/11/2016 11:40 AM  
  
 Not reviewed this visit You Were Diagnosed With   
  
 Codes Comments Supratherapeutic INR    -  Primary ICD-10-CM: R79.1 ICD-9-CM: 790.92 Epistaxis     ICD-10-CM: R04.0 ICD-9-CM: 822. 7 Chronic deep vein thrombosis (DVT) of distal vein of right lower extremity (HCC)     ICD-10-CM: X65.5C2 ICD-9-CM: 453.52   
 Monitoring for long-term anticoagulant use     ICD-10-CM: Z51.81, Z79.01 
ICD-9-CM: V58.61 Inflammatory polyarthritis ([facility] [address]     ICD-10-CM: M06.4 ICD-9-CM: 714.9 Adhesive capsulitis of right shoulder     ICD-10-CM: M75.01 
ICD-9-CM: 726.0 Vitals BP Pulse Temp Resp Height(growth percentile) Weight(growth percentile) 117/57 (BP 1 Location: Left arm, BP Patient Position: Sitting) 85 97.5 °F (36.4 °C) (Oral) 16 5' 5\" (1.651 m) 217 lb 6.4 oz (98.6 kg) LMP SpO2 BMI OB Status Smoking Status (LMP Unknown) 95% 36.18 kg/m2 Hysterectomy Never Smoker Vitals History BMI and BSA Data Body Mass Index Body Surface Area  
 36.18 kg/m 2 2.13 m 2 Preferred Pharmacy Pharmacy Name Phone [facility]/PHARMACY [address] [place] [address] [phone] Your Updated Medication List  
  
   
This list is accurate as of 6/21/18  9:16 AM.  Always use your most recent med list.  
  
  
  
  
 albuterol 90 mcg/actuation inhaler Commonly known as:  PROVENTIL HFA, VENTOLIN HFA, PROAIR HFA Take 1 Puff by inhalation every four (4) hours as needed for Wheezing. CIMZIA SC  
by SubCUTAneous route. Injection:  Every 4 weeks  
  
 ergocalciferol 50,000 unit capsule Commonly known as:  ERGOCALCIFEROL  
TAKE 1 CAPSULE BY MOUTH ONCE PER WEEK FOR A TOTAL OF 8 WEEKS. THEN CHANGE TO TWICE PER MONTH.  
  
 folic acid 1 mg tablet Commonly known as:  FOLVITE  
TAKE 1 TABLET BY MOUTH ONCE A DAY  
  
 furosemide 40 mg tablet Commonly known as:  LASIX TAKE 1 TABLET BY MOUTH TWICE DAILY AS NEEDED  
  
 KLOR-CON M20 20 mEq tablet Generic drug:  potassium chloride TAKE 1 TABLET BY MOUTH TWICE A DAY  
  
 methotrexate 2.5 mg tablet Commonly known as:  RHEUMATREX  
TAKE 8 TABLETS BY MOUTH ONCE PER WEEK  
  
 oxyCODONE-acetaminophen 5-325 mg per tablet Commonly known as:  PERCOCET Take 1 Tab by mouth every four (4) hours as needed.  Max Daily Amount: 6 Tabs. Indications: Pain  
  
 pantoprazole 40 mg tablet Commonly known as:  PROTONIX Take 1 Tab by mouth daily. phentermine 37.5 mg tablet Commonly known as:  ADIPEX-P Take 1/2 tablet before breakfast and before dinner  
  
 raNITIdine 150 mg tablet Commonly known as:  ZANTAC Take 150 mg by mouth two (2) times a day. topiramate 50 mg tablet Commonly known as:  TOPAMAX Take 1 Tab by mouth two (2) times a day. TYLENOL EXTRA STRENGTH 500 mg tablet Generic drug:  acetaminophen Take 1,000 mg by mouth every six (6) hours as needed for Pain. * warfarin 5 mg tablet Commonly known as:  COUMADIN Take 2 Tabs by mouth daily. Indications: deep venous thrombosis * warfarin 10 mg tablet Commonly known as:  COUMADIN Take 1 Tab by mouth daily. * Notice: This list has 2 medication(s) that are the same as other medications prescribed for you. Read the directions carefully, and ask your doctor or other care provider to review them with you. June 2018 Details Sun Mon Tue Wed Thu Fri Sat  
       1  
  
  
  
   2  
  
  
  
  
  3  
  
  
  
   4  
  
  
  
   5  
  
  
  
   6  
  
  
  
   7  
  
  
  
   8  
  
  
  
   9  
  
  
  
  
  10  
  
  
  
   11  
  
  
  
   12  
  
  
  
   13  
  
  
  
   14  
  
  
  
   15  
  
  
  
   16  
  
  
  
  
  17  
  
  
  
   18  
  
  
  
   19  
  
  
  
   20  
  
  
  
   21  
  
Hold See details 25 Hold 21 Hold 24 Hold 25  
  
10 mg  
  
   26  
  
  
  
   27  
  
  
  
   28  
  
  
  
   29  
  
  
  
   30  
  
  
  
  
 Date Details 06/21 This INR check INR: 4.4 Date of next INR:  6/25/2018 How to take your warfarin dose To take:  10 mg Take two of the 5 mg tablets. Hold Do not take your warfarin dose. See the Details table to the right for additional instructions. We Performed the Following AMB POC PT/INR [70063 CPT(R)] Follow-up Instructions Return in about 4 days (around 6/25/2018), or if symptoms worsen or fail to improve, for Regular Follow up. Patient Instructions Frozen Shoulder: Care Instructions Your Care Instructions Frozen shoulder is stiffness, pain, and trouble moving your shoulder. It may happen after an injury or overuse, or from a disease such as diabetes or a stroke. You may have pain that keeps you from using your shoulder. However, you need to move your shoulder. If you do not move it, it will get more stiff and sore. Your doctor may order an X-ray to make sure there is not another cause for your stiff shoulder. You can treat frozen shoulder with heat, stretching, over-the-counter pain medicines, and physical therapy. Your doctor also may inject medicine into your shoulder to reduce pain and swelling. It can take a year or more to get better. Surgery is almost never needed. Follow-up care is a key part of your treatment and safety. Be sure to make and go to all appointments, and call your doctor if you are having problems. It's also a good idea to know your test results and keep a list of the medicines you take. How can you care for yourself at home? · Take pain medicines exactly as directed. ¨ If the doctor gave you a prescription medicine for pain, take it as prescribed. ¨ If you are not taking a prescription pain medicine, ask your doctor if you can take an over-the-counter medicine. · Put a heating pad set on low or a warm, wet towel wrapped in plastic on your shoulder. The heat may make it easier to stretch your shoulder. · Follow your doctor's advice for stretches and exercises. · Go to physical therapy if your doctor suggests it. · Try these stretching exercises to reduce stiffness if your doctor says it is okay. Do the exercises slowly to avoid injury.  Put a warm, wet towel on your shoulder before exercising. Stop any exercise that increases pain. ¨ Pendulum exercise. While leaning forward and holding onto a table or the back of a chair with your good arm, bend at the waist, allowing your affected arm to hang straight down. Swing the affected arm back and forth like a pendulum, then in circles that start small and gradually grow larger as pain allows. Try this for about 2 or 3 minutes, several times a day. Once pain begins to go away, you can do this exercise while holding a 1- or 2-pound weight. ¨ Wall climbing (to the side). Stand with your side to a wall so that your fingers can just touch it. Then turn so your body is turned slightly toward the wall. Walk the fingers of your injured arm up the wall as high as pain permits. Hold that position for a count of 15 to 30 seconds. Walk your fingers down to the starting position. Repeat 2 to 4 times, trying to reach higher each time. ¨ Wall climbing (to the front). Face a wall, standing so your fingers can just touch it. Walk the fingers of your affected arm up the wall as high as pain permits. Hold that position for a count of 15 to 30 seconds. Slowly walk your fingers to the starting position. Repeat 2 to 4 times, trying to reach higher each time. When should you call for help? Call your doctor now or seek immediate medical care if: 
? · You have severe pain. ? · Your arm is cool or pale or changes color. ? · You have tingling or numbness in your arm. ? Watch closely for changes in your health, and be sure to contact your doctor if: 
? · You have increased pain. ? · You have new pain that develops in another area. For example, you have pain in your arm, hand, or elbow. ? · You do not get better as expected. Where can you learn more? Go to http://antonia-los.info/. Enter Z007 in the search box to learn more about \"Frozen Shoulder: Care Instructions. \" Current as of: March 21, 2017 Content Version: 11.4 © 0017-7931 Healthwise, [employer]. Care instructions adapted under license by [employer] (which disclaims liability or warranty for this information). If you have questions about a medical condition or this instruction, always ask your healthcare professional. [address] any warranty or liability for your use of this information. Introducing \[facility]\"" & HEALTH SERVICES! Dear Atif Tijerina: Thank you for requesting a [employer] account. Our records indicate that you already have an active [employer] account. You can access your account anytime at [email or web address] Did you know that you can access your hospital and ER discharge instructions at any time in [employer]? You can also review all of your test results from your hospital stay or ER visit. Additional Information If you have questions, please visit the Frequently Asked Questions section of the [employer] website at [email or web address] Remember, [employer] is NOT to be used for urgent needs. For medical emergencies, dial 911. Now available from your iPhone and Android! Please provide this summary of care documentation to your next provider. Your primary care clinician is listed as Karthik Navarrete. If you have any questions after today's visit, please call [phone].

## 2018-06-21 NOTE — PROGRESS NOTES
Subjective:     Chief Complaint   Patient presents with    Follow-up     PT/INR        She  is a 50 y.o. female who presents for evaluation of:  Epistaxis - noted sig nose bleed 5 nights ago and came in for INR check  She is using Cipro and Flagyl for pouchitis which is largely better and INR up to 6.1 earlier this week and down to 4.4 today. She had another nose bleed yesterday and 1 this am but able to get bleeding to stop with pressure - she did not go to the ER as prev instructed. Shoulder pain- sx x 3 weeks, no injury, worse with lifting and supination and pronation, waking her up at night, x-rays nml. Tried Oxycodone in short term while dealing with more pressing above issues and no improvement with this.     ROS  Gen - no fever/chills  Resp - no dyspnea or cough  CV - no chest pain or CARRASQUILLO  Rest per HPI    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    Pseudogout     PUD (peptic ulcer disease)     Skin abrasion 1/28/14    After loosing 125#, Bilateral Inner thigh friction flareup monthly with skin breakdown    Thromboembolus ([address] 2008    Rt leg,  pt states she fell and had a plane ride home and developed blood clots    UC (ulcerative colitis) ([address] 3/12/2010     Past Surgical History:   Procedure Laterality Date    HX ACL RECONSTRUCTION      Rt    HX COLECTOMY  1992    Dr Pj Sales HX GI      reopening of rectal pouch    HX GYN  06/19/2017    Hysterectomy    HX ORTHOPAEDIC 12/13    Rt foot / toes    HX TONSILLECTOMY       Current Outpatient Prescriptions on File Prior to Visit   Medication Sig Dispense Refill    oxyCODONE-acetaminophen (PERCOCET) 5-325 mg per tablet Take 1 Tab by mouth every four (4) hours as needed. Max Daily Amount: 6 Tabs. Indications: Pain 30 Tab 0    pantoprazole (PROTONIX) 40 mg tablet Take 1 Tab by mouth daily. 30 Tab 0    ergocalciferol (ERGOCALCIFEROL) 50,000 unit capsule TAKE 1 CAPSULE BY MOUTH ONCE PER WEEK FOR A TOTAL OF 8 WEEKS. THEN CHANGE TO TWICE PER MONTH. 12 Cap 0    warfarin (COUMADIN) 10 mg tablet Take 1 Tab by mouth daily. 30 Tab 0    folic acid (FOLVITE) 1 mg tablet TAKE 1 TABLET BY MOUTH ONCE A DAY  6    KLOR-CON M20 20 mEq tablet TAKE 1 TABLET BY MOUTH TWICE A DAY  0    furosemide (LASIX) 40 mg tablet TAKE 1 TABLET BY MOUTH TWICE DAILY AS NEEDED 459 Tab 1    CERTOLIZUMAB PEGOL (CIMZIA SC) by SubCUTAneous route. Injection:  Every 4 weeks      phentermine (ADIPEX-P) 37.5 mg tablet Take 1/2 tablet before breakfast and before dinner 100 Tab 5    topiramate (TOPAMAX) 50 mg tablet Take 1 Tab by mouth two (2) times a day. 60 Tab 5    methotrexate (RHEUMATREX) 2.5 mg tablet TAKE 8 TABLETS BY MOUTH ONCE PER WEEK  2    ranitidine (ZANTAC) 150 mg tablet Take 150 mg by mouth two (2) times a day.  albuterol (PROVENTIL HFA, VENTOLIN HFA, PROAIR HFA) 90 mcg/actuation inhaler Take 1 Puff by inhalation every four (4) hours as needed for Wheezing. 1 Inhaler 5    acetaminophen (TYLENOL EXTRA STRENGTH) 500 mg tablet Take 1,000 mg by mouth every six (6) hours as needed for Pain.  warfarin (COUMADIN) 5 mg tablet Take 2 Tabs by mouth daily. Indications: deep venous thrombosis 60 Tab 1     No current facility-administered medications on file prior to visit.          Objective:     Vitals:    06/21/18 0811   BP: 117/57   Pulse: 85   Resp: 16   Temp: 97.5 °F (36.4 °C)   TempSrc: Oral   SpO2: 95%   Weight: 217 lb 6.4 oz (98.6 kg)   Height: 5' 5\" (1.651 m)     Physical Examination:  General appearance - alert, well appearing, and in no distress  Eyes -sclera anicteric  Nose - left side with no active bleeding but mild inflammation  Neck - supple, no significant adenopathy, no thyromegaly  Chest - clear to auscultation, no wheezes, rales or rhonchi, symmetric air entry  Heart - normal rate, regular rhythm, normal S1, S2, no murmurs, rubs, clicks or gallops  Neurological - alert, oriented, no focal findings or movement disorder noted  Shoulder - restricted passive ROM to 90 degrees flexion, neg apley scratch, neg lift off, + belcher, nml sensation  Extr - trace edema    Assessment/ Plan:   Diagnoses and all orders for this visit:    1. Epistaxis  2. Supratherapeutic INR  3. Chronic deep vein thrombosis (DVT) of distal vein of right lower extremity (HCC)  4. Monitoring for long-term anticoagulant use  - stopping coumadin for 3 days since still supratherapeutic d/t abx use and still having episodic epistaxis. Did not go to ER as rec. Discussed going to ER if this happens again.  -     AMB POC PT/INR    5. Inflammatory polyarthritis [facility]) - May be playing a role in shoulder pain as well    6. Adhesive capsulitis of right shoulder - X rays nml, discussed exercises. Has pain meds though not helping much. Planning for steroid injection if INR comes down as pt wants to avoid po steroids. Discussed going to Ortho and getting MRI but opts to try this first.    I have discussed the diagnosis with the patient and the intended plan as seen in the above orders. The patient has received an after-visit summary and questions were answered concerning future plans. I have discussed medication side effects and warnings with the patient as well. The patient verbalizes understanding and agreement with the plan. Follow-up Disposition:  Return in about 4 days (around 6/25/2018), or if symptoms worsen or fail to improve, for Regular Follow up.

## 2018-06-21 NOTE — PATIENT INSTRUCTIONS
Frozen Shoulder: Care Instructions  Your Care Instructions    Frozen shoulder is stiffness, pain, and trouble moving your shoulder. It may happen after an injury or overuse, or from a disease such as diabetes or a stroke. You may have pain that keeps you from using your shoulder. However, you need to move your shoulder. If you do not move it, it will get more stiff and sore. Your doctor may order an X-ray to make sure there is not another cause for your stiff shoulder. You can treat frozen shoulder with heat, stretching, over-the-counter pain medicines, and physical therapy. Your doctor also may inject medicine into your shoulder to reduce pain and swelling. It can take a year or more to get better. Surgery is almost never needed. Follow-up care is a key part of your treatment and safety. Be sure to make and go to all appointments, and call your doctor if you are having problems. It's also a good idea to know your test results and keep a list of the medicines you take. How can you care for yourself at home? · Take pain medicines exactly as directed. ¨ If the doctor gave you a prescription medicine for pain, take it as prescribed. ¨ If you are not taking a prescription pain medicine, ask your doctor if you can take an over-the-counter medicine. · Put a heating pad set on low or a warm, wet towel wrapped in plastic on your shoulder. The heat may make it easier to stretch your shoulder. · Follow your doctor's advice for stretches and exercises. · Go to physical therapy if your doctor suggests it. · Try these stretching exercises to reduce stiffness if your doctor says it is okay. Do the exercises slowly to avoid injury. Put a warm, wet towel on your shoulder before exercising. Stop any exercise that increases pain. ¨ Pendulum exercise. While leaning forward and holding onto a table or the back of a chair with your good arm, bend at the waist, allowing your affected arm to hang straight down.  Swing the affected arm back and forth like a pendulum, then in circles that start small and gradually grow larger as pain allows. Try this for about 2 or 3 minutes, several times a day. Once pain begins to go away, you can do this exercise while holding a 1- or 2-pound weight. ¨ Wall climbing (to the side). Stand with your side to a wall so that your fingers can just touch it. Then turn so your body is turned slightly toward the wall. Walk the fingers of your injured arm up the wall as high as pain permits. Hold that position for a count of 15 to 30 seconds. Walk your fingers down to the starting position. Repeat 2 to 4 times, trying to reach higher each time. ¨ Wall climbing (to the front). Face a wall, standing so your fingers can just touch it. Walk the fingers of your affected arm up the wall as high as pain permits. Hold that position for a count of 15 to 30 seconds. Slowly walk your fingers to the starting position. Repeat 2 to 4 times, trying to reach higher each time. When should you call for help? Call your doctor now or seek immediate medical care if:  ? · You have severe pain. ? · Your arm is cool or pale or changes color. ? · You have tingling or numbness in your arm. ? Watch closely for changes in your health, and be sure to contact your doctor if:  ? · You have increased pain. ? · You have new pain that develops in another area. For example, you have pain in your arm, hand, or elbow. ? · You do not get better as expected. Where can you learn more? Go to http://antonia-los.info/. Enter A085 in the search box to learn more about \"Frozen Shoulder: Care Instructions. \"  Current as of: March 21, 2017  Content Version: 11.4  © 6045-1436 [employer]. Care instructions adapted under license by [employer] (which disclaims liability or warranty for this information).  If you have questions about a medical condition or this instruction, always ask your healthcare professional. [address] any warranty or liability for your use of this information.

## 2018-06-21 NOTE — PROGRESS NOTES
Chief Complaint   Patient presents with    Follow-up     PT/INR   1. Have you been to the ER, urgent care clinic since your last visit? Hospitalized since your last visit? No    2. Have you seen or consulted any other health care providers outside of the [address] [place] [address] since your last visit? Include any pap smears or colon screening.  No   Nosebleed this morning before appointment  Room 4

## 2018-06-22 ENCOUNTER — DOCUMENTATION ONLY (OUTPATIENT)
Dept: FAMILY MEDICINE CLINIC | Age: 48
End: 2018-06-22

## 2018-06-22 ENCOUNTER — HOSPITAL ENCOUNTER (EMERGENCY)
Age: 48
Discharge: HOME | End: 2018-06-22
Attending: EMERGENCY MEDICINE
Payer: COMMERCIAL

## 2018-06-22 ENCOUNTER — TELEPHONE (OUTPATIENT)
Dept: FAMILY MEDICINE CLINIC | Age: 48
End: 2018-06-22

## 2018-06-22 VITALS
SYSTOLIC BLOOD PRESSURE: 120 MMHG | TEMPERATURE: 98.3 F | OXYGEN SATURATION: 100 % | BODY MASS INDEX: 36.58 KG/M2 | WEIGHT: 219.8 LBS | DIASTOLIC BLOOD PRESSURE: 76 MMHG | RESPIRATION RATE: 18 BRPM | HEART RATE: 74 BPM

## 2018-06-22 DIAGNOSIS — R58 BLEEDING ON COUMADIN: ICD-10-CM

## 2018-06-22 DIAGNOSIS — T45.515A BLEEDING ON COUMADIN: ICD-10-CM

## 2018-06-22 DIAGNOSIS — R04.0 EPISTAXIS: Primary | ICD-10-CM

## 2018-06-22 LAB — INR BLD: 2.5 (ref 0.9–1.2)

## 2018-06-22 PROCEDURE — 85610 PROTHROMBIN TIME: CPT

## 2018-06-22 PROCEDURE — 99284 EMERGENCY DEPT VISIT MOD MDM: CPT

## 2018-06-22 PROCEDURE — 74011000250 HC RX REV CODE- 250: Performed by: EMERGENCY MEDICINE

## 2018-06-22 PROCEDURE — 74011250637 HC RX REV CODE- 250/637: Performed by: PHYSICIAN ASSISTANT

## 2018-06-22 PROCEDURE — 77030011644

## 2018-06-22 PROCEDURE — 75810000284 HC CNTRL NASAL HEMORHRAGE SIMPLE

## 2018-06-22 RX ORDER — OXYMETAZOLINE HCL 0.05 %
2 SPRAY, NON-AEROSOL (ML) NASAL
Status: COMPLETED | OUTPATIENT
Start: 2018-06-22 | End: 2018-06-22

## 2018-06-22 RX ADMIN — COCAINE HYDROCHLORIDE: 40 SOLUTION TOPICAL at 09:42

## 2018-06-22 RX ADMIN — OXYMETAZOLINE HYDROCHLORIDE 2 SPRAY: 5 SPRAY NASAL at 08:39

## 2018-06-22 NOTE — ED PROVIDER NOTES
EMERGENCY DEPARTMENT HISTORY AND PHYSICAL EXAM      Date: 6/22/2018  Patient Name: Rocael Adams    History of Presenting Illness     Chief Complaint   Patient presents with    Epistaxis     non traumatic nosebleeds for five days; patient on coumadin       History Provided By: Patient    HPI: Rocael Adams, 50 y.o. female with PMHx significant for PE, DVT, presents via EMS to the ED with cc of intermittent nosebleed out of b/l nares, greater on the left, x 5 days. Pt denies any recent injury or trauma that could contribute to her sx's. She notes she is currently on Cipro and Flagyl and endorses taking Coumadin daily. Pt states her INR was 6.1 several days ago, 4.1 yesterday, and is 2.5 today while in the ED. She denies taking daily allergy medications or using nasal sprays. Pt notes hx of nosebleeds as a child, for which she had cauterization and packing, but denies any issues as an adult. She specifically denies any nausea and vomiting. There are no other complaints, changes, or physical findings at this time. PCP: Alexandra Borrego MD    Current Outpatient Prescriptions   Medication Sig Dispense Refill    oxyCODONE-acetaminophen (PERCOCET) 5-325 mg per tablet Take 1 Tab by mouth every four (4) hours as needed. Max Daily Amount: 6 Tabs. Indications: Pain 30 Tab 0    pantoprazole (PROTONIX) 40 mg tablet Take 1 Tab by mouth daily. 30 Tab 0    ergocalciferol (ERGOCALCIFEROL) 50,000 unit capsule TAKE 1 CAPSULE BY MOUTH ONCE PER WEEK FOR A TOTAL OF 8 WEEKS. THEN CHANGE TO TWICE PER MONTH. 12 Cap 0    warfarin (COUMADIN) 5 mg tablet Take 2 Tabs by mouth daily. Indications: deep venous thrombosis 60 Tab 1    warfarin (COUMADIN) 10 mg tablet Take 1 Tab by mouth daily.  30 Tab 0    folic acid (FOLVITE) 1 mg tablet TAKE 1 TABLET BY MOUTH ONCE A DAY  6    KLOR-CON M20 20 mEq tablet TAKE 1 TABLET BY MOUTH TWICE A DAY  0    furosemide (LASIX) 40 mg tablet TAKE 1 TABLET BY MOUTH TWICE DAILY AS NEEDED 180 Tab 1  CERTOLIZUMAB PEGOL (CIMZIA SC) by SubCUTAneous route. Injection:  Every 4 weeks      phentermine (ADIPEX-P) 37.5 mg tablet Take 1/2 tablet before breakfast and before dinner 100 Tab 5    topiramate (TOPAMAX) 50 mg tablet Take 1 Tab by mouth two (2) times a day. 60 Tab 5    methotrexate (RHEUMATREX) 2.5 mg tablet TAKE 8 TABLETS BY MOUTH ONCE PER WEEK  2    ranitidine (ZANTAC) 150 mg tablet Take 150 mg by mouth two (2) times a day.  albuterol (PROVENTIL HFA, VENTOLIN HFA, PROAIR HFA) 90 mcg/actuation inhaler Take 1 Puff by inhalation every four (4) hours as needed for Wheezing. 1 Inhaler 5    acetaminophen (TYLENOL EXTRA STRENGTH) 500 mg tablet Take 1,000 mg by mouth every six (6) hours as needed for Pain.          Past History     Past Medical History:  Past Medical History:   Diagnosis Date    Asthma 3/12/2010    DDD (degenerative disc disease), lumbar     DJD (degenerative joint disease) 3/12/2010    DJD (degenerative joint disease) of knee     DVT [facility]deep venous thrombosis) ([facility]     Rt leg    GERD (gastroesophageal reflux disease)     History of tuberculosis exposure 2013    pt states she has + PPD and was on Rx for 6 months; last dose either 11/13 or 12/13    Inflammatory polyarthritis ([address]     Psoriatic Arthitis    Morbid obesity ([address]     Post-thrombotic syndrome of right lower extremity 9/14/2017    Pseudogout     PUD (peptic ulcer disease)     Skin abrasion 1/28/14    After loosing 125#, Bilateral Inner thigh friction flareup monthly with skin breakdown    Thromboembolus ([address] 2008    Rt leg,  pt states she fell and had a plane ride home and developed blood clots    UC (ulcerative colitis) ([address] 3/12/2010       Past Surgical History:  Past Surgical History:   Procedure Laterality Date    HX ACL RECONSTRUCTION      Rt    Sharma Luis Casarez HX GI      reopening of rectal pouch    HX GYN  06/19/2017    Hysterectomy    HX ORTHOPAEDIC  12/13    Rt foot / toes    HX TONSILLECTOMY         Family History:  Family History   Problem Relation Age of Onset    Hypertension Mother     Thyroid Disease Mother      Hypothyroid    Diabetes Mother     Elevated Lipids Mother     Cancer Father      brain       Social History:  Social History   Substance Use Topics    Smoking status: Never Smoker    Smokeless tobacco: Never Used    Alcohol use Yes      Comment: occasionally- very rare       Allergies: Allergies   Allergen Reactions    Sulfa (Sulfonamide Antibiotics) Anaphylaxis    Codeine Itching         Review of Systems   Review of Systems   Constitutional: Negative. Negative for appetite change, chills, fatigue and fever. HENT: Positive for nosebleeds. Negative for congestion, rhinorrhea, sinus pressure and sore throat. Eyes: Negative. Respiratory: Negative. Negative for cough, choking, chest tightness, shortness of breath and wheezing. Cardiovascular: Negative. Negative for chest pain, palpitations and leg swelling. Gastrointestinal: Negative for abdominal pain, constipation, diarrhea, nausea and vomiting. Endocrine: Negative. Genitourinary: Negative. Negative for difficulty urinating, dysuria, flank pain and urgency. Musculoskeletal: Negative. Skin: Negative. Neurological: Negative. Negative for dizziness, speech difficulty, weakness, light-headedness, numbness and headaches. Psychiatric/Behavioral: Negative. All other systems reviewed and are negative. Physical Exam   Physical Exam   Constitutional: She is oriented to person, place, and time. She appears well-developed and well-nourished. No distress. HENT:   Head: Normocephalic and atraumatic. Mouth/Throat: Oropharynx is clear and moist. No oropharyngeal exudate. Right nare no bleeding, anterior chamber of left nare erythematous with minimal trickle    Eyes: Conjunctivae and EOM are normal. Pupils are equal, round, and reactive to light. Neck: Normal range of motion.  Neck supple. No JVD present. No tracheal deviation present. Cardiovascular: Normal rate, regular rhythm, normal heart sounds and intact distal pulses. No murmur heard. Pulmonary/Chest: Effort normal and breath sounds normal. No stridor. No respiratory distress. She has no wheezes. She has no rales. She exhibits no tenderness. Abdominal: Soft. She exhibits no distension. Obese     Musculoskeletal: Normal range of motion. She exhibits no edema or tenderness. Neurological: She is alert and oriented to person, place, and time. No cranial nerve deficit. No gross motor or sensory deficits    Skin: Skin is warm and dry. She is not diaphoretic. Psychiatric: She has a normal mood and affect. Her behavior is normal.   Nursing note and vitals reviewed. Diagnostic Study Results     Labs -     Recent Results (from the past 12 hour(s))   POC INR    Collection Time: 06/22/18  9:11 AM   Result Value Ref Range    INR (POC) 2.5 (H) <1.2         Medical Decision Making   I am the first provider for this patient. I reviewed the vital signs, available nursing notes, past medical history, past surgical history, family history and social history. Vital Signs-Reviewed the patient's vital signs. Patient Vitals for the past 12 hrs:   Temp Pulse Resp BP SpO2   06/22/18 0911 - - - 120/76 -   06/22/18 0822 98.3 °F (36.8 °C) 74 18 (!) 148/94 100 %       Pulse Oximetry Analysis - 100% on RA    Cardiac Monitor:   Rate: 74 bpm  Rhythm: Normal Sinus Rhythm      Records Reviewed: Nursing Notes, Old Medical Records, Previous electrocardiograms, Ambulance Run Sheet, Previous Radiology Studies and Previous Laboratory Studies    Provider Notes (Medical Decision Making):   DDx: epistaxis, coagulopathy    ED Course:   Initial assessment performed. The patients presenting problems have been discussed, and they are in agreement with the care plan formulated and outlined with them.   I have encouraged them to ask questions as they arise throughout their visit. Procedure Note - Epistaxis Management:   9:35  AM  Performed by: Shanell Marquez DO  Complexity: simple  After administration of 4% cocaine, the patient underwent nasal pack placement with RhinoRocket in the left nare(s). Unable to place rhino-rocket in entirety. Rhino-rocket was pulled out. Procedure Note - Epistaxis Management:   9:40 AM  Performed by: Shanell Marquez DO. Complexity: simple  The patient underwent nasal pack placement with Merocel nasal sponge (4.5cm cut down to 3.5cm) in the left nare(s). .  Hemostasis was achieved after placement. Estimated blood loss: none  The procedure took 1-15 minutes, and pt tolerated well. Critical Care Time: 0    Disposition:    DISCHARGE NOTE:  10:33 AM  The patient is ready for discharge. The patients signs, symptoms, diagnosis, and instructions for discharge have been discussed and the pt has conveyed their understanding. The patient is to follow up as recommended with PCP/ENT or return to the ER should their symptoms worsen. Plan has been discussed and patient has conveyed their agreement. PLAN:  1. Discharge home  2. Follow-up Information     Follow up With Details [id number] Ebenezer Serna MD   [address] [facility] [address]  Minnie Patterson MD   [address]   [facility]  [address]  [address] [address] [phone]          Return to ED if worse     Diagnosis     Clinical Impression:   1. Epistaxis    2. Bleeding on Coumadin        Attestations: This note is prepared by Annel Graham, acting as Scribe for Shanell Marquez, [address] [place] [address]: The scribe's documentation has been prepared under my direction and personally reviewed by me in its entirety. I confirm that the note above accurately reflects all work, treatment, procedures, and medical decision making performed by me. This note will not be viewable in [address]

## 2018-06-22 NOTE — ED NOTES
Unsuccessful blood draw attempt, will change INR to POC, pt reports having her INR checked yesterday and it was 4.0 and then a few days before it was 6.0, pt has not been taking her coumadin since last week

## 2018-06-22 NOTE — DISCHARGE INSTRUCTIONS
Nosebleeds: Care Instructions  Your Care Instructions    Nosebleeds are common, especially if you have colds or allergies. Many things can cause a nosebleed. Some nosebleeds stop on their own with pressure. Others need packing. Some get cauterized (sealed). If you have gauze or other packing materials in your nose, you will need to follow up with your doctor to have the packing removed. You may need more treatment if you get nosebleeds a lot. The doctor has checked you carefully, but problems can develop later. If you notice any problems or new symptoms, get medical treatment right away. Follow-up care is a key part of your treatment and safety. Be sure to make and go to all appointments, and call your doctor if you are having problems. It's also a good idea to know your test results and keep a list of the medicines you take. How can you care for yourself at home? · If you get another nosebleed:  ¨ Sit up and tilt your head slightly forward. This keeps blood from going down your throat. ¨ Use your thumb and index finger to pinch your nose shut for 10 minutes. Use a clock. Do not check to see if the bleeding has stopped before the 10 minutes are up. If the bleeding has not stopped, pinch your nose shut for another 10 minutes. ¨ When the bleeding has stopped, try not to pick, rub, or blow your nose for 12 hours. Avoiding these things helps keep your nose from bleeding again. · If your doctor prescribed antibiotics, take them as directed. Do not stop taking them just because you feel better. You need to take the full course of antibiotics. To prevent nosebleeds  · Do not blow your nose too hard. · Try not to lift or strain after a nosebleed. · Raise your head on a pillow while you sleep. · Put a thin layer of a saline- or water-based nasal gel, such as NasoGel, inside your nose. Put it on the septum, which divides your nostrils. This will prevent dryness that can cause nosebleeds.   · Use a vaporizer or humidifier to add moisture to your bedroom. Follow the directions for cleaning the machine. · Do not use aspirin, ibuprofen (Advil, Motrin), or naproxen (Aleve) for 36 to 48 hours after a nosebleed unless your doctor tells you to. You can use acetaminophen (Tylenol) for pain relief. · Talk to your doctor about stopping any other medicines you are taking. Some medicines may make you more likely to get a nosebleed. · Do not use cold medicines or nasal sprays without first talking to your doctor. They can make your nose dry. When should you call for help? Call 911 anytime you think you may need emergency care. For example, call if:  ? · You passed out (lost consciousness). ?Call your doctor now or seek immediate medical care if:  ? · You get another nosebleed and your nose is still bleeding after you have applied pressure 3 times for 10 minutes each time (30 minutes total). ? · There is a lot of blood running down the back of your throat even after you pinch your nose and tilt your head forward. ? · You have a fever. ? · You have sinus pain. ? Watch closely for changes in your health, and be sure to contact your doctor if:  ? · You get nosebleeds often, even if they stop. ? · You do not get better as expected. Where can you learn more? Go to http://antonia-los.info/. Enter S156 in the search box to learn more about \"Nosebleeds: Care Instructions. \"  Current as of: March 20, 2017  Content Version: 11.4  © 0321-0577 [employer]. Care instructions adapted under license by [employer] (which disclaims liability or warranty for this information). If you have questions about a medical condition or this instruction, always ask your healthcare professional. [place] [address] any warranty or liability for your use of this information.     REMOVE THE PACKING IN 48 HOURS, AFTER PACKING REMOVED, AFRIN 1 SPRAY EACH NOSTRIL TWICE A DAY FOR 2 DAYS AFTER THE PACKING IS REMOVED     IF YOU BLEED FOLLOWING REMOVAL OF PACKING YOU SHOULD FOLLOW UP WITH ENT, IF YOU BLEED SPRAY AFRIN HOLD PRESSURE 10 MINUTES IF BLEEDING CONTINUES YOU SHOULD BE SEEN IN THE ED

## 2018-06-25 ENCOUNTER — OFFICE VISIT (OUTPATIENT)
Dept: FAMILY MEDICINE CLINIC | Age: 48
End: 2018-06-25

## 2018-06-25 VITALS
WEIGHT: 211.2 LBS | HEIGHT: 65 IN | SYSTOLIC BLOOD PRESSURE: 110 MMHG | TEMPERATURE: 97.5 F | HEART RATE: 89 BPM | OXYGEN SATURATION: 96 % | BODY MASS INDEX: 35.19 KG/M2 | RESPIRATION RATE: 18 BRPM | DIASTOLIC BLOOD PRESSURE: 77 MMHG

## 2018-06-25 DIAGNOSIS — I82.5Z1 CHRONIC DEEP VEIN THROMBOSIS (DVT) OF DISTAL VEIN OF RIGHT LOWER EXTREMITY (HCC): ICD-10-CM

## 2018-06-25 DIAGNOSIS — Z51.81 ENCOUNTER FOR MONITORING COUMADIN THERAPY: ICD-10-CM

## 2018-06-25 DIAGNOSIS — Z79.01 ENCOUNTER FOR MONITORING COUMADIN THERAPY: ICD-10-CM

## 2018-06-25 DIAGNOSIS — R04.0 EPISTAXIS: Primary | ICD-10-CM

## 2018-06-25 LAB
INR BLD: 1.9
PT POC: NORMAL SECONDS
VALID INTERNAL CONTROL?: YES

## 2018-06-25 NOTE — MR AVS SNAPSHOT
[address] [facility] 
[phone] Patient: Mimi Bernardo MRN:  [id number] Visit Information Date & Time Provider Department Dept. Phone Encounter #  
 6/25/2018  8:50 AM Scarlett Raymond MD [facility] at [address] [id number] Your Appointments 7/31/2018  8:50 AM  
Follow Up with Ree Waggoner MD  
[facility]) Appt Note: 6 month f/u  
 [address] [facility] [address] [id number] [address] Upcoming Health Maintenance Date Due Pneumococcal 19-64 Highest Risk (2 of 3 - PCV13) 4/11/2017 PAP AKA CERVICAL CYTOLOGY 11/19/2017 Influenza Age 5 to Adult 8/1/2018 DTaP/Tdap/Td series (2 - Td) 5/17/2026 Allergies as of 6/25/2018  Review Complete On: 6/25/2018 By: Nadine Padilla LPN Severity Noted Reaction Type Reactions Sulfa (Sulfonamide Antibiotics) High 03/12/2010    Anaphylaxis Codeine Medium 03/17/2010    Itching Current Immunizations  Reviewed on 6/18/2018 Name Date Influenza Vaccine (Quad) PF 9/14/2017, 10/27/2016, 10/30/2015 Influenza Vaccine PF 10/11/2013 Influenza Vaccine Split 12/13/2012, 10/5/2011 Pneumococcal Polysaccharide (PPSV-23) 4/11/2016 11:40 AM  
  
 Not reviewed this visit You Were Diagnosed With   
  
 Codes Comments Epistaxis    -  Primary ICD-10-CM: R04.0 ICD-9-CM: 880. 7 Chronic deep vein thrombosis (DVT) of distal vein of right lower extremity (HCC)     ICD-10-CM: J90.5M1 ICD-9-CM: 453.52 Encounter for monitoring coumadin therapy     ICD-10-CM: Z51.81, Z79.01 
ICD-9-CM: V58.83, V58.61 Vitals BP Pulse Temp Resp Height(growth percentile) Weight(growth percentile) 110/77 (BP 1 Location: Right arm, BP Patient Position: Sitting) 89 97.5 °F (36.4 °C) (Oral) 18 5' 5\" (1.651 m) 211 lb 3.2 oz (95.8 kg) LMP SpO2 BMI OB Status Smoking Status (LMP Unknown) 96% 35.15 kg/m2 Hysterectomy Never Smoker Vitals History BMI and BSA Data Body Mass Index Body Surface Area  
 35.15 kg/m 2 2.1 m 2 Preferred Pharmacy Pharmacy Name Phone [facility], [address] [place] [address] [phone] Your Updated Medication List  
  
   
This list is accurate as of 6/25/18  9:54 AM.  Always use your most recent med list.  
  
  
  
  
 albuterol 90 mcg/actuation inhaler Commonly known as:  PROVENTIL HFA, VENTOLIN HFA, PROAIR HFA Take 1 Puff by inhalation every four (4) hours as needed for Wheezing. CIMZIA SC  
by SubCUTAneous route. Injection:  Every 4 weeks  
  
 ergocalciferol 50,000 unit capsule Commonly known as:  ERGOCALCIFEROL  
TAKE 1 CAPSULE BY MOUTH ONCE PER WEEK FOR A TOTAL OF 8 WEEKS. THEN CHANGE TO TWICE PER MONTH.  
  
 folic acid 1 mg tablet Commonly known as:  FOLVITE  
TAKE 1 TABLET BY MOUTH ONCE A DAY  
  
 furosemide 40 mg tablet Commonly known as:  LASIX TAKE 1 TABLET BY MOUTH TWICE DAILY AS NEEDED  
  
 KLOR-CON M20 20 mEq tablet Generic drug:  potassium chloride TAKE 1 TABLET BY MOUTH TWICE A DAY  
  
 methotrexate 2.5 mg tablet Commonly known as:  RHEUMATREX  
TAKE 8 TABLETS BY MOUTH ONCE PER WEEK  
  
 oxyCODONE-acetaminophen 5-325 mg per tablet Commonly known as:  PERCOCET Take 1 Tab by mouth every four (4) hours as needed. Max Daily Amount: 6 Tabs. Indications: Pain  
  
 pantoprazole 40 mg tablet Commonly known as:  PROTONIX Take 1 Tab by mouth daily. phentermine 37.5 mg tablet Commonly known as:  ADIPEX-P Take 1/2 tablet before breakfast and before dinner  
  
 raNITIdine 150 mg tablet Commonly known as:  ZANTAC Take 150 mg by mouth two (2) times a day. topiramate 50 mg tablet Commonly known as:  TOPAMAX Take 1 Tab by mouth two (2) times a day. TYLENOL EXTRA STRENGTH 500 mg tablet Generic drug:  acetaminophen Take 1,000 mg by mouth every six (6) hours as needed for Pain. * warfarin 5 mg tablet Commonly known as:  COUMADIN Take 2 Tabs by mouth daily. Indications: deep venous thrombosis * warfarin 10 mg tablet Commonly known as:  COUMADIN Take 1 Tab by mouth daily. * Notice: This list has 2 medication(s) that are the same as other medications prescribed for you. Read the directions carefully, and ask your doctor or other care provider to review them with you. We Performed the Following AMB POC PT/INR [04936 CPT(R)] REFERRAL TO ENT-OTOLARYNGOLOGY [IPI67 Custom] Referral Information Referral ID Referred By Referred To  
  
 [id number] Lanre Gomes MD   
   [address] Phone: [phone] Fax: [phone] Visits Status Start Date End Date 1 New Request 6/25/18 6/25/19 If your referral has a status of pending review or denied, additional information will be sent to support the outcome of this decision. Introducing [facility]! Dear Imtiaz Roy: Thank you for requesting a [employer] account. Our records indicate that you already have an active [employer] account. You can access your account anytime at [email or web address] Did you know that you can access your hospital and ER discharge instructions at any time in [employer]? You can also review all of your test results from your hospital stay or ER visit. Additional Information If you have questions, please visit the Frequently Asked Questions section of the [employer] website at [email or web address] Remember, [employer] is NOT to be used for urgent needs. For medical emergencies, dial 911. Now available from your iPhone and Android! Please provide this summary of care documentation to your next provider. Your primary care clinician is listed as Alexandra Borrego. If you have any questions after today's visit, please call [phone].

## 2018-06-25 NOTE — PROGRESS NOTES
Chief Complaint   Patient presents with    Follow-up     PT/INR/ER Follow-up Nose bleed   1. Have you been to the ER, urgent care clinic since your last visit? Hospitalized since your last visit? Yes Where: [facility] Nose bleed    2. Have you seen or consulted any other health care providers outside of the [address] [place] [address] since your last visit? Include any pap smears or colon screening.  No   Discuss headache  Room 7

## 2018-06-25 NOTE — PROGRESS NOTES
Subjective:     Chief Complaint   Patient presents with    Follow-up     PT/INR/ER Follow-up Nose bleed        She  is a 50 y.o. female who presents for evaluation of:  Epistaxis - repetitive nose bleeds in left side. Tx pouchitis with Cipro and Flagyl and INR went up to 6.1 and then down to 4.4 but had another nose bleed so went fire station and ended up in ER with ct nose bleeding. In ER INR was 2.5 and had packing placed. Advised to f/u here to remove packing but she has ct to have some minimal spotting beyond the packing even today.     ROS  Gen - no fever/chills  Resp - no dyspnea or cough  CV - no chest pain or CARRASQUILLO  Rest per HPI    Past Medical History:   Diagnosis Date    Asthma 3/12/2010    DDD (degenerative disc disease), lumbar     DJD (degenerative joint disease) 3/12/2010    DJD (degenerative joint disease) of knee     [facility])     Rt leg    GERD (gastroesophageal reflux disease)     History of tuberculosis exposure 2013    pt states she has + PPD and was on Rx for 6 months; last dose either 11/13 or 12/13    Inflammatory polyarthritis ([address]     Psoriatic Arthitis    Morbid obesity ([address]     Post-thrombotic syndrome of right lower extremity 9/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    Pseudogout     PUD (peptic ulcer disease)     Skin abrasion 1/28/14    After loosing 125#, Bilateral Inner thigh friction flareup monthly with skin breakdown    Thromboembolus ([address] 2008    Rt leg,  pt states she fell and had a plane ride home and developed blood clots    UC (ulcerative colitis) ([address] 3/12/2010     Past Surgical History:   Procedure Laterality Date    HX ACL RECONSTRUCTION      Rt    Sandra Chaudhari HX GI      reopening of rectal pouch    HX GYN  06/19/2017    Hysterectomy    HX ORTHOPAEDIC  12/13    Rt foot / toes    HX TONSILLECTOMY       Current Outpatient Prescriptions on File Prior to Visit   Medication Sig Dispense Refill    oxyCODONE-acetaminophen (PERCOCET) 5-325 mg per tablet Take 1 Tab by mouth every four (4) hours as needed. Max Daily Amount: 6 Tabs. Indications: Pain 30 Tab 0    pantoprazole (PROTONIX) 40 mg tablet Take 1 Tab by mouth daily. 30 Tab 0    ergocalciferol (ERGOCALCIFEROL) 50,000 unit capsule TAKE 1 CAPSULE BY MOUTH ONCE PER WEEK FOR A TOTAL OF 8 WEEKS. THEN CHANGE TO TWICE PER MONTH. 12 Cap 0    warfarin (COUMADIN) 10 mg tablet Take 1 Tab by mouth daily. 30 Tab 0    folic acid (FOLVITE) 1 mg tablet TAKE 1 TABLET BY MOUTH ONCE A DAY  6    KLOR-CON M20 20 mEq tablet TAKE 1 TABLET BY MOUTH TWICE A DAY  0    furosemide (LASIX) 40 mg tablet TAKE 1 TABLET BY MOUTH TWICE DAILY AS NEEDED 349 Tab 1    CERTOLIZUMAB PEGOL (CIMZIA SC) by SubCUTAneous route. Injection:  Every 4 weeks      phentermine (ADIPEX-P) 37.5 mg tablet Take 1/2 tablet before breakfast and before dinner 100 Tab 5    topiramate (TOPAMAX) 50 mg tablet Take 1 Tab by mouth two (2) times a day. 60 Tab 5    methotrexate (RHEUMATREX) 2.5 mg tablet TAKE 8 TABLETS BY MOUTH ONCE PER WEEK  2    ranitidine (ZANTAC) 150 mg tablet Take 150 mg by mouth two (2) times a day.  albuterol (PROVENTIL HFA, VENTOLIN HFA, PROAIR HFA) 90 mcg/actuation inhaler Take 1 Puff by inhalation every four (4) hours as needed for Wheezing. 1 Inhaler 5    acetaminophen (TYLENOL EXTRA STRENGTH) 500 mg tablet Take 1,000 mg by mouth every six (6) hours as needed for Pain.  warfarin (COUMADIN) 5 mg tablet Take 2 Tabs by mouth daily. Indications: deep venous thrombosis 60 Tab 1     No current facility-administered medications on file prior to visit.          Objective:     Vitals:    06/25/18 0858   BP: 110/77   Pulse: 89   Resp: 18   Temp: 97.5 °F (36.4 °C)   TempSrc: Oral   SpO2: 96%   Weight: 211 lb 3.2 oz (95.8 kg)   Height: 5' 5\" (1.651 m)     Physical Examination:  General appearance - alert, well appearing, and in no distress  Eyes -sclera anicteric  Nose - left side packing in place and minimal spotting  Chest - clear to auscultation, no wheezes, rales or rhonchi, symmetric air entry  Heart - normal rate, regular rhythm, normal S1, S2, no murmurs, rubs, clicks or gallops  Neurological - alert, oriented, no focal findings or movement disorder noted  Extr - trace edema    Assessment/ Plan:   Diagnoses and all orders for this visit:    1. Epistaxis - sending to Dr. Deandra Abebe for further management  -     REFERRAL TO ENT-OTOLARYNGOLOGY    2. Chronic deep vein thrombosis (DVT) of distal vein of right lower extremity (HCC)  3. Encounter for monitoring coumadin therapy  - INR has come down significantly and is finally subtherapeutic. Ct holding coumadin given epistaxis with packing present and ct slow bleeding  -     AMB POC PT/INR    I have discussed the diagnosis with the patient and the intended plan as seen in the above orders. The patient has received an after-visit summary and questions were answered concerning future plans. I have discussed medication side effects and warnings with the patient as well. The patient verbalizes understanding and agreement with the plan. Follow-up Disposition:  Return in about 2 weeks (around 7/9/2018).

## 2018-07-02 ENCOUNTER — OFFICE VISIT (OUTPATIENT)
Dept: FAMILY MEDICINE CLINIC | Age: 48
End: 2018-07-02

## 2018-07-02 VITALS
DIASTOLIC BLOOD PRESSURE: 56 MMHG | RESPIRATION RATE: 16 BRPM | HEIGHT: 65 IN | TEMPERATURE: 97.7 F | SYSTOLIC BLOOD PRESSURE: 107 MMHG | HEART RATE: 77 BPM | WEIGHT: 217 LBS | BODY MASS INDEX: 36.15 KG/M2

## 2018-07-02 DIAGNOSIS — Z79.01 MONITORING FOR LONG-TERM ANTICOAGULANT USE: ICD-10-CM

## 2018-07-02 DIAGNOSIS — I82.5Z1 CHRONIC DEEP VEIN THROMBOSIS (DVT) OF DISTAL VEIN OF RIGHT LOWER EXTREMITY (HCC): ICD-10-CM

## 2018-07-02 DIAGNOSIS — M75.01 ADHESIVE CAPSULITIS OF RIGHT SHOULDER: ICD-10-CM

## 2018-07-02 DIAGNOSIS — Z51.81 MONITORING FOR LONG-TERM ANTICOAGULANT USE: ICD-10-CM

## 2018-07-02 DIAGNOSIS — R04.0 EPISTAXIS: Primary | ICD-10-CM

## 2018-07-02 DIAGNOSIS — Z51.81 ENCOUNTER FOR MONITORING COUMADIN THERAPY: ICD-10-CM

## 2018-07-02 DIAGNOSIS — Z79.01 ENCOUNTER FOR MONITORING COUMADIN THERAPY: ICD-10-CM

## 2018-07-02 LAB
INR BLD: 1
PT POC: NORMAL SECONDS
VALID INTERNAL CONTROL?: YES

## 2018-07-02 RX ORDER — DICLOFENAC SODIUM 75 MG/1
75 TABLET, DELAYED RELEASE ORAL 2 TIMES DAILY
Qty: 30 TAB | Refills: 0 | Status: SHIPPED | OUTPATIENT
Start: 2018-07-02 | End: 2018-07-09 | Stop reason: ALTCHOICE

## 2018-07-02 NOTE — PROGRESS NOTES
Chief Complaint   Patient presents with    Follow-up     PT/INR   1. Have you been to the ER, urgent care clinic since your last visit? Hospitalized since your last visit? No    2. Have you seen or consulted any other health care providers outside of the [address] [place] [address] since your last visit? Include any pap smears or colon screening.  Yes Where: ENT Dr Galvan [address]

## 2018-07-02 NOTE — PROGRESS NOTES
Subjective:     Chief Complaint   Patient presents with    Follow-up     PT/INR      She  is a 50 y.o. female who presents for evaluation of:  Epistaxis - Resolved. Packing removed by Dr. Farrell Goldmann and then required some cauterization. Nose bleeds in left side. Tx pouchitis with Cipro and Flagyl and INR went up to 6.1 and then down to 4.4 but had another nose bleed so went fire station and ended up in ER with ct nose bleeding. In ER INR was 2.5 and had packing placed. Advised to f/u here to remove packing but she has ct to have some minimal spotting beyond the packing even today.     See anticoag tab    ROS  Gen - no fever/chills  Resp - no dyspnea or cough  CV - no chest pain or CARRASQUILLO  Rest per HPI    Past Medical History:   Diagnosis Date    Asthma 3/12/2010    DDD (degenerative disc disease), lumbar     DJD (degenerative joint disease) 3/12/2010    DJD (degenerative joint disease) of knee     [facility])     Rt leg    GERD (gastroesophageal reflux disease)     History of tuberculosis exposure 2013    pt states she has + PPD and was on Rx for 6 months; last dose either 11/13 or 12/13    Inflammatory polyarthritis ([address]     Psoriatic Arthitis    Morbid obesity ([address]     Post-thrombotic syndrome of right lower extremity 9/14/2017    Pseudogout     PUD (peptic ulcer disease)     Skin abrasion 1/28/14    After loosing 125#, Bilateral Inner thigh friction flareup monthly with skin breakdown    Thromboembolus ([address] 2008    Rt leg,  pt states she fell and had a plane ride home and developed blood clots    UC (ulcerative colitis) ([address] 3/12/2010     Past Surgical History:   Procedure Laterality Date    HX ACL RECONSTRUCTION      Rt    Laurita Sanchez HX GI      reopening of rectal pouch    HX GYN  06/19/2017    Hysterectomy    HX ORTHOPAEDIC  12/13    Rt foot / toes    HX TONSILLECTOMY       Current Outpatient Prescriptions on File Prior to Visit Medication Sig Dispense Refill    pantoprazole (PROTONIX) 40 mg tablet Take 1 Tab by mouth daily. 30 Tab 0    ergocalciferol (ERGOCALCIFEROL) 50,000 unit capsule TAKE 1 CAPSULE BY MOUTH ONCE PER WEEK FOR A TOTAL OF 8 WEEKS. THEN CHANGE TO TWICE PER MONTH. 12 Cap 0    folic acid (FOLVITE) 1 mg tablet TAKE 1 TABLET BY MOUTH ONCE A DAY  6    KLOR-CON M20 20 mEq tablet TAKE 1 TABLET BY MOUTH TWICE A DAY  0    furosemide (LASIX) 40 mg tablet TAKE 1 TABLET BY MOUTH TWICE DAILY AS NEEDED 565 Tab 1    CERTOLIZUMAB PEGOL (CIMZIA SC) by SubCUTAneous route. Injection:  Every 4 weeks      phentermine (ADIPEX-P) 37.5 mg tablet Take 1/2 tablet before breakfast and before dinner 100 Tab 5    topiramate (TOPAMAX) 50 mg tablet Take 1 Tab by mouth two (2) times a day. 60 Tab 5    methotrexate (RHEUMATREX) 2.5 mg tablet TAKE 8 TABLETS BY MOUTH ONCE PER WEEK  2    ranitidine (ZANTAC) 150 mg tablet Take 150 mg by mouth two (2) times a day.  albuterol (PROVENTIL HFA, VENTOLIN HFA, PROAIR HFA) 90 mcg/actuation inhaler Take 1 Puff by inhalation every four (4) hours as needed for Wheezing. 1 Inhaler 5    acetaminophen (TYLENOL EXTRA STRENGTH) 500 mg tablet Take 1,000 mg by mouth every six (6) hours as needed for Pain.  oxyCODONE-acetaminophen (PERCOCET) 5-325 mg per tablet Take 1 Tab by mouth every four (4) hours as needed. Max Daily Amount: 6 Tabs. Indications: Pain 30 Tab 0    warfarin (COUMADIN) 5 mg tablet Take 2 Tabs by mouth daily. Indications: deep venous thrombosis 60 Tab 1    warfarin (COUMADIN) 10 mg tablet Take 1 Tab by mouth daily. 30 Tab 0     No current facility-administered medications on file prior to visit.          Objective:     Vitals:    07/02/18 0836   BP: 107/56   Pulse: 77   Resp: 16   Temp: 97.7 °F (36.5 °C)   TempSrc: Oral   Weight: 217 lb (98.4 kg)   Height: 5' 5\" (1.651 m)     Physical Examination:  General appearance - alert, well appearing, and in no distress  Eyes -sclera anicteric  Nose - no further bleeding, packing removed  Chest - clear to auscultation, no wheezes, rales or rhonchi, symmetric air entry  Heart - normal rate, regular rhythm, normal S1, S2, no murmurs, rubs, clicks or gallops  Neurological - alert, oriented, no focal findings or movement disorder noted  Extr - trace edema    Assessment/ Plan:   Diagnoses and all orders for this visit:    1. Epistaxis - resolved, seen by ENT and packing removed    2. Chronic deep vein thrombosis (DVT) of distal vein of right lower extremity (HCC)  3. Encounter for monitoring coumadin therapy  4. Monitoring for long-term anticoagulant use  - INR 1.0 - Restarting coumadin today  -     AMB POC PT/INR    5. Adhesive capsulitis of right shoulder - will try Voltaren briefly x 5 days while subtherapeutic with coumadin. To stop after this and will f/u with coumadin levels in 1 week. -     diclofenac EC (VOLTAREN) 75 mg EC tablet; Take 1 Tab by mouth two (2) times a day. Take for 5 days and then stop. May use daily as needed after this. For pain    I have discussed the diagnosis with the patient and the intended plan as seen in the above orders. The patient has received an after-visit summary and questions were answered concerning future plans. I have discussed medication side effects and warnings with the patient as well. The patient verbalizes understanding and agreement with the plan. Follow-up Disposition:  Return in about 1 week (around 7/9/2018), or if symptoms worsen or fail to improve.

## 2018-07-02 NOTE — MR AVS SNAPSHOT
[address] [facility] 
[phone] Patient: Sixto Kearney MRN:  [id number] Visit Information Date & Time Provider Department Dept. Phone Encounter #  
 7/2/2018  8:30 AM Eric Moseley MD [facility] at [address] [id number] Follow-up Instructions Return in about 1 week (around 7/9/2018), or if symptoms worsen or fail to improve. Your Appointments 7/31/2018  8:50 AM  
Follow Up with Juan Velazquez MD  
[facility] [address]) Appt Note: 6 month f/u  
 [phone] [id number] [address] [place] [address] Upcoming Health Maintenance Date Due Pneumococcal 19-64 Highest Risk (2 of 3 - PCV13) 4/11/2017 PAP AKA CERVICAL CYTOLOGY 11/19/2017 Influenza Age 5 to Adult 8/1/2018 DTaP/Tdap/Td series (2 - Td) 5/17/2026 Allergies as of 7/2/2018  Review Complete On: 7/2/2018 By: Sylvain Flynn LPN Severity Noted Reaction Type Reactions Sulfa (Sulfonamide Antibiotics) High 03/12/2010    Anaphylaxis Codeine Medium 03/17/2010    Itching Current Immunizations  Reviewed on 6/18/2018 Name Date Influenza Vaccine (Quad) PF 9/14/2017, 10/27/2016, 10/30/2015 Influenza Vaccine PF 10/11/2013 Influenza Vaccine Split 12/13/2012, 10/5/2011 Pneumococcal Polysaccharide (PPSV-23) 4/11/2016 11:40 AM  
  
 Not reviewed this visit You Were Diagnosed With   
  
 Codes Comments Epistaxis    -  Primary ICD-10-CM: R04.0 ICD-9-CM: 336. 7 Chronic deep vein thrombosis (DVT) of distal vein of right lower extremity (HCC)     ICD-10-CM: V81.3V8 ICD-9-CM: 453.52  Encounter for monitoring coumadin therapy     ICD-10-CM: Z51.81, Z79.01 
ICD-9-CM: V58.83, V58.61   
 Monitoring for long-term anticoagulant use     ICD-10-CM: Z51.81, Z79.01 
ICD-9-CM: V58.61 Vitals BP Pulse Temp Resp Height(growth percentile) Weight(growth percentile) 107/56 (BP 1 Location: Left arm, BP Patient Position: Sitting) 77 97.7 °F (36.5 °C) (Oral) 16 5' 5\" (1.651 m) 217 lb (98.4 kg) LMP BMI OB Status Smoking Status (LMP Unknown) 36.11 kg/m2 Hysterectomy Never Smoker Vitals History BMI and BSA Data Body Mass Index Body Surface Area  
 36.11 kg/m 2 2.12 m 2 Preferred Pharmacy Pharmacy Name Phone [facility] [address] [phone] Your Updated Medication List  
  
   
This list is accurate as of 7/2/18  8:58 AM.  Always use your most recent med list.  
  
  
  
  
 albuterol 90 mcg/actuation inhaler Commonly known as:  PROVENTIL HFA, VENTOLIN HFA, PROAIR HFA Take 1 Puff by inhalation every four (4) hours as needed for Wheezing. CIMZIA SC  
by SubCUTAneous route. Injection:  Every 4 weeks  
  
 ergocalciferol 50,000 unit capsule Commonly known as:  ERGOCALCIFEROL  
TAKE 1 CAPSULE BY MOUTH ONCE PER WEEK FOR A TOTAL OF 8 WEEKS. THEN CHANGE TO TWICE PER MONTH.  
  
 folic acid 1 mg tablet Commonly known as:  FOLVITE  
TAKE 1 TABLET BY MOUTH ONCE A DAY  
  
 furosemide 40 mg tablet Commonly known as:  LASIX TAKE 1 TABLET BY MOUTH TWICE DAILY AS NEEDED  
  
 KLOR-CON M20 20 mEq tablet Generic drug:  potassium chloride TAKE 1 TABLET BY MOUTH TWICE A DAY  
  
 methotrexate 2.5 mg tablet Commonly known as:  RHEUMATREX  
TAKE 8 TABLETS BY MOUTH ONCE PER WEEK  
  
 oxyCODONE-acetaminophen 5-325 mg per tablet Commonly known as:  PERCOCET Take 1 Tab by mouth every four (4) hours as needed. Max Daily Amount: 6 Tabs. Indications: Pain  
  
 pantoprazole 40 mg tablet Commonly known as:  PROTONIX Take 1 Tab by mouth daily. phentermine 37.5 mg tablet Commonly known as:  ADIPEX-P Take 1/2 tablet before breakfast and before dinner  
  
 raNITIdine 150 mg tablet Commonly known as:  ZANTAC Take 150 mg by mouth two (2) times a day. topiramate 50 mg tablet Commonly known as:  TOPAMAX Take 1 Tab by mouth two (2) times a day. TYLENOL EXTRA STRENGTH 500 mg tablet Generic drug:  acetaminophen Take 1,000 mg by mouth every six (6) hours as needed for Pain. * warfarin 5 mg tablet Commonly known as:  COUMADIN Take 2 Tabs by mouth daily. Indications: deep venous thrombosis * warfarin 10 mg tablet Commonly known as:  COUMADIN Take 1 Tab by mouth daily. * Notice: This list has 2 medication(s) that are the same as other medications prescribed for you. Read the directions carefully, and ask your doctor or other care provider to review them with you. July 2018 Details Meg Leonmiriam Wed Thu Fri Sat  
  1  
  
  
  
   2  
  
15 mg See details 3  
  
15 mg  
  
   4  
  
12.5 mg  
  
   5  
  
10 mg  
  
   6  
  
12.5 mg  
  
   7  
  
10 mg  
  
  
  8  
  
12.5 mg  
  
   9  
  
10 mg  
  
   10  
  
  
  
   11  
  
  
  
   12  
  
  
  
   13  
  
  
  
   14  
  
  
  
  
  15  
  
  
  
   16  
  
  
  
   17  
  
  
  
   18  
  
  
  
   19  
  
  
  
   20  
  
  
  
   21  
  
  
  
  
  22  
  
  
  
   23  
  
  
  
   24  
  
  
  
   25  
  
  
  
   26  
  
  
  
   27  
  
  
  
   28  
  
  
  
  
  29  
  
  
  
   30  
  
  
  
   31  
  
  
  
      
 Date Details 07/02 This INR check INR: 1.0 Date of next INR:  7/9/2018 How to take your warfarin dose To take:  10 mg Take two of the 5 mg tablets. To take:  12.5 mg Take two and a half of the 5 mg tablets. To take:  15 mg Take three of the 5 mg tablets. We Performed the Following AMB POC PT/INR [37538 CPT(R)] Follow-up Instructions Return in about 1 week (around 7/9/2018), or if symptoms worsen or fail to improve. Introducing [facility]! Dear Manolo Alvares: Thank you for requesting a [employer] account. Our records indicate that you already have an active [employer] account. You can access your account anytime at [email or web address] Did you know that you can access your hospital and ER discharge instructions at any time in [employer]? You can also review all of your test results from your hospital stay or ER visit. Additional Information If you have questions, please visit the Frequently Asked Questions section of the [employer] website at [email or web address] Remember, [employer] is NOT to be used for urgent needs. For medical emergencies, dial 911. Now available from your iPhone and Android! Please provide this summary of care documentation to your next provider. Your primary care clinician is listed as Ashwin Rome. If you have any questions after today's visit, please call [phone].

## 2018-07-06 DIAGNOSIS — K21.9 GASTROESOPHAGEAL REFLUX DISEASE, ESOPHAGITIS PRESENCE NOT SPECIFIED: ICD-10-CM

## 2018-07-06 RX ORDER — PANTOPRAZOLE SODIUM 40 MG/1
TABLET, DELAYED RELEASE ORAL
Qty: 30 TAB | Refills: 0 | Status: SHIPPED | OUTPATIENT
Start: 2018-07-06 | End: 2018-08-20

## 2018-07-09 ENCOUNTER — OFFICE VISIT (OUTPATIENT)
Dept: FAMILY MEDICINE CLINIC | Age: 48
End: 2018-07-09

## 2018-07-09 VITALS
HEIGHT: 65 IN | RESPIRATION RATE: 18 BRPM | SYSTOLIC BLOOD PRESSURE: 118 MMHG | HEART RATE: 76 BPM | BODY MASS INDEX: 35.85 KG/M2 | WEIGHT: 215.2 LBS | TEMPERATURE: 97.7 F | DIASTOLIC BLOOD PRESSURE: 67 MMHG

## 2018-07-09 DIAGNOSIS — Z79.01 MONITORING FOR LONG-TERM ANTICOAGULANT USE: ICD-10-CM

## 2018-07-09 DIAGNOSIS — Z51.81 MONITORING FOR LONG-TERM ANTICOAGULANT USE: ICD-10-CM

## 2018-07-09 DIAGNOSIS — M06.4 INFLAMMATORY POLYARTHRITIS (HCC): ICD-10-CM

## 2018-07-09 DIAGNOSIS — I82.5Z1 CHRONIC DEEP VEIN THROMBOSIS (DVT) OF DISTAL VEIN OF RIGHT LOWER EXTREMITY (HCC): Primary | ICD-10-CM

## 2018-07-09 LAB
INR BLD: 2.6
PT POC: NORMAL SECONDS
VALID INTERNAL CONTROL?: YES

## 2018-07-09 RX ORDER — PREDNISONE 20 MG/1
20 TABLET ORAL DAILY
Qty: 3 TAB | Refills: 0 | Status: SHIPPED | OUTPATIENT
Start: 2018-07-09 | End: 2018-07-12

## 2018-07-09 NOTE — PROGRESS NOTES
Chief Complaint   Patient presents with    Follow-up     PT/INR   1. Have you been to the ER, urgent care clinic since your last visit? Hospitalized since your last visit? No    2. Have you seen or consulted any other health care providers outside of the [address] [place] [address] since your last visit? Include any pap smears or colon screening.  No   Right ankle bruise and painful  Room 7

## 2018-07-09 NOTE — MR AVS SNAPSHOT
Karen Delgado [address] [facility] 
[phone] Patient: Venancio Bishop MRN:  [id number] Visit Information Date & Time Provider Department Dept. Phone Encounter #  
 7/9/2018  8:50 AM Milad Hsu MD [facility] at [address] [id number] Follow-up Instructions Return in about 2 weeks (around 7/23/2018), or if symptoms worsen or fail to improve. Your Appointments 7/9/2018  8:50 AM  
ROUTINE CARE with Milad Hsu MD  
[facility] at [facility] [address] [facility]) Appt Note: 1 wk f/u  
 [facility] [address] [id number]  
[facility]  
  
    
 7/31/2018  8:50 AM  
Follow Up with Serafin Harry MD  
[facility] [address] [facility]) Appt Note: 6 month f/u  
 [address] [facility] [address] [id number] [address] [place] [address] Upcoming Health Maintenance Date Due Pneumococcal 19-64 Highest Risk (2 of 3 - PCV13) 4/11/2017 PAP AKA CERVICAL CYTOLOGY 11/19/2017 Influenza Age 5 to Adult 8/1/2018 DTaP/Tdap/Td series (2 - Td) 5/17/2026 Allergies as of 7/9/2018  Review Complete On: 7/9/2018 By: Milad Hsu MD  
  
 Severity Noted Reaction Type Reactions Sulfa (Sulfonamide Antibiotics) High 03/12/2010    Anaphylaxis Codeine Medium 03/17/2010    Itching Current Immunizations  Reviewed on 6/18/2018 Name Date Influenza Vaccine (Quad) PF 9/14/2017, 10/27/2016, 10/30/2015 Influenza Vaccine PF 10/11/2013 Influenza Vaccine Split 12/13/2012, 10/5/2011 Pneumococcal Polysaccharide (PPSV-23) 4/11/2016 11:40 AM  
  
 Not reviewed this visit You Were Diagnosed With   
  
 Codes Comments Monitoring for long-term anticoagulant use    -  Primary ICD-10-CM: Z51.81, Z79.01 
ICD-9-CM: V58.61 Vitals BP Pulse Temp Resp Height(growth percentile) Weight(growth percentile)  
 118/67 (BP 1 Location: Right arm, BP Patient Position: Sitting) 76 97.7 °F (36.5 °C) (Oral) 18 5' 5\" (1.651 m) 215 lb 3.2 oz (97.6 kg) LMP BMI OB Status Smoking Status (LMP Unknown) 35.81 kg/m2 Hysterectomy Never Smoker Vitals History BMI and BSA Data Body Mass Index Body Surface Area  
 35.81 kg/m 2 2.12 m 2 Preferred Pharmacy Pharmacy Name Phone [facility] [address] [place] [address] [phone] Your Updated Medication List  
  
   
This list is accurate as of 7/9/18  8:38 AM.  Always use your most recent med list.  
  
  
  
  
 albuterol 90 mcg/actuation inhaler Commonly known as:  PROVENTIL HFA, VENTOLIN HFA, PROAIR HFA Take 1 Puff by inhalation every four (4) hours as needed for Wheezing. CIMZIA SC  
by SubCUTAneous route. Injection:  Every 4 weeks  
  
 diclofenac EC 75 mg EC tablet Commonly known as:  VOLTAREN Take 1 Tab by mouth two (2) times a day. Take for 5 days and then stop. May use daily as needed after this. For pain  
  
 ergocalciferol 50,000 unit capsule Commonly known as:  ERGOCALCIFEROL  
TAKE 1 CAPSULE BY MOUTH ONCE PER WEEK FOR A TOTAL OF 8 WEEKS. THEN CHANGE TO TWICE PER MONTH.  
  
 folic acid 1 mg tablet Commonly known as:  FOLVITE  
TAKE 1 TABLET BY MOUTH ONCE A DAY  
  
 furosemide 40 mg tablet Commonly known as:  LASIX TAKE 1 TABLET BY MOUTH TWICE DAILY AS NEEDED  
  
 KLOR-CON M20 20 mEq tablet Generic drug:  potassium chloride TAKE 1 TABLET BY MOUTH TWICE A DAY  
  
 methotrexate 2.5 mg tablet Commonly known as:  RHEUMATREX  
TAKE 8 TABLETS BY MOUTH ONCE PER WEEK  
  
 oxyCODONE-acetaminophen 5-325 mg per tablet Commonly known as:  PERCOCET  
 Take 1 Tab by mouth every four (4) hours as needed. Max Daily Amount: 6 Tabs. Indications: Pain  
  
 pantoprazole 40 mg tablet Commonly known as:  PROTONIX  
TAKE 1 TABLET BY MOUTH EVERY DAY phentermine 37.5 mg tablet Commonly known as:  ADIPEX-P Take 1/2 tablet before breakfast and before dinner  
  
 raNITIdine 150 mg tablet Commonly known as:  ZANTAC Take 150 mg by mouth two (2) times a day. topiramate 50 mg tablet Commonly known as:  TOPAMAX Take 1 Tab by mouth two (2) times a day. TYLENOL EXTRA STRENGTH 500 mg tablet Generic drug:  acetaminophen Take 1,000 mg by mouth every six (6) hours as needed for Pain. * warfarin 5 mg tablet Commonly known as:  COUMADIN Take 2 Tabs by mouth daily. Indications: deep venous thrombosis * warfarin 10 mg tablet Commonly known as:  COUMADIN Take 1 Tab by mouth daily. * Notice: This list has 2 medication(s) that are the same as other medications prescribed for you. Read the directions carefully, and ask your doctor or other care provider to review them with you. July 2018 Details Sun Mon Tue Wed Thu Fri Sat  
  1  
  
  
  
   2  
  
  
  
   3  
  
  
  
   4  
  
  
  
   5  
  
  
  
   6  
  
  
  
   7  
  
  
  
  
  8  
  
  
  
   9  
  
10 mg See details 10  
  
10 mg  
  
   11  
  
12.5 mg  
  
   12  
  
10 mg  
  
   13  
  
12.5 mg  
  
   14  
  
10 mg  
  
  
  15  
  
12.5 mg  
  
   16  
  
10 mg  
  
   17  
  
10 mg  
  
   18  
  
12.5 mg  
  
   19  
  
10 mg  
  
   20  
  
12.5 mg  
  
   21  
  
10 mg  
  
  
  22  
  
12.5 mg  
  
   23  
  
10 mg  
  
   24  
  
  
  
   25  
  
  
  
   26  
  
  
  
   27  
  
  
  
   28  
  
  
  
  
  29  
  
  
  
   30  
  
  
  
   31  
  
  
  
      
 Date Details 07/09 This INR check INR: 2.6 Date of next INR:  7/23/2018 How to take your warfarin dose To take:  10 mg Take two of the 5 mg tablets. To take:  12.5 mg Take two and a half of the 5 mg tablets. We Performed the Following AMB POC PT/INR [83855 CPT(R)] Follow-up Instructions Return in about 2 weeks (around 7/23/2018), or if symptoms worsen or fail to improve. Introducing [facility]! Dear David Angel: Thank you for requesting a [facility] account. Our records indicate that you already have an active [facility] account. You can access your account anytime at [email or web address] Did you know that you can access your hospital and ER discharge instructions at any time in [employer]? You can also review all of your test results from your hospital stay or ER visit. Additional Information If you have questions, please visit the Frequently Asked Questions section of the [employer] website at [email or web address] Remember, [facility] is NOT to be used for urgent needs. For medical emergencies, dial 911. Now available from your iPhone and Android! Please provide this summary of care documentation to your next provider. Your primary care clinician is listed as Anurag Pereira. If you have any questions after today's visit, please call [phone].

## 2018-07-09 NOTE — PROGRESS NOTES
Subjective:     Chief Complaint   Patient presents with    Follow-up     PT/INR      She  is a 50 y.o. female who presents for evaluation of:  Anti-coag- DVT in R leg after gyn sgy and chronically on coumadin now. Has had DVT in same leg x 2 in past.   Ct to struggle with post thrombotic syndrome from numerous DVTs. Pain flares up at different times and is much worse with edema. She continues to manage this by resting her leg and elevating it. Rest per anti-coag tab    Also complains of right shoulder pain. Symptoms ×1 month. No significant improvement with diclofenac which was used during short period when INR was subtherapeutic. Has full range of motion now. No injury. Worse with overhead activity. Has not been back to Dr. Pedro Omalley to discuss this yet but saw his nurse practitioner who was planning on using steroid burst for this.     ROS  Gen - no fever/chills  Resp - no dyspnea or cough  CV - no chest pain or CARRASQUILLO  Rest per HPI    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    Pseudogout     PUD (peptic ulcer disease)     Skin abrasion 1/28/14    After loosing 125#, Bilateral Inner thigh friction flareup monthly with skin breakdown    Thromboembolus ([address] 2008    Rt leg,  pt states she fell and had a plane ride home and developed blood clots    UC (ulcerative colitis) ([address] 3/12/2010     Past Surgical History:   Procedure Laterality Date    HX ACL RECONSTRUCTION      Rt    HX Soo Servin Iron HX GI      reopening of rectal pouch    HX GYN  06/19/2017    Hysterectomy    HX ORTHOPAEDIC  12/13    Rt foot / toes    HX TONSILLECTOMY       Current Outpatient Prescriptions on File Prior to Visit   Medication Sig Dispense Refill    pantoprazole (PROTONIX) 40 mg tablet TAKE 1 TABLET BY MOUTH EVERY DAY 30 Tab 0    oxyCODONE-acetaminophen (PERCOCET) 5-325 mg per tablet Take 1 Tab by mouth every four (4) hours as needed. Max Daily Amount: 6 Tabs. Indications: Pain 30 Tab 0    ergocalciferol (ERGOCALCIFEROL) 50,000 unit capsule TAKE 1 CAPSULE BY MOUTH ONCE PER WEEK FOR A TOTAL OF 8 WEEKS. THEN CHANGE TO TWICE PER MONTH. 12 Cap 0    warfarin (COUMADIN) 5 mg tablet Take 2 Tabs by mouth daily. Indications: deep venous thrombosis 60 Tab 1    warfarin (COUMADIN) 10 mg tablet Take 1 Tab by mouth daily. 30 Tab 0    folic acid (FOLVITE) 1 mg tablet TAKE 1 TABLET BY MOUTH ONCE A DAY  6    KLOR-CON M20 20 mEq tablet TAKE 1 TABLET BY MOUTH TWICE A DAY  0    furosemide (LASIX) 40 mg tablet TAKE 1 TABLET BY MOUTH TWICE DAILY AS NEEDED 503 Tab 1    CERTOLIZUMAB PEGOL (CIMZIA SC) by SubCUTAneous route. Injection:  Every 4 weeks      phentermine (ADIPEX-P) 37.5 mg tablet Take 1/2 tablet before breakfast and before dinner 100 Tab 5    topiramate (TOPAMAX) 50 mg tablet Take 1 Tab by mouth two (2) times a day. 60 Tab 5    methotrexate (RHEUMATREX) 2.5 mg tablet TAKE 8 TABLETS BY MOUTH ONCE PER WEEK  2    ranitidine (ZANTAC) 150 mg tablet Take 150 mg by mouth two (2) times a day.  albuterol (PROVENTIL HFA, VENTOLIN HFA, PROAIR HFA) 90 mcg/actuation inhaler Take 1 Puff by inhalation every four (4) hours as needed for Wheezing. 1 Inhaler 5    acetaminophen (TYLENOL EXTRA STRENGTH) 500 mg tablet Take 1,000 mg by mouth every six (6) hours as needed for Pain. No current facility-administered medications on file prior to visit.          Objective:     Vitals:    07/09/18 0823   BP: 118/67   Pulse: 76   Resp: 18   Temp: 97.7 °F (36.5 °C)   TempSrc: Oral   Weight: 215 lb 3.2 oz (97.6 kg)   Height: 5' 5\" (1.651 m)     Physical Examination:  General appearance - alert, well appearing, and in no distress  Eyes -sclera anicteric  Chest - clear to auscultation, no wheezes, rales or rhonchi, symmetric air entry  Heart - normal rate, regular rhythm, normal S1, S2, no murmurs, rubs, clicks or gallops  Right shoulder- full range of motion, pain with [place]'s, pain with impingement, negative Neer's, negative Lowery, negative liftoff, negative Apley scratch, minimal tenderness to palpation over posterior right shoulder  Extr - trace edema    Assessment/ Plan:   Diagnoses and all orders for this visit:    1. Chronic deep vein thrombosis (DVT) of distal vein of right lower extremity (HCC)  2. Monitoring for long-term anticoagulant use  -INR therapeutic  -     AMB POC PT/INR    3. Inflammatory polyarthritis ([address]- suspect symptoms related to psoriatic arthritis causing pain in right shoulder so will try a burst of prednisone. Patient would like low dose and to shorten course to 3 days given issues with weight and use of weight loss medications. -     predniSONE (DELTASONE) 20 mg tablet; Take 1 Tab by mouth daily for 3 days. I have discussed the diagnosis with the patient and the intended plan as seen in the above orders. The patient has received an after-visit summary and questions were answered concerning future plans. I have discussed medication side effects and warnings with the patient as well. The patient verbalizes understanding and agreement with the plan. Follow-up Disposition:  Return in about 2 weeks (around 7/23/2018), or if symptoms worsen or fail to improve.

## 2018-07-24 ENCOUNTER — OFFICE VISIT (OUTPATIENT)
Dept: FAMILY MEDICINE CLINIC | Age: 48
End: 2018-07-24

## 2018-07-24 VITALS
SYSTOLIC BLOOD PRESSURE: 122 MMHG | TEMPERATURE: 97.8 F | WEIGHT: 218 LBS | OXYGEN SATURATION: 99 % | DIASTOLIC BLOOD PRESSURE: 72 MMHG | HEART RATE: 84 BPM | BODY MASS INDEX: 36.32 KG/M2 | RESPIRATION RATE: 18 BRPM | HEIGHT: 65 IN

## 2018-07-24 DIAGNOSIS — I82.431 ACUTE DEEP VEIN THROMBOSIS (DVT) OF POPLITEAL VEIN OF RIGHT LOWER EXTREMITY (HCC): ICD-10-CM

## 2018-07-24 DIAGNOSIS — M06.4 INFLAMMATORY POLYARTHRITIS (HCC): ICD-10-CM

## 2018-07-24 DIAGNOSIS — I83.893 VARICOSE VEINS OF BOTH LEGS WITH EDEMA: ICD-10-CM

## 2018-07-24 DIAGNOSIS — I82.5Z1 CHRONIC DEEP VEIN THROMBOSIS (DVT) OF DISTAL VEIN OF RIGHT LOWER EXTREMITY (HCC): Primary | ICD-10-CM

## 2018-07-24 DIAGNOSIS — I87.001 POST-THROMBOTIC SYNDROME OF RIGHT LOWER EXTREMITY: ICD-10-CM

## 2018-07-24 LAB
INR BLD: 2.7
PT POC: NORMAL SECONDS
VALID INTERNAL CONTROL?: YES

## 2018-07-24 RX ORDER — WARFARIN SODIUM 5 MG/1
TABLET ORAL
Qty: 60 TAB | Refills: 1 | Status: SHIPPED | OUTPATIENT
Start: 2018-07-24 | End: 2018-09-29 | Stop reason: SDUPTHER

## 2018-07-24 NOTE — PROGRESS NOTES
Subjective:     Chief Complaint   Patient presents with    Follow-up     PT/INR      She  is a 50 y.o. female who presents for evaluation of:  Anti-coag - DVT in R leg after gyn sgy and chronically on coumadin now. Has had DVT in same leg x 2 in past.   Ct to struggle with post thrombotic syndrome from numerous DVTs. Pain flares up at different times and is much worse with edema. She continues to manage this by resting her leg and elevating it. Rest per anti-coag tab    Since last appt, had left leg start getting painful and swollen. Drove about 2.5 hours but had 1 stop along the way. After about 1-2 days of traveling around by boat in [address] [place] [address] and walking around islands, her left leg started to hurt. She ended up getting sent to a hospital in [place] by boat and had a w/u in to r/o DVT. Dopplers were neg. Had labs but was not told about her results.     ROS  Gen - no fever/chills  Resp - no dyspnea or cough  CV - no chest pain or CARRASQUILLO  Rest per HPI    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    Pseudogout     PUD (peptic ulcer disease)     Skin abrasion 1/28/14    After loosing 125#, Bilateral Inner thigh friction flareup monthly with skin breakdown    Thromboembolus ([address] 2008    Rt leg,  pt states she fell and had a plane ride home and developed blood clots    UC (ulcerative colitis) ([address] 3/12/2010     Past Surgical History:   Procedure Laterality Date    HX ACL Camilla Jamil HX GI      reopening of rectal pouch    HX GYN  06/19/2017    Hysterectomy    HX ORTHOPAEDIC  12/13    Rt foot / toes    HX TONSILLECTOMY       Current Outpatient Prescriptions on File Prior to Visit   Medication Sig Dispense Refill    pantoprazole (PROTONIX) 40 mg tablet TAKE 1 TABLET BY MOUTH EVERY DAY 30 Tab 0    oxyCODONE-acetaminophen (PERCOCET) 5-325 mg per tablet Take 1 Tab by mouth every four (4) hours as needed. Max Daily Amount: 6 Tabs. Indications: Pain 30 Tab 0    ergocalciferol (ERGOCALCIFEROL) 50,000 unit capsule TAKE 1 CAPSULE BY MOUTH ONCE PER WEEK FOR A TOTAL OF 8 WEEKS. THEN CHANGE TO TWICE PER MONTH. 12 Cap 0    folic acid (FOLVITE) 1 mg tablet TAKE 1 TABLET BY MOUTH ONCE A DAY  6    KLOR-CON M20 20 mEq tablet TAKE 1 TABLET BY MOUTH TWICE A DAY  0    furosemide (LASIX) 40 mg tablet TAKE 1 TABLET BY MOUTH TWICE DAILY AS NEEDED 230 Tab 1    CERTOLIZUMAB PEGOL (CIMZIA SC) by SubCUTAneous route. Injection:  Every 4 weeks      phentermine (ADIPEX-P) 37.5 mg tablet Take 1/2 tablet before breakfast and before dinner 100 Tab 5    topiramate (TOPAMAX) 50 mg tablet Take 1 Tab by mouth two (2) times a day. 60 Tab 5    methotrexate (RHEUMATREX) 2.5 mg tablet TAKE 8 TABLETS BY MOUTH ONCE PER WEEK  2    ranitidine (ZANTAC) 150 mg tablet Take 150 mg by mouth two (2) times a day.  albuterol (PROVENTIL HFA, VENTOLIN HFA, PROAIR HFA) 90 mcg/actuation inhaler Take 1 Puff by inhalation every four (4) hours as needed for Wheezing. 1 Inhaler 5    acetaminophen (TYLENOL EXTRA STRENGTH) 500 mg tablet Take 1,000 mg by mouth every six (6) hours as needed for Pain. No current facility-administered medications on file prior to visit.          Objective:     Vitals:    07/24/18 1450   BP: 122/72   Pulse: 84   Resp: 18   Temp: 97.8 °F (36.6 °C)   TempSrc: Oral   SpO2: 99%   Weight: 218 lb (98.9 kg)   Height: 5' 5\" (1.651 m)     Physical Examination:  General appearance - alert, well appearing, and in no distress  Eyes -sclera anicteric  Chest - clear to auscultation, no wheezes, rales or rhonchi, symmetric air entry  Heart - normal rate, regular rhythm, normal S1, S2, no murmurs, rubs, clicks or gallops  Extr - trace edema, mildly ttp over bilat LE. No bruising over LLE    Assessment/ Plan:   Diagnoses and all orders for this visit:    1. Chronic deep vein thrombosis (DVT) of distal vein of right lower extremity (HCC) - therapeutic INR  -     AMB POC PT/INR    2. Post-thrombotic syndrome of right lower extremity - mild pain in RLE    3. Inflammatory polyarthritis ([address] - encouraged f/u with Dr. Yuri Rivera    4. Varicose veins of both legs with edema - likely cause of bilat LE swelling and pain    Declines getting LE dopplers at this point. I have discussed the diagnosis with the patient and the intended plan as seen in the above orders. The patient has received an after-visit summary and questions were answered concerning future plans. I have discussed medication side effects and warnings with the patient as well. The patient verbalizes understanding and agreement with the plan. Follow-up Disposition:  Return in about 4 weeks (around 8/21/2018).

## 2018-07-24 NOTE — MR AVS SNAPSHOT
[address] [facility] 
[phone] Patient: Luna Eng MRN:  [id number] Visit Information Date & Time Provider Department Dept. Phone Encounter #  
 7/24/2018  3:10 PM Lloyd Becker MD [facility] at [address] [id number] Follow-up Instructions Return in about 4 weeks (around 8/21/2018). Your Appointments 8/9/2018  9:30 AM  
Follow Up with Nika Montana MD  
[facility] Diabetes and Endocrinology [address]) Appt Note: 6 month f/u; 6 month f/u ok per Dr. Alicia [facility] [address] [address] [phone] [address] [facility] Upcoming Health Maintenance Date Due Pneumococcal 19-64 Highest Risk (2 of 3 - PCV13) 4/11/2017 PAP AKA CERVICAL CYTOLOGY 11/19/2017 Influenza Age 5 to Adult 8/1/2018 DTaP/Tdap/Td series (2 - Td) 5/17/2026 Allergies as of 7/24/2018  Review Complete On: 7/24/2018 By: Lloyd Becker MD  
  
 Severity Noted Reaction Type Reactions Sulfa (Sulfonamide Antibiotics) High 03/12/2010    Anaphylaxis Codeine Medium 03/17/2010    Itching Current Immunizations  Reviewed on 6/18/2018 Name Date Influenza Vaccine (Quad) PF 9/14/2017, 10/27/2016, 10/30/2015 Influenza Vaccine PF 10/11/2013 Influenza Vaccine Split 12/13/2012, 10/5/2011 Pneumococcal Polysaccharide (PPSV-23) 4/11/2016 11:40 AM  
  
 Not reviewed this visit You Were Diagnosed With   
  
 Codes Comments Chronic deep vein thrombosis (DVT) of distal vein of right lower extremity (HCC)    -  Primary ICD-10-CM: X66.7I7 ICD-9-CM: 453.52 Post-thrombotic syndrome of right lower extremity     ICD-10-CM: I87.001 ICD-9-CM: 459.10 Inflammatory polyarthritis ([facility] [address]     ICD-10-CM: M06.4 ICD-9-CM: 714.9 Varicose veins of both legs with edema     ICD-10-CM: G00.548 ICD-9-CM: 454.8 Vitals BP Pulse Temp Resp Height(growth percentile) Weight(growth percentile) 122/72 (BP 1 Location: Left arm, BP Patient Position: Sitting) 84 97.8 °F (36.6 °C) (Oral) 18 5' 5\" (1.651 m) 218 lb (98.9 kg) LMP SpO2 BMI OB Status Smoking Status (LMP Unknown) 99% 36.28 kg/m2 Hysterectomy Never Smoker Vitals History BMI and BSA Data Body Mass Index Body Surface Area  
 36.28 kg/m 2 2.13 m 2 Preferred Pharmacy Pharmacy Name Phone [facility] [address] [place] [address] [phone] Your Updated Medication List  
  
   
This list is accurate as of 7/24/18  4:19 PM.  Always use your most recent med list.  
  
  
  
  
 albuterol 90 mcg/actuation inhaler Commonly known as:  PROVENTIL HFA, VENTOLIN HFA, PROAIR HFA Take 1 Puff by inhalation every four (4) hours as needed for Wheezing. CIMZIA SC  
by SubCUTAneous route. Injection:  Every 4 weeks  
  
 ergocalciferol 50,000 unit capsule Commonly known as:  ERGOCALCIFEROL  
TAKE 1 CAPSULE BY MOUTH ONCE PER WEEK FOR A TOTAL OF 8 WEEKS. THEN CHANGE TO TWICE PER MONTH.  
  
 folic acid 1 mg tablet Commonly known as:  FOLVITE  
TAKE 1 TABLET BY MOUTH ONCE A DAY  
  
 furosemide 40 mg tablet Commonly known as:  LASIX TAKE 1 TABLET BY MOUTH TWICE DAILY AS NEEDED  
  
 KLOR-CON M20 20 mEq tablet Generic drug:  potassium chloride TAKE 1 TABLET BY MOUTH TWICE A DAY  
  
 methotrexate 2.5 mg tablet Commonly known as:  RHEUMATREX  
TAKE 8 TABLETS BY MOUTH ONCE PER WEEK  
  
 oxyCODONE-acetaminophen 5-325 mg per tablet Commonly known as:  PERCOCET Take 1 Tab by mouth every four (4) hours as needed. Max Daily Amount: 6 Tabs. Indications: Pain  
  
 pantoprazole 40 mg tablet Commonly known as:  PROTONIX  
TAKE 1 TABLET BY MOUTH EVERY DAY phentermine 37.5 mg tablet Commonly known as:  ADIPEX-P Take 1/2 tablet before breakfast and before dinner  
  
 raNITIdine 150 mg tablet Commonly known as:  ZANTAC Take 150 mg by mouth two (2) times a day. topiramate 50 mg tablet Commonly known as:  TOPAMAX Take 1 Tab by mouth two (2) times a day. TYLENOL EXTRA STRENGTH 500 mg tablet Generic drug:  acetaminophen Take 1,000 mg by mouth every six (6) hours as needed for Pain.  
  
 warfarin 5 mg tablet Commonly known as:  COUMADIN  
TAKE 2 TABS BY MOUTH DAILY July 2018 Details Sun Mon Tue Wed Thu Fri Sat  
  1  
  
  
  
   2  
  
  
  
   3  
  
  
  
   4  
  
  
  
   5  
  
  
  
   6  
  
  
  
   7  
  
  
  
  
  8  
  
  
  
   9  
  
  
  
   10  
  
  
  
   11  
  
  
  
   12  
  
  
  
   13  
  
  
  
   14  
  
  
  
  
  15  
  
  
  
   16  
  
  
  
   17  
  
  
  
   18  
  
  
  
   19  
  
  
  
   20  
  
  
  
   21  
  
  
  
  
  22  
  
  
  
   23  
  
  
  
   24  
  
10 mg See details 25  
  
12.5 mg  
  
   26  
  
10 mg  
  
   27  
  
12.5 mg  
  
   28  
  
10 mg  
  
  
  29  
  
12.5 mg  
  
   30  
  
10 mg  
  
   31  
  
10 mg Date Details 07/24 This INR check INR: 2.7 How to take your warfarin dose To take:  10 mg Take two of the 5 mg tablets. To take:  12.5 mg Take two and a half of the 5 mg tablets. August 2018 Details Jarvis Paul Tue Wed Thu Fri Sat  
     1  
  
12.5 mg  
  
   2  
  
10 mg  
  
   3  
  
12.5 mg  
  
   4  
  
10 mg  
  
  
  5  
  
12.5 mg  
  
   6  
  
10 mg  
  
   7  
  
10 mg  
  
   8  
  
12.5 mg  
  
   9  
  
10 mg  
  
   10  
  
12.5 mg  
  
   11  
  
10 mg  
  
  
  12  
  
12.5 mg  
  
   13  
  
10 mg  
  
   14  
  
10 mg  
  
   15  
  
12.5 mg  
  
   16  
  
10 mg  
  
   17  
  
12.5 mg  
  
   18  
  
10 mg  
  
  
  19  
  
12.5 mg  
  
   20  
  
10 mg  
  
   21  
  
10 mg  
  
   22  
  
  
  
   23  
  
  
  
 24  
  
  
  
   25  
  
  
  
  
  26  
  
  
  
   27  
  
  
  
   28  
  
  
  
   29  
  
  
  
   30  
  
  
  
   31  
  
  
  
   
 Date Details No additional details Date of next INR:  8/21/2018 How to take your warfarin dose To take:  10 mg Take two of the 5 mg tablets. To take:  12.5 mg Take two and a half of the 5 mg tablets. We Performed the Following AMB POC PT/INR [32393 CPT(R)] Follow-up Instructions Return in about 4 weeks (around 8/21/2018). Introducing [facility] SERVICES! Dear Abdias Cruz: Thank you for requesting a [employer] account. Our records indicate that you already have an active [employer] account. You can access your account anytime at [email or web address] Did you know that you can access your hospital and ER discharge instructions at any time in [employer]? You can also review all of your test results from your hospital stay or ER visit. Additional Information If you have questions, please visit the Frequently Asked Questions section of the [employer] website at [email or web address] Remember, [employer] is NOT to be used for urgent needs. For medical emergencies, dial 911. Now available from your iPhone and Android! Please provide this summary of care documentation to your next provider. Your primary care clinician is listed as Carlita Castellano. If you have any questions after today's visit, please call [phone].

## 2018-07-24 NOTE — PROGRESS NOTES
Chief Complaint   Patient presents with    Follow-up     PT/INR   1. Have you been to the ER, urgent care clinic since your last visit? Hospitalized since your last visit? Yes 7/11/2018 [place] [place] [facility]    2. Have you seen or consulted any other health care providers outside of the [facility] since your last visit? Include any pap smears or colon screening.  No   Room 4

## 2018-08-20 ENCOUNTER — OFFICE VISIT (OUTPATIENT)
Dept: ENDOCRINOLOGY | Age: 48
End: 2018-08-20

## 2018-08-20 VITALS
SYSTOLIC BLOOD PRESSURE: 113 MMHG | BODY MASS INDEX: 37.72 KG/M2 | HEIGHT: 65 IN | WEIGHT: 226.4 LBS | HEART RATE: 77 BPM | DIASTOLIC BLOOD PRESSURE: 67 MMHG

## 2018-08-20 DIAGNOSIS — E66.01 OBESITIES, MORBID (HCC): ICD-10-CM

## 2018-08-20 DIAGNOSIS — R73.02 IGT (IMPAIRED GLUCOSE TOLERANCE): ICD-10-CM

## 2018-08-20 LAB — HBA1C MFR BLD HPLC: 4.8 %

## 2018-08-20 RX ORDER — PHENTERMINE HYDROCHLORIDE 37.5 MG/1
TABLET ORAL
Qty: 100 TAB | Refills: 5 | Status: SHIPPED | OUTPATIENT
Start: 2018-08-20 | End: 2019-03-13 | Stop reason: ALTCHOICE

## 2018-08-20 RX ORDER — TOPIRAMATE 50 MG/1
50 TABLET, FILM COATED ORAL 2 TIMES DAILY
Qty: 60 TAB | Refills: 5 | Status: SHIPPED | OUTPATIENT
Start: 2018-08-20 | End: 2019-04-24 | Stop reason: ALTCHOICE

## 2018-08-20 NOTE — PROGRESS NOTES
Chief Complaint   Patient presents with    Weight Management    Other     IGT   Records since last visit reviewed. History of Present Illness: Suhail Aparicio is a 46 y.o. female here for follow up of obesity. Prior to our initial visit in January 2015 she had lost from 310 down to 160 but then gained back to 230s. Her A1C today was 4.8%. Her weight today was 226 pounds. Her weight was 239 in January 2018. Since then her weight has been as low as 210 May 2018. Pt notes she tried to KETO diet which she notes did help her lose weight, but she was not able to maintain this. She has been out of the Phentermine and Topiramate for 4-6 weeks. She has gained about 10 pounds since then. She she denies palpitations, tremors, CP, SOB, HA, AMS, or numbness. Pt is currently eating 2-3 meals per day. She has breakfast most morning. This AM she had Leobardo tea and blackberries. She will have lunch most days, this afternoon she had two hamburger patties in lettuce wraps with tomatoes, no side items and water. She has dinner around 5-6PM, last night she had roast beef and some potatoes, green beans and flavored water. Pt was previously followed by the RD to help with nutrition for weight loss at [facility]. Pt has no hx of gastric bypass. Pt has a hx of UC and Inflammatory polyarthropathy. She is still seeing Dr. Maday Ortiz of Rhematology for Rheumatoid arthritis and Dr. Lucie Stoll and Teena Pressley of GI for UC. She has had complications to Embril (transaminitis) and Humira (injection site reaction). She had been struggling with issues of pelvic pain and her Gyn Dr. Chandni Kuhn felt is was due to an ischemic uterus. On 6/20/17 she had Supracervical Abdominal Hysterectomy without Salpingo-Oophorectomy. She has post-op complications of ileus which prolonged her hospitalization. She had post-op abdominal pain and rectal bleeding. She saw Dr. Lucie Stoll who treated her for pouchitis and her pain has improve.  She then developed LLE swelling and pain. She has hx of DVT x3. She continues to be on Coumadin for multiple and recurrent DVTs. She is followed by Dr. Ximena Pennington for INR checks. She has not had any DVTs since October 2017. 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 She continues to follow with Dr. Zhen Salgado. 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    Pseudogout     PUD (peptic ulcer disease)     Skin abrasion 1/28/14    After loosing 125#, Bilateral Inner thigh friction flareup monthly with skin breakdown    Thromboembolus ([address] 2008    Rt leg,  pt states she fell and had a plane ride home and developed blood clots    UC (ulcerative colitis) ([address] 3/12/2010     Past Surgical History:   Procedure Laterality Date    HX ACL RECONSTRUCTION      Rt    Dav Peers    Dr Ivette Bal HX GI      reopening of rectal pouch    HX GYN  06/19/2017    Hysterectomy    HX ORTHOPAEDIC  12/13    Rt foot / toes    HX TONSILLECTOMY       Current Outpatient Prescriptions   Medication Sig    phentermine (ADIPEX-P) 37.5 mg tablet Take 1/2 tablet before breakfast and before dinner    topiramate (TOPAMAX) 50 mg tablet Take 1 Tab by mouth two (2) times a day.  warfarin (COUMADIN) 5 mg tablet TAKE 2 TABS BY MOUTH DAILY    oxyCODONE-acetaminophen (PERCOCET) 5-325 mg per tablet Take 1 Tab by mouth every four (4) hours as needed. Max Daily Amount: 6 Tabs.  Indications: Pain    ergocalciferol (ERGOCALCIFEROL) 50,000 unit capsule TAKE 1 CAPSULE BY MOUTH ONCE PER WEEK FOR A TOTAL OF 8 WEEKS. THEN CHANGE TO TWICE PER MONTH.  folic acid (FOLVITE) 1 mg tablet TAKE 1 TABLET BY MOUTH ONCE A DAY    KLOR-CON M20 20 mEq tablet TAKE 1 TABLET BY MOUTH TWICE A DAY    furosemide (LASIX) 40 mg tablet TAKE 1 TABLET BY MOUTH TWICE DAILY AS NEEDED    CERTOLIZUMAB PEGOL (CIMZIA SC) by SubCUTAneous route. Injection:  Every 4 weeks    methotrexate (RHEUMATREX) 2.5 mg tablet TAKE 8 TABLETS BY MOUTH ONCE PER WEEK    ranitidine (ZANTAC) 150 mg tablet Take 150 mg by mouth two (2) times a day.  albuterol (PROVENTIL HFA, VENTOLIN HFA, PROAIR HFA) 90 mcg/actuation inhaler Take 1 Puff by inhalation every four (4) hours as needed for Wheezing.  acetaminophen (TYLENOL EXTRA STRENGTH) 500 mg tablet Take 1,000 mg by mouth every six (6) hours as needed for Pain. No current facility-administered medications for this visit. Allergies   Allergen Reactions    Sulfa (Sulfonamide Antibiotics) Anaphylaxis    Codeine Itching     Family History   Problem Relation Age of Onset    Hypertension Mother     Thyroid Disease Mother      Hypothyroid    Diabetes Mother     Elevated Lipids Mother     Cancer Father      brain     Social History     Social History    Marital status:      Spouse name: N/A    Number of children: N/A    Years of education: N/A     Occupational History    Not on file. Social History Main Topics    Smoking status: Never Smoker    Smokeless tobacco: Never Used    Alcohol use Yes      Comment: occasionally- very rare    Drug use: No    Sexual activity: Yes     Partners: Male     Other Topics Concern    Not on file     Social History Narrative     Review of Systems:  - Negative except as noted in HPI    Physical Examination:  Blood pressure 113/67, pulse 77, height 5' 5\" (1.651 m), weight 226 lb 6.4 oz (102.7 kg).   - General: pleasant, no distress, good eye contact  - HEENT: no exopthalmos, no periorbital edema, no scleral/conjunctival injection, EOMI, no lid lag or stare  - Cardiovascular: regular, normal rate, normal S1 and S2, no murmurs/rubs/gallops, 2+ dorsalis pedis pulses bilaterally  - Respiratory: clear to auscultation bilaterally  - Musculoskeletal: no proximal muscle weakness in upper or lower extremities  - Integumentary: no acanthosis nigricans, no rashes, no edema,   - Neurological: reflexes 2+ at biceps, no tremor  - Psychiatric: normal mood and affect    Data Reviewed:   Her A1C today was 4.8%    Assessment/Plan:   1) Obesity > Will restart the phentermine and topiramate. Pt to keep working on the low carb and low calorie diet. I recommended 1200 calories per day. 2) IGT > Her A1C was 4.8% today, pt encouraged to keep up the excellent work. Pt voices understanding and agreement with the plan. RTC 3 months      Follow-up Disposition:  Return in about 3 months (around 11/20/2018). Copy sent to:  Vivi Harvey and Lucie Stoll.

## 2018-08-24 DIAGNOSIS — I82.401 ACUTE DEEP VEIN THROMBOSIS (DVT) OF RIGHT LOWER EXTREMITY, UNSPECIFIED VEIN (HCC): ICD-10-CM

## 2018-08-24 DIAGNOSIS — E55.9 VITAMIN D DEFICIENCY: ICD-10-CM

## 2018-08-24 RX ORDER — ERGOCALCIFEROL 1.25 MG/1
CAPSULE ORAL
Qty: 12 CAP | Refills: 0 | Status: SHIPPED | OUTPATIENT
Start: 2018-08-24 | End: 2018-11-16 | Stop reason: SDUPTHER

## 2018-08-27 ENCOUNTER — OFFICE VISIT (OUTPATIENT)
Dept: FAMILY MEDICINE CLINIC | Age: 48
End: 2018-08-27

## 2018-08-27 VITALS
SYSTOLIC BLOOD PRESSURE: 117 MMHG | DIASTOLIC BLOOD PRESSURE: 65 MMHG | WEIGHT: 223 LBS | RESPIRATION RATE: 18 BRPM | HEART RATE: 78 BPM | TEMPERATURE: 97.1 F | HEIGHT: 65 IN | OXYGEN SATURATION: 90 % | BODY MASS INDEX: 37.15 KG/M2

## 2018-08-27 DIAGNOSIS — I82.5Z1 CHRONIC DEEP VEIN THROMBOSIS (DVT) OF DISTAL VEIN OF RIGHT LOWER EXTREMITY (HCC): Primary | ICD-10-CM

## 2018-08-27 DIAGNOSIS — Z51.81 MONITORING FOR LONG-TERM ANTICOAGULANT USE: ICD-10-CM

## 2018-08-27 DIAGNOSIS — Z79.01 MONITORING FOR LONG-TERM ANTICOAGULANT USE: ICD-10-CM

## 2018-08-27 DIAGNOSIS — I87.001 POST-THROMBOTIC SYNDROME OF RIGHT LOWER EXTREMITY: ICD-10-CM

## 2018-08-27 DIAGNOSIS — L30.9 DERMATITIS: ICD-10-CM

## 2018-08-27 DIAGNOSIS — M06.4 INFLAMMATORY POLYARTHRITIS (HCC): ICD-10-CM

## 2018-08-27 LAB
INR BLD: 1.8
PT POC: NORMAL SECONDS
VALID INTERNAL CONTROL?: YES

## 2018-08-27 RX ORDER — TRIAMCINOLONE ACETONIDE 5 MG/G
CREAM TOPICAL 2 TIMES DAILY
Qty: 15 G | Refills: 0 | Status: SHIPPED | OUTPATIENT
Start: 2018-08-27 | End: 2018-11-30 | Stop reason: ALTCHOICE

## 2018-08-27 NOTE — MR AVS SNAPSHOT
[address] [facility] 
[phone] Patient: Buddie Cushing MRN:  [id number] Visit Information Date & Time Provider Department Dept. Phone Encounter #  
 8/27/2018  8:30 AM Elinor Marquez MD [facility] at [address] [id number] Your Appointments 11/28/2018  8:30 AM  
Follow Up with Arminda Cruz MD  
[facility] Diabetes and Endocrinology [facility]) Appt Note: 3 month f/u Diabetes [address] [phone] [address] Upcoming Health Maintenance Date Due  
 PAP AKA CERVICAL CYTOLOGY 11/19/2017 BREAST CANCER SCRN MAMMOGRAM 8/3/2018 COLON CANCER SCRN (BARIUM / CT COLO / FLX SIG) Q5 8/3/2018 Influenza Age 5 to Adult 11/9/2018* Pneumococcal 19-64 Highest Risk (2 of 3 - PCV13) 3/22/2019* DTaP/Tdap/Td series (2 - Td) 5/17/2026 *Topic was postponed. The date shown is not the original due date. Allergies as of 8/27/2018  Review Complete On: 8/27/2018 By: Elinor Marquez MD  
  
 Severity Noted Reaction Type Reactions Sulfa (Sulfonamide Antibiotics) High 03/12/2010    Anaphylaxis Codeine Medium 03/17/2010    Itching Current Immunizations  Reviewed on 6/18/2018 Name Date Influenza Vaccine (Quad) PF 9/14/2017, 10/27/2016, 10/30/2015 Influenza Vaccine PF 10/11/2013 Influenza Vaccine Split 12/13/2012, 10/5/2011 Pneumococcal Polysaccharide (PPSV-23) 4/11/2016 11:40 AM  
  
 Not reviewed this visit You Were Diagnosed With   
  
 Codes Comments Chronic deep vein thrombosis (DVT) of distal vein of right lower extremity (HCC)    -  Primary ICD-10-CM: F82.7B7 ICD-9-CM: 453.52  Monitoring for long-term anticoagulant use     ICD-10-CM: Z51.81, Z79.01 
ICD-9-CM: V58.61   
 Post-thrombotic syndrome of right lower extremity     ICD-10-CM: I87.001 ICD-9-CM: 459.10 Inflammatory polyarthritis ([facility]     ICD-10-CM: M06.4 ICD-9-CM: 714.9 Vitals BP Pulse Temp Resp Height(growth percentile) Weight(growth percentile)  
 117/65 (BP 1 Location: Right arm, BP Patient Position: Sitting) 78 97.1 °F (36.2 °C) (Oral) 18 5' 5\" (1.651 m) 223 lb (101.2 kg) LMP SpO2 BMI OB Status Smoking Status (LMP Unknown) 90% 37.11 kg/m2 Hysterectomy Never Smoker Vitals History BMI and BSA Data Body Mass Index Body Surface Area  
 37.11 kg/m 2 2.15 m 2 Preferred Pharmacy Pharmacy Name Phone [facility] [address] [place] [address] [phone] Your Updated Medication List  
  
   
This list is accurate as of 8/27/18  8:44 AM.  Always use your most recent med list.  
  
  
  
  
 albuterol 90 mcg/actuation inhaler Commonly known as:  PROVENTIL HFA, VENTOLIN HFA, PROAIR HFA Take 1 Puff by inhalation every four (4) hours as needed for Wheezing. CIMZIA SC  
by SubCUTAneous route. Injection:  Every 4 weeks  
  
 ergocalciferol 50,000 unit capsule Commonly known as:  ERGOCALCIFEROL  
TAKE 1 CAPSULE BY MOUTH ONCE PER WEEK FOR A TOTAL OF 8 WEEKS. THEN CHANGE TO TWICE PER MONTH.  
  
 folic acid 1 mg tablet Commonly known as:  FOLVITE  
TAKE 1 TABLET BY MOUTH ONCE A DAY  
  
 furosemide 40 mg tablet Commonly known as:  LASIX TAKE 1 TABLET BY MOUTH TWICE DAILY AS NEEDED  
  
 KLOR-CON M20 20 mEq tablet Generic drug:  potassium chloride TAKE 1 TABLET BY MOUTH TWICE A DAY  
  
 methotrexate 2.5 mg tablet Commonly known as:  RHEUMATREX  
TAKE 8 TABLETS BY MOUTH ONCE PER WEEK  
  
 oxyCODONE-acetaminophen 5-325 mg per tablet Commonly known as:  PERCOCET Take 1 Tab by mouth every four (4) hours as needed. Max Daily Amount: 6 Tabs. Indications: Pain  
  
 phentermine 37.5 mg tablet Commonly known as:  ADIPEX-P  
 Take 1/2 tablet before breakfast and before dinner  
  
 raNITIdine 150 mg tablet Commonly known as:  ZANTAC Take 150 mg by mouth two (2) times a day. topiramate 50 mg tablet Commonly known as:  TOPAMAX Take 1 Tab by mouth two (2) times a day. TYLENOL EXTRA STRENGTH 500 mg tablet Generic drug:  acetaminophen Take 1,000 mg by mouth every six (6) hours as needed for Pain.  
  
 warfarin 5 mg tablet Commonly known as:  COUMADIN  
TAKE 2 TABS BY MOUTH DAILY We Performed the Following AMB POC PT/INR [60396 CPT(R)] Introducing [facility] & [facility] SERVICES! Dear Ramon Granger: Thank you for requesting a "[employer] account. Our records indicate that you already have an active "[employer] account. You can access your account anytime at [email or web address] Did you know that you can access your hospital and ER discharge instructions at any time in "[employer]? You can also review all of your test results from your hospital stay or ER visit. Additional Information If you have questions, please visit the Frequently Asked Questions section of the "[employer] website at [email or web address] Remember, "[employer] is NOT to be used for urgent needs. For medical emergencies, dial 911. Now available from your iPhone and Android! Please provide this summary of care documentation to your next provider. Your primary care clinician is listed as Kiki Hendricks. If you have any questions after today's visit, please call [phone].

## 2018-08-27 NOTE — PROGRESS NOTES
Chief Complaint   Patient presents with    Follow-up     PT/INR   1. Have you been to the ER, urgent care clinic since your last visit? Hospitalized since your last visit? No    2. Have you seen or consulted any other health care providers outside of the [address] [place] [address] since your last visit? Include any pap smears or colon screening.  No   Lesion left Hip  Room 4

## 2018-08-27 NOTE — PROGRESS NOTES
Subjective:     Chief Complaint   Patient presents with    Follow-up     PT/INR      She  is a 46 y.o. female who presents for evaluation of:  Anti-coag - DVT in R leg after gyn sgy and chronically on coumadin now. Has had DVT in same leg x 2 in past.   Ct to struggle with post thrombotic syndrome from numerous DVTs. Pain flares up at different times and is much worse with edema. She continues to manage this by resting her leg and elevating it. Rest per anti-coag tab    Since last appt, had right shoulder steroid injection with Dr. Leny Ac under [address] and much improved. Today reports an itchy rash on her left lateral buttock that started a few days ago.     ROS  Gen - no fever/chills  Resp - no dyspnea or cough  CV - no chest pain or CARRASQUILLO  Rest per HPI    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    Pseudogout     PUD (peptic ulcer disease)     Skin abrasion 1/28/14    After loosing 125#, Bilateral Inner thigh friction flareup monthly with skin breakdown    Thromboembolus ([address] 2008    Rt leg,  pt states she fell and had a plane ride home and developed blood clots    UC (ulcerative colitis) ([address] 3/12/2010     Past Surgical History:   Procedure Laterality Date    HX ACL RECONSTRUCTION      Rt    Lavelle Solitario HX GI      reopening of rectal pouch    HX GYN  06/19/2017    Hysterectomy    HX ORTHOPAEDIC  12/13    Rt foot / toes    HX TONSILLECTOMY       Current Outpatient Prescriptions on File Prior to Visit   Medication Sig Dispense Refill    ergocalciferol (ERGOCALCIFEROL) 50,000 unit capsule TAKE 1 CAPSULE BY MOUTH ONCE PER WEEK FOR A TOTAL OF 8 WEEKS. THEN CHANGE TO TWICE PER MONTH. 12 Cap 0    phentermine (ADIPEX-P) 37.5 mg tablet Take 1/2 tablet before breakfast and before dinner 100 Tab 5    topiramate (TOPAMAX) 50 mg tablet Take 1 Tab by mouth two (2) times a day. 60 Tab 5    warfarin (COUMADIN) 5 mg tablet TAKE 2 TABS BY MOUTH DAILY 60 Tab 1    oxyCODONE-acetaminophen (PERCOCET) 5-325 mg per tablet Take 1 Tab by mouth every four (4) hours as needed. Max Daily Amount: 6 Tabs. Indications: Pain 30 Tab 0    folic acid (FOLVITE) 1 mg tablet TAKE 1 TABLET BY MOUTH ONCE A DAY  6    KLOR-CON M20 20 mEq tablet TAKE 1 TABLET BY MOUTH TWICE A DAY  0    furosemide (LASIX) 40 mg tablet TAKE 1 TABLET BY MOUTH TWICE DAILY AS NEEDED 533 Tab 1    CERTOLIZUMAB PEGOL (CIMZIA SC) by SubCUTAneous route. Injection:  Every 4 weeks      methotrexate (RHEUMATREX) 2.5 mg tablet TAKE 8 TABLETS BY MOUTH ONCE PER WEEK  2    ranitidine (ZANTAC) 150 mg tablet Take 150 mg by mouth two (2) times a day.  albuterol (PROVENTIL HFA, VENTOLIN HFA, PROAIR HFA) 90 mcg/actuation inhaler Take 1 Puff by inhalation every four (4) hours as needed for Wheezing. 1 Inhaler 5    acetaminophen (TYLENOL EXTRA STRENGTH) 500 mg tablet Take 1,000 mg by mouth every six (6) hours as needed for Pain. No current facility-administered medications on file prior to visit.          Objective:     Vitals:    08/27/18 0824   BP: 117/65   Pulse: 78   Resp: 18   Temp: 97.1 °F (36.2 °C)   TempSrc: Oral   SpO2: 90%   Weight: 223 lb (101.2 kg)   Height: 5' 5\" (1.651 m)     Physical Examination:  General appearance - alert, well appearing, and in no distress  Eyes -sclera anicteric  Chest - clear to auscultation, no wheezes, rales or rhonchi, symmetric air entry  Heart - normal rate, regular rhythm, normal S1, S2, no murmurs, rubs, clicks or gallops  Extr - trace edema, mildly ttp over bilat LE. No bruising over LLE  Skin - left lateral buttock with 3x3 erythematous base with scattered papules    Assessment/ Plan:   Diagnoses and all orders for this visit:    1. Chronic deep vein thrombosis (DVT) of distal vein of right lower extremity (HCC)  2. Monitoring for long-term anticoagulant use  - subtherapeutic INR but incr greens so will recheck in 2 weeks. -     AMB POC PT/INR    3. Post-thrombotic syndrome of right lower extremity - stable    4. Inflammatory polyarthritis (HCC) - stable, followed by Rheum    5. Dermatitis - may be an early shingles picture but will try TAC since very itchy and not classic appearing. Pt to call if not improving.  -     triamcinolone (ARISTOCORT) 0.5 % topical cream; Apply  to affected area two (2) times a day. use thin layer    I have discussed the diagnosis with the patient and the intended plan as seen in the above orders. The patient has received an after-visit summary and questions were answered concerning future plans. I have discussed medication side effects and warnings with the patient as well. The patient verbalizes understanding and agreement with the plan. Follow-up Disposition:  Return in about 2 weeks (around 9/10/2018).

## 2018-08-28 DIAGNOSIS — B02.9 HERPES ZOSTER WITHOUT COMPLICATION: Primary | ICD-10-CM

## 2018-08-28 RX ORDER — VALACYCLOVIR HYDROCHLORIDE 1 G/1
1000 TABLET, FILM COATED ORAL 3 TIMES DAILY
Qty: 21 TAB | Refills: 0 | Status: SHIPPED | OUTPATIENT
Start: 2018-08-28 | End: 2019-03-01 | Stop reason: SDUPTHER

## 2018-08-29 DIAGNOSIS — M79.661 PAIN AND SWELLING OF RIGHT LOWER LEG: ICD-10-CM

## 2018-08-29 DIAGNOSIS — M79.89 PAIN AND SWELLING OF RIGHT LOWER LEG: ICD-10-CM

## 2018-08-29 RX ORDER — FUROSEMIDE 40 MG/1
TABLET ORAL
Qty: 180 TAB | Refills: 1 | Status: SHIPPED | OUTPATIENT
Start: 2018-08-29 | End: 2019-02-05 | Stop reason: SDUPTHER

## 2018-09-07 ENCOUNTER — OFFICE VISIT (OUTPATIENT)
Dept: FAMILY MEDICINE CLINIC | Age: 48
End: 2018-09-07

## 2018-09-07 VITALS
DIASTOLIC BLOOD PRESSURE: 64 MMHG | BODY MASS INDEX: 37.15 KG/M2 | WEIGHT: 223 LBS | HEART RATE: 78 BPM | HEIGHT: 65 IN | RESPIRATION RATE: 18 BRPM | TEMPERATURE: 97.2 F | SYSTOLIC BLOOD PRESSURE: 115 MMHG

## 2018-09-07 DIAGNOSIS — D84.9 IMMUNOSUPPRESSED STATUS (HCC): ICD-10-CM

## 2018-09-07 DIAGNOSIS — Z51.81 MONITORING FOR LONG-TERM ANTICOAGULANT USE: ICD-10-CM

## 2018-09-07 DIAGNOSIS — I82.5Z1 CHRONIC DEEP VEIN THROMBOSIS (DVT) OF DISTAL VEIN OF RIGHT LOWER EXTREMITY (HCC): Primary | ICD-10-CM

## 2018-09-07 DIAGNOSIS — Z79.01 MONITORING FOR LONG-TERM ANTICOAGULANT USE: ICD-10-CM

## 2018-09-07 DIAGNOSIS — M06.4 INFLAMMATORY POLYARTHRITIS (HCC): ICD-10-CM

## 2018-09-07 DIAGNOSIS — B02.9 HERPES ZOSTER WITHOUT COMPLICATION: ICD-10-CM

## 2018-09-07 DIAGNOSIS — Z23 ENCOUNTER FOR IMMUNIZATION: ICD-10-CM

## 2018-09-07 LAB
INR BLD: 2.2
PT POC: NORMAL SECONDS
VALID INTERNAL CONTROL?: YES

## 2018-09-07 NOTE — PROGRESS NOTES
Subjective:     Chief Complaint   Patient presents with    Follow-up     PT/INR      She  is a 46 y.o. female who presents for evaluation of:  Anti-coag - DVT in R leg after gyn sgy and chronically on coumadin now. Has had DVT in same leg x 2 in past.   Ct to struggle with post thrombotic syndrome from numerous DVTs. Pain flares up at different times and is much worse with edema. She continues to manage this by resting her leg and elevating it. 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 She likely had a shingles episode on left buttock. Pain was electrical over rash so we treated with antivirals and sx are improved.      ROS  Gen - no fever/chills  Resp - no dyspnea or cough  CV - no chest pain or CARRASQUILLO  Rest per HPI    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    Pseudogout     PUD (peptic ulcer disease)     Skin abrasion 1/28/14    After loosing 125#, Bilateral Inner thigh friction flareup monthly with skin breakdown    Thromboembolus ([address] 2008    Rt leg,  pt states she fell and had a plane ride home and developed blood clots    UC (ulcerative colitis) ([address] 3/12/2010     Past Surgical History:   Procedure Laterality Date    HX ACL RECONSTRUCTION      Rt    HX COLECTOMY  1992    Dr Arie Anthony HX GI      reopening of rectal pouch    HX GYN  06/19/2017    Hysterectomy    HX ORTHOPAEDIC  12/13    Rt foot / toes    HX TONSILLECTOMY       Current Outpatient Prescriptions on File Prior to Visit   Medication Sig Dispense Refill    furosemide (LASIX) 40 mg tablet TAKE 1 TABLET BY MOUTH TWICE DAILY AS NEEDED 180 Tab 1    triamcinolone (ARISTOCORT) 0.5 % topical cream Apply  to affected area two (2) times a day. use thin layer 15 g 0    ergocalciferol (ERGOCALCIFEROL) 50,000 unit capsule TAKE 1 CAPSULE BY MOUTH ONCE PER WEEK FOR A TOTAL OF 8 WEEKS. THEN CHANGE TO TWICE PER MONTH. 12 Cap 0    phentermine (ADIPEX-P) 37.5 mg tablet Take 1/2 tablet before breakfast and before dinner 100 Tab 5    topiramate (TOPAMAX) 50 mg tablet Take 1 Tab by mouth two (2) times a day. 60 Tab 5    warfarin (COUMADIN) 5 mg tablet TAKE 2 TABS BY MOUTH DAILY 60 Tab 1    oxyCODONE-acetaminophen (PERCOCET) 5-325 mg per tablet Take 1 Tab by mouth every four (4) hours as needed. Max Daily Amount: 6 Tabs. Indications: Pain 30 Tab 0    folic acid (FOLVITE) 1 mg tablet TAKE 1 TABLET BY MOUTH ONCE A DAY  6    KLOR-CON M20 20 mEq tablet TAKE 1 TABLET BY MOUTH TWICE A DAY  0    CERTOLIZUMAB PEGOL (CIMZIA SC) by SubCUTAneous route. Injection:  Every 4 weeks      methotrexate (RHEUMATREX) 2.5 mg tablet TAKE 8 TABLETS BY MOUTH ONCE PER WEEK  2    ranitidine (ZANTAC) 150 mg tablet Take 150 mg by mouth two (2) times a day.  albuterol (PROVENTIL HFA, VENTOLIN HFA, PROAIR HFA) 90 mcg/actuation inhaler Take 1 Puff by inhalation every four (4) hours as needed for Wheezing. 1 Inhaler 5    acetaminophen (TYLENOL EXTRA STRENGTH) 500 mg tablet Take 1,000 mg by mouth every six (6) hours as needed for Pain. No current facility-administered medications on file prior to visit.          Objective:     Vitals:    09/07/18 0752   BP: 115/64   Pulse: 78   Resp: 18   Temp: 97.2 °F (36.2 °C)   TempSrc: Oral   Weight: 223 lb (101.2 kg)   Height: 5' 5\" (1.651 m)     Physical Examination:  General appearance - alert, well appearing, and in no distress  Eyes -sclera anicteric  Chest - clear to auscultation, no wheezes, rales or rhonchi, symmetric air entry  Heart - normal rate, regular rhythm, normal S1, S2, no murmurs, rubs, clicks or gallops  Extr - trace edema, mildly ttp over bilat LE. No bruising over LLE    Assessment/ Plan:   Diagnoses and all orders for this visit:    1. Chronic deep vein thrombosis (DVT) of distal vein of right lower extremity (HCC)  2. Monitoring for long-term anticoagulant use  - INR therapeutic to will f/u in 4 weeeks  -     AMB POC PT/INR    3. Inflammatory polyarthritis ([address] - per Rheum, discussed avoiding excessive lifting    4. Herpes zoster without complication - improving. rec Shingrix at pharmacy given immunocompromised and first episode of shingles    5. Encounter for immunization  -     Influenza virus vaccine (QUADRIVALENT PRES FREE SYRINGE) IM (67141)    6. Immunosuppressed status ([address] - on MTX and Cimzia injections    I have discussed the diagnosis with the patient and the intended plan as seen in the above orders. The patient has received an after-visit summary and questions were answered concerning future plans. I have discussed medication side effects and warnings with the patient as well. The patient verbalizes understanding and agreement with the plan. Follow-up Disposition:  Return in about 4 weeks (around 10/5/2018) for Regular Follow up.

## 2018-09-07 NOTE — LETTER
9/7/2018 8:37 AM 
 
Ms. Guido Otoole [address] [facility] [id number] To Whom It May Concern: 
 
Guido Otoole [facility] the [facility]. Based on her medical history and rheumatological condition, I recommend Ms. Lopez avoid heavy lifting (including lifting > 15 lbs repetitively or lifting > 50 lbs occasionally). If there are questions or concerns please have the patient contact our office.  
 
 
 
Sincerely, 
 
 
Dacia Servin MD

## 2018-09-07 NOTE — MR AVS SNAPSHOT
Karen Delgado [address] [facility] [address] [facility] 
[phone] Patient: Adela Mcgarry MRN:  [id number] Visit Information Date & Time Provider Department Dept. Phone Encounter #  
 9/7/2018  7:50 AM Azucena Engle MD [facility] at [address] [id number] Follow-up Instructions Return in about 4 weeks (around 10/5/2018) for Regular Follow up. Your Appointments 11/28/2018  8:30 AM  
Follow Up with Janet Pearson MD  
[facility] [address] [facility]) Appt Note: 3 month f/u Diabetes [address] [phone] [address] [place] [address] Road Upcoming Health Maintenance Date Due  
 PAP AKA CERVICAL CYTOLOGY 11/19/2017 BREAST CANCER SCRN MAMMOGRAM 8/3/2018 COLON CANCER SCRN (BARIUM / CT COLO / FLX SIG) Q5 8/3/2018 Influenza Age 5 to Adult 11/9/2018* Pneumococcal 19-64 Highest Risk (2 of 3 - PCV13) 3/22/2019* DTaP/Tdap/Td series (2 - Td) 5/17/2026 *Topic was postponed. The date shown is not the original due date. Allergies as of 9/7/2018  Review Complete On: 9/7/2018 By: Mekhi Mann LPN Severity Noted Reaction Type Reactions Sulfa (Sulfonamide Antibiotics) High 03/12/2010    Anaphylaxis Codeine Medium 03/17/2010    Itching Current Immunizations  Reviewed on 6/18/2018 Name Date Influenza Vaccine (Quad) PF  Incomplete, 9/14/2017, 10/27/2016, 10/30/2015 Influenza Vaccine PF 10/11/2013 Influenza Vaccine Split 12/13/2012, 10/5/2011 Pneumococcal Polysaccharide (PPSV-23) 4/11/2016 11:40 AM  
  
 Not reviewed this visit You Were Diagnosed With   
  
 Codes Comments Chronic deep vein thrombosis (DVT) of distal vein of right lower extremity (HCC)    -  Primary ICD-10-CM: E77.5S2 ICD-9-CM: 453.52   
 Monitoring for long-term anticoagulant use     ICD-10-CM: Z51.81, Z79.01 
ICD-9-CM: V58.61 Inflammatory polyarthritis ([facility]     ICD-10-CM: M06.4 ICD-9-CM: 714.9 Herpes zoster without complication     FLD-68-OX: B02.9 ICD-9-CM: 053.9 Encounter for immunization     ICD-10-CM: N87 ICD-9-CM: V03.89 Immunosuppressed status ([facility]     ICD-10-CM: D89.9 ICD-9-CM: 279.9 Vitals BP Pulse Temp Resp Height(growth percentile) Weight(growth percentile)  
 115/64 (BP 1 Location: Left arm, BP Patient Position: Sitting) 78 97.2 °F (36.2 °C) (Oral) 18 5' 5\" (1.651 m) 223 lb (101.2 kg) LMP BMI OB Status Smoking Status (LMP Unknown) 37.11 kg/m2 Hysterectomy Never Smoker Vitals History BMI and BSA Data Body Mass Index Body Surface Area  
 37.11 kg/m 2 2.15 m 2 Preferred Pharmacy Pharmacy Name Phone [facility] [address] [place] [address] [phone] Your Updated Medication List  
  
   
This list is accurate as of 9/7/18  8:40 AM.  Always use your most recent med list.  
  
  
  
  
 albuterol 90 mcg/actuation inhaler Commonly known as:  PROVENTIL HFA, VENTOLIN HFA, PROAIR HFA Take 1 Puff by inhalation every four (4) hours as needed for Wheezing. CIMZIA SC  
by SubCUTAneous route. Injection:  Every 4 weeks  
  
 ergocalciferol 50,000 unit capsule Commonly known as:  ERGOCALCIFEROL  
TAKE 1 CAPSULE BY MOUTH ONCE PER WEEK FOR A TOTAL OF 8 WEEKS. THEN CHANGE TO TWICE PER MONTH.  
  
 folic acid 1 mg tablet Commonly known as:  FOLVITE  
TAKE 1 TABLET BY MOUTH ONCE A DAY  
  
 furosemide 40 mg tablet Commonly known as:  LASIX TAKE 1 TABLET BY MOUTH TWICE DAILY AS NEEDED  
  
 KLOR-CON M20 20 mEq tablet Generic drug:  potassium chloride TAKE 1 TABLET BY MOUTH TWICE A DAY  
  
 methotrexate 2.5 mg tablet Commonly known as:  RHEUMATREX  
TAKE 8 TABLETS BY MOUTH ONCE PER WEEK  
  
 oxyCODONE-acetaminophen 5-325 mg per tablet Commonly known as:  PERCOCET Take 1 Tab by mouth every four (4) hours as needed. Max Daily Amount: 6 Tabs. Indications: Pain  
  
 phentermine 37.5 mg tablet Commonly known as:  ADIPEX-P Take 1/2 tablet before breakfast and before dinner  
  
 raNITIdine 150 mg tablet Commonly known as:  ZANTAC Take 150 mg by mouth two (2) times a day. topiramate 50 mg tablet Commonly known as:  TOPAMAX Take 1 Tab by mouth two (2) times a day. triamcinolone 0.5 % topical cream  
Commonly known as:  ARISTOCORT Apply  to affected area two (2) times a day. use thin layer TYLENOL EXTRA STRENGTH 500 mg tablet Generic drug:  acetaminophen Take 1,000 mg by mouth every six (6) hours as needed for Pain.  
  
 warfarin 5 mg tablet Commonly known as:  COUMADIN  
TAKE 2 TABS BY MOUTH DAILY September 2018 Details Sun Mon Tue Wed Thu Fri Sat  
        1  
  
  
  
  
  2  
  
  
  
   3  
  
  
  
   4  
  
  
  
   5  
  
  
  
   6  
  
  
  
   7  
  
12.5 mg  
See details 8  
  
10 mg  
  
  
  9  
  
12.5 mg  
  
   10  
  
10 mg  
  
   11  
  
10 mg  
  
   12  
  
12.5 mg  
  
   13  
  
10 mg  
  
   14  
  
12.5 mg  
  
   15  
  
10 mg  
  
  
  16  
  
12.5 mg  
  
   17  
  
10 mg  
  
   18  
  
10 mg  
  
   19  
  
12.5 mg  
  
   20  
  
10 mg  
  
   21  
  
12.5 mg  
  
   22  
  
10 mg  
  
  
  23  
  
12.5 mg  
  
   24  
  
10 mg  
  
   25  
  
10 mg  
  
   26  
  
12.5 mg  
  
   27  
  
10 mg  
  
   28  
  
12.5 mg  
  
   29  
  
10 mg  
  
  
  30  
  
12.5 mg Date Details 09/07 This INR check INR: 2.2 How to take your warfarin dose To take:  10 mg Take two of the 5 mg tablets. To take:  12.5 mg Take two and a half of the 5 mg tablets. October 2018 Details Gil King Tue Wed Thu Fri Sat  
   1  
  
10 mg  
  
   2  
  
10 mg  
  
   3  
  
12.5 mg  
  
   4 10 mg 5  
  
12.5 mg  
  
   6  
  
  
  
  
  7  
  
  
  
   8  
  
  
  
   9  
  
  
  
   10  
  
  
  
   11  
  
  
  
   12  
  
  
  
   13  
  
  
  
  
  14  
  
  
  
   15  
  
  
  
   16  
  
  
  
   17  
  
  
  
   18  
  
  
  
   19  
  
  
  
   20  
  
  
  
  
  21  
  
  
  
   22  
  
  
  
   23  
  
  
  
   24  
  
  
  
   25  
  
  
  
   26  
  
  
  
   27  
  
  
  
  
  28  
  
  
  
   29  
  
  
  
   30  
  
  
  
   31  
  
  
  
     
 Date Details No additional details Date of next INR:  10/5/2018 How to take your warfarin dose To take:  10 mg Take two of the 5 mg tablets. To take:  12.5 mg Take two and a half of the 5 mg tablets. We Performed the Following AMB POC PT/INR [99192 CPT(R)] INFLUENZA VIRUS VAC QUAD,SPLIT,PRESV FREE SYRINGE IM [id number] CPT(R)] Follow-up Instructions Return in about 4 weeks (around 10/5/2018) for Regular Follow up. Introducing [facility]! Dear Guillermina Urban: Thank you for requesting a [employer] account. Our records indicate that you already have an active [employer] account. You can access your account anytime at [email or web address] Did you know that you can access your hospital and ER discharge instructions at any time in [employer]? You can also review all of your test results from your hospital stay or ER visit. Additional Information If you have questions, please visit the Frequently Asked Questions section of the [employer] website at [email or web address] Remember, [employer] is NOT to be used for urgent needs. For medical emergencies, dial 911. Now available from your iPhone and Android! Please provide this summary of care documentation to your next provider. Your primary care clinician is listed as Eric Moseley. If you have any questions after today's visit, please call [phone].

## 2018-09-07 NOTE — PROGRESS NOTES
Chief Complaint   Patient presents with    Follow-up     PT/INR   1. Have you been to the ER, urgent care clinic since your last visit? Hospitalized since your last visit? No    2. Have you seen or consulted any other health care providers outside of the [address] [place] [address] since your last visit? Include any pap smears or colon screening. 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 Risks and adverse reactions were discussed and the VIS was given to them. All questions were addressed. She was observed for 15 min post injection. There were no reactions observed.     Evan Camargo LPN

## 2018-09-29 DIAGNOSIS — I82.431 ACUTE DEEP VEIN THROMBOSIS (DVT) OF POPLITEAL VEIN OF RIGHT LOWER EXTREMITY (HCC): ICD-10-CM

## 2018-10-01 ENCOUNTER — OFFICE VISIT (OUTPATIENT)
Dept: FAMILY MEDICINE CLINIC | Age: 48
End: 2018-10-01

## 2018-10-01 VITALS
TEMPERATURE: 98.4 F | OXYGEN SATURATION: 99 % | DIASTOLIC BLOOD PRESSURE: 76 MMHG | HEART RATE: 73 BPM | WEIGHT: 222.4 LBS | RESPIRATION RATE: 16 BRPM | SYSTOLIC BLOOD PRESSURE: 121 MMHG | BODY MASS INDEX: 37.05 KG/M2 | HEIGHT: 65 IN

## 2018-10-01 DIAGNOSIS — M06.4 INFLAMMATORY POLYARTHRITIS (HCC): ICD-10-CM

## 2018-10-01 DIAGNOSIS — D84.9 IMMUNOSUPPRESSED STATUS (HCC): ICD-10-CM

## 2018-10-01 DIAGNOSIS — Z79.01 MONITORING FOR LONG-TERM ANTICOAGULANT USE: ICD-10-CM

## 2018-10-01 DIAGNOSIS — J01.10 ACUTE NON-RECURRENT FRONTAL SINUSITIS: ICD-10-CM

## 2018-10-01 DIAGNOSIS — I82.5Z1 CHRONIC DEEP VEIN THROMBOSIS (DVT) OF DISTAL VEIN OF RIGHT LOWER EXTREMITY (HCC): Primary | ICD-10-CM

## 2018-10-01 DIAGNOSIS — Z51.81 MONITORING FOR LONG-TERM ANTICOAGULANT USE: ICD-10-CM

## 2018-10-01 LAB
INR BLD: 3.4
PT POC: NORMAL SECONDS
VALID INTERNAL CONTROL?: YES

## 2018-10-01 RX ORDER — WARFARIN SODIUM 5 MG/1
TABLET ORAL
Qty: 60 TAB | Refills: 1 | Status: SHIPPED | OUTPATIENT
Start: 2018-10-01 | End: 2018-11-27 | Stop reason: SDUPTHER

## 2018-10-01 RX ORDER — FLUCONAZOLE 150 MG/1
150 TABLET ORAL DAILY
Qty: 1 TAB | Refills: 0 | Status: SHIPPED | OUTPATIENT
Start: 2018-10-01 | End: 2018-10-02

## 2018-10-01 RX ORDER — AMOXICILLIN AND CLAVULANATE POTASSIUM 875; 125 MG/1; MG/1
1 TABLET, FILM COATED ORAL 2 TIMES DAILY
Qty: 14 TAB | Refills: 0 | Status: SHIPPED | OUTPATIENT
Start: 2018-10-01 | End: 2018-10-08

## 2018-10-01 NOTE — PROGRESS NOTES
Chief Complaint   Patient presents with    Sinus Infection     cough,nasal drainage dark  green in color x 1 week   headache x 1 week  1. Have you been to the ER, urgent care clinic since your last visit? Hospitalized since your last visit? No    2. Have you seen or consulted any other health care providers outside of the [address] [place] [address] since your last visit? Include any pap smears or colon screening.  No   Discuss generalized discomfort  Room 7

## 2018-10-01 NOTE — MR AVS SNAPSHOT
Karen Delgado [address] [facility] 
[phone] Patient: Alberta Dallas MRN:  [id number] Visit Information Date & Time Provider Department Dept. Phone Encounter #  
 10/1/2018  3:00 PM Yesi Torrez MD [facility] at [address] [id number] Follow-up Instructions Return in about 4 weeks (around 10/29/2018). Your Appointments 10/5/2018  7:50 AM  
ROUTINE CARE with Yesi Torrez MD  
[facility] at [facility] Appt Note: 4w fu  
 [id number]  
[address]  
  
    
 11/28/2018  8:30 AM  
Follow Up with Charlee Carr MD  
[facility]) Appt Note: 3 month f/u Diabetes [address] [phone] [address] [facility] Upcoming Health Maintenance Date Due  
 PAP AKA CERVICAL CYTOLOGY 11/19/2017 Shingrix Vaccine Age 50> (1 of 2) 8/3/2018 BREAST CANCER SCRN MAMMOGRAM 8/3/2018 COLON CANCER SCRN (BARIUM / CT COLO / FLX SIG) Q5 8/3/2018 Pneumococcal 19-64 Highest Risk (2 of 3 - PCV13) 3/22/2019* DTaP/Tdap/Td series (2 - Td) 5/17/2026 *Topic was postponed. The date shown is not the original due date. Allergies as of 10/1/2018  Review Complete On: 10/1/2018 By: Manuel Condon LPN Severity Noted Reaction Type Reactions Sulfa (Sulfonamide Antibiotics) High 03/12/2010    Anaphylaxis Codeine Medium 03/17/2010    Itching Current Immunizations  Reviewed on 6/18/2018 Name Date Influenza Vaccine (Quad) PF 9/7/2018, 9/14/2017, 10/27/2016, 10/30/2015 Influenza Vaccine PF 10/11/2013 Influenza Vaccine Split 12/13/2012, 10/5/2011 Pneumococcal Polysaccharide (PPSV-23) 4/11/2016 11:40 AM  
  
 Not reviewed this visit You Were Diagnosed With   
  
 Codes Comments Chronic deep vein thrombosis (DVT) of distal vein of right lower extremity (HCC)    -  Primary ICD-10-CM: X10.1V8 ICD-9-CM: 453.52 Monitoring for long-term anticoagulant use     ICD-10-CM: Z51.81, Z79.01 
ICD-9-CM: V58.61 Inflammatory polyarthritis ([address]     ICD-10-CM: M06.4 ICD-9-CM: 714.9 Immunosuppressed status ([address]     ICD-10-CM: D89.9 ICD-9-CM: 279.9 Acute non-recurrent frontal sinusitis     ICD-10-CM: J01.10 ICD-9-CM: 580.9 Vitals BP Pulse Temp Resp Height(growth percentile) Weight(growth percentile) 121/76 (BP 1 Location: Right arm, BP Patient Position: Sitting) 73 98.4 °F (36.9 °C) (Oral) 16 5' 5\" (1.651 m) 222 lb 6.4 oz (100.9 kg) LMP SpO2 BMI OB Status Smoking Status (LMP Unknown) 99% 37.01 kg/m2 Hysterectomy Never Smoker Vitals History BMI and BSA Data Body Mass Index Body Surface Area  
 37.01 kg/m 2 2.15 m 2 Preferred Pharmacy Pharmacy Name Phone [facility] [address] [phone] Your Updated Medication List  
  
   
This list is accurate as of 10/1/18  3:46 PM.  Always use your most recent med list.  
  
  
  
  
 albuterol 90 mcg/actuation inhaler Commonly known as:  PROVENTIL HFA, VENTOLIN HFA, PROAIR HFA Take 1 Puff by inhalation every four (4) hours as needed for Wheezing. amoxicillin-clavulanate 875-125 mg per tablet Commonly known as:  AUGMENTIN Take 1 Tab by mouth two (2) times a day for 7 days. CIMZIA SC  
by SubCUTAneous route. Injection:  Every 4 weeks  
  
 ergocalciferol 50,000 unit capsule Commonly known as:  ERGOCALCIFEROL  
TAKE 1 CAPSULE BY MOUTH ONCE PER WEEK FOR A TOTAL OF 8 WEEKS. THEN CHANGE TO TWICE PER MONTH.  
  
 fluconazole 150 mg tablet Commonly known as:  DIFLUCAN  
 Take 1 Tab by mouth daily for 1 day. FDA advises cautious prescribing of oral fluconazole in pregnancy. folic acid 1 mg tablet Commonly known as:  FOLVITE  
TAKE 1 TABLET BY MOUTH ONCE A DAY  
  
 furosemide 40 mg tablet Commonly known as:  LASIX TAKE 1 TABLET BY MOUTH TWICE DAILY AS NEEDED  
  
 KLOR-CON M20 20 mEq tablet Generic drug:  potassium chloride TAKE 1 TABLET BY MOUTH TWICE A DAY  
  
 methotrexate 2.5 mg tablet Commonly known as:  RHEUMATREX  
TAKE 8 TABLETS BY MOUTH ONCE PER WEEK  
  
 oxyCODONE-acetaminophen 5-325 mg per tablet Commonly known as:  PERCOCET Take 1 Tab by mouth every four (4) hours as needed. Max Daily Amount: 6 Tabs. Indications: Pain  
  
 phentermine 37.5 mg tablet Commonly known as:  ADIPEX-P Take 1/2 tablet before breakfast and before dinner  
  
 raNITIdine 150 mg tablet Commonly known as:  ZANTAC Take 150 mg by mouth two (2) times a day. topiramate 50 mg tablet Commonly known as:  TOPAMAX Take 1 Tab by mouth two (2) times a day. triamcinolone 0.5 % topical cream  
Commonly known as:  ARISTOCORT Apply  to affected area two (2) times a day. use thin layer TYLENOL EXTRA STRENGTH 500 mg tablet Generic drug:  acetaminophen Take 1,000 mg by mouth every six (6) hours as needed for Pain.  
  
 warfarin 5 mg tablet Commonly known as:  COUMADIN  
TAKE 2 TABS BY MOUTH DAILY Prescriptions Sent to Pharmacy Refills  
 amoxicillin-clavulanate (AUGMENTIN) 875-125 mg per tablet 0 Sig: Take 1 Tab by mouth two (2) times a day for 7 days. Class: Normal  
 Pharmacy: [address] [place] [address] Ph #: [phone] Route: Oral  
 fluconazole (DIFLUCAN) 150 mg tablet 0 Sig: Take 1 Tab by mouth daily for 1 day. FDA advises cautious prescribing of oral fluconazole in pregnancy.   
 Class: Normal  
 Pharmacy: [address] [place] [address] ROAD Ph #: [phone] Route: Oral  
  
October 2018 Details Presley Herr Fri Sat  
   1 Hold See details 2 Hold  
  
   3 Hold  
  
   4 Hold  
  
   5 Hold 6  
  
10 mg  
  
  
  7  
  
12.5 mg  
  
   8  
  
10 mg  
  
   9  
  
10 mg  
  
   10  
  
12.5 mg  
  
   11  
  
10 mg  
  
   12  
  
12.5 mg  
  
   13  
  
10 mg  
  
  
  14  
  
12.5 mg  
  
   15  
  
10 mg  
  
   16  
  
  
  
   17  
  
  
  
   18  
  
  
  
   19  
  
  
  
   20  
  
  
  
  
  21  
  
  
  
   22  
  
  
  
   23  
  
  
  
   24  
  
  
  
   25  
  
  
  
   26  
  
  
  
   27  
  
  
  
  
  28  
  
  
  
   29  
  
  
  
   30  
  
  
  
   31  
  
  
  
     
 Date Details 10/01 This INR check INR: 3.4 Date of next INR:  10/15/2018 How to take your warfarin dose To take:  10 mg Take two of the 5 mg tablets. To take:  12.5 mg Take two and a half of the 5 mg tablets. Hold Do not take your warfarin dose. See the Details table to the right for additional instructions. We Performed the Following AMB POC PT/INR [50899 CPT(R)] Follow-up Instructions Return in about 4 weeks (around 10/29/2018). Introducing [facility]! Dear Steven Moran: Thank you for requesting a [employer] account. Our records indicate that you already have an active [employer] account. You can access your account anytime at [email or web address]/[email or web address] Did you know that you can access your hospital and ER discharge instructions at any time in [employer]? You can also review all of your test results from your hospital stay or ER visit. Additional Information If you have questions, please visit the Frequently Asked Questions section of the [employer] website at [email or web address]/[email or web address] Remember, [employer] is NOT to be used for urgent needs. For medical emergencies, dial 911. Now available from your iPhone and Android! Please provide this summary of care documentation to your next provider. Your primary care clinician is listed as Nadia Crawford. If you have any questions after today's visit, please call [phone].

## 2018-10-01 NOTE — PROGRESS NOTES
Subjective:     Chief Complaint   Patient presents with    Sinus Infection     cough,nasal drainage dark  green in color x 1 week        She  is a 46y.o. year old female who presents for evaluation of URI    Upper Respiratory Infection  Patient complains of symptoms of a URI. Symptoms include coryza, sore throat and some bloody purulent discharge. Onset of symptoms was 1 week ago, unchanged since that time. She is drinking plenty of fluids. Evaluation to date: none. Treatment to date: none. Also here for coumadin check.     ROS:  Constitutional: per HPI  Eyes: negative for visual disturbance  Ears, nose, mouth, throat, and face: per HPI  Respiratory: per HPI  Cardiovascular: negative for chest pain, dyspnea, palpitations  Gastrointestinal: negative for nausea, vomiting, diarrhea and abdominal pain  Integument/breast: negative for rash    Objective:     Vitals:    10/01/18 1503   BP: 121/76   Pulse: 73   Resp: 16   Temp: 98.4 °F (36.9 °C)   TempSrc: Oral   SpO2: 99%   Weight: 222 lb 6.4 oz (100.9 kg)   Height: 5' 5\" (1.651 m)     Physical Examination:   Gen - NAD  Eyes - sclera anicteric  ENT - turbinates inflamed bilat, + frontal and max sinus tenderness, post pharynx erythematous and injected with no exudate  Resp - CTAB, no w/r/r  CV - rrr, no m/r/g    Allergies   Allergen Reactions    Sulfa (Sulfonamide Antibiotics) Anaphylaxis    Codeine Itching      Social History     Social History    Marital status:      Spouse name: N/A    Number of children: N/A    Years of education: N/A     Social History Main Topics    Smoking status: Never Smoker    Smokeless tobacco: Never Used    Alcohol use Yes      Comment: occasionally- very rare    Drug use: No    Sexual activity: Yes     Partners: Male     Other Topics Concern    None     Social History Narrative      Family History   Problem Relation Age of Onset    Hypertension Mother     Thyroid Disease Mother      Hypothyroid    Diabetes Mother    [facility] Elevated Lipids Mother     Cancer Father      brain      Past Surgical History:   Procedure Laterality Date    HX ACL RECONSTRUCTION      Rt    Sachin Carlee    Dr Phillip Marie HX GI      reopening of rectal pouch    HX GYN  06/19/2017    Hysterectomy    HX ORTHOPAEDIC  12/13    Rt foot / toes    HX TONSILLECTOMY        Past Medical History:   Diagnosis Date    Asthma 3/12/2010    DDD (degenerative disc disease), lumbar     DJD (degenerative joint disease) 3/12/2010    DJD (degenerative joint disease) of knee     [facility] thrombosis) ([facility])     Rt leg    GERD (gastroesophageal reflux disease)     History of tuberculosis exposure 2013    pt states she has + PPD and was on Rx for 6 months; last dose either 11/13 or 12/13    Inflammatory polyarthritis ([address]     Psoriatic Arthitis    Morbid obesity ([address]     Post-thrombotic syndrome of right lower extremity 9/14/2017    Pseudogout     PUD (peptic ulcer disease)     Skin abrasion 1/28/14    After loosing 125#, Bilateral Inner thigh friction flareup monthly with skin breakdown    Thromboembolus ([address] 2008    Rt leg,  pt states she fell and had a plane ride home and developed blood clots    UC (ulcerative colitis) ([address] 3/12/2010      Current Outpatient Prescriptions   Medication Sig Dispense Refill    warfarin (COUMADIN) 5 mg tablet TAKE 2 TABS BY MOUTH DAILY 60 Tab 1    amoxicillin-clavulanate (AUGMENTIN) 875-125 mg per tablet Take 1 Tab by mouth two (2) times a day for 7 days. 14 Tab 0    fluconazole (DIFLUCAN) 150 mg tablet Take 1 Tab by mouth daily for 1 day. FDA advises cautious prescribing of oral fluconazole in pregnancy. 1 Tab 0    furosemide (LASIX) 40 mg tablet TAKE 1 TABLET BY MOUTH TWICE DAILY AS NEEDED 180 Tab 1    triamcinolone (ARISTOCORT) 0.5 % topical cream Apply  to affected area two (2) times a day.  use thin layer 15 g 0    ergocalciferol (ERGOCALCIFEROL) 50,000 unit capsule TAKE 1 CAPSULE BY MOUTH ONCE PER WEEK FOR A TOTAL OF 8 WEEKS. THEN CHANGE TO TWICE PER MONTH. 12 Cap 0    phentermine (ADIPEX-P) 37.5 mg tablet Take 1/2 tablet before breakfast and before dinner 100 Tab 5    topiramate (TOPAMAX) 50 mg tablet Take 1 Tab by mouth two (2) times a day. 60 Tab 5    oxyCODONE-acetaminophen (PERCOCET) 5-325 mg per tablet Take 1 Tab by mouth every four (4) hours as needed. Max Daily Amount: 6 Tabs. Indications: Pain 30 Tab 0    folic acid (FOLVITE) 1 mg tablet TAKE 1 TABLET BY MOUTH ONCE A DAY  6    KLOR-CON M20 20 mEq tablet TAKE 1 TABLET BY MOUTH TWICE A DAY  0    CERTOLIZUMAB PEGOL (CIMZIA SC) by SubCUTAneous route. Injection:  Every 4 weeks      methotrexate (RHEUMATREX) 2.5 mg tablet TAKE 8 TABLETS BY MOUTH ONCE PER WEEK  2    ranitidine (ZANTAC) 150 mg tablet Take 150 mg by mouth two (2) times a day.  albuterol (PROVENTIL HFA, VENTOLIN HFA, PROAIR HFA) 90 mcg/actuation inhaler Take 1 Puff by inhalation every four (4) hours as needed for Wheezing. 1 Inhaler 5    acetaminophen (TYLENOL EXTRA STRENGTH) 500 mg tablet Take 1,000 mg by mouth every six (6) hours as needed for Pain. Assessment/ Plan:   Diagnoses and all orders for this visit:    1. Chronic deep vein thrombosis (DVT) of distal vein of right lower extremity (HCC)  2. Monitoring for long-term anticoagulant use  - supratherapeutic INR and starting abx, holding Coumadin and will f/u  -     AMB POC PT/INR    3. Inflammatory polyarthritis ([address] - following with Rheum    4. Immunosuppressed status ([address]    5. Acute non-recurrent frontal sinusitis - will tx with abx given sx and duration given immunosuppression  -     amoxicillin-clavulanate (AUGMENTIN) 875-125 mg per tablet; Take 1 Tab by mouth two (2) times a day for 7 days. Other orders  -     fluconazole (DIFLUCAN) 150 mg tablet; Take 1 Tab by mouth daily for 1 day. FDA advises cautious prescribing of oral fluconazole in pregnancy.     I have discussed the diagnosis with the patient and the intended plan as seen in the above orders. The patient has received an after-visit summary and questions were answered concerning future plans. I have discussed medication side effects and warnings with the patient as well. The patient verbalizes understanding and agreement with the plan. Follow-up Disposition:  Return in about 4 weeks (around 10/29/2018).

## 2018-10-09 ENCOUNTER — OFFICE VISIT (OUTPATIENT)
Dept: FAMILY MEDICINE CLINIC | Age: 48
End: 2018-10-09

## 2018-10-09 VITALS
HEIGHT: 65 IN | TEMPERATURE: 97.6 F | HEART RATE: 82 BPM | DIASTOLIC BLOOD PRESSURE: 79 MMHG | RESPIRATION RATE: 18 BRPM | WEIGHT: 222 LBS | SYSTOLIC BLOOD PRESSURE: 135 MMHG | BODY MASS INDEX: 36.99 KG/M2

## 2018-10-09 DIAGNOSIS — Z56.6 WORK-RELATED STRESS: ICD-10-CM

## 2018-10-09 DIAGNOSIS — F32.A ANXIETY AND DEPRESSION: Primary | ICD-10-CM

## 2018-10-09 DIAGNOSIS — F51.02 ADJUSTMENT INSOMNIA: ICD-10-CM

## 2018-10-09 DIAGNOSIS — F41.9 ANXIETY AND DEPRESSION: Primary | ICD-10-CM

## 2018-10-09 RX ORDER — ESCITALOPRAM OXALATE 10 MG/1
TABLET ORAL
Qty: 30 TAB | Refills: 1 | Status: SHIPPED | OUTPATIENT
Start: 2018-10-09 | End: 2018-10-31 | Stop reason: SDUPTHER

## 2018-10-09 RX ORDER — ZOLPIDEM TARTRATE 10 MG/1
10 TABLET ORAL
Qty: 30 TAB | Refills: 1 | Status: SHIPPED | OUTPATIENT
Start: 2018-10-09 | End: 2018-10-17 | Stop reason: ALTCHOICE

## 2018-10-09 SDOH — HEALTH STABILITY - MENTAL HEALTH: OTHER PHYSICAL AND MENTAL STRAIN RELATED TO WORK: Z56.6

## 2018-10-09 NOTE — PATIENT INSTRUCTIONS
I have started you on an anti-depressant combo anti-anxiety medication today. The most common side effect that you can experience is nausea during the first week but this will subside. If for some reason the nausea persists for more than two weeks or any other unwanted side effect, call our office to discuss changes in your therapy. You must take this medication daily and not miss a dose. Be aware that you will not feel any difference in the way you feel until approximately two weeks after taking your pill. The full effect of your medication will be in effect at one month, at which time we will re-evaluate your progress. Remember, this is not an addictive medication, and I recommend that you take this for at least 6 months for best resolution of your symptoms. Side effects can include weight gain, sexual dysfunction, insomnia, headache, nausea but you may not have any of these.

## 2018-10-09 NOTE — PROGRESS NOTES
Subjective:     Chief Complaint   Patient presents with    Stress     Work Related        She  is a 46 y.o. female who presents for evaluation of:  Patient is here with concerns about work-related stress. 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 Recently had a new superintendent and their team come in and critique her. She has been told she is too passive but is confused about this since she has been applying for and receiving numerous grants. 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 She also feels singled out as her other colleagues are not receiving the same crew taking. She is even had issues with request for conference is being denied. She feels like she has been having daily crying spells, decreased energy, decreased concentration and attention, decreased appetite. Denies any suicidal ideation or homicidal ideation or concerns about guilt. She has had significant issues with sleeping with frequent awakenings largely related to her work stress. Symptoms have been going on for a little over 2 weeks.     ROS  Gen - no fever/chills  Resp - no dyspnea or cough  CV - no chest pain or [place]QU[place]O  Rest per HPI    Past Medical History:   Diagnosis Date    Asthma 3/12/2010    DDD (degenerative disc disease), lumbar     DJD (degenerative joint disease) 3/12/2010    DJD (degenerative joint disease) of knee     [facility])     Rt leg    GERD (gastroesophageal reflux disease)     History of tuberculosis exposure 2013    pt states she has + PPD and was on Rx for 6 months; last dose either 11/13 or 12/13    Inflammatory polyarthritis ([address]     Psoriatic Arthitis    Morbid obesity (HCC)     Post-thrombotic syndrome of right lower extremity 9/14/2017    Pseudogout     PUD (peptic ulcer disease)     Skin abrasion 1/28/14    After loosing 125#, Bilateral Inner thigh friction flareup monthly with skin breakdown    [facility] 2008    Rt leg,  pt states she fell and had a plane ride home and developed blood clots    UC (ulcerative colitis) ([facility] 3/12/2010     Past Surgical History:   Procedure Laterality Date    HX ACL RECONSTRUCTION      Rt    Petty Bock    Dr Eartha Goltz HX GI      reopening of rectal pouch    HX GYN  06/19/2017    Hysterectomy    HX ORTHOPAEDIC  12/13    Rt foot / toes    HX TONSILLECTOMY       Current Outpatient Prescriptions on File Prior to Visit   Medication Sig Dispense Refill    warfarin (COUMADIN) 5 mg tablet TAKE 2 TABS BY MOUTH DAILY 60 Tab 1    furosemide (LASIX) 40 mg tablet TAKE 1 TABLET BY MOUTH TWICE DAILY AS NEEDED 180 Tab 1    triamcinolone (ARISTOCORT) 0.5 % topical cream Apply  to affected area two (2) times a day. use thin layer 15 g 0    ergocalciferol (ERGOCALCIFEROL) 50,000 unit capsule TAKE 1 CAPSULE BY MOUTH ONCE PER WEEK FOR A TOTAL OF 8 WEEKS. THEN CHANGE TO TWICE PER MONTH. 12 Cap 0    phentermine (ADIPEX-P) 37.5 mg tablet Take 1/2 tablet before breakfast and before dinner 100 Tab 5    topiramate (TOPAMAX) 50 mg tablet Take 1 Tab by mouth two (2) times a day. 60 Tab 5    oxyCODONE-acetaminophen (PERCOCET) 5-325 mg per tablet Take 1 Tab by mouth every four (4) hours as needed. Max Daily Amount: 6 Tabs. Indications: Pain 30 Tab 0    folic acid (FOLVITE) 1 mg tablet TAKE 1 TABLET BY MOUTH ONCE A DAY  6    KLOR-CON M20 20 mEq tablet TAKE 1 TABLET BY MOUTH TWICE A DAY  0    CERTOLIZUMAB PEGOL (CIMZIA SC) by SubCUTAneous route. Injection:  Every 4 weeks      methotrexate (RHEUMATREX) 2.5 mg tablet TAKE 8 TABLETS BY MOUTH ONCE PER WEEK  2    ranitidine (ZANTAC) 150 mg tablet Take 150 mg by mouth two (2) times a day.       albuterol (PROVENTIL HFA, VENTOLIN HFA, PROAIR HFA) 90 mcg/actuation inhaler Take 1 Puff by inhalation every four (4) hours as needed for Wheezing. 1 Inhaler 5    acetaminophen (TYLENOL EXTRA STRENGTH) 500 mg tablet Take 1,000 mg by mouth every six (6) hours as needed for Pain.  [] amoxicillin-clavulanate (AUGMENTIN) 875-125 mg per tablet Take 1 Tab by mouth two (2) times a day for 7 days. 14 Tab 0     No current facility-administered medications on file prior to visit. Objective:     Vitals:    10/09/18 0743   BP: 135/79   Pulse: 82   Resp: 18   Temp: 97.6 °F (36.4 °C)   TempSrc: Oral   Weight: 222 lb (100.7 kg)   Height: 5' 5\" (1.651 m)     Physical Examination:  General appearance - alert, well appearing, and in no distress  Eyes -sclera anicteric  Psych- overwhelmed and tearful, linear and logical    Assessment/ Plan:   Diagnoses and all orders for this visit:    1. Anxiety and depression  2. Work-related stress  -Major work stress recently with significant changes in her department. Seem to have unrealistic expectations for her. Major issues with crying spells, feeling overwhelmed, and trouble with concentration, energy, and appetite. Starting Lexapro and will slowly titrate up. Discussed current environmental stresses at length and options moving forward. We will plan to treat issues with sleep as below but encouraged her to deal with her environmental stressors as sleep issues are clearly related to this root cause. Would consider therapy if symptoms are not improving and she plans to continue in the same work environment. -     escitalopram oxalate (LEXAPRO) 10 mg tablet; Take 1/2 tab by mouth daily x 1 week and then increase to 1 tab daily    3. Adjustment insomnia-she has tolerated Ambien in the past so will use 10 mg as needed  -     zolpidem (AMBIEN) 10 mg tablet; Take 1 Tab by mouth nightly as needed for Sleep. -     escitalopram oxalate (LEXAPRO) 10 mg tablet;  Take 1/2 tab by mouth daily x 1 week and then increase to 1 tab daily    I spent > 50% of the 25 min visit counseling and educating about stressors and anxiety. I have discussed the diagnosis with the patient and the intended plan as seen in the above orders. The patient has received an after-visit summary and questions were answered concerning future plans. I have discussed medication side effects and warnings with the patient as well. The patient verbalizes understanding and agreement with the plan. Follow-up Disposition:  Return in about 2 weeks (around 10/23/2018), or if symptoms worsen or fail to improve.

## 2018-10-09 NOTE — MR AVS SNAPSHOT
[facility] [address] [facility] 
[phone] Patient: Johnny Dover MRN:  [id number] Visit Information Date & Time Provider Department Dept. Phone Encounter #  
 10/9/2018  7:30 AM Wes Miranda MD [facility] at [address] [id number] Follow-up Instructions Return in about 6 weeks (around 11/20/2018), or if symptoms worsen or fail to improve. Your Appointments 10/17/2018  7:50 AM  
ROUTINE CARE with Wes Miranda MD  
[facility] at [facility] [address] [facility]) Appt Note: 2 wk f/u  
 [address] [id number]  
[address]  
  
    
 11/28/2018  8:30 AM  
Follow Up with Luciano Flores MD  
[facility] [address] [place] [address]) Appt Note: 3 month f/u Diabetes [phone] [address] [place] [address] Upcoming Health Maintenance Date Due  
 PAP AKA CERVICAL CYTOLOGY 11/19/2017 Shingrix Vaccine Age 50> (1 of 2) 8/3/2018 BREAST CANCER SCRN MAMMOGRAM 8/3/2018 COLON CANCER SCRN (BARIUM / CT COLO / FLX SIG) Q5 8/3/2018 Pneumococcal 19-64 Highest Risk (2 of 3 - PCV13) 3/22/2019* DTaP/Tdap/Td series (2 - Td) 5/17/2026 *Topic was postponed. The date shown is not the original due date. Allergies as of 10/9/2018  Review Complete On: 10/9/2018 By: Angélica Shields LPN Severity Noted Reaction Type Reactions Sulfa (Sulfonamide Antibiotics) High 03/12/2010    Anaphylaxis Codeine Medium 03/17/2010    Itching Current Immunizations  Reviewed on 6/18/2018 Name Date Influenza Vaccine (Quad) PF 9/7/2018, 9/14/2017, 10/27/2016, 10/30/2015 Influenza Vaccine PF 10/11/2013 Influenza Vaccine Split 12/13/2012, 10/5/2011 Pneumococcal Polysaccharide (PPSV-23) 4/11/2016 11:40 AM  
  
 Not reviewed this visit You Were Diagnosed With   
  
 Codes Comments Anxiety and depression    -  Primary ICD-10-CM: F41.9, F32.9 ICD-9-CM: 300.00, 311 Work-related stress     ICD-10-CM: Z56.6 ICD-9-CM: V62.1 Adjustment insomnia     ICD-10-CM: F51.02 
ICD-9-CM: 307.41 Vitals BP Pulse Temp Resp Height(growth percentile) Weight(growth percentile) 135/79 (BP 1 Location: Right arm, BP Patient Position: Sitting) 82 97.6 °F (36.4 °C) (Oral) 18 5' 5\" (1.651 m) 222 lb (100.7 kg) LMP BMI OB Status Smoking Status (LMP Unknown) 36.94 kg/m2 Hysterectomy Never Smoker Vitals History BMI and BSA Data Body Mass Index Body Surface Area  
 36.94 kg/m 2 2.15 m 2 Preferred Pharmacy Pharmacy Name Phone [facility]PHARM[facility] [address] [place] [address] Tiffany Hickman[address] [phone] Your Updated Medication List  
  
   
This list is accurate as of 10/9/18  8:27 AM.  Always use your most recent med list.  
  
  
  
  
 albuterol 90 mcg/actuation inhaler Commonly known as:  PROVENTIL HFA, VENTOLIN HFA, PROAIR HFA Take 1 Puff by inhalation every four (4) hours as needed for Wheezing. CIMZIA SC  
by SubCUTAneous route. Injection:  Every 4 weeks  
  
 ergocalciferol 50,000 unit capsule Commonly known as:  ERGOCALCIFEROL  
TAKE 1 CAPSULE BY MOUTH ONCE PER WEEK FOR A TOTAL OF 8 WEEKS. THEN CHANGE TO TWICE PER MONTH. escitalopram oxalate 10 mg tablet Commonly known as:  Yln Purple Take 1/2 tab by mouth daily x 1 week and then increase to 1 tab daily  
  
 folic acid 1 mg tablet Commonly known as:  FOLVITE  
TAKE 1 TABLET BY MOUTH ONCE A DAY  
  
 furosemide 40 mg tablet Commonly known as:  LASIX TAKE 1 TABLET BY MOUTH TWICE DAILY AS NEEDED  
  
 KLOR-CON M20 20 mEq tablet Generic drug:  potassium chloride TAKE 1 TABLET BY MOUTH TWICE A DAY  
  
 methotrexate 2.5 mg tablet Commonly known as:  RHEUMATREX  
TAKE 8 TABLETS BY MOUTH ONCE PER WEEK  
  
 oxyCODONE-acetaminophen 5-325 mg per tablet Commonly known as:  PERCOCET Take 1 Tab by mouth every four (4) hours as needed. Max Daily Amount: 6 Tabs. Indications: Pain  
  
 phentermine 37.5 mg tablet Commonly known as:  ADIPEX-P Take 1/2 tablet before breakfast and before dinner  
  
 raNITIdine 150 mg tablet Commonly known as:  ZANTAC Take 150 mg by mouth two (2) times a day. topiramate 50 mg tablet Commonly known as:  TOPAMAX Take 1 Tab by mouth two (2) times a day. triamcinolone 0.5 % topical cream  
Commonly known as:  ARISTOCORT Apply  to affected area two (2) times a day. use thin layer TYLENOL EXTRA STRENGTH 500 mg tablet Generic drug:  acetaminophen Take 1,000 mg by mouth every six (6) hours as needed for Pain.  
  
 warfarin 5 mg tablet Commonly known as:  COUMADIN  
TAKE 2 TABS BY MOUTH DAILY  
  
 zolpidem 10 mg tablet Commonly known as:  AMBIEN Take 1 Tab by mouth nightly as needed for Sleep. Prescriptions Printed Refills  
 zolpidem (AMBIEN) 10 mg tablet 1 Sig: Take 1 Tab by mouth nightly as needed for Sleep. Class: Print Route: Oral  
  
Prescriptions Sent to Pharmacy Refills  
 escitalopram oxalate (LEXAPRO) 10 mg tablet 1 Sig: Take 1/2 tab by mouth daily x 1 week and then increase to 1 tab daily Class: Normal  
 Pharmacy: [address] [place] [address] #: [phone] Follow-up Instructions Return in about 6 weeks (around 11/20/2018), or if symptoms worsen or fail to improve. Patient Instructions I have started you on an anti-depressant combo anti-anxiety medication today. The most common side effect that you can experience is nausea during the first week but this will subside.   If for some reason the nausea persists for more than two weeks or any other unwanted side effect, call our office to discuss changes in your therapy. You must take this medication daily and not miss a dose. Be aware that you will not feel any difference in the way you feel until approximately two weeks after taking your pill. The full effect of your medication will be in effect at one month, at which time we will re-evaluate your progress. Remember, this is not an addictive medication, and I recommend that you take this for at least 6 months for best resolution of your symptoms. Side effects can include weight gain, sexual dysfunction, insomnia, headache, nausea but you may not have any of these. Introducing [facility]! Dear Katja Lagunas: Thank you for requesting a [employer] account. Our records indicate that you already have an active [employer] account. You can access your account anytime at [email or web address] Did you know that you can access your hospital and ER discharge instructions at any time in [employer]? You can also review all of your test results from your hospital stay or ER visit. Additional Information If you have questions, please visit the Frequently Asked Questions section of the [employer] website at [email or web address] Remember, [employer] is NOT to be used for urgent needs. For medical emergencies, dial 911. Now available from your iPhone and Android! Please provide this summary of care documentation to your next provider. Your primary care clinician is listed as Kaushik Adhikari. If you have any questions after today's visit, please call [phone].

## 2018-10-17 ENCOUNTER — OFFICE VISIT (OUTPATIENT)
Dept: FAMILY MEDICINE CLINIC | Age: 48
End: 2018-10-17

## 2018-10-17 VITALS
DIASTOLIC BLOOD PRESSURE: 72 MMHG | RESPIRATION RATE: 16 BRPM | OXYGEN SATURATION: 100 % | TEMPERATURE: 97.9 F | SYSTOLIC BLOOD PRESSURE: 129 MMHG | BODY MASS INDEX: 36.49 KG/M2 | HEART RATE: 90 BPM | WEIGHT: 219 LBS | HEIGHT: 65 IN

## 2018-10-17 DIAGNOSIS — Z79.01 MONITORING FOR LONG-TERM ANTICOAGULANT USE: ICD-10-CM

## 2018-10-17 DIAGNOSIS — Z51.81 MONITORING FOR LONG-TERM ANTICOAGULANT USE: ICD-10-CM

## 2018-10-17 DIAGNOSIS — F51.02 ADJUSTMENT INSOMNIA: ICD-10-CM

## 2018-10-17 DIAGNOSIS — I82.5Z1 CHRONIC DEEP VEIN THROMBOSIS (DVT) OF DISTAL VEIN OF RIGHT LOWER EXTREMITY (HCC): Primary | ICD-10-CM

## 2018-10-17 DIAGNOSIS — F32.A ANXIETY AND DEPRESSION: ICD-10-CM

## 2018-10-17 DIAGNOSIS — F41.9 ANXIETY AND DEPRESSION: ICD-10-CM

## 2018-10-17 LAB
INR BLD: 2.1
PT POC: NORMAL SECONDS
VALID INTERNAL CONTROL?: YES

## 2018-10-17 RX ORDER — LORAZEPAM 1 MG/1
1 TABLET ORAL
Qty: 30 TAB | Refills: 0 | Status: SHIPPED | OUTPATIENT
Start: 2018-10-17 | End: 2018-10-31 | Stop reason: SDUPTHER

## 2018-10-17 NOTE — PROGRESS NOTES
Subjective:     Chief Complaint   Patient presents with    Follow-up     PT/INR and Stress      She  is a 46 y.o. female who presents for evaluation of:  Anti-coag - DVT in R leg after gyn sgy and chronically on coumadin now. Has had DVT in same leg x 2 in past.   Ct to struggle with post thrombotic syndrome from numerous DVTs. Pain flares up at different times and is much worse with edema. She continues to manage this by resting her leg and elevating it. Rest per anti-coag tab    Recently on abx and supratherapeutic so adjustments to coumadin dosing made. Still dealing with work-related stress. Still having daily crying spells, decreased energy, decreased concentration and attention, decreased appetite. Denies any suicidal ideation or homicidal ideation or concerns about guilt. She has had significant issues with sleeping with frequent awakenings largely related to her work stress. Symptoms have been going on for a little over 4 weeks. Not much improvement with Lexapro yet and still having trouble sleeping even with Ambien.     ROS  Gen - no fever/chills  Resp - no dyspnea or cough  CV - no chest pain or CARRASQUILLO  Rest per HPI    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    Pseudogout     PUD (peptic ulcer disease)     Skin abrasion 1/28/14    After loosing 125#, Bilateral Inner thigh friction flareup monthly with skin breakdown    Thromboembolus ([address] 2008    Rt leg,  pt states she fell and had a plane ride home and developed blood clots    UC (ulcerative colitis) ([facility] 3/12/2010     Past Surgical History:   Procedure Laterality Date    HX ACL RECONSTRUCTION      Rt    Brigitte Casillas correction HX GI      reopening of rectal pouch    HX GYN  06/19/2017    Hysterectomy    HX ORTHOPAEDIC  12/13    Rt foot / toes    HX TONSILLECTOMY       Current Outpatient Medications on File Prior to Visit   Medication Sig Dispense Refill    zolpidem (AMBIEN) 10 mg tablet Take 1 Tab by mouth nightly as needed for Sleep. 30 Tab 1    escitalopram oxalate (LEXAPRO) 10 mg tablet Take 1/2 tab by mouth daily x 1 week and then increase to 1 tab daily 30 Tab 1    warfarin (COUMADIN) 5 mg tablet TAKE 2 TABS BY MOUTH DAILY (Patient taking differently: TAKE 2 TABS BY MOUTH and 3 on Wednesday and Sunday) 60 Tab 1    furosemide (LASIX) 40 mg tablet TAKE 1 TABLET BY MOUTH TWICE DAILY AS NEEDED 180 Tab 1    triamcinolone (ARISTOCORT) 0.5 % topical cream Apply  to affected area two (2) times a day. use thin layer 15 g 0    ergocalciferol (ERGOCALCIFEROL) 50,000 unit capsule TAKE 1 CAPSULE BY MOUTH ONCE PER WEEK FOR A TOTAL OF 8 WEEKS. THEN CHANGE TO TWICE PER MONTH. 12 Cap 0    phentermine (ADIPEX-P) 37.5 mg tablet Take 1/2 tablet before breakfast and before dinner 100 Tab 5    topiramate (TOPAMAX) 50 mg tablet Take 1 Tab by mouth two (2) times a day. 60 Tab 5    oxyCODONE-acetaminophen (PERCOCET) 5-325 mg per tablet Take 1 Tab by mouth every four (4) hours as needed. Max Daily Amount: 6 Tabs. Indications: Pain 30 Tab 0    folic acid (FOLVITE) 1 mg tablet TAKE 1 TABLET BY MOUTH ONCE A DAY  6    KLOR-CON M20 20 mEq tablet TAKE 1 TABLET BY MOUTH TWICE A DAY  0    CERTOLIZUMAB PEGOL (CIMZIA SC) by SubCUTAneous route.  Injection:  Every 4 weeks      methotrexate (RHEUMATREX) 2.5 mg tablet TAKE 8 TABLETS BY MOUTH ONCE PER WEEK  2    ranitidine (ZANTAC) 150 mg tablet Take 150 mg by mouth two (2) times a day.      albuterol (PROVENTIL HFA, VENTOLIN HFA, PROAIR HFA) 90 mcg/actuation inhaler Take 1 Puff by inhalation every four (4) hours as needed for Wheezing. 1 Inhaler 5    acetaminophen (TYLENOL EXTRA STRENGTH) 500 mg tablet Take 1,000 mg by mouth every six (6) hours as needed for Pain. No current facility-administered medications on file prior to visit. Objective:     Vitals:    10/17/18 0738 10/17/18 0755   BP: (!) 147/91 129/72   Pulse: 90    Resp: 16    Temp: 97.9 °F (36.6 °C)    TempSrc: Oral    SpO2: 100%    Weight: 219 lb (99.3 kg)    Height: 5' 5\" (1.651 m)      Physical Examination:  General appearance - alert, well appearing, and in no distress  Eyes -sclera anicteric  Chest - clear to auscultation, no wheezes, rales or rhonchi, symmetric air entry  Heart - normal rate, regular rhythm, normal S1, S2, no murmurs, rubs, clicks or gallops  Extr - trace edema, mildly ttp over bilat LE. No bruising over LLE    Assessment/ Plan:   Diagnoses and all orders for this visit:    Chronic deep vein thrombosis (DVT) of distal vein of right lower extremity ([address]  Monitoring for long-term anticoagulant use  - INR therapeutic to will f/u in 2 weeeks  -     AMB POC PT/INR    Anxiety and depression  Adjustment insomnia  - ct Lexapro and stopping Ambien since not much improvement. Will try Ativan instead given overall hx.  -     LORazepam (ATIVAN) 1 mg tablet; Take 1 Tab by mouth nightly as needed for Anxiety. Max Daily Amount: 1 mg. I have discussed the diagnosis with the patient and the intended plan as seen in the above orders. The patient has received an after-visit summary and questions were answered concerning future plans. I have discussed medication side effects and warnings with the patient as well. The patient verbalizes understanding and agreement with the plan. Follow-up Disposition:  Return in about 2 weeks (around 10/31/2018).

## 2018-10-17 NOTE — PROGRESS NOTES
Chief Complaint   Patient presents with    Follow-up     PT/INR and Stress   1. Have you been to the ER, urgent care clinic since your last visit? Hospitalized since your last visit? No    2. Have you seen or consulted any other health care providers outside of the [address] [place] [address] since your last visit? Include any pap smears or colon screening.  No   Room 8

## 2018-10-31 ENCOUNTER — OFFICE VISIT (OUTPATIENT)
Dept: FAMILY MEDICINE CLINIC | Age: 48
End: 2018-10-31

## 2018-10-31 VITALS
SYSTOLIC BLOOD PRESSURE: 119 MMHG | DIASTOLIC BLOOD PRESSURE: 66 MMHG | WEIGHT: 225.4 LBS | OXYGEN SATURATION: 100 % | RESPIRATION RATE: 18 BRPM | TEMPERATURE: 98 F | HEIGHT: 65 IN | BODY MASS INDEX: 37.55 KG/M2 | HEART RATE: 76 BPM

## 2018-10-31 DIAGNOSIS — Z79.01 MONITORING FOR LONG-TERM ANTICOAGULANT USE: ICD-10-CM

## 2018-10-31 DIAGNOSIS — Z56.6 WORK-RELATED STRESS: ICD-10-CM

## 2018-10-31 DIAGNOSIS — F32.A ANXIETY AND DEPRESSION: ICD-10-CM

## 2018-10-31 DIAGNOSIS — F41.9 ANXIETY AND DEPRESSION: ICD-10-CM

## 2018-10-31 DIAGNOSIS — M79.89 PAIN AND SWELLING OF RIGHT LOWER LEG: ICD-10-CM

## 2018-10-31 DIAGNOSIS — F51.02 ADJUSTMENT INSOMNIA: ICD-10-CM

## 2018-10-31 DIAGNOSIS — I82.5Z1 CHRONIC DEEP VEIN THROMBOSIS (DVT) OF DISTAL VEIN OF RIGHT LOWER EXTREMITY (HCC): Primary | ICD-10-CM

## 2018-10-31 DIAGNOSIS — M79.661 PAIN AND SWELLING OF RIGHT LOWER LEG: ICD-10-CM

## 2018-10-31 DIAGNOSIS — Z51.81 MONITORING FOR LONG-TERM ANTICOAGULANT USE: ICD-10-CM

## 2018-10-31 LAB
INR BLD: 2.2
PT POC: NORMAL SECONDS
VALID INTERNAL CONTROL?: YES

## 2018-10-31 RX ORDER — LORAZEPAM 1 MG/1
1 TABLET ORAL
Qty: 30 TAB | Refills: 0 | Status: SHIPPED | OUTPATIENT
Start: 2018-10-31 | End: 2019-01-16 | Stop reason: ALTCHOICE

## 2018-10-31 RX ORDER — ESCITALOPRAM OXALATE 20 MG/1
20 TABLET ORAL DAILY
Qty: 90 TAB | Refills: 0 | Status: SHIPPED | OUTPATIENT
Start: 2018-10-31 | End: 2018-12-31 | Stop reason: SDUPTHER

## 2018-10-31 SDOH — HEALTH STABILITY - MENTAL HEALTH: OTHER PHYSICAL AND MENTAL STRAIN RELATED TO WORK: Z56.6

## 2018-10-31 NOTE — PROGRESS NOTES
Chief Complaint   Patient presents with    Follow-up     PT/INR   Discuss new medication and work related stress

## 2018-10-31 NOTE — PROGRESS NOTES
Subjective:     Chief Complaint   Patient presents with    Follow-up     PT/INR      She  is a 46 y.o. female who presents for evaluation of:  Anti-coag - DVT in R leg after gyn sgy and chronically on coumadin now. Has had DVT in same leg x 2 in past.   Ct to struggle with post thrombotic syndrome from numerous DVTs. Pain flares up at different times and is much worse with edema. She continues to manage this by resting her leg and elevating it. Rest per anti-coag tab    Patient recently went on a trip to [place] and has been feeling some right lower extremity swelling and pain. She did use Lasix which helped to get some of her fluid off but still notes some warmth and swelling and pain. She is therapeutic on her Coumadin. Anxiety - Still dealing with work-related stress. Still having daily crying spells, decreased energy, decreased concentration and attention, decreased appetite. Denies any suicidal ideation or homicidal ideation or concerns about guilt. She has had significant issues with sleeping with frequent awakenings largely related to her work stress. Symptoms have been going on for a little over 4 weeks. Not much improvement with Lexapro yet and sleep is improving with using Ativan but she is trying to use this very sparingly and is still having difficulty sleeping when she is not using any medications.     ROS  Gen - no fever/chills  Resp - no dyspnea or cough  CV - no chest pain or CARRASQUILLO  Rest per HPI    Past Medical History:   Diagnosis Date    Asthma 3/12/2010    DDD (degenerative disc disease), lumbar     DJD (degenerative joint disease) 3/12/2010    DJD (degenerative joint disease) of knee     DVT (deep venous thrombosis) ([facility])     Rt leg    GERD (gastroesophageal reflux disease)     History of tuberculosis exposure 2013    pt states she has + PPD and was on Rx for 6 months; last dose either 11/13 or 12/13    Inflammatory polyarthritis ([address]     Psoriatic Arthitis    Morbid obesity ([address]     Post-thrombotic syndrome of right lower extremity 9/14/2017    Pseudogout     PUD (peptic ulcer disease)     Skin abrasion 1/28/14    After loosing 125#, Bilateral Inner thigh friction flareup monthly with skin breakdown    Thromboembolus ([address] 2008    Rt leg,  pt states she fell and had a plane ride home and developed blood clots    UC (ulcerative colitis) ([address] 3/12/2010     Past Surgical History:   Procedure Laterality Date    HX ACL RECONSTRUCTION      Rt    Devyn Bowman HX GI      reopening of rectal pouch    HX GYN  06/19/2017    Hysterectomy    HX ORTHOPAEDIC  12/13    Rt foot / toes    HX TONSILLECTOMY       Current Outpatient Medications on File Prior to Visit   Medication Sig Dispense Refill    warfarin (COUMADIN) 5 mg tablet TAKE 2 TABS BY MOUTH DAILY (Patient taking differently: TAKE 2 TABS BY MOUTH and 3 on Wednesday and Sunday) 60 Tab 1    furosemide (LASIX) 40 mg tablet TAKE 1 TABLET BY MOUTH TWICE DAILY AS NEEDED 180 Tab 1    triamcinolone (ARISTOCORT) 0.5 % topical cream Apply  to affected area two (2) times a day. use thin layer 15 g 0    ergocalciferol (ERGOCALCIFEROL) 50,000 unit capsule TAKE 1 CAPSULE BY MOUTH ONCE PER WEEK FOR A TOTAL OF 8 WEEKS. THEN CHANGE TO TWICE PER MONTH. 12 Cap 0    phentermine (ADIPEX-P) 37.5 mg tablet Take 1/2 tablet before breakfast and before dinner 100 Tab 5    topiramate (TOPAMAX) 50 mg tablet Take 1 Tab by mouth two (2) times a day. 60 Tab 5    folic acid (FOLVITE) 1 mg tablet TAKE 1 TABLET BY MOUTH ONCE A DAY  6    KLOR-CON M20 20 mEq tablet TAKE 1 TABLET BY MOUTH TWICE A DAY  0    CERTOLIZUMAB PEGOL (CIMZIA SC) by SubCUTAneous route. Injection:  Every 4 weeks      methotrexate (RHEUMATREX) 2.5 mg tablet TAKE 8 TABLETS BY MOUTH ONCE PER WEEK  2    ranitidine (ZANTAC) 150 mg tablet Take 150 mg by mouth two (2) times a day.       albuterol (PROVENTIL HFA, VENTOLIN HFA, PROAIR HFA) 90 mcg/actuation inhaler Take 1 Puff by inhalation every four (4) hours as needed for Wheezing. 1 Inhaler 5    acetaminophen (TYLENOL EXTRA STRENGTH) 500 mg tablet Take 1,000 mg by mouth every six (6) hours as needed for Pain. No current facility-administered medications on file prior to visit. Objective:     Vitals:    10/31/18 0747   BP: 119/66   Pulse: 76   Resp: 18   Temp: 98 °F (36.7 °C)   TempSrc: Oral   SpO2: 100%   Weight: 225 lb 6.4 oz (102.2 kg)   Height: 5' 5\" (1.651 m)     Physical Examination:  General appearance - alert, well appearing, and in no distress  Eyes -sclera anicteric  Chest - clear to auscultation, no wheezes, rales or rhonchi, symmetric air entry  Heart - normal rate, regular rhythm, normal S1, S2, no murmurs, rubs, clicks or gallops  Extr -RLE with swelling, warmth, and mildly tender to palpation. [facility]    Assessment/ Plan:   Diagnoses and all orders for this visit:    1. Chronic deep vein thrombosis (DVT) of distal vein of right lower extremity (HCC)  2. Monitoring for long-term anticoagulant use  -INR therapeutic, continue current Coumadin dose  -     AMB POC PT/INR    3. Anxiety and depression  4. Adjustment insomnia  -Seems to be improving slightly. Will Lexapro to 20 mg and refill Ativan for as needed use at bedtime in the short-term. -     LORazepam (ATIVAN) 1 mg tablet; Take 1 Tab by mouth nightly as needed for Anxiety. Max Daily Amount: 1 mg.  -     escitalopram oxalate (LEXAPRO) 20 mg tablet; Take 1 Tab by mouth daily. 5. Work-related stress  -     escitalopram oxalate (LEXAPRO) 20 mg tablet; Take 1 Tab by mouth daily. 6. Pain and swelling of right lower leg- getting lower extremity Dopplers to rule out DVT though this is less likely since patient is therapeutic on her Coumadin. She did have a significant risk factor with flight to [address] as well as history of 2 DVTs in the same leg.  -     DUPLEX LOWER EXT VENOUS RIGHT;  Future    I have discussed the diagnosis with the patient and the intended plan as seen in the above orders. The patient has received an after-visit summary and questions were answered concerning future plans. I have discussed medication side effects and warnings with the patient as well. The patient verbalizes understanding and agreement with the plan. Follow-up Disposition:  Return in about 4 weeks (around 11/28/2018).

## 2018-11-12 ENCOUNTER — HOSPITAL ENCOUNTER (OUTPATIENT)
Dept: ULTRASOUND IMAGING | Age: 48
Discharge: HOME | End: 2018-11-12
Attending: FAMILY MEDICINE
Payer: COMMERCIAL

## 2018-11-12 DIAGNOSIS — M79.89 PAIN AND SWELLING OF RIGHT LOWER LEG: ICD-10-CM

## 2018-11-12 DIAGNOSIS — M79.661 PAIN AND SWELLING OF RIGHT LOWER LEG: ICD-10-CM

## 2018-11-12 PROCEDURE — 93971 EXTREMITY STUDY: CPT

## 2018-11-16 DIAGNOSIS — E55.9 VITAMIN D DEFICIENCY: ICD-10-CM

## 2018-11-16 DIAGNOSIS — I82.401 ACUTE DEEP VEIN THROMBOSIS (DVT) OF RIGHT LOWER EXTREMITY, UNSPECIFIED VEIN (HCC): ICD-10-CM

## 2018-11-16 RX ORDER — ERGOCALCIFEROL 1.25 MG/1
CAPSULE ORAL
Qty: 12 CAP | Refills: 0 | Status: SHIPPED | OUTPATIENT
Start: 2018-11-16 | End: 2019-02-05 | Stop reason: SDUPTHER

## 2018-11-27 DIAGNOSIS — I82.431 ACUTE DEEP VEIN THROMBOSIS (DVT) OF POPLITEAL VEIN OF RIGHT LOWER EXTREMITY (HCC): ICD-10-CM

## 2018-11-27 RX ORDER — WARFARIN SODIUM 5 MG/1
TABLET ORAL
Qty: 60 TAB | Refills: 1 | Status: SHIPPED | OUTPATIENT
Start: 2018-11-27 | End: 2019-01-26 | Stop reason: SDUPTHER

## 2018-11-28 ENCOUNTER — OFFICE VISIT (OUTPATIENT)
Dept: ENDOCRINOLOGY | Age: 48
End: 2018-11-28

## 2018-11-28 VITALS
HEART RATE: 74 BPM | SYSTOLIC BLOOD PRESSURE: 134 MMHG | WEIGHT: 227.4 LBS | BODY MASS INDEX: 37.89 KG/M2 | HEIGHT: 65 IN | DIASTOLIC BLOOD PRESSURE: 78 MMHG

## 2018-11-28 DIAGNOSIS — E66.01 MORBID OBESITY, UNSPECIFIED OBESITY TYPE (HCC): Primary | ICD-10-CM

## 2018-11-28 RX ORDER — PEN NEEDLE, DIABETIC 30 GX3/16"
NEEDLE, DISPOSABLE MISCELLANEOUS
Qty: 1 PACKAGE | Refills: 11 | Status: SHIPPED | OUTPATIENT
Start: 2018-11-28 | End: 2018-12-03

## 2018-11-28 NOTE — PROGRESS NOTES
Chief Complaint   Patient presents with    Weight Management     pcp and pharmacy verified. Madison Bowens Blood sugar problem   Records since last visit reviewed. History of Present Illness: Suzanne Rm is a 46 y.o. female here for follow up of obesity. Her weight was 226 pounds in August 2018. We restarted her phentermine and topiramate and she was encouraged to continue the low carb dieting. Her weight today was 227 pounds. In October her weight was down to 217 pounds. Pt notes she was having issues of pain in her leg and she had a doppler, which was negative. Pt was started on Lexapro, which can cause weight gain. She also notes her job has been very stressful. She has been looking into Saxenda and is wondering if switching, could be a option. She she denies palpitations, tremors, CP, SOB, HA, AMS, or numbness. Pt is currently eating 2-3 meals per day. She has breakfast most morning. This AM she did not eat breakfast, \"I did not have an appetite today\". She will have lunch most days, yesterday she had a PB&J, no side items and water. She has dinner around 5-6PM, last night she had BBQ sandwich with rachelle slaw and ginger ale. Pt was previously followed by the RD to help with nutrition for weight loss at [facility]. Pt has no hx of gastric bypass. Pt has a hx of UC and Inflammatory polyarthropathy. She is still seeing Dr. Randy Mcfadden of Rhematology for Rheumatoid arthritis and Dr. Madison Bowens and Gomez Garzon of GI for UC. She has had complications to Embril (transaminitis) and Humira (injection site reaction). She had been struggling with issues of pelvic pain and her Gyn Dr. Rosemary Carpenter felt is was due to an ischemic uterus. On 6/20/17 she had Supracervical Abdominal Hysterectomy without Salpingo-Oophorectomy. She has post-op complications of ileus which prolonged her hospitalization. She had post-op abdominal pain and rectal bleeding.  She saw Dr. Madison Bowens who treated her for pouchitis and her pain has improve. She then developed LLE swelling and pain. She has hx of DVT x3. She continues to be on Coumadin for multiple and recurrent DVTs. She is followed by Dr. Eugene Solano for INR checks. She has not had any DVTs since October 2017. 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 She continues to follow with Dr. Farzad Nuñez. 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    Pseudogout     PUD (peptic ulcer disease)     Skin abrasion 1/28/14    After loosing 125#, Bilateral Inner thigh friction flareup monthly with skin breakdown    Thromboembolus ([address] 2008    Rt leg,  pt states she fell and had a plane ride home and developed blood clots    UC (ulcerative colitis) ([address] 3/12/2010     Past Surgical History:   Procedure Laterality Date    HX ACL RECONSTRUCTION      Rt    Margie Kerns HX GI      reopening of rectal pouch    HX GYN  06/19/2017    Hysterectomy    HX ORTHOPAEDIC  12/13    Rt foot / toes    HX TONSILLECTOMY       Current Outpatient Medications   Medication Sig    liraglutide (SAXENDA) 3 mg/0.5 mL (18 mg/3 mL) pen 0.5 mL by SubCUTAneous route daily.  Insulin Needles, Disposable, 31 gauge x 5/16\" ndle One shot daily    warfarin (COUMADIN) 5 mg tablet TAKE 2 TABS BY MOUTH DAILY    ergocalciferol (ERGOCALCIFEROL) 50,000 unit capsule TAKE 1 CAPSULE BY MOUTH ONCE PER WEEK FOR A TOTAL OF 8 WEEKS.  THEN CHANGE TO TWICE PER MONTH.    LORazepam (ATIVAN) 1 mg tablet Take 1 Tab by mouth nightly as needed for Anxiety. Max Daily Amount: 1 mg.  escitalopram oxalate (LEXAPRO) 20 mg tablet Take 1 Tab by mouth daily.  furosemide (LASIX) 40 mg tablet TAKE 1 TABLET BY MOUTH TWICE DAILY AS NEEDED    triamcinolone (ARISTOCORT) 0.5 % topical cream Apply  to affected area two (2) times a day. use thin layer    phentermine (ADIPEX-P) 37.5 mg tablet Take 1/2 tablet before breakfast and before dinner    topiramate (TOPAMAX) 50 mg tablet Take 1 Tab by mouth two (2) times a day.  folic acid (FOLVITE) 1 mg tablet TAKE 1 TABLET BY MOUTH ONCE A DAY    KLOR-CON M20 20 mEq tablet TAKE 1 TABLET BY MOUTH TWICE A DAY    CERTOLIZUMAB PEGOL (CIMZIA SC) by SubCUTAneous route. Injection:  Every 4 weeks    methotrexate (RHEUMATREX) 2.5 mg tablet TAKE 8 TABLETS BY MOUTH ONCE PER WEEK    ranitidine (ZANTAC) 150 mg tablet Take 150 mg by mouth two (2) times a day.  albuterol (PROVENTIL HFA, VENTOLIN HFA, PROAIR HFA) 90 mcg/actuation inhaler Take 1 Puff by inhalation every four (4) hours as needed for Wheezing.  acetaminophen (TYLENOL EXTRA STRENGTH) 500 mg tablet Take 1,000 mg by mouth every six (6) hours as needed for Pain. No current facility-administered medications for this visit.       Allergies   Allergen Reactions    Sulfa (Sulfonamide Antibiotics) Anaphylaxis    Codeine Itching     Family History   Problem Relation Age of Onset    Hypertension Mother     Thyroid Disease Mother         Hypothyroid    Diabetes Mother     Elevated Lipids Mother     Cancer Father         brain     Social History     Socioeconomic History    Marital status:      Spouse name: Not on file    Number of children: Not on file    Years of education: Not on file    Highest education level: Not on file   Social Needs    Financial resource strain: Not on file    Food insecurity - worry: Not on file    Food insecurity - inability: Not on file    Transportation needs - medical: Not on file   [employer] needs - non-medical: Not on file   Occupational History    Not on file   Tobacco Use    Smoking status: Never Smoker    Smokeless tobacco: Never Used   Substance and Sexual Activity    Alcohol use: Yes     Comment: occasionally- very rare    Drug use: No    Sexual activity: Yes     Partners: Male   Other Topics Concern    Not on file   Social History Narrative    Not on file     Review of Systems:  - Negative except as noted in HPI    Physical Examination:  Blood pressure 134/78, pulse 74, height 5' 5\" (1.651 m), weight 227 lb 6.4 oz (103.1 kg). - General: pleasant, no distress, good eye contact  - HEENT: no exopthalmos, no periorbital edema, no scleral/conjunctival injection, EOMI, no lid lag or stare  - Cardiovascular: regular, normal rate, normal S1 and S2, no murmurs/rubs/gallops,   - Respiratory: clear to auscultation bilaterally  - Musculoskeletal: no proximal muscle weakness in upper or lower extremities  - Integumentary: no acanthosis nigricans, no rashes, no edema,   - Neurological: reflexes 2+ at biceps, no tremor  - Psychiatric: normal mood and affect    Data Reviewed:   None    Assessment/Plan:   1) Obesity > Pt is interested in trying Saxenda and transitioning from the phentermine and topiramate. We discussed the mechanism of action of the GLP-1 agents and the risk vs benefits, including Nausea, pancreatitis and hypoglycemia. Pt given written information about Saxenda. Pt to keep working on the low carb and low calorie diet. I recommended 1200 calories per day. Pt voices understanding and agreement with the plan. RTC 3 months      Follow-up Disposition:  Return in about 3 months (around 2/28/2019). Copy sent to:  Vivi Boss and Elle Lopez.

## 2018-11-28 NOTE — PATIENT INSTRUCTIONS
Liraglutide (By injection)   Liraglutide ([id number]-[id number]-tide)  Treats type 2 diabetes and helps with weight loss in certain patients. Also reduces the risk of heart attacks and strokes in patients with type 2 diabetes and heart or blood vessel disease. Brand Name(s): Saxenda, Victoza   There may be other brand names for this medicine. When This Medicine Should Not Be Used: This medicine is not right for everyone. Do not use it if you had an allergic reaction to liraglutide, or if you have multiple endocrine neoplasia syndrome type 2 (MEN 2) or if you or anyone in your family had medullary thyroid cancer. Tell your doctor if you are pregnant or have become pregnant while you are using this medicine. How to Use This Medicine:   Injectable  · Your doctor will prescribe your exact dose and tell you how often it should be given. This medicine is given as a shot under the skin of your stomach, thighs, or upper arms. · If you use insulin in addition to this medicine, do not mix them into the same syringe. You may give the shots in the same area (including your stomach), but do not give the shots right next to each other. · You may be taught how to give your medicine at home. Make sure you understand all instructions before giving yourself an injection. Do not use more medicine or use it more often than your doctor tells you to. · Check the liquid in the pen. It should be clear and colorless. Do not use it if it is cloudy, discolored, or has particles in it. · You will be shown the body areas where this shot can be given. Use a different body area each time you give yourself a shot. Keep track of where you give each shot to make sure you rotate body areas. · Use a new needle and syringe each time you inject your medicine. · Never share medicine pens with others under any circumstances. Sharing needles or pens can result in transmission of infection.   · Drink extra fluids so you will urinate more often and help prevent kidney problems. · This medicine should come with a Medication Guide. Ask your pharmacist for a copy if you do not have one. · Missed dose: If you miss a dose of this medicine, use it as soon as you remember. Then take your next dose at your usual time. Never take extra medicine to make up for a missed dose. If you miss a dose for 3 days or more, call your doctor to talk about how to restart your treatment. · Store your new, unused medicine pen in its original carton in the refrigerator. Protect it from light. Do not freeze this medicine or use it if it has been frozen. You may store the opened medicine pen in the refrigerator or at room temperature for 30 days. Throw away your used pen after 30 days, even if it still has medicine in it. Always remove the needle from the pen before you store it. · Throw away used needles in a hard, closed container that the needles cannot poke through. Keep this container away from children and pets. Drugs and Foods to Avoid:      Ask your doctor or pharmacist before using any other medicine, including over-the-counter medicines, vitamins, and herbal products. Warnings While Using This Medicine:   · Tell your doctor if you are breastfeeding, or if you have kidney disease, liver disease, digestion problems (including gastroparesis), gallbladder disease, or a history of pancreas problems, depression, or angioedema (swelling of the arms, face, hands, mouth, or throat). · Do not use Saxenda® if you are also using Victoza®. They contain the same medicine. · This medicine may cause the following problems:   ¨ Increased risk for thyroid tumors  ¨ Pancreatitis  ¨ Low blood sugar  ¨ Kidney problems  ¨ Gallbladder problems, including gallstones  ¨ Thoughts of hurting yourself [facility] [place]der[place]ott)  · Your doctor will do lab tests at regular visits to check on the effects of this medicine. Keep all appointments. · Keep all medicine out of the reach of children.  Never share your medicine with anyone. Possible Side Effects While Using This Medicine:   Call your doctor right away if you notice any of these side effects:  · Allergic reaction: Itching or hives, swelling in your face or hands, swelling or tingling in your mouth or throat, chest tightness, trouble breathing  · Change in how much or how often you urinate, painful or burning urination  · Feeling sad or depressed, thoughts of suicide, unusual changes in mood or behavior  · Shaking, trembling, sweating, fast or pounding heartbeat, fainting, hunger, confusion  · Sudden and severe stomach pain, nausea, vomiting, fever, lightheadedness  · Trouble breathing or swallowing, a lump in your neck, hoarseness when speaking  · Yellow skin or eyes  If you notice these less serious side effects, talk with your doctor:   · Decreased appetite  · Diarrhea, constipation, stomach upset  · Dizziness  · Headache  · Redness, itching, swelling, or any changes in your skin where the shot was given  If you notice other side effects that you think are caused by this medicine, tell your doctor. Call your doctor for medical advice about side effects. You may report side effects to FDA at 5-427-FDA-1479  © 2017 [address] Information is for End User's use only and may not be sold, redistributed or otherwise used for commercial purposes. The above information is an  only. It is not intended as medical advice for individual conditions or treatments. Talk to your doctor, nurse or pharmacist before following any medical regimen to see if it is safe and effective for you.

## 2018-11-30 ENCOUNTER — OFFICE VISIT (OUTPATIENT)
Dept: FAMILY MEDICINE CLINIC | Age: 48
End: 2018-11-30

## 2018-11-30 VITALS
RESPIRATION RATE: 16 BRPM | HEIGHT: 65 IN | HEART RATE: 75 BPM | OXYGEN SATURATION: 100 % | TEMPERATURE: 98.1 F | BODY MASS INDEX: 38.99 KG/M2 | WEIGHT: 234 LBS | DIASTOLIC BLOOD PRESSURE: 76 MMHG | SYSTOLIC BLOOD PRESSURE: 119 MMHG

## 2018-11-30 DIAGNOSIS — F32.A ANXIETY AND DEPRESSION: Primary | ICD-10-CM

## 2018-11-30 DIAGNOSIS — Z51.81 MONITORING FOR LONG-TERM ANTICOAGULANT USE: ICD-10-CM

## 2018-11-30 DIAGNOSIS — I82.5Z1 CHRONIC DEEP VEIN THROMBOSIS (DVT) OF DISTAL VEIN OF RIGHT LOWER EXTREMITY (HCC): ICD-10-CM

## 2018-11-30 DIAGNOSIS — Z56.6 WORK-RELATED STRESS: ICD-10-CM

## 2018-11-30 DIAGNOSIS — Z79.01 MONITORING FOR LONG-TERM ANTICOAGULANT USE: ICD-10-CM

## 2018-11-30 DIAGNOSIS — R45.850 HOMICIDAL IDEATION: ICD-10-CM

## 2018-11-30 DIAGNOSIS — F41.9 ANXIETY AND DEPRESSION: Primary | ICD-10-CM

## 2018-11-30 LAB
INR BLD: 3.5
PT POC: NORMAL SECONDS
VALID INTERNAL CONTROL?: YES

## 2018-11-30 RX ORDER — BUPROPION HYDROCHLORIDE 100 MG/1
TABLET ORAL
Qty: 90 TAB | Refills: 0 | Status: SHIPPED | OUTPATIENT
Start: 2018-11-30 | End: 2018-12-03 | Stop reason: ALTCHOICE

## 2018-11-30 SDOH — HEALTH STABILITY - MENTAL HEALTH: OTHER PHYSICAL AND MENTAL STRAIN RELATED TO WORK: Z56.6

## 2018-11-30 NOTE — PROGRESS NOTES
Appointment scheduled with Dr. Peyman Roberts (PS) for Monday, December 3, 2018 at 1:30 PM (spoke with Katarzyna)  patient informed via Dr. Ileana Tolentino she acknowledged understanding.

## 2018-11-30 NOTE — PROGRESS NOTES
Subjective:     Chief Complaint   Patient presents with   [address] [place] [address]      She  is a 46 y.o. female who presents for evaluation of:  Anxiety and work-related stress- Still dealing with major work-related stress. Still having daily crying spells, decreased energy, decreased concentration and attention, decreased appetite. Denies any suicidal ideation BUT recently endorses homicidal ideation. She has come up with a plan and considered putting antifreeze in the coffee of her coworkers/pulses who are creating much of her stress. She has had significant issues with sleeping with frequent awakenings largely related to her work stress. Symptoms have been going on for ~2 months. Not much improvement with Lexapro at 20 mg daily and sleep is improving with using Ativan but she is trying to use this very sparingly and is still having difficulty sleeping when she is not using any medications. Anti-coag - DVT in R leg after gyn sgy and chronically on coumadin now. Has had DVT in same leg x 2 in past.   Ct to struggle with post thrombotic syndrome from numerous DVTs. Pain flares up at different times and is much worse with edema. She continues to manage this by resting her leg and elevating it.   Rest per anti-coag tab    ROS  Gen - no fever/chills  Resp - no dyspnea or cough  CV - no chest pain or CARRASQUILLO  Rest per HPI    Past Medical History:   Diagnosis Date    Asthma 3/12/2010    DDD (degenerative disc disease), lumbar     DJD (degenerative joint disease) 3/12/2010    DJD (degenerative joint disease) of knee     [facility]     Rt leg    GERD (gastroesophageal reflux disease)     History of tuberculosis exposure 2013    pt states she has + PPD and was on Rx for 6 months; last dose either 11/13 or 12/13    Inflammatory polyarthritis ([address]     Psoriatic Arthitis    Morbid obesity ([address]     Post-thrombotic syndrome of right lower extremity 9/14/2017    Pseudogout     PUD (peptic ulcer disease)     Skin abrasion 1/28/14    After loosing 125#, Bilateral Inner thigh friction flareup monthly with skin breakdown    Thromboembolus ([facility] 2008    Rt leg,  pt states she fell and had a plane ride home and developed blood clots    UC (ulcerative colitis) ([facility] 3/12/2010     Past Surgical History:   Procedure Laterality Date    HX ACL RECONSTRUCTION      Rt    [place]    Dr Candy Child HX GI      reopening of rectal pouch    HX GYN  06/19/2017    Hysterectomy    HX ORTHOPAEDIC  12/13    Rt foot / toes    HX TONSILLECTOMY       Current Outpatient Medications on File Prior to Visit   Medication Sig Dispense Refill    warfarin (COUMADIN) 5 mg tablet TAKE 2 TABS BY MOUTH DAILY 60 Tab 1    ergocalciferol (ERGOCALCIFEROL) 50,000 unit capsule TAKE 1 CAPSULE BY MOUTH ONCE PER WEEK FOR A TOTAL OF 8 WEEKS. THEN CHANGE TO TWICE PER MONTH. 12 Cap 0    LORazepam (ATIVAN) 1 mg tablet Take 1 Tab by mouth nightly as needed for Anxiety. Max Daily Amount: 1 mg. 30 Tab 0    escitalopram oxalate (LEXAPRO) 20 mg tablet Take 1 Tab by mouth daily. 90 Tab 0    furosemide (LASIX) 40 mg tablet TAKE 1 TABLET BY MOUTH TWICE DAILY AS NEEDED 180 Tab 1    phentermine (ADIPEX-P) 37.5 mg tablet Take 1/2 tablet before breakfast and before dinner 100 Tab 5    topiramate (TOPAMAX) 50 mg tablet Take 1 Tab by mouth two (2) times a day. 60 Tab 5    folic acid (FOLVITE) 1 mg tablet TAKE 1 TABLET BY MOUTH ONCE A DAY  6    KLOR-CON M20 20 mEq tablet TAKE 1 TABLET BY MOUTH TWICE A DAY  0    CERTOLIZUMAB PEGOL (CIMZIA SC) by SubCUTAneous route. Injection:  Every 4 weeks      methotrexate (RHEUMATREX) 2.5 mg tablet TAKE 8 TABLETS BY MOUTH ONCE PER WEEK  2    ranitidine (ZANTAC) 150 mg tablet Take 150 mg by mouth two (2) times a day.  albuterol (PROVENTIL HFA, VENTOLIN HFA, PROAIR HFA) 90 mcg/actuation inhaler Take 1 Puff by inhalation every four (4) hours as needed for Wheezing.  1 Inhaler 5    acetaminophen (TYLENOL EXTRA STRENGTH) 500 mg tablet Take 1,000 mg by mouth every six (6) hours as needed for Pain. No current facility-administered medications on file prior to visit. Objective:     Vitals:    11/30/18 0756   BP: 119/76   Pulse: 75   Resp: 16   Temp: 98.1 °F (36.7 °C)   TempSrc: Oral   SpO2: 100%   Weight: 234 lb (106.1 kg)   Height: 5' 5\" (1.651 m)     Physical Examination:  General appearance - alert, well appearing, and in no distress  Eyes -sclera anicteric  Chest - clear to auscultation, no wheezes, rales or rhonchi, symmetric air entry  Heart - normal rate, regular rhythm, normal S1, S2, no murmurs, rubs, clicks or gallops  Psych - nml mood and affect, tearful when discussing work stresses    Assessment/ Plan:   Diagnoses and all orders for this visit:    1. Anxiety and depression  2. Homicidal ideation  3. Work-related stress  - Significant concerns with homicidal ideation with a plan. She has seemingly good reasons to not act on this plan. Continue Lexapro 20 mg and will add on Wellbutrin today. Also sent to psychiatry for urgent evaluation given new HI and setting up with therapy. May need to end up on atypical antipsychotics. -     REFERRAL TO PSYCHIATRY  -     REFERRAL TO PSYCHOLOGY    4. Chronic deep vein thrombosis (DVT) of distal vein of right lower extremity (HCC)  5. Monitoring for long-term anticoagulant use  -INR supratherapeutic today we will make no changes given recent normal INRs. -     AMB POC PT/INR    I have discussed the diagnosis with the patient and the intended plan as seen in the above orders. The patient has received an after-visit summary and questions were answered concerning future plans. I have discussed medication side effects and warnings with the patient as well. The patient verbalizes understanding and agreement with the plan. Follow-up Disposition:  Return in about 5 days (around 12/5/2018).

## 2018-11-30 NOTE — PROGRESS NOTES
Chief Complaint   Patient presents with    Follow-up     PTINR       1. Have you been to the ER, urgent care clinic since your last visit? Hospitalized since your last visit? no    2. Have you seen or consulted any other health care providers outside of the [address] [place] [address] since your last visit? Include any pap smears or colon screening.  Yes Rheumatologist

## 2018-11-30 NOTE — PATIENT INSTRUCTIONS
For your anxiety and depression, I am setting you up with a psychiatrist to see you rapidly. I would also like you to see a therapist -please try [facility]. The phone number is: [phone]    We will continue your medication with Lexapro 20 mg daily and add on Wellbutrin 100 mg twice daily for the first 3 days and then increasing to 100 mg 3 times daily. Please continue using Ativan as needed when you are having severe anxiety symptoms including being overwhelmed, severe crying spells, feeling like you are about to have a panic attack. Lastly and most importantly, we have contracted together to address your thoughts of hurting others. If you have any more concerning thoughts and feel like he may act on these thoughts of hurting others, he will need to come into the office and I will see you the same day. Do not worry about calling or trying to get an appointment but just come to the office.   I am also giving you the phone number for crisis intervention:    Genna FAULKNER [place] [address] Emergency Services: [phone]

## 2018-12-03 ENCOUNTER — OFFICE VISIT (OUTPATIENT)
Dept: BEHAVIORAL/MENTAL HEALTH CLINIC | Age: 48
End: 2018-12-03

## 2018-12-03 VITALS
SYSTOLIC BLOOD PRESSURE: 124 MMHG | BODY MASS INDEX: 38.99 KG/M2 | WEIGHT: 234 LBS | HEIGHT: 65 IN | DIASTOLIC BLOOD PRESSURE: 89 MMHG | HEART RATE: 75 BPM

## 2018-12-03 DIAGNOSIS — G47.00 INSOMNIA, UNSPECIFIED TYPE: ICD-10-CM

## 2018-12-03 DIAGNOSIS — F33.2 SEVERE EPISODE OF RECURRENT MAJOR DEPRESSIVE DISORDER, WITHOUT PSYCHOTIC FEATURES (HCC): Primary | ICD-10-CM

## 2018-12-03 DIAGNOSIS — F41.1 GENERALIZED ANXIETY DISORDER: ICD-10-CM

## 2018-12-03 RX ORDER — TRAZODONE HYDROCHLORIDE 50 MG/1
50 TABLET ORAL
Qty: 30 TAB | Refills: 2 | Status: SHIPPED | OUTPATIENT
Start: 2018-12-03 | End: 2018-12-31

## 2018-12-03 RX ORDER — BUPROPION HYDROCHLORIDE 150 MG/1
150 TABLET ORAL
Qty: 30 TAB | Refills: 2 | Status: SHIPPED | OUTPATIENT
Start: 2018-12-03 | End: 2018-12-31 | Stop reason: SDUPTHER

## 2018-12-03 NOTE — PROGRESS NOTES
Wilman Vasquez  1970  46 y.o.  female  [address]    Chief Complaint   Patient presents with    Depression     PHQ 9 is score of 14 reporting very difficult symptoms    Anxiety     Mostly generalized anxiety symptoms with a score of 33 on Cooper anxiety rating scale indicating severe anxiety    Insomnia     Both initial and middle more so middle to late.  Medication Problem     Taking Lexapro in morning with sedation and Wellbutrin at nighttime which could be interfering with sleep       Pala:   This is a 46years old  -American female with past psychiatric history and treatment of depression and anxiety who is referred by her PCP for psychiatric evaluation and medication management. She presented on time, was alert awake oriented to time person and place and was calm and cooperative, polite, and pleasant during the interview. She was able to provide pertinent history and was able to discuss treatment alternatives and decide about the plan. Patient reported compliance with Lexapro 20 mg in the morning, noncompliance with Wellbutrin 100 mg 3 times a day, and is taking Ativan 1 mg at bedtime mostly to sleep prescribed by PCP with advised not to take it on a regular basis. She report getting bullied in his school due to her significant weight and report stress eating especially when depressed most of her life. Patient report moderate symptoms of depression with a score of 14 on PHQ 9 reporting very difficult symptoms of overeating and weight gain and trouble concentrating every day, depression and trouble with sleep more than half of the days, and several days of anhedonia, feeling tired, feeling bad about herself, and restless and fidgety. She does fulfill criteria for major depression currently moderate severity and reported to her first episode at age 26 when she was going through psychosocial stress.       The patient is scored 33 indicating severe anxiety on Cooper anxiety rating scale and provide diagnostic criteria for generalized anxiety and her mood disorder questionnaire is negative and she denies any episode of kallie or hypomania, denies any hallucination, denies any PTSD or OCD symptoms ever. She was provided with support and psychoeducation, and after discussing risk/benefits/expectations with treatment alternatives available, patient decided to change from Wellbutrin 100 mg 3 times a day to Wellbutrin  mg daily, will take Lexapro 20 mg at bedtime together with trazodone 25-75 mg at bedtime for sleep and will try not to take Ativan and will return in 4 weeks for reevaluation. PAST PSYCHIATRIC HISTORY:  Patient denies any acute inpatient psychiatric hospitalization, any substance abuse detox or rehab, and denies any suicide attempt ever. FAMILY HISTORY:  Any of first degree relatives including biological parents, siblings, first cousins on both sides, uncle/aunt on both sides, and grand parents on both sides have been diagnosed or treated for any mental illness, substance abuse or attempted/commited suicide. Patient report history of mental illness and siblings of maternal grandmother and substance abuse history and cousins on both sides of her family. SUBSTANCE USE HISTORY:   TOBACCO: Never smoke. ALCOHOL, CANNABIS, COCAINE, OPIOIDS and OTHERS: Patient denies. MEDICAL HISTORY:    has a past medical history of Asthma, DDD (degenerative disc disease), lumbar, DJD (degenerative joint disease), DJD (degenerative joint disease) of knee, DVT (deep venous thrombosis) ([address] GERD (gastroesophageal reflux disease), History of tuberculosis exposure, Inflammatory polyarthritis ([address] Morbid obesity ([address] Post-thrombotic syndrome of right lower extremity, Pseudogout, PUD (peptic ulcer disease), Skin abrasion, Thromboembolus ([address] and UC (ulcerative colitis) ([address]     ALLERGIES:   Allergies   Allergen Reactions    Sulfa (Sulfonamide Antibiotics) Anaphylaxis    Codeine Itching       VITAL SIGNS:  /89   Pulse 75   Ht 5' 5\" (1.651 m)   Wt 106.1 kg (234 lb)   LMP  (LMP Unknown) Comment: IUD  BMI 38.94 kg/m²     Current Outpatient Medications   Medication Sig Dispense Refill    buPROPion XL (WELLBUTRIN XL) 150 mg tablet Take 1 Tab by mouth every morning. 30 Tab 2    traZODone (DESYREL) 50 mg tablet Take 1 Tab by mouth nightly. 30 Tab 2    warfarin (COUMADIN) 5 mg tablet TAKE 2 TABS BY MOUTH DAILY 60 Tab 1    ergocalciferol (ERGOCALCIFEROL) 50,000 unit capsule TAKE 1 CAPSULE BY MOUTH ONCE PER WEEK FOR A TOTAL OF 8 WEEKS. THEN CHANGE TO TWICE PER MONTH. 12 Cap 0    LORazepam (ATIVAN) 1 mg tablet Take 1 Tab by mouth nightly as needed for Anxiety. Max Daily Amount: 1 mg. 30 Tab 0    escitalopram oxalate (LEXAPRO) 20 mg tablet Take 1 Tab by mouth daily. 90 Tab 0    furosemide (LASIX) 40 mg tablet TAKE 1 TABLET BY MOUTH TWICE DAILY AS NEEDED 180 Tab 1    phentermine (ADIPEX-P) 37.5 mg tablet Take 1/2 tablet before breakfast and before dinner 100 Tab 5    topiramate (TOPAMAX) 50 mg tablet Take 1 Tab by mouth two (2) times a day. 60 Tab 5    folic acid (FOLVITE) 1 mg tablet TAKE 1 TABLET BY MOUTH ONCE A DAY  6    KLOR-CON M20 20 mEq tablet TAKE 1 TABLET BY MOUTH TWICE A DAY  0    CERTOLIZUMAB PEGOL (CIMZIA SC) by SubCUTAneous route. Injection:  Every 4 weeks      methotrexate (RHEUMATREX) 2.5 mg tablet TAKE 8 TABLETS BY MOUTH ONCE PER WEEK  2    ranitidine (ZANTAC) 150 mg tablet Take 150 mg by mouth two (2) times a day.  albuterol (PROVENTIL HFA, VENTOLIN HFA, PROAIR HFA) 90 mcg/actuation inhaler Take 1 Puff by inhalation every four (4) hours as needed for Wheezing. 1 Inhaler 5    acetaminophen (TYLENOL EXTRA STRENGTH) 500 mg tablet Take 1,000 mg by mouth every six (6) hours as needed for Pain.          ROS:  Constitutional: Positive for fatigue and weight gain  Eyes: Positive for glasses  ENT: Negative for hearing loss and tinnitus  Respiratory: Negative for cough or wheezing  Cardiovascular: Negative for chest pain, palpitations  Gastrointestinal: Negative for reflux symptoms and constipation  Genitourinary: Negative for frequency and urinary incontinence  Musculoskeletal: Negative for myalgias and arthralgias  Neurological: Positive for memory problems  Behavioral/psych: Positive for insomnia, anxiety, depression, negative for SI/HI  Endocrine: Negative for diabetic symptoms including polyuria and polydipsia      LEGAL HISTORY:  Patient denies. SOCIAL HISTORY:  Patient was born and raised in [employer] [place] [employer] by  parents until age 9 when her father  at age 25 because of brain cancer. History of childhood abuse: Reported excellent childhood and denies any abuse. Education: She has masters in education from [employer] in  and she has certificate and educational leadership from [employer] in . She is working on her PhD and has approximately 3 years left as she is doing part-time.  history: None. Marriage and children:  for 25 years and no children. Zoroastrian/Sprituality: Practicing Denominational.     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 She is working with [employer] for past 20 years in various capacity. She made any statement to her PCP about plan to poison her coworkers who are disrespecting her but said that she was venting and will never do such a thing. She understand that it is over duty to call police if we think that she is serious about doing such a crime.     MENTAL STATUS EXAMINATION:   Patient is a 46 y.o. female [address] who looks slightly older than her stated age. She cried most of the visit especially while talking about her issues at work. She is morbidly obese and was dressed appropriately with good hygiene. Speech is regular rate and rhythm, fluent language, and thought process is linear, logical, and goal directed. Reported mood is depressed and anxious with mood congruent depressed and anxious affect. Patient denies any suicidal ideation, homicidal ideation, and auditory visual hallucination. Not observed to be delusional or paranoid. Insight, judgement, reliability and impulse control is limited to intact. Cognition was within normal limit and patient was observed to be reliable. DIAGNOSIS:  Encounter Diagnoses   Name Primary?  Severe episode of recurrent major depressive disorder, without psychotic features ([facility] Yes    Generalized anxiety disorder     Insomnia, unspecified type        PLAN:  1. MEDICATION:   1. Patient agreed to take Wellbutrin  mg daily. 2.   Patient agreed to take Lexapro 20 mg at bedtime. 3.  Trazodone 25-75 mg at bedtime as needed for insomnia. She is currently taking Ativan 1 mg at bedtime for sleep and agreed to limit taking Ativan as with the help of trazodone and Lexapro at bedtime she should be able to sleep together with the effect of Wellbutrin  mg daily. She was provided with psycho education, discussed risk/benefits, and expectations from medication changes. Patient agrees with plan. 2. PSYCHOTHERAPY: Patient was provided with supportive therapy, strongly encourage to seek psychotherapy. She was provided with contact information for family focus with the strong encouragement to get into therapy as soon as possible, discuss getting supportive therapy first and after medication is stabilization she may transition to CBT. 3. MEDICAL CARE: Patient was strongly encourage to take their medical medications and follow up with their PCP on regular basis.   Her weight today is 234 pounds and she is taking 3 medication for weight loss but is still gaining 7 pounds and past 1 week. She reports stress eating and now eating all the time. 4. SUBSTANCE ABUSE CARE: Patient patient never smoke, denies drinking, denies abusing any illicit drugs. 5. FOLLOW UP:   Follow-up Disposition:  Return in about 4 weeks (around 12/31/2018) for Medication management. Patient was seen face-to-face for more than 45 minutes and more than half of the time spent in counseling.

## 2018-12-05 ENCOUNTER — OFFICE VISIT (OUTPATIENT)
Dept: FAMILY MEDICINE CLINIC | Age: 48
End: 2018-12-05

## 2018-12-05 VITALS
OXYGEN SATURATION: 90 % | WEIGHT: 240.8 LBS | RESPIRATION RATE: 16 BRPM | BODY MASS INDEX: 40.12 KG/M2 | HEIGHT: 65 IN | TEMPERATURE: 96.8 F | SYSTOLIC BLOOD PRESSURE: 116 MMHG | DIASTOLIC BLOOD PRESSURE: 52 MMHG | HEART RATE: 72 BPM

## 2018-12-05 DIAGNOSIS — I87.001 POST-THROMBOTIC SYNDROME OF RIGHT LOWER EXTREMITY: ICD-10-CM

## 2018-12-05 DIAGNOSIS — F41.9 ANXIETY AND DEPRESSION: Primary | ICD-10-CM

## 2018-12-05 DIAGNOSIS — Z51.81 MONITORING FOR LONG-TERM ANTICOAGULANT USE: ICD-10-CM

## 2018-12-05 DIAGNOSIS — Z56.6 WORK-RELATED STRESS: ICD-10-CM

## 2018-12-05 DIAGNOSIS — F32.A ANXIETY AND DEPRESSION: Primary | ICD-10-CM

## 2018-12-05 DIAGNOSIS — Z79.01 MONITORING FOR LONG-TERM ANTICOAGULANT USE: ICD-10-CM

## 2018-12-05 DIAGNOSIS — I82.5Z1 CHRONIC DEEP VEIN THROMBOSIS (DVT) OF DISTAL VEIN OF RIGHT LOWER EXTREMITY (HCC): ICD-10-CM

## 2018-12-05 DIAGNOSIS — R45.850 HOMICIDAL IDEATION: ICD-10-CM

## 2018-12-05 LAB
INR BLD: 1.9
PT POC: NORMAL SECONDS
VALID INTERNAL CONTROL?: YES

## 2018-12-05 SDOH — HEALTH STABILITY - MENTAL HEALTH: OTHER PHYSICAL AND MENTAL STRAIN RELATED TO WORK: Z56.6

## 2018-12-05 NOTE — PROGRESS NOTES
Chief Complaint   Patient presents with    Follow-up     PT/INR   1. Have you been to the ER, urgent care clinic since your last visit? Hospitalized since your last visit? No    2. Have you seen or consulted any other health care providers outside of the [facility] since your last visit? Include any pap smears or colon screening.  No

## 2018-12-05 NOTE — PROGRESS NOTES
Subjective:     Chief Complaint   Patient presents with    Follow-up     PT/INR      She  is a 46 y.o. female who presents for evaluation of:  Anxiety and depression - Was able to see Dr. Viktoriya Betts for psych eval and had meds adjusted (Lexapro 20 mg to pm, starting Trazodone at PM, and switching from Wellbutrin 100 mg TID which was started last week to Wellbutrin  mg daily). She has not noticed much improvement in her symptoms yet - changes were made about 5 days ago. Sx x ~2-3 months. Still dealing with major work-related stress. Still having daily crying spells, decreased energy, decreased concentration and attention, decreased appetite. Denies any suicidal ideation BUT recently endorses homicidal ideation. She is come up with a plan and considered putting antifreeze in the coffee of her coworkers/pulses who are creating much of her stress. Anti-coag - DVT in R leg after gyn sgy and chronically on coumadin now. Has had DVT in same leg x 2 in past.   Ct to struggle with post thrombotic syndrome from numerous DVTs. Pain flares up at different times and is much worse with edema. She continues to manage this by resting her leg and elevating it.   Rest per anti-coag tab    ROS  Gen - no fever/chills  Resp - no dyspnea or cough  CV - no chest pain or CARRASQUILLO  Rest per HPI    Past Medical History:   Diagnosis Date    Asthma 3/12/2010    DDD (degenerative disc disease), lumbar     DJD (degenerative joint disease) 3/12/2010    DJD (degenerative joint disease) of knee     DVT [facility]deep venous thrombosis) ([facility])     Rt leg    GERD (gastroesophageal reflux disease)     History of tuberculosis exposure 2013    pt states she has + PPD and was on Rx for 6 months; last dose either 11/13 or 12/13    Inflammatory polyarthritis ([address]     Psoriatic Arthitis    Morbid obesity ([address]     Post-thrombotic syndrome of right lower extremity 9/14/2017    Pseudogout     PUD (peptic ulcer disease)     Skin abrasion 1/28/14    After loosing 125#, Bilateral Inner thigh friction flareup monthly with skin breakdown    Thromboembolus ([facility] 2008    Rt leg,  pt states she fell and had a plane ride home and developed blood clots    UC (ulcerative colitis) ([facility] 3/12/2010     Past Surgical History:   Procedure Laterality Date    HX ACL RECONSTRUCTION      Rt    [facility] Child HX GI      reopening of rectal pouch    HX GYN  06/19/2017    Hysterectomy    HX ORTHOPAEDIC  12/13    Rt foot / toes    HX TONSILLECTOMY       Current Outpatient Medications on File Prior to Visit   Medication Sig Dispense Refill    buPROPion XL (WELLBUTRIN XL) 150 mg tablet Take 1 Tab by mouth every morning. 30 Tab 2    traZODone (DESYREL) 50 mg tablet Take 1 Tab by mouth nightly. 30 Tab 2    warfarin (COUMADIN) 5 mg tablet TAKE 2 TABS BY MOUTH DAILY 60 Tab 1    ergocalciferol (ERGOCALCIFEROL) 50,000 unit capsule TAKE 1 CAPSULE BY MOUTH ONCE PER WEEK FOR A TOTAL OF 8 WEEKS. THEN CHANGE TO TWICE PER MONTH. 12 Cap 0    LORazepam (ATIVAN) 1 mg tablet Take 1 Tab by mouth nightly as needed for Anxiety. Max Daily Amount: 1 mg. 30 Tab 0    escitalopram oxalate (LEXAPRO) 20 mg tablet Take 1 Tab by mouth daily. 90 Tab 0    furosemide (LASIX) 40 mg tablet TAKE 1 TABLET BY MOUTH TWICE DAILY AS NEEDED 180 Tab 1    phentermine (ADIPEX-P) 37.5 mg tablet Take 1/2 tablet before breakfast and before dinner 100 Tab 5    topiramate (TOPAMAX) 50 mg tablet Take 1 Tab by mouth two (2) times a day. 60 Tab 5    folic acid (FOLVITE) 1 mg tablet TAKE 1 TABLET BY MOUTH ONCE A DAY  6    KLOR-CON M20 20 mEq tablet TAKE 1 TABLET BY MOUTH TWICE A DAY  0    CERTOLIZUMAB PEGOL (CIMZIA SC) by SubCUTAneous route. Injection:  Every 4 weeks      methotrexate (RHEUMATREX) 2.5 mg tablet TAKE 8 TABLETS BY MOUTH ONCE PER WEEK  2    ranitidine (ZANTAC) 150 mg tablet Take 150 mg by mouth two (2) times a day.       albuterol (PROVENTIL HFA, VENTOLIN HFA, PROAIR HFA) 90 mcg/actuation inhaler Take 1 Puff by inhalation every four (4) hours as needed for Wheezing. 1 Inhaler 5    acetaminophen (TYLENOL EXTRA STRENGTH) 500 mg tablet Take 1,000 mg by mouth every six (6) hours as needed for Pain. No current facility-administered medications on file prior to visit. Objective:     Vitals:    12/05/18 0728   BP: 116/52   Pulse: 72   Resp: 16   Temp: 96.8 °F (36 °C)   TempSrc: Oral   SpO2: 90%   Weight: 240 lb 12.8 oz (109.2 kg)   Height: 5' 5\" (1.651 m)     Physical Examination:  General appearance - alert, well appearing, and in no distress  Eyes -sclera anicteric  Chest - clear to auscultation, no wheezes, rales or rhonchi, symmetric air entry  Heart - normal rate, regular rhythm, normal S1, S2, no murmurs, rubs, clicks or gallops  Extr -RLE with swelling, warmth, and mildly tender to palpation. Negative Homans  Psych - nml mood and affect, tearful about work stress    Assessment/ Plan:   Diagnoses and all orders for this visit:    1. Anxiety and depression  2. Homicidal ideation  3. Work-related stress  - Appreciate Psych input and med adjustments. We discussed sx today and specifically her fleeting HI with plan. She feels like she is trying to avoid thinking about this. Last had these HI thoughts at our last appt 1 week ago. Has appt today for psychology/therapy. 4. Chronic deep vein thrombosis (DVT) of distal vein of right lower extremity ([address]  5. Monitoring for long-term anticoagulant use  - subtherapeutic INR today after supratherapeutic at last appt. No change and f/u again in 2 weeks. -     AMB POC PT/INR    6. Post-thrombotic syndrome of right lower extremity - encouraged monitoring    I have discussed the diagnosis with the patient and the intended plan as seen in the above orders. The patient has received an after-visit summary and questions were answered concerning future plans.   I have discussed medication side effects and warnings with the patient as well. The patient verbalizes understanding and agreement with the plan. Follow-up Disposition:  Return in about 2 weeks (around 12/19/2018), or if symptoms worsen or fail to improve.

## 2018-12-07 ENCOUNTER — HOSPITAL ENCOUNTER (OUTPATIENT)
Dept: GENERAL RADIOLOGY | Age: 48
Discharge: HOME | End: 2018-12-07
Payer: COMMERCIAL

## 2018-12-07 DIAGNOSIS — M25.571 ACUTE RIGHT ANKLE PAIN: ICD-10-CM

## 2018-12-07 PROCEDURE — 73610 X-RAY EXAM OF ANKLE: CPT

## 2018-12-13 ENCOUNTER — OFFICE VISIT (OUTPATIENT)
Dept: FAMILY MEDICINE CLINIC | Age: 48
End: 2018-12-13

## 2018-12-13 VITALS
RESPIRATION RATE: 18 BRPM | WEIGHT: 239.6 LBS | SYSTOLIC BLOOD PRESSURE: 119 MMHG | BODY MASS INDEX: 39.92 KG/M2 | OXYGEN SATURATION: 94 % | HEART RATE: 77 BPM | DIASTOLIC BLOOD PRESSURE: 65 MMHG | HEIGHT: 65 IN | TEMPERATURE: 97.5 F

## 2018-12-13 DIAGNOSIS — D84.9 IMMUNOSUPPRESSED STATUS (HCC): ICD-10-CM

## 2018-12-13 DIAGNOSIS — J06.9 ACUTE URI: Primary | ICD-10-CM

## 2018-12-13 DIAGNOSIS — F41.9 ANXIETY AND DEPRESSION: ICD-10-CM

## 2018-12-13 DIAGNOSIS — F32.A ANXIETY AND DEPRESSION: ICD-10-CM

## 2018-12-13 RX ORDER — AZITHROMYCIN 250 MG/1
TABLET, FILM COATED ORAL
Qty: 6 TAB | Refills: 0 | Status: SHIPPED | OUTPATIENT
Start: 2018-12-13 | End: 2018-12-18

## 2018-12-13 NOTE — PROGRESS NOTES
Subjective:     Chief Complaint   Patient presents with    Cough     x 1 week      She  is a 46 y.o. female who presents for evaluation of:  URI - coughing, HAs, hoarseness, dyspnea, ear itching and throat itching, and voice loss x 1 week. Has finished Prednisone. Has tried many home remedies and OTC meds. Having pain in back with coughing too. No eval yet. Anxiety and depression - Was able to see Dr. Kyle Rodriguez for psych eval and had meds adjusted (Lexapro 20 mg to pm, starting Trazodone at PM, and switching from Wellbutrin 100 mg TID which was started last week to Wellbutrin  mg daily). Sx x ~2-3 months. Still dealing with major work-related stress. Still having daily crying spells, decreased energy, decreased concentration and attention, decreased appetite. Denies any suicidal ideation BUT recently endorses homicidal ideation. She is come up with a plan and considered putting antifreeze in the coffee of her coworkers/pulses who are creating much of her stress. Anti-coag - DVT in R leg after gyn sgy and chronically on coumadin now. Has had DVT in same leg x 2 in past.   Ct to struggle with post thrombotic syndrome from numerous DVTs. Pain flares up at different times and is much worse with edema. She continues to manage this by resting her leg and elevating it.   Rest per anti-coag tab    ROS  Gen - no fever/chills  Resp - no dyspnea or cough  CV - no chest pain or CARRASQUILLO  Rest per HPI    Past Medical History:   Diagnosis Date    Asthma 3/12/2010    DDD (degenerative disc disease), lumbar     DJD (degenerative joint disease) 3/12/2010    DJD (degenerative joint disease) of knee     [facility]deep [facility] thrombosis) ([facility])     Rt leg    GERD (gastroesophageal reflux disease)     History of tuberculosis exposure 2013    pt states she has + PPD and was on Rx for 6 months; last dose either 11/13 or 12/13    Inflammatory polyarthritis ([address]     Psoriatic Arthitis    Morbid obesity ([address]     Post-thrombotic syndrome of right lower extremity 9/14/2017    Pseudogout     PUD (peptic ulcer disease)     Skin abrasion 1/28/14    After loosing 125#, Bilateral Inner thigh friction flareup monthly with skin breakdown    Thromboembolus ([address] 2008    Rt leg,  pt states she fell and had a plane ride home and developed blood clots    UC (ulcerative colitis) ([address] 3/12/2010     Past Surgical History:   Procedure Laterality Date    HX ACL RECONSTRUCTION      Rt    Petty Bock    Dr Eartha Goltz HX GI      reopening of rectal pouch    HX GYN  06/19/2017    Hysterectomy    HX ORTHOPAEDIC  12/13    Rt foot / toes    HX TONSILLECTOMY       Current Outpatient Medications on File Prior to Visit   Medication Sig Dispense Refill    buPROPion XL (WELLBUTRIN XL) 150 mg tablet Take 1 Tab by mouth every morning. 30 Tab 2    traZODone (DESYREL) 50 mg tablet Take 1 Tab by mouth nightly. 30 Tab 2    warfarin (COUMADIN) 5 mg tablet TAKE 2 TABS BY MOUTH DAILY 60 Tab 1    ergocalciferol (ERGOCALCIFEROL) 50,000 unit capsule TAKE 1 CAPSULE BY MOUTH ONCE PER WEEK FOR A TOTAL OF 8 WEEKS. THEN CHANGE TO TWICE PER MONTH. 12 Cap 0    LORazepam (ATIVAN) 1 mg tablet Take 1 Tab by mouth nightly as needed for Anxiety. Max Daily Amount: 1 mg. 30 Tab 0    escitalopram oxalate (LEXAPRO) 20 mg tablet Take 1 Tab by mouth daily. 90 Tab 0    furosemide (LASIX) 40 mg tablet TAKE 1 TABLET BY MOUTH TWICE DAILY AS NEEDED 180 Tab 1    phentermine (ADIPEX-P) 37.5 mg tablet Take 1/2 tablet before breakfast and before dinner 100 Tab 5    topiramate (TOPAMAX) 50 mg tablet Take 1 Tab by mouth two (2) times a day. 60 Tab 5    folic acid (FOLVITE) 1 mg tablet TAKE 1 TABLET BY MOUTH ONCE A DAY  6    KLOR-CON M20 20 mEq tablet TAKE 1 TABLET BY MOUTH TWICE A DAY  0    CERTOLIZUMAB PEGOL (CIMZIA SC) by SubCUTAneous route.  Injection:  Every 4 weeks      methotrexate (RHEUMATREX) 2.5 mg tablet TAKE 8 TABLETS BY MOUTH ONCE PER WEEK  2    ranitidine (ZANTAC) 150 mg tablet Take 150 mg by mouth two (2) times a day.  albuterol (PROVENTIL HFA, VENTOLIN HFA, PROAIR HFA) 90 mcg/actuation inhaler Take 1 Puff by inhalation every four (4) hours as needed for Wheezing. 1 Inhaler 5    acetaminophen (TYLENOL EXTRA STRENGTH) 500 mg tablet Take 1,000 mg by mouth every six (6) hours as needed for Pain. No current facility-administered medications on file prior to visit. Objective:     Vitals:    12/13/18 0738   BP: 119/65   Pulse: 77   Resp: 18   Temp: 97.5 °F (36.4 °C)   TempSrc: Oral   SpO2: 94%   Weight: 239 lb 9.6 oz (108.7 kg)   Height: 5' 5\" (1.651 m)     Physical Examination:  General appearance - alert, well appearing, and in no distress, voice loss noted  Eyes -sclera anicteric  Nose - mild bilat turb inflammation  Mouth - nml oropharynx noted  Chest - diffuse mild wheezing  Heart - normal rate, regular rhythm, normal S1, S2, no murmurs, rubs, clicks or gallops  Extr -RLE with swelling, warmth, and mildly tender to palpation. Negative Homans  Psych - nml mood and affect, tearful about work stress    Assessment/ Plan:   Diagnoses and all orders for this visit:    1. Acute URI  2. Immunosuppressed status ([facility]  - based on hx and exam so will use Z toan today  -     azithromycin (ZITHROMAX) 250 mg tablet; Take 2 tablets today, then take 1 tablet daily    3. Anxiety and depression  - Appreciate Psych input and med adjustments. We discussed sx today and specifically her fleeting HI with plan. She feels like she is trying to avoid thinking about this. Last had these HI thoughts at our last appt 1 week ago. Working with therapist now. I have discussed the diagnosis with the patient and the intended plan as seen in the above orders. The patient has received an after-visit summary and questions were answered concerning future plans. I have discussed medication side effects and warnings with the patient as well.   The patient verbalizes understanding and agreement with the plan. Follow-up Disposition:  Return if symptoms worsen or fail to improve.

## 2018-12-13 NOTE — LETTER
NOTIFICATION RETURN TO WORK 
 
12/13/2018 8:02 AM 
 
Ms. Mariah Jang [facility] [id number] [facility] [id number] To Whom It May Concern: 
 
Mariah Jang is [facility] the [facility]. Based on her current symptoms and overall medical history, I am recommending that she return to work on: 12/17/18 If there are questions or concerns please have the patient contact our office.  
 
 
 
Sincerely, 
 
 
Guerrero Richey MD

## 2018-12-13 NOTE — PROGRESS NOTES
Chief Complaint   Patient presents with    Cough     x 1 week   1. Have you been to the ER, urgent care clinic since your last visit? Hospitalized since your last visit? No    2. Have you seen or consulted any other health care providers outside of the [address] [place] [address] since your last visit? Include any pap smears or colon screening.  No   Started with dry cough ,tried OTC cough syrup with no improvement,Laryngitis,Finished Prednisone with no improvement

## 2018-12-19 ENCOUNTER — OFFICE VISIT (OUTPATIENT)
Dept: FAMILY MEDICINE CLINIC | Age: 48
End: 2018-12-19

## 2018-12-19 VITALS
WEIGHT: 241.1 LBS | HEIGHT: 65 IN | DIASTOLIC BLOOD PRESSURE: 56 MMHG | RESPIRATION RATE: 18 BRPM | TEMPERATURE: 97.1 F | BODY MASS INDEX: 40.17 KG/M2 | SYSTOLIC BLOOD PRESSURE: 118 MMHG | OXYGEN SATURATION: 94 % | HEART RATE: 87 BPM

## 2018-12-19 DIAGNOSIS — M25.579 ANKLE PAIN, UNSPECIFIED CHRONICITY, UNSPECIFIED LATERALITY: ICD-10-CM

## 2018-12-19 DIAGNOSIS — I87.001 POST-THROMBOTIC SYNDROME OF RIGHT LOWER EXTREMITY: ICD-10-CM

## 2018-12-19 DIAGNOSIS — J04.0 LARYNGITIS: ICD-10-CM

## 2018-12-19 DIAGNOSIS — F32.A ANXIETY AND DEPRESSION: Primary | ICD-10-CM

## 2018-12-19 DIAGNOSIS — F41.9 ANXIETY AND DEPRESSION: Primary | ICD-10-CM

## 2018-12-19 DIAGNOSIS — Z51.81 MONITORING FOR LONG-TERM ANTICOAGULANT USE: ICD-10-CM

## 2018-12-19 DIAGNOSIS — D84.9 IMMUNOSUPPRESSED STATUS (HCC): ICD-10-CM

## 2018-12-19 DIAGNOSIS — M25.519 SHOULDER PAIN, UNSPECIFIED CHRONICITY, UNSPECIFIED LATERALITY: ICD-10-CM

## 2018-12-19 DIAGNOSIS — Z79.01 MONITORING FOR LONG-TERM ANTICOAGULANT USE: ICD-10-CM

## 2018-12-19 DIAGNOSIS — Z98.890 HISTORY OF REPAIR OF ACL: ICD-10-CM

## 2018-12-19 DIAGNOSIS — M25.569 KNEE PAIN, UNSPECIFIED CHRONICITY, UNSPECIFIED LATERALITY: Primary | ICD-10-CM

## 2018-12-19 DIAGNOSIS — I82.5Z1 CHRONIC DEEP VEIN THROMBOSIS (DVT) OF DISTAL VEIN OF RIGHT LOWER EXTREMITY (HCC): ICD-10-CM

## 2018-12-19 LAB
INR BLD: 1.3
PT POC: NORMAL SECONDS
VALID INTERNAL CONTROL?: YES

## 2018-12-19 NOTE — PROGRESS NOTES
Subjective:     Chief Complaint   Patient presents with    Follow-up     PT/INR      She  is a 46 y.o. female who presents for evaluation of:  Laryngitis - coughing, HAs, hoarseness, dyspnea, ear itching and throat itching, and voice loss x 2 weeks. Has finished Prednisone and Z toan. Has tried many home remedies and OTC meds. Still mostly struggling with her voice loss. Anxiety and depression - Was able to see Dr. Madeleine Ricks for psych eval and had meds adjusted (Lexapro 20 mg to pm, starting Trazodone at PM, and switching from Wellbutrin 100 mg TID which was started last week to Wellbutrin  mg daily). Sx x ~2-3 months. Still dealing with major work-related stress. Still having daily crying spells, decreased energy, decreased concentration and attention, decreased appetite. Denies any suicidal ideation BUT recently endorses homicidal ideation. She is come up with a plan and considered putting antifreeze in the coffee of her coworkers/pulses who are creating much of her stress. Working with therapist but trouble talking d/t laryngitis so not able to do this very extensively. Anti-coag - DVT in R leg after gyn sgy and chronically on coumadin now. Has had DVT in same leg x 2 in past.   Ct to struggle with post thrombotic syndrome from numerous DVTs. Pain flares up at different times and is much worse with edema. She continues to manage this by resting her leg and elevating it.   Rest per anti-coag tab    ROS  Gen - no fever/chills  Resp - no dyspnea or cough  CV - no chest pain or CARRASQUILLO  Rest per HPI    Past Medical History:   Diagnosis Date    Asthma 3/12/2010    DDD (degenerative disc disease), lumbar     DJD (degenerative joint disease) 3/12/2010    DJD (degenerative joint disease) of knee     DVT [facility]deep venous thrombosis) ([facility])     Rt leg    GERD (gastroesophageal reflux disease)     History of tuberculosis exposure 2013    pt states she has + PPD and was on Rx for 6 months; last dose either 11/13 or 12/13    Inflammatory polyarthritis ([facility]     Psoriatic Arthitis    Morbid obesity ([address]     Post-thrombotic syndrome of right lower extremity 9/14/2017    Pseudogout     PUD (peptic ulcer disease)     Skin abrasion 1/28/14    After loosing 125#, Bilateral Inner thigh friction flareup monthly with skin breakdown    Thromboembolus ([address] 2008    Rt leg,  pt states she fell and had a plane ride home and developed blood clots    UC (ulcerative colitis) ([address] 3/12/2010     Past Surgical History:   Procedure Laterality Date    HX ACL RECONSTRUCTION      Rt    Jaida Santana HX GI      reopening of rectal pouch    HX GYN  06/19/2017    Hysterectomy    HX ORTHOPAEDIC  12/13    Rt foot / toes    HX TONSILLECTOMY       Current Outpatient Medications on File Prior to Visit   Medication Sig Dispense Refill    buPROPion XL (WELLBUTRIN XL) 150 mg tablet Take 1 Tab by mouth every morning. 30 Tab 2    traZODone (DESYREL) 50 mg tablet Take 1 Tab by mouth nightly. 30 Tab 2    warfarin (COUMADIN) 5 mg tablet TAKE 2 TABS BY MOUTH DAILY 60 Tab 1    ergocalciferol (ERGOCALCIFEROL) 50,000 unit capsule TAKE 1 CAPSULE BY MOUTH ONCE PER WEEK FOR A TOTAL OF 8 WEEKS. THEN CHANGE TO TWICE PER MONTH. 12 Cap 0    LORazepam (ATIVAN) 1 mg tablet Take 1 Tab by mouth nightly as needed for Anxiety. Max Daily Amount: 1 mg. 30 Tab 0    escitalopram oxalate (LEXAPRO) 20 mg tablet Take 1 Tab by mouth daily. 90 Tab 0    furosemide (LASIX) 40 mg tablet TAKE 1 TABLET BY MOUTH TWICE DAILY AS NEEDED 180 Tab 1    phentermine (ADIPEX-P) 37.5 mg tablet Take 1/2 tablet before breakfast and before dinner 100 Tab 5    topiramate (TOPAMAX) 50 mg tablet Take 1 Tab by mouth two (2) times a day. 60 Tab 5    folic acid (FOLVITE) 1 mg tablet TAKE 1 TABLET BY MOUTH ONCE A DAY  6    KLOR-CON M20 20 mEq tablet TAKE 1 TABLET BY MOUTH TWICE A DAY  0    CERTOLIZUMAB PEGOL (CIMZIA SC) by SubCUTAneous route. Injection:  Every 4 weeks      methotrexate (RHEUMATREX) 2.5 mg tablet TAKE 8 TABLETS BY MOUTH ONCE PER WEEK  2    ranitidine (ZANTAC) 150 mg tablet Take 150 mg by mouth two (2) times a day.  albuterol (PROVENTIL HFA, VENTOLIN HFA, PROAIR HFA) 90 mcg/actuation inhaler Take 1 Puff by inhalation every four (4) hours as needed for Wheezing. 1 Inhaler 5    acetaminophen (TYLENOL EXTRA STRENGTH) 500 mg tablet Take 1,000 mg by mouth every six (6) hours as needed for Pain.  [] azithromycin (ZITHROMAX) 250 mg tablet Take 2 tablets today, then take 1 tablet daily 6 Tab 0     No current facility-administered medications on file prior to visit. Objective:     Vitals:    18 0746   BP: 118/56   Pulse: 87   Resp: 18   Temp: 97.1 °F (36.2 °C)   TempSrc: Oral   SpO2: 94%   Weight: 241 lb 1.6 oz (109.4 kg)   Height: 5' 5\" (1.651 m)     Physical Examination:  General appearance - alert, well appearing, and in no distress, voice loss noted  Eyes -sclera anicteric  Nose - mild bilat turb inflammation  Mouth - nml oropharynx noted  Chest - CTAB  Heart - normal rate, regular rhythm, normal S1, S2, no murmurs, rubs, clicks or gallops  Extr -RLE with swelling, warmth, and mildly tender to palpation. Negative Homans  Psych - nml mood and affect, tearful about work stress    Assessment/ Plan:   Diagnoses and all orders for this visit:    1. Anxiety and depression  - Appreciate Psych input and med adjustments. We discussed sx today and specifically her fleeting HI with plan. She feels like she is trying to avoid thinking about this. Last had these HI thoughts at our last appt 1 week ago. Working with therapist now. 2. Chronic deep vein thrombosis (DVT) of distal vein of right lower extremity ([address]  3. Monitoring for long-term anticoagulant use  4.  Post-thrombotic syndrome of right lower extremity  - Subtherapeutic today so will recheck with no changes since recently held meds while on abx.  -     AMB POC PT/INR    5. Laryngitis  6. Immunosuppressed status ([address]  - encouraged symptomatic care as she has had minimal improvement with Prednisone and Z toan. Can ct cough syrup at night armani to help with rest.    I have discussed the diagnosis with the patient and the intended plan as seen in the above orders. The patient has received an after-visit summary and questions were answered concerning future plans. I have discussed medication side effects and warnings with the patient as well. The patient verbalizes understanding and agreement with the plan. Follow-up Disposition:  Return in about 4 weeks (around 1/16/2019).

## 2018-12-31 ENCOUNTER — OFFICE VISIT (OUTPATIENT)
Dept: BEHAVIORAL/MENTAL HEALTH CLINIC | Age: 48
End: 2018-12-31

## 2018-12-31 VITALS
HEART RATE: 80 BPM | BODY MASS INDEX: 39.99 KG/M2 | DIASTOLIC BLOOD PRESSURE: 70 MMHG | WEIGHT: 240 LBS | HEIGHT: 65 IN | SYSTOLIC BLOOD PRESSURE: 120 MMHG

## 2018-12-31 DIAGNOSIS — F33.2 SEVERE EPISODE OF RECURRENT MAJOR DEPRESSIVE DISORDER, WITHOUT PSYCHOTIC FEATURES (HCC): Primary | ICD-10-CM

## 2018-12-31 DIAGNOSIS — F51.02 ADJUSTMENT INSOMNIA: ICD-10-CM

## 2018-12-31 DIAGNOSIS — F41.1 GENERALIZED ANXIETY DISORDER: ICD-10-CM

## 2018-12-31 DIAGNOSIS — G47.00 INSOMNIA, UNSPECIFIED TYPE: ICD-10-CM

## 2018-12-31 RX ORDER — ESCITALOPRAM OXALATE 20 MG/1
20 TABLET ORAL DAILY
Qty: 90 TAB | Refills: 1 | Status: SHIPPED | OUTPATIENT
Start: 2018-12-31 | End: 2019-01-16 | Stop reason: ALTCHOICE

## 2018-12-31 RX ORDER — TEMAZEPAM 15 MG/1
15 CAPSULE ORAL
Qty: 30 CAP | Refills: 2 | Status: SHIPPED | OUTPATIENT
Start: 2018-12-31 | End: 2019-02-07

## 2018-12-31 RX ORDER — BUPROPION HYDROCHLORIDE 300 MG/1
300 TABLET ORAL
Qty: 30 TAB | Refills: 2 | Status: SHIPPED | OUTPATIENT
Start: 2018-12-31 | End: 2019-02-05 | Stop reason: SDUPTHER

## 2018-12-31 NOTE — PROGRESS NOTES
Gurdeep Matthews  1970  46 y.o.  female  [address]    Chief Complaint   Patient presents with    Depression     9 out of 10 on the scale of 1-10 where 10 is worst    Anxiety     Mostly generalized anxiety, 9 out of 10    Insomnia     Severe insomnia especially initial requiring Ativan       [place]:   This is a 46years old  -American female with past psychiatric history and treatment of depression and anxiety who is referred by her PCP for psychiatric evaluation and medication management and was seen for the first time on December 3 when she decided to continue Lexapro 20 mg at bedtime, try Wellbutrin  mg daily, and try trazodone  mg at bedtime as needed for insomnia. Patient presented on time, was observed to be significantly depressed and cried most of the visit just like her initial appointment. She was alert awake oriented to time person and place and was calm and cooperative, polite, and pleasant during the interview. She reported compliance with Lexapro 20 mg in the morning, Wellbutrin 150 mg daily, and denies any benefit whatsoever from trazodone up to 100 mg at bedtime and is still requires Ativan 1 mg at bedtime in order to sleep. Patient reported severe depression with 9 out of 10 on the scale of 1-10 where 10 is worst for both depression and anxiety and denies any sleep without Ativan. [facility] and [facility] twice and is a scheduled to see her again and is strongly encouraged to continue therapy. She was provided with support and psychoeducation, and after discussing risk/benefits/expectations with treatment alternatives available, patient decided to  increase Wellbutrin  mg daily, continue Lexapro 20 mg at bedtime, discontinue trazodone for poor benefit and will try temazepam 15 mg at bedtime for initial insomnia and not take Ativan and will return in 5 weeks for reevaluation.     SUBSTANCE USE HISTORY:   Patient denies a smoking, drinking, abusing any illicit drugs. MEDICAL HISTORY:    has a past medical history of Asthma, DDD (degenerative disc disease), lumbar, DJD (degenerative joint disease), DJD (degenerative joint disease) of knee, DVT (deep venous thrombosis) ([facility] GERD (gastroesophageal reflux disease), History of tuberculosis exposure, Inflammatory polyarthritis ([facility] [facility] Morbid obesity ([facility] [facility] Post-thrombotic syndrome of right lower extremity, Pseudogout, PUD (peptic ulcer disease), Skin abrasion, Thromboembolus ([facility] [facility] and UC (ulcerative colitis) ([facility] ALLERGIES:   Allergies   Allergen Reactions    Sulfa (Sulfonamide Antibiotics) Anaphylaxis    Codeine Itching       VITAL SIGNS:  /70   Pulse 80   Ht 5' 5\" (1.651 m)   Wt 108.9 kg (240 lb)   LMP  (LMP Unknown) Comment: IUD  BMI 39.94 kg/m²     Current Outpatient Medications   Medication Sig Dispense Refill    escitalopram oxalate (LEXAPRO) 20 mg tablet Take 1 Tab by mouth daily. 90 Tab 1    buPROPion XL (WELLBUTRIN XL) 300 mg XL tablet Take 1 Tab by mouth every morning. 30 Tab 2    temazepam (RESTORIL) 15 mg capsule Take 1 Cap by mouth nightly as needed for Sleep. Max Daily Amount: 15 mg. 30 Cap 2    warfarin (COUMADIN) 5 mg tablet TAKE 2 TABS BY MOUTH DAILY 60 Tab 1    ergocalciferol (ERGOCALCIFEROL) 50,000 unit capsule TAKE 1 CAPSULE BY MOUTH ONCE PER WEEK FOR A TOTAL OF 8 WEEKS. THEN CHANGE TO TWICE PER MONTH. 12 Cap 0    LORazepam (ATIVAN) 1 mg tablet Take 1 Tab by mouth nightly as needed for Anxiety. Max Daily Amount: 1 mg. 30 Tab 0    furosemide (LASIX) 40 mg tablet TAKE 1 TABLET BY MOUTH TWICE DAILY AS NEEDED 180 Tab 1    phentermine (ADIPEX-P) 37.5 mg tablet Take 1/2 tablet before breakfast and before dinner 100 Tab 5    topiramate (TOPAMAX) 50 mg tablet Take 1 Tab by mouth two (2) times a day.  60 Tab 5    folic acid (FOLVITE) 1 mg tablet TAKE 1 TABLET BY MOUTH ONCE A DAY  6    KLOR-CON M20 20 mEq tablet TAKE 1 TABLET BY MOUTH TWICE A DAY  0    CERTOLIZUMAB PEGOL (CIMZIA SC) by SubCUTAneous route. Injection:  Every 4 weeks      methotrexate (RHEUMATREX) 2.5 mg tablet TAKE 8 TABLETS BY MOUTH ONCE PER WEEK  2    ranitidine (ZANTAC) 150 mg tablet Take 150 mg by mouth two (2) times a day.  albuterol (PROVENTIL HFA, VENTOLIN HFA, PROAIR HFA) 90 mcg/actuation inhaler Take 1 Puff by inhalation every four (4) hours as needed for Wheezing. 1 Inhaler 5    acetaminophen (TYLENOL EXTRA STRENGTH) 500 mg tablet Take 1,000 mg by mouth every six (6) hours as needed for Pain. ROS:  Constitutional: Positive for fatigue   Eyes: Negative for contacts/glasses  ENT: Negative for hearing loss and tinnitus  Respiratory: Negative for cough or wheezing  Cardiovascular: Negative for chest pain, palpitations  Neurological: Positive for memory problems  Behavioral/psych: Positive for insomnia, anxiety, depression, negative for SI/HI  Endocrine: Negative for diabetic symptoms including polyuria and polydipsia    MENTAL STATUS EXAMINATION:   Patient is a 46 y.o. female [address] who looks slightly older than her stated age. She again cried most of the visit like her initial visit. She is morbidly obese and was dressed appropriately with good hygiene. Speech is regular rate and rhythm, fluent language, and thought process is linear, logical, and goal directed. Reported mood is depressed and anxious with mood congruent depressed and anxious affect. Patient denies any suicidal ideation, homicidal ideation, and auditory visual hallucination. Not observed to be delusional or paranoid. Insight, judgement, reliability and impulse control is limited to intact. Cognition was within normal limit and patient was observed to be reliable. DIAGNOSIS:  Encounter Diagnoses   Name Primary?     Severe episode of recurrent major depressive disorder, without psychotic features ([facility] Yes    Generalized anxiety disorder     Adjustment insomnia     Insomnia, unspecified type        PLAN:  1. MEDICATION:   1. Patient report compliance with Wellbutrin  mg daily, is able to tolerate and denies any side effect, and denies any benefits whatsoever so decided to increase before we can consider it as a failed trial.  She will take Wellbutrin  mg daily until return in 5 weeks for reevaluation. 2. Patient agreed to continue Lexapro 20 mg at bedtime. 3. Patient denies any benefits with Trazodone up to 100 mg at bedtime as needed for insomnia and has to take Ativan 1 mg at bedtime for sleep and agreed to try temazepam 50 mg at bedtime only as needed for initial insomnia as it is approved for insomnia and Ativan is not and we will discontinue trazodone due to poor efficacy. She was provided with psycho education, discussed risk/benefits, and expectations from medication changes. Patient agrees with plan. 2. PSYCHOTHERAPY: Patient was provided with supportive therapy, strongly encourage to continue psychotherapy at [facility] and has been seen twice with a follow-up appointment is scheduled and report good rapport. 3. MEDICAL CARE: Patient was strongly encourage to take their medical medications and follow up with their PCP on regular basis. Her weight today is 240 pounds, it was 241 pounds on December 19 and 239 pounds on December 13. She is on warfarin, vitamin D replacement, Lasix, phentermine for weight loss, Topamax for weight loss, folic acid, potassium, methotrexate, Zantac, albuterol, and extra strength Tylenol. 4. SUBSTANCE ABUSE CARE: Patient denies a smoking, drinking, abusing any illicit drugs. 5. FOLLOW UP:   Follow-up Disposition:  Return in about 5 weeks (around 2/4/2019) for Medication management.

## 2019-01-16 ENCOUNTER — OFFICE VISIT (OUTPATIENT)
Dept: FAMILY MEDICINE CLINIC | Age: 49
End: 2019-01-16

## 2019-01-16 VITALS
DIASTOLIC BLOOD PRESSURE: 61 MMHG | BODY MASS INDEX: 40.75 KG/M2 | WEIGHT: 244.6 LBS | RESPIRATION RATE: 18 BRPM | HEIGHT: 65 IN | SYSTOLIC BLOOD PRESSURE: 123 MMHG | TEMPERATURE: 97.8 F | HEART RATE: 62 BPM | OXYGEN SATURATION: 95 %

## 2019-01-16 DIAGNOSIS — I87.001 POST-THROMBOTIC SYNDROME OF RIGHT LOWER EXTREMITY: ICD-10-CM

## 2019-01-16 DIAGNOSIS — I82.5Z1 CHRONIC DEEP VEIN THROMBOSIS (DVT) OF DISTAL VEIN OF RIGHT LOWER EXTREMITY (HCC): ICD-10-CM

## 2019-01-16 DIAGNOSIS — F32.A ANXIETY AND DEPRESSION: Primary | ICD-10-CM

## 2019-01-16 DIAGNOSIS — Z51.81 MONITORING FOR LONG-TERM ANTICOAGULANT USE: ICD-10-CM

## 2019-01-16 DIAGNOSIS — F41.9 ANXIETY AND DEPRESSION: Primary | ICD-10-CM

## 2019-01-16 DIAGNOSIS — Z56.6 WORK-RELATED STRESS: ICD-10-CM

## 2019-01-16 DIAGNOSIS — M06.4 INFLAMMATORY POLYARTHRITIS (HCC): ICD-10-CM

## 2019-01-16 DIAGNOSIS — Z79.01 MONITORING FOR LONG-TERM ANTICOAGULANT USE: ICD-10-CM

## 2019-01-16 DIAGNOSIS — R04.0 EPISTAXIS: ICD-10-CM

## 2019-01-16 LAB
INR BLD: 3.4
PT POC: NORMAL SECONDS
VALID INTERNAL CONTROL?: YES

## 2019-01-16 SDOH — HEALTH STABILITY - MENTAL HEALTH: OTHER PHYSICAL AND MENTAL STRAIN RELATED TO WORK: Z56.6

## 2019-01-16 NOTE — LETTER
1/16/2019 8:24 AM 
 
Ms. Joseph Arrieta [id number] [facility] [id number] To Whom It May Concern: 
 
Jospeh Berumen is currently under the care of Gaurav Ramos. She will return to work/school on: 1/30/19. We will plan for FMLA during this time period. If there are questions or concerns please have the patient contact our office.  
 
 
 
Sincerely, 
 
 
Ambrosio Pablo MD

## 2019-01-16 NOTE — PROGRESS NOTES
Chief Complaint   Patient presents with    Follow-up     PT/INR   1. Have you been to the ER, urgent care clinic since your last visit? Hospitalized since your last visit? No    2. Have you seen or consulted any other health care providers outside of the [address] [place] [address] since your last visit? Include any pap smears or colon screening.  Yes Where: [facility] Therapist Dr Tracy Browning  Discuss shoulder and foot pain  Had nose bleed last Sunday

## 2019-01-16 NOTE — PROGRESS NOTES
Subjective:     Chief Complaint   Patient presents with    Follow-up     PT/INR      She  is a 46 y.o. female who presents for evaluation of:  Anxiety and depression - Was able to see Dr. Dyan Landon for psych eval and had meds adjusted (Lexapro 20 mg to pm, starting Trazodone at PM, and switching from Wellbutrin 100 mg TID which was started last week to Wellbutrin  mg daily). Sx x ~3 months. Still dealing with major work-related stress. Still having daily crying spells, decreased energy, decreased concentration and attention, decreased appetite. Denies any suicidal ideation BUT recently endorses homicidal ideation. She has come up with a plan and considered putting antifreeze in the coffee of her coworkers/pulses who are creating much of her stress. Last thought about this at our last appt. Anti-coag - DVT in R leg after gyn sgy and chronically on coumadin now. Has had DVT in same leg x 2 in past.   Ct to struggle with post thrombotic syndrome from numerous DVTs. Pain flares up at different times and is much worse with edema. She continues to manage this by resting her leg and elevating it.   Rest per anti-coag tab    ROS  Gen - no fever/chills  Resp - no dyspnea or cough  CV - no chest pain or CARRASQUILLO  Rest per HPI    Past Medical History:   Diagnosis Date    Asthma 3/12/2010    DDD (degenerative disc disease), lumbar     DJD (degenerative joint disease) 3/12/2010    DJD (degenerative joint disease) of knee     [facility] thrombosis) ([facility]     Rt leg    GERD (gastroesophageal reflux disease)     History of tuberculosis exposure 2013    pt states she has + PPD and was on Rx for 6 months; last dose either 11/13 or 12/13    Inflammatory polyarthritis ([address]     Psoriatic Arthitis    Morbid obesity ([address]     Post-thrombotic syndrome of right lower extremity 9/14/2017    Pseudogout     PUD (peptic ulcer disease)     Skin abrasion 1/28/14    After loosing 125#, Bilateral Inner thigh friction flareup monthly with skin breakdown    Thromboembolus ([facility] 2008    Rt leg,  pt states she fell and had a plane ride home and developed blood clots    UC (ulcerative colitis) ([facility] 3/12/2010     Past Surgical History:   Procedure Laterality Date    HX ACL RECONSTRUCTION      Rt    Karen Marley HX GI      reopening of rectal pouch    HX GYN  06/19/2017    Hysterectomy    HX ORTHOPAEDIC  12/13    Rt foot / toes    HX TONSILLECTOMY       Current Outpatient Medications on File Prior to Visit   Medication Sig Dispense Refill    buPROPion XL (WELLBUTRIN XL) 300 mg XL tablet Take 1 Tab by mouth every morning. 30 Tab 2    temazepam (RESTORIL) 15 mg capsule Take 1 Cap by mouth nightly as needed for Sleep. Max Daily Amount: 15 mg. 30 Cap 2    warfarin (COUMADIN) 5 mg tablet TAKE 2 TABS BY MOUTH DAILY 60 Tab 1    ergocalciferol (ERGOCALCIFEROL) 50,000 unit capsule TAKE 1 CAPSULE BY MOUTH ONCE PER WEEK FOR A TOTAL OF 8 WEEKS. THEN CHANGE TO TWICE PER MONTH. 12 Cap 0    furosemide (LASIX) 40 mg tablet TAKE 1 TABLET BY MOUTH TWICE DAILY AS NEEDED 180 Tab 1    phentermine (ADIPEX-P) 37.5 mg tablet Take 1/2 tablet before breakfast and before dinner 100 Tab 5    topiramate (TOPAMAX) 50 mg tablet Take 1 Tab by mouth two (2) times a day. 60 Tab 5    folic acid (FOLVITE) 1 mg tablet TAKE 1 TABLET BY MOUTH ONCE A DAY  6    KLOR-CON M20 20 mEq tablet TAKE 1 TABLET BY MOUTH TWICE A DAY  0    CERTOLIZUMAB PEGOL (CIMZIA SC) by SubCUTAneous route. Injection:  Every 4 weeks      methotrexate (RHEUMATREX) 2.5 mg tablet TAKE 8 TABLETS BY MOUTH ONCE PER WEEK  2    ranitidine (ZANTAC) 150 mg tablet Take 150 mg by mouth two (2) times a day.  albuterol (PROVENTIL HFA, VENTOLIN HFA, PROAIR HFA) 90 mcg/actuation inhaler Take 1 Puff by inhalation every four (4) hours as needed for Wheezing.  1 Inhaler 5    acetaminophen (TYLENOL EXTRA STRENGTH) 500 mg tablet Take 1,000 mg by mouth every six (6) hours as needed for Pain. No current facility-administered medications on file prior to visit. Objective:     Vitals:    01/16/19 0735   BP: 123/61   Pulse: 62   Resp: 18   Temp: 97.8 °F (36.6 °C)   TempSrc: Oral   SpO2: 95%   Weight: 244 lb 9.6 oz (110.9 kg)   Height: 5' 5\" (1.651 m)     Physical Examination:  General appearance - alert, well appearing, and in no distress  Eyes -sclera anicteric  Chest - CTAB  Heart - normal rate, regular rhythm, normal S1, S2, no murmurs, rubs, clicks or gallops  Extr -RLE with swelling, warmth, and mildly tender to palpation. Negative Homans  Psych - nml mood and affect, tearful about work stress    Assessment/ Plan:   Diagnoses and all orders for this visit:    1. Anxiety and depression  - Appreciate Psych input and med adjustments. We discussed sx today and specifically her fleeting HI with plan. She feels like she is trying to avoid thinking about this. Still having sig depression sx so asked her to check in with Psych again. Will plan to take her out of work for the next 2 weeks as well given her hx and encouraged finding a less stressful work environment. Working with therapist now too. 2. Chronic deep vein thrombosis (DVT) of distal vein of right lower extremity ([address]  3. Monitoring for long-term anticoagulant use  4. Post-thrombotic syndrome of right lower extremity  5. Epistaxis  -  Slightly supratherapeutic but having nose bleeds too so holding coumadin x 3 days. Recheck in 1 week  -     AMB POC PT/INR    6. Work-related stress - as above    7. Inflammatory polyarthritis ([address] - flaring up, encouraged f/u with Rheum    I have discussed the diagnosis with the patient and the intended plan as seen in the above orders. The patient has received an after-visit summary and questions were answered concerning future plans. I have discussed medication side effects and warnings with the patient as well.   The patient verbalizes understanding and agreement with the plan. Follow-up Disposition:  Return in about 1 week (around 1/23/2019), or if symptoms worsen or fail to improve, for Regular Follow up.

## 2019-01-24 ENCOUNTER — OFFICE VISIT (OUTPATIENT)
Dept: FAMILY MEDICINE CLINIC | Age: 49
End: 2019-01-24

## 2019-01-24 VITALS
WEIGHT: 238.6 LBS | DIASTOLIC BLOOD PRESSURE: 85 MMHG | OXYGEN SATURATION: 99 % | TEMPERATURE: 99.1 F | RESPIRATION RATE: 18 BRPM | HEIGHT: 65 IN | BODY MASS INDEX: 39.75 KG/M2 | HEART RATE: 68 BPM | SYSTOLIC BLOOD PRESSURE: 131 MMHG

## 2019-01-24 DIAGNOSIS — Z51.81 MONITORING FOR LONG-TERM ANTICOAGULANT USE: ICD-10-CM

## 2019-01-24 DIAGNOSIS — Z79.01 MONITORING FOR LONG-TERM ANTICOAGULANT USE: ICD-10-CM

## 2019-01-24 DIAGNOSIS — F41.9 ANXIETY AND DEPRESSION: Primary | ICD-10-CM

## 2019-01-24 DIAGNOSIS — F32.2 CURRENT SEVERE EPISODE OF MAJOR DEPRESSIVE DISORDER WITHOUT PSYCHOTIC FEATURES WITHOUT PRIOR EPISODE (HCC): ICD-10-CM

## 2019-01-24 DIAGNOSIS — I87.001 POST-THROMBOTIC SYNDROME OF RIGHT LOWER EXTREMITY: ICD-10-CM

## 2019-01-24 DIAGNOSIS — I82.5Z1 CHRONIC DEEP VEIN THROMBOSIS (DVT) OF DISTAL VEIN OF RIGHT LOWER EXTREMITY (HCC): ICD-10-CM

## 2019-01-24 DIAGNOSIS — F32.A ANXIETY AND DEPRESSION: Primary | ICD-10-CM

## 2019-01-24 LAB
INR BLD: 2.4
PT POC: NORMAL SECONDS
VALID INTERNAL CONTROL?: YES

## 2019-01-24 NOTE — PROGRESS NOTES
Subjective:     Chief Complaint   Patient presents with    Follow-up     Anxiety and PT/INR      She  is a 46 y.o. female who presents for evaluation of:  Anxiety and depression - Was able to see Dr. Sheldon Sánchez for psych eval and had meds adjusted (Lexapro 20 mg to pm, starting Trazodone switched to Restoril, and switching from Wellbutrin 100 mg TID to Wellbutrin  mg daily)    Will see Psych back in 2/2019. Sx x ~4 months. Still dealing with major work-related stress. Still having daily crying spells, decreased energy, decreased concentration and attention, decreased appetite. Denies any suicidal ideation BUT recently endorses homicidal ideation. She has come up with a plan and considered putting antifreeze in the coffee of her coworkers/pulses who are creating much of her stress. Last thought about this about a few days ago. Anti-coag - DVT in R leg after gyn sgy and chronically on coumadin now. Has had DVT in same leg x 2 in past.   Ct to struggle with post thrombotic syndrome from numerous DVTs. Pain flares up at different times and is much worse with edema. She continues to manage this by resting her leg and elevating it.   Rest per anti-coag tab    ROS  Gen - no fever/chills  Resp - no dyspnea or cough  CV - no chest pain or CARRASQUILLO  Rest per HPI    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    Pseudogout     PUD (peptic ulcer disease)     Skin abrasion 1/28/14    After loosing 125#, Bilateral Inner thigh friction flareup monthly with skin breakdown    Th[facility]boemb[facility]us ([facility] 2008    Rt leg,  pt states she fell and had a plane ride home and developed blood clots    UC (ulcerative colitis) ([facility] 3/12/2010     Past Surgical History:   Procedure Laterality Date    HX ACL RECONSTRUCTION      Rt    Levi Goldberg HX GI      reopening of rectal pouch    HX GYN  06/19/2017    Hysterectomy    HX ORTHOPAEDIC  12/13    Rt foot / toes    HX TONSILLECTOMY       Current Outpatient Medications on File Prior to Visit   Medication Sig Dispense Refill    buPROPion XL (WELLBUTRIN XL) 300 mg XL tablet Take 1 Tab by mouth every morning. 30 Tab 2    temazepam (RESTORIL) 15 mg capsule Take 1 Cap by mouth nightly as needed for Sleep. Max Daily Amount: 15 mg. 30 Cap 2    warfarin (COUMADIN) 5 mg tablet TAKE 2 TABS BY MOUTH DAILY 60 Tab 1    ergocalciferol (ERGOCALCIFEROL) 50,000 unit capsule TAKE 1 CAPSULE BY MOUTH ONCE PER WEEK FOR A TOTAL OF 8 WEEKS. THEN CHANGE TO TWICE PER MONTH. 12 Cap 0    furosemide (LASIX) 40 mg tablet TAKE 1 TABLET BY MOUTH TWICE DAILY AS NEEDED 180 Tab 1    phentermine (ADIPEX-P) 37.5 mg tablet Take 1/2 tablet before breakfast and before dinner 100 Tab 5    topiramate (TOPAMAX) 50 mg tablet Take 1 Tab by mouth two (2) times a day. 60 Tab 5    folic acid (FOLVITE) 1 mg tablet TAKE 1 TABLET BY MOUTH ONCE A DAY  6    KLOR-CON M20 20 mEq tablet TAKE 1 TABLET BY MOUTH TWICE A DAY  0    CERTOLIZUMAB PEGOL (CIMZIA SC) by SubCUTAneous route. Injection:  Every 4 weeks      methotrexate (RHEUMATREX) 2.5 mg tablet TAKE 8 TABLETS BY MOUTH ONCE PER WEEK  2    ranitidine (ZANTAC) 150 mg tablet Take 150 mg by mouth two (2) times a day.  albuterol (PROVENTIL HFA, VENTOLIN HFA, PROAIR HFA) 90 mcg/actuation inhaler Take 1 Puff by inhalation every four (4) hours as needed for Wheezing.  1 Inhaler 5    acetaminophen (TYLENOL EXTRA STRENGTH) 500 mg tablet Take 1,000 mg by mouth every six (6) hours as needed for Pain. No current facility-administered medications on file prior to visit. Objective:     Vitals:    01/24/19 1414   BP: 131/85   Pulse: 68   Resp: 18   Temp: 99.1 °F (37.3 °C)   TempSrc: Oral   SpO2: 99%   Weight: 238 lb 9.6 oz (108.2 kg)   Height: 5' 5\" (1.651 m)     Physical Examination:  General appearance - alert, well appearing, and in no distress  Eyes -sclera anicteric  Chest - CTAB  Heart - normal rate, regular rhythm, normal S1, S2, no murmurs, rubs, clicks or gallops  Psych - nml mood and affect, tearful about work stress    Assessment/ Plan:   Diagnoses and all orders for this visit:    1. Anxiety and depression  - Appreciate Psych input and med adjustments. We discussed sx today and specifically her fleeting HI with plan. She feels like she is trying to avoid thinking about this. Still having sig depression sx so asked her to check in with Psych again. Will plan to take her out of work for the next 6 weeks as well given her hx and encouraged finding a less stressful work environment. Working with therapist now too. Discussed med adjustments to be considered in next 2 weeks if needed. 2. Chronic deep vein thrombosis (DVT) of distal vein of right lower extremity ([address]  3. Monitoring for long-term anticoagulant use  4. Post-thrombotic syndrome of right lower extremity  -     AMB POC PT/INR    5. Current severe episode of major depressive disorder without psychotic features without prior episode ([address] - as above. F/u in 2 weeks. Need to get her out of severe stressors at her work environment given her HI    I spent > 50% of the 25 min visit counseling and educating about depression and anxiety    I have discussed the diagnosis with the patient and the intended plan as seen in the above orders. The patient has received an after-visit summary and questions were answered concerning future plans.   I have discussed medication side effects and warnings with the patient as well. The patient verbalizes understanding and agreement with the plan. Follow-up Disposition:  Return in about 4 weeks (around 2/21/2019), or if symptoms worsen or fail to improve.

## 2019-01-26 DIAGNOSIS — I82.431 ACUTE DEEP VEIN THROMBOSIS (DVT) OF POPLITEAL VEIN OF RIGHT LOWER EXTREMITY (HCC): ICD-10-CM

## 2019-01-28 RX ORDER — WARFARIN SODIUM 5 MG/1
TABLET ORAL
Qty: 60 TAB | Refills: 1 | Status: SHIPPED | OUTPATIENT
Start: 2019-01-28 | End: 2019-02-05 | Stop reason: SDUPTHER

## 2019-02-05 DIAGNOSIS — I82.401 ACUTE DEEP VEIN THROMBOSIS (DVT) OF RIGHT LOWER EXTREMITY, UNSPECIFIED VEIN (HCC): ICD-10-CM

## 2019-02-05 DIAGNOSIS — M79.89 PAIN AND SWELLING OF RIGHT LOWER LEG: ICD-10-CM

## 2019-02-05 DIAGNOSIS — I82.431 ACUTE DEEP VEIN THROMBOSIS (DVT) OF POPLITEAL VEIN OF RIGHT LOWER EXTREMITY (HCC): ICD-10-CM

## 2019-02-05 DIAGNOSIS — E55.9 VITAMIN D DEFICIENCY: ICD-10-CM

## 2019-02-05 DIAGNOSIS — M79.661 PAIN AND SWELLING OF RIGHT LOWER LEG: ICD-10-CM

## 2019-02-05 RX ORDER — BUPROPION HYDROCHLORIDE 300 MG/1
300 TABLET ORAL
Qty: 90 TAB | Refills: 0 | Status: SHIPPED | OUTPATIENT
Start: 2019-02-05 | End: 2019-04-05 | Stop reason: SDUPTHER

## 2019-02-05 RX ORDER — ESCITALOPRAM OXALATE 20 MG/1
TABLET ORAL
Refills: 1 | COMMUNITY
Start: 2019-01-27 | End: 2019-02-13 | Stop reason: SDUPTHER

## 2019-02-06 RX ORDER — WARFARIN SODIUM 5 MG/1
TABLET ORAL
Qty: 180 TAB | Refills: 1 | Status: SHIPPED | OUTPATIENT
Start: 2019-02-06 | End: 2019-03-07 | Stop reason: SDUPTHER

## 2019-02-06 RX ORDER — ERGOCALCIFEROL 1.25 MG/1
CAPSULE ORAL
Qty: 12 CAP | Refills: 1 | Status: SHIPPED | OUTPATIENT
Start: 2019-02-06 | End: 2019-02-21 | Stop reason: SDUPTHER

## 2019-02-06 RX ORDER — POTASSIUM CHLORIDE 1500 MG/1
TABLET, EXTENDED RELEASE ORAL
Qty: 180 TAB | Refills: 1 | Status: SHIPPED | OUTPATIENT
Start: 2019-02-06 | End: 2021-05-12

## 2019-02-06 RX ORDER — FUROSEMIDE 40 MG/1
TABLET ORAL
Qty: 180 TAB | Refills: 1 | Status: SHIPPED | OUTPATIENT
Start: 2019-02-06 | End: 2020-08-10 | Stop reason: SDUPTHER

## 2019-02-07 ENCOUNTER — OFFICE VISIT (OUTPATIENT)
Dept: FAMILY MEDICINE CLINIC | Age: 49
End: 2019-02-07

## 2019-02-07 VITALS
HEART RATE: 72 BPM | HEIGHT: 65 IN | SYSTOLIC BLOOD PRESSURE: 117 MMHG | TEMPERATURE: 98.2 F | WEIGHT: 231.6 LBS | RESPIRATION RATE: 16 BRPM | DIASTOLIC BLOOD PRESSURE: 78 MMHG | BODY MASS INDEX: 38.59 KG/M2

## 2019-02-07 DIAGNOSIS — F32.2 CURRENT SEVERE EPISODE OF MAJOR DEPRESSIVE DISORDER WITHOUT PSYCHOTIC FEATURES WITHOUT PRIOR EPISODE (HCC): ICD-10-CM

## 2019-02-07 DIAGNOSIS — F41.9 ANXIETY AND DEPRESSION: Primary | ICD-10-CM

## 2019-02-07 DIAGNOSIS — Z51.81 MONITORING FOR LONG-TERM ANTICOAGULANT USE: ICD-10-CM

## 2019-02-07 DIAGNOSIS — I82.5Z1 CHRONIC DEEP VEIN THROMBOSIS (DVT) OF DISTAL VEIN OF RIGHT LOWER EXTREMITY (HCC): ICD-10-CM

## 2019-02-07 DIAGNOSIS — I87.001 POST-THROMBOTIC SYNDROME OF RIGHT LOWER EXTREMITY: ICD-10-CM

## 2019-02-07 DIAGNOSIS — F32.A ANXIETY AND DEPRESSION: Primary | ICD-10-CM

## 2019-02-07 DIAGNOSIS — Z79.01 MONITORING FOR LONG-TERM ANTICOAGULANT USE: ICD-10-CM

## 2019-02-07 LAB
INR BLD: 1.3
PT POC: NORMAL SECONDS
VALID INTERNAL CONTROL?: YES

## 2019-02-07 NOTE — PROGRESS NOTES
Subjective:     Chief Complaint   Patient presents with    Follow-up     PT/INR      She  is a 46 y.o. female who presents for evaluation of:  Anxiety and depression - Was able to see Dr. Bony Stokes for psych eval and had meds adjusted (Lexapro 20 mg to pm, starting Trazodone switched to Restoril, and switching from Wellbutrin 100 mg TID to Wellbutrin  mg daily). Will see Psych back in 2/2019. Sx x ~4 months. Still dealing with major work-related stress. Still having daily crying spells, decreased energy, decreased concentration and attention, decreased appetite. Denies any suicidal ideation BUT initially endorsed homicidal ideation. She has come up with a plan and considered putting antifreeze in the coffee of her coworkers/pulses who are creating much of her stress. Last thought about this about a few days ago but has good reasons not to act on this. Anti-coag - DVT in R leg after gyn sgy and chronically on coumadin now. Has had DVT in same leg x 2 in past.   Ct to struggle with post thrombotic syndrome from numerous DVTs. Pain flares up at different times and is much worse with edema. She continues to manage this by resting her leg and elevating it.   Rest per anti-coag tab    ROS  Gen - no fever/chills  Resp - no dyspnea or cough  CV - no chest pain or CARRASQUILLO  Rest per HPI    Past Medical History:   Diagnosis Date    Asthma 3/12/2010    DDD (degenerative disc disease), lumbar     DJD (degenerative joint disease) 3/12/2010    DJD (degenerative joint disease) of knee     [facility] thrombosis) ([facility]     Rt leg    GERD (gastroesophageal reflux disease)     History of tuberculosis exposure 2013    pt states she has + PPD and was on Rx for 6 months; last dose either 11/13 or 12/13    Inflammatory polyarthritis ([address]     Psoriatic Arthitis    Morbid obesity ([address]     Post-thrombotic syndrome of right lower extremity 9/14/2017    Pseudogout     PUD (peptic ulcer disease)     Skin abrasion 1/28/14    After loosing 125#, Bilateral Inner thigh friction flareup monthly with skin breakdown    Th[facility]boembolus ([facility] 2008    Rt leg,  pt states she fell and had a plane ride home and developed blood clots    UC (ulcerative colitis) ([facility] 3/12/2010     Past Surgical History:   Procedure Laterality Date    HX ACL RECONSTRUCTION      Rt    Dianna See    Dr Britt Figueroa HX GI      reopening of rectal pouch    HX GYN  06/19/2017    Hysterectomy    HX ORTHOPAEDIC  12/13    Rt foot / toes    HX TONSILLECTOMY       Current Outpatient Medications on File Prior to Visit   Medication Sig Dispense Refill    KLOR-CON M20 20 mEq tablet TAKE 1 TABLET BY MOUTH TWICE A  Tab 1    ergocalciferol (ERGOCALCIFEROL) 50,000 unit capsule TAKE 1 CAPSULE BY MOUTH ONCE PER WEEK FOR A TOTAL OF 8 WEEKS. THEN CHANGE TO TWICE PER MONTH. 12 Cap 1    furosemide (LASIX) 40 mg tablet TAKE 1 TABLET BY MOUTH TWICE DAILY AS NEEDED 180 Tab 1    warfarin (COUMADIN) 5 mg tablet TAKE 2 TABS BY MOUTH DAILY 180 Tab 1    buPROPion XL (WELLBUTRIN XL) 300 mg XL tablet Take 1 Tab by mouth every morning. 90 Tab 0    escitalopram oxalate (LEXAPRO) 20 mg tablet TAKE 1 TABLET BY MOUTH EVERY DAY  1    temazepam (RESTORIL) 15 mg capsule Take 1 Cap by mouth nightly as needed for Sleep. Max Daily Amount: 15 mg. 30 Cap 2    phentermine (ADIPEX-P) 37.5 mg tablet Take 1/2 tablet before breakfast and before dinner 100 Tab 5    topiramate (TOPAMAX) 50 mg tablet Take 1 Tab by mouth two (2) times a day. 60 Tab 5    folic acid (FOLVITE) 1 mg tablet TAKE 1 TABLET BY MOUTH ONCE A DAY  6    CERTOLIZUMAB PEGOL (CIMZIA SC) by SubCUTAneous route. Injection:  Every 4 weeks      methotrexate (RHEUMATREX) 2.5 mg tablet TAKE 8 TABLETS BY MOUTH ONCE PER WEEK  2    ranitidine (ZANTAC) 150 mg tablet Take 150 mg by mouth two (2) times a day.       albuterol (PROVENTIL HFA, VENTOLIN HFA, PROAIR HFA) 90 mcg/actuation inhaler Take 1 Puff by inhalation every four (4) hours as needed for Wheezing. 1 Inhaler 5    acetaminophen (TYLENOL EXTRA STRENGTH) 500 mg tablet Take 1,000 mg by mouth every six (6) hours as needed for Pain. No current facility-administered medications on file prior to visit. Objective:     Vitals:    02/07/19 0743   BP: 117/78   Pulse: 72   Resp: 16   Temp: 98.2 °F (36.8 °C)   TempSrc: Oral   Weight: 231 lb 9.6 oz (105.1 kg)   Height: 5' 5\" (1.651 m)     Physical Examination:  General appearance - alert, well appearing, and in no distress  Eyes -sclera anicteric  Chest - CTAB  Heart - normal rate, regular rhythm, normal S1, S2, no murmurs, rubs, clicks or gallops  Psych - nml mood and affect, tearful about work stress    Assessment/ Plan:   Diagnoses and all orders for this visit:    1. Anxiety and depression  - Appreciate Psych input and med adjustments. We discussed sx today and specifically her fleeting HI with plan. Still having sig depression sx so asked her to check in with Psych again. Still on FMLA for work and encouraged finding a less stressful work environment. Ct working with therapist.  Will await psych appt rather than adjusting meds today. 2. Chronic deep vein thrombosis (DVT) of distal vein of right lower extremity ([address]  3. Monitoring for long-term anticoagulant use  4. Post-thrombotic syndrome of right lower extremity  -     AMB POC PT/INR    5. Current severe episode of major depressive disorder without psychotic features without prior episode ([address] - ct to encourage forgiveness in current situation, Need to get her out of severe stressors at her work environment given her HI. Ct meds as above and switching from Restoril to Belsomra given lack of benefit. -     suvorexant (BELSOMRA) 15 mg tablet; Take 1 Tab by mouth nightly as needed for Insomnia. Max Daily Amount: 15 mg. I have discussed the diagnosis with the patient and the intended plan as seen in the above orders.   The patient has received an after-visit summary and questions were answered concerning future plans. I have discussed medication side effects and warnings with the patient as well. The patient verbalizes understanding and agreement with the plan. Follow-up Disposition:  Return in about 2 weeks (around 2/21/2019), or if symptoms worsen or fail to improve.

## 2019-02-07 NOTE — PROGRESS NOTES
Chief Complaint   Patient presents with    Follow-up     PT/INR   1. Have you been to the ER, urgent care clinic since your last visit? Hospitalized since your last visit? No    2. Have you seen or consulted any other health care providers outside of the [address] [place] [address] since your last visit? Include any pap smears or colon screening. No   Continue to have right shoulder pain.

## 2019-02-08 DIAGNOSIS — I82.401 ACUTE DEEP VEIN THROMBOSIS (DVT) OF RIGHT LOWER EXTREMITY, UNSPECIFIED VEIN (HCC): ICD-10-CM

## 2019-02-08 DIAGNOSIS — E55.9 VITAMIN D DEFICIENCY: ICD-10-CM

## 2019-02-08 RX ORDER — ERGOCALCIFEROL 1.25 MG/1
CAPSULE ORAL
Qty: 12 CAP | Refills: 0 | Status: SHIPPED | OUTPATIENT
Start: 2019-02-08 | End: 2019-06-17 | Stop reason: SDUPTHER

## 2019-02-12 ENCOUNTER — TELEPHONE (OUTPATIENT)
Dept: FAMILY MEDICINE CLINIC | Age: 49
End: 2019-02-12

## 2019-02-12 NOTE — TELEPHONE ENCOUNTER
----- Message from Grace Lance sent at 2/12/2019  3:16 PM EST -----  Regarding: Dr. Anna Marie Araiza: [phone] [address] [facility] pharmacy in regards to \"Klorconm 20 tablets\" script received. The manufacture has discontinued. Pharmacy would like to know if alternate \"Potassium Chloride 20meq\"  Can be dispensed as original says to dispense as is written.     Pharmacy contact [phone] option 1

## 2019-02-13 ENCOUNTER — OFFICE VISIT (OUTPATIENT)
Dept: BEHAVIORAL/MENTAL HEALTH CLINIC | Age: 49
End: 2019-02-13

## 2019-02-13 DIAGNOSIS — F33.2 SEVERE EPISODE OF RECURRENT MAJOR DEPRESSIVE DISORDER, WITHOUT PSYCHOTIC FEATURES (HCC): Primary | ICD-10-CM

## 2019-02-13 DIAGNOSIS — F41.1 GENERALIZED ANXIETY DISORDER: ICD-10-CM

## 2019-02-13 DIAGNOSIS — G47.00 INSOMNIA, UNSPECIFIED TYPE: ICD-10-CM

## 2019-02-13 RX ORDER — ESCITALOPRAM OXALATE 20 MG/1
20 TABLET ORAL DAILY
Qty: 30 TAB | Refills: 2 | Status: SHIPPED | OUTPATIENT
Start: 2019-02-13 | End: 2019-04-05 | Stop reason: SDUPTHER

## 2019-02-13 NOTE — PROGRESS NOTES
Ivette Mejia  1970  46 y.o.  female  [address]    Chief Complaint   Patient presents with    Depression     40% better with increase Wellbutrin    Anxiety     [place] a lot of anxiety    Insomnia     Belsomra help    Stress     Medical issues       [place]:   This is a 50 years old  -American female with past psychiatric history and treatment of depression and anxiety who is referred by her PCP for psychiatric evaluation and medication management and was seen for the first time on December 3 when she decided to continue Lexapro 20 mg at bedtime, try Wellbutrin  mg daily, and try trazodone  mg at bedtime as needed for insomnia.       Patient  was last seen on December 31, 2018 when she decided to increase Wellbutrin  mg daily, continue Lexapro 20 mg at bedtime, discontinue trazodone and try Belsomra 15 mg at bedtime. She presented on time, was observed to be slightly improved with brighter affect and calmer than last time when she was significantly depressed and cried most of the visit just like her initial appointment. She was alert awake oriented to time person and place and was calm and cooperative, polite, and pleasant during the interview.     Patient reported compliance with all of her medication, is able to tolerate, and reported 40% feeling better since we went up on Wellbutrin and was able to sleep well with Belsomra. She is also receiving psychotherapy at [facility] twice a month and report good rapport. She was provided with support and psychoeducation, and after discussing risk/benefits/expectations with treatment alternatives available, patient decided to continue current treatment plan, continue psychotherapy, and will return in  6 weeks for reevaluation.     SUBSTANCE USE HISTORY:   Patient denies a smoking, drinking, abusing any illicit drugs.         MEDICAL HISTORY:    has a past medical history of Asthma (3/12/2010), DDD (degenerative disc disease), lumbar, DJD (degenerative joint disease) (3/12/2010), DJD (degenerative joint disease) of knee, DVT (deep venous thrombosis) ([facility] GERD (gastroesophageal reflux disease), History of tuberculosis exposure (2013), Inflammatory polyarthritis ([facility] Morbid obesity ([facility] Post-thrombotic syndrome of right lower extremity (9/14/2017), Pseudogout, PUD (peptic ulcer disease), Skin abrasion (1/28/14), Thromboembolus ([facility] (2008), and UC (ulcerative colitis) ([facility] (3/12/2010). ALLERGIES:   Allergies   Allergen Reactions    Sulfa (Sulfonamide Antibiotics) Anaphylaxis    Codeine Itching       VITAL SIGNS:  LMP  (LMP Unknown) Comment: IUD    Current Outpatient Medications   Medication Sig Dispense Refill    escitalopram oxalate (LEXAPRO) 20 mg tablet Take 1 Tab by mouth daily. 30 Tab 2    ergocalciferol (ERGOCALCIFEROL) 50,000 unit capsule TAKE 1 CAPSULE BY MOUTH ONCE PER WEEK FOR A TOTAL OF 8 WEEKS. THEN CHANGE TO TWICE PER MONTH. 12 Cap 0    suvorexant (BELSOMRA) 15 mg tablet Take 1 Tab by mouth nightly as needed for Insomnia. Max Daily Amount: 15 mg. 30 Tab 0    KLOR-CON M20 20 mEq tablet TAKE 1 TABLET BY MOUTH TWICE A  Tab 1    ergocalciferol (ERGOCALCIFEROL) 50,000 unit capsule TAKE 1 CAPSULE BY MOUTH ONCE PER WEEK FOR A TOTAL OF 8 WEEKS. THEN CHANGE TO TWICE PER MONTH. 12 Cap 1    furosemide (LASIX) 40 mg tablet TAKE 1 TABLET BY MOUTH TWICE DAILY AS NEEDED 180 Tab 1    warfarin (COUMADIN) 5 mg tablet TAKE 2 TABS BY MOUTH DAILY 180 Tab 1    buPROPion XL (WELLBUTRIN XL) 300 mg XL tablet Take 1 Tab by mouth every morning. 90 Tab 0    phentermine (ADIPEX-P) 37.5 mg tablet Take 1/2 tablet before breakfast and before dinner 100 Tab 5    topiramate (TOPAMAX) 50 mg tablet Take 1 Tab by mouth two (2) times a day. 60 Tab 5    folic acid (FOLVITE) 1 mg tablet TAKE 1 TABLET BY MOUTH ONCE A DAY  6    CERTOLIZUMAB PEGOL (CIMZIA SC) by SubCUTAneous route.  Injection:  Every 4 weeks  methotrexate (RHEUMATREX) 2.5 mg tablet TAKE 8 TABLETS BY MOUTH ONCE PER WEEK  2    ranitidine (ZANTAC) 150 mg tablet Take 150 mg by mouth two (2) times a day.  albuterol (PROVENTIL HFA, VENTOLIN HFA, PROAIR HFA) 90 mcg/actuation inhaler Take 1 Puff by inhalation every four (4) hours as needed for Wheezing. 1 Inhaler 5    acetaminophen (TYLENOL EXTRA STRENGTH) 500 mg tablet Take 1,000 mg by mouth every six (6) hours as needed for Pain. ROS:  Constitutional: Positive for fatigue   Eyes: Negative for contacts/glasses  ENT: Negative for hearing loss and tinnitus  Respiratory: Negative for cough or wheezing  Cardiovascular: Negative for chest pain, palpitations  Neurological: Positive for memory problems  Behavioral/psych: Positive for insomnia, anxiety, depression, negative for SI/HI  Endocrine: Negative for diabetic symptoms including polyuria and polydipsia     MENTAL STATUS EXAMINATION:   Patient is a 48 y.o. female BLACK OR  who looks slightly older than her stated age. She  was observed to be improved with brighter affect and slightly calmer than her last visit when she cried most of the visit like her initial visit. She is obese and was dressed appropriately with good hygiene. Speech is regular rate and rhythm, fluent language, and thought process is linear, logical, and goal directed. Reported mood is depressed and anxious with mood congruent depressed and anxious affect. Patient denies any suicidal ideation, homicidal ideation, and auditory visual hallucination. Not observed to be delusional or paranoid. Insight, judgement, reliability and impulse control is limited to intact.  Cognition was within normal limit and patient was observed to be reliable. DIAGNOSIS:  Encounter Diagnoses   Name Primary?     Severe episode of recurrent major depressive disorder, without psychotic features ([facility] Yes    Generalized anxiety disorder     Insomnia, unspecified type PLAN:  1. MEDICATION:   1. Patient report compliance with Wellbutrin  mg daily, is able to tolerate and report about 40% benefits especially with help with not crying that much and slight ability to control her emotions. She requested to continue current treatment plan and return in 6 weeks when we will discuss further medication increase. 2. Patient agreed to continue Lexapro 20 mg at bedtime. 3. Patient responded well to Belsomra 15 mg at bedtime. She was provided with psycho education, discussed risk/benefits, and expectations from medication changes. Patient agrees with plan. 2. PSYCHOTHERAPY: Patient was provided with supportive therapy, strongly encourage to seek psychotherapy. She is receiving regular psychotherapy at [facility], she see her twice a month and report good rapport. 3. MEDICAL CARE: Patient was strongly encourage to take their medical medications and follow up with their PCP on regular basis. Her weight today is 231 pounds and she has lost 7 pounds in past 2 weeks. She has history of syncope and collapse, deep venous thrombosis, ulcerative colitis, GERD, GI bleed, impaired glucose tolerance, asthma, degenerative disc disease, pseudogout, inflammatory polyarthritis, fibroid uterus, and morbid obesity. She is dealing with multiple medical issues at young age. 4. SUBSTANCE ABUSE CARE: Patient denies a smoking, drinking, abusing any illicit drugs. 5. FOLLOW UP:   Follow-up Disposition:  Return in about 6 weeks (around 3/27/2019) for Medication management.

## 2019-02-21 ENCOUNTER — OFFICE VISIT (OUTPATIENT)
Dept: FAMILY MEDICINE CLINIC | Age: 49
End: 2019-02-21

## 2019-02-21 VITALS
HEART RATE: 79 BPM | OXYGEN SATURATION: 99 % | DIASTOLIC BLOOD PRESSURE: 79 MMHG | BODY MASS INDEX: 38.95 KG/M2 | SYSTOLIC BLOOD PRESSURE: 130 MMHG | HEIGHT: 65 IN | RESPIRATION RATE: 18 BRPM | TEMPERATURE: 97.5 F | WEIGHT: 233.8 LBS

## 2019-02-21 DIAGNOSIS — Z79.01 MONITORING FOR LONG-TERM ANTICOAGULANT USE: ICD-10-CM

## 2019-02-21 DIAGNOSIS — L73.9 FOLLICULITIS: ICD-10-CM

## 2019-02-21 DIAGNOSIS — F41.9 ANXIETY AND DEPRESSION: Primary | ICD-10-CM

## 2019-02-21 DIAGNOSIS — Z51.81 MONITORING FOR LONG-TERM ANTICOAGULANT USE: ICD-10-CM

## 2019-02-21 DIAGNOSIS — F32.A ANXIETY AND DEPRESSION: Primary | ICD-10-CM

## 2019-02-21 DIAGNOSIS — I82.5Z1 CHRONIC DEEP VEIN THROMBOSIS (DVT) OF DISTAL VEIN OF RIGHT LOWER EXTREMITY (HCC): ICD-10-CM

## 2019-02-21 DIAGNOSIS — I87.001 POST-THROMBOTIC SYNDROME OF RIGHT LOWER EXTREMITY: ICD-10-CM

## 2019-02-21 LAB
INR BLD: 1.4
PT POC: NORMAL SECONDS
VALID INTERNAL CONTROL?: YES

## 2019-02-21 NOTE — PROGRESS NOTES
Chief Complaint   Patient presents with    Follow-up     PT/INR   1. Have you been to the ER, urgent care clinic since your last visit? Hospitalized since your last visit? No    2. Have you seen or consulted any other health care providers outside of the [address] [place] [address] since your last visit? Include any pap smears or colon screening.  Yes Where: Counselor   Bump under Left Breast bra line started hurting this week  Taking Belsomra but continue to wake up in middle of night

## 2019-02-21 NOTE — PROGRESS NOTES
Subjective:     Chief Complaint   Patient presents with    Follow-up     PT/INR      She  is a 46 y.o. female who presents for evaluation of:  Anxiety and depression - Was able to see Dr. Brooke Anton for psych eval and had meds adjusted (Lexapro 20 mg to pm, starting Trazodone switched to Restoril, and switching from Wellbutrin 100 mg TID to Wellbutrin  mg daily). Will see Psych back in 2/2019. Sx x ~ 5 months. Still dealing with major work-related stress. Still having daily crying spells, decreased energy, decreased concentration and attention, decreased appetite. Denies any suicidal ideation BUT initially endorsed homicidal ideation. She has come up with a plan and considered putting antifreeze in the coffee of her coworkers/pulses who are creating much of her stress. Last thought about this about a few days ago but has good reasons not to act on this. Anti-coag - DVT in R leg after gyn sgy and chronically on coumadin now. Has had DVT in same leg x 2 in past.   Ct to struggle with post thrombotic syndrome from numerous DVTs. Pain flares up at different times and is much worse with edema. She continues to manage this by resting her leg and elevating it.   Rest per anti-coag tab    ROS  Gen - no fever/chills  Resp - no dyspnea or cough  CV - no chest pain or CARRASQUILLO  Rest per HPI    Past Medical History:   Diagnosis Date    Asthma 3/12/2010    DDD (degenerative disc disease), lumbar     DJD (degenerative joint disease) 3/12/2010    DJD (degenerative joint disease) of knee     [facility] thrombosis[facility] ([facility])     Rt leg    GERD (gastroesophageal reflux disease)     History of tuberculosis exposure 2013    pt states she has + PPD and was on Rx for 6 months; last dose either 11/13 or 12/13    Inflammatory polyarthritis ([address]     Psoriatic Arthitis    Morbid obesity ([address]     Post-thrombotic syndrome of right lower extremity 9/14/2017    Pseudogout     PUD (peptic ulcer disease)     Skin abrasion 1/28/14    After loosing 125#, Bilateral Inner thigh friction flareup monthly with skin breakdown    [facility] [facility] 2008    Rt leg,  pt states she fell and had a plane ride home and developed blood clots    UC (ulcerative colitis) ([facility] 3/12/2010     Past Surgical History:   Procedure Laterality Date    HX ACL RECONSTRUCTION      Rt    Nuzhat Breed    Dr Winston Muniz HX GI      reopening of rectal pouch    HX GYN  06/19/2017    Hysterectomy    HX ORTHOPAEDIC  12/13    Rt foot / toes    HX TONSILLECTOMY       Current Outpatient Medications on File Prior to Visit   Medication Sig Dispense Refill    escitalopram oxalate (LEXAPRO) 20 mg tablet Take 1 Tab by mouth daily. 30 Tab 2    ergocalciferol (ERGOCALCIFEROL) 50,000 unit capsule TAKE 1 CAPSULE BY MOUTH ONCE PER WEEK FOR A TOTAL OF 8 WEEKS. THEN CHANGE TO TWICE PER MONTH. 12 Cap 0    suvorexant (BELSOMRA) 15 mg tablet Take 1 Tab by mouth nightly as needed for Insomnia. Max Daily Amount: 15 mg. 30 Tab 0    KLOR-CON M20 20 mEq tablet TAKE 1 TABLET BY MOUTH TWICE A  Tab 1    furosemide (LASIX) 40 mg tablet TAKE 1 TABLET BY MOUTH TWICE DAILY AS NEEDED 180 Tab 1    warfarin (COUMADIN) 5 mg tablet TAKE 2 TABS BY MOUTH DAILY 180 Tab 1    buPROPion XL (WELLBUTRIN XL) 300 mg XL tablet Take 1 Tab by mouth every morning. 90 Tab 0    phentermine (ADIPEX-P) 37.5 mg tablet Take 1/2 tablet before breakfast and before dinner 100 Tab 5    topiramate (TOPAMAX) 50 mg tablet Take 1 Tab by mouth two (2) times a day. 60 Tab 5    folic acid (FOLVITE) 1 mg tablet TAKE 1 TABLET BY MOUTH ONCE A DAY  6    CERTOLIZUMAB PEGOL (CIMZIA SC) by SubCUTAneous route. Injection:  Every 4 weeks      methotrexate (RHEUMATREX) 2.5 mg tablet TAKE 8 TABLETS BY MOUTH ONCE PER WEEK  2    ranitidine (ZANTAC) 150 mg tablet Take 150 mg by mouth two (2) times a day.       albuterol (PROVENTIL HFA, VENTOLIN HFA, PROAIR HFA) 90 mcg/actuation inhaler Take 1 Puff by inhalation every four (4) hours as needed for Wheezing. 1 Inhaler 5    acetaminophen (TYLENOL EXTRA STRENGTH) 500 mg tablet Take 1,000 mg by mouth every six (6) hours as needed for Pain. No current facility-administered medications on file prior to visit. Objective:     Vitals:    02/21/19 0929   BP: 130/79   Pulse: 79   Resp: 18   Temp: 97.5 °F (36.4 °C)   TempSrc: Oral   SpO2: 99%   Weight: 233 lb 12.8 oz (106.1 kg)   Height: 5' 5\" (1.651 m)     Physical Examination:  General appearance - alert, well appearing, and in no distress  Eyes -sclera anicteric  Chest - CTAB  Heart - normal rate, regular rhythm, normal S1, S2, no murmurs, rubs, clicks or gallops  Psych - nml mood and affect, tearful about work stress  Skin - inflamed papule under left breast with ttp, no induration    Assessment/ Plan:   Diagnoses and all orders for this visit:    1. Anxiety and depression  - Appreciate Psych input. Stable on meds. Ct to work with thearpist.  Still on FMLA for work and working to find a less stressful work environment. No recent SI/HI. 2. Chronic deep vein thrombosis (DVT) of distal vein of right lower extremity ([address]  3. Monitoring for long-term anticoagulant use  4. Post-thrombotic syndrome of right lower extremity  -     AMB POC PT/INR    5. Folliculitis - noted under left breast.  Will try warm soaks first and if not improving or worsening, will plan for Doxycycline 100 mg BID x 7 days and hold coumadin x 3 days if needed. I have discussed the diagnosis with the patient and the intended plan as seen in the above orders. The patient has received an after-visit summary and questions were answered concerning future plans. I have discussed medication side effects and warnings with the patient as well. The patient verbalizes understanding and agreement with the plan. Follow-up Disposition:  Return in about 2 weeks (around 3/7/2019).

## 2019-02-24 DIAGNOSIS — M79.661 PAIN AND SWELLING OF RIGHT LOWER LEG: ICD-10-CM

## 2019-02-24 DIAGNOSIS — M79.89 PAIN AND SWELLING OF RIGHT LOWER LEG: ICD-10-CM

## 2019-02-26 RX ORDER — FUROSEMIDE 40 MG/1
TABLET ORAL
Qty: 180 TAB | Refills: 1 | Status: SHIPPED | OUTPATIENT
Start: 2019-02-26 | End: 2019-03-07 | Stop reason: SDUPTHER

## 2019-02-28 ENCOUNTER — TELEPHONE (OUTPATIENT)
Dept: FAMILY MEDICINE CLINIC | Age: 49
End: 2019-02-28

## 2019-02-28 DIAGNOSIS — E66.01 MORBID OBESITY, UNSPECIFIED OBESITY TYPE (HCC): ICD-10-CM

## 2019-02-28 NOTE — TELEPHONE ENCOUNTER
Neyda Hall, calling to report pt broke out on her back last night, she thinks it is shingles     Best number to reach her is [phone]  or C [phone]

## 2019-03-01 ENCOUNTER — OFFICE VISIT (OUTPATIENT)
Dept: FAMILY MEDICINE CLINIC | Age: 49
End: 2019-03-01

## 2019-03-01 VITALS
HEIGHT: 65 IN | DIASTOLIC BLOOD PRESSURE: 70 MMHG | HEART RATE: 76 BPM | TEMPERATURE: 97.3 F | RESPIRATION RATE: 18 BRPM | WEIGHT: 236.8 LBS | BODY MASS INDEX: 39.45 KG/M2 | OXYGEN SATURATION: 98 % | SYSTOLIC BLOOD PRESSURE: 135 MMHG

## 2019-03-01 DIAGNOSIS — B02.9 HERPES ZOSTER WITHOUT COMPLICATION: Primary | ICD-10-CM

## 2019-03-01 RX ORDER — VALACYCLOVIR HYDROCHLORIDE 1 G/1
1000 TABLET, FILM COATED ORAL 3 TIMES DAILY
Qty: 21 TAB | Refills: 0 | Status: SHIPPED | OUTPATIENT
Start: 2019-03-01 | End: 2022-07-26 | Stop reason: SDUPTHER

## 2019-03-01 NOTE — PROGRESS NOTES
Subjective:     Chief Complaint   Patient presents with    Rash     Chest/Back        She  is a 46 y.o. female who presents for evaluation of:  Rash - noted on chest and back x 2 days. Some burning and tingling. Small red papules. No known shingles episodes in past but had Valtrex for concerns for this in the past.  She did take 1-2 doses of Valtrex which has seemed to help. Also tried witch hazel which did not help.     ROS  Gen - no fever/chills  Resp - no dyspnea or cough  CV - no chest pain or CARRASQUILLO  Rest per HPI    Past Medical History:   Diagnosis Date    Asthma 3/12/2010    DDD (degenerative disc disease), lumbar     DJD (degenerative joint disease) 3/12/2010    DJD (degenerative joint disease) of knee     [facility]     Rt leg    GERD (gastroesophageal reflux disease)     History of tuberculosis exposure 2013    pt states she has + PPD and was on Rx for 6 months; last dose either 11/13 or 12/13    Inflammatory polyarthritis ([address]     Psoriatic Arthitis    Morbid obesity ([address]     Post-thrombotic syndrome of right lower extremity 9/14/2017    Pseudogout     PUD (peptic ulcer disease)     Skin abrasion 1/28/14    After loosing 125#, Bilateral Inner thigh friction flareup monthly with skin breakdown    Thromboembolus ([address] 2008    Rt leg,  pt states she fell and had a plane ride home and developed blood clots    UC (ulcerative colitis) ([address] 3/12/2010     Past Surgical History:   Procedure Laterality Date    HX ACL RECONSTRUCTION      Rt    Bette Gill HX GI      reopening of rectal pouch    HX GYN  06/19/2017    Hysterectomy    HX ORTHOPAEDIC  12/13    Rt foot / toes    HX TONSILLECTOMY       Current Outpatient Medications on File Prior to Visit   Medication Sig Dispense Refill    furosemide (LASIX) 40 mg tablet TAKE 1 TABLET BY MOUTH TWICE DAILY AS NEEDED 180 Tab 1    escitalopram oxalate (LEXAPRO) 20 mg tablet Take 1 Tab by mouth daily. 30 Tab 2    ergocalciferol (ERGOCALCIFEROL) 50,000 unit capsule TAKE 1 CAPSULE BY MOUTH ONCE PER WEEK FOR A TOTAL OF 8 WEEKS. THEN CHANGE TO TWICE PER MONTH. 12 Cap 0    suvorexant (BELSOMRA) 15 mg tablet Take 1 Tab by mouth nightly as needed for Insomnia. Max Daily Amount: 15 mg. 30 Tab 0    KLOR-CON M20 20 mEq tablet TAKE 1 TABLET BY MOUTH TWICE A  Tab 1    furosemide (LASIX) 40 mg tablet TAKE 1 TABLET BY MOUTH TWICE DAILY AS NEEDED 180 Tab 1    warfarin (COUMADIN) 5 mg tablet TAKE 2 TABS BY MOUTH DAILY 180 Tab 1    buPROPion XL (WELLBUTRIN XL) 300 mg XL tablet Take 1 Tab by mouth every morning. 90 Tab 0    phentermine (ADIPEX-P) 37.5 mg tablet Take 1/2 tablet before breakfast and before dinner 100 Tab 5    topiramate (TOPAMAX) 50 mg tablet Take 1 Tab by mouth two (2) times a day. 60 Tab 5    folic acid (FOLVITE) 1 mg tablet TAKE 1 TABLET BY MOUTH ONCE A DAY  6    CERTOLIZUMAB PEGOL (CIMZIA SC) by SubCUTAneous route. Injection:  Every 4 weeks      methotrexate (RHEUMATREX) 2.5 mg tablet TAKE 8 TABLETS BY MOUTH ONCE PER WEEK  2    ranitidine (ZANTAC) 150 mg tablet Take 150 mg by mouth two (2) times a day.  albuterol (PROVENTIL HFA, VENTOLIN HFA, PROAIR HFA) 90 mcg/actuation inhaler Take 1 Puff by inhalation every four (4) hours as needed for Wheezing. 1 Inhaler 5    acetaminophen (TYLENOL EXTRA STRENGTH) 500 mg tablet Take 1,000 mg by mouth every six (6) hours as needed for Pain. No current facility-administered medications on file prior to visit.          Objective:     Vitals:    03/01/19 0743   BP: 135/70   Pulse: 76   Resp: 18   Temp: 97.3 °F (36.3 °C)   TempSrc: Oral   SpO2: 98%   Weight: 236 lb 12.8 oz (107.4 kg)   Height: 5' 5\" (1.651 m)     Physical Examination:  General appearance - alert, well appearing, and in no distress  Eyes -sclera anicteric  Skin - + shingles patch over right ant chest and left upper back both in a dermatomal distribution    Assessment/ Plan: Diagnoses and all orders for this visit:    1. Herpes zoster without complication  -     valACYclovir (VALTREX) 1 gram tablet; Take 1 Tab by mouth three (3) times daily for 7 days. I have discussed the diagnosis with the patient and the intended plan as seen in the above orders. The patient has received an after-visit summary and questions were answered concerning future plans. I have discussed medication side effects and warnings with the patient as well. The patient verbalizes understanding and agreement with the plan.     Follow-up Disposition: Not on File

## 2019-03-01 NOTE — PATIENT INSTRUCTIONS

## 2019-03-07 ENCOUNTER — OFFICE VISIT (OUTPATIENT)
Dept: FAMILY MEDICINE CLINIC | Age: 49
End: 2019-03-07

## 2019-03-07 VITALS
WEIGHT: 232 LBS | TEMPERATURE: 98.1 F | OXYGEN SATURATION: 92 % | HEART RATE: 75 BPM | SYSTOLIC BLOOD PRESSURE: 125 MMHG | DIASTOLIC BLOOD PRESSURE: 72 MMHG | BODY MASS INDEX: 38.65 KG/M2 | RESPIRATION RATE: 18 BRPM | HEIGHT: 65 IN

## 2019-03-07 DIAGNOSIS — I87.001 POST-THROMBOTIC SYNDROME OF RIGHT LOWER EXTREMITY: ICD-10-CM

## 2019-03-07 DIAGNOSIS — F41.9 ANXIETY AND DEPRESSION: Primary | ICD-10-CM

## 2019-03-07 DIAGNOSIS — Z79.01 MONITORING FOR LONG-TERM ANTICOAGULANT USE: ICD-10-CM

## 2019-03-07 DIAGNOSIS — I82.5Z1 CHRONIC DEEP VEIN THROMBOSIS (DVT) OF DISTAL VEIN OF RIGHT LOWER EXTREMITY (HCC): ICD-10-CM

## 2019-03-07 DIAGNOSIS — F32.A ANXIETY AND DEPRESSION: Primary | ICD-10-CM

## 2019-03-07 DIAGNOSIS — B02.9 HERPES ZOSTER WITHOUT COMPLICATION: ICD-10-CM

## 2019-03-07 DIAGNOSIS — Z51.81 MONITORING FOR LONG-TERM ANTICOAGULANT USE: ICD-10-CM

## 2019-03-07 LAB
INR BLD: 1.6
PT POC: NORMAL SECONDS
VALID INTERNAL CONTROL?: YES

## 2019-03-07 RX ORDER — WARFARIN SODIUM 5 MG/1
TABLET ORAL
Qty: 90 TAB | Refills: 0
Start: 2019-03-07 | End: 2020-03-31 | Stop reason: SDUPTHER

## 2019-03-07 RX ORDER — WARFARIN 7.5 MG/1
TABLET ORAL
Qty: 90 TAB | Refills: 1 | Status: SHIPPED | OUTPATIENT
Start: 2019-03-07 | End: 2019-10-18 | Stop reason: SDUPTHER

## 2019-03-07 NOTE — LETTER
NOTIFICATION RETURN TO WORK / SCHOOL 
 
3/7/2019 9:36 AM 
 
Ms. Antonina Munguia [facility] [id number] [id number] To Whom It May Concern: 
 
Antonina Munguia is [facility] the [facility] She will return to work on: 3/12/19 Of note, she has numerous medical appointments scheduled with different specialists and will need to be excused for these appointments: 
 
3/13/19 at 8:30 am with Dr. Richard Montenegro 3/14/19 at 3:50 pm with Dr. Tanesha Murillo 3/22/19 at 7:30 am with Dr. Armin Muro (me) 
4/5/19 at 2:00pm with Dr. Jett Guo Monthly appointments with Dr. Fanny Fuentes She will need to be excused for 2-4 hours for each appointment to allow for wait times, travel, and different durations of each appointment. If there are questions or concerns please have the patient contact our office.  
 
 
 
Sincerely, 
 
 
Any Dao MD

## 2019-03-07 NOTE — PROGRESS NOTES
Chief Complaint   Patient presents with    Follow-up     PT/INR   Follow-up Shingles Improving,Lesion are not draining

## 2019-03-07 NOTE — PROGRESS NOTES
Subjective:     Chief Complaint   Patient presents with    Follow-up     PT/INR      She  is a 46 y.o. female who presents for evaluation of:  Since last appt, had shingles patches pop up and doing much better after a round of Valtrex. Anxiety and depression - Was able to see Dr. Bre[place] for psych eval and had meds adjusted (Lexapro 20 mg to pm, starting Trazodone switched to Restoril, and switching from Wellbutrin 100 mg TID to Wellbutrin  mg daily). Sx x ~ 6 months. Still dealing with major work-related stress. Still having daily crying spells, decreased energy, decreased concentration and attention, decreased appetite. Denies any suicidal ideation BUT initially endorsed homicidal ideation. She has come up with a plan and considered putting antifreeze in the coffee of her coworkers/pulses who are creating much of her stress. Last thought about this about a few days ago but has good reasons not to act on this. Anti-coag - DVT in R leg after gyn sgy and chronically on coumadin now. Has had DVT in same leg x 2 in past.   Ct to struggle with post thrombotic syndrome from numerous DVTs. Pain flares up at different times and is much worse with edema. She continues to manage this by resting her leg and elevating it. Rest per anti-coag tab.     ROS  Gen - no fever/chills  Resp - no dyspnea or cough  CV - no chest pain or CARRASQUILLO  Rest per HPI    Past Medical History:   Diagnosis Date    Asthma 3/12/2010    DDD (degenerative disc disease), lumbar     DJD (degenerative joint disease) 3/12/2010    DJD (degenerative joint disease) of knee     [facility]     Rt leg    GERD (gastroesophageal reflux disease)     History of tuberculosis exposure 2013    pt states she has + PPD and was on Rx for 6 months; last dose either 11/13 or 12/13    Inflammatory polyarthritis ([address]     Psoriatic Arthitis    Morbid obesity ([address]     Post-thrombotic syndrome of right lower extremity 9/14/2017    Pseudogout     PUD (peptic ulcer disease)     Skin abrasion 1/28/14    After loosing 125#, Bilateral Inner thigh friction flareup monthly with skin breakdown    Thrombo[facility]olus ([facility] 2008    Rt leg,  pt states she fell and had a plane ride home and developed blood clots    UC (ulcerative colitis) ([facility] 3/12/2010     Past Surgical History:   Procedure Laterality Date    HX ACL RECONSTRUCTION      Rt    Lloyd Snrobert Ansari HX GI      reopening of rectal pouch    HX GYN  06/19/2017    Hysterectomy    HX ORTHOPAEDIC  12/13    Rt foot / toes    HX TONSILLECTOMY       Current Outpatient Medications on File Prior to Visit   Medication Sig Dispense Refill    valACYclovir (VALTREX) 1 gram tablet Take 1 Tab by mouth three (3) times daily for 7 days. 21 Tab 0    escitalopram oxalate (LEXAPRO) 20 mg tablet Take 1 Tab by mouth daily. 30 Tab 2    ergocalciferol (ERGOCALCIFEROL) 50,000 unit capsule TAKE 1 CAPSULE BY MOUTH ONCE PER WEEK FOR A TOTAL OF 8 WEEKS. THEN CHANGE TO TWICE PER MONTH. 12 Cap 0    suvorexant (BELSOMRA) 15 mg tablet Take 1 Tab by mouth nightly as needed for Insomnia. Max Daily Amount: 15 mg. 30 Tab 0    KLOR-CON M20 20 mEq tablet TAKE 1 TABLET BY MOUTH TWICE A  Tab 1    furosemide (LASIX) 40 mg tablet TAKE 1 TABLET BY MOUTH TWICE DAILY AS NEEDED 180 Tab 1    buPROPion XL (WELLBUTRIN XL) 300 mg XL tablet Take 1 Tab by mouth every morning. 90 Tab 0    phentermine (ADIPEX-P) 37.5 mg tablet Take 1/2 tablet before breakfast and before dinner 100 Tab 5    topiramate (TOPAMAX) 50 mg tablet Take 1 Tab by mouth two (2) times a day. 60 Tab 5    folic acid (FOLVITE) 1 mg tablet TAKE 1 TABLET BY MOUTH ONCE A DAY  6    CERTOLIZUMAB PEGOL (CIMZIA SC) by SubCUTAneous route. Injection:  Every 4 weeks      methotrexate (RHEUMATREX) 2.5 mg tablet TAKE 8 TABLETS BY MOUTH ONCE PER WEEK  2    ranitidine (ZANTAC) 150 mg tablet Take 150 mg by mouth two (2) times a day.  albuterol (PROVENTIL HFA, VENTOLIN HFA, PROAIR HFA) 90 mcg/actuation inhaler Take 1 Puff by inhalation every four (4) hours as needed for Wheezing. 1 Inhaler 5    acetaminophen (TYLENOL EXTRA STRENGTH) 500 mg tablet Take 1,000 mg by mouth every six (6) hours as needed for Pain. No current facility-administered medications on file prior to visit. Objective:     Vitals:    03/07/19 0818   BP: 125/72   Pulse: 75   Resp: 18   Temp: 98.1 °F (36.7 °C)   TempSrc: Oral   SpO2: 92%   Weight: 232 lb (105.2 kg)   Height: 5' 5\" (1.651 m)     Physical Examination:  General appearance - alert, well appearing, and in no distress  Eyes -sclera anicteric  Chest - CTAB  Heart - normal rate, regular rhythm, normal S1, S2, no murmurs, rubs, clicks or gallops  Psych - nml mood and affect, overwhelmed and frustrated  Skin: + rash improving    Assessment/ Plan:   Diagnoses and all orders for this visit:    1. Anxiety and depression  - Appreciate Psych input. Stable on meds. Ct to work with thearpist.  Still on FMLA for work and working to find a less stressful work environment. No recent SI/HI. 2. Chronic deep vein thrombosis (DVT) of distal vein of right lower extremity ([address]  3. Monitoring for long-term anticoagulant use  4. Post-thrombotic syndrome of right lower extremity  -     AMB POC PT/INR    5. Herpes zoster without complication - resolving    I spent > 50% of the 25 min visit counseling and educating about: anxiety and depression    I have discussed the diagnosis with the patient and the intended plan as seen in the above orders. The patient has received an after-visit summary and questions were answered concerning future plans. I have discussed medication side effects and warnings with the patient as well. The patient verbalizes understanding and agreement with the plan. Follow-up Disposition:  Return in about 2 weeks (around 3/21/2019).

## 2019-03-08 LAB
ALBUMIN SERPL-MCNC: 4.5 G/DL (ref 3.5–5.5)
ALBUMIN/GLOB SERPL: 1.6 {RATIO} (ref 1.2–2.2)
ALP SERPL-CCNC: 44 IU/L (ref 39–117)
ALT SERPL-CCNC: 14 IU/L (ref 0–32)
AST SERPL-CCNC: 9 IU/L (ref 0–40)
BILIRUB SERPL-MCNC: 1.1 MG/DL (ref 0–1.2)
BUN SERPL-MCNC: 15 MG/DL (ref 6–24)
BUN/CREAT SERPL: 15 (ref 9–23)
CALCIUM SERPL-MCNC: 8.9 MG/DL (ref 8.7–10.2)
CHLORIDE SERPL-SCNC: 104 MMOL/L (ref 96–106)
CO2 SERPL-SCNC: 23 MMOL/L (ref 20–29)
CREAT SERPL-MCNC: 1 MG/DL (ref 0.57–1)
ERYTHROCYTE [DISTWIDTH] IN BLOOD BY AUTOMATED COUNT: 14.1 % (ref 12.3–15.4)
EST. AVERAGE GLUCOSE BLD GHB EST-MCNC: 88 MG/DL
GLOBULIN SER CALC-MCNC: 2.8 G/DL (ref 1.5–4.5)
GLUCOSE SERPL-MCNC: 89 MG/DL (ref 65–99)
HBA1C MFR BLD: 4.7 % (ref 4.8–5.6)
HCT VFR BLD AUTO: 42.1 % (ref 34–46.6)
HGB BLD-MCNC: 14.1 G/DL (ref 11.1–15.9)
MCH RBC QN AUTO: 31.5 PG (ref 26.6–33)
MCHC RBC AUTO-ENTMCNC: 33.5 G/DL (ref 31.5–35.7)
MCV RBC AUTO: 94 FL (ref 79–97)
PLATELET # BLD AUTO: 295 X10E3/UL (ref 150–379)
POTASSIUM SERPL-SCNC: 4.2 MMOL/L (ref 3.5–5.2)
PROT SERPL-MCNC: 7.3 G/DL (ref 6–8.5)
RBC # BLD AUTO: 4.48 X10E6/UL (ref 3.77–5.28)
SODIUM SERPL-SCNC: 139 MMOL/L (ref 134–144)
WBC # BLD AUTO: 3.7 X10E3/UL (ref 3.4–10.8)

## 2019-03-13 ENCOUNTER — OFFICE VISIT (OUTPATIENT)
Dept: FAMILY MEDICINE CLINIC | Age: 49
End: 2019-03-13

## 2019-03-13 ENCOUNTER — DOCUMENTATION ONLY (OUTPATIENT)
Dept: FAMILY MEDICINE CLINIC | Age: 49
End: 2019-03-13

## 2019-03-13 VITALS
WEIGHT: 233 LBS | HEIGHT: 65 IN | SYSTOLIC BLOOD PRESSURE: 129 MMHG | RESPIRATION RATE: 18 BRPM | BODY MASS INDEX: 38.82 KG/M2 | DIASTOLIC BLOOD PRESSURE: 84 MMHG | OXYGEN SATURATION: 98 % | HEART RATE: 73 BPM | TEMPERATURE: 97.6 F

## 2019-03-13 DIAGNOSIS — K27.9 PUD (PEPTIC ULCER DISEASE): ICD-10-CM

## 2019-03-13 DIAGNOSIS — I82.4Z1 LOWER LEG DVT (DEEP VENOUS THROMBOEMBOLISM), ACUTE, RIGHT (HCC): ICD-10-CM

## 2019-03-13 DIAGNOSIS — R94.31 ABNORMAL ELECTROCARDIOGRAM (ECG) (EKG): ICD-10-CM

## 2019-03-13 DIAGNOSIS — L40.50 PSORIATIC ARTHRITIS (HCC): ICD-10-CM

## 2019-03-13 DIAGNOSIS — R07.89 OTHER CHEST PAIN: Primary | ICD-10-CM

## 2019-03-13 DIAGNOSIS — E89.40 POSTSURGICAL MENOPAUSE: ICD-10-CM

## 2019-03-13 DIAGNOSIS — Z83.49 FAMILY HISTORY OF HYPOTHYROIDISM: ICD-10-CM

## 2019-03-13 DIAGNOSIS — K51.919 ULCERATIVE COLITIS WITH COMPLICATION, UNSPECIFIED LOCATION (HCC): ICD-10-CM

## 2019-03-13 DIAGNOSIS — R12 HEARTBURN: ICD-10-CM

## 2019-03-13 DIAGNOSIS — B02.9 HERPES ZOSTER WITHOUT COMPLICATION: ICD-10-CM

## 2019-03-13 DIAGNOSIS — Z79.01 MONITORING FOR LONG-TERM ANTICOAGULANT USE: ICD-10-CM

## 2019-03-13 DIAGNOSIS — F51.04 CHRONIC INSOMNIA: ICD-10-CM

## 2019-03-13 DIAGNOSIS — I83.893 VARICOSE VEINS OF BOTH LEGS WITH EDEMA: ICD-10-CM

## 2019-03-13 DIAGNOSIS — Z83.79 FAMILY HISTORY OF GALLSTONES: ICD-10-CM

## 2019-03-13 DIAGNOSIS — I87.001 POST-THROMBOTIC SYNDROME OF RIGHT LOWER EXTREMITY: ICD-10-CM

## 2019-03-13 DIAGNOSIS — E73.9 LACTOSE INTOLERANCE: ICD-10-CM

## 2019-03-13 DIAGNOSIS — E66.9 OBESITY, CLASS II, BMI 35-39.9: ICD-10-CM

## 2019-03-13 DIAGNOSIS — F32.A ANXIETY AND DEPRESSION: ICD-10-CM

## 2019-03-13 DIAGNOSIS — K59.04 CHRONIC IDIOPATHIC CONSTIPATION: ICD-10-CM

## 2019-03-13 DIAGNOSIS — E55.9 VITAMIN D DEFICIENCY: ICD-10-CM

## 2019-03-13 DIAGNOSIS — Z90.49 S/P COLECTOMY: ICD-10-CM

## 2019-03-13 DIAGNOSIS — Z51.81 MONITORING FOR LONG-TERM ANTICOAGULANT USE: ICD-10-CM

## 2019-03-13 DIAGNOSIS — F41.9 ANXIETY AND DEPRESSION: ICD-10-CM

## 2019-03-13 DIAGNOSIS — Z82.3 FAMILY HISTORY OF STROKE: ICD-10-CM

## 2019-03-13 DIAGNOSIS — Z83.3 FAMILY HISTORY OF DIABETES MELLITUS (DM): ICD-10-CM

## 2019-03-13 DIAGNOSIS — R04.0 EPISTAXIS: ICD-10-CM

## 2019-03-13 DIAGNOSIS — Z82.49 FAMILY HISTORY OF HEART DISEASE: ICD-10-CM

## 2019-03-13 DIAGNOSIS — Z80.8 FAMILY HISTORY OF BRAIN CANCER: ICD-10-CM

## 2019-03-13 DIAGNOSIS — R41.3 MILD MEMORY DISTURBANCE: ICD-10-CM

## 2019-03-13 DIAGNOSIS — Z90.710 S/P TAH (TOTAL ABDOMINAL HYSTERECTOMY): ICD-10-CM

## 2019-03-13 NOTE — PATIENT INSTRUCTIONS
Congrats on starting the LCD! Before starting the program, see the cardiologist for clearance. After you are cleared, call our office to set up a time to  your food products. Then, start using 2-3 meal replacements a day. Your 1 regular meal should consist of protein and green vegetables. Your total calories intake should not exceed 1,000-1,200 calories. 1.Please refer to [facility] interest folder for a list of all potential side effects of the LCD and what to do. For constipation, do not allow more than 3 days to pass you without having a bowel movement. As mentioned at your provider monthly visit, you can try OTC Magnesium 400 mg daily or Milk of Magnesia or Miralax or Smooth Move Tea for constipation. Please make sure you are consuming 2 liter (67 oz of water minimally). 2. Recheck fasting labs 10 days prior to next monthly visit with Dr. Dominik Box. Make sure to bring [facility] lab order form with you to your lab appointment. 3. Attend the mandatory weekly weigh-ins at the office. Make sure homework sheets are completed prior to arrival or you will not be seen and cannot  meal products. 4. Attend the weekly nutritional meetings on Thursdays at 4:30 pm.   5. Stop Lasix 40 mg due to diuretic effect of LCD. Stop Phentermine and reduce Topamax to once a day. Learning About Low-Carbohydrate Diets for Weight Loss  What is a low-carbohydrate diet? Low-carb diets avoid foods that are high in carbohydrate. These high-carb foods include pasta, bread, rice, cereal, fruits, and starchy vegetables. Instead, these diets usually have you eat foods that are high in fat and protein. Many people lose weight quickly on a low-carb diet. But the early weight loss is water. People on this diet often gain the weight back after they start eating carbs again. Not all diet plans are safe or work well. A lot of the evidence shows that low-carb diets aren't healthy.  That's because these diets often don't include healthy foods like fruits and vegetables. Losing weight safely means balancing protein, fat, and carbs with every meal and snack. And low-carb diets don't always provide the vitamins, minerals, and fiber you need. If you have a serious medical condition, talk to your doctor before you try any diet. These conditions include kidney disease, heart disease, type 2 diabetes, high cholesterol, and high blood pressure. If you are pregnant, it may not be safe for your baby if you are on a low-carb diet. How can you lose weight safely? You might have heard that a diet plan helped another person lose weight. But that doesn't mean that it will work for you. It is very hard to stay on a diet that includes lots of big changes in your eating habits. If you want to get to a healthy weight and stay there, making healthy lifestyle changes will often work better than dieting. These steps can help. · Make a plan for change. Work with your doctor to create a plan that is right for you. · See a dietitian. He or she can show you how to make healthy changes in your eating habits. · Manage stress. If you have a lot of stress in your life, it can be hard to focus on making healthy changes to your daily habits. · Track your food and activity. You are likely to do better at losing weight if you keep track of what you eat and what you do. Follow-up care is a key part of your treatment and safety. Be sure to make and go to all appointments, and call your doctor if you are having problems. It's also a good idea to know your test results and keep a list of the medicines you take. Where can you learn more? Go to http://antonia-los.info/. Enter A121 in the search box to learn more about \"Learning About Low-Carbohydrate Diets for Weight Loss. \"  Current as of: March 28, 2018  Content Version: 11.9  © 3420-1525 [employer].  Care instructions adapted under license by [employer] (which disclaims liability or warranty for this information). If you have questions about a medical condition or this instruction, always ask your healthcare professional. [address] any warranty or liability for your use of this information.
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
## 2019-03-13 NOTE — PROGRESS NOTES
Seema Lopez  Identified pt with two pt identifiers(name and ). Chief Complaint   Patient presents with    Weight Management    New Patient       1. Have you been to the ER, urgent care clinic since your last visit? Hospitalized since your last visit? No    2. Have you seen or consulted any other health care providers outside of the [facility] since your last visit? Include any pap smears or colon screening. No      Would you like to sign up for MyChart today, if you have not already done so? Patient has a mychart  If not, would you like information on MyChart, and how to sign up at a later time? No    Weight: 233lb  Body Fat: 42.3%  Muscle Mass: 33%  Body H2O: 43.3%  BMR: 1671  BMI: 38.4%    Weight Metrics 3/13/2019 3/7/2019 3/1/2019 2019 2019 2019 2019   Weight 233 lb 232 lb 236 lb 12.8 oz 233 lb 12.8 oz 231 lb 9.6 oz 238 lb 9.6 oz 244 lb 9.6 oz   Neck Circ (inches) 13 - - - - - -   Waist Measure Inches 36.5 - - - - - -   Body Fat % 38.4 - - - - - -   BMI 38.77 kg/m2 38.61 kg/m2 39.41 kg/m2 38.91 kg/m2 38.54 kg/m2 39.71 kg/m2 40.7 kg/m2       Medication reconciliation up to date and corrected with patient at this time. Today's provider has been notified of reason for visit, vitals and flowsheets obtained on patients. Reviewed record in preparation for visit, huddled with provider and have obtained necessary documentation.       Health Maintenance Due   Topic    Shingrix Vaccine Age 50> (1 of 2)    BREAST CANCER SCRN MAMMOGRAM     COLON CANCER SCRN (BARIUM / CT COLO / FLX SIG) Q5        Wt Readings from Last 3 Encounters:   19 233 lb (105.7 kg)   19 232 lb (105.2 kg)   19 236 lb 12.8 oz (107.4 kg)     Temp Readings from Last 3 Encounters:   19 97.6 °F (36.4 °C) (Oral)   19 98.1 °F (36.7 °C) (Oral)   19 97.3 °F (36.3 °C) (Oral)     BP Readings from Last 3 Encounters:   19 129/84   19 125/72   19 135/70 Pulse Readings from Last 3 Encounters:   03/13/19 73   03/07/19 75   03/01/19 76     Vitals:    03/13/19 0832   BP: 129/84   Pulse: 73   Resp: 18   Temp: 97.6 °F (36.4 °C)   TempSrc: Oral   SpO2: 98%   Weight: 233 lb (105.7 kg)   Height: 5' 5\" (1.651 m)   PainSc:   4   PainLoc: Shoulder         Learning Assessment:  :     Learning Assessment 8/27/2018 8/31/2016 12/1/2014   PRIMARY LEARNER Patient Patient Patient   HIGHEST LEVEL OF EDUCATION - PRIMARY LEARNER  > 4 YEARS OF COLLEGE 4 YEARS OF COLLEGE > 4 YEARS OF COLLEGE   BARRIERS PRIMARY LEARNER NONE NONE NONE   CO-LEARNER CAREGIVER No No No   PRIMARY LANGUAGE ENGLISH ENGLISH ENGLISH    NEED No No -   LEARNER PREFERENCE PRIMARY LISTENING DEMONSTRATION DEMONSTRATION     - - LISTENING   LEARNING SPECIAL TOPICS None None none   ANSWERED BY Patient Patient patient   RELATIONSHIP SELF SELF SELF   ASSESSMENT COMMENT No No -       Depression Screening:  :     3 most recent PHQ Screens 12/3/2018   Little interest or pleasure in doing things Several days   Feeling down, depressed, irritable, or hopeless More than half the days   Total Score PHQ 2 3   Trouble falling or staying asleep, or sleeping too much More than half the days   Feeling tired or having little energy Several days   Poor appetite, weight loss, or overeating Nearly every day   Feeling bad about yourself - or that you are a failure or have let yourself or your family down Several days   Trouble concentrating on things such as school, work, reading, or watching TV Nearly every day   Moving or speaking so slowly that other people could have noticed; or the opposite being so fidgety that others notice Several days   Thoughts of being better off dead, or hurting yourself in some way Not at all   PHQ 9 Score 14   How difficult have these problems made it for you to do your work, take care of your home and get along with others -       Fall Risk Assessment:  :     Fall Risk Assessment, last 12 mths 2/8/2018   Able to walk? Yes   Fall in past 12 months? No       Abuse Screening:  :     Abuse Screening Questionnaire 2/8/2018   Do you ever feel afraid of your partner? N   Are you in a relationship with someone who physically or mentally threatens you? N   Is it safe for you to go home? Y       ADL Screening:  :     No flowsheet data found.

## 2019-03-13 NOTE — PROGRESS NOTES
Spoke with [employer] Phone# [phone] Fax # [phone] regarding documentation for clarification regarding requirements for patient's return to work and FMLA to cover patient taking off for scheduled appointments and return to work. She advised she would fax over documents.

## 2019-03-14 ENCOUNTER — DOCUMENTATION ONLY (OUTPATIENT)
Dept: FAMILY MEDICINE CLINIC | Age: 49
End: 2019-03-14

## 2019-03-14 NOTE — PROGRESS NOTES
Patient came by office regarding FMLA paperwork . Had spoken with Chuyita Hoffmann in [employer] and paperwork never received. Left another message with Chuyita Hoffmann advising paperwork never received and please forward to complete forms along with copy of [employer] guidelines to return patient to work.

## 2019-03-16 LAB
% ALBUMIN, 58A: 62 % (ref 54–71)
ABSOLUTE IMMATURE GRANULOCYTES, AIG: 0 X10^3/UL (ref 0–0.1)
ADIPONECTIN-DIAZYME: 15 UG/ML
ALB/GLOBRATIO, 58C: 1.64 (ref 1.15–2.5)
ALBUMIN SERPL-MCNC: 4.2 G/DL (ref 3.7–5.1)
ALP SERPL-CCNC: 44 U/L (ref 35–125)
ALT SERPL-CCNC: 13 U/L
ANION GAP SERPL CALC-SCNC: 13 MMOL/L (ref 6–18)
AST SERPL W P-5'-P-CCNC: 12 U/L (ref 5–32)
BASOPHILS # BLD: 1 % (ref 0–3)
BASOPHILS NFR BLD: 0 X10^3/UL (ref 0–0.2)
BILIRUB SERPL-MCNC: 1.1 MG/DL
BUN SERPL-MCNC: 15 MG/DL (ref 6–20)
BUN/CREATININE RATIO,BUCR: 15 (ref 10–27)
CALCIUM SERPL-MCNC: 9.3 MG/DL (ref 8.8–10.5)
CHLORIDE SERPL-SCNC: 109 MMOL/L (ref 98–110)
CHOLEST SERPL-MCNC: 228 MG/DL
CO2 SERPL-SCNC: 19 MMOL/L (ref 19–31)
CREAT SERPL-MCNC: 1 MG/DL (ref 0.5–0.9)
CRP SERPL HS-MCNC: 1 MG/L
EGFRAACREAT: 73 ML/MIN/1.73M^2
EGFRNACREAT: 63 ML/MIN/1.73M^2
EOSINOPHIL # BLD: 1 % (ref 0–7)
EOSINOPHIL NFR BLD: 0.1 X10^3/UL (ref 0–0.4)
ERYTHROCYTE [DISTWIDTH] IN BLOOD BY AUTOMATED COUNT: 14.6 % (ref 11.7–15)
ESTIMATED AVERAGE GLUCOSE, EAG: 91.1 MG/DL
GLOBCALC, 58B: 2.6 G/DL (ref 1.9–3.5)
GLUCOSE SERPL-MCNC: 75 MG/DL (ref 70–99)
GRANULOCYTES,GRANS: 46 % (ref 40–74)
HBA1C MFR BLD HPLC: 4.8 %
HCT VFR BLD AUTO: 43 % (ref 34–44)
HDLC SERPL-MCNC: 84 MG/DL
HGB BLD-MCNC: 14.2 G/DL (ref 11.5–15)
HOMA-IR, HDL2100: 1.4
INSULIN,INS: 8 UU/ML (ref 3–9)
IRON,IRN: 87 UG/DL (ref 37–145)
LDLC SERPL CALC-MCNC: 132 MG/DL
LYMPHOCYTES # BLD: 1.5 X10^3/UL (ref 0.7–4.5)
LYMPHOCYTES NFR BLD: 41 % (ref 14–46)
MAGNESIUM SERPL-MCNC: 2.2 MG/DL (ref 1.6–2.4)
MCH RBC QN AUTO: 31 PG (ref 27–34)
MCHC RBC AUTO-ENTMCNC: 33 G/DL (ref 32–36)
MCV RBC AUTO: 96 FL (ref 80–98)
MONOCYTES # BLD: 0.4 X10^3/UL (ref 0.1–1)
MONOCYTES NFR BLD: 11 % (ref 4–13)
NEUTROPHILS # BLD AUTO: 1.7 X10^3/UL (ref 1.8–7.8)
NON-HDL CHOLESTEROL, 011976: 144 MG/DL
NRBC: 0.4 /100 WBC
PLATELET # BLD AUTO: 288 X10^3/UL (ref 140–415)
POTASSIUM SERPL-SCNC: 3.9 MMOL/L (ref 3.5–5.3)
PROT SERPL-MCNC: 6.8 G/DL (ref 6.1–8)
RBC # BLD AUTO: 4.5 X10^6/UL (ref 3.8–5.1)
SODIUM SERPL-SCNC: 141 MMOL/L (ref 133–145)
T4 FREE SERPL-MCNC: 1.24 NG/DL (ref 0.93–1.7)
TPO AB, HDL17101: < 10 IU/ML
TRIGL SERPL-MCNC: 73 MG/DL (ref ?–150)
TSH SERPL-ACNC: 1.07 UIU/ML (ref 0.27–4.2)
URATE SERPL-MCNC: 4.7 MG/DL (ref 2–6.9)
VIT B12 II: 468 PG/ML
VIT D 25-HYDROXY, VDLT: 25 NG/ML (ref 30–100)
WBC # BLD AUTO: 3.7 X10^3/UL (ref 4–10.5)

## 2019-03-17 LAB
HDL HDL-P, HDL5001: 41.4 UMOL/L
HDL LDL-P, HDL5000: 1652 NMOL/L
HDL SLDL-P, HDL5002: 515 NMOL/L

## 2019-03-18 LAB
AA2: 18.87 % (ref 10.5–23.3)
ALPHA LINOLEIC ACID N3, HDL1202: 0.14 % (ref 0.1–0.4)
CIS-MONO-UNSATURATED FATTY ACID TOTAL, HDL1205: 13.6 (ref 11.5–20.5)
COQ10: 0.85 UG/ML
DOCOSAHEXAENOIC ACID N3, HDL1208: 4.11 % (ref 0.1–8.4)
DOCOSAPENTAENOIC ACID N3, HDL1206: 2.45 % (ref 0.6–4.1)
DOCOSAPENTAENOIC ACID N6, HDL1207: 0.9 % (ref 0.1–1.3)
EPA2: 0.46 % (ref 0.1–2.5)
LEPTIN, SERUM, 146711: 74 NG/ML
LINOLEIC ACID C6, HDL1216: 11.26 % (ref 4.6–21.3)
OMEGA-3 FATTY ACID TOTAL, HDL1220: 7.2 (ref 0.1–14.1)
OMEGA-3 INDEX, HDL1219: 4.6 (ref 0.1–10.4)
OMEGA-6 FATTY ACID TOTAL, HDL1222: 37.4 (ref 28.6–44.5)
SATURATED FATTY ACID TOTAL, HDL1226: 41.2 (ref 36.6–42)
TRANS FATTY ACID TOTAL, HDL1232: 0.6 (ref 0.1–1.8)
TRANSLINOLEIC ACID, HDL1229: <0.1 %
TRANSOLEIC ACID, HDL1230: 0.46 % (ref 0.1–1.3)

## 2019-03-19 ENCOUNTER — TELEPHONE (OUTPATIENT)
Dept: FAMILY MEDICINE CLINIC | Age: 49
End: 2019-03-19

## 2019-03-19 NOTE — TELEPHONE ENCOUNTER
----- Message from Warren Barron sent at 3/19/2019  2:21 PM EDT -----  Regarding: Dr. Rose Andrew  Pt would like to speak with the nurse. (did not state the reason why) .  Pt's callback: [phone]

## 2019-03-21 ENCOUNTER — OFFICE VISIT (OUTPATIENT)
Dept: FAMILY MEDICINE CLINIC | Age: 49
End: 2019-03-21

## 2019-03-21 VITALS
HEART RATE: 71 BPM | SYSTOLIC BLOOD PRESSURE: 119 MMHG | HEIGHT: 65 IN | BODY MASS INDEX: 38.99 KG/M2 | OXYGEN SATURATION: 100 % | TEMPERATURE: 98.5 F | RESPIRATION RATE: 16 BRPM | DIASTOLIC BLOOD PRESSURE: 76 MMHG | WEIGHT: 234 LBS

## 2019-03-21 DIAGNOSIS — Z51.81 MONITORING FOR LONG-TERM ANTICOAGULANT USE: ICD-10-CM

## 2019-03-21 DIAGNOSIS — Z86.718 HISTORY OF RECURRENT DEEP VEIN THROMBOSIS (DVT): ICD-10-CM

## 2019-03-21 DIAGNOSIS — D68.59 HYPERCOAGULABLE STATE (HCC): Primary | ICD-10-CM

## 2019-03-21 DIAGNOSIS — Z79.01 MONITORING FOR LONG-TERM ANTICOAGULANT USE: ICD-10-CM

## 2019-03-21 DIAGNOSIS — I87.001 POST-THROMBOTIC SYNDROME OF RIGHT LOWER EXTREMITY: ICD-10-CM

## 2019-03-21 LAB
INR BLD: 3
PT POC: NORMAL SECONDS
VALID INTERNAL CONTROL?: YES
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
## 2019-03-27 ENCOUNTER — OFFICE VISIT (OUTPATIENT)
Dept: CARDIOLOGY CLINIC | Age: 49
End: 2019-03-27

## 2019-03-27 VITALS
WEIGHT: 245.4 LBS | SYSTOLIC BLOOD PRESSURE: 130 MMHG | HEART RATE: 78 BPM | DIASTOLIC BLOOD PRESSURE: 90 MMHG | HEIGHT: 65 IN | RESPIRATION RATE: 16 BRPM | BODY MASS INDEX: 40.89 KG/M2

## 2019-03-27 DIAGNOSIS — K51.919 ULCERATIVE COLITIS WITH COMPLICATION, UNSPECIFIED LOCATION (HCC): ICD-10-CM

## 2019-03-27 DIAGNOSIS — R06.09 DOE (DYSPNEA ON EXERTION): Primary | ICD-10-CM

## 2019-03-27 DIAGNOSIS — E66.01 OBESITIES, MORBID (HCC): ICD-10-CM

## 2019-03-27 DIAGNOSIS — R07.9 CHEST PAIN, UNSPECIFIED TYPE: ICD-10-CM

## 2019-03-27 DIAGNOSIS — I82.5Z1 CHRONIC DEEP VEIN THROMBOSIS (DVT) OF DISTAL VEIN OF RIGHT LOWER EXTREMITY (HCC): ICD-10-CM

## 2019-03-27 DIAGNOSIS — K29.71 GASTROINTESTINAL HEMORRHAGE ASSOCIATED WITH GASTRITIS, UNSPECIFIED GASTRITIS TYPE: ICD-10-CM

## 2019-03-27 RX ORDER — TEMAZEPAM 15 MG/1
CAPSULE ORAL
COMMUNITY
End: 2019-04-05

## 2019-03-27 RX ORDER — PHENTERMINE HYDROCHLORIDE 37.5 MG/1
37.5 CAPSULE ORAL
COMMUNITY
End: 2019-04-05 | Stop reason: ALTCHOICE
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
## 2019-04-04 ENCOUNTER — TELEPHONE (OUTPATIENT)
Dept: CARDIOLOGY CLINIC | Age: 49
End: 2019-04-04

## 2019-04-04 NOTE — TELEPHONE ENCOUNTER
----- Message from Ab Tapia MD sent at 4/4/2019  8:26 AM EDT -----  No blockages on stress echo, yay! Above stress echo results given via my chart e-mail.

## 2019-04-05 ENCOUNTER — OFFICE VISIT (OUTPATIENT)
Dept: BEHAVIORAL/MENTAL HEALTH CLINIC | Age: 49
End: 2019-04-05

## 2019-04-05 VITALS
WEIGHT: 238 LBS | HEIGHT: 65 IN | HEART RATE: 82 BPM | BODY MASS INDEX: 39.65 KG/M2 | DIASTOLIC BLOOD PRESSURE: 65 MMHG | SYSTOLIC BLOOD PRESSURE: 105 MMHG

## 2019-04-05 DIAGNOSIS — F41.1 GENERALIZED ANXIETY DISORDER: ICD-10-CM

## 2019-04-05 DIAGNOSIS — G47.00 INSOMNIA, UNSPECIFIED TYPE: Primary | ICD-10-CM

## 2019-04-05 DIAGNOSIS — F33.2 SEVERE EPISODE OF RECURRENT MAJOR DEPRESSIVE DISORDER, WITHOUT PSYCHOTIC FEATURES (HCC): ICD-10-CM

## 2019-04-05 RX ORDER — ZALEPLON 5 MG/1
5 CAPSULE ORAL
Qty: 30 CAP | Refills: 2 | Status: SHIPPED | OUTPATIENT
Start: 2019-04-05 | End: 2019-06-05

## 2019-04-05 RX ORDER — BUPROPION HYDROCHLORIDE 300 MG/1
300 TABLET ORAL
Qty: 90 TAB | Refills: 0 | Status: SHIPPED | OUTPATIENT
Start: 2019-04-05 | End: 2019-06-05 | Stop reason: SDUPTHER

## 2019-04-05 RX ORDER — ESCITALOPRAM OXALATE 20 MG/1
20 TABLET ORAL DAILY
Qty: 30 TAB | Refills: 2 | Status: SHIPPED | OUTPATIENT
Start: 2019-04-05 | End: 2019-06-05 | Stop reason: SDUPTHER

## 2019-04-05 NOTE — PROGRESS NOTES
Peter Goel  1970  46 y.o.  female  [address]    Chief Complaint   Patient presents with    Depression     4-5 out of 10 on the scale of 1-10 where 10 is worst    Anxiety     5-6 out of 10    Insomnia     Her chief complaint today       [place]:   This is a 50 years old  -American female with past psychiatric history and treatment of depression and anxiety who is referred by her PCP for psychiatric evaluation and medication management and was seen for the first time on December 3 when she decided to continue Lexapro 20 mg at bedtime, try Wellbutrin  mg daily, and try trazodone  mg at bedtime as needed for insomnia.       Patient was last seen on February 13, 2019 when she decided to continue Wellbutrin  mg daily, Lexapro 20 mg at bedtime, and Belsomra 15 mg at bedtime. She presented on time, was observed to be slightly improved with brighter affect and calmer than last time when she was significantly depressed and cried most of the visit just like her initial appointment. She was alert awake oriented to time person and place and was calm and cooperative, polite, and pleasant during the interview.      Patient reported compliance with all of her medication except Belsomra which she is stopped taking and even tried temazepam from previous prescription without any benefit for her sleep, is able to tolerate, and reported 50% feeling better. She report 4-5 out of 10 on the scale of 1-10 where 10 is worst for depression and 5-6 out of 10 for anxiety. Her chief complaint today is sleep and she has tried trazodone, Ambien, temazepam, and Belsomra without benefit. Patient was provided with support and psychoeducation, we discussed treatment alternative available and after discussing risk and benefit she decided to try Sonata 5 mg at bedtime for insomnia and continue Wellbutrin  mg daily and Lexapro 20 mg at bedtime.   She is now under care of her weight management physician who request to decrease dose of Wellbutrin but given Wellbutrin is approved for weight loss I am guessing it was Lexapro. She is also receiving psychotherapy at [facility] twice a month and report good rapport.      SUBSTANCE USE HISTORY:   Patient denies a smoking, drinking, abusing any illicit drugs. MEDICAL HISTORY:    has a past medical history of Anemia associated with acute blood loss (2017), Asthma (childhood), DDD (degenerative disc disease), lumbar, DJD (degenerative joint disease) of knee, JANAE (generalized anxiety disorder) (2018), GI bleeding (2017), Heartburn, History of tuberculosis exposure (2013), Lower leg DVT (deep venous thromboembolism), acute, right ([address] (2008, 4/27/2016, 08/03/2017), Major depression (2018), Morbid obesity ([address] Orthostatic syncope (2017), Post-thrombotic syndrome of right lower extremity (09/14/2017), Pseudogout (2016), Psoriatic arthritis ([address] (2000s), PUD (peptic ulcer disease), Shingles (03/2019), Skin abrasion (01/28/2014), Thromboembolus ([address] (2008), UC (ulcerative colitis) ([address] (3/12/2010), and Uterine fibroid (2017). ALLERGIES:   Allergies   Allergen Reactions    Codeine Itching    Humira [Adalimumab] Rash     Large red knots    Sulfa (Sulfonamide Antibiotics) Anaphylaxis       VITAL SIGNS:  /65   Pulse 82   Ht 5' 5\" (1.651 m)   Wt 108 kg (238 lb)   LMP  (LMP Unknown) Comment: IUD  BMI 39.61 kg/m²     Current Outpatient Medications   Medication Sig Dispense Refill    escitalopram oxalate (LEXAPRO) 20 mg tablet Take 1 Tab by mouth daily. 30 Tab 2    buPROPion XL (WELLBUTRIN XL) 300 mg XL tablet Take 1 Tab by mouth every morning. Indications: major depressive disorder 90 Tab 0    zaleplon (SONATA) 5 mg capsule Take 1 Cap by mouth nightly.  Max Daily Amount: 5 mg. 30 Cap 2    warfarin (COUMADIN) 7.5 mg tablet Take as directed 90 Tab 1    warfarin (COUMADIN) 5 mg tablet Take as directed 90 Tab 0    ergocalciferol (ERGOCALCIFEROL) 50,000 unit capsule TAKE 1 CAPSULE BY MOUTH ONCE PER WEEK FOR A TOTAL OF 8 WEEKS. THEN CHANGE TO TWICE PER MONTH. 12 Cap 0    KLOR-CON M20 20 mEq tablet TAKE 1 TABLET BY MOUTH TWICE A  Tab 1    furosemide (LASIX) 40 mg tablet TAKE 1 TABLET BY MOUTH TWICE DAILY AS NEEDED 180 Tab 1    topiramate (TOPAMAX) 50 mg tablet Take 1 Tab by mouth two (2) times a day. 60 Tab 5    folic acid (FOLVITE) 1 mg tablet TAKE 1 TABLET BY MOUTH ONCE A DAY  6    CERTOLIZUMAB PEGOL (CIMZIA SC) by SubCUTAneous route. Injection:  Every 4 weeks      methotrexate (RHEUMATREX) 2.5 mg tablet TAKE 8 TABLETS BY MOUTH ONCE PER WEEK  2    ranitidine (ZANTAC) 150 mg tablet Take 150 mg by mouth two (2) times a day.  albuterol (PROVENTIL HFA, VENTOLIN HFA, PROAIR HFA) 90 mcg/actuation inhaler Take 1 Puff by inhalation every four (4) hours as needed for Wheezing. 1 Inhaler 5    acetaminophen (TYLENOL EXTRA STRENGTH) 500 mg tablet Take 1,000 mg by mouth every six (6) hours as needed for Pain. ROS:  Constitutional: Positive for fatigue and walking with the help of cane with difficulty. Eyes: Negative for contacts/glasses  ENT: Negative for hearing loss and tinnitus  Respiratory: Negative for cough or wheezing  Cardiovascular: Negative for chest pain, palpitations  Neurological: Positive for memory problems  Behavioral/psych: Positive for insomnia, anxiety, depression, negative for SI/HI  Endocrine: Negative for diabetic symptoms including polyuria and polydipsia     MENTAL STATUS EXAMINATION:   Patient is a 48 y.o. female BLACK OR  who looks slightly older than her stated age. She  was observed to be improved with brighter affect and slightly calmer than her last visit when she cried most of the visit like her initial visit. She is obese and was dressed appropriately with good hygiene.  Speech is regular rate and rhythm, fluent language, and thought process is linear, logical, and goal directed. Reported mood is depressed and anxious with mood congruent depressed and anxious affect. Patient denies any suicidal ideation, homicidal ideation, and auditory visual hallucination. Not observed to be delusional or paranoid. Insight, judgement, reliability and impulse control is limited to intact. Cognition was within normal limit and patient was observed to be reliable. DIAGNOSIS:  Encounter Diagnoses   Name Primary?  Insomnia, unspecified type Yes    Severe episode of recurrent major depressive disorder, without psychotic features [facility]     Generalized anxiety disorder        PLAN:  1. MEDICATION:   1. Patient report compliance with Wellbutrin  mg daily, is able to tolerate and report about 60% benefits especially with help with not crying that much and slight ability to control her emotions. She requested to continue current treatment plan and return in 6 weeks when we will discuss further medication increase.     2. Patient agreed to continue Lexapro 20 mg at bedtime.     3. Patient  initially responded well to Belsomra 15 mg at bedtime but stopped taking a few weeks ago and he started to take Restoril 15 mg from previous prescription but is still report significant trouble with going to sleep and is staying sleep. She has been tried on trazodone and Ambien without any benefits so we decided to try Sonata 5 mg at bedtime as needed for initial insomnia. She was provided with psycho education, discussed risk/benefits, and expectations from medication changes. Patient agrees with plan.         2. PSYCHOTHERAPY: Patient was provided with supportive therapy, strongly encourage to seek psychotherapy. She is receiving regular psychotherapy at [facility], she see her twice a month and report good rapport.     3. MEDICAL CARE: Patient was strongly encourage to take their medical medications and follow up with their PCP on regular basis.  Her weight today is 238 pounds, it was 245 pounds on March 27 and 234 pounds on March 21. Her blood pressure is 105/65 and pulse is 82. She has history of syncope and collapse, deep venous thrombosis, ulcerative colitis, GERD, GI bleed, impaired glucose tolerance, asthma, degenerative disc disease, pseudogout, inflammatory polyarthritis, fibroid uterus, and morbid obesity. She is dealing with multiple medical issues at young age.     4. SUBSTANCE ABUSE CARE: Patient denies a smoking, drinking, abusing any illicit drugs. 5. FOLLOW UP:   Follow-up and Dispositions    · Return in about 2 months (around 6/5/2019) for Medication.

## 2019-04-09 ENCOUNTER — OFFICE VISIT (OUTPATIENT)
Dept: ENDOCRINOLOGY | Age: 49
End: 2019-04-09

## 2019-04-09 VITALS
HEART RATE: 86 BPM | BODY MASS INDEX: 40.05 KG/M2 | SYSTOLIC BLOOD PRESSURE: 128 MMHG | HEIGHT: 65 IN | WEIGHT: 240.4 LBS | DIASTOLIC BLOOD PRESSURE: 74 MMHG

## 2019-04-09 DIAGNOSIS — R73.02 IGT (IMPAIRED GLUCOSE TOLERANCE): Primary | ICD-10-CM

## 2019-04-09 DIAGNOSIS — E66.01 MORBID OBESITY, UNSPECIFIED OBESITY TYPE (HCC): ICD-10-CM
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
## 2019-04-11 ENCOUNTER — OFFICE VISIT (OUTPATIENT)
Dept: FAMILY MEDICINE CLINIC | Age: 49
End: 2019-04-11

## 2019-04-11 VITALS
HEIGHT: 65 IN | BODY MASS INDEX: 40.65 KG/M2 | OXYGEN SATURATION: 98 % | DIASTOLIC BLOOD PRESSURE: 67 MMHG | TEMPERATURE: 97.6 F | WEIGHT: 244 LBS | RESPIRATION RATE: 18 BRPM | HEART RATE: 71 BPM | SYSTOLIC BLOOD PRESSURE: 119 MMHG

## 2019-04-11 DIAGNOSIS — F41.9 ANXIETY AND DEPRESSION: ICD-10-CM

## 2019-04-11 DIAGNOSIS — I87.001 POST-THROMBOTIC SYNDROME OF RIGHT LOWER EXTREMITY: ICD-10-CM

## 2019-04-11 DIAGNOSIS — Z86.718 HISTORY OF RECURRENT DEEP VEIN THROMBOSIS (DVT): ICD-10-CM

## 2019-04-11 DIAGNOSIS — D68.59 HYPERCOAGULABLE STATE (HCC): Primary | ICD-10-CM

## 2019-04-11 DIAGNOSIS — Z79.01 MONITORING FOR LONG-TERM ANTICOAGULANT USE: ICD-10-CM

## 2019-04-11 DIAGNOSIS — Z23 ENCOUNTER FOR IMMUNIZATION: ICD-10-CM

## 2019-04-11 DIAGNOSIS — F32.A ANXIETY AND DEPRESSION: ICD-10-CM

## 2019-04-11 DIAGNOSIS — Z51.81 MONITORING FOR LONG-TERM ANTICOAGULANT USE: ICD-10-CM

## 2019-04-11 LAB
INR BLD: 3.7
PT POC: NORMAL SECONDS
VALID INTERNAL CONTROL?: YES
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
## 2019-04-11 NOTE — PROGRESS NOTES
Chief Complaint   Patient presents with    Anticoagulation   1. Have you been to the ER, urgent care clinic since your last visit? Hospitalized since your last visit? No    2. Have you seen or consulted any other health care providers outside of the [address] [place] [address] since your last visit? Include any pap smears or colon screening. No     Discuss clarification of FMLA leave    She denies any symptoms , reactions or allergies that would exclude them from being immunized today. Risks and adverse reactions were discussed and the VIS was given to them. All questions were addressed. She was observed for 15 min post injection. There were no reactions observed.     Bennett Meyers LPN

## 2019-04-16 ENCOUNTER — TELEPHONE (OUTPATIENT)
Dept: FAMILY MEDICINE CLINIC | Age: 49
End: 2019-04-16

## 2019-04-16 NOTE — TELEPHONE ENCOUNTER
----- Message from Lily Rocha sent at 4/16/2019  3:25 PM EDT -----  Regarding: Alexander Felder Telephone   Pt is requesting to speak with Nurse Ashley regarding adjusting the time of her follow-up appt on 4/19/19.  Pt's Best Contact Number: [phone]

## 2019-04-22 ENCOUNTER — OFFICE VISIT (OUTPATIENT)
Dept: FAMILY MEDICINE CLINIC | Age: 49
End: 2019-04-22

## 2019-04-22 VITALS
RESPIRATION RATE: 18 BRPM | DIASTOLIC BLOOD PRESSURE: 71 MMHG | TEMPERATURE: 97.5 F | SYSTOLIC BLOOD PRESSURE: 112 MMHG | HEART RATE: 75 BPM | BODY MASS INDEX: 41.09 KG/M2 | HEIGHT: 65 IN | OXYGEN SATURATION: 98 % | WEIGHT: 246.6 LBS

## 2019-04-22 DIAGNOSIS — Z86.718 HISTORY OF RECURRENT DEEP VEIN THROMBOSIS (DVT): ICD-10-CM

## 2019-04-22 DIAGNOSIS — L40.50 PSORIATIC ARTHRITIS (HCC): ICD-10-CM

## 2019-04-22 DIAGNOSIS — M79.674 GREAT TOE PAIN, RIGHT: ICD-10-CM

## 2019-04-22 DIAGNOSIS — Z51.81 MONITORING FOR LONG-TERM ANTICOAGULANT USE: ICD-10-CM

## 2019-04-22 DIAGNOSIS — D68.59 HYPERCOAGULABLE STATE (HCC): Primary | ICD-10-CM

## 2019-04-22 DIAGNOSIS — I87.001 POST-THROMBOTIC SYNDROME OF RIGHT LOWER EXTREMITY: ICD-10-CM

## 2019-04-22 DIAGNOSIS — Z79.01 MONITORING FOR LONG-TERM ANTICOAGULANT USE: ICD-10-CM

## 2019-04-22 LAB
INR BLD: 2.7
PT POC: NORMAL SECONDS
VALID INTERNAL CONTROL?: YES

## 2019-04-22 RX ORDER — PREDNISONE 5 MG/1
5 TABLET ORAL DAILY
Qty: 5 TAB | Refills: 0 | Status: SHIPPED | OUTPATIENT
Start: 2019-04-22 | End: 2019-05-29 | Stop reason: ALTCHOICE

## 2019-04-22 RX ORDER — TRAMADOL HYDROCHLORIDE 50 MG/1
50-100 TABLET ORAL
Qty: 20 TAB | Refills: 0 | Status: SHIPPED | OUTPATIENT
Start: 2019-04-22 | End: 2019-06-17 | Stop reason: SDUPTHER
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
## 2019-04-22 NOTE — PROGRESS NOTES
Chief Complaint   Patient presents with    Anticoagulation     PT/INR   1. Have you been to the ER, urgent care clinic since your last visit? Hospitalized since your last visit? No    2. Have you seen or consulted any other health care providers outside of the [address] [place] [address] since your last visit? Include any pap smears or colon screening.  No   Discuss Right foot pain

## 2019-04-24 ENCOUNTER — OFFICE VISIT (OUTPATIENT)
Dept: FAMILY MEDICINE CLINIC | Age: 49
End: 2019-04-24

## 2019-04-24 VITALS
HEART RATE: 73 BPM | TEMPERATURE: 98 F | DIASTOLIC BLOOD PRESSURE: 78 MMHG | HEIGHT: 65 IN | RESPIRATION RATE: 19 BRPM | WEIGHT: 245 LBS | BODY MASS INDEX: 40.82 KG/M2 | OXYGEN SATURATION: 98 % | SYSTOLIC BLOOD PRESSURE: 116 MMHG

## 2019-04-24 DIAGNOSIS — Z83.3 FAMILY HISTORY OF DIABETES MELLITUS (DM): ICD-10-CM

## 2019-04-24 DIAGNOSIS — M25.511 ACUTE PAIN OF RIGHT SHOULDER: ICD-10-CM

## 2019-04-24 DIAGNOSIS — F32.A ANXIETY AND DEPRESSION: ICD-10-CM

## 2019-04-24 DIAGNOSIS — Z83.79 FAMILY HISTORY OF GALLSTONES: ICD-10-CM

## 2019-04-24 DIAGNOSIS — I83.893 VARICOSE VEINS OF BOTH LEGS WITH EDEMA: ICD-10-CM

## 2019-04-24 DIAGNOSIS — E73.9 LACTOSE INTOLERANCE: ICD-10-CM

## 2019-04-24 DIAGNOSIS — L40.50 PSORIATIC ARTHRITIS (HCC): ICD-10-CM

## 2019-04-24 DIAGNOSIS — Z90.710 S/P TAH (TOTAL ABDOMINAL HYSTERECTOMY): ICD-10-CM

## 2019-04-24 DIAGNOSIS — Z90.49 S/P COLECTOMY: ICD-10-CM

## 2019-04-24 DIAGNOSIS — E55.9 VITAMIN D DEFICIENCY: ICD-10-CM

## 2019-04-24 DIAGNOSIS — I82.5Z1 CHRONIC DEEP VEIN THROMBOSIS (DVT) OF DISTAL VEIN OF RIGHT LOWER EXTREMITY (HCC): ICD-10-CM

## 2019-04-24 DIAGNOSIS — R40.0 DAYTIME SLEEPINESS: ICD-10-CM

## 2019-04-24 DIAGNOSIS — Z82.3 FAMILY HISTORY OF STROKE: ICD-10-CM

## 2019-04-24 DIAGNOSIS — Z51.81 MONITORING FOR LONG-TERM ANTICOAGULANT USE: ICD-10-CM

## 2019-04-24 DIAGNOSIS — R06.83 SNORING: ICD-10-CM

## 2019-04-24 DIAGNOSIS — D68.59 HYPERCOAGULABLE STATE (HCC): ICD-10-CM

## 2019-04-24 DIAGNOSIS — F51.04 CHRONIC INSOMNIA: Primary | ICD-10-CM

## 2019-04-24 DIAGNOSIS — Z79.01 MONITORING FOR LONG-TERM ANTICOAGULANT USE: ICD-10-CM

## 2019-04-24 DIAGNOSIS — R19.7 DIARRHEA, UNSPECIFIED TYPE: ICD-10-CM

## 2019-04-24 DIAGNOSIS — F41.9 ANXIETY AND DEPRESSION: ICD-10-CM

## 2019-04-24 DIAGNOSIS — Z82.49 FAMILY HISTORY OF HEART DISEASE: ICD-10-CM

## 2019-04-24 DIAGNOSIS — R11.0 NAUSEA: ICD-10-CM

## 2019-04-24 DIAGNOSIS — K51.919 ULCERATIVE COLITIS WITH COMPLICATION, UNSPECIFIED LOCATION (HCC): ICD-10-CM

## 2019-04-24 DIAGNOSIS — R63.5 WEIGHT GAIN: ICD-10-CM

## 2019-04-24 DIAGNOSIS — R68.2 DRY MOUTH: ICD-10-CM

## 2019-04-24 RX ORDER — ERGOCALCIFEROL 1.25 MG/1
50000 CAPSULE ORAL
Qty: 5 CAP | Refills: 2 | Status: SHIPPED | OUTPATIENT
Start: 2019-04-24 | End: 2020-01-10 | Stop reason: CLARIF

## 2019-04-24 RX ORDER — ESOMEPRAZOLE MAGNESIUM 40 MG/1
40 CAPSULE, DELAYED RELEASE ORAL DAILY
Qty: 30 CAP | Refills: 0 | Status: SHIPPED | OUTPATIENT
Start: 2019-04-24 | End: 2019-05-24 | Stop reason: SDUPTHER

## 2019-04-24 NOTE — PATIENT INSTRUCTIONS
Congrats on starting the LCD! Use 2-3 meal replacements a day. Your 1 regular meal should consist of protein and green vegetables. Your total calories intake should not exceed 1,000-1,200 calories. 1.Please refer to MS interest folder for a list of all potential side effects of the LCD and what to do. For constipation, do not allow more than 3 days to pass you without having a bowel movement. As mentioned at your provider monthly visit, you can try OTC Magnesium 400 mg daily or Milk of Magnesia or Miralax or Smooth Move Tea for constipation. Please make sure you are consuming 2 liter (67 oz of water minimally). 2. Recheck fasting labs 10 days prior to next monthly visit with Dr. Stacie Romeo. Make sure to bring [employer] lab order form with you to your lab appointment. 3. Attend the mandatory weekly weigh-ins at the office. Make sure homework sheets are completed prior to arrival or you will not be seen and cannot  meal products. 4. Attend the weekly nutritional meetings on Thursdays at 4:30 pm.     Start prescription Vitamin D 50,000IU once a week for 3 months. Start over the counter fish oil 1,000 mg with EPA+DHA daily. Rotator Cuff: Exercises  Your Care Instructions  Here are some examples of typical rehabilitation exercises for your condition. Start each exercise slowly. Ease off the exercise if you start to have pain. Your doctor or physical therapist will tell you when you can start these exercises and which ones will work best for you. How to do the exercises  Pendulum swing    1. Hold on to a table or the back of a chair with your good arm. Then bend forward a little and let your sore arm hang straight down. This exercise does not use the arm muscles. Rather, use your legs and your hips to create movement that makes your arm swing freely. 2. Use the movement from your hips and legs to guide the slightly swinging arm back and forth like a pendulum (or elephant trunk).  Then guide it in circles that start small (about the size of a dinner plate). Make the circles a bit larger each day, as your pain allows. 3. Do this exercise for 5 minutes, 5 to 7 times each day. 4. As you have less pain, try bending over a little farther to do this exercise. This will increase the amount of movement at your shoulder. Posterior stretching exercise    1. Hold the elbow of your injured arm with your other hand. 2. Use your hand to pull your injured arm gently up and across your body. You will feel a gentle stretch across the back of your injured shoulder. 3. Hold for at least 15 to 30 seconds. Then slowly lower your arm. 4. Repeat 2 to 4 times. Up-the-back stretch    1. Put your hand in your back pocket. Let it rest there to stretch your shoulder. 2. With your other hand, hold your injured arm (palm outward) behind your back by the wrist. Pull your arm up gently to stretch your shoulder. 3. Next, put a towel over your other shoulder. Put the hand of your injured arm behind your back. Now hold the back end of the towel. With the other hand, hold the front end of the towel in front of your body. Pull gently on the front end of the towel. This will bring your hand farther up your back to stretch your shoulder. Overhead stretch    1. Standing about an arm's length away, grasp onto a solid surface. You could use a countertop, a doorknob, or the back of a sturdy chair. 2. With your knees slightly bent, bend forward with your arms straight. Lower your upper body, and let your shoulders stretch. 3. As your shoulders are able to stretch farther, you may need to take a step or two backward. 4. Hold for at least 15 to 30 seconds. Then stand up and relax. If you had stepped back during your stretch, step forward so you can keep your hands on the solid surface. 5. Repeat 2 to 4 times. Shoulder flexion (lying down)    1. Lie on your back, holding a wand with both hands.  Your palms should face down as you hold the wand. 2. Keeping your elbows straight, slowly raise your arms over your head. Raise them until you feel a stretch in your shoulders, upper back, and chest.  3. Hold for 15 to 30 seconds. 4. Repeat 2 to 4 times. Shoulder rotation (lying down)    1. Lie on your back. Hold a wand with both hands with your elbows bent and palms up. 2. Keep your elbows close to your body, and move the wand across your body toward the sore arm. 3. Hold for 8 to 12 seconds. 4. Repeat 2 to 4 times. Wall climbing (to the side)    1. Stand with your side to a wall so that your fingers can just touch it at an angle about 30 degrees toward the front of your body. 2. Walk the fingers of your injured arm up the wall as high as pain permits. Try not to shrug your shoulder up toward your ear as you move your arm up. 3. Hold that position for a count of at least 15 to 20.  4. Walk your fingers back down to the starting position. 5. Repeat at least 2 to 4 times. Try to reach higher each time. Wall climbing (to the front)    1. Face a wall, and stand so your fingers can just touch it. 2. Keeping your shoulder down, walk the fingers of your injured arm up the wall as high as pain permits. (Don't shrug your shoulder up toward your ear.)  3. Hold your arm in that position for at least 15 to 30 seconds. 4. Slowly walk your fingers back down to where you started. 5. Repeat at least 2 to 4 times. Try to reach higher each time. Shoulder blade squeeze    1. Stand with your arms at your sides, and squeeze your shoulder blades together. Do not raise your shoulders up as you squeeze. 2. Hold 6 seconds. 3. Repeat 8 to 12 times. Scapular exercise: Arm reach    1. Lie flat on your back. This exercise is a very slight motion that starts with your arms raised (elbows straight, arms straight). 2. From this position, reach higher toward the qamar or ceiling. Keep your elbows straight.  All motion should be from your shoulder blade only. 3. Relax your arms back to where you started. 4. Repeat 8 to 12 times. Arm raise to the side    1. Slowly raise your injured arm to the side, with your thumb facing up. Raise your arm 60 degrees at the most (shoulder level is 90 degrees). 2. Hold the position for 3 to 5 seconds. Then lower your arm back to your side. If you need to, bring your \"good\" arm across your body and place it under the elbow as you lower your injured arm. Use your good arm to keep your injured arm from dropping down too fast.  3. Repeat 8 to 12 times. 4. When you first start out, don't hold any extra weight in your hand. As you get stronger, you may use a 1-pound to 2-pound dumbbell or a small can of food. Shoulder flexor and extensor exercise    1. Push forward (flex): Stand facing a wall or doorjamb, about 6 inches or less back. Hold your injured arm against your body. Make a closed fist with your thumb on top. Then gently push your hand forward into the wall with about 25% to 50% of your strength. Don't let your body move backward as you push. Hold for about 6 seconds. Relax for a few seconds. Repeat 8 to 12 times. 2. Push backward (extend): Stand with your back flat against a wall. Your upper arm should be against the wall, with your elbow bent 90 degrees (your hand straight ahead). Push your elbow gently back against the wall with about 25% to 50% of your strength. Don't let your body move forward as you push. Hold for about 6 seconds. Relax for a few seconds. Repeat 8 to 12 times. Scapular exercise: Wall push-ups    1. Stand facing a wall, about 12 inches to 18 inches away. 2. Place your hands on the wall at shoulder height. 3. Slowly bend your elbows and bring your face to the wall. Keep your back and hips straight. 4. Push back to where you started. 5. Repeat 8 to 12 times. 6. When you can do this exercise against a wall comfortably, you can try it against a counter.  You can then slowly progress to the end of a couch, then to a sturdy chair, and finally to the floor. Scapular exercise: Retraction    1. Put the band around a solid object at about waist level. (A bedpost will work well.) Each hand should hold an end of the band. 2. With your elbows at your sides and bent to 90 degrees, pull the band back. Your shoulder blades should move toward each other. Then move your arms back where you started. 3. Repeat 8 to 12 times. 4. If you have good range of motion in your shoulders, try this exercise with your arms lifted out to the sides. Keep your elbows at a 90-degree angle. Raise the elastic band up to about shoulder level. Pull the band back to move your shoulder blades toward each other. Then move your arms back where you started. Internal rotator strengthening exercise    1. Start by tying a piece of elastic exercise material to a doorknob. You can use surgical tubing or Thera-Band. 2. Stand or sit with your shoulder relaxed and your elbow bent 90 degrees. Your upper arm should rest comfortably against your side. Squeeze a rolled towel between your elbow and your body for comfort. This will help keep your arm at your side. 3. Hold one end of the elastic band in the hand of the painful arm. 4. Slowly rotate your forearm toward your body until it touches your belly. Slowly move it back to where you started. 5. Keep your elbow and upper arm firmly tucked against the towel roll or at your side. 6. Repeat 8 to 12 times. External rotator strengthening exercise    1. Start by tying a piece of elastic exercise material to a doorknob. You can use surgical tubing or Thera-Band. (You may also hold one end of the band in each hand.)  2. Stand or sit with your shoulder relaxed and your elbow bent 90 degrees. Your upper arm should rest comfortably against your side. Squeeze a rolled towel between your elbow and your body for comfort. This will help keep your arm at your side.   3. Hold one end of the elastic band with the hand of the painful arm. 4. Start with your forearm across your belly. Slowly rotate the forearm out away from your body. Keep your elbow and upper arm tucked against the towel roll or the side of your body until you begin to feel tightness in your shoulder. Slowly move your arm back to where you started. 5. Repeat 8 to 12 times. Follow-up care is a key part of your treatment and safety. Be sure to make and go to all appointments, and call your doctor if you are having problems. It's also a good idea to know your test results and keep a list of the medicines you take. Where can you learn more? Go to http://antoniaTreedomlos.info/. Enter Jaleel Lilibeth in the search box to learn more about \"Rotator Cuff: Exercises. \"  Current as of: September 20, 2018  Content Version: 11.9  © 4117-8303 [employer]. Care instructions adapted under license by [employer] (which disclaims liability or warranty for this information). If you have questions about a medical condition or this instruction, always ask your healthcare professional. [place] [address] any warranty or liability for your use of this information. Learning About Low-Carbohydrate Diets for Weight Loss  What is a low-carbohydrate diet? Low-carb diets avoid foods that are high in carbohydrate. These high-carb foods include pasta, bread, rice, cereal, fruits, and starchy vegetables. Instead, these diets usually have you eat foods that are high in fat and protein. Many people lose weight quickly on a low-carb diet. But the early weight loss is water. People on this diet often gain the weight back after they start eating carbs again. Not all diet plans are safe or work well. A lot of the evidence shows that low-carb diets aren't healthy. That's because these diets often don't include healthy foods like fruits and vegetables.  Losing weight safely means balancing protein, fat, and carbs with every meal and snack. And low-carb diets don't always provide the vitamins, minerals, and fiber you need. If you have a serious medical condition, talk to your doctor before you try any diet. These conditions include kidney disease, heart disease, type 2 diabetes, high cholesterol, and high blood pressure. If you are pregnant, it may not be safe for your baby if you are on a low-carb diet. How can you lose weight safely? You might have heard that a diet plan helped another person lose weight. But that doesn't mean that it will work for you. It is very hard to stay on a diet that includes lots of big changes in your eating habits. If you want to get to a healthy weight and stay there, making healthy lifestyle changes will often work better than dieting. These steps can help. · Make a plan for change. Work with your doctor to create a plan that is right for you. · See a dietitian. He or she can show you how to make healthy changes in your eating habits. · Manage stress. If you have a lot of stress in your life, it can be hard to focus on making healthy changes to your daily habits. · Track your food and activity. You are likely to do better at losing weight if you keep track of what you eat and what you do. Follow-up care is a key part of your treatment and safety. Be sure to make and go to all appointments, and call your doctor if you are having problems. It's also a good idea to know your test results and keep a list of the medicines you take. Where can you learn more? Go to http://antonia-los.info/. Enter A121 in the search box to learn more about \"Learning About Low-Carbohydrate Diets for Weight Loss. \"  Current as of: March 28, 2018  Content Version: 11.9  © 7309-0746 [employer]. Care instructions adapted under license by [employer] (which disclaims liability or warranty for this information).  If you have questions about a medical condition or this instruction, always ask your healthcare professional. [place] [address] any warranty or liability for your use of this information. HEARTBURN occurs when stomach acid moves back up through your esophagus. Several conditions and foods can contribute to this process. Common causes include:    Hiatal Hernia  Pregnancy  Alcohol use  Overweight or Obesity  Smoking. Consuming certain types of foods or drinks can increase the acid content of the stomach and cause heartburn. AVOID THESE TRIGGERS:     Stress  Alcoholic or carbonated beverages  Caffeine (coffee, tea, chocolate, soda)  Acidic foods or drinks (Oranges or orange juice, apples, apple juice, grapefruit or grapefruit juice, bananas, grape juice)  Tomatoes or tomato based foods (pizza, spaghetti, chili)  Greasy, Fatty or Fried foods  Spicy Foods (including hot sauce)  Garlic  Onions  Mint flavoring (peppermint, speramint, cinnamon). PREVENTIVE MEASURES    Do not wear tight, restrictive clothing. Lose weight. Elevate the head of the bed 4 to 6 inches with blocks or a wedge pillow. Wait 2 to 3 hours after a meal before lying down. Avoid the triggers. MEDICATIONS     Try over the counter medications if needed first.  These include:  TUMS, ROLAIDS, Mylanta, Prilosec 20 mg, Pepcid 20 mg, Tagamet, Zantac up to 150 mg twice daily or 300 mg daily, Nexium 20 mg up to 40 mg daily and Prevacid up to 30 mg daily. Ideally, take for 2 weeks after symptoms consistently appear. NOTIFY OUR OFFICE    If symptoms worsen or persist.   If breathing or swallowing becomes difficult. If you regurgitate blood. DIAL 911 IF THE HEART BURN SYMPTOMS ARE ACCOMPANIED BY   Shortness of breath, sweating, pain in the jaw, neck or arm, cold clammy feeling with nausea or vomiting. This could be a HEART ATTACK.          Indigestion (Dyspepsia or Heartburn): Care Instructions  Your Care Instructions  Sometimes it can be hard to pinpoint the cause of indigestion. (It is also called dyspepsia or heartburn.) Most cases of an upset stomach with bloating, burning, burping, and nausea are minor and go away within several hours. Home treatment and over-the-counter medicine often are able to control symptoms. But if you take medicine to relieve your indigestion without making diet and lifestyle changes, your symptoms are likely to return again and again. If you get indigestion often, it may be a sign of a more serious medical problem. Be sure to follow up with your doctor, who may want to do tests to be sure of the cause of your indigestion. Follow-up care is a key part of your treatment and safety. Be sure to make and go to all appointments, and call your doctor if you are having problems. It's also a good idea to know your test results and keep a list of the medicines you take. How can you care for yourself at home? · Your doctor may recommend over-the-counter medicine. For mild or occasional indigestion, antacids such as Gaviscon, Mylanta, Maalox, or Tums, may help. Be safe with medicines. Be careful when you take over-the-counter antacid medicines. Many of these medicines have aspirin in them. Read the label to make sure that you are not taking more than the recommended dose. Too much aspirin can be harmful. · Your doctor also may recommend over-the-counter acid reducers, such as Pepcid AC, Tagamet HB, Zantac 75, or Prilosec. Read and follow all instructions on the label. If you use these medicines often, talk with your doctor. · Change your eating habits. ? It's best to eat several small meals instead of two or three large meals. ? After you eat, wait 2 to 3 hours before you lie down. ? Chocolate, mint, and alcohol can make GERD worse. ? Spicy foods, foods that have a lot of acid (like tomatoes and oranges), and coffee can make GERD symptoms worse in some people.  If your symptoms are worse after you eat a certain food, you may want to stop eating that food to see if your symptoms get better. · Do not smoke or chew tobacco. Smoking can make GERD worse. If you need help quitting, talk to your doctor about stop-smoking programs and medicines. These can increase your chances of quitting for good. · If you have GERD symptoms at night, raise the head of your bed 6 to 8 inches. You can do this by putting the frame on blocks or placing a foam wedge under the head of your mattress. (Adding extra pillows does not work.)  · Do not wear tight clothing around your middle. · Lose weight if you need to. Losing just 5 to 10 pounds can help. · Do not take anti-inflammatory medicines, such as aspirin, ibuprofen (Advil, Motrin), or naproxen (Aleve). These can irritate the stomach. If you need a pain medicine, try acetaminophen (Tylenol), which does not cause stomach upset. When should you call for help? Call your doctor now or seek immediate medical care if:    · You have new or worse belly pain.     · You are vomiting.    Watch closely for changes in your health, and be sure to contact your doctor if:    · You have new or worse symptoms of indigestion.     · You have trouble or pain swallowing.     · You are losing weight.     · You do not get better as expected. Where can you learn more? Go to http://antonia-los.info/. Enter B564 in the search box to learn more about \"Indigestion (Dyspepsia or Heartburn): Care Instructions. \"  Current as of: March 27, 2018  Content Version: 11.9  © 5060-4833 [employer]. Care instructions adapted under license by [employer] (which disclaims liability or warranty for this information). If you have questions about a medical condition or this instruction, always ask your healthcare professional. [address] any warranty or liability for your use of this information.

## 2019-04-24 NOTE — PROGRESS NOTES
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 seconds. 4. Slowly walk your fingers back down to where you started. 5. Repeat at least 2 to 4 times. Try to reach higher each time. Shoulder blade squeeze    1. Stand with your arms at your sides, and squeeze your shoulder blades together. Do not raise your shoulders up as you squeeze. 2. Hold 6 seconds. 3. Repeat 8 to 12 times. Scapular exercise: Arm reach    1. Lie flat on your back. This exercise is a very slight motion that starts with your arms raised (elbows straight, arms straight). 2. From this position, reach higher toward the qamar or ceiling. Keep your elbows straight. All motion should be from your shoulder blade only. 3. Relax your arms back to where you started. 4. Repeat 8 to 12 times. Arm raise to the side    1. Slowly raise your injured arm to the side, with your thumb facing up. Raise your arm 60 degrees at the most (shoulder level is 90 degrees).   2. Hold the position for 3 to 5 seconds. Then lower your arm back to your side. If you need to, bring your \"good\" arm across your body and place it under the elbow as you lower your injured arm. Use your good arm to keep your injured arm from dropping down too fast.  3. Repeat 8 to 12 times. 4. When you first start out, don't hold any extra weight in your hand. As you get stronger, you may use a 1-pound to 2-pound dumbbell or a small can of food. Shoulder flexor and extensor exercise    1. Push forward (flex): Stand facing a wall or doorjamb, about 6 inches or less back. Hold your injured arm against your body. Make a closed fist with your thumb on top. Then gently push your hand forward into the wall with about 25% to 50% of your strength. Don't let your body move backward as you push. Hold for about 6 seconds. Relax for a few seconds. Repeat 8 to 12 times. 2. Push backward (extend): Stand with your back flat against a wall. Your upper arm should be against the wall, with your elbow bent 90 degrees (your hand straight ahead). Push your elbow gently back against the wall with about 25% to 50% of your strength. Don't let your body move forward as you push. Hold for about 6 seconds. Relax for a few seconds. Repeat 8 to 12 times. Scapular exercise: Wall push-ups    1. Stand facing a wall, about 12 inches to 18 inches away. 2. Place your hands on the wall at shoulder height. 3. Slowly bend your elbows and bring your face to the wall. Keep your back and hips straight. 4. Push back to where you started. 5. Repeat 8 to 12 times. 6. When you can do this exercise against a wall comfortably, you can try it against a counter. You can then slowly progress to the end of a couch, then to a sturdy chair, and finally to the floor. Scapular exercise: Retraction    1. Put the band around a solid object at about waist level. (A bedpost will work well.) Each hand should hold an end of the band.   2. With your elbows at your sides and bent to 90 degrees, pull the band back. Your shoulder blades should move toward each other. Then move your arms back where you started. 3. Repeat 8 to 12 times. 4. If you have good range of motion in your shoulders, try this exercise with your arms lifted out to the sides. Keep your elbows at a 90-degree angle. Raise the elastic band up to about shoulder level. Pull the band back to move your shoulder blades toward each other. Then move your arms back where you started. Internal rotator strengthening exercise    1. Start by tying a piece of elastic exercise material to a doorknob. You can use surgical tubing or Thera-Band. 2. Stand or sit with your shoulder relaxed and your elbow bent 90 degrees. Your upper arm should rest comfortably against your side. Squeeze a rolled towel between your elbow and your body for comfort. This will help keep your arm at your side. 3. Hold one end of the elastic band in the hand of the painful arm. 4. Slowly rotate your forearm toward your body until it touches your belly. Slowly move it back to where you started. 5. Keep your elbow and upper arm firmly tucked against the towel roll or at your side. 6. Repeat 8 to 12 times. External rotator strengthening exercise    1. Start by tying a piece of elastic exercise material to a doorknob. You can use surgical tubing or Thera-Band. (You may also hold one end of the band in each hand.)  2. Stand or sit with your shoulder relaxed and your elbow bent 90 degrees. Your upper arm should rest comfortably against your side. Squeeze a rolled towel between your elbow and your body for comfort. This will help keep your arm at your side. 3. Hold one end of the elastic band with the hand of the painful arm. 4. Start with your forearm across your belly. Slowly rotate the forearm out away from your body. Keep your elbow and upper arm tucked against the towel roll or the side of your body until you begin to feel tightness in your shoulder.  Slowly move your arm back to where you started. 5. Repeat 8 to 12 times. Follow-up care is a key part of your treatment and safety. Be sure to make and go to all appointments, and call your doctor if you are having problems. It's also a good idea to know your test results and keep a list of the medicines you take. Where can you learn more? Go to http://antonia-los.info/. Enter Sherlyn Lupe in the search box to learn more about \"Rotator Cuff: Exercises. \"  Current as of: September 20, 2018  Content Version: 11.9  © 9029-0780 [employer]. Care instructions adapted under license by [employer] (which disclaims liability or warranty for this information). If you have questions about a medical condition or this instruction, always ask your healthcare professional. [address] any warranty or liability for your use of this information. Learning About Low-Carbohydrate Diets for Weight Loss  What is a low-carbohydrate diet? Low-carb diets avoid foods that are high in carbohydrate. These high-carb foods include pasta, bread, rice, cereal, fruits, and starchy vegetables. Instead, these diets usually have you eat foods that are high in fat and protein. Many people lose weight quickly on a low-carb diet. But the early weight loss is water. People on this diet often gain the weight back after they start eating carbs again. Not all diet plans are safe or work well. A lot of the evidence shows that low-carb diets aren't healthy. That's because these diets often don't include healthy foods like fruits and vegetables. Losing weight safely means balancing protein, fat, and carbs with every meal and snack. And low-carb diets don't always provide the vitamins, minerals, and fiber you need. If you have a serious medical condition, talk to your doctor before you try any diet.  These conditions include kidney disease, heart disease, type 2 diabetes, high cholesterol, and high blood pressure. If you are pregnant, it may not be safe for your baby if you are on a low-carb diet. How can you lose weight safely? You might have heard that a diet plan helped another person lose weight. But that doesn't mean that it will work for you. It is very hard to stay on a diet that includes lots of big changes in your eating habits. If you want to get to a healthy weight and stay there, making healthy lifestyle changes will often work better than dieting. These steps can help. · Make a plan for change. Work with your doctor to create a plan that is right for you. · See a dietitian. He or she can show you how to make healthy changes in your eating habits. · Manage stress. If you have a lot of stress in your life, it can be hard to focus on making healthy changes to your daily habits. · Track your food and activity. You are likely to do better at losing weight if you keep track of what you eat and what you do. Follow-up care is a key part of your treatment and safety. Be sure to make and go to all appointments, and call your doctor if you are having problems. It's also a good idea to know your test results and keep a list of the medicines you take. Where can you learn more? Go to http://antonia-los.info/. Enter A121 in the search box to learn more about \"Learning About Low-Carbohydrate Diets for Weight Loss. \"  Current as of: March 28, 2018  Content Version: 11.9  © 1774-1265 [employer]. Care instructions adapted under license by [employer] (which disclaims liability or warranty for this information). If you have questions about a medical condition or this instruction, always ask your healthcare professional. [place] [address] any warranty or liability for your use of this information. HEARTBURN occurs when stomach acid moves back up through your esophagus. Several conditions and foods can contribute to this process.   Common causes include:    Hiatal Hernia  Pregnancy  Alcohol use  Overweight or Obesity  Smoking. Consuming certain types of foods or drinks can increase the acid content of the stomach and cause heartburn. AVOID THESE TRIGGERS:     Stress  Alcoholic or carbonated beverages  Caffeine (coffee, tea, chocolate, soda)  Acidic foods or drinks (Oranges or orange juice, apples, apple juice, grapefruit or grapefruit juice, bananas, grape juice)  Tomatoes or tomato based foods (pizza, spaghetti, chili)  Greasy, Fatty or Fried foods  Spicy Foods (including hot sauce)  Garlic  Onions  Mint flavoring (peppermint, speramint, cinnamon). PREVENTIVE MEASURES    Do not wear tight, restrictive clothing. Lose weight. Elevate the head of the bed 4 to 6 inches with blocks or a wedge pillow. Wait 2 to 3 hours after a meal before lying down. Avoid the triggers. MEDICATIONS     Try over the counter medications if needed first.  These include:  TUMS, ROLAIDS, Mylanta, Prilosec 20 mg, Pepcid 20 mg, Tagamet, Zantac up to 150 mg twice daily or 300 mg daily, Nexium 20 mg up to 40 mg daily and Prevacid up to 30 mg daily. Ideally, take for 2 weeks after symptoms consistently appear. NOTIFY OUR OFFICE    If symptoms worsen or persist.   If breathing or swallowing becomes difficult. If you regurgitate blood. DIAL 911 IF THE HEART BURN SYMPTOMS ARE ACCOMPANIED BY   Shortness of breath, sweating, pain in the jaw, neck or arm, cold clammy feeling with nausea or vomiting. This could be a HEART ATTACK. Indigestion (Dyspepsia or Heartburn): Care Instructions  Your Care Instructions  Sometimes it can be hard to pinpoint the cause of indigestion. (It is also called dyspepsia or heartburn.) Most cases of an upset stomach with bloating, burning, burping, and nausea are minor and go away within several hours. Home treatment and over-the-counter medicine often are able to control symptoms.  But if you take medicine to relieve your indigestion without making diet and lifestyle changes, your symptoms are likely to return again and again. If you get indigestion often, it may be a sign of a more serious medical problem. Be sure to follow up with your doctor, who may want to do tests to be sure of the cause of your indigestion. Follow-up care is a key part of your treatment and safety. Be sure to make and go to all appointments, and call your doctor if you are having problems. It's also a good idea to know your test results and keep a list of the medicines you take. How can you care for yourself at home? · Your doctor may recommend over-the-counter medicine. For mild or occasional indigestion, antacids such as Gaviscon, Mylanta, Maalox, or Tums, may help. Be safe with medicines. Be careful when you take over-the-counter antacid medicines. Many of these medicines have aspirin in them. Read the label to make sure that you are not taking more than the recommended dose. Too much aspirin can be harmful. · Your doctor also may recommend over-the-counter acid reducers, such as Pepcid AC, Tagamet HB, Zantac 75, or Prilosec. Read and follow all instructions on the label. If you use these medicines often, talk with your doctor. · Change your eating habits. ? It's best to eat several small meals instead of two or three large meals. ? After you eat, wait 2 to 3 hours before you lie down. ? Chocolate, mint, and alcohol can make GERD worse. ? Spicy foods, foods that have a lot of acid (like tomatoes and oranges), and coffee can make GERD symptoms worse in some people. If your symptoms are worse after you eat a certain food, you may want to stop eating that food to see if your symptoms get better. · Do not smoke or chew tobacco. Smoking can make GERD worse. If you need help quitting, talk to your doctor about stop-smoking programs and medicines. These can increase your chances of quitting for good.   · If you have GERD symptoms at night, raise the head of your bed 6 to 8 inches. You can do this by putting the frame on blocks or placing a foam wedge under the head of your mattress. (Adding extra pillows does not work.)  · Do not wear tight clothing around your middle. · Lose weight if you need to. Losing just 5 to 10 pounds can help. · Do not take anti-inflammatory medicines, such as aspirin, ibuprofen (Advil, Motrin), or naproxen (Aleve). These can irritate the stomach. If you need a pain medicine, try acetaminophen (Tylenol), which does not cause stomach upset. When should you call for help? Call your doctor now or seek immediate medical care if:    · You have new or worse belly pain.     · You are vomiting.    Watch closely for changes in your health, and be sure to contact your doctor if:    · You have new or worse symptoms of indigestion.     · You have trouble or pain swallowing.     · You are losing weight.     · You do not get better as expected. Where can you learn more? Go to http://antonia-los.info/. Enter S497 in the search box to learn more about \"Indigestion (Dyspepsia or Heartburn): Care Instructions. \"  Current as of: March 27, 2018  Content Version: 11.9  © 1060-7353 [employer]. Care instructions adapted under license by [email or web address] (which disclaims liability or warranty for this information). If you have questions about a medical condition or this instruction, always ask your healthcare professional. [place] [address] any warranty or liability for your use of this information.

## 2019-05-02 ENCOUNTER — CLINICAL SUPPORT (OUTPATIENT)
Dept: BARIATRICS/WEIGHT MGMT | Age: 49
End: 2019-05-02

## 2019-05-02 DIAGNOSIS — E66.01 OBESITY, CLASS III, BMI 40-49.9 (MORBID OBESITY) (HCC): Primary | ICD-10-CM

## 2019-05-07 NOTE — PROGRESS NOTES
Progress Note: Weekly Education Class in the [facility]     I am on the  LCD     Did you have any symptoms of physical problems? no What effects     Is there anything that you or the patient needs to let Dr Deepak Paez know about? no      Deyanira Mitchell is a 46 y.o. female who is enrolled in [facility]    Visit Vitals  LMP  (LMP Unknown) Comment: IUD     Weight Metrics 5/7/2019 4/24/2019 4/24/2019 4/22/2019 4/11/2019 4/9/2019 4/5/2019   Weight - - 245 lb 246 lb 9.6 oz 244 lb 240 lb 6.4 oz 238 lb   Neck Circ (inches) - 13 - - - - -   Waist Measure Inches 36 39.75 - - - - -   Body Fat % 43.6 43.6 - - - - -   BMI - - 40.77 kg/m2 41.04 kg/m2 40.6 kg/m2 40 kg/m2 39.61 kg/m2         Have you received any other medical care this week? no  If yes, where and for what? Have you had any change in your medications since your last visit? no  If yes what? Did you have any problems adhering to the program last week? no  If yes, please explain:       Eating Habits Over Last Week:  Did you take in 64 oz of non-caloric fluids? yes     Did you consume your 4 meal replacements each day?  yes       Physical Activity Over the Past Week:    Aerobic exercise: 0  min    Resistance exercise: 0 workouts / week  Steps weekly: 0    How much exercise do you plan to do next week? 0      I would like a follow-up call (#________________) to discuss   Transition into adapting    Question about the diet    Other

## 2019-05-09 ENCOUNTER — CLINICAL SUPPORT (OUTPATIENT)
Dept: BARIATRICS/WEIGHT MGMT | Age: 49
End: 2019-05-09

## 2019-05-09 DIAGNOSIS — E66.01 OBESITY, CLASS III, BMI 40-49.9 (MORBID OBESITY) (HCC): Primary | ICD-10-CM

## 2019-05-14 VITALS
WEIGHT: 247 LBS | HEIGHT: 65 IN | HEART RATE: 73 BPM | BODY MASS INDEX: 41.15 KG/M2 | SYSTOLIC BLOOD PRESSURE: 123 MMHG | DIASTOLIC BLOOD PRESSURE: 83 MMHG

## 2019-05-16 ENCOUNTER — CLINICAL SUPPORT (OUTPATIENT)
Dept: BARIATRICS/WEIGHT MGMT | Age: 49
End: 2019-05-16

## 2019-05-16 ENCOUNTER — TELEPHONE (OUTPATIENT)
Dept: FAMILY MEDICINE CLINIC | Age: 49
End: 2019-05-16

## 2019-05-16 DIAGNOSIS — E66.9 OBESITY, CLASS II, BMI 35-39.9: Primary | ICD-10-CM

## 2019-05-16 NOTE — TELEPHONE ENCOUNTER
Pt wants to come late day on 5/20/19 due to SOL testing     Best number to reach her is [phone]

## 2019-05-20 ENCOUNTER — OFFICE VISIT (OUTPATIENT)
Dept: FAMILY MEDICINE CLINIC | Age: 49
End: 2019-05-20

## 2019-05-20 VITALS
WEIGHT: 242 LBS | DIASTOLIC BLOOD PRESSURE: 63 MMHG | OXYGEN SATURATION: 93 % | RESPIRATION RATE: 16 BRPM | HEIGHT: 65 IN | TEMPERATURE: 97.4 F | SYSTOLIC BLOOD PRESSURE: 105 MMHG | BODY MASS INDEX: 40.32 KG/M2 | HEART RATE: 64 BPM

## 2019-05-20 DIAGNOSIS — I87.001 POST-THROMBOTIC SYNDROME OF RIGHT LOWER EXTREMITY: ICD-10-CM

## 2019-05-20 DIAGNOSIS — D68.59 HYPERCOAGULABLE STATE (HCC): Primary | ICD-10-CM

## 2019-05-20 DIAGNOSIS — Z86.718 HISTORY OF RECURRENT DEEP VEIN THROMBOSIS (DVT): ICD-10-CM

## 2019-05-20 DIAGNOSIS — Z79.01 MONITORING FOR LONG-TERM ANTICOAGULANT USE: ICD-10-CM

## 2019-05-20 DIAGNOSIS — Z51.81 MONITORING FOR LONG-TERM ANTICOAGULANT USE: ICD-10-CM

## 2019-05-20 LAB
INR BLD: 3
PT POC: NORMAL SECONDS
VALID INTERNAL CONTROL?: YES

## 2019-05-20 NOTE — PROGRESS NOTES
Chief Complaint   Patient presents with    Anticoagulation     PT/INR   1. Have you been to the ER, urgent care clinic since your last visit? Hospitalized since your last visit? No    2. Have you seen or consulted any other health care providers outside of the [address] [place] [address] since your last visit? Include any pap smears or colon screening.  Yes Rheumatologist
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
## 2019-05-21 VITALS
DIASTOLIC BLOOD PRESSURE: 73 MMHG | HEART RATE: 73 BPM | WEIGHT: 240 LBS | SYSTOLIC BLOOD PRESSURE: 121 MMHG | BODY MASS INDEX: 39.99 KG/M2 | HEIGHT: 65 IN

## 2019-05-21 NOTE — PROGRESS NOTES
Progress Note: Weekly Education Class in the [facility] Weight Loss Program     I am on the  VLCD LCD     Did you have any symptoms of physical problems? yes What effects   Ulcerative colitis flare up    Marta Harry is a 46 y.o. female who is enrolled in [facility] Loss Program    Visit Vitals  /73   Pulse 73   Ht 5' 5\" (1.651 m)   Wt 240 lb (108.9 kg)   LMP  (LMP Unknown) Comment: IUD   BMI 39.94 kg/m²     Weight Metrics 5/21/2019 5/20/2019 5/16/2019 5/14/2019 5/9/2019 5/7/2019 4/24/2019   Weight - 242 lb 240 lb - 247 lb - -   Neck Circ (inches) - - - - - - 13   Waist Measure Inches 36.5 - - 36.5 - 36 39.75   Body Fat % - - - 43.8 - 43.6 43.6   BMI - 40.27 kg/m2 39.94 kg/m2 - 41.1 kg/m2 - -         Have you received any other medical care this week? no  If yes, where and for what? Have you had any change in your medications since your last visit? no  If yes what? Did you have any problems adhering to the program last week? no  If yes, please explain:       Eating Habits Over Last Week:  Did you take in 64 oz of non-caloric fluids? yes     Did you consume your 4 meal replacements each day?  yes       Physical Activity Over the Past Week:    Aerobic exercise: 0  min    Resistance exercise: 0 workouts / week  Steps weekly: 0    How much exercise do you plan to do next week? 0      I would like a follow-up call (#________________) to discuss   Transition into adapting    Question about the diet    Other

## 2019-05-24 NOTE — TELEPHONE ENCOUNTER
PCP: Radha Lazo MD    Last appt: 4/24/2019  Future Appointments   Date Time Provider Gaurav Ceballos   5/29/2019  3:30 PM Shaista Bernardo DO [facility] [address]   6/5/2019  3:30 PM Bharti Cloud  [facility]   6/17/2019  7:30 AM Radha Lazo MD [address]       Requested Prescriptions     Pending Prescriptions Disp Refills    esomeprazole (NEXIUM) 40 mg capsule [Pharmacy Med Name: ESOMEPRAZOLE MAG DR 40 MG CAP] 30 Cap 0     Sig: TAKE 1 CAPSULE BY MOUTH DAILY INDICATIONS: HEARTBURN       Prior labs and Blood pressures:  BP Readings from Last 3 Encounters:   05/20/19 105/63   05/21/19 121/73   05/14/19 123/83     Lab Results   Component Value Date/Time    Sodium 141 03/15/2019 07:20 AM    Potassium 3.9 03/15/2019 07:20 AM    Chloride 109 03/15/2019 07:20 AM    CO2 19 03/15/2019 07:20 AM    Anion gap 13 03/15/2019 07:20 AM    Glucose 75 03/15/2019 07:20 AM    BUN 15 03/15/2019 07:20 AM    Creatinine 1.0 (H) 03/15/2019 07:20 AM    BUN/Creatinine ratio 15 03/15/2019 07:20 AM    GFR est AA 76 03/07/2019 10:04 AM    GFR est non-AA 66 03/07/2019 10:04 AM    Calcium 9.3 03/15/2019 07:20 AM     Lab Results   Component Value Date/Time    Hemoglobin A1c 4.8 03/15/2019 07:20 AM    Hemoglobin A1c (POC) 4.8 08/20/2018 04:25 PM     Lab Results   Component Value Date/Time    Cholesterol, total 228 (H) 03/15/2019 07:20 AM    HDL Cholesterol 84 03/15/2019 07:20 AM    LDL, calculated 132 (H) 03/15/2019 07:20 AM    VLDL, calculated 12 06/08/2018 07:56 AM    Triglyceride 73 03/15/2019 07:20 AM     Lab Results   Component Value Date/Time    Vitamin D 25-Hydroxy 12.3 (L) 08/27/2010 10:30 AM       Lab Results   Component Value Date/Time    TSH 1.07 03/15/2019 07:20 AM    TSH 0.890 06/08/2018 07:56 AM

## 2019-05-26 RX ORDER — ESOMEPRAZOLE MAGNESIUM 40 MG/1
CAPSULE, DELAYED RELEASE ORAL
Qty: 30 CAP | Refills: 3 | Status: SHIPPED | OUTPATIENT
Start: 2019-05-26 | End: 2019-12-05 | Stop reason: ALTCHOICE

## 2019-05-29 ENCOUNTER — OFFICE VISIT (OUTPATIENT)
Dept: FAMILY MEDICINE CLINIC | Age: 49
End: 2019-05-29

## 2019-05-29 VITALS
OXYGEN SATURATION: 96 % | SYSTOLIC BLOOD PRESSURE: 110 MMHG | HEART RATE: 75 BPM | BODY MASS INDEX: 40.32 KG/M2 | RESPIRATION RATE: 18 BRPM | HEIGHT: 65 IN | WEIGHT: 242 LBS | TEMPERATURE: 98.8 F | DIASTOLIC BLOOD PRESSURE: 71 MMHG

## 2019-05-29 DIAGNOSIS — E55.9 VITAMIN D DEFICIENCY: ICD-10-CM

## 2019-05-29 DIAGNOSIS — Z90.710 S/P TAH (TOTAL ABDOMINAL HYSTERECTOMY): ICD-10-CM

## 2019-05-29 DIAGNOSIS — Z90.49 S/P COLECTOMY: ICD-10-CM

## 2019-05-29 DIAGNOSIS — R60.9 WATER RETENTION: ICD-10-CM

## 2019-05-29 DIAGNOSIS — R63.4 WEIGHT LOSS: ICD-10-CM

## 2019-05-29 DIAGNOSIS — D68.59 HYPERCOAGULABLE STATE (HCC): ICD-10-CM

## 2019-05-29 DIAGNOSIS — R19.7 DIARRHEA, UNSPECIFIED TYPE: ICD-10-CM

## 2019-05-29 DIAGNOSIS — F51.04 CHRONIC INSOMNIA: Primary | ICD-10-CM

## 2019-05-29 DIAGNOSIS — K51.919 ULCERATIVE COLITIS WITH COMPLICATION, UNSPECIFIED LOCATION (HCC): ICD-10-CM

## 2019-05-29 DIAGNOSIS — Z86.718 HISTORY OF RECURRENT DEEP VEIN THROMBOSIS (DVT): ICD-10-CM

## 2019-05-29 DIAGNOSIS — R40.0 DAYTIME SLEEPINESS: ICD-10-CM

## 2019-05-29 DIAGNOSIS — M25.561 CHRONIC PAIN OF BOTH KNEES: ICD-10-CM

## 2019-05-29 DIAGNOSIS — M25.562 CHRONIC PAIN OF BOTH KNEES: ICD-10-CM

## 2019-05-29 DIAGNOSIS — F41.9 ANXIETY AND DEPRESSION: ICD-10-CM

## 2019-05-29 DIAGNOSIS — E73.9 LACTOSE INTOLERANCE: ICD-10-CM

## 2019-05-29 DIAGNOSIS — Z79.01 MONITORING FOR LONG-TERM ANTICOAGULANT USE: ICD-10-CM

## 2019-05-29 DIAGNOSIS — J30.89 SEASONAL ALLERGIC RHINITIS DUE TO OTHER ALLERGIC TRIGGER: ICD-10-CM

## 2019-05-29 DIAGNOSIS — T73.0XXS HUNGRY, SEQUELA: ICD-10-CM

## 2019-05-29 DIAGNOSIS — F32.A ANXIETY AND DEPRESSION: ICD-10-CM

## 2019-05-29 DIAGNOSIS — G89.29 CHRONIC PAIN OF BOTH KNEES: ICD-10-CM

## 2019-05-29 DIAGNOSIS — Z51.81 MONITORING FOR LONG-TERM ANTICOAGULANT USE: ICD-10-CM

## 2019-05-29 DIAGNOSIS — L40.50 PSORIATIC ARTHRITIS (HCC): ICD-10-CM
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
## 2019-05-29 NOTE — PROGRESS NOTES
Seema Lopez  Identified pt with two pt identifiers(name and ). Chief Complaint   Patient presents with    Weight Management     Rm 13 MSWL    Results         1. Have you been to the ER, urgent care clinic since your last visit? Hospitalized since your last visit? NO    2. Have you seen or consulted any other health care providers outside of the [address] [place] [address] since your last visit? Include any pap smears or colon screening. NO      My Chart     My chart gives you direct online access to portions of the electronic medical record (EMR) where your doctor stores your health information (ie, lab results, appointment information, medications, immunizations, and more. It is free. Would you like to set up your my chart? YES    [unfilled]    Weight Metrics 2019   Weight - 242 lb - 242 lb 240 lb - 247 lb   Neck Circ (inches) 13.5 - - - - - -   Waist Measure Inches 35.75 - 36.5 - - 36.5 -   Body Fat % 43.3 - - - - 43.8 -   BMI - 40.27 kg/m2 - 40.27 kg/m2 39.94 kg/m2 - 41.1 kg/m2       MMW: 33.1  BWW: 42.8  BMR: 1711    Medication reconciliation up to date and corrected with patient at this time. Advance Care Planning    In the event something were to happen to you and you were unable to speak on your behalf, do you have an Advance Directive/ Living Will in place stating your wishes? NO    If yes, do we have a copy on fileno    If no, would you like information YES      ====Hayden Mir Invitation====    Patient was invited to [facility] on this date and given the information folder for review. Recommended appointment with Hayden Mir facilitator for ACP conversation regarding advance directives. [] Yes  [x] No  Referral sent to [facility] team member or Coordinator for follow-up    [] Yes  [x] No  Patient scheduled an appointment.        Site of Referral:       Today's provider has been notified of reason for visit, vitals and flowsheets obtained on patients. Reviewed record in preparation for visit, huddled with provider and have obtained necessary documentation.       Health Maintenance Due   Topic    Shingrix Vaccine Age 50> (1 of 2)    BREAST CANCER SCRN MAMMOGRAM     COLON CANCER SCRN (BARIUM / CT COLO / FLX SIG) Q5        Wt Readings from Last 3 Encounters:   05/29/19 242 lb (109.8 kg)   05/20/19 242 lb (109.8 kg)   05/21/19 240 lb (108.9 kg)     Temp Readings from Last 3 Encounters:   05/29/19 98.8 °F (37.1 °C) (Oral)   05/20/19 97.4 °F (36.3 °C) (Oral)   04/24/19 98 °F (36.7 °C) (Oral)     BP Readings from Last 3 Encounters:   05/29/19 110/71   05/20/19 105/63   05/21/19 121/73     Pulse Readings from Last 3 Encounters:   05/29/19 75   05/20/19 64   05/21/19 73     Vitals:    05/29/19 1549   BP: 110/71   Pulse: 75   Resp: 18   Temp: 98.8 °F (37.1 °C)   TempSrc: Oral   SpO2: 96%   Weight: 242 lb (109.8 kg)   Height: 5' 5\" (1.651 m)   PainSc:   0 - No pain         Learning Assessment:  :     Learning Assessment 4/5/2019 8/27/2018 8/31/2016 12/1/2014   PRIMARY LEARNER Patient Patient Patient Patient   HIGHEST LEVEL OF EDUCATION - PRIMARY LEARNER  - > 4 YEARS OF COLLEGE 4 YEARS OF COLLEGE > 4 YEARS OF COLLEGE   BARRIERS PRIMARY LEARNER - NONE NONE NONE   CO-LEARNER CAREGIVER - No No No   PRIMARY LANGUAGE ENGLISH ENGLISH ENGLISH ENGLISH    NEED - No No -   LEARNER PREFERENCE PRIMARY DEMONSTRATION LISTENING DEMONSTRATION DEMONSTRATION     - - - LISTENING   LEARNING SPECIAL TOPICS - None None none   ANSWERED BY patient Patient Patient patient   RELATIONSHIP SELF SELF SELF SELF   ASSESSMENT COMMENT - No No -       Depression Screening:  :     3 most recent PHQ Screens 12/3/2018   Little interest or pleasure in doing things Several days   Feeling down, depressed, irritable, or hopeless More than half the days   Total Score PHQ 2 3   Trouble falling or staying asleep, or sleeping too much More than half the days   Feeling tired or having little energy Several days   Poor appetite, weight loss, or overeating Nearly every day   Feeling bad about yourself - or that you are a failure or have let yourself or your family down Several days   Trouble concentrating on things such as school, work, reading, or watching TV Nearly every day   Moving or speaking so slowly that other people could have noticed; or the opposite being so fidgety that others notice Several days   Thoughts of being better off dead, or hurting yourself in some way Not at all   PHQ 9 Score 14   How difficult have these problems made it for you to do your work, take care of your home and get along with others -       Fall Risk Assessment:  :     Fall Risk Assessment, last 12 mths 2/8/2018   Able to walk? Yes   Fall in past 12 months? No       Abuse Screening:  :     Abuse Screening Questionnaire 2/8/2018   Do you ever feel afraid of your partner? N   Are you in a relationship with someone who physically or mentally threatens you? N   Is it safe for you to go home? Y       ADL Screening:  :     No flowsheet data found.

## 2019-05-29 NOTE — PATIENT INSTRUCTIONS
Advised pt to use Lactaid with food products. Add benefiber to bulk stool. If symptoms continue, try using only soups. Stop Potassium since no longer using Lasix daily. Continue to reduce salt in diet.

## 2019-06-05 ENCOUNTER — OFFICE VISIT (OUTPATIENT)
Dept: BEHAVIORAL/MENTAL HEALTH CLINIC | Age: 49
End: 2019-06-05

## 2019-06-05 VITALS
WEIGHT: 244 LBS | SYSTOLIC BLOOD PRESSURE: 117 MMHG | HEIGHT: 65 IN | BODY MASS INDEX: 40.65 KG/M2 | DIASTOLIC BLOOD PRESSURE: 63 MMHG | HEART RATE: 76 BPM

## 2019-06-05 DIAGNOSIS — F41.1 GENERALIZED ANXIETY DISORDER: Primary | ICD-10-CM

## 2019-06-05 DIAGNOSIS — F33.2 SEVERE EPISODE OF RECURRENT MAJOR DEPRESSIVE DISORDER, WITHOUT PSYCHOTIC FEATURES (HCC): ICD-10-CM

## 2019-06-05 DIAGNOSIS — G47.00 INSOMNIA, UNSPECIFIED TYPE: ICD-10-CM

## 2019-06-05 RX ORDER — ESCITALOPRAM OXALATE 20 MG/1
20 TABLET ORAL DAILY
Qty: 30 TAB | Refills: 2 | Status: SHIPPED | OUTPATIENT
Start: 2019-06-05 | End: 2019-07-24 | Stop reason: SDUPTHER

## 2019-06-05 RX ORDER — BUPROPION HYDROCHLORIDE 450 MG/1
450 TABLET, FILM COATED, EXTENDED RELEASE ORAL
Qty: 30 TAB | Refills: 2 | Status: SHIPPED | OUTPATIENT
Start: 2019-06-05 | End: 2019-07-24 | Stop reason: SDUPTHER

## 2019-06-05 NOTE — PROGRESS NOTES
Yara Jaime  1970  46 y.o.  female  [address]    Chief Complaint   Patient presents with    Depression     5 out of 10 on the scale of 1-10 where 10 is worst, last time it was 4-5 out of 10    Anxiety     6 out of 10, last time it was 5-6 out of 10    Insomnia     Middle to late insomnia agreed to try Sonata in the middle of the night instead of taking earlier    Stress     Talking detail about trouble at work       [place]:   This is a 50 years old  -American female with past psychiatric history and treatment of depression and anxiety who is referred by her PCP for psychiatric evaluation and medication management and was seen for the first time on December 3 when she decided to continue Lexapro 20 mg at bedtime, try Wellbutrin  mg daily, and try trazodone  mg at bedtime as needed for insomnia.       Patient was last seen on  April 5, 2019 when she decided to continue Wellbutrin  mg daily, Lexapro 20 mg at bedtime, and discontinue Belsomra 15 mg at bedtime and try Sonata 5 mg at bedtime as needed for insomnia. She presented on time, was observed to be significantly depressed and tearful most of the visit especially talking about her psychosocial issues which she described in detail. She was alert awake oriented to time person and place and was anxious but cooperative, polite, and pleasant during the interview.      Patient reported compliance with all of her medication except Sonata which she is stopped taking without any benefit for her sleep, is able to tolerate, and reported 40% feeling better. She report 5 out of 10 on the scale of 1-10 where 10 is worst for depression and 6 out of 10 for anxiety.   Her chief complaint today is sleep and she has tried trazodone, Ambien, temazepam, and Belsomra without benefit.      Patient was provided with support and psychoeducation, we discussed treatment alternative available and after discussing risk and benefit she decided to try Sonata 5 mg in the middle of the night when she wake up to go back to sleep, continue Wellbutrin  mg daily and Lexapro 20 mg at bedtime. She is now under care of her weight management physician who is trying to help her. She is also receiving psychotherapy at [address] and report good rapport.      SUBSTANCE USE HISTORY:   Patient denies a smoking, drinking, abusing any illicit drugs. MEDICAL HISTORY:    has a past medical history of Anemia associated with acute blood loss (2017), Asthma (childhood), DDD (degenerative disc disease), lumbar, DJD (degenerative joint disease) of knee, JANAE (generalized anxiety disorder) (2018), GI bleeding (2017), Heartburn, History of tuberculosis exposure (2013), Lower leg DVT (deep venous thromboembolism), acute, right ([address] (2008, 4/27/2016, 08/03/2017), Major depression (2018), Morbid obesity ([address] Orthostatic syncope (2017), Post-thrombotic syndrome of right lower extremity (09/14/2017), Pseudogout (2016), Psoriatic arthritis ([facility] (2000s), PUD (peptic ulcer disease), Shingles (03/2019), Skin abrasion (01/28/2014), Thromboembolus ([facility] (2008), UC (ulcerative colitis) ([facility] (3/12/2010), and Uterine fibroid (2017). ALLERGIES:   Allergies   Allergen Reactions    Codeine Itching    Humira [Adalimumab] Rash     Large red knots    Sulfa (Sulfonamide Antibiotics) Anaphylaxis       VITAL SIGNS:  /63   Pulse 76   Ht 5' 5\" (1.651 m)   Wt 110.7 kg (244 lb)   LMP  (LMP Unknown) Comment: IUD  BMI 40.60 kg/m²     Current Outpatient Medications   Medication Sig Dispense Refill    buPROPion  mg Tb24 Take 450 mg by mouth every morning. Indications: major depressive disorder 30 Tab 2    escitalopram oxalate (LEXAPRO) 20 mg tablet Take 1 Tab by mouth daily.  30 Tab 2    esomeprazole (NEXIUM) 40 mg capsule TAKE 1 CAPSULE BY MOUTH DAILY INDICATIONS: HEARTBURN 30 Cap 3    ergocalciferol (ERGOCALCIFEROL) 50,000 unit capsule Take 1 Cap by mouth every seven (7) days. 5 Cap 2    warfarin (COUMADIN) 7.5 mg tablet Take as directed 90 Tab 1    warfarin (COUMADIN) 5 mg tablet Take as directed 90 Tab 0    ergocalciferol (ERGOCALCIFEROL) 50,000 unit capsule TAKE 1 CAPSULE BY MOUTH ONCE PER WEEK FOR A TOTAL OF 8 WEEKS. THEN CHANGE TO TWICE PER MONTH. 12 Cap 0    KLOR-CON M20 20 mEq tablet TAKE 1 TABLET BY MOUTH TWICE A  Tab 1    furosemide (LASIX) 40 mg tablet TAKE 1 TABLET BY MOUTH TWICE DAILY AS NEEDED 341 Tab 1    folic acid (FOLVITE) 1 mg tablet TAKE 1 TABLET BY MOUTH ONCE A DAY  6    CERTOLIZUMAB PEGOL (CIMZIA SC) by SubCUTAneous route. Injection:  Every 4 weeks      methotrexate (RHEUMATREX) 2.5 mg tablet TAKE 8 TABLETS BY MOUTH ONCE PER WEEK  2    ranitidine (ZANTAC) 150 mg tablet Take 150 mg by mouth two (2) times a day.  albuterol (PROVENTIL HFA, VENTOLIN HFA, PROAIR HFA) 90 mcg/actuation inhaler Take 1 Puff by inhalation every four (4) hours as needed for Wheezing. 1 Inhaler 5       ROS:  Constitutional: Positive for fatigue. Eyes: Negative for contacts/glasses  ENT: Negative for hearing loss and tinnitus  Respiratory: Negative for cough or wheezing  Cardiovascular: Negative for chest pain, palpitations  Neurological: Positive for memory problems  Behavioral/psych: Positive for insomnia, anxiety, depression, negative for SI/HI  Endocrine: Negative for diabetic symptoms including polyuria and polydipsia     MENTAL STATUS EXAMINATION:   Patient is a 48 y.o. female BLACK OR  who looks slightly older than her stated age. She  was observed to be depressed and anxious and tearful while talking about her psychosocial stress. She is obese and was dressed appropriately with good hygiene. Speech is regular rate and rhythm, fluent language, and thought process is linear, logical, and goal directed. Reported mood is depressed and anxious with mood congruent depressed and anxious affect.  Patient denies any suicidal ideation, homicidal ideation, and auditory visual hallucination. Not observed to be delusional or paranoid. Insight, judgement, reliability and impulse control is limited to intact. Cognition was within normal limit and patient was observed to be reliable. DIAGNOSIS:  Encounter Diagnoses   Name Primary?  Generalized anxiety disorder Yes    Severe episode of recurrent major depressive disorder, without psychotic features ([facility]     Insomnia, unspecified type        PLAN:  1. MEDICATION:   1. Depression and weight loss: Increase and maximize Wellbutrin XL 450 mg daily.     2.  Generalized anxiety and depression: Lexapro 20 mg at bedtime.     3.  Middle insomnia: Sonata 5 mg at bedtime as needed for initial insomnia. She was provided with psycho education, discussed risk/benefits, and expectations from medication changes. Patient agrees with plan.         2. PSYCHOTHERAPY: Patient was provided with supportive therapy, strongly encourage to seek psychotherapy. She is receiving regular psychotherapy at [facility], she see her twice a month and report good rapport.     3. MEDICAL CARE: Patient was strongly encourage to take their medical medications and follow up with their PCP on regular basis. Her weight today is 244 pounds, blood pressure is 117/63 and pulse is 76. She has history of syncope and collapse, deep venous thrombosis, ulcerative colitis, GERD, GI bleed, impaired glucose tolerance, asthma, degenerative disc disease, pseudogout, inflammatory polyarthritis, fibroid uterus, and morbid obesity. She is dealing with multiple medical issues at young age. She is now receiving wellness care at [facility] medically supervised weight loss program and was evaluated by Dr. Suzan Bailey and is motivated to decrease her weight but her emotional distress is causing her to gain weight.     4. SUBSTANCE ABUSE CARE: Patient denies a smoking, drinking, abusing any illicit drugs.     5. FOLLOW UP:   Follow-up and Dispositions    · Return in about 6 weeks (around 7/17/2019) for Medication management.

## 2019-06-13 NOTE — PROGRESS NOTES
Reviewed and agree with Weekly Education Class nurse progress note for [facility] Medically Supervised Weight Loss Program (MSWLP) using [employer] food products. Continue current diet, care and follow up as planned and outlined in BS MSWLP manual.  Please follow up with GI for UC symptoms. Documentation true and accepted by Radha Serna.  Sierra Magallanes.

## 2019-06-13 NOTE — PROGRESS NOTES
Reviewed and agree with Weekly Education Class nurse progress note for [facility] Medically Supervised Weight Loss Program (MSWLP) using [employer] food products. Continue current diet, care and follow up as planned and outlined in BS MSWLP manual.      Documentation true and accepted by Ayaz Arriaza.  Suly Aguilar.

## 2019-06-17 ENCOUNTER — OFFICE VISIT (OUTPATIENT)
Dept: FAMILY MEDICINE CLINIC | Age: 49
End: 2019-06-17

## 2019-06-17 VITALS
SYSTOLIC BLOOD PRESSURE: 121 MMHG | HEART RATE: 86 BPM | HEIGHT: 65 IN | RESPIRATION RATE: 18 BRPM | WEIGHT: 252 LBS | OXYGEN SATURATION: 100 % | DIASTOLIC BLOOD PRESSURE: 54 MMHG | BODY MASS INDEX: 41.99 KG/M2 | TEMPERATURE: 97.8 F

## 2019-06-17 DIAGNOSIS — M51.36 DDD (DEGENERATIVE DISC DISEASE), LUMBAR: ICD-10-CM

## 2019-06-17 DIAGNOSIS — L40.50 PSORIATIC ARTHRITIS (HCC): ICD-10-CM

## 2019-06-17 DIAGNOSIS — Z86.718 HISTORY OF RECURRENT DEEP VEIN THROMBOSIS (DVT): ICD-10-CM

## 2019-06-17 DIAGNOSIS — Z51.81 MONITORING FOR LONG-TERM ANTICOAGULANT USE: ICD-10-CM

## 2019-06-17 DIAGNOSIS — Z79.01 MONITORING FOR LONG-TERM ANTICOAGULANT USE: ICD-10-CM

## 2019-06-17 DIAGNOSIS — I87.001 POST-THROMBOTIC SYNDROME OF RIGHT LOWER EXTREMITY: ICD-10-CM

## 2019-06-17 DIAGNOSIS — F32.A ANXIETY AND DEPRESSION: ICD-10-CM

## 2019-06-17 DIAGNOSIS — F41.9 ANXIETY AND DEPRESSION: ICD-10-CM

## 2019-06-17 DIAGNOSIS — D68.59 HYPERCOAGULABLE STATE (HCC): Primary | ICD-10-CM

## 2019-06-17 DIAGNOSIS — E66.01 OBESITIES, MORBID (HCC): ICD-10-CM

## 2019-06-17 LAB
INR BLD: 2.4
PT POC: NORMAL SECONDS
VALID INTERNAL CONTROL?: YES

## 2019-06-17 RX ORDER — ZALEPLON 5 MG/1
CAPSULE ORAL
Refills: 2 | COMMUNITY
Start: 2019-04-07 | End: 2019-11-05 | Stop reason: ALTCHOICE

## 2019-06-17 RX ORDER — TRAMADOL HYDROCHLORIDE 50 MG/1
50-100 TABLET ORAL
Qty: 20 TAB | Refills: 0 | Status: SHIPPED | OUTPATIENT
Start: 2019-06-17 | End: 2019-06-20

## 2019-06-17 NOTE — PROGRESS NOTES
Chief Complaint   Patient presents with    Anticoagulation     PT/INR   1. Have you been to the ER, urgent care clinic since your last visit? Hospitalized since your last visit? No    2. Have you seen or consulted any other health care providers outside of the [address] [place] [address] since your last visit? Include any pap smears or colon screening.  No   Discuss back pain and job stressors
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
## 2019-07-16 ENCOUNTER — OFFICE VISIT (OUTPATIENT)
Dept: FAMILY MEDICINE CLINIC | Age: 49
End: 2019-07-16

## 2019-07-16 VITALS
RESPIRATION RATE: 18 BRPM | HEART RATE: 60 BPM | BODY MASS INDEX: 41.42 KG/M2 | TEMPERATURE: 96.6 F | WEIGHT: 248.6 LBS | HEIGHT: 65 IN | DIASTOLIC BLOOD PRESSURE: 76 MMHG | OXYGEN SATURATION: 97 % | SYSTOLIC BLOOD PRESSURE: 135 MMHG

## 2019-07-16 DIAGNOSIS — I87.001 POST-THROMBOTIC SYNDROME OF RIGHT LOWER EXTREMITY: ICD-10-CM

## 2019-07-16 DIAGNOSIS — Z86.718 HISTORY OF RECURRENT DEEP VEIN THROMBOSIS (DVT): ICD-10-CM

## 2019-07-16 DIAGNOSIS — Z79.01 MONITORING FOR LONG-TERM ANTICOAGULANT USE: ICD-10-CM

## 2019-07-16 DIAGNOSIS — L40.50 PSORIATIC ARTHRITIS (HCC): ICD-10-CM

## 2019-07-16 DIAGNOSIS — Z51.81 MONITORING FOR LONG-TERM ANTICOAGULANT USE: ICD-10-CM

## 2019-07-16 DIAGNOSIS — F32.A ANXIETY AND DEPRESSION: ICD-10-CM

## 2019-07-16 DIAGNOSIS — F41.9 ANXIETY AND DEPRESSION: ICD-10-CM

## 2019-07-16 DIAGNOSIS — M51.36 DDD (DEGENERATIVE DISC DISEASE), LUMBAR: ICD-10-CM

## 2019-07-16 DIAGNOSIS — D68.59 HYPERCOAGULABLE STATE (HCC): Primary | ICD-10-CM

## 2019-07-16 DIAGNOSIS — K51.919 ULCERATIVE COLITIS WITH COMPLICATION, UNSPECIFIED LOCATION (HCC): ICD-10-CM

## 2019-07-16 LAB
INR BLD: 2.9
PT POC: NORMAL SECONDS
VALID INTERNAL CONTROL?: YES

## 2019-07-16 RX ORDER — OXYCODONE AND ACETAMINOPHEN 5; 325 MG/1; MG/1
1 TABLET ORAL
Qty: 30 TAB | Refills: 0 | Status: SHIPPED | OUTPATIENT
Start: 2019-07-16 | End: 2019-07-24

## 2019-07-16 NOTE — PROGRESS NOTES
Chief Complaint   Patient presents with    Anticoagulation     PT/INR   1. Have you been to the ER, urgent care clinic since your last visit? Hospitalized since your last visit? No    2. Have you seen or consulted any other health care providers outside of the [address] [place] [address] since your last visit? Include any pap smears or colon screening.  No   Discuss back pain and nosebleed
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
## 2019-07-24 ENCOUNTER — OFFICE VISIT (OUTPATIENT)
Dept: BEHAVIORAL/MENTAL HEALTH CLINIC | Age: 49
End: 2019-07-24

## 2019-07-24 VITALS
WEIGHT: 244 LBS | SYSTOLIC BLOOD PRESSURE: 111 MMHG | DIASTOLIC BLOOD PRESSURE: 73 MMHG | HEIGHT: 65 IN | BODY MASS INDEX: 40.65 KG/M2 | HEART RATE: 60 BPM

## 2019-07-24 DIAGNOSIS — F33.2 SEVERE EPISODE OF RECURRENT MAJOR DEPRESSIVE DISORDER, WITHOUT PSYCHOTIC FEATURES (HCC): ICD-10-CM

## 2019-07-24 DIAGNOSIS — F51.02 ADJUSTMENT INSOMNIA: ICD-10-CM

## 2019-07-24 DIAGNOSIS — F41.1 GENERALIZED ANXIETY DISORDER: Primary | ICD-10-CM

## 2019-07-24 RX ORDER — ESCITALOPRAM OXALATE 20 MG/1
20 TABLET ORAL DAILY
Qty: 30 TAB | Refills: 2 | Status: SHIPPED | OUTPATIENT
Start: 2019-07-24 | End: 2019-11-05 | Stop reason: ALTCHOICE

## 2019-07-24 RX ORDER — BUPROPION HYDROCHLORIDE 450 MG/1
450 TABLET, FILM COATED, EXTENDED RELEASE ORAL
Qty: 30 TAB | Refills: 2 | Status: SHIPPED | OUTPATIENT
Start: 2019-07-24 | End: 2019-11-05 | Stop reason: ALTCHOICE

## 2019-07-24 NOTE — PROGRESS NOTES
Cl Sheikh  1970  46 y.o.  female  [address]    Chief Complaint   Patient presents with    Depression     3 out of 10 on the scale of 1-10 where 10 is worst, last few months it is 8-9 out of 10    Anxiety     4 out of 10, it was 9 out of 10    Stress     Dealing with stress at work where she was demoted with less pain and she has a  who is working for her    Insomnia     Sonata 5 mg helps, she is requiring every other day. [place]:   This is a 50 years old  -American female with past psychiatric history and treatment of depression and anxiety who is referred by her PCP for psychiatric evaluation and medication management and was seen for the first time on December 3 when she decided to continue Lexapro 20 mg at bedtime, try Wellbutrin  mg daily, and try trazodone  mg at bedtime as needed for insomnia.       Patient was last seen on June 5, 2019 when she decided to increase and maximize Wellbutrin  mg daily, continue Lexapro 20 mg at bedtime, and discontinue Belsomra 15 mg at bedtime and try Sonata 5 mg at bedtime as needed for insomnia. She presented on time, was observed to be significantly improved with brighter affect and very different than last visit when she was depressed and tearful most of the visit especially talking about her psychosocial issues which she described in detail. She was alert awake oriented to time person and place and was  calm, cooperative, polite, and pleasant during the interview.      Patient reported compliance with all of her medication except Sonata which she is stopped taking without any benefit for her sleep, is able to tolerate, and reported 40% feeling better.  She report 5 out of 10 on the scale of 1-10 where 10 is worst for depression and 6 out of 10 for anxiety. Robyn Taylor chief complaint today is sleep and she has tried trazodone, Ambien, temazepam, and Belsomra without benefit.      Patient was provided with support and psychoeducation, we discussed treatment alternative available and after discussing risk and benefit she decided to try Sonata 5 mg in the middle of the night when she wake up to go back to sleep, continue Wellbutrin  mg daily and Lexapro 20 mg at bedtime. She is now under care of her weight management physician who is trying to help her. Yani Burch is also receiving psychotherapy at [address] and report good rapport.      SUBSTANCE USE HISTORY:   Patient denies a smoking, drinking, abusing any illicit drugs. MEDICAL HISTORY:    has a past medical history of Anemia associated with acute blood loss (2017), Asthma (childhood), DDD (degenerative disc disease), lumbar, DJD (degenerative joint disease) of knee, JANAE (generalized anxiety disorder) (2018), GI bleeding (2017), Heartburn, History of tuberculosis exposure (2013), Lower leg DVT (deep venous thromboembolism), acute, right ([address] (2008, 4/27/2016, 08/03/2017), Major depression (2018), Morbid obesity ([address] Orthostatic syncope (2017), Post-thrombotic syndrome of right lower extremity (09/14/2017), Pseudogout (2016), Psoriatic arthritis ([address] (2000s), PUD (peptic ulcer disease), Shingles (03/2019), Skin abrasion (01/28/2014), Thromboembolus ([address] (2008), UC (ulcerative colitis) ([address] (3/12/2010), and Uterine fibroid (2017). ALLERGIES:   Allergies   Allergen Reactions    Codeine Itching    Humira [Adalimumab] Rash     Large red knots    Sulfa (Sulfonamide Antibiotics) Anaphylaxis       VITAL SIGNS:  /73   Pulse 60   Ht 5' 5\" (1.651 m)   Wt 110.7 kg (244 lb)   LMP  (LMP Unknown) Comment: IUD  BMI 40.60 kg/m²     Current Outpatient Medications   Medication Sig Dispense Refill    escitalopram oxalate (LEXAPRO) 20 mg tablet Take 1 Tab by mouth daily. 30 Tab 2    buPROPion HCl 450 mg Tb24 Take 450 mg by mouth every morning.  Indications: major depressive disorder 30 Tab 2    oxyCODONE-acetaminophen (PERCOCET) 5-325 mg per tablet Take 1 Tab by mouth every four (4) hours as needed for Pain for up to 7 days. Max Daily Amount: 6 Tabs. Indications: Pain 30 Tab 0    zaleplon (SONATA) 5 mg capsule TAKE ONE CAPSULE BY MOUTH AT NIGHT AS Needed  2    esomeprazole (NEXIUM) 40 mg capsule TAKE 1 CAPSULE BY MOUTH DAILY INDICATIONS: HEARTBURN 30 Cap 3    ergocalciferol (ERGOCALCIFEROL) 50,000 unit capsule Take 1 Cap by mouth every seven (7) days. 5 Cap 2    warfarin (COUMADIN) 7.5 mg tablet Take as directed 90 Tab 1    warfarin (COUMADIN) 5 mg tablet Take as directed 90 Tab 0    KLOR-CON M20 20 mEq tablet TAKE 1 TABLET BY MOUTH TWICE A  Tab 1    furosemide (LASIX) 40 mg tablet TAKE 1 TABLET BY MOUTH TWICE DAILY AS NEEDED 790 Tab 1    folic acid (FOLVITE) 1 mg tablet TAKE 1 TABLET BY MOUTH ONCE A DAY  6    CERTOLIZUMAB PEGOL (CIMZIA SC) by SubCUTAneous route. Injection:  Every 4 weeks      methotrexate (RHEUMATREX) 2.5 mg tablet TAKE 8 TABLETS BY MOUTH ONCE PER WEEK  2    ranitidine (ZANTAC) 150 mg tablet Take 150 mg by mouth two (2) times a day.  albuterol (PROVENTIL HFA, VENTOLIN HFA, PROAIR HFA) 90 mcg/actuation inhaler Take 1 Puff by inhalation every four (4) hours as needed for Wheezing. 1 Inhaler 5       ROS:  Constitutional: Negative for fever.   Eyes: Negative for contacts/glasses  ENT: Negative for hearing loss and tinnitus  Respiratory: Negative for cough or wheezing  Cardiovascular: Negative for chest pain, palpitations  Neurological: Positive for memory problems  Behavioral/psych: Positive for insomnia, anxiety, depression, negative for SI/HI  Endocrine: Negative for diabetic symptoms including polyuria and polydipsia     MENTAL STATUS EXAMINATION:   Patient is a 48 y.o. female BLACK OR  who looks slightly older than her stated age. She  was observed to be much improved with brighter affect and not depressed and anxious and tearful while talking about her psychosocial stress as her last visit 6 weeks ago. She is obese and was dressed appropriately with good hygiene. Speech is regular rate and rhythm, fluent language, and thought process is linear, logical, and goal directed. Reported mood is depressed and anxious with mood congruent depressed and anxious affect. Patient denies any suicidal ideation, homicidal ideation, and auditory visual hallucination. Not observed to be delusional or paranoid. Insight, judgement, reliability and impulse control is limited to intact. Cognition was within normal limit and patient was observed to be reliable. DIAGNOSIS:  Encounter Diagnoses   Name Primary?  Generalized anxiety disorder Yes    Severe episode of recurrent major depressive disorder, without psychotic features ([facility]     Adjustment insomnia        PLAN:  1. MEDICATION:   1. Depression and weight loss: Wellbutrin XL 450 mg daily. She report compliance, denies any side effect, and report improved mood with a score of 3 out of 10 for depression and 4 out of 10 for anxiety, both were significantly high in recent past.     2.  Generalized anxiety and depression: Lexapro 20 mg at bedtime.     3.  Middle insomnia: Sonata 5 mg at bedtime as needed for initial insomnia. She is taking approximately 3-4 nights a week with benefit. She report satisfaction with her current treatment plan and requested to continue. She was provided with psycho education, discussed risk/benefits, and expectations from medication changes. Patient agrees with plan.         2. PSYCHOTHERAPY: Patient was provided with supportive therapy, strongly encourage to seek psychotherapy. She is receiving [facility], she see her twice a month and report good rapport.     3.  MEDICAL CARE: Patient was strongly encourage to take their medical medications and follow up with their PCP on regular basis. Her weight today is 244 pounds, blood pressure is 111/73 and pulse is 60. She has history of syncope and collapse, deep venous thrombosis, ulcerative colitis, GERD, GI bleed, impaired glucose tolerance, asthma, degenerative disc disease, pseudogout, inflammatory polyarthritis, fibroid uterus, and morbid obesity. She is dealing with multiple medical issues at young age. She is now receiving wellness care at [facility] medically supervised weight loss program and was evaluated by Dr. Michelle Santana and is motivated to decrease her weight but her emotional distress is causing her to gain weight.     4. SUBSTANCE ABUSE CARE: Patient denies a smoking, drinking, abusing any illicit drugs. 5. FOLLOW UP:   Follow-up and Dispositions    · Return in about 2 months (around 9/24/2019) for Medication management.

## 2019-08-14 ENCOUNTER — OFFICE VISIT (OUTPATIENT)
Dept: FAMILY MEDICINE CLINIC | Age: 49
End: 2019-08-14

## 2019-08-14 VITALS
DIASTOLIC BLOOD PRESSURE: 60 MMHG | TEMPERATURE: 97.7 F | RESPIRATION RATE: 16 BRPM | WEIGHT: 243.8 LBS | BODY MASS INDEX: 40.62 KG/M2 | HEIGHT: 65 IN | OXYGEN SATURATION: 100 % | SYSTOLIC BLOOD PRESSURE: 106 MMHG | HEART RATE: 72 BPM

## 2019-08-14 DIAGNOSIS — I82.4Z1 LOWER LEG DVT (DEEP VENOUS THROMBOEMBOLISM), ACUTE, RIGHT (HCC): ICD-10-CM

## 2019-08-14 DIAGNOSIS — D68.59 HYPERCOAGULABLE STATE (HCC): Primary | ICD-10-CM

## 2019-08-14 DIAGNOSIS — Z86.718 HISTORY OF RECURRENT DEEP VEIN THROMBOSIS (DVT): ICD-10-CM

## 2019-08-14 DIAGNOSIS — K62.4 ANAL STENOSIS: ICD-10-CM

## 2019-08-14 DIAGNOSIS — Z51.81 MONITORING FOR LONG-TERM ANTICOAGULANT USE: ICD-10-CM

## 2019-08-14 DIAGNOSIS — F41.9 ANXIETY AND DEPRESSION: ICD-10-CM

## 2019-08-14 DIAGNOSIS — F32.A ANXIETY AND DEPRESSION: ICD-10-CM

## 2019-08-14 DIAGNOSIS — Z79.01 MONITORING FOR LONG-TERM ANTICOAGULANT USE: ICD-10-CM

## 2019-08-14 LAB
INR BLD: 2.5
PT POC: NORMAL
VALID INTERNAL CONTROL?: YES

## 2019-08-14 NOTE — PROGRESS NOTES
Chief Complaint   Patient presents with    Anticoagulation     PT/INR   1. Have you been to the ER, urgent care clinic since your last visit? Hospitalized since your last visit? No    2. Have you seen or consulted any other health care providers outside of the [address] [place] [address] since your last visit? Include any pap smears or colon screening.  Yes
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
## 2019-08-22 ENCOUNTER — OFFICE VISIT (OUTPATIENT)
Dept: ENDOCRINOLOGY | Age: 49
End: 2019-08-22

## 2019-08-22 VITALS
WEIGHT: 252.6 LBS | SYSTOLIC BLOOD PRESSURE: 133 MMHG | BODY MASS INDEX: 42.09 KG/M2 | DIASTOLIC BLOOD PRESSURE: 79 MMHG | HEIGHT: 65 IN | HEART RATE: 63 BPM

## 2019-08-22 DIAGNOSIS — R73.02 IGT (IMPAIRED GLUCOSE TOLERANCE): ICD-10-CM

## 2019-08-22 DIAGNOSIS — E66.01 MORBID OBESITY, UNSPECIFIED OBESITY TYPE (HCC): Primary | ICD-10-CM

## 2019-08-22 RX ORDER — PHENTERMINE HYDROCHLORIDE 37.5 MG/1
TABLET ORAL
Qty: 100 TAB | Refills: 3 | Status: SHIPPED | OUTPATIENT
Start: 2019-08-22 | End: 2019-11-21 | Stop reason: SDUPTHER

## 2019-08-22 RX ORDER — TOPIRAMATE 50 MG/1
50 TABLET, FILM COATED ORAL 2 TIMES DAILY
Qty: 60 TAB | Refills: 3 | Status: SHIPPED | OUTPATIENT
Start: 2019-08-22 | End: 2019-11-21 | Stop reason: SDUPTHER
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
## 2019-08-22 NOTE — PATIENT INSTRUCTIONS
Phentermine - 18.75 mg (1/2 of 37.5 mg tablet) daily. Take early in the morning. Let me know if you have any problems with increase heart rate, blood pressure, anxiety or trouble sleeping as these can be side effects. If symptoms are mild, you body should acclimate to this and any related symptoms should improve. Topiramate - 50 mg tablet. Increase dose gradually as recommended below   - Step 1: Start with 1/2 tablet (25 mg) at bedtime x 2 weeks   - Step 2: Increase to 25 mg twice daily   - Step 3: Increase to 25 mg in AM and 50 mg at night. - Step 4: Increase to 50 mg twice daily   Stop or delay titration at any time if you feel you are doing great at current dose or if you feel you are noting some unwanted side effects   Side effect may be sleepiness. Also, if you notice any changes in your thinking, then decrease medication or stop.    Let me know if you have questions

## 2019-09-25 ENCOUNTER — OFFICE VISIT (OUTPATIENT)
Dept: FAMILY MEDICINE CLINIC | Age: 49
End: 2019-09-25

## 2019-09-25 VITALS
TEMPERATURE: 98.5 F | OXYGEN SATURATION: 97 % | DIASTOLIC BLOOD PRESSURE: 70 MMHG | WEIGHT: 252.6 LBS | SYSTOLIC BLOOD PRESSURE: 135 MMHG | RESPIRATION RATE: 16 BRPM | HEART RATE: 67 BPM | HEIGHT: 65 IN | BODY MASS INDEX: 42.09 KG/M2

## 2019-09-25 DIAGNOSIS — Z79.01 MONITORING FOR LONG-TERM ANTICOAGULANT USE: ICD-10-CM

## 2019-09-25 DIAGNOSIS — I82.5Z1 CHRONIC DEEP VEIN THROMBOSIS (DVT) OF DISTAL VEIN OF RIGHT LOWER EXTREMITY (HCC): ICD-10-CM

## 2019-09-25 DIAGNOSIS — Z51.81 MONITORING FOR LONG-TERM ANTICOAGULANT USE: ICD-10-CM

## 2019-09-25 DIAGNOSIS — R10.31 ABDOMINAL PAIN, RLQ: Primary | ICD-10-CM

## 2019-09-25 DIAGNOSIS — Z23 ENCOUNTER FOR IMMUNIZATION: ICD-10-CM

## 2019-09-25 DIAGNOSIS — R79.1 SUPRATHERAPEUTIC INR: ICD-10-CM

## 2019-09-25 LAB
BILIRUB UR QL STRIP: NEGATIVE
GLUCOSE UR-MCNC: NEGATIVE MG/DL
INR BLD: 5.3
KETONES P FAST UR STRIP-MCNC: NEGATIVE MG/DL
PH UR STRIP: 7.5 [PH] (ref 4.6–8)
PROT UR QL STRIP: NEGATIVE
PT POC: NORMAL
SP GR UR STRIP: 1.02 (ref 1–1.03)
UA UROBILINOGEN AMB POC: NORMAL (ref 0.2–1)
URINALYSIS CLARITY POC: CLEAR
URINALYSIS COLOR POC: YELLOW
URINE BLOOD POC: NORMAL
URINE LEUKOCYTES POC: NEGATIVE
URINE NITRITES POC: NEGATIVE
VALID INTERNAL CONTROL?: YES
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
## 2019-09-25 NOTE — PROGRESS NOTES
Chief Complaint   Patient presents with    Anticoagulation     PT/INR   1. Have you been to the ER, urgent care clinic since your last visit? Hospitalized since your last visit? No    2. Have you seen or consulted any other health care providers outside of the [address] [place] [address] since your last visit? Include any pap smears or colon screening. No   Discuss right sided abdominal pain,pain with urination and nose bleeds    She denies any symptoms , reactions or allergies that would exclude them from being immunized today. Risks and adverse reactions were discussed and the VIS was given to them. All questions were addressed. She was observed for 15 min post injection. There were no reactions observed.     Syd Saleh LPN

## 2019-09-27 ENCOUNTER — HOSPITAL ENCOUNTER (OUTPATIENT)
Dept: GENERAL RADIOLOGY | Age: 49
Discharge: HOME | End: 2019-09-27
Payer: COMMERCIAL

## 2019-09-27 DIAGNOSIS — R10.31 ABDOMINAL PAIN, RLQ: ICD-10-CM

## 2019-09-27 PROCEDURE — 74018 RADEX ABDOMEN 1 VIEW: CPT

## 2019-09-28 LAB
ALBUMIN SERPL-MCNC: 3.9 G/DL (ref 3.5–5.5)
ALBUMIN/GLOB SERPL: 1.6 {RATIO} (ref 1.2–2.2)
ALP SERPL-CCNC: 56 IU/L (ref 39–117)
ALT SERPL-CCNC: 21 IU/L (ref 0–32)
AST SERPL-CCNC: 18 IU/L (ref 0–40)
BILIRUB SERPL-MCNC: 1.1 MG/DL (ref 0–1.2)
BUN SERPL-MCNC: 17 MG/DL (ref 6–24)
BUN/CREAT SERPL: 19 (ref 9–23)
CALCIUM SERPL-MCNC: 8.7 MG/DL (ref 8.7–10.2)
CHLORIDE SERPL-SCNC: 107 MMOL/L (ref 96–106)
CO2 SERPL-SCNC: 20 MMOL/L (ref 20–29)
CREAT SERPL-MCNC: 0.9 MG/DL (ref 0.57–1)
ERYTHROCYTE [DISTWIDTH] IN BLOOD BY AUTOMATED COUNT: 13.1 % (ref 12.3–15.4)
GLOBULIN SER CALC-MCNC: 2.4 G/DL (ref 1.5–4.5)
GLUCOSE SERPL-MCNC: 95 MG/DL (ref 65–99)
HCT VFR BLD AUTO: 36.9 % (ref 34–46.6)
HGB BLD-MCNC: 12 G/DL (ref 11.1–15.9)
INR PPP: 1.8 (ref 0.8–1.2)
MCH RBC QN AUTO: 28.9 PG (ref 26.6–33)
MCHC RBC AUTO-ENTMCNC: 32.5 G/DL (ref 31.5–35.7)
MCV RBC AUTO: 89 FL (ref 79–97)
PLATELET # BLD AUTO: 266 X10E3/UL (ref 150–450)
POTASSIUM SERPL-SCNC: 4.2 MMOL/L (ref 3.5–5.2)
PROT SERPL-MCNC: 6.3 G/DL (ref 6–8.5)
PROTHROMBIN TIME: 17.6 SEC (ref 9.1–12)
RBC # BLD AUTO: 4.15 X10E6/UL (ref 3.77–5.28)
SODIUM SERPL-SCNC: 139 MMOL/L (ref 134–144)
WBC # BLD AUTO: 4.7 X10E3/UL (ref 3.4–10.8)

## 2019-10-04 ENCOUNTER — OFFICE VISIT (OUTPATIENT)
Dept: FAMILY MEDICINE CLINIC | Age: 49
End: 2019-10-04

## 2019-10-04 VITALS
RESPIRATION RATE: 16 BRPM | HEART RATE: 69 BPM | DIASTOLIC BLOOD PRESSURE: 76 MMHG | TEMPERATURE: 97.9 F | BODY MASS INDEX: 41.65 KG/M2 | SYSTOLIC BLOOD PRESSURE: 126 MMHG | WEIGHT: 250 LBS | OXYGEN SATURATION: 99 % | HEIGHT: 65 IN

## 2019-10-04 DIAGNOSIS — I82.5Z1 CHRONIC DEEP VEIN THROMBOSIS (DVT) OF DISTAL VEIN OF RIGHT LOWER EXTREMITY (HCC): ICD-10-CM

## 2019-10-04 DIAGNOSIS — Z79.01 MONITORING FOR LONG-TERM ANTICOAGULANT USE: ICD-10-CM

## 2019-10-04 DIAGNOSIS — Z51.81 MONITORING FOR LONG-TERM ANTICOAGULANT USE: ICD-10-CM

## 2019-10-04 DIAGNOSIS — D68.59 HYPERCOAGULABLE STATE (HCC): Primary | ICD-10-CM

## 2019-10-04 LAB
INR BLD: 3.1
PT POC: NORMAL
VALID INTERNAL CONTROL?: YES

## 2019-10-04 NOTE — PROGRESS NOTES
Chief Complaint   Patient presents with    Anticoagulation     PT/INR   1. Have you been to the ER, urgent care clinic since your last visit? Hospitalized since your last visit? No    2. Have you seen or consulted any other health care providers outside of the [address] [place] [address] since your last visit? Include any pap smears or colon screening.  No   Discuss dry patch on left foot
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
## 2019-10-18 ENCOUNTER — OFFICE VISIT (OUTPATIENT)
Dept: FAMILY MEDICINE CLINIC | Age: 49
End: 2019-10-18

## 2019-10-18 VITALS
SYSTOLIC BLOOD PRESSURE: 130 MMHG | OXYGEN SATURATION: 90 % | BODY MASS INDEX: 42.38 KG/M2 | DIASTOLIC BLOOD PRESSURE: 68 MMHG | HEART RATE: 87 BPM | TEMPERATURE: 97.6 F | HEIGHT: 65 IN | RESPIRATION RATE: 16 BRPM | WEIGHT: 254.4 LBS

## 2019-10-18 DIAGNOSIS — I82.5Z1 CHRONIC DEEP VEIN THROMBOSIS (DVT) OF DISTAL VEIN OF RIGHT LOWER EXTREMITY (HCC): ICD-10-CM

## 2019-10-18 DIAGNOSIS — Z51.81 MONITORING FOR LONG-TERM ANTICOAGULANT USE: ICD-10-CM

## 2019-10-18 DIAGNOSIS — Z86.718 HISTORY OF RECURRENT DEEP VEIN THROMBOSIS (DVT): ICD-10-CM

## 2019-10-18 DIAGNOSIS — R79.1 SUPRATHERAPEUTIC INR: ICD-10-CM

## 2019-10-18 DIAGNOSIS — Z79.01 MONITORING FOR LONG-TERM ANTICOAGULANT USE: ICD-10-CM

## 2019-10-18 DIAGNOSIS — L40.50 PSORIATIC ARTHRITIS (HCC): ICD-10-CM

## 2019-10-18 DIAGNOSIS — M06.4 INFLAMMATORY POLYARTHRITIS (HCC): ICD-10-CM

## 2019-10-18 DIAGNOSIS — D68.59 HYPERCOAGULABLE STATE (HCC): Primary | ICD-10-CM

## 2019-10-18 LAB
INR BLD: 4.4
PT POC: NORMAL
VALID INTERNAL CONTROL?: YES

## 2019-10-18 RX ORDER — DICLOFENAC SODIUM 10 MG/G
GEL TOPICAL 4 TIMES DAILY
Qty: 100 G | Refills: 0 | Status: SHIPPED | OUTPATIENT
Start: 2019-10-18 | End: 2021-08-17

## 2019-10-18 RX ORDER — TRIAMCINOLONE ACETONIDE 5 MG/G
OINTMENT TOPICAL 2 TIMES DAILY
Qty: 30 G | Refills: 0 | Status: SHIPPED | OUTPATIENT
Start: 2019-10-18 | End: 2022-05-19

## 2019-10-18 RX ORDER — WARFARIN 7.5 MG/1
TABLET ORAL
Qty: 90 TAB | Refills: 1 | Status: SHIPPED | OUTPATIENT
Start: 2019-10-18 | End: 2020-03-23 | Stop reason: SDUPTHER

## 2019-10-18 RX ORDER — TRAMADOL HYDROCHLORIDE 50 MG/1
50 TABLET ORAL
Qty: 20 TAB | Refills: 0 | Status: SHIPPED | OUTPATIENT
Start: 2019-10-18 | End: 2019-10-25

## 2019-10-18 NOTE — PROGRESS NOTES
Chief Complaint   Patient presents with    Anticoagulation     PT/INR   1. Have you been to the ER, urgent care clinic since your last visit? Hospitalized since your last visit? No    2. Have you seen or consulted any other health care providers outside of the [address] [place] [address] since your last visit? Include any pap smears or colon screening.  No   Discuss Right foot pain and left knee pain
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
## 2019-10-31 NOTE — TELEPHONE ENCOUNTER
Pt mom Mckenzie Charisse calling for an appt     States pt has sore throat and not feeling well     Best number to reach her is [phone]

## 2019-10-31 NOTE — TELEPHONE ENCOUNTER
Patient came by office has sore throat ,no fever,cough and chest congestion. She has appointment scheduled for Tuesday but would like something sent to pharmacy. Dr Lenard Mcbride will send over antibiotic and patient will follow-up as scheduled.

## 2019-10-31 NOTE — TELEPHONE ENCOUNTER
Patient's mother Gisela Christian called, requesting an appointment for patient,  She states she has a fever  Sore throat   Sweating  Cough    This started 2 days ago    Mother's phone  [phone]

## 2019-11-03 RX ORDER — AMOXICILLIN AND CLAVULANATE POTASSIUM 875; 125 MG/1; MG/1
1 TABLET, FILM COATED ORAL EVERY 12 HOURS
Qty: 20 TAB | Refills: 0 | OUTPATIENT
Start: 2019-11-03 | End: 2019-11-13

## 2019-11-05 ENCOUNTER — OFFICE VISIT (OUTPATIENT)
Dept: FAMILY MEDICINE CLINIC | Age: 49
End: 2019-11-05

## 2019-11-05 VITALS
TEMPERATURE: 98.2 F | DIASTOLIC BLOOD PRESSURE: 79 MMHG | OXYGEN SATURATION: 97 % | BODY MASS INDEX: 42.15 KG/M2 | RESPIRATION RATE: 18 BRPM | HEART RATE: 72 BPM | SYSTOLIC BLOOD PRESSURE: 123 MMHG | WEIGHT: 253 LBS | HEIGHT: 65 IN

## 2019-11-05 DIAGNOSIS — J04.0 LARYNGITIS: ICD-10-CM

## 2019-11-05 DIAGNOSIS — D68.59 HYPERCOAGULABLE STATE (HCC): Primary | ICD-10-CM

## 2019-11-05 DIAGNOSIS — Z86.718 HISTORY OF RECURRENT DEEP VEIN THROMBOSIS (DVT): ICD-10-CM

## 2019-11-05 DIAGNOSIS — J06.9 VIRAL URI: ICD-10-CM

## 2019-11-05 DIAGNOSIS — D84.9 IMMUNOSUPPRESSED STATUS (HCC): ICD-10-CM

## 2019-11-05 DIAGNOSIS — Z79.01 MONITORING FOR LONG-TERM ANTICOAGULANT USE: ICD-10-CM

## 2019-11-05 DIAGNOSIS — Z51.81 MONITORING FOR LONG-TERM ANTICOAGULANT USE: ICD-10-CM

## 2019-11-05 DIAGNOSIS — I82.5Z1 CHRONIC DEEP VEIN THROMBOSIS (DVT) OF DISTAL VEIN OF RIGHT LOWER EXTREMITY (HCC): ICD-10-CM

## 2019-11-05 LAB
INR BLD: 1.6
PT POC: NORMAL
VALID INTERNAL CONTROL?: YES

## 2019-11-05 RX ORDER — PROMETHAZINE HYDROCHLORIDE AND CODEINE PHOSPHATE 6.25; 1 MG/5ML; MG/5ML
1 SOLUTION ORAL
Qty: 60 ML | Refills: 0 | Status: SHIPPED | OUTPATIENT
Start: 2019-11-05 | End: 2019-11-19
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
## 2019-11-05 NOTE — PROGRESS NOTES
Room B8  Chief Complaint   Patient presents with    Medication Evaluation     PT/INR,     Cold Symptoms     cough, congestion and sore throat

## 2019-11-21 ENCOUNTER — OFFICE VISIT (OUTPATIENT)
Dept: ENDOCRINOLOGY | Age: 49
End: 2019-11-21

## 2019-11-21 VITALS — HEIGHT: 65 IN | WEIGHT: 250 LBS | BODY MASS INDEX: 41.65 KG/M2

## 2019-11-21 DIAGNOSIS — R73.02 IGT (IMPAIRED GLUCOSE TOLERANCE): ICD-10-CM

## 2019-11-21 DIAGNOSIS — E66.01 MORBID OBESITY, UNSPECIFIED OBESITY TYPE (HCC): Primary | ICD-10-CM

## 2019-11-21 LAB — HBA1C MFR BLD HPLC: 4.7 %

## 2019-11-21 RX ORDER — PHENTERMINE HYDROCHLORIDE 37.5 MG/1
TABLET ORAL
Qty: 100 TAB | Refills: 3 | Status: SHIPPED | OUTPATIENT
Start: 2019-11-21 | End: 2019-12-16 | Stop reason: SDUPTHER

## 2019-11-21 RX ORDER — TOPIRAMATE 50 MG/1
50 TABLET, FILM COATED ORAL 2 TIMES DAILY
Qty: 60 TAB | Refills: 3 | Status: SHIPPED | OUTPATIENT
Start: 2019-11-21 | End: 2020-07-06 | Stop reason: SDUPTHER
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
## 2019-12-05 ENCOUNTER — OFFICE VISIT (OUTPATIENT)
Dept: FAMILY MEDICINE CLINIC | Age: 49
End: 2019-12-05

## 2019-12-05 VITALS
HEART RATE: 79 BPM | TEMPERATURE: 97.4 F | DIASTOLIC BLOOD PRESSURE: 82 MMHG | HEIGHT: 65 IN | OXYGEN SATURATION: 99 % | BODY MASS INDEX: 41.22 KG/M2 | WEIGHT: 247.4 LBS | SYSTOLIC BLOOD PRESSURE: 130 MMHG | RESPIRATION RATE: 16 BRPM

## 2019-12-05 DIAGNOSIS — Z86.718 HISTORY OF RECURRENT DEEP VEIN THROMBOSIS (DVT): ICD-10-CM

## 2019-12-05 DIAGNOSIS — Z51.81 MONITORING FOR LONG-TERM ANTICOAGULANT USE: ICD-10-CM

## 2019-12-05 DIAGNOSIS — D68.59 HYPERCOAGULABLE STATE (HCC): Primary | ICD-10-CM

## 2019-12-05 DIAGNOSIS — Z79.01 MONITORING FOR LONG-TERM ANTICOAGULANT USE: ICD-10-CM

## 2019-12-05 DIAGNOSIS — N30.01 ACUTE CYSTITIS WITH HEMATURIA: ICD-10-CM

## 2019-12-05 DIAGNOSIS — I82.5Z1 CHRONIC DEEP VEIN THROMBOSIS (DVT) OF DISTAL VEIN OF RIGHT LOWER EXTREMITY (HCC): ICD-10-CM

## 2019-12-05 LAB
BILIRUB UR QL STRIP: NEGATIVE
GLUCOSE UR-MCNC: NEGATIVE MG/DL
INR BLD: 2.1
KETONES P FAST UR STRIP-MCNC: NEGATIVE MG/DL
PH UR STRIP: 7 [PH] (ref 4.6–8)
PROT UR QL STRIP: NORMAL
PT POC: NORMAL
SP GR UR STRIP: 1.02 (ref 1–1.03)
UA UROBILINOGEN AMB POC: NORMAL (ref 0.2–1)
URINALYSIS CLARITY POC: NORMAL
URINALYSIS COLOR POC: YELLOW
URINE BLOOD POC: NORMAL
URINE LEUKOCYTES POC: NORMAL
URINE NITRITES POC: NEGATIVE
VALID INTERNAL CONTROL?: YES

## 2019-12-05 RX ORDER — CIPROFLOXACIN 500 MG/1
500 TABLET ORAL 2 TIMES DAILY
Qty: 6 TAB | Refills: 0 | Status: SHIPPED | OUTPATIENT
Start: 2019-12-05 | End: 2019-12-06 | Stop reason: SDUPTHER

## 2019-12-05 RX ORDER — OMEPRAZOLE 20 MG/1
CAPSULE, DELAYED RELEASE ORAL
Refills: 3 | COMMUNITY
Start: 2019-11-26 | End: 2021-01-11 | Stop reason: ALTCHOICE

## 2019-12-05 NOTE — PROGRESS NOTES
Chief Complaint   Patient presents with    Anticoagulation     PT/INR   1. Have you been to the ER, urgent care clinic since your last visit? Hospitalized since your last visit? No    2. Have you seen or consulted any other health care providers outside of the [address] [place] [address] since your last visit? Include any pap smears or colon screening.  No   Discuss foot pain and urinary discomfort
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
## 2019-12-06 ENCOUNTER — PATIENT MESSAGE (OUTPATIENT)
Dept: FAMILY MEDICINE CLINIC | Age: 49
End: 2019-12-06

## 2019-12-06 DIAGNOSIS — N30.01 ACUTE CYSTITIS WITH HEMATURIA: ICD-10-CM

## 2019-12-06 RX ORDER — CIPROFLOXACIN 500 MG/1
500 TABLET ORAL 2 TIMES DAILY
Qty: 6 TAB | Refills: 0 | Status: SHIPPED | OUTPATIENT
Start: 2019-12-06 | End: 2019-12-10 | Stop reason: SDUPTHER

## 2019-12-08 LAB — BACTERIA UR CULT: ABNORMAL

## 2019-12-10 ENCOUNTER — PATIENT MESSAGE (OUTPATIENT)
Dept: FAMILY MEDICINE CLINIC | Age: 49
End: 2019-12-10

## 2019-12-10 DIAGNOSIS — N30.01 ACUTE CYSTITIS WITH HEMATURIA: ICD-10-CM

## 2019-12-10 RX ORDER — FLUCONAZOLE 150 MG/1
150 TABLET ORAL DAILY
Qty: 1 TAB | Refills: 0 | Status: SHIPPED | OUTPATIENT
Start: 2019-12-10 | End: 2019-12-11

## 2019-12-10 RX ORDER — CIPROFLOXACIN 500 MG/1
500 TABLET ORAL 2 TIMES DAILY
Qty: 6 TAB | Refills: 0 | Status: SHIPPED | OUTPATIENT
Start: 2019-12-10 | End: 2019-12-13

## 2019-12-16 DIAGNOSIS — R73.02 IGT (IMPAIRED GLUCOSE TOLERANCE): ICD-10-CM

## 2019-12-16 DIAGNOSIS — E66.01 MORBID OBESITY, UNSPECIFIED OBESITY TYPE (HCC): ICD-10-CM

## 2019-12-16 RX ORDER — PHENTERMINE HYDROCHLORIDE 37.5 MG/1
TABLET ORAL
Qty: 100 TAB | Refills: 1 | Status: SHIPPED | OUTPATIENT
Start: 2019-12-16 | End: 2020-03-09 | Stop reason: SDUPTHER

## 2020-01-07 ENCOUNTER — TELEPHONE (OUTPATIENT)
Dept: FAMILY MEDICINE CLINIC | Age: 50
End: 2020-01-07

## 2020-01-07 NOTE — TELEPHONE ENCOUNTER
----- Message from [employer] sent at 1/7/2020  9:09 AM EST -----  Regarding: Dr. Jaqueline Gomez: [phone]  Caller's first and last name and relationship (if not the patient): self  Details to clarify the request: Pt is requesting to speak w/ Ashley in regards to stomach swelling this morning and no bowel movement.   Best contact number(s): [phone]  What are the symptoms: stomach swelling, no bowel  movement  Transfer successful - yes/no (include outcome): no, no answer  Transfer declined - yes/no (include reason): no  Was caller advised to seek appropriate level of care - yes/no: yes

## 2020-01-07 NOTE — TELEPHONE ENCOUNTER
Spoke with patient's mother and advised per Dr Nikki Raphael to see if patient can be seen by GI or colon rectal specialist today or tomorrow if not call office back.

## 2020-01-09 ENCOUNTER — OFFICE VISIT (OUTPATIENT)
Dept: FAMILY MEDICINE CLINIC | Age: 50
End: 2020-01-09

## 2020-01-09 VITALS
OXYGEN SATURATION: 94 % | DIASTOLIC BLOOD PRESSURE: 77 MMHG | HEIGHT: 65 IN | SYSTOLIC BLOOD PRESSURE: 127 MMHG | HEART RATE: 86 BPM | TEMPERATURE: 97.4 F | WEIGHT: 248.6 LBS | BODY MASS INDEX: 41.42 KG/M2 | RESPIRATION RATE: 16 BRPM

## 2020-01-09 DIAGNOSIS — K62.4 ANAL STRICTURE: ICD-10-CM

## 2020-01-09 DIAGNOSIS — Z86.718 HISTORY OF RECURRENT DEEP VEIN THROMBOSIS (DVT): ICD-10-CM

## 2020-01-09 DIAGNOSIS — Z51.81 MONITORING FOR LONG-TERM ANTICOAGULANT USE: ICD-10-CM

## 2020-01-09 DIAGNOSIS — I82.5Z1 CHRONIC DEEP VEIN THROMBOSIS (DVT) OF DISTAL VEIN OF RIGHT LOWER EXTREMITY (HCC): Primary | ICD-10-CM

## 2020-01-09 DIAGNOSIS — D68.59 HYPERCOAGULABLE STATE (HCC): ICD-10-CM

## 2020-01-09 DIAGNOSIS — Z79.01 MONITORING FOR LONG-TERM ANTICOAGULANT USE: ICD-10-CM

## 2020-01-09 LAB
INR BLD: 4
PT POC: NORMAL
VALID INTERNAL CONTROL?: YES
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
## 2020-01-09 NOTE — PERIOP NOTES
MARTY for [facility] in MD office re: coumadin instructions    Discussed EKG dated 3/13/19, visit with Dr. Debra Batres dated 3/27/19, and stress echo dated 4/1/19 with Sophy Dong NP.  No need for further review as patient seen by cardiology and had negative stress test.

## 2020-01-09 NOTE — LETTER
1/9/2020 3:22 PM 
 
Ms. Shayy Arrieta [id number] [facility] [id number] Dr. Alvaro Kenny, For our mutual patient, Ms. Lopez, I have instructed her to hold her coumadin for 3 days prior to surgery and restart it the day of surgery. She has had 3 DVTs in her right lower extremity with her last DVT coming after her hysterectomy in 2017 so I am recommending the minimum time off coumadin for her.  
 
 
 
 
Sincerely, 
 
 
Vannessa Rodrigues MD

## 2020-01-09 NOTE — PROGRESS NOTES
Chief Complaint   Patient presents with    Anticoagulation     PT/INR   1. Have you been to the ER, urgent care clinic since your last visit? Hospitalized since your last visit? No    2. Have you seen or consulted any other health care providers outside of the [address] [place] [address] since your last visit? Include any pap smears or colon screening.  Yes Where: Dr Sophie Nina   Needs to given instructions for blood thinner prior to surgery next Wednesday  Discuss nose bleed and pain

## 2020-01-10 NOTE — PERIOP NOTES
[facility]  Preoperative Instructions        Surgery Date 1/15/20          Time of Arrival 0600    1. On the day of your surgery, please report to the Surgical Services Registration Desk and sign in at your designated time. The Surgery Center is located to the right of the Emergency Room. 2. You must have someone with you to drive you home. You should not drive a car for 24 hours following surgery. Please make arrangements for a friend or family member to stay with you for the first 24 hours after your surgery. 3. Do not have anything to eat or drink (including water, gum, mints, coffee, juice) after midnight 1/14/20?? Carmel Yusuf ? This may not apply to medications prescribed by your physician. ?(Please note below the special instructions with medications to take the morning of your procedure.)    4. We recommend you do not drink any alcoholic beverages for 24 hours before and after your surgery. 5. Contact your surgeons office for instructions on the following medications: non-steroidal anti-inflammatory drugs (i.e. Advil, Aleve), vitamins, and supplements. (Some surgeons will want you to stop these medications prior to surgery and others may allow you to take them)  **If you are currently taking Plavix, Coumadin, Aspirin and/or other blood-thinning agents, contact your surgeon for instructions. ** Your surgeon will partner with the physician prescribing these medications to determine if it is safe to stop or if you need to continue taking. Please do not stop taking these medications without instructions from your surgeon    6. Wear comfortable clothes. Wear glasses instead of contacts. Do not bring any money or jewelry. Please bring picture ID, insurance card, and any prearranged co-payment or hospital payment. Do not wear make-up, particularly mascara the morning of your surgery. Do not wear nail polish, particularly if you are having foot /hand surgery.   Wear your hair loose or down, no ponytails, buns, martir pins or clips. All body piercings must be removed. Please shower with antibacterial soap for three consecutive days before and on the morning of surgery, but do not apply any lotions, powders or deodorants after the shower on the day of surgery. Please use a fresh towels after each shower. Please sleep in clean clothes and change bed linens the night before surgery. Please do not shave for 48 hours prior to surgery. Shaving of the face is acceptable. 7. You should understand that if you do not follow these instructions your surgery may be cancelled. If your physical condition changes (I.e. fever, cold or flu) please contact your surgeon as soon as possible. 8. It is important that you be on time. If a situation occurs where you may be late, please call [phone] (OR Holding Area). 9. If you have any questions and or problems, please call [phone] (Pre-admission Testing). 10. Your surgery time may be subject to change. You will receive a phone call the evening prior if your time changes. 11.  If having outpatient surgery, you must have someone to drive you here, stay with you during the duration of your stay, and to drive you home at time of discharge. 12.   In an effort to improve the efficiency, privacy, and safety for all of our Pre-op patients visitors are not allowed in the Holding area. Once you arrive and are registered your family/visitors will be asked to remain in the waiting room. The Pre-op staff will get you from the Surgical Waiting Area and will explain to you and your family/visitors that the Pre-op phase is beginning. The staff will answer any questions and provide instructions for tracking of the patient, by use of the existing tracking number and color-coded status board in the waiting room.   At this time the staff will also ask for your designated spokesperson information in the event that the physician or staff need to provide an update or obtain any pertinent information. The designated spokesperson will be notified if the physician needs to speak to family during the pre-operative phase. If at any time your family/visitors has questions or concerns they may approach the volunteer desk in the waiting area for assistance. Special Instructions:use & bring inhaler. Coumadin per surgeon-last dose 1/9/20. TAKE ALL MEDICATIONS DAY OF SURGERY EXCEPT:Coumadin,ASA      I understand a pre-operative phone call will be made to verify my surgery time. In the event that I am not available, I give permission for a message to be left on my answering service and/or with another person?   {yes @ [phone].         ___________________      __________   _________    (Signature of Patient)             (Witness)                (Date and Time)

## 2020-01-10 NOTE — PERIOP NOTES
Left vm for patient stating that note from dr. Erica Iyer states to only hold x 3 days prior to surgery. Asked her tcb with any questions, number provided.

## 2020-01-14 ENCOUNTER — ANESTHESIA EVENT (OUTPATIENT)
Dept: SURGERY | Age: 50
End: 2020-01-14
Payer: COMMERCIAL

## 2020-01-15 ENCOUNTER — HOSPITAL ENCOUNTER (OUTPATIENT)
Age: 50
Setting detail: OUTPATIENT SURGERY
Discharge: HOME | End: 2020-01-15
Attending: SURGERY | Admitting: SURGERY
Payer: COMMERCIAL

## 2020-01-15 ENCOUNTER — ANESTHESIA (OUTPATIENT)
Dept: SURGERY | Age: 50
End: 2020-01-15
Payer: COMMERCIAL

## 2020-01-15 VITALS
HEART RATE: 57 BPM | DIASTOLIC BLOOD PRESSURE: 56 MMHG | BODY MASS INDEX: 41.07 KG/M2 | WEIGHT: 246.47 LBS | OXYGEN SATURATION: 97 % | HEIGHT: 65 IN | RESPIRATION RATE: 16 BRPM | SYSTOLIC BLOOD PRESSURE: 112 MMHG | TEMPERATURE: 98.3 F

## 2020-01-15 LAB
ANION GAP BLD CALC-SCNC: 14 MMOL/L (ref 10–20)
BUN BLD-MCNC: 14 MG/DL (ref 9–20)
CA-I BLD-MCNC: 1.22 MMOL/L (ref 1.12–1.32)
CHLORIDE BLD-SCNC: 105 MMOL/L (ref 98–107)
CO2 BLD-SCNC: 26 MMOL/L (ref 21–32)
CREAT BLD-MCNC: 0.7 MG/DL (ref 0.6–1.3)
GLUCOSE BLD-MCNC: 94 MG/DL (ref 65–100)
HCT VFR BLD CALC: 42 % (ref 35–47)
INR BLD: 1.1 (ref 0.9–1.2)
POTASSIUM BLD-SCNC: 3.7 MMOL/L (ref 3.5–5.1)
SERVICE CMNT-IMP: NORMAL
SODIUM BLD-SCNC: 140 MMOL/L (ref 136–145)

## 2020-01-15 PROCEDURE — 74011000250 HC RX REV CODE- 250: Performed by: NURSE ANESTHETIST, CERTIFIED REGISTERED

## 2020-01-15 PROCEDURE — 74011250637 HC RX REV CODE- 250/637: Performed by: SURGERY

## 2020-01-15 PROCEDURE — 80047 BASIC METABLC PNL IONIZED CA: CPT

## 2020-01-15 PROCEDURE — 85610 PROTHROMBIN TIME: CPT

## 2020-01-15 PROCEDURE — 77030026438 HC STYL ET INTUB CARD -A: Performed by: ANESTHESIOLOGY

## 2020-01-15 PROCEDURE — 76210000020 HC REC RM PH II FIRST 0.5 HR: Performed by: SURGERY

## 2020-01-15 PROCEDURE — 77030019908 HC STETH ESOPH SIMS -A: Performed by: ANESTHESIOLOGY

## 2020-01-15 PROCEDURE — 77030008684 HC TU ET CUF COVD -B: Performed by: ANESTHESIOLOGY

## 2020-01-15 PROCEDURE — 74011250636 HC RX REV CODE- 250/636: Performed by: NURSE ANESTHETIST, CERTIFIED REGISTERED

## 2020-01-15 PROCEDURE — 74011250636 HC RX REV CODE- 250/636: Performed by: ANESTHESIOLOGY

## 2020-01-15 PROCEDURE — 77030018836 HC SOL IRR NACL ICUM -A: Performed by: SURGERY

## 2020-01-15 PROCEDURE — 76010000138 HC OR TIME 0.5 TO 1 HR: Performed by: SURGERY

## 2020-01-15 PROCEDURE — 74011000250 HC RX REV CODE- 250: Performed by: SURGERY

## 2020-01-15 PROCEDURE — 76060000032 HC ANESTHESIA 0.5 TO 1 HR: Performed by: SURGERY

## 2020-01-15 PROCEDURE — 77030013079 HC BLNKT BAIR HGGR 3M -A: Performed by: ANESTHESIOLOGY

## 2020-01-15 PROCEDURE — 76210000006 HC OR PH I REC 0.5 TO 1 HR: Performed by: SURGERY

## 2020-01-15 RX ORDER — GLYCOPYRROLATE 0.2 MG/ML
INJECTION INTRAMUSCULAR; INTRAVENOUS AS NEEDED
Status: DISCONTINUED | OUTPATIENT
Start: 2020-01-15 | End: 2020-01-15 | Stop reason: HOSPADM

## 2020-01-15 RX ORDER — NEOSTIGMINE METHYLSULFATE 1 MG/ML
INJECTION INTRAVENOUS AS NEEDED
Status: DISCONTINUED | OUTPATIENT
Start: 2020-01-15 | End: 2020-01-15 | Stop reason: HOSPADM

## 2020-01-15 RX ORDER — ONDANSETRON 2 MG/ML
INJECTION INTRAMUSCULAR; INTRAVENOUS AS NEEDED
Status: DISCONTINUED | OUTPATIENT
Start: 2020-01-15 | End: 2020-01-15 | Stop reason: HOSPADM

## 2020-01-15 RX ORDER — MORPHINE SULFATE 10 MG/ML
2 INJECTION, SOLUTION INTRAMUSCULAR; INTRAVENOUS
Status: DISCONTINUED | OUTPATIENT
Start: 2020-01-15 | End: 2020-01-15 | Stop reason: HOSPADM

## 2020-01-15 RX ORDER — BACITRACIN ZINC 500 UNIT/G
OINTMENT (GRAM) TOPICAL ONCE
Status: COMPLETED | OUTPATIENT
Start: 2020-01-15 | End: 2020-01-15

## 2020-01-15 RX ORDER — MIDAZOLAM HYDROCHLORIDE 1 MG/ML
INJECTION, SOLUTION INTRAMUSCULAR; INTRAVENOUS AS NEEDED
Status: DISCONTINUED | OUTPATIENT
Start: 2020-01-15 | End: 2020-01-15 | Stop reason: HOSPADM

## 2020-01-15 RX ORDER — ONDANSETRON 2 MG/ML
4 INJECTION INTRAMUSCULAR; INTRAVENOUS AS NEEDED
Status: DISCONTINUED | OUTPATIENT
Start: 2020-01-15 | End: 2020-01-15 | Stop reason: HOSPADM

## 2020-01-15 RX ORDER — SUCCINYLCHOLINE CHLORIDE 20 MG/ML
INJECTION INTRAMUSCULAR; INTRAVENOUS AS NEEDED
Status: DISCONTINUED | OUTPATIENT
Start: 2020-01-15 | End: 2020-01-15 | Stop reason: HOSPADM

## 2020-01-15 RX ORDER — DEXAMETHASONE SODIUM PHOSPHATE 4 MG/ML
INJECTION, SOLUTION INTRA-ARTICULAR; INTRALESIONAL; INTRAMUSCULAR; INTRAVENOUS; SOFT TISSUE AS NEEDED
Status: DISCONTINUED | OUTPATIENT
Start: 2020-01-15 | End: 2020-01-15 | Stop reason: HOSPADM

## 2020-01-15 RX ORDER — ROCURONIUM BROMIDE 10 MG/ML
INJECTION, SOLUTION INTRAVENOUS AS NEEDED
Status: DISCONTINUED | OUTPATIENT
Start: 2020-01-15 | End: 2020-01-15 | Stop reason: HOSPADM

## 2020-01-15 RX ORDER — LIDOCAINE HYDROCHLORIDE 20 MG/ML
INJECTION, SOLUTION EPIDURAL; INFILTRATION; INTRACAUDAL; PERINEURAL AS NEEDED
Status: DISCONTINUED | OUTPATIENT
Start: 2020-01-15 | End: 2020-01-15 | Stop reason: HOSPADM

## 2020-01-15 RX ORDER — MIDAZOLAM HYDROCHLORIDE 1 MG/ML
1 INJECTION, SOLUTION INTRAMUSCULAR; INTRAVENOUS AS NEEDED
Status: DISCONTINUED | OUTPATIENT
Start: 2020-01-15 | End: 2020-01-15 | Stop reason: HOSPADM

## 2020-01-15 RX ORDER — SODIUM CHLORIDE, SODIUM LACTATE, POTASSIUM CHLORIDE, CALCIUM CHLORIDE 600; 310; 30; 20 MG/100ML; MG/100ML; MG/100ML; MG/100ML
25 INJECTION, SOLUTION INTRAVENOUS CONTINUOUS
Status: DISCONTINUED | OUTPATIENT
Start: 2020-01-15 | End: 2020-01-15 | Stop reason: HOSPADM

## 2020-01-15 RX ORDER — LIDOCAINE HYDROCHLORIDE 10 MG/ML
0.1 INJECTION, SOLUTION EPIDURAL; INFILTRATION; INTRACAUDAL; PERINEURAL AS NEEDED
Status: DISCONTINUED | OUTPATIENT
Start: 2020-01-15 | End: 2020-01-15 | Stop reason: HOSPADM

## 2020-01-15 RX ORDER — FENTANYL CITRATE 50 UG/ML
INJECTION, SOLUTION INTRAMUSCULAR; INTRAVENOUS AS NEEDED
Status: DISCONTINUED | OUTPATIENT
Start: 2020-01-15 | End: 2020-01-15 | Stop reason: HOSPADM

## 2020-01-15 RX ORDER — FENTANYL CITRATE 50 UG/ML
25 INJECTION, SOLUTION INTRAMUSCULAR; INTRAVENOUS
Status: DISCONTINUED | OUTPATIENT
Start: 2020-01-15 | End: 2020-01-15 | Stop reason: HOSPADM

## 2020-01-15 RX ORDER — PROPOFOL 10 MG/ML
INJECTION, EMULSION INTRAVENOUS AS NEEDED
Status: DISCONTINUED | OUTPATIENT
Start: 2020-01-15 | End: 2020-01-15 | Stop reason: HOSPADM

## 2020-01-15 RX ORDER — LIDOCAINE HYDROCHLORIDE AND EPINEPHRINE 10; 10 MG/ML; UG/ML
1.5 INJECTION, SOLUTION INFILTRATION; PERINEURAL ONCE
Status: COMPLETED | OUTPATIENT
Start: 2020-01-15 | End: 2020-01-15

## 2020-01-15 RX ORDER — FENTANYL CITRATE 50 UG/ML
50 INJECTION, SOLUTION INTRAMUSCULAR; INTRAVENOUS AS NEEDED
Status: DISCONTINUED | OUTPATIENT
Start: 2020-01-15 | End: 2020-01-15 | Stop reason: HOSPADM

## 2020-01-15 RX ADMIN — DEXAMETHASONE SODIUM PHOSPHATE 4 MG: 4 INJECTION, SOLUTION INTRAMUSCULAR; INTRAVENOUS at 08:22

## 2020-01-15 RX ADMIN — SUCCINYLCHOLINE CHLORIDE 140 MG: 20 INJECTION, SOLUTION INTRAMUSCULAR; INTRAVENOUS at 08:11

## 2020-01-15 RX ADMIN — PROPOFOL 160 MG: 10 INJECTION, EMULSION INTRAVENOUS at 08:11

## 2020-01-15 RX ADMIN — GLYCOPYRROLATE 0.2 MG: 0.2 INJECTION, SOLUTION INTRAMUSCULAR; INTRAVENOUS at 08:36

## 2020-01-15 RX ADMIN — ROCURONIUM BROMIDE 10 MG: 10 INJECTION INTRAVENOUS at 08:16

## 2020-01-15 RX ADMIN — ONDANSETRON HYDROCHLORIDE 4 MG: 2 INJECTION, SOLUTION INTRAMUSCULAR; INTRAVENOUS at 08:22

## 2020-01-15 RX ADMIN — Medication 3 AMPULE: at 07:49

## 2020-01-15 RX ADMIN — ROCURONIUM BROMIDE 10 MG: 10 INJECTION INTRAVENOUS at 08:11

## 2020-01-15 RX ADMIN — LIDOCAINE HYDROCHLORIDE 100 MG: 20 INJECTION, SOLUTION INTRAVENOUS at 08:11

## 2020-01-15 RX ADMIN — FENTANYL CITRATE 100 MCG: 50 INJECTION, SOLUTION INTRAMUSCULAR; INTRAVENOUS at 08:11

## 2020-01-15 RX ADMIN — NEOSTIGMINE METHYLSULFATE 1 MG: 1 INJECTION INTRAVENOUS at 08:36

## 2020-01-15 RX ADMIN — SODIUM CHLORIDE, SODIUM LACTATE, POTASSIUM CHLORIDE, AND CALCIUM CHLORIDE 25 ML/HR: 600; 310; 30; 20 INJECTION, SOLUTION INTRAVENOUS at 07:49

## 2020-01-15 RX ADMIN — MIDAZOLAM HYDROCHLORIDE 2 MG: 1 INJECTION INTRAMUSCULAR; INTRAVENOUS at 08:04

## 2020-01-15 NOTE — OP NOTES
[address]  OPERATIVE REPORT    Name:  Tatiana Murrieta  MR#:  [id number]  :  1970  ACCOUNT #:  [de-identified]  DATE OF SERVICE:  01/15/2020    PREOPERATIVE DIAGNOSIS:  Ileoanal stricture. POSTOPERATIVE DIAGNOSES:  Ileoanal stricture and fecal impaction. PROCEDURE PERFORMED:  Exam under anesthesia, dilation of Ileoanal stricture and fecal disimpaction. SURGEON:  Ray Fermin MD    ASSISTANT:  None. ANESTHESIA:  General.    COMPLICATIONS:  None. SPECIMENS REMOVED:  None. IMPLANTS:  None. ESTIMATED BLOOD LOSS:  Minimal.    FINDINGS:  Mild stricture at the ileoanal anastomosis, dilated up to #20 Hegar. INDICATIONS:  This is a 67-year-old female who had a history of J-pouch procedure and she presents with recurrent stricture at the ileoanal anastomosis. She has previously been dilated. I explained all the risks and benefits of the procedure including, but not limited to, bleeding, infection, leak, and recurrence. She understood all the risks and elected to proceed. DESCRIPTION OF PROCEDURE:  The patient was brought to the operating suite and placed in the supine position. General endotracheal anesthesia was induced. She was then flipped over in prone jackknife position. Her buttocks were taped apart and her perianal area was prepped and draped in the usual sterile fashion. A time-out was performed indicating the correct patient and procedure to be performed as per protocol. I began by dilating the ileoanal stricture, which was only mildly narrowed up to a #20 Hegar dilator, this went in fairly easily. I was able to then place a very small Fansler anoscope in the anal canal and immediately upon doing so there was noted to be a large amount of retained feces in the pouch. This required disimpaction. I used saline syringe to liquefy the stool and allowed it to come out. There was also particulate matter, which had to be disimpacted as well.   Once this was completed, I was able to visualize the pouch mucosa that appeared normal.  No evidence of inflammation or pouchitis. I visualized this using a small anoscope. I did not think that there was any need for a flexible endoscopy as she just recently had one. With the procedure completed, a dry dressing was placed. The patient was awaken, extubated, and taken to the recovery room in stable condition.       Cole Kothari MD      [id number]  D:  01/15/2020 8:40  T:  01/15/2020 12:53  JOB #:  [id number]

## 2020-01-15 NOTE — ANESTHESIA POSTPROCEDURE EVALUATION
Procedure(s):  EXAM UNDER ANESTHESIA, DILATION OF ILEOANAL STRICTURE, FECAL DISIMPACTION. general    Anesthesia Post Evaluation        Patient location during evaluation: PACU  Note status: Adequate. Level of consciousness: responsive to verbal stimuli and sleepy but conscious  Pain management: satisfactory to patient  Airway patency: patent  Anesthetic complications: no  Cardiovascular status: acceptable  Respiratory status: acceptable  Hydration status: acceptable  Comments: +Post-Anesthesia Evaluation and Assessment    Patient: Jennifer White MRN: [id number]  SSN: [id number]   YOB: 1970  Age: 44 y.o. Sex: female      Cardiovascular Function/Vital Signs    /61   Pulse (!) 55   Temp 36.6 °C (97.8 °F)   Resp 13   Ht 5' 5\" (1.651 m)   Wt 111.8 kg (246 lb 7.6 oz)   SpO2 97%   BMI 41.02 kg/m²     Patient is status post Procedure(s):  EXAM UNDER ANESTHESIA, DILATION OF ILEOANAL STRICTURE, FECAL DISIMPACTION. Nausea/Vomiting: Controlled. Postoperative hydration reviewed and adequate. Pain:  Pain Scale 1: Numeric (0 - 10) (01/15/20 0930)  Pain Intensity 1: 0 (01/15/20 0930)   Managed. Neurological Status:   Neuro (WDL): Exceptions to WDL (01/15/20 0855)   At baseline. Mental Status and Level of Consciousness: Arousable. Pulmonary Status:   O2 Device: Room air (01/15/20 0930)   Adequate oxygenation and airway patent. Complications related to anesthesia: None    Post-anesthesia assessment completed. No concerns. Signed By: Esther Hough MD    1/15/2020  Post anesthesia nausea and vomiting:  controlled      Vitals Value Taken Time   /61 1/15/2020  9:45 AM   Temp 36.6 °C (97.8 °F) 1/15/2020  8:52 AM   Pulse 57 1/15/2020  9:48 AM   Resp 14 1/15/2020  9:48 AM   SpO2 99 % 1/15/2020  9:48 AM   Vitals shown include unvalidated device data.

## 2020-01-15 NOTE — PERIOP NOTES
5467: Report received from Maurilio Chatterjee RN. SCT assisted patient getting dressed. Discharge instructions reviewed with patient and caregiver. Both parties were given opportunities to ask questions and voice concerns. Both parties denied any further questions or concerns at the time of discharge. Patient A&O x4. Respirations even, unlabored. Skin warm, dry; no drainage from surgical site noted at the time of discharge. Patient dressed, given belongings and escorted to car via wheelchair. PIV Removed.

## 2020-01-15 NOTE — PERIOP NOTES
Handoff Report from Operating Room to PACU    Report received from Genesis Turner RN and GIO Duarte regarding Yara Jaime. Surgeon(s):  Jaquan Chery MD  And Procedure(s) (LRB):  EXAM UNDER ANESTHESIA, DILATION OF ILEOANAL STRICTURE, FECAL DISIMPACTION (N/A)  confirmed   with allergies and dressings discussed. Anesthesia type, drugs, patient history, complications, estimated blood loss, vital signs, intake and output, and last pain medication, lines and temperature were reviewed.

## 2020-01-15 NOTE — ANESTHESIA PREPROCEDURE EVALUATION
Anesthetic History   No history of anesthetic complications            Review of Systems / Medical History  Patient summary reviewed, nursing notes reviewed and pertinent labs reviewed    Pulmonary            Asthma        Neuro/Psych         Psychiatric history     Cardiovascular  Within defined limits                Exercise tolerance: >4 METS     GI/Hepatic/Renal     GERD      PUD     Endo/Other        Morbid obesity and arthritis     Other Findings   Comments: 2016  Per pt Right leg DVT    UC         Physical Exam    Airway  Mallampati: III  TM Distance: 4 - 6 cm  Neck ROM: normal range of motion   Mouth opening: Normal     Cardiovascular  Regular rate and rhythm,  S1 and S2 normal,  no murmur, click, rub, or gallop             Dental  No notable dental hx       Pulmonary  Breath sounds clear to auscultation               Abdominal  GI exam deferred       Other Findings            Anesthetic Plan    ASA: 3  Anesthesia type: general    Monitoring Plan: BIS      Induction: Intravenous  Anesthetic plan and risks discussed with: Patient
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
## 2020-01-15 NOTE — BRIEF OP NOTE
BRIEF OPERATIVE NOTE    Date of Procedure: 1/15/2020   Preoperative Diagnosis: ILEOANAL STRICTURE  Postoperative Diagnosis: Ileoanal stricture, fecal impaction    Procedure(s):  EXAM UNDER ANESTHESIA, DILATION OF ILEOANAL STRICTURE, FECAL DISIMPACTION  Surgeon(s) and Role:     * Jasmeet Contreras MD - Primary         Surgical Assistant: None    Surgical Staff:  Circ-1: Alexander Fragoso RN  Scrub Tech-1: Shaina MARIE  Event Time In Time Out   Incision Start 4402    Incision Close 3205      Anesthesia: General   Estimated Blood Loss: Minimal  Specimens: * No specimens in log *   Findings: Mild stricture of ileoanal anastomosis.   Dilated to #19 [place]   Complications: None  Implants: * No implants in log *

## 2020-01-15 NOTE — ROUTINE PROCESS
Patient: Loly Hickman MRN: [id number]  SSN: [id number]   YOB: 1970  Age: 44 y.o. Sex: female     Patient is status post Procedure(s):  EXAM UNDER ANESTHESIA, DILATION OF ILEOANAL STRICTURE, FECAL DISIMPACTION. Surgeon(s) and Role:     * Pam Alvarado MD - Primary    Local/Dose/Irrigation:  30 ML LOCAL INJECN ANAL LUDWIN                  Peripheral IV 01/15/20 Left Forearm (Active)   Site Assessment Clean, dry, & intact 1/15/2020  7:48 AM   Phlebitis Assessment 0 1/15/2020  7:48 AM   Infiltration Assessment 0 1/15/2020  7:48 AM   Dressing Status Clean, dry, & intact 1/15/2020  7:48 AM   Dressing Type Tape;Transparent 1/15/2020  7:48 AM   Hub Color/Line Status Pink; Infusing;Patent 1/15/2020  7:48 AM                           Dressing/Packing:  Wound Anus-Dressing Type: 4 x 4(hypafix tape) (01/15/20 6946)    Splint/Cast:  ]    Other:  NONE

## 2020-01-15 NOTE — DISCHARGE INSTRUCTIONS
Jw Stevens MD, [address] [facility] Milagros Kilgore MD, [address] Paula Otoole MD, FACS  Lyn Barron. MD Suzanna Foss Do, MD Reynolds Pulse, MD Jolly Sou, MD    [facility] Discharge Instructions for Ambulatory 23-Hour Surgery Patients    1. Advance as tolerated. 2. Take miralax daily to keep stool soft  3. Remove dressing at next bowel movement. 4. Take 1 sitz baths today (warm water baths for 15-20 minutes). Begin four (4) times a day the day after surgery. 5. Apply Nupercainal Ointment (does not need a prescription) to the anal area after sitz baths, place a dry 4x4 gauze over the are between the buttocks. 6. No lifting any objects weighing more than 15 pounds. 7. No sitting more than 45 minutes without standing, walking, or lying down. 8. You may walk as desired. 9.  Please schedule an appointment in the office within 4 weeks. Call ahead to schedule your appointment [phone]. 10.  Call Exchange [phone] if you have any problems or questions after hours. 11.  Expect slight bright red blood up to four (4) weeks from surgery. 12.  If no bowel movement by tomorrow morning, take 30cc (1 tablespoon) of Milk of Magnesia. Repeat if no results. If still no bowel movement the next day, then call the office. DISCHARGE SUMMARY from Nurse    PATIENT INSTRUCTIONS:    After general anesthesia or intravenous sedation, for 24 hours or while taking prescription Narcotics:  · Limit your activities  · Do not drive and operate hazardous machinery  · Do not make important personal or business decisions  · Do  not drink alcoholic beverages  · If you have not urinated within 8 hours after discharge, please contact your surgeon on call.     Report the following to your surgeon:  · Excessive pain, swelling, redness or odor of or around the surgical area  · Temperature over 100.5  · Nausea and vomiting lasting longer than 4 hours or if unable to take medications  · Any signs of decreased circulation or nerve impairment to extremity: change in color, persistent  numbness, tingling, coldness or increase pain  · Any questions      *  Please give a list of your current medications to your Primary Care Provider. *  Please update this list whenever your medications are discontinued, doses are      changed, or new medications (including over-the-counter products) are added. *  Please carry medication information at all times in case of emergency situations. These are general instructions for a healthy lifestyle:    No smoking/ No tobacco products/ Avoid exposure to second hand smoke  Surgeon General's Warning:  Quitting smoking now greatly reduces serious risk to your health. Obesity, smoking, and sedentary lifestyle greatly increases your risk for illness    A healthy diet, regular physical exercise & weight monitoring are important for maintaining a healthy lifestyle    You may be retaining fluid if you have a history of heart failure or if you experience any of the following symptoms:  Weight gain of 3 pounds or more overnight or 5 pounds in a week, increased swelling in our hands or feet or shortness of breath while lying flat in bed. Please call your doctor as soon as you notice any of these symptoms; do not wait until your next office visit. The discharge information has been reviewed with the patient and parent. The patient and parent verbalized understanding. Discharge medications reviewed with the patient and mother and appropriate educational materials and side effects teaching were provided.   ___________________________________________________________________________________________________________________________________

## 2020-01-23 ENCOUNTER — TELEPHONE (OUTPATIENT)
Dept: FAMILY MEDICINE CLINIC | Age: 50
End: 2020-01-23

## 2020-01-23 NOTE — TELEPHONE ENCOUNTER
----- Message from Radha Amezcua sent at 1/23/2020  8:33 AM EST -----  Regarding: Dr. Jose Luis Lott telephone  Appointment not available    Caller's first and last name and relationship to patient (if not the patient):      Best contact number: [phone]      Preferred date and time: Monday 1/27/20 in the morning       Scheduled appointment date and time: Tuesday 1/28/20 at 3:40pm      Reason for appointment: PT/INR 2 week follow-up      Details to clarify the request:      Radha Amezcua

## 2020-01-27 ENCOUNTER — OFFICE VISIT (OUTPATIENT)
Dept: FAMILY MEDICINE CLINIC | Age: 50
End: 2020-01-27

## 2020-01-27 ENCOUNTER — HOSPITAL ENCOUNTER (OUTPATIENT)
Dept: ULTRASOUND IMAGING | Age: 50
Discharge: HOME | End: 2020-01-27
Attending: FAMILY MEDICINE
Payer: COMMERCIAL

## 2020-01-27 VITALS
SYSTOLIC BLOOD PRESSURE: 130 MMHG | WEIGHT: 255.4 LBS | OXYGEN SATURATION: 98 % | RESPIRATION RATE: 16 BRPM | HEIGHT: 65 IN | BODY MASS INDEX: 42.55 KG/M2 | HEART RATE: 76 BPM | TEMPERATURE: 97.7 F | DIASTOLIC BLOOD PRESSURE: 80 MMHG

## 2020-01-27 DIAGNOSIS — Z79.01 MONITORING FOR LONG-TERM ANTICOAGULANT USE: ICD-10-CM

## 2020-01-27 DIAGNOSIS — K62.4 ANAL STRICTURE: ICD-10-CM

## 2020-01-27 DIAGNOSIS — I82.5Z1 CHRONIC DEEP VEIN THROMBOSIS (DVT) OF DISTAL VEIN OF RIGHT LOWER EXTREMITY (HCC): ICD-10-CM

## 2020-01-27 DIAGNOSIS — D68.59 HYPERCOAGULABLE STATE (HCC): ICD-10-CM

## 2020-01-27 DIAGNOSIS — Z51.81 MONITORING FOR LONG-TERM ANTICOAGULANT USE: ICD-10-CM

## 2020-01-27 DIAGNOSIS — Z86.718 HISTORY OF RECURRENT DEEP VEIN THROMBOSIS (DVT): ICD-10-CM

## 2020-01-27 DIAGNOSIS — I82.5Z1 CHRONIC DEEP VEIN THROMBOSIS (DVT) OF DISTAL VEIN OF RIGHT LOWER EXTREMITY (HCC): Primary | ICD-10-CM

## 2020-01-27 DIAGNOSIS — M79.89 PAIN AND SWELLING OF RIGHT LOWER LEG: ICD-10-CM

## 2020-01-27 DIAGNOSIS — M79.661 PAIN AND SWELLING OF RIGHT LOWER LEG: ICD-10-CM

## 2020-01-27 LAB
INR BLD: 3.3
PT POC: NORMAL
VALID INTERNAL CONTROL?: YES

## 2020-01-27 PROCEDURE — 93971 EXTREMITY STUDY: CPT

## 2020-01-27 NOTE — PROGRESS NOTES
Chief Complaint   Patient presents with    Anticoagulation     PT/INR   1. Have you been to the ER, urgent care clinic since your last visit? Hospitalized since your last visit? No    2. Have you seen or consulted any other health care providers outside of the [address] [place] [address] since your last visit? Include any pap smears or colon screening.  No   Discuss right lower leg pain
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
## 2020-02-04 ENCOUNTER — OFFICE VISIT (OUTPATIENT)
Dept: FAMILY MEDICINE CLINIC | Age: 50
End: 2020-02-04

## 2020-02-04 VITALS
DIASTOLIC BLOOD PRESSURE: 78 MMHG | BODY MASS INDEX: 42.38 KG/M2 | WEIGHT: 254.4 LBS | HEART RATE: 75 BPM | SYSTOLIC BLOOD PRESSURE: 134 MMHG | RESPIRATION RATE: 16 BRPM | TEMPERATURE: 97 F | HEIGHT: 65 IN

## 2020-02-04 DIAGNOSIS — Z51.81 MONITORING FOR LONG-TERM ANTICOAGULANT USE: ICD-10-CM

## 2020-02-04 DIAGNOSIS — D68.59 HYPERCOAGULABLE STATE (HCC): Primary | ICD-10-CM

## 2020-02-04 DIAGNOSIS — Z79.01 MONITORING FOR LONG-TERM ANTICOAGULANT USE: ICD-10-CM

## 2020-02-04 DIAGNOSIS — Z86.718 HISTORY OF RECURRENT DEEP VEIN THROMBOSIS (DVT): ICD-10-CM

## 2020-02-04 LAB
INR BLD: 3.2
PT POC: NORMAL
VALID INTERNAL CONTROL?: YES
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
## 2020-02-04 NOTE — PROGRESS NOTES
Chief Complaint   Patient presents with    Anticoagulation     PT/INR   1. Have you been to the ER, urgent care clinic since your last visit? Hospitalized since your last visit? No    2. Have you seen or consulted any other health care providers outside of the [address] [place] [address] since your last visit? Include any pap smears or colon screening.  No   Insect bite right forearm

## 2020-02-21 DIAGNOSIS — E66.01 MORBID OBESITY, UNSPECIFIED OBESITY TYPE (HCC): ICD-10-CM

## 2020-02-21 DIAGNOSIS — R73.02 IGT (IMPAIRED GLUCOSE TOLERANCE): ICD-10-CM

## 2020-02-25 ENCOUNTER — OFFICE VISIT (OUTPATIENT)
Dept: FAMILY MEDICINE CLINIC | Age: 50
End: 2020-02-25

## 2020-02-25 VITALS
WEIGHT: 253.4 LBS | DIASTOLIC BLOOD PRESSURE: 80 MMHG | RESPIRATION RATE: 16 BRPM | HEART RATE: 78 BPM | TEMPERATURE: 98.4 F | BODY MASS INDEX: 42.22 KG/M2 | OXYGEN SATURATION: 99 % | HEIGHT: 65 IN | SYSTOLIC BLOOD PRESSURE: 122 MMHG

## 2020-02-25 DIAGNOSIS — Z51.81 MONITORING FOR LONG-TERM ANTICOAGULANT USE: ICD-10-CM

## 2020-02-25 DIAGNOSIS — Z79.01 MONITORING FOR LONG-TERM ANTICOAGULANT USE: ICD-10-CM

## 2020-02-25 DIAGNOSIS — D68.59 HYPERCOAGULABLE STATE (HCC): Primary | ICD-10-CM

## 2020-02-25 DIAGNOSIS — Z86.718 HISTORY OF RECURRENT DEEP VEIN THROMBOSIS (DVT): ICD-10-CM

## 2020-02-25 LAB
INR BLD: 1.5
PT POC: NORMAL
VALID INTERNAL CONTROL?: YES
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
## 2020-02-25 NOTE — PROGRESS NOTES
Chief Complaint   Patient presents with    Anticoagulation     PT/INR   1. Have you been to the ER, urgent care clinic since your last visit? Hospitalized since your last visit? No    2. Have you seen or consulted any other health care providers outside of the [address] [place] [address] since your last visit? Include any pap smears or colon screening.  No   No concerns voiced

## 2020-03-09 ENCOUNTER — OFFICE VISIT (OUTPATIENT)
Dept: ENDOCRINOLOGY | Age: 50
End: 2020-03-09

## 2020-03-09 VITALS
DIASTOLIC BLOOD PRESSURE: 85 MMHG | HEIGHT: 65 IN | HEART RATE: 75 BPM | WEIGHT: 257.8 LBS | SYSTOLIC BLOOD PRESSURE: 141 MMHG | BODY MASS INDEX: 42.95 KG/M2

## 2020-03-09 DIAGNOSIS — E66.01 MORBID OBESITY, UNSPECIFIED OBESITY TYPE (HCC): Primary | ICD-10-CM

## 2020-03-09 DIAGNOSIS — R73.02 IGT (IMPAIRED GLUCOSE TOLERANCE): ICD-10-CM

## 2020-03-09 RX ORDER — PHENTERMINE HYDROCHLORIDE 37.5 MG/1
TABLET ORAL
Qty: 100 TAB | Refills: 1 | Status: SHIPPED | OUTPATIENT
Start: 2020-03-09 | End: 2020-07-06 | Stop reason: SDUPTHER
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
## 2020-03-12 ENCOUNTER — OFFICE VISIT (OUTPATIENT)
Dept: FAMILY MEDICINE CLINIC | Age: 50
End: 2020-03-12

## 2020-03-12 VITALS
DIASTOLIC BLOOD PRESSURE: 76 MMHG | TEMPERATURE: 97.9 F | BODY MASS INDEX: 43.25 KG/M2 | HEIGHT: 65 IN | SYSTOLIC BLOOD PRESSURE: 128 MMHG | WEIGHT: 259.6 LBS | HEART RATE: 78 BPM | OXYGEN SATURATION: 100 % | RESPIRATION RATE: 16 BRPM

## 2020-03-12 DIAGNOSIS — M79.661 PAIN AND SWELLING OF RIGHT LOWER LEG: ICD-10-CM

## 2020-03-12 DIAGNOSIS — Z86.718 HISTORY OF RECURRENT DEEP VEIN THROMBOSIS (DVT): ICD-10-CM

## 2020-03-12 DIAGNOSIS — Z51.81 MONITORING FOR LONG-TERM ANTICOAGULANT USE: ICD-10-CM

## 2020-03-12 DIAGNOSIS — Z79.01 MONITORING FOR LONG-TERM ANTICOAGULANT USE: ICD-10-CM

## 2020-03-12 DIAGNOSIS — R79.1 SUPRATHERAPEUTIC INR: ICD-10-CM

## 2020-03-12 DIAGNOSIS — M79.89 PAIN AND SWELLING OF RIGHT LOWER LEG: ICD-10-CM

## 2020-03-12 DIAGNOSIS — D68.59 HYPERCOAGULABLE STATE (HCC): Primary | ICD-10-CM

## 2020-03-12 LAB
INR BLD: 5.9
PT POC: NORMAL
VALID INTERNAL CONTROL?: YES
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
## 2020-03-12 NOTE — PROGRESS NOTES
Chief Complaint   Patient presents with    Anticoagulation     PT/INR   1. Have you been to the ER, urgent care clinic since your last visit? Hospitalized since your last visit? No    2. Have you seen or consulted any other health care providers outside of the [address] [place] [address] since your last visit? Include any pap smears or colon screening.  Yes Where: Rheumatologist  Discuss right leg swelling and pain

## 2020-03-12 NOTE — PATIENT INSTRUCTIONS
Take Lasix 40 mg 2x daily and restart the potassium twice daily. As your swelling improves, your pain should improve.

## 2020-03-19 ENCOUNTER — OFFICE VISIT (OUTPATIENT)
Dept: FAMILY MEDICINE CLINIC | Age: 50
End: 2020-03-19

## 2020-03-19 VITALS
RESPIRATION RATE: 16 BRPM | DIASTOLIC BLOOD PRESSURE: 80 MMHG | HEART RATE: 60 BPM | BODY MASS INDEX: 42.99 KG/M2 | WEIGHT: 258 LBS | SYSTOLIC BLOOD PRESSURE: 132 MMHG | TEMPERATURE: 97.8 F | HEIGHT: 65 IN

## 2020-03-19 DIAGNOSIS — Z51.81 MONITORING FOR LONG-TERM ANTICOAGULANT USE: ICD-10-CM

## 2020-03-19 DIAGNOSIS — Z86.718 HISTORY OF RECURRENT DEEP VEIN THROMBOSIS (DVT): ICD-10-CM

## 2020-03-19 DIAGNOSIS — D68.59 HYPERCOAGULABLE STATE (HCC): Primary | ICD-10-CM

## 2020-03-19 DIAGNOSIS — Z79.01 MONITORING FOR LONG-TERM ANTICOAGULANT USE: ICD-10-CM

## 2020-03-19 LAB
INR BLD: 2.3
PT POC: NORMAL
VALID INTERNAL CONTROL?: YES

## 2020-03-19 NOTE — PROGRESS NOTES
Chief Complaint   Patient presents with    Anticoagulation     PT/INR   1. Have you been to the ER, urgent care clinic since your last visit? Hospitalized since your last visit? No    2. Have you seen or consulted any other health care providers outside of the [address] [place] [address] since your last visit? Include any pap smears or colon screening.  No   Discuss night sweats
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
## 2020-03-20 ENCOUNTER — PATIENT MESSAGE (OUTPATIENT)
Dept: FAMILY MEDICINE CLINIC | Age: 50
End: 2020-03-20

## 2020-03-20 DIAGNOSIS — I82.5Z1 CHRONIC DEEP VEIN THROMBOSIS (DVT) OF DISTAL VEIN OF RIGHT LOWER EXTREMITY (HCC): ICD-10-CM

## 2020-03-20 DIAGNOSIS — Z51.81 MONITORING FOR LONG-TERM ANTICOAGULANT USE: ICD-10-CM

## 2020-03-20 DIAGNOSIS — Z79.01 MONITORING FOR LONG-TERM ANTICOAGULANT USE: ICD-10-CM

## 2020-03-20 DIAGNOSIS — D68.59 HYPERCOAGULABLE STATE (HCC): ICD-10-CM

## 2020-03-20 DIAGNOSIS — Z86.718 HISTORY OF RECURRENT DEEP VEIN THROMBOSIS (DVT): ICD-10-CM

## 2020-03-23 RX ORDER — WARFARIN 7.5 MG/1
TABLET ORAL
Qty: 90 TAB | Refills: 1 | Status: SHIPPED | OUTPATIENT
Start: 2020-03-23 | End: 2021-03-24 | Stop reason: ALTCHOICE

## 2020-03-30 ENCOUNTER — PATIENT MESSAGE (OUTPATIENT)
Dept: FAMILY MEDICINE CLINIC | Age: 50
End: 2020-03-30

## 2020-03-30 DIAGNOSIS — Z79.01 MONITORING FOR LONG-TERM ANTICOAGULANT USE: ICD-10-CM

## 2020-03-30 DIAGNOSIS — I87.001 POST-THROMBOTIC SYNDROME OF RIGHT LOWER EXTREMITY: ICD-10-CM

## 2020-03-30 DIAGNOSIS — Z51.81 MONITORING FOR LONG-TERM ANTICOAGULANT USE: ICD-10-CM

## 2020-03-30 DIAGNOSIS — I82.5Z1 CHRONIC DEEP VEIN THROMBOSIS (DVT) OF DISTAL VEIN OF RIGHT LOWER EXTREMITY (HCC): ICD-10-CM

## 2020-03-31 RX ORDER — WARFARIN SODIUM 5 MG/1
TABLET ORAL
Qty: 90 TAB | Refills: 0 | Status: SHIPPED | OUTPATIENT
Start: 2020-03-31 | End: 2020-06-29

## 2020-06-28 DIAGNOSIS — Z79.01 MONITORING FOR LONG-TERM ANTICOAGULANT USE: ICD-10-CM

## 2020-06-28 DIAGNOSIS — I87.001 POST-THROMBOTIC SYNDROME OF RIGHT LOWER EXTREMITY: ICD-10-CM

## 2020-06-28 DIAGNOSIS — Z51.81 MONITORING FOR LONG-TERM ANTICOAGULANT USE: ICD-10-CM

## 2020-06-28 DIAGNOSIS — I82.5Z1 CHRONIC DEEP VEIN THROMBOSIS (DVT) OF DISTAL VEIN OF RIGHT LOWER EXTREMITY (HCC): ICD-10-CM

## 2020-06-29 RX ORDER — WARFARIN SODIUM 5 MG/1
TABLET ORAL
Qty: 90 TAB | Refills: 0 | Status: SHIPPED | OUTPATIENT
Start: 2020-06-29 | End: 2020-12-27

## 2020-07-06 ENCOUNTER — VIRTUAL VISIT (OUTPATIENT)
Dept: ENDOCRINOLOGY | Age: 50
End: 2020-07-06

## 2020-07-06 DIAGNOSIS — R73.02 IGT (IMPAIRED GLUCOSE TOLERANCE): ICD-10-CM

## 2020-07-06 DIAGNOSIS — E66.01 MORBID OBESITY, UNSPECIFIED OBESITY TYPE (HCC): Primary | ICD-10-CM

## 2020-07-06 RX ORDER — TOPIRAMATE 50 MG/1
50 TABLET, FILM COATED ORAL 2 TIMES DAILY
Qty: 60 TAB | Refills: 3 | Status: SHIPPED | OUTPATIENT
Start: 2020-07-06 | End: 2021-05-12

## 2020-07-06 RX ORDER — PHENTERMINE HYDROCHLORIDE 37.5 MG/1
TABLET ORAL
Qty: 100 TAB | Refills: 1 | Status: SHIPPED | OUTPATIENT
Start: 2020-07-06 | End: 2021-02-03
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
## 2020-07-06 NOTE — PROGRESS NOTES
Lab Results   Component Value Date/Time    Hemoglobin A1c 4.8 03/15/2019 07:20 AM    Hemoglobin A1c 4.7 (L) 03/07/2019 10:04 AM    Hemoglobin A1c 5.3 07/22/2016 11:09 AM

## 2020-07-14 ENCOUNTER — HOSPITAL ENCOUNTER (OUTPATIENT)
Dept: MAMMOGRAPHY | Age: 50
Discharge: HOME | End: 2020-07-14
Attending: FAMILY MEDICINE
Payer: COMMERCIAL

## 2020-07-14 DIAGNOSIS — Z12.31 VISIT FOR SCREENING MAMMOGRAM: ICD-10-CM

## 2020-07-14 PROCEDURE — 77067 SCR MAMMO BI INCL CAD: CPT

## 2020-07-22 ENCOUNTER — OFFICE VISIT (OUTPATIENT)
Dept: FAMILY MEDICINE CLINIC | Age: 50
End: 2020-07-22

## 2020-07-22 ENCOUNTER — HOSPITAL ENCOUNTER (OUTPATIENT)
Dept: ULTRASOUND IMAGING | Age: 50
Discharge: HOME | End: 2020-07-22
Attending: FAMILY MEDICINE
Payer: COMMERCIAL

## 2020-07-22 VITALS
HEART RATE: 92 BPM | WEIGHT: 244 LBS | BODY MASS INDEX: 40.65 KG/M2 | SYSTOLIC BLOOD PRESSURE: 128 MMHG | OXYGEN SATURATION: 97 % | RESPIRATION RATE: 16 BRPM | TEMPERATURE: 98.4 F | HEIGHT: 65 IN | DIASTOLIC BLOOD PRESSURE: 74 MMHG

## 2020-07-22 DIAGNOSIS — D68.59 HYPERCOAGULABLE STATE (HCC): Primary | ICD-10-CM

## 2020-07-22 DIAGNOSIS — Z51.81 MONITORING FOR LONG-TERM ANTICOAGULANT USE: ICD-10-CM

## 2020-07-22 DIAGNOSIS — Z79.01 MONITORING FOR LONG-TERM ANTICOAGULANT USE: ICD-10-CM

## 2020-07-22 DIAGNOSIS — I82.4Z1 LOWER LEG DVT (DEEP VENOUS THROMBOEMBOLISM), ACUTE, RIGHT (HCC): ICD-10-CM

## 2020-07-22 DIAGNOSIS — Z86.718 HISTORY OF RECURRENT DEEP VEIN THROMBOSIS (DVT): ICD-10-CM

## 2020-07-22 LAB
INR BLD: 4.6
PT POC: NORMAL
VALID INTERNAL CONTROL?: YES

## 2020-07-22 PROCEDURE — 93971 EXTREMITY STUDY: CPT

## 2020-07-22 NOTE — Clinical Note
Please call patient with Coumadin dose changes-hold dose today and tomorrow. F take only 5 mg on Friday and then resume normal dosing. Also needs follow-up appointment.   Thanks
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
## 2020-07-22 NOTE — PROGRESS NOTES
Chief Complaint   Patient presents with    Leg Swelling     Right   1. Have you been to the ER, urgent care clinic since your last visit? Hospitalized since your last visit? No    2. Have you seen or consulted any other health care providers outside of the [address] [place] [address] since your last visit? Include any pap smears or colon screening.  No   Pain and swelling started on Monday

## 2020-07-23 NOTE — PROGRESS NOTES
Discussed results with ptno DVT noted and pain most likely coming from post thrombotic syndrome. Reviewed coumadin changes since INR was supratherapeutic and to recheck INR next week.

## 2020-07-29 ENCOUNTER — OFFICE VISIT (OUTPATIENT)
Dept: FAMILY MEDICINE CLINIC | Age: 50
End: 2020-07-29

## 2020-07-29 VITALS
HEART RATE: 52 BPM | SYSTOLIC BLOOD PRESSURE: 125 MMHG | BODY MASS INDEX: 40.85 KG/M2 | WEIGHT: 245.2 LBS | HEIGHT: 65 IN | DIASTOLIC BLOOD PRESSURE: 59 MMHG | TEMPERATURE: 97.1 F | OXYGEN SATURATION: 98 % | RESPIRATION RATE: 16 BRPM

## 2020-07-29 DIAGNOSIS — D68.59 HYPERCOAGULABLE STATE (HCC): Primary | ICD-10-CM

## 2020-07-29 DIAGNOSIS — Z86.718 HISTORY OF RECURRENT DEEP VEIN THROMBOSIS (DVT): ICD-10-CM

## 2020-07-29 DIAGNOSIS — Z51.81 MONITORING FOR LONG-TERM ANTICOAGULANT USE: ICD-10-CM

## 2020-07-29 DIAGNOSIS — Z79.01 MONITORING FOR LONG-TERM ANTICOAGULANT USE: ICD-10-CM

## 2020-07-29 DIAGNOSIS — I87.001 POST-THROMBOTIC SYNDROME OF RIGHT LOWER EXTREMITY: ICD-10-CM

## 2020-07-29 LAB
INR BLD: 2.1
PT POC: NORMAL
VALID INTERNAL CONTROL?: YES
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
## 2020-07-29 NOTE — PROGRESS NOTES
Chief Complaint   Patient presents with    Anticoagulation     PT/INR/Follow-up Right Leg Swelling and Pain   1. Have you been to the ER, urgent care clinic since your last visit? Hospitalized since your last visit? No    2. Have you seen or consulted any other health care providers outside of the [address] [place] [address] since your last visit? Include any pap smears or colon screening.  No

## 2020-08-10 ENCOUNTER — DOCUMENTATION ONLY (OUTPATIENT)
Dept: FAMILY MEDICINE CLINIC | Age: 50
End: 2020-08-10

## 2020-08-10 DIAGNOSIS — M79.661 PAIN AND SWELLING OF RIGHT LOWER LEG: ICD-10-CM

## 2020-08-10 DIAGNOSIS — I87.001 POST-THROMBOTIC SYNDROME OF RIGHT LOWER EXTREMITY: Primary | ICD-10-CM

## 2020-08-10 DIAGNOSIS — M79.89 PAIN AND SWELLING OF RIGHT LOWER LEG: ICD-10-CM

## 2020-08-10 RX ORDER — FUROSEMIDE 40 MG/1
TABLET ORAL
Qty: 180 TAB | Refills: 1 | Status: SHIPPED | OUTPATIENT
Start: 2020-08-10

## 2020-08-10 RX ORDER — OXYCODONE AND ACETAMINOPHEN 5; 325 MG/1; MG/1
1 TABLET ORAL
Qty: 20 TAB | Refills: 0 | Status: SHIPPED | OUTPATIENT
Start: 2020-08-10 | End: 2020-08-15

## 2020-10-12 ENCOUNTER — CLINICAL SUPPORT (OUTPATIENT)
Dept: FAMILY MEDICINE CLINIC | Age: 50
End: 2020-10-12
Payer: COMMERCIAL

## 2020-10-12 DIAGNOSIS — Z23 ENCOUNTER FOR IMMUNIZATION: ICD-10-CM

## 2020-10-12 PROCEDURE — 90686 IIV4 VACC NO PRSV 0.5 ML IM: CPT

## 2020-10-12 PROCEDURE — 90471 IMMUNIZATION ADMIN: CPT

## 2020-12-23 DIAGNOSIS — I87.001 POST-THROMBOTIC SYNDROME OF RIGHT LOWER EXTREMITY: ICD-10-CM

## 2020-12-23 DIAGNOSIS — Z51.81 MONITORING FOR LONG-TERM ANTICOAGULANT USE: ICD-10-CM

## 2020-12-23 DIAGNOSIS — Z79.01 MONITORING FOR LONG-TERM ANTICOAGULANT USE: ICD-10-CM

## 2020-12-23 DIAGNOSIS — I82.5Z1 CHRONIC DEEP VEIN THROMBOSIS (DVT) OF DISTAL VEIN OF RIGHT LOWER EXTREMITY (HCC): ICD-10-CM

## 2020-12-27 RX ORDER — WARFARIN SODIUM 5 MG/1
TABLET ORAL
Qty: 90 TAB | Refills: 0 | Status: SHIPPED | OUTPATIENT
Start: 2020-12-27 | End: 2021-03-24 | Stop reason: ALTCHOICE

## 2021-01-04 ENCOUNTER — VIRTUAL VISIT (OUTPATIENT)
Dept: ENDOCRINOLOGY | Age: 51
End: 2021-01-04
Payer: COMMERCIAL

## 2021-01-04 DIAGNOSIS — E66.01 MORBID OBESITY, UNSPECIFIED OBESITY TYPE (HCC): Primary | ICD-10-CM

## 2021-01-04 DIAGNOSIS — R73.02 IGT (IMPAIRED GLUCOSE TOLERANCE): ICD-10-CM

## 2021-01-04 PROCEDURE — 99214 OFFICE O/P EST MOD 30 MIN: CPT | Performed by: INTERNAL MEDICINE
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
## 2021-01-09 LAB
ALBUMIN SERPL-MCNC: 4.2 G/DL (ref 3.8–4.9)
ALBUMIN/GLOB SERPL: 1.8 {RATIO} (ref 1.2–2.2)
ALP SERPL-CCNC: 58 IU/L (ref 39–117)
ALT SERPL-CCNC: 9 IU/L (ref 0–32)
AST SERPL-CCNC: 10 IU/L (ref 0–40)
BILIRUB SERPL-MCNC: 0.7 MG/DL (ref 0–1.2)
BUN SERPL-MCNC: 10 MG/DL (ref 6–24)
BUN/CREAT SERPL: 11 (ref 9–23)
CALCIUM SERPL-MCNC: 9.5 MG/DL (ref 8.7–10.2)
CHLORIDE SERPL-SCNC: 103 MMOL/L (ref 96–106)
CO2 SERPL-SCNC: 26 MMOL/L (ref 20–29)
CREAT SERPL-MCNC: 0.9 MG/DL (ref 0.57–1)
ERYTHROCYTE [DISTWIDTH] IN BLOOD BY AUTOMATED COUNT: 13 % (ref 11.7–15.4)
EST. AVERAGE GLUCOSE BLD GHB EST-MCNC: 100 MG/DL
GLOBULIN SER CALC-MCNC: 2.4 G/DL (ref 1.5–4.5)
GLUCOSE SERPL-MCNC: 84 MG/DL (ref 65–99)
HBA1C MFR BLD: 5.1 % (ref 4.8–5.6)
HCT VFR BLD AUTO: 41.3 % (ref 34–46.6)
HGB BLD-MCNC: 13.2 G/DL (ref 11.1–15.9)
MCH RBC QN AUTO: 30.1 PG (ref 26.6–33)
MCHC RBC AUTO-ENTMCNC: 32 G/DL (ref 31.5–35.7)
MCV RBC AUTO: 94 FL (ref 79–97)
PLATELET # BLD AUTO: 317 X10E3/UL (ref 150–450)
POTASSIUM SERPL-SCNC: 4 MMOL/L (ref 3.5–5.2)
PROT SERPL-MCNC: 6.6 G/DL (ref 6–8.5)
RBC # BLD AUTO: 4.39 X10E6/UL (ref 3.77–5.28)
SODIUM SERPL-SCNC: 140 MMOL/L (ref 134–144)
TSH SERPL DL<=0.005 MIU/L-ACNC: 0.68 UIU/ML (ref 0.45–4.5)
WBC # BLD AUTO: 3.8 X10E3/UL (ref 3.4–10.8)

## 2021-01-11 ENCOUNTER — VIRTUAL VISIT (OUTPATIENT)
Dept: FAMILY MEDICINE CLINIC | Age: 51
End: 2021-01-11
Payer: COMMERCIAL

## 2021-01-11 DIAGNOSIS — D68.59 HYPERCOAGULABLE STATE (HCC): Primary | ICD-10-CM

## 2021-01-11 DIAGNOSIS — K51.919 ULCERATIVE COLITIS WITH COMPLICATION, UNSPECIFIED LOCATION (HCC): ICD-10-CM

## 2021-01-11 DIAGNOSIS — D84.9 IMMUNOSUPPRESSED STATUS (HCC): ICD-10-CM

## 2021-01-11 DIAGNOSIS — Z86.718 HISTORY OF RECURRENT DEEP VEIN THROMBOSIS (DVT): ICD-10-CM

## 2021-01-11 DIAGNOSIS — L40.50 PSORIATIC ARTHRITIS (HCC): ICD-10-CM

## 2021-01-11 DIAGNOSIS — K21.9 GASTROESOPHAGEAL REFLUX DISEASE, UNSPECIFIED WHETHER ESOPHAGITIS PRESENT: ICD-10-CM

## 2021-01-11 PROCEDURE — 99213 OFFICE O/P EST LOW 20 MIN: CPT | Performed by: FAMILY MEDICINE

## 2021-01-11 RX ORDER — OMEPRAZOLE 20 MG/1
CAPSULE, DELAYED RELEASE ORAL
Qty: 90 CAP | Refills: 0 | Status: SHIPPED | OUTPATIENT
Start: 2021-01-11 | End: 2021-06-02

## 2021-01-11 NOTE — PROGRESS NOTES
Jeferson Sheppard (: 1970) is a 44 y.o. female, established patient, here for evaluation of the following chief complaint(s):   GERD (was previously taking medication 2 months ago)       ASSESSMENT/PLAN:  Stable overall but no INR recently. To come into office for check this week. Discussed COVID vaccine given her immunosuppression and hx of psoriatic arthritis and ulcerative colitis. Encouraged to work with weight loss and refilling Prilosec for GERD. 1. Hypercoagulable state ([address]    2. History of recurrent deep vein thrombosis (DVT)    3. Psoriatic arthritis ([address]    4. Immunosuppressed status ([address]    5. Ulcerative colitis with complication, unspecified location ([address]    6. Gastroesophageal reflux disease, unspecified whether esophagitis present  -     omeprazole (PRILOSEC) 20 mg capsule; TAKE 1 CAPSULE BY MOUTH EVERY MORNING 30-60MIN BEFORE FIRST MEAL, Normal, Disp-90 Cap, R-0        Return in about 4 weeks (around 2021). SUBJECTIVE/OBJECTIVE:  HPI    Not seen in nearly 6 months d/t pandemic. Had labs checked with Endo on 2021 and reviewed with pt. Has not had her INR checked during this time. Anti-coag  Has had 3 DVT's in the right lower extremity with the most recent occurring off Coumadin for hysterectomy in 2017. Does also have post thrombotic syndrome from numerous DVTs. Post-thrombotic syndrome pain controlled. Managed with resting her leg and elevating it. Rest per anti-coag tab.     ROS  Gen - no fever/chills  Resp - no dyspnea or cough  CV - no chest pain or CARRASQUILLO  Rest per HPI    Patient-Reported Vitals 2021   Patient-Reported Weight -   Patient-Reported Height -   Patient-Reported Pulse 77   Patient-Reported Systolic  214   Patient-Reported Diastolic 87     Physical exam:  General appearance - alert, well appearing, and in no distress  Eyes -sclera anicteric, no discharge  HEENT normocephalic, atraumatic, moist mucous membranes, no visualized neck mass  Chest -normal respiratory effort, no visualized signs of respiratory distress  Neurological - alert, awake, normal speech, no focal findings or movement disorder noted  Psych - normal mood and affect  Skin no apparent lesions    On this date 01/11/21 I have spent 15 minutes reviewing previous notes, test results and face to face (virtual) with the patient discussing the diagnosis and importance of compliance with the treatment plan as well as documenting on the day of the visit. Liliya Brady is being evaluated by a Virtual Visit (video visit) encounter to address concerns as mentioned above. A caregiver was present when appropriate. Due to this being a TeleHealth encounter (During XYJUS-04 public health emergency), evaluation of the following organ systems was limited: Vitals/Constitutional/EENT/Resp/CV/GI//MS/Neuro/Skin/Heme-Lymph-Imm. Pursuant to the emergency declaration under the [address] [place] [address] authority and the [employer] and [employer] Act, this Virtual Visit was conducted with patient's (and/or legal guardian's) consent, to reduce the patient's risk of exposure to COVID-19 and provide necessary medical care. The patient (and/or legal guardian) has also been advised to contact this office for worsening conditions or problems, and seek emergency medical treatment and/or call 911 if deemed necessary. Patient identification was verified at the start of the visit: YES    Services were provided through a video synchronous discussion virtually to substitute for in-person clinic visit. Patient was located at home and provider was located in office or at home. An electronic signature was used to authenticate this note.   -- Milady Goldberg MD

## 2021-01-13 ENCOUNTER — OFFICE VISIT (OUTPATIENT)
Dept: FAMILY MEDICINE CLINIC | Age: 51
End: 2021-01-13
Payer: COMMERCIAL

## 2021-01-13 VITALS
WEIGHT: 247.4 LBS | SYSTOLIC BLOOD PRESSURE: 125 MMHG | OXYGEN SATURATION: 96 % | BODY MASS INDEX: 41.22 KG/M2 | RESPIRATION RATE: 16 BRPM | DIASTOLIC BLOOD PRESSURE: 61 MMHG | HEIGHT: 65 IN | TEMPERATURE: 97.1 F | HEART RATE: 72 BPM

## 2021-01-13 DIAGNOSIS — D68.59 HYPERCOAGULABLE STATE (HCC): ICD-10-CM

## 2021-01-13 DIAGNOSIS — Z86.718 HISTORY OF RECURRENT DEEP VEIN THROMBOSIS (DVT): ICD-10-CM

## 2021-01-13 DIAGNOSIS — I82.5Z1 CHRONIC DEEP VEIN THROMBOSIS (DVT) OF DISTAL VEIN OF RIGHT LOWER EXTREMITY (HCC): Primary | ICD-10-CM

## 2021-01-13 DIAGNOSIS — Z51.81 MONITORING FOR LONG-TERM ANTICOAGULANT USE: ICD-10-CM

## 2021-01-13 DIAGNOSIS — Z79.01 MONITORING FOR LONG-TERM ANTICOAGULANT USE: ICD-10-CM

## 2021-01-13 DIAGNOSIS — K21.9 GASTROESOPHAGEAL REFLUX DISEASE, UNSPECIFIED WHETHER ESOPHAGITIS PRESENT: ICD-10-CM

## 2021-01-13 DIAGNOSIS — R07.89 ATYPICAL CHEST PAIN: ICD-10-CM

## 2021-01-13 LAB
INR BLD: 1.9
PT POC: NORMAL
VALID INTERNAL CONTROL?: YES

## 2021-01-13 PROCEDURE — 93000 ELECTROCARDIOGRAM COMPLETE: CPT | Performed by: FAMILY MEDICINE

## 2021-01-13 PROCEDURE — 99214 OFFICE O/P EST MOD 30 MIN: CPT | Performed by: FAMILY MEDICINE

## 2021-01-13 PROCEDURE — 85610 PROTHROMBIN TIME: CPT | Performed by: FAMILY MEDICINE

## 2021-01-13 NOTE — PROGRESS NOTES
Chief Complaint   Patient presents with    Anticoagulation     PT/INR   1. Have you been to the ER, urgent care clinic since your last visit? Hospitalized since your last visit? No    2. Have you seen or consulted any other health care providers outside of the [facility] since your last visit? Include any pap smears or colon screening.  No

## 2021-02-19 ENCOUNTER — OFFICE VISIT (OUTPATIENT)
Dept: FAMILY MEDICINE CLINIC | Age: 51
End: 2021-02-19
Payer: COMMERCIAL

## 2021-02-19 VITALS
DIASTOLIC BLOOD PRESSURE: 72 MMHG | TEMPERATURE: 97.1 F | HEIGHT: 65 IN | HEART RATE: 74 BPM | BODY MASS INDEX: 40.59 KG/M2 | OXYGEN SATURATION: 100 % | SYSTOLIC BLOOD PRESSURE: 133 MMHG | WEIGHT: 243.6 LBS | RESPIRATION RATE: 16 BRPM

## 2021-02-19 DIAGNOSIS — Z86.718 HISTORY OF RECURRENT DEEP VEIN THROMBOSIS (DVT): ICD-10-CM

## 2021-02-19 DIAGNOSIS — Z51.81 MONITORING FOR LONG-TERM ANTICOAGULANT USE: ICD-10-CM

## 2021-02-19 DIAGNOSIS — Z79.01 MONITORING FOR LONG-TERM ANTICOAGULANT USE: ICD-10-CM

## 2021-02-19 DIAGNOSIS — D68.59 HYPERCOAGULABLE STATE (HCC): ICD-10-CM

## 2021-02-19 DIAGNOSIS — L40.50 PSORIATIC ARTHRITIS (HCC): ICD-10-CM

## 2021-02-19 DIAGNOSIS — D84.9 IMMUNOSUPPRESSED STATUS (HCC): ICD-10-CM

## 2021-02-19 DIAGNOSIS — K51.919 ULCERATIVE COLITIS WITH COMPLICATION, UNSPECIFIED LOCATION (HCC): ICD-10-CM

## 2021-02-19 DIAGNOSIS — I82.5Z1 CHRONIC DEEP VEIN THROMBOSIS (DVT) OF DISTAL VEIN OF RIGHT LOWER EXTREMITY (HCC): Primary | ICD-10-CM

## 2021-02-19 LAB
INR BLD: 2.7
PT POC: NORMAL
VALID INTERNAL CONTROL?: YES

## 2021-02-19 PROCEDURE — 85610 PROTHROMBIN TIME: CPT | Performed by: FAMILY MEDICINE

## 2021-02-19 PROCEDURE — 99214 OFFICE O/P EST MOD 30 MIN: CPT | Performed by: FAMILY MEDICINE

## 2021-02-19 NOTE — PROGRESS NOTES
Caridad Mcgill (: 1970) is a 44 y.o. female, established patient, here for evaluation of the following chief complaint(s):  Follow-up (4 week)       ASSESSMENT/PLAN:  1. Chronic deep vein thrombosis (DVT) of distal vein of right lower extremity (HCC)  2. Hypercoagulable state ([address]  3. History of recurrent deep vein thrombosis (DVT)  4. Monitoring for long-term anticoagulant use  - therapeutic, stable  -     AMB POC PT/INR    5. Psoriatic arthritis ([address]  6. Ulcerative colitis with complication, unspecified location ([address]  7. Immunosuppressed status ([address]  - stable, ct following with specialists. Discussed COVID vaccine and expectations for second dose. Holding MTX the week prior to this. Return in about 4 weeks (around 3/19/2021). SUBJECTIVE/OBJECTIVE:  Pt doing well. For INR check    Anti-coag - Has had 3 DVT's in the right lower extremity with the most recent occurring off Coumadin for hysterectomy in 2017. Does also have post thrombotic syndrome from numerous DVTs. Post-thrombotic syndrome pain controlled. Managed with resting her leg and elevating it. Rest per anti-coag tab. UC and Psoriatic arthritis doing well. Recent abd sx with diarrhea. Improved after cutting back on sweets. Had 1st COVID vaccine and did well with it. ROS  Gen - no fever/chills  Resp - no dyspnea or cough  CV -Per HPI  Rest per HPI    Blood pressure 133/72, pulse 74, temperature 97.1 °F (36.2 °C), temperature source Temporal, resp. rate 16, height 5' 5\" (1.651 m), weight 243 lb 9.6 oz (110.5 kg), SpO2 100 %.     PE  General appearance - alert, well appearing, and in no distress  Eyes -sclera anicteric  Neck - supple, no significant adenopathy, no thyromegaly  Chest - clear to auscultation, no wheezes, rales or rhonchi, symmetric air entry  Heart - normal rate, regular rhythm, normal S1, S2, no murmurs, rubs, clicks or gallops  Neurological - alert, oriented, normal speech, no focal findings or movement disorder noted  Extr - bilat edema with R>>L  Psych - normal mood and affect      On this date 02/19/21 I have spent 30 minutes reviewing previous notes, test results and face to face with the patient discussing the diagnosis and importance of compliance with the treatment plan as well as documenting on the day of the visit. An electronic signature was used to authenticate this note.   -- Melissa Avila MD

## 2021-02-19 NOTE — PROGRESS NOTES
Chief Complaint   Patient presents with    Follow-up     4 week   1. Have you been to the ER, urgent care clinic since your last visit? Hospitalized since your last visit? No    2. Have you seen or consulted any other health care providers outside of the [address] [place] [address] since your last visit? Include any pap smears or colon screening.  No

## 2021-03-19 ENCOUNTER — OFFICE VISIT (OUTPATIENT)
Dept: FAMILY MEDICINE CLINIC | Age: 51
End: 2021-03-19
Payer: COMMERCIAL

## 2021-03-19 VITALS
RESPIRATION RATE: 16 BRPM | DIASTOLIC BLOOD PRESSURE: 69 MMHG | HEART RATE: 77 BPM | TEMPERATURE: 97.8 F | WEIGHT: 247 LBS | SYSTOLIC BLOOD PRESSURE: 129 MMHG | BODY MASS INDEX: 41.15 KG/M2 | HEIGHT: 65 IN

## 2021-03-19 DIAGNOSIS — Z51.81 MONITORING FOR LONG-TERM ANTICOAGULANT USE: ICD-10-CM

## 2021-03-19 DIAGNOSIS — Z86.718 HISTORY OF RECURRENT DEEP VEIN THROMBOSIS (DVT): ICD-10-CM

## 2021-03-19 DIAGNOSIS — D68.59 HYPERCOAGULABLE STATE (HCC): Primary | ICD-10-CM

## 2021-03-19 DIAGNOSIS — Z79.01 MONITORING FOR LONG-TERM ANTICOAGULANT USE: ICD-10-CM

## 2021-03-19 LAB
INR BLD: 1.4
PT POC: NORMAL
VALID INTERNAL CONTROL?: YES

## 2021-03-19 PROCEDURE — 99213 OFFICE O/P EST LOW 20 MIN: CPT | Performed by: FAMILY MEDICINE

## 2021-03-19 PROCEDURE — 85610 PROTHROMBIN TIME: CPT | Performed by: FAMILY MEDICINE

## 2021-03-19 RX ORDER — FOLIC ACID 1 MG/1
TABLET ORAL
Qty: 90 TAB | Refills: 6 | Status: SHIPPED | OUTPATIENT
Start: 2021-03-19

## 2021-03-19 NOTE — PROGRESS NOTES
Maura Santillan (: 1970) is a 44 y.o. female, established patient, here for evaluation of the following chief complaint(s):  Follow-up       ASSESSMENT/PLAN:  1. Chronic deep vein thrombosis (DVT) of distal vein of right lower extremity (HCC)  2. Hypercoagulable state ([address]  3. History of recurrent deep vein thrombosis (DVT)  4. Monitoring for long-term anticoagulant use  - subtherapeutic so adjusting coumadin  -     AMB POC PT/INR    Return in about 4 weeks (around 2021). SUBJECTIVE/OBJECTIVE:  Pt doing well. For INR check    Anti-coag - Has had 3 DVT's in the right lower extremity with the most recent occurring off Coumadin for hysterectomy in 2017. Does also have post thrombotic syndrome from numerous DVTs. Post-thrombotic syndrome pain controlled. Managed with resting her leg and elevating it. Rest per anti-coag tab. UC and Psoriatic arthritis doing well. Has had both Covid vaccinations    ROS  Gen - no fever/chills  Resp - no dyspnea or cough  CV -Per HPI  Rest per HPI    Blood pressure 129/69, pulse 77, temperature 97.8 °F (36.6 °C), temperature source Temporal, resp. rate 16, height 5' 5\" (1.651 m), weight 247 lb (112 kg). PE  General appearance - alert, well appearing, and in no distress  Eyes -sclera anicteric  Neck - supple, no significant adenopathy, no thyromegaly  Chest - clear to auscultation, no wheezes, rales or rhonchi, symmetric air entry  Heart - normal rate, regular rhythm, normal S1, S2, no murmurs, rubs, clicks or gallops  Neurological - alert, oriented, normal speech, no focal findings or movement disorder noted  Extr - bilat edema with R>>L  Psych - normal mood and affect      On this date 21 I have spent 20 minutes reviewing previous notes, test results and face to face with the patient discussing the diagnosis and importance of compliance with the treatment plan as well as documenting on the day of the visit.     An electronic signature was used to authenticate this note.   -- Lin Hartmann MD

## 2021-03-19 NOTE — PROGRESS NOTES
Chief Complaint   Patient presents with    Follow-up           1. Have you been to the ER, urgent care clinic since your last visit? Hospitalized since your last visit? No    2. Have you seen or consulted any other health care providers outside of the [address] [place] [address] since your last visit? Include any pap smears or colon screening.  No

## 2021-03-23 NOTE — PERIOP NOTES
PAT call to patient. Pt is awaiting instruction from Dr. Rosa Basurto re blood thinners prior to procedure with Dr. Wilma Francois. She has messaged Dr. Rosa Basurto. Pt also reports some leg cramps, is not currently taking K+. Pt will also discuss with PCP office.

## 2021-03-23 NOTE — PERIOP NOTES
[facility]  Ambulatory Surgery Unit  Pre-operative Instructions for Endo Procedures    Procedure Date  3/31            Tentative Arrival Time 12:15pm      1. On the day of your procedure, please report to the Ambulatory Surgery Unit Registration Desk and sign in at your designated time. The Ambulatory Surgery Unit is located in [facility] on the [facility] side of the [facility] across from the [address] [place] [address]. Please have all of your health insurance cards and a photo ID. 2. You must have someone with you to drive you home, as you should not drive a car for 24 hours following anesthesia. Please make arrangements for a responsible adult friend or family member to stay with you for at least the first 24 hours after your procedure. 3. Do not have anything to eat or drink (including water, gum, mints, coffee, juice) after 11:59 PM 3/30. This may not apply to medications prescribed by your physician. (Please note below the special instructions with medications to take the morning of your procedure.)    4. If applicable, follow the clear liquid diet and bowel prep instructions provided by your physician's office. If you do not have this information, or have any questions, please contact your physician's office. 5. We recommend you do not drink any alcoholic beverages for 24 hours before and after your procedure. 6. Contact your surgeons office for instructions on the following medications: non-steroidal anti-inflammatory drugs (i.e. Advil, Aleve), vitamins, and supplements. (Some surgeons will want you to stop these medications prior to surgery and others may allow you to take them)   **If you are currently taking Plavix, Coumadin, Aspirin and/or other blood-thinning agents, contact your surgeon for instructions. ** Your surgeon will partner with the physician prescribing these medications to determine if it is safe to stop or if you need to continue taking.  Please do not stop taking these medications without instructions from your surgeon. 7. In an effort to help prevent surgical site infection, we ask that you shower with an anti-bacterial soap (i.e. Dial or Safeguard) on the morning of your procedure. Do not apply any lotions, powders, or deodorants after showering. 8. Wear comfortable clothes. Wear glasses instead of contacts. Do not bring any jewelry or money (other than copays or fees as instructed). Do not wear make-up, particularly mascara, the morning of your procedure. Wear your hair loose or down, no ponytails, buns, martir pins or clips. All body piercings must be removed. 9. You should understand that if you do not follow these instructions your procedure may be cancelled. If your physical condition changes (i.e. fever, cold or flu) please contact your surgeon as soon as possible. 10. It is important that you be on time. If a situation occurs where you may be late, or if you have any questions or problems, please call [phone]. 11. Your procedure time may be subject to change. You will receive a phone call the day prior to confirm your arrival time. Special Instructions: Take all medications and inhalers, as prescribed, on the morning of surgery with a sip of water EXCEPT: warfarin hold per Dr. Edilma Soler and Dr. Sundeep Butcher. Hold phentermine 7 days prior to procedure. Insulin Dependent Diabetic patients: Take your diabetic medications as prescribed the day before surgery. Hold all diabetic medications the day of surgery. If you are scheduled to arrive for surgery after 8:00 AM, and your AM blood sugar is >200, please call Ambulatory Surgery. I understand a pre-operative phone call will be made to verify my procedure time. In the event that I am not available, I give permission for a message to be left on my answering service and/or with another person?       yes      Reviewed by phone with pt, verbalized understanding.   ___________________ ___________________      ___________________  (Signature of Patient)          (Witness)                   (Date and Time)

## 2021-03-24 ENCOUNTER — OFFICE VISIT (OUTPATIENT)
Dept: FAMILY MEDICINE CLINIC | Age: 51
End: 2021-03-24
Payer: COMMERCIAL

## 2021-03-24 VITALS
TEMPERATURE: 97.3 F | BODY MASS INDEX: 39.44 KG/M2 | DIASTOLIC BLOOD PRESSURE: 65 MMHG | RESPIRATION RATE: 18 BRPM | HEIGHT: 66 IN | WEIGHT: 245.4 LBS | SYSTOLIC BLOOD PRESSURE: 131 MMHG | HEART RATE: 69 BPM

## 2021-03-24 DIAGNOSIS — D68.59 HYPERCOAGULABLE STATE (HCC): Primary | ICD-10-CM

## 2021-03-24 DIAGNOSIS — Z79.01 MONITORING FOR LONG-TERM ANTICOAGULANT USE: ICD-10-CM

## 2021-03-24 DIAGNOSIS — Z86.718 HISTORY OF RECURRENT DEEP VEIN THROMBOSIS (DVT): ICD-10-CM

## 2021-03-24 DIAGNOSIS — Z51.81 MONITORING FOR LONG-TERM ANTICOAGULANT USE: ICD-10-CM

## 2021-03-24 DIAGNOSIS — I87.001 POST-THROMBOTIC SYNDROME OF RIGHT LOWER EXTREMITY: ICD-10-CM

## 2021-03-24 DIAGNOSIS — K51.919 ULCERATIVE COLITIS WITH COMPLICATION, UNSPECIFIED LOCATION (HCC): ICD-10-CM

## 2021-03-24 DIAGNOSIS — Z01.818 PREOP EXAMINATION: ICD-10-CM

## 2021-03-24 LAB
INR BLD: 2.3
PT POC: NORMAL
VALID INTERNAL CONTROL?: YES

## 2021-03-24 PROCEDURE — 99214 OFFICE O/P EST MOD 30 MIN: CPT | Performed by: FAMILY MEDICINE

## 2021-03-24 PROCEDURE — 85610 PROTHROMBIN TIME: CPT | Performed by: FAMILY MEDICINE

## 2021-03-24 NOTE — PROGRESS NOTES
Chief Complaint   Patient presents with    Leg Pain     Right Calf   1. Have you been to the ER, urgent care clinic since your last visit? Hospitalized since your last visit? No    2. Have you seen or consulted any other health care providers outside of the [address] [place] [address] since your last visit? Include any pap smears or colon screening.  Yes Where: Dr Nancy Kaplan

## 2021-03-24 NOTE — LETTER
3/24/2021 12:21 PM 
 
Ms. Robbi Arrieta [id number] [facility] [id number] Dr. Gerardo Johns, Thanks for working with [email or web address]. I saw her in the office today for follow-up on her right lower extremity pain. She has no significant changes in her swelling and had no warmth or erythema and was therapeutic on Coumadin with an INR of 2.3 today. Her symptoms appear most consistent with her known history of post thrombotic syndrome pain and I have recommended that she get lower extremity duplex if her pain and swelling worsen in the next 2-3 days prior to surgery. As you know, she last had a DVT in 2017 after coming off coumadin for a GYN surgery. Since [place] mentioned you are comfortable with her being on a NOAC instead of coumadin, I've recommended she stop coumadin today and start Xarelto today. She will stop the Xarelto 3 days prior to the planned pouchoscopy with anal dilation and plan to restart the day after surgery if able. She is cleared to proceed with this outpatient surgery from my perspective. Feel free to contact me if you have any questions.  
 
 
Sincerely, 
 
 
Farzana Buckner MD

## 2021-03-24 NOTE — PROGRESS NOTES
Daisy Murrieta (: 1970) is a 44 y.o. female, established patient, here for evaluation of the following chief complaint(s):  Leg Pain (Right Calf)       ASSESSMENT/PLAN:  INR is therapeutic today and she has sx c/w post thrombotic syndrome again. She has had this previously and confirmed with seeing Dr. Tiera Browning (Vascular). Swelling is not sig worse and there is no erythema/warmth. Will order duplex but have her get this done only if her pain and swelling worsen over the next few days. Since Dr. Nicole Muniz is comfortable with her being switched to [address] [place] [address] (Dr. Zoya Tai rec against this), she will stop coumadin today and start Xarelto. Rechecking labs prior to surgery. Hold Xarelto 3 days prior to surgery and restart day after surgery if able. 1. Hypercoagulable state ([facility] [address] [address]  2. History of recurrent deep vein thrombosis (DVT)  3. Monitoring for long-term anticoagulant use  4. Post-thrombotic syndrome of right lower extremity  5. Ulcerative colitis with complication, unspecified location ([facility] [address]  6. Preop examination  -     CBC W/O DIFF; Future  -     METABOLIC PANEL, COMPREHENSIVE; Future  -     PROTHROMBIN TIME + INR; Future      Return in about 4 weeks (around 2021), or if symptoms worsen or fail to improve. SUBJECTIVE/OBJECTIVE:  Today, has concerns with RLE pain. No change in swelling. Has known Post-thrombotic syndrome pain that seems to flare from time to time. Last LE Duplex was in 2020 for same sx and had no DVT at the time. Is on Lasix and K but last labs in 2021 with nml K. Anti-coag - Has had 3 DVT's in the right lower extremity with the most recent occurring off Coumadin for hysterectomy in 2017. Does also have post thrombotic syndrome from numerous DVTs. Managed with resting her leg and elevating it. Rest per anti-coag tab. To have pouchoscopy with possible anal dilation on 3/31/21 so needs letter to hold coumadin.   We prev discussed switching to Xarelto as her prev Colorectal surgeon rec against NOAC and Dr. Consuelo Lindsay is in favor of this per pt report. ROS  Gen - no fever/chills  Resp - no dyspnea or cough  CV -Per HPI  Rest per HPI    On this date 03/24/2021 I have spent 30 minutes reviewing previous notes, test results and face to face with the patient discussing the diagnosis and importance of compliance with the treatment plan as well as documenting on the day of the visit. An electronic signature was used to authenticate this note.   -- Antonina Nelson MD

## 2021-03-27 ENCOUNTER — HOSPITAL ENCOUNTER (OUTPATIENT)
Dept: PREADMISSION TESTING | Age: 51
Discharge: HOME | End: 2021-03-27
Payer: COMMERCIAL

## 2021-03-27 LAB
ALBUMIN SERPL-MCNC: 4.3 G/DL (ref 3.8–4.9)
ALBUMIN/GLOB SERPL: 1.8 {RATIO} (ref 1.2–2.2)
ALP SERPL-CCNC: 71 IU/L (ref 39–117)
ALT SERPL-CCNC: 9 IU/L (ref 0–32)
AST SERPL-CCNC: 12 IU/L (ref 0–40)
BILIRUB SERPL-MCNC: 0.6 MG/DL (ref 0–1.2)
BUN SERPL-MCNC: 13 MG/DL (ref 6–24)
BUN/CREAT SERPL: 16 (ref 9–23)
CALCIUM SERPL-MCNC: 9.2 MG/DL (ref 8.7–10.2)
CHLORIDE SERPL-SCNC: 104 MMOL/L (ref 96–106)
CO2 SERPL-SCNC: 25 MMOL/L (ref 20–29)
CREAT SERPL-MCNC: 0.83 MG/DL (ref 0.57–1)
ERYTHROCYTE [DISTWIDTH] IN BLOOD BY AUTOMATED COUNT: 13 % (ref 11.7–15.4)
GLOBULIN SER CALC-MCNC: 2.4 G/DL (ref 1.5–4.5)
GLUCOSE SERPL-MCNC: 87 MG/DL (ref 65–99)
HCT VFR BLD AUTO: 39.7 % (ref 34–46.6)
HGB BLD-MCNC: 13.2 G/DL (ref 11.1–15.9)
INR PPP: 3.2 (ref 0.9–1.2)
MCH RBC QN AUTO: 31.4 PG (ref 26.6–33)
MCHC RBC AUTO-ENTMCNC: 33.2 G/DL (ref 31.5–35.7)
MCV RBC AUTO: 94 FL (ref 79–97)
PLATELET # BLD AUTO: 277 X10E3/UL (ref 150–450)
POTASSIUM SERPL-SCNC: 4.1 MMOL/L (ref 3.5–5.2)
PROT SERPL-MCNC: 6.7 G/DL (ref 6–8.5)
PROTHROMBIN TIME: 32.2 SEC (ref 9.1–12)
RBC # BLD AUTO: 4.21 X10E6/UL (ref 3.77–5.28)
SARS-COV-2, COV2: NORMAL
SODIUM SERPL-SCNC: 141 MMOL/L (ref 134–144)
WBC # BLD AUTO: 4.3 X10E3/UL (ref 3.4–10.8)

## 2021-03-27 PROCEDURE — U0003 INFECTIOUS AGENT DETECTION BY NUCLEIC ACID (DNA OR RNA); SEVERE ACUTE RESPIRATORY SYNDROME CORONAVIRUS 2 (SARS-COV-2) (CORONAVIRUS DISEASE [COVID-19]), AMPLIFIED PROBE TECHNIQUE, MAKING USE OF HIGH THROUGHPUT TECHNOLOGIES AS DESCRIBED BY CMS-2020-01-R: HCPCS

## 2021-03-28 LAB — SARS-COV-2, COV2NT: NOT DETECTED

## 2021-03-29 NOTE — PERIOP NOTES
Per SRAVAN Garcia NP, check INR DOS. Confirm (per Dr. Harshil Steele note)  that pt has stopped coumadin, increased greens, and started xarelto. Call to patient. Per pt, last coumadin was 3/24/21, started xarelto and is on hold for 3 days prior to case. Advised of Dr. Harshil davis to confirm and increase greens.

## 2021-03-29 NOTE — PROGRESS NOTES
Please call  Inr now running higher surprisingly. Please make sure she has stopped taking Coumadin and can increase her greens for the next few days prior to surgery.  Thanks

## 2021-03-30 ENCOUNTER — ANESTHESIA EVENT (OUTPATIENT)
Dept: SURGERY | Age: 51
End: 2021-03-30
Payer: COMMERCIAL

## 2021-03-31 ENCOUNTER — HOSPITAL ENCOUNTER (OUTPATIENT)
Age: 51
Setting detail: OUTPATIENT SURGERY
Discharge: HOME | End: 2021-03-31
Attending: SURGERY | Admitting: SURGERY
Payer: COMMERCIAL

## 2021-03-31 ENCOUNTER — ANESTHESIA (OUTPATIENT)
Dept: SURGERY | Age: 51
End: 2021-03-31
Payer: COMMERCIAL

## 2021-03-31 VITALS
OXYGEN SATURATION: 99 % | WEIGHT: 249 LBS | RESPIRATION RATE: 13 BRPM | SYSTOLIC BLOOD PRESSURE: 156 MMHG | DIASTOLIC BLOOD PRESSURE: 85 MMHG | TEMPERATURE: 97.4 F | HEART RATE: 73 BPM | BODY MASS INDEX: 40.02 KG/M2 | HEIGHT: 66 IN

## 2021-03-31 LAB — INR BLD: 1.1 (ref 0.9–1.2)

## 2021-03-31 PROCEDURE — 76060000073 HC AMB SURG ANES FIRST 0.5 HR: Performed by: SURGERY

## 2021-03-31 PROCEDURE — 2709999900 HC NON-CHARGEABLE SUPPLY: Performed by: SURGERY

## 2021-03-31 PROCEDURE — 88305 TISSUE EXAM BY PATHOLOGIST: CPT

## 2021-03-31 PROCEDURE — 77030021352 HC CBL LD SYS DISP COVD -B: Performed by: SURGERY

## 2021-03-31 PROCEDURE — 74011250636 HC RX REV CODE- 250/636: Performed by: ANESTHESIOLOGY

## 2021-03-31 PROCEDURE — 74011250636 HC RX REV CODE- 250/636: Performed by: REGISTERED NURSE

## 2021-03-31 PROCEDURE — 74011000250 HC RX REV CODE- 250: Performed by: REGISTERED NURSE

## 2021-03-31 PROCEDURE — 76210000046 HC AMBSU PH II REC FIRST 0.5 HR: Performed by: SURGERY

## 2021-03-31 PROCEDURE — 76030000002 HC AMB SURG OR TIME FIRST 0.: Performed by: SURGERY

## 2021-03-31 PROCEDURE — 85610 PROTHROMBIN TIME: CPT

## 2021-03-31 PROCEDURE — 76210000040 HC AMBSU PH I REC FIRST 0.5 HR: Performed by: SURGERY

## 2021-03-31 PROCEDURE — 77030021593 HC FCPS BIOP ENDOSC BSC -A: Performed by: SURGERY

## 2021-03-31 RX ORDER — OXYCODONE AND ACETAMINOPHEN 5; 325 MG/1; MG/1
1 TABLET ORAL
Status: DISCONTINUED | OUTPATIENT
Start: 2021-03-31 | End: 2021-03-31 | Stop reason: HOSPADM

## 2021-03-31 RX ORDER — PROPOFOL 10 MG/ML
INJECTION, EMULSION INTRAVENOUS AS NEEDED
Status: DISCONTINUED | OUTPATIENT
Start: 2021-03-31 | End: 2021-03-31 | Stop reason: HOSPADM

## 2021-03-31 RX ORDER — DIPHENHYDRAMINE HYDROCHLORIDE 50 MG/ML
12.5 INJECTION, SOLUTION INTRAMUSCULAR; INTRAVENOUS AS NEEDED
Status: DISCONTINUED | OUTPATIENT
Start: 2021-03-31 | End: 2021-03-31 | Stop reason: HOSPADM

## 2021-03-31 RX ORDER — HYDROMORPHONE HYDROCHLORIDE 1 MG/ML
.2-.5 INJECTION, SOLUTION INTRAMUSCULAR; INTRAVENOUS; SUBCUTANEOUS ONCE
Status: DISCONTINUED | OUTPATIENT
Start: 2021-03-31 | End: 2021-03-31 | Stop reason: HOSPADM

## 2021-03-31 RX ORDER — LIDOCAINE HYDROCHLORIDE 10 MG/ML
0.1 INJECTION, SOLUTION EPIDURAL; INFILTRATION; INTRACAUDAL; PERINEURAL AS NEEDED
Status: DISCONTINUED | OUTPATIENT
Start: 2021-03-31 | End: 2021-03-31 | Stop reason: HOSPADM

## 2021-03-31 RX ORDER — SODIUM CHLORIDE, SODIUM LACTATE, POTASSIUM CHLORIDE, CALCIUM CHLORIDE 600; 310; 30; 20 MG/100ML; MG/100ML; MG/100ML; MG/100ML
25 INJECTION, SOLUTION INTRAVENOUS CONTINUOUS
Status: DISCONTINUED | OUTPATIENT
Start: 2021-03-31 | End: 2021-03-31 | Stop reason: HOSPADM

## 2021-03-31 RX ORDER — LIDOCAINE HYDROCHLORIDE 20 MG/ML
INJECTION, SOLUTION EPIDURAL; INFILTRATION; INTRACAUDAL; PERINEURAL AS NEEDED
Status: DISCONTINUED | OUTPATIENT
Start: 2021-03-31 | End: 2021-03-31 | Stop reason: HOSPADM

## 2021-03-31 RX ORDER — FENTANYL CITRATE 50 UG/ML
25 INJECTION, SOLUTION INTRAMUSCULAR; INTRAVENOUS
Status: DISCONTINUED | OUTPATIENT
Start: 2021-03-31 | End: 2021-03-31 | Stop reason: HOSPADM

## 2021-03-31 RX ORDER — SODIUM CHLORIDE 0.9 % (FLUSH) 0.9 %
5-40 SYRINGE (ML) INJECTION EVERY 8 HOURS
Status: DISCONTINUED | OUTPATIENT
Start: 2021-03-31 | End: 2021-03-31 | Stop reason: HOSPADM

## 2021-03-31 RX ORDER — SODIUM CHLORIDE 0.9 % (FLUSH) 0.9 %
5-40 SYRINGE (ML) INJECTION AS NEEDED
Status: DISCONTINUED | OUTPATIENT
Start: 2021-03-31 | End: 2021-03-31 | Stop reason: HOSPADM

## 2021-03-31 RX ORDER — MORPHINE SULFATE 10 MG/ML
2 INJECTION, SOLUTION INTRAMUSCULAR; INTRAVENOUS
Status: DISCONTINUED | OUTPATIENT
Start: 2021-03-31 | End: 2021-03-31 | Stop reason: HOSPADM

## 2021-03-31 RX ORDER — ONDANSETRON 2 MG/ML
4 INJECTION INTRAMUSCULAR; INTRAVENOUS AS NEEDED
Status: DISCONTINUED | OUTPATIENT
Start: 2021-03-31 | End: 2021-03-31 | Stop reason: HOSPADM

## 2021-03-31 RX ADMIN — SODIUM CHLORIDE, POTASSIUM CHLORIDE, SODIUM LACTATE AND CALCIUM CHLORIDE 25 ML/HR: 600; 310; 30; 20 INJECTION, SOLUTION INTRAVENOUS at 13:38

## 2021-03-31 RX ADMIN — PROPOFOL 100 MG: 10 INJECTION, EMULSION INTRAVENOUS at 14:03

## 2021-03-31 RX ADMIN — PROPOFOL 50 MG: 10 INJECTION, EMULSION INTRAVENOUS at 14:09

## 2021-03-31 RX ADMIN — LIDOCAINE HYDROCHLORIDE 40 MG: 20 INJECTION, SOLUTION EPIDURAL; INFILTRATION; INTRACAUDAL; PERINEURAL at 14:03

## 2021-03-31 RX ADMIN — PROPOFOL 50 MG: 10 INJECTION, EMULSION INTRAVENOUS at 14:05

## 2021-03-31 RX ADMIN — PROPOFOL 50 MG: 10 INJECTION, EMULSION INTRAVENOUS at 14:12
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
## 2021-03-31 NOTE — PERIOP NOTES
Permission received to review discharge instructions with Mother and will be with patient for 24 hours

## 2021-03-31 NOTE — ANESTHESIA POSTPROCEDURE EVALUATION
Procedure(s):  POUCHOSCOPY  COLON BIOPSY.     general - backup, general    Anesthesia Post Evaluation      Multimodal analgesia: multimodal analgesia not used between 6 hours prior to anesthesia start to PACU discharge  Patient location during evaluation: PACU  Patient participation: complete - patient participated  Level of consciousness: awake  Pain score: 0  Airway patency: patent  Anesthetic complications: no  Cardiovascular status: acceptable  Respiratory status: acceptable  Hydration status: acceptable  Post anesthesia nausea and vomiting:  none  Final Post Anesthesia Temperature Assessment:  Normothermia (36.0-37.5 degrees C)      INITIAL Post-op Vital signs:   Vitals Value Taken Time   /85 03/31/21 1445   Temp 36.3 °C (97.4 °F) 03/31/21 1430   Pulse 73 03/31/21 1445   Resp 13 03/31/21 1445   SpO2 99 % 03/31/21 1445

## 2021-03-31 NOTE — DISCHARGE INSTRUCTIONS
Wendi Malagon  [id number]  1970    COLON DISCHARGE INSTRUCTIONS  Discomfort:  Redness at IV site- apply warm compress to area; if redness or soreness persist- contact your physician  There may be a slight amount of blood passed from the rectum  Gaseous discomfort- walking, belching will help relieve any discomfort  You may not operate a vehicle for 12 hours  You may not engage in an occupation involving machinery or appliances for rest of today  You may not drink alcoholic beverages for at least 12 hours  Avoid making any critical decisions for at least 24 hour  DIET:   High fiber diet. - however -  remember your colon is empty and a heavy meal will produce gas. Avoid these foods:  vegetables, fried / greasy foods, carbonated drinks for today     ACTIVITY:  You may resume your normal daily activities it is recommended that you spend the remainder of the day resting -  avoid any strenuous activity. CALL M.D. ANY SIGN OF:   Increasing pain, nausea, vomiting  Abdominal distension (swelling)  New increased bleeding (oral or rectal)  Fever (chills)  Pain in chest area  Bloody discharge from nose or mouth  Shortness of breath     Follow-up Instructions:   Call Delilah Lazo MD if any questions or problems. Telephone # [phone]  Biopsy results will be available in  7 to10 days    POUCHOSCOPY FINDINGS:  Your colonoscopy showed: mild inflammation (biopsies taken) and anal fissure with spasm of the anal sphincter muscle. I will prescribe a medication called Diltiazem that will help with anal spasm and help heal the anal fissure (tear)    You can  the diltiazem from [address] [facility] at [facility] [address] CPAP PATIENTS BE SURE TO WEAR MACHINE WHENEVER NAPPING OR SLEEPING.     DISCHARGE SUMMARY from Nurse    The following personal items collected during your admission are returned to you:   Dental Appliance: Dental Appliances: (P) None  Vision: Visual Aid: Glasses  Hearing Aid:    Jewelry: Jewelry: (P) Earrings(Silver loop in patient belonging bag )  Clothing: Clothing: (P) With patient  Other Valuables: Other Valuables: (P) None  Valuables sent to safe:        PATIENT INSTRUCTIONS:    Anesthesia Discharge Instructions for Procedural Area requiring Sedation (MAC Anesthesia, Cath Lab, Endo and Radiology): You have been given medications during your procedure that may affect your memory and mental judgement for the next 24 hours. During this time frame for your safety, please follow the instructions listed below :    Have a responsible adult to drive you home and be with you for at least 12 hours.  Rest today and resume normal activities tomorrow.  Start with a soft bland diet and advance as tolerated to your recommended diet.  Do not drive any motor vehicle or operate mechanical or electrical equipment prior to [employer].  Avoid making critical decisions or signing legal documents prior to 6am tomorrow.  Do not drink alcohol prior to 6am tomorrow.  If you have sleep apnea and you plan to go home and take a nap, please use your CPAP machine not only at bedtime, but also while napping for 24 hours.  If you notice any redness or swelling on parts of your body where IV medications were given, place a warm wet washcloth over the area for 20 minutes at a time until the redness or swelling goes away. If you still have redness or swelling after 2-3 days, please call us. · You will receive a Post Operative Call from one of the Recovery Room Nurses on the day after your surgery to check on you. It is very important for us to know how you are recovering after your surgery. If you have an issue or need to speak with someone, please call your surgeon, do not wait for the post operative call.     · You may receive an e-mail or letter in the mail from [employer] regarding your experience with us in the Ambulatory Surgery Unit. Your feedback is valuable to us and we appreciate your participation in the survey. · We wish you a speedy recovery ? What to do at Home:      *  Please give a list of your current medications to your Primary Care Provider. *  Please update this list whenever your medications are discontinued, doses are      changed, or new medications (including over-the-counter products) are added. *  Please carry medication information at all times in case of emergency situations. If you have not received your influenza and/or pneumococcal vaccine, please follow up with your primary care physician. The discharge information has been reviewed with the patient and caregiver. The patient and caregiver verbalized understanding.

## 2021-03-31 NOTE — PERIOP NOTES
Kristal Nunn  1970  [id number]    Situation:  Verbal report given from: KEVIN Sethi CRNA and SRAVAN Estevez RN  Procedure: Procedure(s):  POUCHOSCOPY  COLON BIOPSY    Background:    Preoperative diagnosis: ANAL PAIN    Postoperative diagnosis: Hypertonic sphincter, Anal fissure, Mild pouchitis    :  Dr. Zeke Moreno    Assistant(s): Circ-1: Lindsay Childers  Scrub Tech-1: Leodan Thompson    Specimens:   ID Type Source Tests Collected by Time Destination   1 : J-Pouch biopsy Preservative OTHER (use comments)  Vadim Ruiz MD 3/31/2021 1628 Pathology       Assessment:  Intra-procedure medications         Anesthesia gave intra-procedure sedation and medications, see anesthesia flow sheet     Intravenous fluids: LR@ KVO     Vital signs stable       Recommendation:    Permission to share finding with kristofer CrossJairo Seth

## 2021-03-31 NOTE — ANESTHESIA PREPROCEDURE EVALUATION
Anesthetic History   No history of anesthetic complications            Review of Systems / Medical History  Patient summary reviewed, nursing notes reviewed and pertinent labs reviewed    Pulmonary            Asthma        Neuro/Psych         Psychiatric history ( anxiety d/o)     Cardiovascular  Within defined limits                Exercise tolerance: >4 METS  Comments: Hypercoagulable  On Eliquis (stopped x 3 days)    H/o multiple DVT  Has post thrombotic syndrome right leg   GI/Hepatic/Renal     GERD ( started back on meds)      PUD    Comments: Ulcerative colitis, s/p multiple surgeries Endo/Other        Morbid obesity and arthritis     Other Findings   Comments: Psoriatic arthritis; controlled           Physical Exam    Airway  Mallampati: II  TM Distance: 4 - 6 cm  Neck ROM: normal range of motion   Mouth opening: Normal     Cardiovascular    Rhythm: regular  Rate: normal         Dental    Dentition: Caps/crowns     Pulmonary  Breath sounds clear to auscultation               Abdominal  GI exam deferred       Other Findings            Anesthetic Plan    ASA: 3  Anesthesia type: MAC and general - backup          Induction: Intravenous  Anesthetic plan and risks discussed with: Patient

## 2021-03-31 NOTE — PROCEDURES
Flexible Pouchoscopy Procedure Note    Procedure: Flexible Pouchoscopy    Pre-Procedure Diagnosis: ANAL PAIN    Post-Procedure Diagnosis: Hypertonic sphinter, Anal fissure, Mild pouchitis, poor bowel prep    Procedure in Detail:   Informed consent was obtained for the procedure. Risks of perforation and hemorrhage were discussed. The patient was placed in the left lateral decubitus position. The anal region was examined and a rectal exam was performed. Then the 60cm flexible sigmoidoscope was inserted and advanced without difficulty to a distance of 40 cm. The preparation was inadequate. The instrument was withdrawn and retroflexed with good views throughout. Findings:    - Hypertonic anal sphincter without stricture of the pouch-cuff anastomosis. Posterior midline anal fissure. - Mild inflammation in scattered areas of the pouch. These red areas were biopsied with cold forceps    Specimens: Pouch biopsies to rule out pouchitis           EBL:  None    Complications:  None; patient tolerated the procedure well.     Impression:      - Mild inflammation   - Fissure and hypertonic sphincter muscle    Recommendations:  Diltiazem ointment  High fiber diet  Hot water sitz baths    Attending Attestation: I was present and scrubbed for the entire procedure

## 2021-04-21 ENCOUNTER — OFFICE VISIT (OUTPATIENT)
Dept: FAMILY MEDICINE CLINIC | Age: 51
End: 2021-04-21
Payer: COMMERCIAL

## 2021-04-21 VITALS
HEART RATE: 80 BPM | RESPIRATION RATE: 16 BRPM | DIASTOLIC BLOOD PRESSURE: 71 MMHG | OXYGEN SATURATION: 98 % | TEMPERATURE: 98 F | SYSTOLIC BLOOD PRESSURE: 125 MMHG | BODY MASS INDEX: 39.6 KG/M2 | WEIGHT: 246.4 LBS | HEIGHT: 66 IN

## 2021-04-21 DIAGNOSIS — F32.A ANXIETY AND DEPRESSION: Primary | ICD-10-CM

## 2021-04-21 DIAGNOSIS — K51.919 ULCERATIVE COLITIS WITH COMPLICATION, UNSPECIFIED LOCATION (HCC): ICD-10-CM

## 2021-04-21 DIAGNOSIS — F51.02 ADJUSTMENT INSOMNIA: ICD-10-CM

## 2021-04-21 DIAGNOSIS — F41.9 ANXIETY AND DEPRESSION: Primary | ICD-10-CM

## 2021-04-21 DIAGNOSIS — Z86.718 HISTORY OF RECURRENT DEEP VEIN THROMBOSIS (DVT): ICD-10-CM

## 2021-04-21 DIAGNOSIS — D68.59 HYPERCOAGULABLE STATE (HCC): ICD-10-CM

## 2021-04-21 DIAGNOSIS — I87.001 POST-THROMBOTIC SYNDROME OF RIGHT LOWER EXTREMITY: ICD-10-CM

## 2021-04-21 PROCEDURE — 99214 OFFICE O/P EST MOD 30 MIN: CPT | Performed by: FAMILY MEDICINE

## 2021-04-21 RX ORDER — AMITRIPTYLINE HYDROCHLORIDE 10 MG/1
10 TABLET, FILM COATED ORAL
Qty: 30 TAB | Refills: 0 | Status: SHIPPED | OUTPATIENT
Start: 2021-04-21

## 2021-04-21 RX ORDER — ESCITALOPRAM OXALATE 10 MG/1
TABLET ORAL
Qty: 30 TAB | Refills: 1 | Status: SHIPPED | OUTPATIENT
Start: 2021-04-21 | End: 2021-08-17

## 2021-04-21 NOTE — PROGRESS NOTES
Chief Complaint   Patient presents with    Medication Evaluation     Xarelto   1. Have you been to the ER, urgent care clinic since your last visit? Hospitalized since your last visit? No    2. Have you seen or consulted any other health care providers outside of the [address] [place] [address] since your last visit? Include any pap smears or colon screening. Yes Where:  Outpatient surgery 3/31/21   Discuss Insomnia

## 2021-04-21 NOTE — PROGRESS NOTES
Bria King (: 1970) is a 44 y.o. female, established patient, here for evaluation of the following chief complaint(s):  Medication Evaluation (Xarelto)       ASSESSMENT/PLAN:  1. Anxiety and depression  2. Adjustment insomnia  Will start on Lexapro and have her work with therapist.  Trial of Elavil at a low dose over the next 30 days to help with sleep and developing better patterns  -     amitriptyline (ELAVIL) 10 mg tablet; Take 1 Tab by mouth nightly., Normal, Disp-30 Tab, R-0  -     escitalopram oxalate (LEXAPRO) 10 mg tablet; Take 1/2 tab by mouth daily x 1 week and then increase to 1 tab daily, Normal, Disp-30 Tab, R-1    3. Ulcerative colitis with complication, unspecified location (HCC)-recent surgery and doing well    4. Hypercoagulable state ([facility] [address]  5. History of recurrent deep vein thrombosis (DVT)  -Able to transition to Xarelto since new CRS is comfortable with this and doing well    6. Post-thrombotic syndrome of right lower extremity-ongoing mild pain      Return in about 6 weeks (around 2021), or if symptoms worsen or fail to improve. SUBJECTIVE/OBJECTIVE:  Doing well today. Recovered from sgy and feeling better. Transitioned well to Xarelto and no trouble with this recently. Off Coumadin given her new colorectal surgeon comfort with DOACs. She is very happy with this transition. She does understand the importance of taking with her Xarelto given her history of recurrent DVTs. Insomnia - 4-5 hrs/night. Sx for a few years. Mostly trouble with waking up and unable falling back asleep. Did take Ambien years ago. Also remembers being on Elavil at one point which significantly helped but does note she had some minimal grogginess in the morning. Does endorse having some significant anxiety and depression symptoms that seem to play a role with this.     ROS  Gen - no fever/chills  Resp - no dyspnea or cough  CV - no chest pain or CARRASQUILLO  Rest per HPI    Blood pressure 125/71, pulse 80, temperature 98 °F (36.7 °C), temperature source Temporal, resp. rate 16, height 5' 5.5\" (1.664 m), weight 246 lb 6.4 oz (111.8 kg), SpO2 98 %. PE  General appearance - alert, well appearing, and in no distress  Eyes -sclera anicteric  Neck - supple, no significant adenopathy, no thyromegaly  Chest - clear to auscultation, no wheezes, rales or rhonchi, symmetric air entry  Heart - normal rate, regular rhythm, normal S1, S2, no murmurs, rubs, clicks or gallops  Neurological - alert, oriented, normal speech, no focal findings or movement disorder noted  Extr -mild right-sided edema and tenderness to palpation  Psych - normal mood and affect    On this date 04/21/2021 I have spent 30 minutes reviewing previous notes, test results and face to face with the patient discussing the diagnosis and importance of compliance with the treatment plan as well as documenting on the day of the visit. An electronic signature was used to authenticate this note.   -- Rico Silva MD

## 2021-05-12 ENCOUNTER — VIRTUAL VISIT (OUTPATIENT)
Dept: ENDOCRINOLOGY | Age: 51
End: 2021-05-12
Payer: COMMERCIAL

## 2021-05-12 DIAGNOSIS — E66.01 MORBID OBESITY, UNSPECIFIED OBESITY TYPE (HCC): Primary | ICD-10-CM

## 2021-05-12 DIAGNOSIS — R73.02 IGT (IMPAIRED GLUCOSE TOLERANCE): ICD-10-CM

## 2021-05-12 PROCEDURE — 99214 OFFICE O/P EST MOD 30 MIN: CPT | Performed by: INTERNAL MEDICINE

## 2021-05-12 RX ORDER — PEN NEEDLE, DIABETIC 31 GX3/16"
NEEDLE, DISPOSABLE MISCELLANEOUS
Qty: 100 PEN NEEDLE | Refills: 3 | Status: SHIPPED | OUTPATIENT
Start: 2021-05-12 | End: 2021-06-02 | Stop reason: ALTCHOICE

## 2021-05-12 RX ORDER — PHENTERMINE HYDROCHLORIDE 37.5 MG/1
TABLET ORAL
Qty: 100 TAB | Refills: 1 | Status: SHIPPED | OUTPATIENT
Start: 2021-05-12 | End: 2021-08-17 | Stop reason: SDUPTHER
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
## 2021-06-02 ENCOUNTER — OFFICE VISIT (OUTPATIENT)
Dept: FAMILY MEDICINE CLINIC | Age: 51
End: 2021-06-02
Payer: COMMERCIAL

## 2021-06-02 VITALS
HEIGHT: 66 IN | DIASTOLIC BLOOD PRESSURE: 66 MMHG | HEART RATE: 77 BPM | TEMPERATURE: 97.6 F | WEIGHT: 242.2 LBS | SYSTOLIC BLOOD PRESSURE: 122 MMHG | OXYGEN SATURATION: 99 % | RESPIRATION RATE: 16 BRPM | BODY MASS INDEX: 38.92 KG/M2

## 2021-06-02 DIAGNOSIS — Z86.718 HISTORY OF RECURRENT DEEP VEIN THROMBOSIS (DVT): ICD-10-CM

## 2021-06-02 DIAGNOSIS — D84.9 IMMUNOSUPPRESSED STATUS (HCC): ICD-10-CM

## 2021-06-02 DIAGNOSIS — L40.50 PSORIATIC ARTHRITIS (HCC): ICD-10-CM

## 2021-06-02 DIAGNOSIS — Z87.11 HISTORY OF PEPTIC ULCER DISEASE: ICD-10-CM

## 2021-06-02 DIAGNOSIS — K51.919 ULCERATIVE COLITIS WITH COMPLICATION, UNSPECIFIED LOCATION (HCC): ICD-10-CM

## 2021-06-02 DIAGNOSIS — K21.9 GASTROESOPHAGEAL REFLUX DISEASE, UNSPECIFIED WHETHER ESOPHAGITIS PRESENT: Primary | ICD-10-CM

## 2021-06-02 PROCEDURE — 99213 OFFICE O/P EST LOW 20 MIN: CPT | Performed by: FAMILY MEDICINE

## 2021-06-02 RX ORDER — PANTOPRAZOLE SODIUM 40 MG/1
40 TABLET, DELAYED RELEASE ORAL DAILY
Qty: 90 TABLET | Refills: 1 | Status: SHIPPED | OUTPATIENT
Start: 2021-06-02 | End: 2022-01-05

## 2021-06-02 NOTE — PROGRESS NOTES
Chief Complaint   Patient presents with    Follow-up     6 week   1. Have you been to the ER, urgent care clinic since your last visit? Hospitalized since your last visit? No    2. Have you seen or consulted any other health care providers outside of the [address] [place] [address] since your last visit? Include any pap smears or colon screening.  No     Discuss  indigestion

## 2021-06-02 NOTE — PROGRESS NOTES
Bria King (: 1970) is a 44 y.o. female, established patient, here for evaluation of the following chief complaint(s):  Follow-up (6 week)       ASSESSMENT/PLAN: Doing well overall. Trial Protonix for GERD. If symptoms still not improving with dietary changes, PPI, and weight loss, will need to follow-up with GI    Below is the assessment and plan developed based on review of pertinent history, physical exam, labs, studies, and medications. 1. Gastroesophageal reflux disease, unspecified whether esophagitis present  -     pantoprazole (PROTONIX) 40 mg tablet; Take 1 Tablet by mouth daily. , Normal, Disp-90 Tablet, R-1  2. History of recurrent deep vein thrombosis (DVT)  3. Psoriatic arthritis ([facility] [address]  4. Ulcerative colitis with complication, unspecified location ([facility] [address]  5. Immunosuppressed status ([facility]  6. History of peptic ulcer disease      Return in about 3 months (around 2021), or if symptoms worsen or fail to improve. SUBJECTIVE/OBJECTIVE:  Doing well overall today. Concerns today. GERD type symptoms. Much worse with tomato-based foods and caffeine. Tried on Prozac in the past but still having symptoms. Having mild regurgitation as well. Doing well otherwise with no significant changes. Seems to be doing very well on Xarelto transitioning from Coumadin. No issues with bleeding and following with Dr. Parminder Benavidez and Dr. Asaf Mancilla regularly    Does have small skin rash on left leg. ROS  Gen - no fever/chills  Resp - no dyspnea or cough  CV - no chest pain or CARRASQUILLO  Rest per HPI    Blood pressure 122/66, pulse 77, temperature 97.6 °F (36.4 °C), temperature source Oral, resp. rate 16, height 5' 5.5\" (1.664 m), weight 242 lb 3.2 oz (109.9 kg), SpO2 99 %.     PE  General appearance - alert, well appearing, and in no distress  Eyes -sclera anicteric  Neck - supple, no significant adenopathy, no thyromegaly  Chest - clear to auscultation, no wheezes, rales or rhonchi, symmetric air entry  Heart - normal rate, regular rhythm, normal S1, S2, no murmurs, rubs, clicks or gallops  Neurological - alert, oriented, normal speech, no focal findings or movement disorder noted  Extr - no edema  Psych - normal mood and affect  Skin - small annular lesion on LLE with central clearing    On this date 06/02/2021 I have spent 20 minutes reviewing previous notes, test results and face to face with the patient discussing the diagnosis and importance of compliance with the treatment plan as well as documenting on the day of the visit. An electronic signature was used to authenticate this note.   -- Reyna Velez MD

## 2021-08-02 DIAGNOSIS — E66.01 MORBID OBESITY, UNSPECIFIED OBESITY TYPE (HCC): ICD-10-CM

## 2021-08-02 DIAGNOSIS — R73.02 IGT (IMPAIRED GLUCOSE TOLERANCE): ICD-10-CM

## 2021-08-16 ENCOUNTER — OFFICE VISIT (OUTPATIENT)
Dept: FAMILY MEDICINE CLINIC | Age: 51
End: 2021-08-16
Payer: COMMERCIAL

## 2021-08-16 VITALS
RESPIRATION RATE: 16 BRPM | BODY MASS INDEX: 38.31 KG/M2 | WEIGHT: 238.4 LBS | HEIGHT: 66 IN | HEART RATE: 78 BPM | SYSTOLIC BLOOD PRESSURE: 130 MMHG | TEMPERATURE: 97.1 F | OXYGEN SATURATION: 99 % | DIASTOLIC BLOOD PRESSURE: 78 MMHG

## 2021-08-16 DIAGNOSIS — Z87.19 HISTORY OF CHRONIC ULCERATIVE COLITIS: ICD-10-CM

## 2021-08-16 DIAGNOSIS — K52.9 ACUTE GASTROENTERITIS: Primary | ICD-10-CM

## 2021-08-16 PROCEDURE — 99213 OFFICE O/P EST LOW 20 MIN: CPT | Performed by: STUDENT IN AN ORGANIZED HEALTH CARE EDUCATION/TRAINING PROGRAM

## 2021-08-16 RX ORDER — DICYCLOMINE HYDROCHLORIDE 10 MG/1
10 CAPSULE ORAL
Qty: 30 CAPSULE | Refills: 0 | Status: SHIPPED | OUTPATIENT
Start: 2021-08-16 | End: 2021-09-29 | Stop reason: ALTCHOICE

## 2021-08-16 RX ORDER — LOPERAMIDE HYDROCHLORIDE 2 MG/1
CAPSULE ORAL
Qty: 30 CAPSULE | Refills: 0 | Status: SHIPPED | OUTPATIENT
Start: 2021-08-16 | End: 2021-09-29 | Stop reason: ALTCHOICE

## 2021-08-16 NOTE — PROGRESS NOTES
[address] [address] CONSU[place]O Bird. [place], [address] [place] [address]  [phone]    C/C:  Abdominal Pain     Kris Guerra is a 46 y.o. female who presents with complaint of abdominal pain. Pt of Dr. Yolanda Davila. Abdominal pain:    Suprapubic and generalized abdominal cramps. Symptoms started 3 days ago few hours after pt went out for dinner where she ate some steak with green beans and salad. Pt reports have diarrhea, goes about 6-8 times per day. Normal BM for her about 4-5 times per day. Had some nausea when symptoms first started but it is now resolved. No blood in her stool. Has a history of Ulcerative colitis and states that she has hard time getting an appointment with GI due to no openings. Last appointment was more than a year ago. Sees Dr. Pradeep Pollard as well as Dr. Jase Milan. Denies rectal bleeding, blood in the urine, flank pain, fever, chills or vomiting. Allergies- reviewed: Allergies   Allergen Reactions    Humira [Adalimumab] Rash     Large red knots    Sulfa (Sulfonamide Antibiotics) Anaphylaxis       Medications- reviewed:   Current Outpatient Medications   Medication Sig    dicyclomine (BENTYL) 10 mg capsule Take 1 Capsule by mouth three (3) times daily as needed for Abdominal Cramps or Cramping.  loperamide (Imodium A-D) 2 mg capsule Take 2 mg as needed with loose stools. Hold if constipated    semaglutide (OZEMPIC) 0.25 mg/0.2 mL (2 mg/1.5 mL) sub-q pen 0.5 mg by SubCUTAneous route every seven (7) days.  pantoprazole (PROTONIX) 40 mg tablet Take 1 Tablet by mouth daily.  phentermine (ADIPEX-P) 37.5 mg tablet TAKE 1/2 (ONE-HALF) TABLET BY MOUTH BEFORE BREAKFAST AND TAKE 1/2 (ONE-HALF) BEFORE DINNER    Xarelto 20 mg tab tablet Take 1 tablet by mouth once daily    amitriptyline (ELAVIL) 10 mg tablet Take 1 Tab by mouth nightly.     folic acid (FOLVITE) 1 mg tablet TAKE 1 TABLET BY MOUTH ONCE A DAY    furosemide (LASIX) 40 mg tablet TAKE 1 TABLET BY MOUTH TWICE DAILY AS NEEDED    triamcinolone acetonide (KENALOG) 0.5 % ointment Apply  to affected area two (2) times a day. use thin layer    CERTOLIZUMAB PEGOL (CIMZIA SC) by SubCUTAneous route. Injection:  Every 4 weeks    methotrexate (RHEUMATREX) 2.5 mg tablet TAKE 8 TABLETS BY MOUTH ONCE PER WEEK    albuterol (PROVENTIL HFA, VENTOLIN HFA, PROAIR HFA) 90 mcg/actuation inhaler Take 1 Puff by inhalation every four (4) hours as needed for Wheezing.  liraglutide, weight loss, (SAXENDA) 3 mg/0.5 mL (18 mg/3 mL) pen 0.5 mL by SubCUTAneous route daily. (Patient not taking: Reported on 6/2/2021)    escitalopram oxalate (LEXAPRO) 10 mg tablet Take 1/2 tab by mouth daily x 1 week and then increase to 1 tab daily (Patient not taking: Reported on 6/2/2021)    diclofenac (VOLTAREN) 1 % gel Apply  to affected area four (4) times daily. No current facility-administered medications for this visit. Past Medical History- reviewed:  Past Medical History:   Diagnosis Date    Anemia associated with acute blood loss 2017    Txd with Blood transfusions x 2. Dr. Alea Garcia.  Asthma childhood    DDD (degenerative disc disease), lumbar     DJD (degenerative joint disease) of knee     JANAE (generalized anxiety disorder) 2018    Dr. Ashanti Perdue    GERD (gastroesophageal reflux disease)     GI bleeding 2017    Dr. Gerson Gonzalez. Txd with Blood transfusions.  History of tuberculosis exposure 2013    POSITIVE PPD TEST. Txd x 6 months; last dose either 11/13 or 12/13    Lower leg DVT (deep venous thromboembolism), acute, right ([address] 2008, 4/27/2016, 08/03/2017    x 3. Initially due to trauma to leg then plane ride home. Dr. Yuni Mancera. Chronic Anticoagulation.  Major depression 2018    Dr. Ashanti Perdue    Morbid [facility]     Orthostatic syncope 2017    due to anemia from blood loss. Dr. Alea Garcia.     Post-thrombotic syndrome of right lower extremity 09/14/2017    w R leg swelling and pain. Dr. Joy Ghotra.  Pseudogout 2016    R knee. Dr. Keena Callaway.  Psoriatic arthritis ([address]     Dr. Josefina Eagle. Txd w Cherylene Berne injections monthly.  PUD (peptic ulcer disease)     Dr. Milburn Kussmaul.  Shingles 2019    R ACW. R upper back previously. Chika Choe.  Skin abrasion 2014    After loosing 125#, Bilateral Inner thigh friction flareup monthly with skin breakdown    Thromboembolus ([address]     Rt leg,  pt states she fell and had a plane ride home and developed blood clots    Ulcerative colitis ([address]     Uterine fibroid 2017    x 4. Dr. Yelena Marroquin. Family History - reviewed:  Family History   Problem Relation Age of Onset    Hypertension Mother     Thyroid Disease Mother         Hypothyroid    Diabetes Mother     Other Mother         GALLSTONES    Cancer Father 27        brain    High Cholesterol Maternal Grandmother     Diabetes Maternal Grandmother     Hypertension Maternal Grandmother     Stroke Maternal Grandmother 80        massive.  Cancer Maternal Grandmother         STOMACH.  Heart Disease Maternal Grandmother     Other Maternal Grandfather         SEVERE ANAPHYLACTIC REACTIONS.  FROM BEE STINGS.     Hypertension Paternal Grandmother     Hypertension Paternal Grandfather     Obesity Paternal Aunt        Social History - reviewed:  Social History     Socioeconomic History    Marital status:      Spouse name: Not on file    Number of children: Not on file    Years of education: Not on file    Highest education level: Not on file   Occupational History    Not on file   Tobacco Use    Smoking status: Never Smoker    Smokeless tobacco: Never Used   Vaping Use    Vaping Use: Never used   Substance and Sexual Activity    Alcohol use: Yes     Comment: not weekly    Drug use: No    Sexual activity: Yes     Partners: Male     Birth control/protection: Surgical     Comment: PHILL   Other Topics Concern    Not on file Social History Narrative    Not on file     Social Determinants of Health     Financial Resource Strain:     Difficulty of Paying Living Expenses:    Food Insecurity:     Worried About Running Out of Food in the Last Year:     [address] in the Last Year:    Transportation Needs:     Lack of Transportation (Medical):  Lack of Transportation (Non-Medical):    Physical Activity:     Days of Exercise per Week:     Minutes of Exercise per Session:    Stress:     Feeling of Stress :    Social Connections:     Frequency of Communication with Friends and Family:     Frequency of Social Gatherings with Friends and Family:     Attends [employer] Services:     Active Member of Clubs or Organizations:     Attends Club or Organization Meetings:     Marital Status:    Intimate Partner Violence:     Fear of Current or Ex-Partner:     Emotionally Abused:     Physically Abused:     Sexually Abused:        Review of Systems:    General/Constitutional:  No fever, chills, sweats, fatigue, night sweats, weakness, weight loss or weight gain   Head: No headache, no trauma   Neck: No swelling, masses, stiffness, pain, or limited movement   Cardiac: No chest pain   Respiratory: No cough, shortness of breath, or dyspnea on exertion   GI: Positive for abdominal pain. No incontinence. No nausea/vomiting, bloody or dark stools  : No incontinence. No change in urinary habits. Objective:     Visit Vitals  /78 (BP 1 Location: Right arm, BP Patient Position: Sitting, BP Cuff Size: Adult)   Pulse 78   Temp 97.1 °F (36.2 °C) (Oral)   Resp 16   Ht 5' 5.5\" (1.664 m)   Wt 238 lb 6.4 oz (108.1 kg)   LMP  (LMP Unknown) Comment: IUD   SpO2 99%   BMI 39.07 kg/m²       General: Alert and oriented and in no acute distress. LUNGS: Clear to auscultation bilaterally, no wheezes, rales and rhonchi. CARDIOVASCULAR: Regular rate and rhythm without murmurs, gallops or rubs.   ABDOMEN: minimal generalized abdominal tenderness to palpation. No mass or rebound. No CVA tenderness or inguinal adenopathy noted  NEUROLOGIC: Speech intact; face symmetrical; moves all extremities equally. SKIN: No rash:    Assessment/Plan:       ICD-10-CM ICD-9-CM    1. Acute gastroenteritis  K52.9 558.9 dicyclomine (BENTYL) 10 mg capsule      loperamide (Imodium A-D) 2 mg capsule   2. History of chronic ulcerative colitis  Z87.19 V12.79 REFERRAL TO GASTROENTEROLOGY       1. Acute gastroenteritis  Tolerating po intake. No blood in the stool.  - dicyclomine (BENTYL) 10 mg capsule; Take 1 Capsule by mouth three (3) times daily as needed for Abdominal Cramps or Cramping.    - loperamide (Imodium A-D) 2 mg capsule; Take 2 mg as needed with loose stools. Hold if constipated  - Advised the patient to try ginger ale to help with the symptoms.   - Explained that symptoms will improve on its own and may last few more days.   - Encouraged to continue to drink plenty of fluids  - Advised to call the office or go to ER if symptoms are not improving, new symptoms or worsening of current symptoms    2. History of chronic ulcerative colitis  - REFERRAL TO GASTROENTEROLOGY - We called to schedule appointment on behalf of patient. Appointment on 8/18/21    Follow up: with pcp. RTC to clinic sooner should symptoms persist, worsen or fail to improve as anticipated. On this date 08/16/21 I have spent 25 minutes reviewing previous notes, test results and face to face with the patient discussing the diagnosis and importance of compliance with the treatment plan as well as documenting on the day of the visit. We discussed the expected course, resolution and complications of the diagnosis(es) in detail. Medication risks, benefits, costs, interactions, and alternatives were discussed as indicated. I advised to contact the office if his condition worsens, changes or fails to improve as anticipated. Pt expressed understanding with the diagnosis(es) and plan.  Patient understands that this encounter was a temporary measure, and the importance of further follow up and examination was emphasized. Patient verbalized understanding.       Signed By: Litzy Cárdenas MD     August 16, 2021

## 2021-08-16 NOTE — PROGRESS NOTES
Name and  Verified. Patient of Dr. Clint Cruz also saw Dr. Quinn Moses verified      Chief Complaint   Patient presents with    Abdominal Pain     x 3 days     Patient did not want to go to ED on 2021 as advised by Dr. Jennifer Chowdary on call Physician. Denies N/V/D. Pain really starts after she drinks or eats, so has not been eating a lot. 1. Have you been to the ER, urgent care clinic since your last visit? Hospitalized since your last visit? No    2. Have you seen or consulted any other health care providers outside of the [address] [place] [address] since your last visit? Include any pap smears or colon screening.  No

## 2021-08-17 ENCOUNTER — OFFICE VISIT (OUTPATIENT)
Dept: ENDOCRINOLOGY | Age: 51
End: 2021-08-17
Payer: COMMERCIAL

## 2021-08-17 VITALS
HEIGHT: 66 IN | WEIGHT: 237.4 LBS | SYSTOLIC BLOOD PRESSURE: 121 MMHG | BODY MASS INDEX: 38.15 KG/M2 | DIASTOLIC BLOOD PRESSURE: 69 MMHG | HEART RATE: 68 BPM

## 2021-08-17 DIAGNOSIS — E66.01 MORBID OBESITY, UNSPECIFIED OBESITY TYPE (HCC): Primary | ICD-10-CM

## 2021-08-17 DIAGNOSIS — R73.02 IGT (IMPAIRED GLUCOSE TOLERANCE): ICD-10-CM

## 2021-08-17 PROCEDURE — 99214 OFFICE O/P EST MOD 30 MIN: CPT | Performed by: INTERNAL MEDICINE

## 2021-08-17 RX ORDER — CERTOLIZUMAB PEGOL 200 MG/ML
INJECTION, SOLUTION SUBCUTANEOUS
COMMUNITY
Start: 2021-08-03 | End: 2021-09-02 | Stop reason: SDUPTHER

## 2021-08-17 RX ORDER — PHENTERMINE HYDROCHLORIDE 37.5 MG/1
TABLET ORAL
Qty: 100 TABLET | Refills: 1 | Status: SHIPPED | OUTPATIENT
Start: 2021-08-17 | End: 2021-10-12 | Stop reason: ALTCHOICE
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
## 2021-08-19 LAB
ALBUMIN SERPL-MCNC: 4.3 G/DL (ref 3.8–4.9)
ALBUMIN/GLOB SERPL: 1.7 {RATIO} (ref 1.2–2.2)
ALP SERPL-CCNC: 58 IU/L (ref 48–121)
ALT SERPL-CCNC: 8 IU/L (ref 0–32)
AST SERPL-CCNC: 11 IU/L (ref 0–40)
BILIRUB SERPL-MCNC: 0.9 MG/DL (ref 0–1.2)
BUN SERPL-MCNC: 10 MG/DL (ref 6–24)
BUN/CREAT SERPL: 12 (ref 9–23)
CALCIUM SERPL-MCNC: 9.4 MG/DL (ref 8.7–10.2)
CHLORIDE SERPL-SCNC: 105 MMOL/L (ref 96–106)
CHOLEST SERPL-MCNC: 207 MG/DL (ref 100–199)
CO2 SERPL-SCNC: 25 MMOL/L (ref 20–29)
CREAT SERPL-MCNC: 0.84 MG/DL (ref 0.57–1)
ERYTHROCYTE [DISTWIDTH] IN BLOOD BY AUTOMATED COUNT: 13.1 % (ref 11.7–15.4)
EST. AVERAGE GLUCOSE BLD GHB EST-MCNC: 105 MG/DL
GLOBULIN SER CALC-MCNC: 2.5 G/DL (ref 1.5–4.5)
GLUCOSE SERPL-MCNC: 87 MG/DL (ref 65–99)
HBA1C MFR BLD: 5.3 % (ref 4.8–5.6)
HCT VFR BLD AUTO: 40.3 % (ref 34–46.6)
HDLC SERPL-MCNC: 72 MG/DL
HGB BLD-MCNC: 13.4 G/DL (ref 11.1–15.9)
IMP & REVIEW OF LAB RESULTS: NORMAL
LDLC SERPL CALC-MCNC: 122 MG/DL (ref 0–99)
MCH RBC QN AUTO: 31 PG (ref 26.6–33)
MCHC RBC AUTO-ENTMCNC: 33.3 G/DL (ref 31.5–35.7)
MCV RBC AUTO: 93 FL (ref 79–97)
PLATELET # BLD AUTO: 281 X10E3/UL (ref 150–450)
POTASSIUM SERPL-SCNC: 4.2 MMOL/L (ref 3.5–5.2)
PROT SERPL-MCNC: 6.8 G/DL (ref 6–8.5)
RBC # BLD AUTO: 4.32 X10E6/UL (ref 3.77–5.28)
SODIUM SERPL-SCNC: 140 MMOL/L (ref 134–144)
TRIGL SERPL-MCNC: 70 MG/DL (ref 0–149)
VLDLC SERPL CALC-MCNC: 13 MG/DL (ref 5–40)
WBC # BLD AUTO: 3.5 X10E3/UL (ref 3.4–10.8)

## 2021-08-23 ENCOUNTER — TRANSCRIBE ORDER (OUTPATIENT)
Dept: SCHEDULING | Age: 51
End: 2021-08-23

## 2021-08-23 DIAGNOSIS — R10.13 EPIGASTRIC PAIN: ICD-10-CM

## 2021-08-23 DIAGNOSIS — K21.9 GASTROESOPHAGEAL REFLUX DISEASE: ICD-10-CM

## 2021-08-23 DIAGNOSIS — R19.7 DIARRHEA, UNSPECIFIED: Primary | ICD-10-CM

## 2021-08-23 DIAGNOSIS — Z79.01 CHRONIC ANTICOAGULATION: ICD-10-CM

## 2021-08-23 DIAGNOSIS — R10.814 LEFT LOWER QUADRANT ABDOMINAL TENDERNESS: ICD-10-CM

## 2021-08-23 DIAGNOSIS — R13.19 ESOPHAGEAL DYSPHAGIA: ICD-10-CM

## 2021-08-26 ENCOUNTER — HOSPITAL ENCOUNTER (OUTPATIENT)
Dept: CT IMAGING | Age: 51
Discharge: HOME | End: 2021-08-26
Attending: SPECIALIST
Payer: COMMERCIAL

## 2021-08-26 DIAGNOSIS — R13.19 ESOPHAGEAL DYSPHAGIA: ICD-10-CM

## 2021-08-26 DIAGNOSIS — K21.9 GASTROESOPHAGEAL REFLUX DISEASE: ICD-10-CM

## 2021-08-26 DIAGNOSIS — Z79.01 CHRONIC ANTICOAGULATION: ICD-10-CM

## 2021-08-26 DIAGNOSIS — R10.13 EPIGASTRIC PAIN: ICD-10-CM

## 2021-08-26 DIAGNOSIS — R10.814 LEFT LOWER QUADRANT ABDOMINAL TENDERNESS: ICD-10-CM

## 2021-08-26 DIAGNOSIS — R19.7 DIARRHEA, UNSPECIFIED: ICD-10-CM

## 2021-08-26 PROCEDURE — 74177 CT ABD & PELVIS W/CONTRAST: CPT

## 2021-08-26 PROCEDURE — 74011000636 HC RX REV CODE- 636: Performed by: SPECIALIST

## 2021-08-26 RX ORDER — SODIUM CHLORIDE 0.9 % (FLUSH) 0.9 %
5-10 SYRINGE (ML) INJECTION
Status: COMPLETED | OUTPATIENT
Start: 2021-08-26 | End: 2021-08-26

## 2021-08-26 RX ADMIN — Medication 10 ML: at 10:56

## 2021-08-26 RX ADMIN — IOPAMIDOL 100 ML: 755 INJECTION, SOLUTION INTRAVENOUS at 10:56

## 2021-08-27 ENCOUNTER — TRANSCRIBE ORDER (OUTPATIENT)
Dept: SCHEDULING | Age: 51
End: 2021-08-27

## 2021-08-27 DIAGNOSIS — R10.814 LEFT LOWER QUADRANT ABDOMINAL TENDERNESS: ICD-10-CM

## 2021-08-27 DIAGNOSIS — K21.9 GASTROESOPHAGEAL REFLUX DISEASE: ICD-10-CM

## 2021-08-27 DIAGNOSIS — R10.13 EPIGASTRIC PAIN: ICD-10-CM

## 2021-08-27 DIAGNOSIS — R19.7 DIARRHEA, UNSPECIFIED: ICD-10-CM

## 2021-08-27 DIAGNOSIS — R13.19 OTHER DYSPHAGIA: ICD-10-CM

## 2021-08-27 DIAGNOSIS — Z79.01 CHRONIC ANTICOAGULATION: Primary | ICD-10-CM

## 2021-09-02 ENCOUNTER — VIRTUAL VISIT (OUTPATIENT)
Dept: FAMILY MEDICINE CLINIC | Age: 51
End: 2021-09-02
Payer: COMMERCIAL

## 2021-09-02 DIAGNOSIS — R06.00 DYSPNEA, UNSPECIFIED TYPE: ICD-10-CM

## 2021-09-02 DIAGNOSIS — R07.9 CHEST PAIN, UNSPECIFIED TYPE: ICD-10-CM

## 2021-09-02 DIAGNOSIS — U07.1 COVID-19: Primary | ICD-10-CM

## 2021-09-02 DIAGNOSIS — R00.0 TACHYCARDIA: ICD-10-CM

## 2021-09-02 DIAGNOSIS — Z86.718 HISTORY OF RECURRENT DEEP VEIN THROMBOSIS (DVT): ICD-10-CM

## 2021-09-02 PROCEDURE — 99214 OFFICE O/P EST MOD 30 MIN: CPT | Performed by: FAMILY MEDICINE

## 2021-09-02 RX ORDER — FAMOTIDINE 40 MG/1
TABLET, FILM COATED ORAL
COMMUNITY
Start: 2021-08-18

## 2021-09-02 NOTE — PROGRESS NOTES
Kris Guerra (: 1970) is a 46 y.o. female, established patient, here for evaluation of the following chief complaint(s):   Positive For Covid-19 (Follow-up)       ASSESSMENT/PLAN: sending for CTA Chest as outpt with her chest pain, tachycardia, dyspnea, and hx of recurrent DVTs to r/o PE. She is COVID +. Below is the assessment and plan developed based on review of pertinent history, labs, studies, and medications. 1. COVID-19  2. Chest pain, unspecified type  3. Tachycardia  4. History of recurrent deep vein thrombosis (DVT)  5. Dyspnea, unspecified type      Return in about 1 week (around 2021), or if symptoms worsen or fail to improve. SUBJECTIVE/OBJECTIVE:    COVID 19 infection   tested + on 21. Sx of HA, chills, mild nasal congestion and cough on 21. Had Pfizer vaccine but is immunosuppressed and has not had 3rd booster dose. Son tested + prior to this. Other household members negative. Mildly dyspneic but better with rest.  Has pulse ox and running 92-95%. Having daily chest pain on left side. Worse with getting up and moving around. No radiation, associated sx except for dyspnea. Does report some tachycardia with pulse in 100-110 range. Numerous recurrent DVTs in R leg. On xarelto daily.     ROS per HPI    No data recorded     Physical exam:  General appearance - alert, well appearing, and in no distress  Eyes -sclera anicteric, no discharge  HEENT normocephalic, atraumatic, moist mucous membranes, no visualized neck mass  Chest -normal respiratory effort, no visualized signs of respiratory distress  Neurological - alert, awake, normal speech, no focal findings or movement disorder noted  Psych - normal mood and affect  Skin no apparent lesions    On this date 2021 I have spent 30 minutes reviewing previous notes, test results and face to face (virtual) with the patient discussing the diagnosis and importance of compliance with the treatment plan as well as documenting on the day of the visit. Dorcas Pinto, was evaluated through a synchronous (real-time) audio-video encounter. The patient (or guardian if applicable) is aware that this is a billable service. Verbal consent to proceed has been obtained within the past 12 months. The visit was conducted pursuant to the emergency declaration under the [address] [place] [address] and the [employer]. Patient identification was verified, and a caregiver was present when appropriate. The patient was located in a state where the provider was credentialed to provide care. An electronic signature was used to authenticate this note.   -- Ken Bermeo MD

## 2021-09-02 NOTE — PROGRESS NOTES
Chief Complaint   Patient presents with    Positive For Covid-19     Follow-up   1. Have you been to the ER, urgent care clinic since your last visit? Hospitalized since your last visit? Yes [facility] Covid test    2. Have you seen or consulted any other health care providers outside of the [address] [place] [address] since your last visit? Include any pap smears or colon screening.  Yes Where: Dr Micki Taveras sat 92%

## 2021-09-07 ENCOUNTER — APPOINTMENT (OUTPATIENT)
Dept: GENERAL RADIOLOGY | Age: 51
End: 2021-09-07
Attending: EMERGENCY MEDICINE
Payer: COMMERCIAL

## 2021-09-07 ENCOUNTER — HOSPITAL ENCOUNTER (OUTPATIENT)
Age: 51
Setting detail: OBSERVATION
Discharge: HOME HEALTH | End: 2021-09-10
Attending: EMERGENCY MEDICINE | Admitting: INTERNAL MEDICINE
Payer: COMMERCIAL

## 2021-09-07 ENCOUNTER — APPOINTMENT (OUTPATIENT)
Dept: CT IMAGING | Age: 51
End: 2021-09-07
Attending: EMERGENCY MEDICINE
Payer: COMMERCIAL

## 2021-09-07 DIAGNOSIS — J12.82 PNEUMONIA DUE TO COVID-19 VIRUS: Primary | ICD-10-CM

## 2021-09-07 DIAGNOSIS — J96.01 ACUTE RESPIRATORY FAILURE WITH HYPOXIA (HCC): ICD-10-CM

## 2021-09-07 DIAGNOSIS — U07.1 PNEUMONIA DUE TO COVID-19 VIRUS: Primary | ICD-10-CM

## 2021-09-07 LAB
ALBUMIN SERPL-MCNC: 3.5 G/DL (ref 3.5–5)
ALBUMIN/GLOB SERPL: 0.8 {RATIO} (ref 1.1–2.2)
ALP SERPL-CCNC: 60 U/L (ref 45–117)
ALT SERPL-CCNC: 26 U/L (ref 12–78)
ANION GAP SERPL CALC-SCNC: 6 MMOL/L (ref 5–15)
AST SERPL-CCNC: 16 U/L (ref 15–37)
BASOPHILS # BLD: 0 K/UL (ref 0–0.1)
BASOPHILS NFR BLD: 1 % (ref 0–1)
BILIRUB SERPL-MCNC: 1.2 MG/DL (ref 0.2–1)
BUN SERPL-MCNC: 13 MG/DL (ref 6–20)
BUN/CREAT SERPL: 12 (ref 12–20)
CALCIUM SERPL-MCNC: 9.7 MG/DL (ref 8.5–10.1)
CHLORIDE SERPL-SCNC: 102 MMOL/L (ref 97–108)
CO2 SERPL-SCNC: 30 MMOL/L (ref 21–32)
CREAT SERPL-MCNC: 1.05 MG/DL (ref 0.55–1.02)
D DIMER PPP FEU-MCNC: 0.39 MG/L FEU (ref 0–0.65)
DIFFERENTIAL METHOD BLD: ABNORMAL
EOSINOPHIL # BLD: 0.1 K/UL (ref 0–0.4)
EOSINOPHIL NFR BLD: 2 % (ref 0–7)
ERYTHROCYTE [DISTWIDTH] IN BLOOD BY AUTOMATED COUNT: 13 % (ref 11.5–14.5)
GLOBULIN SER CALC-MCNC: 4.6 G/DL (ref 2–4)
GLUCOSE SERPL-MCNC: 109 MG/DL (ref 65–100)
HCT VFR BLD AUTO: 44.8 % (ref 35–47)
HGB BLD-MCNC: 14.6 G/DL (ref 11.5–16)
IMM GRANULOCYTES # BLD AUTO: 0 K/UL (ref 0–0.04)
IMM GRANULOCYTES NFR BLD AUTO: 0 % (ref 0–0.5)
LYMPHOCYTES # BLD: 1.9 K/UL (ref 0.8–3.5)
LYMPHOCYTES NFR BLD: 46 % (ref 12–49)
MAGNESIUM SERPL-MCNC: 2.1 MG/DL (ref 1.6–2.4)
MCH RBC QN AUTO: 30.7 PG (ref 26–34)
MCHC RBC AUTO-ENTMCNC: 32.6 G/DL (ref 30–36.5)
MCV RBC AUTO: 94.1 FL (ref 80–99)
MONOCYTES # BLD: 0.6 K/UL (ref 0–1)
MONOCYTES NFR BLD: 14 % (ref 5–13)
NEUTS SEG # BLD: 1.6 K/UL (ref 1.8–8)
NEUTS SEG NFR BLD: 37 % (ref 32–75)
NRBC # BLD: 0 K/UL (ref 0–0.01)
NRBC BLD-RTO: 0 PER 100 WBC
PLATELET # BLD AUTO: 304 K/UL (ref 150–400)
PMV BLD AUTO: 10.4 FL (ref 8.9–12.9)
POTASSIUM SERPL-SCNC: 4 MMOL/L (ref 3.5–5.1)
PROT SERPL-MCNC: 8.1 G/DL (ref 6.4–8.2)
RBC # BLD AUTO: 4.76 M/UL (ref 3.8–5.2)
SODIUM SERPL-SCNC: 138 MMOL/L (ref 136–145)
TROPONIN I SERPL-MCNC: <0.05 NG/ML
WBC # BLD AUTO: 4.2 K/UL (ref 3.6–11)

## 2021-09-07 PROCEDURE — 83735 ASSAY OF MAGNESIUM: CPT

## 2021-09-07 PROCEDURE — 99285 EMERGENCY DEPT VISIT HI MDM: CPT

## 2021-09-07 PROCEDURE — 84484 ASSAY OF TROPONIN QUANT: CPT

## 2021-09-07 PROCEDURE — 85379 FIBRIN DEGRADATION QUANT: CPT

## 2021-09-07 PROCEDURE — 85025 COMPLETE CBC W/AUTO DIFF WBC: CPT

## 2021-09-07 PROCEDURE — 74011000636 HC RX REV CODE- 636: Performed by: EMERGENCY MEDICINE

## 2021-09-07 PROCEDURE — 71046 X-RAY EXAM CHEST 2 VIEWS: CPT

## 2021-09-07 PROCEDURE — 71275 CT ANGIOGRAPHY CHEST: CPT

## 2021-09-07 PROCEDURE — 96374 THER/PROPH/DIAG INJ IV PUSH: CPT

## 2021-09-07 PROCEDURE — 80053 COMPREHEN METABOLIC PANEL: CPT

## 2021-09-07 PROCEDURE — 93005 ELECTROCARDIOGRAM TRACING: CPT

## 2021-09-07 PROCEDURE — 36415 COLL VENOUS BLD VENIPUNCTURE: CPT

## 2021-09-07 RX ADMIN — IOPAMIDOL 100 ML: 755 INJECTION, SOLUTION INTRAVENOUS at 23:06

## 2021-09-08 PROBLEM — U07.1 PNEUMONIA DUE TO COVID-19 VIRUS: Status: ACTIVE | Noted: 2021-09-08

## 2021-09-08 PROBLEM — J12.82 PNEUMONIA DUE TO COVID-19 VIRUS: Status: ACTIVE | Noted: 2021-09-08

## 2021-09-08 PROBLEM — R07.81 PLEURITIC CHEST PAIN: Status: ACTIVE | Noted: 2021-09-08

## 2021-09-08 PROBLEM — R09.02 HYPOXIA: Status: ACTIVE | Noted: 2021-09-08

## 2021-09-08 PROBLEM — Z79.01 ANTICOAGULATED ON COUMADIN: Status: ACTIVE | Noted: 2021-09-08

## 2021-09-08 PROBLEM — R06.09 DYSPNEA ON EXERTION: Status: ACTIVE | Noted: 2021-09-08

## 2021-09-08 LAB
ATRIAL RATE: 89 BPM
CALCULATED P AXIS, ECG09: 61 DEGREES
CALCULATED R AXIS, ECG10: -30 DEGREES
CALCULATED T AXIS, ECG11: 28 DEGREES
CRP SERPL-MCNC: <0.29 MG/DL (ref 0–0.6)
DIAGNOSIS, 93000: NORMAL
LACTATE BLD-SCNC: 1.07 MMOL/L (ref 0.4–2)
MAGNESIUM SERPL-MCNC: 2 MG/DL (ref 1.6–2.4)
P-R INTERVAL, ECG05: 158 MS
PROCALCITONIN SERPL-MCNC: <0.05 NG/ML
Q-T INTERVAL, ECG07: 368 MS
QRS DURATION, ECG06: 100 MS
QTC CALCULATION (BEZET), ECG08: 447 MS
TROPONIN I SERPL-MCNC: <0.05 NG/ML
VENTRICULAR RATE, ECG03: 89 BPM

## 2021-09-08 PROCEDURE — 74011250636 HC RX REV CODE- 250/636: Performed by: HOSPITALIST

## 2021-09-08 PROCEDURE — 74011000258 HC RX REV CODE- 258: Performed by: HOSPITALIST

## 2021-09-08 PROCEDURE — 83605 ASSAY OF LACTIC ACID: CPT

## 2021-09-08 PROCEDURE — 36415 COLL VENOUS BLD VENIPUNCTURE: CPT

## 2021-09-08 PROCEDURE — 96365 THER/PROPH/DIAG IV INF INIT: CPT

## 2021-09-08 PROCEDURE — 94762 N-INVAS EAR/PLS OXIMTRY CONT: CPT

## 2021-09-08 PROCEDURE — 96374 THER/PROPH/DIAG INJ IV PUSH: CPT

## 2021-09-08 PROCEDURE — 86140 C-REACTIVE PROTEIN: CPT

## 2021-09-08 PROCEDURE — 84484 ASSAY OF TROPONIN QUANT: CPT

## 2021-09-08 PROCEDURE — 65270000029 HC RM PRIVATE

## 2021-09-08 PROCEDURE — 74011250636 HC RX REV CODE- 250/636: Performed by: EMERGENCY MEDICINE

## 2021-09-08 PROCEDURE — 94640 AIRWAY INHALATION TREATMENT: CPT

## 2021-09-08 PROCEDURE — 96368 THER/DIAG CONCURRENT INF: CPT

## 2021-09-08 PROCEDURE — 83735 ASSAY OF MAGNESIUM: CPT

## 2021-09-08 PROCEDURE — 87040 BLOOD CULTURE FOR BACTERIA: CPT

## 2021-09-08 PROCEDURE — 87899 AGENT NOS ASSAY W/OPTIC: CPT

## 2021-09-08 PROCEDURE — 74011250637 HC RX REV CODE- 250/637: Performed by: HOSPITALIST

## 2021-09-08 PROCEDURE — 84145 PROCALCITONIN (PCT): CPT

## 2021-09-08 PROCEDURE — 96375 TX/PRO/DX INJ NEW DRUG ADDON: CPT

## 2021-09-08 RX ORDER — ACETAMINOPHEN 325 MG/1
650 TABLET ORAL
Status: DISCONTINUED | OUTPATIENT
Start: 2021-09-08 | End: 2021-09-10 | Stop reason: HOSPADM

## 2021-09-08 RX ORDER — ONDANSETRON 2 MG/ML
4 INJECTION INTRAMUSCULAR; INTRAVENOUS
Status: DISCONTINUED | OUTPATIENT
Start: 2021-09-08 | End: 2021-09-10 | Stop reason: HOSPADM

## 2021-09-08 RX ORDER — DEXAMETHASONE SODIUM PHOSPHATE 4 MG/ML
6 INJECTION, SOLUTION INTRA-ARTICULAR; INTRALESIONAL; INTRAMUSCULAR; INTRAVENOUS; SOFT TISSUE
Status: COMPLETED | OUTPATIENT
Start: 2021-09-08 | End: 2021-09-08

## 2021-09-08 RX ORDER — POLYETHYLENE GLYCOL 3350 17 G/17G
17 POWDER, FOR SOLUTION ORAL DAILY PRN
Status: DISCONTINUED | OUTPATIENT
Start: 2021-09-08 | End: 2021-09-10 | Stop reason: HOSPADM

## 2021-09-08 RX ORDER — DEXAMETHASONE SODIUM PHOSPHATE 4 MG/ML
6 INJECTION, SOLUTION INTRA-ARTICULAR; INTRALESIONAL; INTRAMUSCULAR; INTRAVENOUS; SOFT TISSUE EVERY 24 HOURS
Status: DISCONTINUED | OUTPATIENT
Start: 2021-09-08 | End: 2021-09-10 | Stop reason: HOSPADM

## 2021-09-08 RX ORDER — SODIUM CHLORIDE 0.9 % (FLUSH) 0.9 %
5-40 SYRINGE (ML) INJECTION EVERY 8 HOURS
Status: DISCONTINUED | OUTPATIENT
Start: 2021-09-08 | End: 2021-09-10 | Stop reason: HOSPADM

## 2021-09-08 RX ORDER — ONDANSETRON 4 MG/1
4 TABLET, ORALLY DISINTEGRATING ORAL
Status: DISCONTINUED | OUTPATIENT
Start: 2021-09-08 | End: 2021-09-10 | Stop reason: HOSPADM

## 2021-09-08 RX ORDER — AMITRIPTYLINE HYDROCHLORIDE 10 MG/1
10 TABLET, FILM COATED ORAL
Status: DISCONTINUED | OUTPATIENT
Start: 2021-09-08 | End: 2021-09-08

## 2021-09-08 RX ORDER — ALBUTEROL SULFATE 90 UG/1
2 AEROSOL, METERED RESPIRATORY (INHALATION)
Status: DISCONTINUED | OUTPATIENT
Start: 2021-09-08 | End: 2021-09-10

## 2021-09-08 RX ORDER — SODIUM CHLORIDE 0.9 % (FLUSH) 0.9 %
5-40 SYRINGE (ML) INJECTION AS NEEDED
Status: DISCONTINUED | OUTPATIENT
Start: 2021-09-08 | End: 2021-09-10 | Stop reason: HOSPADM

## 2021-09-08 RX ORDER — ACETAMINOPHEN 650 MG/1
650 SUPPOSITORY RECTAL
Status: DISCONTINUED | OUTPATIENT
Start: 2021-09-08 | End: 2021-09-10 | Stop reason: HOSPADM

## 2021-09-08 RX ORDER — FOLIC ACID 1 MG/1
2 TABLET ORAL DAILY
Status: DISCONTINUED | OUTPATIENT
Start: 2021-09-08 | End: 2021-09-10 | Stop reason: HOSPADM

## 2021-09-08 RX ORDER — DEXAMETHASONE SODIUM PHOSPHATE 100 MG/10ML
6 INJECTION INTRAMUSCULAR; INTRAVENOUS EVERY 24 HOURS
Status: DISCONTINUED | OUTPATIENT
Start: 2021-09-08 | End: 2021-09-08

## 2021-09-08 RX ORDER — FAMOTIDINE 20 MG/1
40 TABLET, FILM COATED ORAL DAILY
Status: DISCONTINUED | OUTPATIENT
Start: 2021-09-08 | End: 2021-09-10 | Stop reason: HOSPADM

## 2021-09-08 RX ORDER — BUDESONIDE AND FORMOTEROL FUMARATE DIHYDRATE 80; 4.5 UG/1; UG/1
2 AEROSOL RESPIRATORY (INHALATION)
Status: DISCONTINUED | OUTPATIENT
Start: 2021-09-08 | End: 2021-09-10 | Stop reason: HOSPADM

## 2021-09-08 RX ADMIN — RIVAROXABAN 20 MG: 20 TABLET, FILM COATED ORAL at 08:21

## 2021-09-08 RX ADMIN — ALBUTEROL SULFATE 2 PUFF: 90 AEROSOL, METERED RESPIRATORY (INHALATION) at 04:15

## 2021-09-08 RX ADMIN — ALBUTEROL SULFATE 2 PUFF: 90 AEROSOL, METERED RESPIRATORY (INHALATION) at 23:46

## 2021-09-08 RX ADMIN — ACETAMINOPHEN 650 MG: 325 TABLET ORAL at 09:48

## 2021-09-08 RX ADMIN — CEFTRIAXONE SODIUM 2 G: 2 INJECTION, POWDER, FOR SOLUTION INTRAMUSCULAR; INTRAVENOUS at 02:10

## 2021-09-08 RX ADMIN — ALBUTEROL SULFATE 2 PUFF: 90 AEROSOL, METERED RESPIRATORY (INHALATION) at 12:45

## 2021-09-08 RX ADMIN — Medication 10 ML: at 06:18

## 2021-09-08 RX ADMIN — Medication 10 ML: at 08:28

## 2021-09-08 RX ADMIN — AZITHROMYCIN MONOHYDRATE 500 MG: 500 INJECTION, POWDER, LYOPHILIZED, FOR SOLUTION INTRAVENOUS at 02:16

## 2021-09-08 RX ADMIN — BUDESONIDE AND FORMOTEROL FUMARATE DIHYDRATE 2 PUFF: 80; 4.5 AEROSOL RESPIRATORY (INHALATION) at 23:46

## 2021-09-08 RX ADMIN — Medication 10 ML: at 21:50

## 2021-09-08 RX ADMIN — FAMOTIDINE 40 MG: 20 TABLET, FILM COATED ORAL at 08:20

## 2021-09-08 RX ADMIN — Medication 10 ML: at 04:14

## 2021-09-08 RX ADMIN — FOLIC ACID 2 MG: 1 TABLET ORAL at 08:20

## 2021-09-08 RX ADMIN — DEXAMETHASONE SODIUM PHOSPHATE 6 MG: 4 INJECTION, SOLUTION INTRAMUSCULAR; INTRAVENOUS at 01:54

## 2021-09-08 NOTE — PROGRESS NOTES
Transition of Care Plan:    RUR: 13% low risk for readmission   Disposition: Home with family   Follow up appointments: PCP  DME needed: None anticipated, no DME use at baseline   Transportation at Discharge: Pt's mother  [address] or means to access home: Family will have access to home    IM Medicare Letter: N/A  Is patient a BCPI-A Bundle:   N/A        If yes, was Bundle Letter given?:     Caregiver Contact: Pt's mother/Ophelia Blood p) [phone]  Discharge Caregiver contacted prior to discharge? To be contacted prior to d/c               Reason for Admission:  Pneumonia due to COVID-19 virus                     RUR Score: 13% low risk for readmission                     Plan for utilizing home health: No home health needs identified at this time. PCP: First and Last name:  Mario Vega MD     Name of Practice:    Are you a current patient: Yes/No: Yes   Approximate date of last visit: Last Thursday (9/2) virtually   Can you participate in a virtual visit with your PCP:  Yes                    Current Advanced Directive/Advance Care Plan: Full Code   Kya 13 (ACP) Conversation      Date of Conversation: 9/8/2021  Conducted with: Patient with Decision Making Capacity    Healthcare Decision Maker:     Primary Decision Maker: Fred Vora - Mother - [phone]    Secondary Decision Maker: Stephanie Angulo - Friend - [phone]  Click here to complete [address] including selection of the Healthcare Decision Maker Relationship (ie \"Primary\")      Today we documented Decision Maker(s) consistent with ACP documents on file. Today we completed Advanced Medical Directive for Healthcare. Pt has elected for mother, Ophelia Bishop, to be primary decision maker and for friend, Sherice Evert, to be secondary decision maker. New AMD has been placed on bedside chart to be scanned into electronic medical record.  Decision Makers have been updated in chart. Content/Action Overview: Has ACP document(s) on file - reflects the patient's care preferences  Reviewed DNR/DNI and patient elects Full Code (Attempt Resuscitation)    Length of Voluntary ACP Conversation in minutes:  16 minutes    Karen Richard 95 Decision Maker:      Primary Decision Maker: Rober Martinez -  - [phone]    Secondary Decision Maker: Usama Villalobos - [phone]                  Transition of Care Plan:   Home with family, outpatient follow up appointments    CM reviewed chart. CM completed assessment with pt via room phone due to COVID-19 isolation precautions. CM introduced self/role, verified demographics, and discussed discharge planning. Pt resides with her spouse, mother, and son. She identifies good family support. Pt is independent at baseline, employed, no DME use. Pt's mother will transport pt home at d/c. Pt to transport herself to follow up appointments but also has family support to assist with transportation if she is feeling poorly. Pt can also utilize virtual appointments. PCP last seen last Thursday 9/2 per pt. Pt's pharmacy preference is [facility]IR[facility] MAHARAJ on [address] [place] [address]. No barriers identified with transition of care plan at this time. Care Management Interventions  PCP Verified by CM: Yes  Palliative Care Criteria Met (RRAT>21 & CHF Dx)?: No  Mode of Transport at Discharge:  Other (see comment) (Pt's mother)  Transition of Care Consult (CM Consult): Discharge Planning  Discharge Durable Medical Equipment: No (No DME use)  Physical Therapy Consult: No  Occupational Therapy Consult: No  Speech Therapy Consult: No  Support Systems: Spouse/Significant Other, Parent(s), Child(leydi), Friend/Neighbor (Pt resides with her spouse, mother, and son)  Confirm Follow Up Transport: Family (Self or family depending on how pt is feeling)  Discharge Location  Discharge Placement: Home with family assistance    Unit care management will continue to follow for transition of care planning needs.       Asmita Andre 178, ED [facility]

## 2021-09-08 NOTE — ED NOTES
TRANSFER - IN REPORT:    Verbal report received from Thda (name) on [facility]. Report consisted of patients Situation, Background, Assessment and   Recommendations(SBAR). Information from the following report(s) SBAR and ED Summary was reviewed with the receiving nurse. Opportunity for questions and clarification was provided. Pt resting in stretcher and remains on the monitor x 3, VSS.     0820 Pt medicated per orders. Pt reporting mild SOB at rest - sats normal on RA, intermittent central chest pressure, productive cough, diarrhea, anosmia, mild sore throat and poor appetite. Pt remains afebrile and is resting comfortably in stretcher. Pt reporting she has a hx of UC and that she was evaluated recently for abdominal pain however her symptoms were related to gall bladder polyps or thickening per previous imaging     0940 Pt reporting HA and is medicated w/ prn Tylenol. Pt ambulated to bathroom and provided breakfast tray    1420 Attempted to call report. Patient is being transferred to [facility] General Surgery, Room # 2132. Report given to RN on [facility] for routine progression of care. Report consisted of the following information SBAR, Kardex, ED Summary, MAR and Recent Results. Patient transferred to receiving unit by: transport (RN or tech name). Outstanding consults needed: No     Next labs due: 0400 am    The following personal items will be sent with the patient during transfer to the floor:     All valuables:    Cardiac monitoring ordered: No     The following CURRENT information was reported to the receiving RN:    Code status: Full Code at time of transfer    Last set of vital signs:  Vital Signs  Level of Consciousness: Alert (0) (09/08/21 1137)  Temp: 98.7 °F (37.1 °C) (09/08/21 1137)  Temp Source: Oral (09/08/21 1137)  Pulse (Heart Rate): 84 (09/08/21 1300)  Heart Rate Source: Monitor (09/08/21 0630)  Cardiac Rhythm: Sinus Rhythm (09/08/21 0730)  Resp Rate: 19 (09/08/21 1300)  BP: 121/78 (09/08/21 1300)  MAP (Monitor): 88 (09/08/21 1300)  MAP (Calculated): 92 (09/08/21 1300)  BP 1 Location: Left arm (09/08/21 0630)  BP 1 Method: Automatic (09/08/21 0630)  BP Patient Position: At rest (09/08/21 0630)  MEWS Score: 1 (09/08/21 1137)         Oxygen Therapy  O2 Sat (%): 99 % (09/08/21 1300)  Pulse via Oximetry: 83 beats per minute (09/08/21 1300)  O2 Device: None (Room air) (09/08/21 1137)      Last pain assessment:  Pain 1  Pain Scale 1: Numeric (0 - 10)  Pain Intensity 1: 3  Patient Stated Pain Goal: 0  Pain Reassessment 1: Yes  Pain Location 1: Head  Pain Intervention(s) 1: Medication (see MAR)      Wounds: No     Urinary catheter: voiding - with stand-by assist to bathroom     Is there a campbell order: No     LDAs:       Peripheral IV 09/07/21 Right Antecubital (Active)   Site Assessment Clean, dry, & intact 09/07/21 2236   Phlebitis Assessment 0 09/07/21 2236   Infiltration Assessment 0 09/07/21 2236   Dressing Status Clean, dry, & intact 09/07/21 2236   Dressing Type Tape;Transparent 09/07/21 2236         Opportunity for questions and clarification was provided.     Sofia Benitez RN

## 2021-09-08 NOTE — ED NOTES
26 - Charlie LOPEZ at bedside for eval;;     Gordon Son MD at bedside for reeval;;    0124 - Ambulation trial - pt noted to become symptomatic / hypoxic 85% room air / chest pain while ambulating;;    When laying down: sxs and POX improved to normal;;     0250 - pt sleeping in bed with no acute distress noted at this time - remains on room air at this time with normal POX;;     Hx of ulcerative colitis, DVT on Xarelto    ED visit d.t Chest pain - pt reports being covid positive (+) roughly 10 days after being exposed to son who was covid positive - pt received pfizer vaccine 02/2021. Reports having nasal congestion / congestion / general fatigue / (L) sided CP leading to SOB / HA on and off / N / V / D / abd pain, upper aspect / also reports cramps in her legs consistent with her previous DVTs. Applied to cardiac monitor x4 - normal POX on room air while in bed;; course and diminished lung sounds at this time - mild intermittent coughing, non productive;;    0728 - Bedside shift change report given to Yanci Marrero 69  (oncoming nurse) by Raffaele Graham  (offgoing nurse). Report included the following information SBAR, Kardex, ED Summary, Intake/Output and MAR.
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
## 2021-09-09 PROBLEM — U07.1 COVID-19: Status: ACTIVE | Noted: 2021-09-09

## 2021-09-09 LAB
ANION GAP SERPL CALC-SCNC: 9 MMOL/L (ref 5–15)
BASOPHILS # BLD: 0 K/UL (ref 0–0.1)
BASOPHILS NFR BLD: 1 % (ref 0–1)
BUN SERPL-MCNC: 12 MG/DL (ref 6–20)
BUN/CREAT SERPL: 16 (ref 12–20)
CALCIUM SERPL-MCNC: 8.8 MG/DL (ref 8.5–10.1)
CHLORIDE SERPL-SCNC: 106 MMOL/L (ref 97–108)
CO2 SERPL-SCNC: 26 MMOL/L (ref 21–32)
CREAT SERPL-MCNC: 0.75 MG/DL (ref 0.55–1.02)
DIFFERENTIAL METHOD BLD: ABNORMAL
EOSINOPHIL # BLD: 0 K/UL (ref 0–0.4)
EOSINOPHIL NFR BLD: 0 % (ref 0–7)
ERYTHROCYTE [DISTWIDTH] IN BLOOD BY AUTOMATED COUNT: 13.2 % (ref 11.5–14.5)
FLUID CULTURE, SPNG2: NORMAL
GLUCOSE SERPL-MCNC: 96 MG/DL (ref 65–100)
HCT VFR BLD AUTO: 39.5 % (ref 35–47)
HGB BLD-MCNC: 12.9 G/DL (ref 11.5–16)
IMM GRANULOCYTES # BLD AUTO: 0 K/UL (ref 0–0.04)
IMM GRANULOCYTES NFR BLD AUTO: 1 % (ref 0–0.5)
LYMPHOCYTES # BLD: 1.1 K/UL (ref 0.8–3.5)
LYMPHOCYTES NFR BLD: 19 % (ref 12–49)
MCH RBC QN AUTO: 30.6 PG (ref 26–34)
MCHC RBC AUTO-ENTMCNC: 32.7 G/DL (ref 30–36.5)
MCV RBC AUTO: 93.8 FL (ref 80–99)
MONOCYTES # BLD: 0.5 K/UL (ref 0–1)
MONOCYTES NFR BLD: 9 % (ref 5–13)
NEUTS SEG # BLD: 4.1 K/UL (ref 1.8–8)
NEUTS SEG NFR BLD: 70 % (ref 32–75)
NRBC # BLD: 0 K/UL (ref 0–0.01)
NRBC BLD-RTO: 0 PER 100 WBC
ORGANISM ID, SPNG3: NORMAL
PLATELET # BLD AUTO: 299 K/UL (ref 150–400)
PLEASE NOTE, SPNG4: NORMAL
PMV BLD AUTO: 11 FL (ref 8.9–12.9)
POTASSIUM SERPL-SCNC: 3.4 MMOL/L (ref 3.5–5.1)
PROCALCITONIN SERPL-MCNC: <0.05 NG/ML
RBC # BLD AUTO: 4.21 M/UL (ref 3.8–5.2)
S PNEUM AG SPEC QL LA: NEGATIVE
SODIUM SERPL-SCNC: 141 MMOL/L (ref 136–145)
SPECIMEN SOURCE: NORMAL
SPECIMEN, SPNG1: NORMAL
WBC # BLD AUTO: 5.8 K/UL (ref 3.6–11)

## 2021-09-09 PROCEDURE — 85025 COMPLETE CBC W/AUTO DIFF WBC: CPT

## 2021-09-09 PROCEDURE — 96376 TX/PRO/DX INJ SAME DRUG ADON: CPT

## 2021-09-09 PROCEDURE — 36415 COLL VENOUS BLD VENIPUNCTURE: CPT

## 2021-09-09 PROCEDURE — 94760 N-INVAS EAR/PLS OXIMETRY 1: CPT

## 2021-09-09 PROCEDURE — 84145 PROCALCITONIN (PCT): CPT

## 2021-09-09 PROCEDURE — 74011250636 HC RX REV CODE- 250/636: Performed by: HOSPITALIST

## 2021-09-09 PROCEDURE — 97530 THERAPEUTIC ACTIVITIES: CPT | Performed by: OCCUPATIONAL THERAPIST

## 2021-09-09 PROCEDURE — 97535 SELF CARE MNGMENT TRAINING: CPT | Performed by: OCCUPATIONAL THERAPIST

## 2021-09-09 PROCEDURE — 99218 HC RM OBSERVATION: CPT

## 2021-09-09 PROCEDURE — 94761 N-INVAS EAR/PLS OXIMETRY MLT: CPT

## 2021-09-09 PROCEDURE — 97162 PT EVAL MOD COMPLEX 30 MIN: CPT

## 2021-09-09 PROCEDURE — 74011000258 HC RX REV CODE- 258: Performed by: HOSPITALIST

## 2021-09-09 PROCEDURE — 80048 BASIC METABOLIC PNL TOTAL CA: CPT

## 2021-09-09 PROCEDURE — 74011250637 HC RX REV CODE- 250/637: Performed by: INTERNAL MEDICINE

## 2021-09-09 PROCEDURE — 74011250637 HC RX REV CODE- 250/637: Performed by: HOSPITALIST

## 2021-09-09 PROCEDURE — 97165 OT EVAL LOW COMPLEX 30 MIN: CPT | Performed by: OCCUPATIONAL THERAPIST

## 2021-09-09 PROCEDURE — 97116 GAIT TRAINING THERAPY: CPT

## 2021-09-09 PROCEDURE — 94640 AIRWAY INHALATION TREATMENT: CPT

## 2021-09-09 RX ORDER — POTASSIUM CHLORIDE 20 MEQ/1
40 TABLET, EXTENDED RELEASE ORAL
Status: COMPLETED | OUTPATIENT
Start: 2021-09-09 | End: 2021-09-09

## 2021-09-09 RX ORDER — ALBUTEROL SULFATE 90 UG/1
1 AEROSOL, METERED RESPIRATORY (INHALATION)
Status: DISCONTINUED | OUTPATIENT
Start: 2021-09-09 | End: 2021-09-10 | Stop reason: HOSPADM

## 2021-09-09 RX ADMIN — ALBUTEROL SULFATE 2 PUFF: 90 AEROSOL, METERED RESPIRATORY (INHALATION) at 20:40

## 2021-09-09 RX ADMIN — BUDESONIDE AND FORMOTEROL FUMARATE DIHYDRATE 2 PUFF: 80; 4.5 AEROSOL RESPIRATORY (INHALATION) at 20:41

## 2021-09-09 RX ADMIN — ACETAMINOPHEN 650 MG: 325 TABLET ORAL at 23:00

## 2021-09-09 RX ADMIN — DEXAMETHASONE SODIUM PHOSPHATE 6 MG: 4 INJECTION, SOLUTION INTRAMUSCULAR; INTRAVENOUS at 01:17

## 2021-09-09 RX ADMIN — ACETAMINOPHEN 650 MG: 325 TABLET ORAL at 11:08

## 2021-09-09 RX ADMIN — ALBUTEROL SULFATE 2 PUFF: 90 AEROSOL, METERED RESPIRATORY (INHALATION) at 12:25

## 2021-09-09 RX ADMIN — RIVAROXABAN 20 MG: 20 TABLET, FILM COATED ORAL at 08:15

## 2021-09-09 RX ADMIN — AZITHROMYCIN MONOHYDRATE 500 MG: 500 INJECTION, POWDER, LYOPHILIZED, FOR SOLUTION INTRAVENOUS at 01:16

## 2021-09-09 RX ADMIN — FOLIC ACID 2 MG: 1 TABLET ORAL at 08:15

## 2021-09-09 RX ADMIN — BUDESONIDE AND FORMOTEROL FUMARATE DIHYDRATE 2 PUFF: 80; 4.5 AEROSOL RESPIRATORY (INHALATION) at 08:44

## 2021-09-09 RX ADMIN — Medication 10 ML: at 22:57

## 2021-09-09 RX ADMIN — Medication 10 ML: at 13:20

## 2021-09-09 RX ADMIN — FAMOTIDINE 40 MG: 20 TABLET, FILM COATED ORAL at 08:15

## 2021-09-09 RX ADMIN — CEFTRIAXONE SODIUM 2 G: 2 INJECTION, POWDER, FOR SOLUTION INTRAMUSCULAR; INTRAVENOUS at 01:15

## 2021-09-09 RX ADMIN — Medication 10 ML: at 08:15

## 2021-09-09 RX ADMIN — ALBUTEROL SULFATE 2 PUFF: 90 AEROSOL, METERED RESPIRATORY (INHALATION) at 03:31

## 2021-09-09 RX ADMIN — POTASSIUM CHLORIDE 40 MEQ: 20 TABLET, EXTENDED RELEASE ORAL at 08:15

## 2021-09-09 RX ADMIN — ALBUTEROL SULFATE 2 PUFF: 90 AEROSOL, METERED RESPIRATORY (INHALATION) at 08:44

## 2021-09-09 NOTE — PROGRESS NOTES
Hospitalist Progress Note    NAME: Mark Baer   :  1970   MRN:  [id number]       Assessment / Plan:  PNA due to COVID19   History of mild intermittent asthma   Onset of symptoms 11 days ago PTA therefore not a candidate for remdesivir. Pt is fully vaccinated against COVID19  CXR showed mild left basilar atelectasis. CTA chest showed focal pleural based pna in the lingula of the ABHIJIT  Cont' empiric IV abx, nebs, symbicort  Cont' decadron  O2 suppl as needed  PT/OT to eval    Hypokalemia  Replete     History of DVT on Xarelto will continue that  History of ulcerative colitis  History of psoriatic arthritis on methotrexate every Saturday, hold for now    Estimated discharge date: - pending clinical improvement      Code status: Full  Prophylaxis: rivaroxaban  Recommended Disposition:  PT, OT, RN     Subjective:     Chief Complaint / Reason for Physician Visit  Pt states she feels better today. Sob with minimal exertion. Discussed with RN events overnight. Review of Systems:  Symptom Y/N Comments  Symptom Y/N Comments   Fever/Chills    Chest Pain     Poor Appetite    Edema     Cough    Abdominal Pain     Sputum    Joint Pain     SOB/CARRASQUILLO    Pruritis/Rash     Nausea/vomit    Tolerating PT/OT     Diarrhea    Tolerating Diet     Constipation    Other       Could NOT obtain due to:      Objective:     VITALS:   Last 24hrs VS reviewed since prior progress note.  Most recent are:  Patient Vitals for the past 24 hrs:   Temp Pulse Resp BP SpO2   21 1057 98.6 °F (37 °C) 82 18 121/69 100 %   21 0844 -- -- -- -- 95 %   21 0807 98.2 °F (36.8 °C) 79 18 130/73 99 %   21 0248 97.7 °F (36.5 °C) 73 18 121/65 98 %   21 2130 97.8 °F (36.6 °C) 76 18 130/74 98 %   21 1529 98.5 °F (36.9 °C) 81 18 126/84 100 %   21 1439 99 °F (37.2 °C) -- -- -- --   21 1300 -- 84 19 121/78 99 %       Intake/Output Summary (Last 24 hours) at 2021 1215  Last data filed at 9/9/2021 0116  Gross per 24 hour   Intake 300 ml   Output --   Net 300 ml        I had a face to face encounter and independently examined this patient on 9/9/2021, as outlined below:  PHYSICAL EXAM:  General: WD, WN. Alert, cooperative, no acute distress    EENT:  EOMI. Anicteric sclerae. MMM  Resp:  No accessory muscle use  CV:  Regular  rhythm,  No edema  GI:  Non distended, Non tender  Neurologic:  Alert and oriented X 3, normal speech  Psych:   Good insight. Not anxious nor agitated  Skin:  No rashes. No jaundice    Reviewed most current lab test results and cultures  YES  Reviewed most current radiology test results   YES  Review and summation of old records today    NO  Reviewed patient's current orders and MAR    YES  PMH/ reviewed - no change compared to H&P  ________________________________________________________________________  Care Plan discussed with:    Comments   Patient x    Family      RN x    Care Manager     Consultant                        Multidiciplinary team rounds were held today with , nursing, pharmacist and clinical coordinator. Patient's plan of care was discussed; medications were reviewed and discharge planning was addressed. ________________________________________________________________________  Total NON critical care TIME:  35   Minutes    Total CRITICAL CARE TIME Spent:   Minutes non procedure based      Comments   >50% of visit spent in counseling and coordination of care     ________________________________________________________________________  Toan Acuna MD     Procedures: see electronic medical records for all procedures/Xrays and details which were not copied into this note but were reviewed prior to creation of Plan. LABS:  I reviewed today's most current labs and imaging studies.   Pertinent labs include:  Recent Labs     09/09/21 0256 09/07/21 2031   WBC 5.8 4.2   HGB 12.9 14.6   HCT 39.5 44.8    304     Recent Labs     09/09/21 0256 09/08/21 0206 09/07/21 2031     --  138   K 3.4*  --  4.0     --  102   CO2 26  --  30   GLU 96  --  109*   BUN 12  --  13   CREA 0.75  --  1.05*   CA 8.8  --  9.7   MG  --  2.0 2.1   ALB  --   --  3.5   TBILI  --   --  1.2*   ALT  --   --  26       Signed: Mitzy Law MD
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
## 2021-09-09 NOTE — INTERDISCIPLINARY ROUNDS
Interdisciplinary Rounds were completed on 09/09/21 for this patient. Rounds included nursing, clinical care leader, pharmacy, and case management. Plan of care discussed. See clinical pathway and/or care plan for interventions and desired outcomes.
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
## 2021-09-10 VITALS
OXYGEN SATURATION: 100 % | TEMPERATURE: 98.1 F | DIASTOLIC BLOOD PRESSURE: 79 MMHG | WEIGHT: 227.07 LBS | BODY MASS INDEX: 37.83 KG/M2 | HEART RATE: 70 BPM | RESPIRATION RATE: 17 BRPM | HEIGHT: 65 IN | SYSTOLIC BLOOD PRESSURE: 123 MMHG

## 2021-09-10 LAB
ANION GAP SERPL CALC-SCNC: 6 MMOL/L (ref 5–15)
BACTERIA SPEC CULT: ABNORMAL
BUN SERPL-MCNC: 15 MG/DL (ref 6–20)
BUN/CREAT SERPL: 23 (ref 12–20)
CALCIUM SERPL-MCNC: 8.7 MG/DL (ref 8.5–10.1)
CHLORIDE SERPL-SCNC: 108 MMOL/L (ref 97–108)
CO2 SERPL-SCNC: 25 MMOL/L (ref 21–32)
CREAT SERPL-MCNC: 0.65 MG/DL (ref 0.55–1.02)
GLUCOSE SERPL-MCNC: 125 MG/DL (ref 65–100)
POTASSIUM SERPL-SCNC: 4.2 MMOL/L (ref 3.5–5.1)
SERVICE CMNT-IMP: ABNORMAL
SODIUM SERPL-SCNC: 139 MMOL/L (ref 136–145)

## 2021-09-10 PROCEDURE — 94640 AIRWAY INHALATION TREATMENT: CPT

## 2021-09-10 PROCEDURE — 80048 BASIC METABOLIC PNL TOTAL CA: CPT

## 2021-09-10 PROCEDURE — 96376 TX/PRO/DX INJ SAME DRUG ADON: CPT

## 2021-09-10 PROCEDURE — 94760 N-INVAS EAR/PLS OXIMETRY 1: CPT

## 2021-09-10 PROCEDURE — 77010033678 HC OXYGEN DAILY

## 2021-09-10 PROCEDURE — 74011250637 HC RX REV CODE- 250/637: Performed by: INTERNAL MEDICINE

## 2021-09-10 PROCEDURE — 97116 GAIT TRAINING THERAPY: CPT

## 2021-09-10 PROCEDURE — 74011250637 HC RX REV CODE- 250/637: Performed by: HOSPITALIST

## 2021-09-10 PROCEDURE — 74011250636 HC RX REV CODE- 250/636: Performed by: HOSPITALIST

## 2021-09-10 PROCEDURE — 99218 HC RM OBSERVATION: CPT

## 2021-09-10 PROCEDURE — 74011000258 HC RX REV CODE- 258: Performed by: HOSPITALIST

## 2021-09-10 PROCEDURE — 97110 THERAPEUTIC EXERCISES: CPT

## 2021-09-10 PROCEDURE — 36415 COLL VENOUS BLD VENIPUNCTURE: CPT

## 2021-09-10 PROCEDURE — 94761 N-INVAS EAR/PLS OXIMETRY MLT: CPT

## 2021-09-10 RX ORDER — DEXAMETHASONE 6 MG/1
6 TABLET ORAL
Qty: 6 TABLET | Refills: 0 | Status: SHIPPED
Start: 2021-09-10 | End: 2021-09-29 | Stop reason: ALTCHOICE

## 2021-09-10 RX ORDER — ALBUTEROL SULFATE 90 UG/1
2 AEROSOL, METERED RESPIRATORY (INHALATION)
Status: DISCONTINUED | OUTPATIENT
Start: 2021-09-10 | End: 2021-09-10 | Stop reason: HOSPADM

## 2021-09-10 RX ORDER — DOXYCYCLINE 100 MG/1
100 CAPSULE ORAL 2 TIMES DAILY
Qty: 6 CAPSULE | Refills: 0 | Status: SHIPPED | OUTPATIENT
Start: 2021-09-10 | End: 2021-09-16 | Stop reason: ALTCHOICE

## 2021-09-10 RX ADMIN — AZITHROMYCIN MONOHYDRATE 500 MG: 500 INJECTION, POWDER, LYOPHILIZED, FOR SOLUTION INTRAVENOUS at 01:12

## 2021-09-10 RX ADMIN — BUDESONIDE AND FORMOTEROL FUMARATE DIHYDRATE 2 PUFF: 80; 4.5 AEROSOL RESPIRATORY (INHALATION) at 07:35

## 2021-09-10 RX ADMIN — DEXAMETHASONE SODIUM PHOSPHATE 6 MG: 4 INJECTION, SOLUTION INTRAMUSCULAR; INTRAVENOUS at 02:44

## 2021-09-10 RX ADMIN — ALBUTEROL SULFATE 2 PUFF: 90 AEROSOL, METERED RESPIRATORY (INHALATION) at 04:50

## 2021-09-10 RX ADMIN — CEFTRIAXONE SODIUM 2 G: 2 INJECTION, POWDER, FOR SOLUTION INTRAMUSCULAR; INTRAVENOUS at 01:12

## 2021-09-10 RX ADMIN — ALBUTEROL SULFATE 2 PUFF: 90 AEROSOL, METERED RESPIRATORY (INHALATION) at 01:12

## 2021-09-10 RX ADMIN — RIVAROXABAN 20 MG: 20 TABLET, FILM COATED ORAL at 08:51

## 2021-09-10 RX ADMIN — ALBUTEROL SULFATE 2 PUFF: 90 AEROSOL, METERED RESPIRATORY (INHALATION) at 07:32

## 2021-09-10 RX ADMIN — ACETAMINOPHEN 650 MG: 325 TABLET ORAL at 08:50

## 2021-09-10 RX ADMIN — FOLIC ACID 2 MG: 1 TABLET ORAL at 08:50

## 2021-09-10 RX ADMIN — FAMOTIDINE 40 MG: 20 TABLET, FILM COATED ORAL at 08:51

## 2021-09-10 NOTE — PROGRESS NOTES
Transition of Care Plan:     RUR: 13% low risk for readmission   Disposition: Home with family and [facility]  Follow up appointments: PCP  DME needed: None anticipated, no DME use at baseline   Transportation at Discharge: Pt's mother  Maritza Benítez or means to access home: Family will have access to home    IM Medicare Letter: N/A  Is patient a BCPI-A Bundle:   N/A                   If yes, was Bundle Letter given?:     Caregiver Contact: Pt's mother/Giorgio Blood p) [phone]  Discharge Caregiver contacted prior to discharge? To be contacted prior to d/c      2:28 p.m. Oral and Written notification given to patient and/or caregiver informing them that they are currently an Outpatient receiving care in our facility. Outpatient services include [facility]. 12:46 p.m. CM called [facility]; however, they only take Medicare. 12:20 p.m. CM called [facility] to see if they can accept; however, they are not in network. 12:07 p.m. CM emailed CM specialist to arrange f/u PCP appt. CM placed [facility] on pt's AVS.    12:04 p.m. [facility] denied. 12:03 p.m. [facility] denied. 12:01 p.m. [facility] denied. CM called [facility] to see what providers they are in network with. CM also checked website and will send to [facility] to see if they will accept pt for services as they are partners with [facility]. 10:26 a.m. [address] denied. 9:54 a.m. [facility] denied, [facility], [facility], [facility], [address] [place] [address], [address] [place] [address], and [facility] all denied. CM will continue to monitor referrals. CM spoke with attending physician who stated pt is able to d/c today with [facility], no oxygen needed. CM called and spoke with pt via phone due to isolation precautions regarding d/c and setting her up with [facility]. Pt agreeable and does not have a preference. FOC verbally reviewed.   Pt stated her ride will pick her up around 1 p.m. CM will send referrals.     ALBERTO Alvarenga  Care Management, [address] [place] [address]

## 2021-09-10 NOTE — PROGRESS NOTES
Problem: Mobility Impaired (Adult and Pediatric)  Goal: *Acute Goals and Plan of Care (Insert Text)  Description: FUNCTIONAL STATUS PRIOR TO ADMISSION: Pt lives with family in 2 story home. She is totally independent at baseline without use of device and is a . Note she does have a 2 story home but states there is a bedroom on the first floor that she could use. HOME SUPPORT PRIOR TO ADMISSION: The patient lived with family but did not require assist.    Physical Therapy Goals  Initiated 9/9/2021  1. Patient will move from supine to sit and sit to supine , scoot up and down, and roll side to side in bed with independence within 7 day(s). 2.  Patient will transfer from bed to chair and chair to bed with independence using the least restrictive device within 7 day(s). 3.  Patient will perform sit to stand with independence within 7 day(s). 4.  Patient will ambulate with modified independence for 150 feet with the least restrictive device within 7 day(s). Outcome: Progressing Towards Goal   PHYSICAL THERAPY TREATMENT  Patient: Philipp Colón (46 y.o. female)  Date: 9/10/2021    Diagnosis:  Pneumonia due to COVID-19 virus [U07.1, J12.82]  Hypoxia [R09.02]  Dyspnea on exertion [R06.00]  Pleuritic chest pain [R07.81]  Anticoagulated on Coumadin [Z79.01]  COVID-19 [U07.1]     Precautions:  (droplet plus, COVID +), falls    Chart, physical therapy assessment, plan of care and goals were reviewed. ASSESSMENT: Patient continues with skilled PT services and is progressing towards goals, pt moving very slow today but no LOB or SOB, did well with bed mob and transfers, did well with ther-ex, vc's for safety and proper RW use. Current Level of Function Impacting Discharge (mobility/balance): CGA x1         PLAN : Patient continues to benefit from skilled intervention to address the above impairments. Continue treatment per established plan of care. to address goals.     Recommendation for discharge: (in order for the patient to meet his/her long term goals) Physical therapy at least 2 days/week in the home AND ensure assist and/or supervision for safety with mobility    This discharge recommendation: Has not yet been discussed the attending provider and/or case management    IF patient discharges home will need the following DME: rolling walker     OBJECTIVE DATA SUMMARY:     Critical Behavior:  Neurologic State: Alert, Appropriate for age  Orientation Level: Oriented X4  Cognition: Appropriate decision making, Follows commands  Safety/Judgement: Awareness of environment, Fall prevention, Home safety, Insight into deficits    Functional Mobility Training:  Bed Mobility:  Supine to Sit: Modified independent; Additional time  Scooting: Modified independent; Additional time  Level of Assistance: Modified independent  Interventions: Verbal cues    Transfers:  Sit to Stand: Stand-by assistance; Additional time  Stand to Sit: Stand-by assistance; Additional time  Bed to Chair: Contact guard assistance; Additional time;Assist x1  Interventions: Tactile cues; Verbal cues  Level of Assistance: Contact guard assistance    Balance:  Sitting: Intact; Without support  Sitting - Static: Good (unsupported)  Standing: Intact; With support  Standing - Static: Good;Constant support  Standing - Dynamic : Good;Constant support    Ambulation/Gait Training:  Distance (ft): 25 Feet (ft)  Assistive Device: Gait belt;Walker, rolling  Ambulation - Level of Assistance: Contact guard assistance;Assist x1;Additional time  Gait Abnormalities: Decreased step clearance  Right Side Weight Bearing: Full  Left Side Weight Bearing: Full  Base of Support: Widened  Speed/Shobha: Slow  Step Length: Left shortened;Right shortened    Therapeutic Exercises:   sitting  EXERCISE   Sets   Reps   Active Active Assist   Passive   Comments   Ankle pumps 1 10 [x] [] [] bilat   Heel raises 1 10 [x] [] [] \"   Toe tap 1 10 [x] [] [] \"   Knee ext 1 10 [x] [] [] \"   Hip flex 1 10 [x] [] [] \"     Pain Ratin    Activity Tolerance: Fair    After treatment patient left in no apparent distress: Sitting in chair and Call bell within reach    COMMUNICATION/COLLABORATION:   The patients plan of care was discussed with: Registered nurse.      Adin Messer PTA   Time Calculation: 34 mins

## 2021-09-10 NOTE — PROGRESS NOTES
Discharge Summary      Name: Williams Antunez  [id number]  YOB: 1970 (Age: 46 y.o.)   Date of Admission: 9/7/2021  Date of Discharge: 9/10/2021  Attending Physician: Denis Suazo MD    Discharge Diagnosis:   PNA due to [address]   History of mild intermittent asthma   History of DVT on Xarelto   History of ulcerative colitis  History of psoriatic arthritis    Consultations:  None    Brief Admission History/Reason for Admission Per Mayela Parks MD:   L[age] Dur[age] y. o. female with PMHx significant for ulcerative colitis, DVT on Xarelto presents to the ED with chief complaint of chest pain.  She is Covid positive.  Tested positive approximately 11 days ago at [facility] after her son had a positive test.  Since then she started having nasal congestion and was tired. Natividad Harp has developed left-sided chest pain that is worse with breathing and a headache off and on.  She also reports diarrhea, nausea and upper abdominal pain.  She denies any vomiting.  Reports chills and sweats but has not checked her temperature.  She reports a cough and decreased sense of taste and smell.  She has had shortness of breath, especially with walking and talking.  She also reports cramps in her legs consistent with her previous DVTs.  She reports she has been taking her Xarelto as prescribed.  She had a telemedicine visit with her primary care doctor and he told her to come to the ED for evaluation.      We were asked to admit for work up and evaluation of the above problems. Brief Hospital Course by Main Problems:   PNA due to COVID19   History of mild intermittent asthma   Onset of symptoms 11 days ago PTA therefore not a candidate for remdesivir. Pt is fully vaccinated against COVID19. Admission CXR showed mild left basilar atelectasis.   CTA chest showed focal pleural based pna in the lingula of the ABHIJIT. Pt was started on O2 suppl for comfort.   She was treated with empiric IV abx: rocephin/azithromycin, symbicort and decadron. Pt is weak but improved overall. She was able to maintain O2 sat on RA for >48 hrs. Patient is hemodynamically stable for discharge. Pt to con't decadron and abx as prescribed. Resume PTA albuterol inhl. PCP f/u in 3-5 days.     Hypokalemia  Replete      History of DVT on Xarelto will continue that  History of ulcerative colitis  History of psoriatic arthritis on methotrexate every Saturday, hold for now    Discharge Exam:  Patient seen and examined by me on discharge day. Pertinent Findings:  Visit Vitals  /79   Pulse 70   Temp 98.1 °F (36.7 °C)   Resp 17   Ht 5' 5\" (1.651 m)   Wt 103 kg (227 lb 1.2 oz)   LMP  (LMP Unknown) Comment: IUD   SpO2 100%   BMI 37.79 kg/m²     Gen:    Not in distress  Chest: Clear lungs  CVS:   Regular rhythm. No edema  Abd:  Soft, not distended, not tender    Discharge/Recent Laboratory Results:  Recent Labs     09/10/21  0417 09/09/21  0256 09/08/21  0206      < >  --    K 4.2   < >  --       < >  --    CO2 25   < >  --    BUN 15   < >  --    *   < >  --    CA 8.7   < >  --    MG  --   --  2.0    < > = values in this interval not displayed. Recent Labs     09/09/21  0256   HGB 12.9   HCT 39.5   WBC 5.8          Discharge Medications:  Current Discharge Medication List      START taking these medications    Details   dexAMETHasone (Decadron) 6 mg tablet Take 1 Tablet by mouth Daily (before breakfast). Qty: 6 Tablet, Refills: 0  Start date: 9/10/2021      doxycycline (MONODOX) 100 mg capsule Take 1 Capsule by mouth two (2) times a day.   Qty: 6 Capsule, Refills: 0  Start date: 9/10/2021         CONTINUE these medications which have NOT CHANGED    Details   famotidine (PEPCID) 40 mg tablet TAKE 1 TABLET BY MOUTH TWICE DAILY AS DIRECTED AND AS NEEDED      phentermine (ADIPEX-P) 37.5 mg tablet TAKE 1/2 (ONE-HALF) TABLET BY MOUTH BEFORE BREAKFAST AND TAKE 1/2 (ONE-HALF) BEFORE DINNER  Qty: 100 Tablet, Refills: 1    Associated Diagnoses: Morbid obesity, unspecified obesity type ([facility] IGT (impaired glucose tolerance)      semaglutide (OZEMPIC) 0.25 mg or 0.5 mg/dose (2 mg/1.5 ml) subq pen 0.5 mg by SubCUTAneous route every seven (7) days. Qty: 3 Box, Refills: 3      dicyclomine (BENTYL) 10 mg capsule Take 1 Capsule by mouth three (3) times daily as needed for Abdominal Cramps or Cramping. Qty: 30 Capsule, Refills: 0    Associated Diagnoses: Acute gastroenteritis      loperamide (Imodium A-D) 2 mg capsule Take 2 mg as needed with loose stools. Hold if constipated  Qty: 30 Capsule, Refills: 0    Associated Diagnoses: Acute gastroenteritis      pantoprazole (PROTONIX) 40 mg tablet Take 1 Tablet by mouth daily. Qty: 90 Tablet, Refills: 1    Associated Diagnoses: Gastroesophageal reflux disease, unspecified whether esophagitis present      Xarelto 20 mg tab tablet Take 1 tablet by mouth once daily  Qty: 90 Tab, Refills: 1    Associated Diagnoses: Hypercoagulable state ([facility] History of recurrent deep vein thrombosis (DVT)      amitriptyline (ELAVIL) 10 mg tablet Take 1 Tab by mouth nightly. Qty: 30 Tab, Refills: 0    Associated Diagnoses: Adjustment insomnia; Anxiety and depression      folic acid (FOLVITE) 1 mg tablet TAKE 1 TABLET BY MOUTH ONCE A DAY  Qty: 90 Tab, Refills: 6      furosemide (LASIX) 40 mg tablet TAKE 1 TABLET BY MOUTH TWICE DAILY AS NEEDED  Qty: 180 Tab, Refills: 1    Associated Diagnoses: Pain and swelling of right lower leg; Post-thrombotic syndrome of right lower extremity      triamcinolone acetonide (KENALOG) 0.5 % ointment Apply  to affected area two (2) times a day. use thin layer  Qty: 30 g, Refills: 0    Associated Diagnoses: Psoriatic arthritis (HCC)      CERTOLIZUMAB PEGOL (CIMZIA SC) by SubCUTAneous route.  Injection:  Every 4 weeks     Associated Diagnoses: Obesities, morbid ([facility] IGT (impaired glucose tolerance)      methotrexate (RHEUMATREX) 2.5 mg tablet 6 tablets once weekly  Refills: 2      albuterol (PROVENTIL HFA, VENTOLIN HFA, PROAIR HFA) 90 mcg/actuation inhaler Take 1 Puff by inhalation every four (4) hours as needed for Wheezing.   Qty: 1 Inhaler, Refills: 5    Associated Diagnoses: Laryngitis; Mild intermittent asthma with acute exacerbation             DISPOSITION:    Home with Family:    Home with HH/PT/OT/RN: x   SNF/LTC:    [facility]:    OTHER:          Follow up with:   PCP : Juan Ramon Joseph MD  Follow-up Information     Follow up With Specialties Details Why Contact Info    Juan Ramon Joseph MD Family Medicine In 3 days  [place] [id number] [place] [address]  [phone]              Total time in minutes spent coordinating this discharge (includes going over instructions, follow-up, prescriptions, and preparing report for sign off to her PCP) :  35 minutes

## 2021-09-10 NOTE — PROGRESS NOTES
Problem: Falls - Risk of  Goal: *Absence of Falls  Description: Document Zabrina Stoner Fall Risk and appropriate interventions in the flowsheet. Outcome: Resolved/Met     Problem: Patient Education: Go to Patient Education Activity  Goal: Patient/Family Education  Outcome: Resolved/Met     Problem: Patient Education: Go to Patient Education Activity  Goal: Patient/Family Education  Outcome: Resolved/Met     Problem: Patient Education: Go to Patient Education Activity  Goal: Patient/Family Education  Outcome: Resolved/Met     Problem: Breathing Pattern - Ineffective  Goal: *Absence of hypoxia  Outcome: Resolved/Met     Problem: Airway Clearance - Ineffective  Goal: Achieve or maintain patent airway  Outcome: Resolved/Met     Problem: Gas Exchange - Impaired  Goal: Absence of hypoxia  Outcome: Resolved/Met  Goal: Promote optimal lung function  Outcome: Resolved/Met     Problem: Breathing Pattern - Ineffective  Goal: Ability to achieve and maintain a regular respiratory rate  Outcome: Resolved/Met     Problem:  Body Temperature -  Risk of, Imbalanced  Goal: Ability to maintain a body temperature within defined limits  Outcome: Resolved/Met  Goal: Will regain or maintain usual level of consciousness  Outcome: Resolved/Met  Goal: Complications related to the disease process, condition or treatment will be avoided or minimized  Outcome: Resolved/Met     Problem: Isolation Precautions - Risk of Spread of Infection  Goal: Prevent transmission of infectious organism to others  Outcome: Resolved/Met     Problem: Nutrition Deficits  Goal: Optimize nutrtional status  Outcome: Resolved/Met     Problem: Risk for Fluid Volume Deficit  Goal: Maintain normal heart rhythm  Outcome: Resolved/Met  Goal: Maintain absence of muscle cramping  Outcome: Resolved/Met  Goal: Maintain normal serum potassium, sodium, calcium, phosphorus, and pH  Outcome: Resolved/Met     Problem: Loneliness or Risk for Loneliness  Goal: Demonstrate positive use of time alone when socialization is not possible  Outcome: Resolved/Met     Problem: Fatigue  Goal: Verbalize increase energy and improved vitality  Outcome: Resolved/Met     Problem: Patient Education: Go to Patient Education Activity  Goal: Patient/Family Education  Outcome: Resolved/Met     Problem: Patient Education: Go to Patient Education Activity  Goal: Patient/Family Education  Outcome: Resolved/Met     Problem: Pneumonia: Day 1  Goal: Off Pathway (Use only if patient is Off Pathway)  Outcome: Resolved/Met  Goal: Activity/Safety  Outcome: Resolved/Met  Goal: Consults, if ordered  Outcome: Resolved/Met  Goal: Diagnostic Test/Procedures  Outcome: Resolved/Met  Goal: Nutrition/Diet  Outcome: Resolved/Met  Goal: Medications  Outcome: Resolved/Met  Goal: Respiratory  Outcome: Resolved/Met  Goal: Treatments/Interventions/Procedures  Outcome: Resolved/Met  Goal: Psychosocial  Outcome: Resolved/Met  Goal: *Oxygen saturation within defined limits  Outcome: Resolved/Met  Goal: *Influenza vaccine administered (October-March)  Outcome: Resolved/Met  Goal: *Pneumoccocal vaccine administered  Outcome: Resolved/Met  Goal: *Hemodynamically stable  Outcome: Resolved/Met  Goal: *Demonstrates progressive activity  Outcome: Resolved/Met  Goal: *Tolerating diet  Outcome: Resolved/Met     Problem: Pneumonia: Day 2  Goal: Off Pathway (Use only if patient is Off Pathway)  Outcome: Resolved/Met  Goal: Activity/Safety  Outcome: Resolved/Met  Goal: Consults, if ordered  Outcome: Resolved/Met  Goal: Diagnostic Test/Procedures  Outcome: Resolved/Met  Goal: Nutrition/Diet  Outcome: Resolved/Met  Goal: Discharge Planning  Outcome: Resolved/Met  Goal: Medications  Outcome: Resolved/Met  Goal: Respiratory  Outcome: Resolved/Met  Goal: Treatments/Interventions/Procedures  Outcome: Resolved/Met  Goal: Psychosocial  Outcome: Resolved/Met  Goal: *Oxygen saturation within defined limits  Outcome: Resolved/Met  Goal: *Hemodynamically stable  Outcome: Resolved/Met  Goal: *Demonstrates progressive activity  Outcome: Resolved/Met  Goal: *Tolerating diet  Outcome: Resolved/Met  Goal: *Optimal pain control at patient's stated goal  Outcome: Resolved/Met     Problem: Pneumonia: Day 3  Goal: Off Pathway (Use only if patient is Off Pathway)  Outcome: Resolved/Met  Goal: Activity/Safety  Outcome: Resolved/Met  Goal: Consults, if ordered  Outcome: Resolved/Met  Goal: Diagnostic Test/Procedures  Outcome: Resolved/Met  Goal: Nutrition/Diet  Outcome: Resolved/Met  Goal: Discharge Planning  Outcome: Resolved/Met  Goal: Medications  Outcome: Resolved/Met  Goal: Respiratory  Outcome: Resolved/Met  Goal: Treatments/Interventions/Procedures  Outcome: Resolved/Met  Goal: Psychosocial  Outcome: Resolved/Met  Goal: *Oxygen saturation within defined limits  Outcome: Resolved/Met  Goal: *Hemodynamically stable  Outcome: Resolved/Met  Goal: *Demonstrates progressive activity  Outcome: Resolved/Met  Goal: *Tolerating diet  Outcome: Resolved/Met  Goal: *Describes available resources and support systems  Outcome: Resolved/Met  Goal: *Optimal pain control at patient's stated goal  Outcome: Resolved/Met     Problem: Pneumonia: Day 4  Goal: Off Pathway (Use only if patient is Off Pathway)  Outcome: Resolved/Met  Goal: Activity/Safety  Outcome: Resolved/Met  Goal: Nutrition/Diet  Outcome: Resolved/Met  Goal: Discharge Planning  Outcome: Resolved/Met  Goal: Medications  Outcome: Resolved/Met  Goal: Respiratory  Outcome: Resolved/Met  Goal: Treatments/Interventions/Procedures  Outcome: Resolved/Met  Goal: Psychosocial  Outcome: Resolved/Met     Problem: Pneumonia: Discharge Outcomes  Goal: *Demonstrates progressive activity  Outcome: Resolved/Met  Goal: *Describes follow-up/return visits to physicians  Outcome: Resolved/Met  Goal: *Tolerating diet  Outcome: Resolved/Met  Goal: *Verbalizes name, dosage, time, side effects, and number of days to continue medications  Outcome: Resolved/Met  Goal: *Influenza immunization  Outcome: Resolved/Met  Goal: *Pneumococcal immunization  Outcome: Resolved/Met  Goal: *Respiratory status at baseline  Outcome: Resolved/Met  Goal: *Vital signs within defined limits  Outcome: Resolved/Met  Goal: *Describes available resources and support systems  Outcome: Resolved/Met  Goal: *Optimal pain control at patient's stated goal  Outcome: Resolved/Met

## 2021-09-13 ENCOUNTER — PATIENT OUTREACH (OUTPATIENT)
Dept: CASE MANAGEMENT | Age: 51
End: 2021-09-13

## 2021-09-13 NOTE — PROGRESS NOTES
Care Transitions Initial Call    Call within 2 business days of discharge: No     Patient: Isai Barry Patient : 1970 MRN: [id number]    Last Discharge [address]       Complaint Diagnosis Description Type Department Provider    21 Positive For Covid-19 Pneumonia due to COVID-19 virus . .. ED to Hosp-Admission (Discharged) (ADMIT) Ashli Duncan MD; Charlie April N, ... Was this an external facility discharge? No    Challenges to be reviewed by the provider     START taking:  dexAMETHasone (Decadron)  doxycycline (MONODOX)  -decadron not originally received by pharmacy, called in on  so patient will be starting it a few days late             Method of communication with provider : chart routing    Discussed COVID-19 related testing which was done prior to patient being admitted and patient reported it was positive. Advance Care Planning:   Does patient have an Advance Directive: not on file     Inpatient Readmission Risk score: Unplanned Readmit Risk Score: 15    Was this a readmission? no     Patients top risk factors for readmission: lack of knowledge about disease, medical condition-covid +, PMH of DVT, [place] and multiple health system providers   Interventions to address risk factors: Scheduled appointment with PCP-, Obtained and reviewed discharge summary and/or continuity of care documents, Communication with specialists who will assume or re-assume care of the patient's system-specific problems-see goals RE GI doctor phone call  and Education of patient/family/caregiver/guardian to support self-management-see goals    Care Transition Nurse (CTN) contacted the patient by telephone to perform post hospital discharge assessment. Verified name and  with patient as identifiers. Provided introduction to self, and explanation of the CTN role. CTN reviewed discharge instructions, medical action plan and red flags with patient who verbalized understanding.  Were discharge instructions available to patient? yes. Reviewed appropriate site of care based on symptoms and resources available to patient including: PCP, Specialist, Urgent Care Clinics and When to call 911. Patient given an opportunity to ask questions and does not have any further questions or concerns at this time. The patient agrees to contact the PCP office for questions related to their healthcare. Medication reconciliation was performed with patient, who verbalizes understanding of administration of home medications. Advised obtaining a 90-day supply of all daily and as-needed medications. Referral to Pharm D needed: no     Home Health/Outpatient orders at discharge: no    Durable Medical Equipment ordered at discharge: no    Covid Risk Education    Educated patient about risk for severe COVID-19 due to risk factors according to CDC guidelines. CTN reviewed discharge instructions, medical action plan and red flag symptoms with the patient who verbalized understanding. Discussed COVID vaccination status: yes. Education provided on COVID-19 vaccination as appropriate. Discussed exposure protocols and quarantine with CDC Guidelines. Patient was given an opportunity to verbalize any questions and concerns and agrees to contact CTN or health care provider for questions related to their healthcare. Was patient discharged with a pulse oximeter? no      Discussed follow-up appointments. If no appointment was previously scheduled, appointment scheduling offered: yes. Is follow up appointment scheduled within 7 days of discharge? yes. [facility] follow up appointment(s):   Future Appointments   Date Time Provider Gaurav Ceballos   9/16/2021  3:30 PM Bailee Ring MD MEDICAL [facility]   2/17/2022  9:10 AM Eligio Nunez MD RDE [facility]     Non-[facility] follow up appointment(s): n/a    Plan for follow-up call in 7-10 days based on severity of symptoms and risk factors.   Plan for next call: symptom management-assess s/s and follow up appointment-confirm attendance  CTN provided contact information for future needs. Goals Addressed                 This Visit's Progress     Prevent complications post hospitalization. 9/13/2021  - Spoke to patient today. Patient reports she is ok, tired, little energy. Patient upset that she can not get GI appt until December, Ctn offered to contact Dr. Dwayne Mahajan office to see if patient able to get sooner appt, VM was left. - patient will see PCP on 9/16   - no HH at this time, if patient feels like she has a need for it by Thursday she will ask pcp for order and contact CTN with road blocks. CTN to follow up tomorrow. AR    09/14/21  - CTN did not receive return call about GI appt yet. - Contacted patient to finish call   - patient will take meds as prescribed, patient did report that decadron was not received by pharmacy, this was confirmed with [facility] and Rx was called in as prescribed by Dr. Carlee Borden patient will pick Rx up when it is ready and she will contact CTN if road blocks. - patient will adhere to quarantine from last date of fever, she reported she tested positive on 8/31  - encouraged atleast 2L water daily  - Reviewed red flag s/s with patient, nausea, vomiting, inability to pass urine/stool, SOB, mental status change, chest pain, fever.   - patient will do deep breathing exercises every 1-2 hours   - patient states she has a pulse ox somewhere in her home, encouraged patient to find it and check 02 and to contact MD if less then 90%, she is currently not on 02.   - patient will take short walks as long as her SOB will allow her around her home a few times daily     Patient will contact Md/CTN if red flag s/s arise. CTN to f/u ~ 7-10 days.  AR                   START taking:  dexAMETHasone (Decadron)  doxycycline (Chris Simsley)        Advance Care Planning  People with COVID-19 may have no symptoms, mild symptoms, such as fever, cough, and shortness of breath or they may have more severe illness, developing severe and fatal pneumonia. As a result, Advance Care Planning with attention to naming a health care decision maker (someone you trust to make healthcare decisions for you if you could not speak for yourself) and sharing other health care preferences is important BEFORE a possible health crisis. Please contact your Primary Care Provider to discuss Advance Care Planning. Preventing the Spread of Coronavirus Disease 2019 in Homes and Residential Communities  For the most recent information go to [employer]    Prevention steps for People with confirmed or suspected COVID-19 (including persons under investigation) who do not need to be hospitalized  and   People with confirmed COVID-19 who were hospitalized and determined to be medically stable to go home    Your healthcare provider and public health staff will evaluate whether you can be cared for at home. If it is determined that you do not need to be hospitalized and can be isolated at home, you will be monitored by staff from your local or state health department. You should follow the prevention steps below until a healthcare provider or local or state health department says you can return to your normal activities. Stay home except to get medical care  People who are mildly ill with COVID-19 are able to isolate at home during their illness. You should restrict activities outside your home, except for getting medical care. Do not go to work, school, or public areas. Avoid using public transportation, ride-sharing, or taxis. Separate yourself from other people and animals in your home  People: As much as possible, you should stay in a specific room and away from other people in your home. Also, you should use a separate bathroom, if available. Animals:  You should restrict contact with pets and other animals while you are sick with COVID-19, just like you would around other people. Although there have not been reports of pets or other animals becoming sick with COVID-19, it is still recommended that people sick with COVID-19 limit contact with animals until more information is known about the virus. When possible, have another member of your household care for your animals while you are sick. If you are sick with COVID-19, avoid contact with your pet, including petting, snuggling, being kissed or licked, and sharing food. If you must care for your pet or be around animals while you are sick, wash your hands before and after you interact with pets and wear a facemask. Call ahead before visiting your doctor  If you have a medical appointment, call the healthcare provider and tell them that you have or may have COVID-19. This will help the healthcare providers office take steps to keep other people from getting infected or exposed. Wear a facemask  You should wear a facemask when you are around other people (e.g., sharing a room or vehicle) or pets and before you enter a healthcare providers office. If you are not able to wear a facemask (for example, because it causes trouble breathing), then people who live with you should not stay in the same room with you, or they should wear a facemask if they enter your room. Cover your coughs and sneezes  Cover your mouth and nose with a tissue when you cough or sneeze. Throw used tissues in a lined trash can. Immediately wash your hands with soap and water for at least 20 seconds or, if soap and water are not available, clean your hands with an alcohol-based hand  that contains at least 60% alcohol. Clean your hands often  Wash your hands often with soap and water for at least 20 seconds, especially after blowing your nose, coughing, or sneezing; going to the bathroom; and before eating or preparing food.  If soap and water are not readily available, use an alcohol-based hand  with at least 60% alcohol, covering all surfaces of your hands and rubbing them together until they feel dry. Soap and water are the best option if hands are visibly dirty. Avoid touching your eyes, nose, and mouth with unwashed hands. Avoid sharing personal household items  You should not share dishes, drinking glasses, cups, eating utensils, towels, or bedding with other people or pets in your home. After using these items, they should be washed thoroughly with soap and water. Clean all high-touch surfaces everyday  High touch surfaces include counters, tabletops, doorknobs, bathroom fixtures, toilets, phones, keyboards, tablets, and bedside tables. Also, clean any surfaces that may have blood, stool, or body fluids on them. Use a household cleaning spray or wipe, according to the label instructions. Labels contain instructions for safe and effective use of the cleaning product including precautions you should take when applying the product, such as wearing gloves and making sure you have good ventilation during use of the product. Monitor your symptoms  Seek prompt medical attention if your illness is worsening (e.g., difficulty breathing). Before seeking care, call your healthcare provider and tell them that you have, or are being evaluated for, COVID-19. Put on a facemask before you enter the facility. These steps will help the healthcare providers office to keep other people in the office or waiting room from getting infected or exposed. Ask your healthcare provider to call the local or state health department. Persons who are placed under active monitoring or facilitated self-monitoring should follow instructions provided by their local health department or occupational health professionals, as appropriate. When working with your local health department check their available hours.   If you have a medical emergency and need to call 911, notify the dispatch personnel that you have, or are being evaluated for COVID-19. If possible, put on a facemask before emergency medical services arrive. Discontinuing home isolation  Patients with confirmed COVID-19 should remain under home isolation precautions until the risk of secondary transmission to others is thought to be low. The decision to discontinue home isolation precautions should be made on a case-by-case basis, in consultation with healthcare providers and state and local health departments.

## 2021-09-14 LAB
BACTERIA SPEC CULT: NORMAL
SERVICE CMNT-IMP: NORMAL

## 2021-09-15 ENCOUNTER — TELEPHONE (OUTPATIENT)
Dept: FAMILY MEDICINE CLINIC | Age: 51
End: 2021-09-15

## 2021-09-15 ENCOUNTER — DOCUMENTATION ONLY (OUTPATIENT)
Dept: FAMILY MEDICINE CLINIC | Age: 51
End: 2021-09-15

## 2021-09-15 NOTE — PROGRESS NOTES
COVID positive with recent hospitalization. To see tomorrow for CADE appt. Having some mild dyspnea with exertion. O2 sats staying over 90% now. Having HAs. Was having concerns with lower pulse ox earlier and declined going to ER.

## 2021-09-15 NOTE — TELEPHONE ENCOUNTER
Patient called was discharged from ED [facility] but O 2 Sat's dropping down to 85 and having chest pain.  Advised patient to go to nearest Er but she did not want to go back to hospital.Her call was transferred to Dr Sami Riojas

## 2021-09-15 NOTE — TELEPHONE ENCOUNTER
----- Message from Shabana Wells sent at 9/15/2021  8:41 AM EDT -----  Regarding: Dr. Bradley Valencia  Level 1/Escalated Issue      Caller's first and last name and relationship (if not the patient):      Best contact number(s):[phone]      What are the symptoms: [facility] COVID/ Oxygen Level keeps dropping and heart is racing with chest pains and coughing.        Transfer successful - yes/no (include outcome): no, told to put a message in       Transfer declined - yes/no (include reason): yes      Was caller advised to seek appropriate level of care - yes/no: yes      Details to clarify the request:        Shabana Wells

## 2021-09-16 ENCOUNTER — VIRTUAL VISIT (OUTPATIENT)
Dept: FAMILY MEDICINE CLINIC | Age: 51
End: 2021-09-16
Payer: COMMERCIAL

## 2021-09-16 DIAGNOSIS — Z09 HOSPITAL DISCHARGE FOLLOW-UP: Primary | ICD-10-CM

## 2021-09-16 DIAGNOSIS — U07.1 PNEUMONIA DUE TO COVID-19 VIRUS: ICD-10-CM

## 2021-09-16 DIAGNOSIS — R07.81 PLEURITIC CHEST PAIN: ICD-10-CM

## 2021-09-16 DIAGNOSIS — Z86.718 HISTORY OF RECURRENT DEEP VEIN THROMBOSIS (DVT): ICD-10-CM

## 2021-09-16 DIAGNOSIS — D84.9 IMMUNOSUPPRESSED STATUS (HCC): ICD-10-CM

## 2021-09-16 DIAGNOSIS — Z87.19 HISTORY OF CHRONIC ULCERATIVE COLITIS: ICD-10-CM

## 2021-09-16 DIAGNOSIS — J45.21 MILD INTERMITTENT ASTHMA WITH ACUTE EXACERBATION: ICD-10-CM

## 2021-09-16 DIAGNOSIS — L40.50 PSORIATIC ARTHRITIS (HCC): ICD-10-CM

## 2021-09-16 DIAGNOSIS — J12.82 PNEUMONIA DUE TO COVID-19 VIRUS: ICD-10-CM

## 2021-09-16 PROCEDURE — 99213 OFFICE O/P EST LOW 20 MIN: CPT | Performed by: FAMILY MEDICINE

## 2021-09-16 PROCEDURE — 1111F DSCHRG MED/CURRENT MED MERGE: CPT | Performed by: FAMILY MEDICINE

## 2021-09-16 RX ORDER — PREDNISONE 20 MG/1
TABLET ORAL
Qty: 13 TABLET | Refills: 0 | Status: SHIPPED | OUTPATIENT
Start: 2021-09-16 | End: 2021-09-29 | Stop reason: ALTCHOICE

## 2021-09-16 NOTE — LETTER
NOTIFICATION RETURN TO WORK / SCHOOL    9/16/2021 4:09 PM    Ms. Newton Martinez  [address] [facility] [address] [facility]      To Whom It May Concern:    Newton Martinez is currently under the care of Gaurav Ramos. She was recently hospitalized and continue dealing with ongoing medical concerns. She will plan to return to work/school on: 9/30/21. If there are questions or concerns please have the patient contact our office.         Sincerely,      Ambrose Tuttle MD

## 2021-09-16 NOTE — PROGRESS NOTES
Virtual Video Transitional Care Management Progress Note    Patient: Leonel Morales  : 1970  PCP: Kathia Quach MD    Date of office visit: 2021   Date of admission: 21  Date of discharge: 9/10/21  Hospital: [facility]    Call initiated w/i 2 business dates of discharge: No   Date of the most recent call to the patient: 2021  2:23 PM        Assessment/Plan: Some concerns yesterday about hypoxia after discharge with O2 below 90% acutely. Monitored and improved. Did not qualify for Actemra d/t long duration of symptoms prior to hospital admission. Discussed return to ER if pulse ox drops below 90% or if dyspnea/chest pain worsens. Finishing dexamethasone tomorrow so extending steroids with Prednisone. CTA neg for PE. Ct [facility]    Discussed expectations for slow recovery based on her hx. Will plan to return to work in 2 weeks if dyspnea improving. Diagnoses and all orders for this visit:    1. Hospital discharge follow-up  -     [place] DISCHARGE MEDS RECONCILED W/ CURRENT OUTPATIENT MED LIST    2. Pneumonia due to COVID-19 virus  -     predniSONE (DELTASONE) 20 mg tablet; Take 3 pills for 2 days, then 2 pills for 2 days, then 1 pill for 3 days    3. Pleuritic chest pain  -     predniSONE (DELTASONE) 20 mg tablet; Take 3 pills for 2 days, then 2 pills for 2 days, then 1 pill for 3 days    4. History of recurrent deep vein thrombosis (DVT)    5. History of chronic ulcerative colitis    6. Psoriatic arthritis ([facility] [address]    7. Immunosuppressed status ([facility] [address]    8. Mild intermittent asthma with acute exacerbation  -     predniSONE (DELTASONE) 20 mg tablet; Take 3 pills for 2 days, then 2 pills for 2 days, then 1 pill for 3 days      The complexity of medical decision making for this patient's transitional care is high   Follow-up and Dispositions    · Return in 2 weeks (on 2021), or if symptoms worsen or fail to improve.           Subjective:   Leonel Morales is a 46 y.o. female presenting today for follow-up after hospital discharge. This encounter and supporting documentation was reviewed if available. Medication reconciliation was performed today. The main problem requiring admission was Covid pneumonia. Hospital Discharge reviewed. \"PNA due to COVID19   History of mild intermittent asthma   Onset of symptoms 11 days ago PTA therefore not a candidate for remdesivir.  Pt is fully vaccinated against COVID19. Admission CXR showed mild left basilar atelectasis.   CTA chest showed focal pleural based pna in the lingula of the ABHIJIT. Pt was started on O2 suppl for comfort. She was treated with empiric IV abx: rocephin/azithromycin, symbicort and decadron. Pt is weak but improved overall. She was able to maintain O2 sat on RA for >48 hrs. Patient is hemodynamically stable for discharge. Pt to con't decadron and abx as prescribed. Resume PTA albuterol inhl. PCP f/u in 3-5 days.     Hypokalemia  Replete      History of DVT on Xarelto will continue that  History of ulcerative colitis  History of psoriatic arthritis on methotrexate every Saturday, hold for now\"    Medications marked \"taking\" at this time:  Home Medications    Medication Sig Start Date End Date Taking? Authorizing Provider   predniSONE (DELTASONE) 20 mg tablet Take 3 pills for 2 days, then 2 pills for 2 days, then 1 pill for 3 days 9/16/21  Yes Judith Dinh MD   dexAMETHasone (Decadron) 6 mg tablet Take 1 Tablet by mouth Daily (before breakfast). 9/10/21  Yes Jalyn Farias MD   famotidine (PEPCID) 40 mg tablet TAKE 1 TABLET BY MOUTH TWICE DAILY AS DIRECTED AND AS NEEDED 8/18/21  Yes Provider, Historical   dicyclomine (BENTYL) 10 mg capsule Take 1 Capsule by mouth three (3) times daily as needed for Abdominal Cramps or Cramping. 8/16/21  Yes Ragini Sanches MD   loperamide (Imodium A-D) 2 mg capsule Take 2 mg as needed with loose stools.  Hold if constipated 8/16/21  Yes Ragini Sanches MD   pantoprazole (PROTONIX) 40 mg tablet Take 1 Tablet by mouth daily. 6/2/21  Yes Felice Vickers MD   Xarelto 20 mg tab tablet Take 1 tablet by mouth once daily 5/9/21  Yes Felice Vickers MD   amitriptyline (ELAVIL) 10 mg tablet Take 1 Tab by mouth nightly. 4/21/21  Yes Felice Vickers MD   folic acid (FOLVITE) 1 mg tablet TAKE 1 TABLET BY MOUTH ONCE A DAY  Patient taking differently: 2 mg. TAKE 1 TABLET BY MOUTH ONCE A DAY 3/19/21  Yes Felice Vickers MD   furosemide (LASIX) 40 mg tablet TAKE 1 TABLET BY MOUTH TWICE DAILY AS NEEDED 8/10/20  Yes Felice Vickers MD   triamcinolone acetonide (KENALOG) 0.5 % ointment Apply  to affected area two (2) times a day. use thin layer 10/18/19  Yes Felice Vickers MD   methotrexate (RHEUMATREX) 2.5 mg tablet 6 tablets once weekly 11/21/17  Yes Provider, Historical   albuterol (PROVENTIL HFA, VENTOLIN HFA, PROAIR HFA) 90 mcg/actuation inhaler Take 1 Puff by inhalation every four (4) hours as needed for Wheezing. 3/8/16  Yes Felice Vickers MD   doxycycline (MONODOX) 100 mg capsule Take 1 Capsule by mouth two (2) times a day. 9/10/21 9/16/21  Adrienne Garcia MD   phentermine (ADIPEX-P) 37.5 mg tablet TAKE 1/2 (ONE-HALF) TABLET BY MOUTH BEFORE BREAKFAST AND TAKE 1/2 (ONE-HALF) BEFORE DINNER  Patient not taking: Reported on 9/16/2021 8/17/21   Jass Camejo MD   semaglutide (OZEMPIC) 0.25 mg or 0.5 mg/dose (2 mg/1.5 ml) subq pen 0.5 mg by SubCUTAneous route every seven (7) days. Patient not taking: Reported on 9/16/2021 8/17/21   Jass Camejo MD   CERTOLIZUMAB PEGOL [facility]) by SubCUTAneous route.  Injection:  Every 4 weeks   Patient not taking: Reported on 9/16/2021    Provider, Historical        ROS per HPI    Objective:     Patient-Reported Vitals 9/16/2021   Patient-Reported Weight -   Patient-Reported Height -   Patient-Reported Pulse 106   Patient-Reported SpO2 100   Patient-Reported Systolic  -   Patient-Reported Diastolic -      General: alert, cooperative, no distress   Mental  status: normal mood, behavior, speech, dress, motor activity, and thought processes, able to follow commands   HENT: NCAT   Neck: no visualized mass   Resp: no respiratory distress   Neuro: no gross deficits   Skin: no discoloration or lesions of concern on visible areas   Psychiatric: normal affect, consistent with stated mood, no evidence of hallucinations     Additional exam findings: We discussed the expected course, resolution and complications of the diagnosis(es) in detail. Medication risks, benefits, costs, interactions, and alternatives were discussed as indicated. I advised her to contact the office if her condition worsens, changes or fails to improve as anticipated. She expressed understanding with the diagnosis(es) and plan. Wilfred Hilliard, who was evaluated through a synchronous (real-time) audio-video encounter and/or her healthcare decision maker, is aware that it is a billable service, with coverage as determined by her insurance carrier. She provided verbal consent to proceed: Yes, and patient identification was verified. It was conducted pursuant to the emergency declaration under the [address] [place] [address] authority and the [employer] General Act. A caregiver was present when appropriate. Ability to conduct physical exam was limited. I was at home. The patient was at home.       Kasey Lovett MD

## 2021-09-16 NOTE — PROGRESS NOTES
Chief Complaint   Patient presents with   [facility] Follow Up     Admitted [address] 9/7/21 Discharged 9/10/21   1. Have you been to the ER, urgent care clinic since your last visit? Hospitalized since your last visit? Yes Where: [address]    2. Have you seen or consulted any other health care providers outside of the [address] [place] [address] since your last visit? Include any pap smears or colon screening.  No     Continue to have chest pain

## 2021-09-29 ENCOUNTER — OFFICE VISIT (OUTPATIENT)
Dept: FAMILY MEDICINE CLINIC | Age: 51
End: 2021-09-29
Payer: COMMERCIAL

## 2021-09-29 DIAGNOSIS — R07.9 CHEST PAIN, UNSPECIFIED TYPE: ICD-10-CM

## 2021-09-29 DIAGNOSIS — U07.1 PNEUMONIA DUE TO COVID-19 VIRUS: Primary | ICD-10-CM

## 2021-09-29 DIAGNOSIS — J12.82 PNEUMONIA DUE TO COVID-19 VIRUS: Primary | ICD-10-CM

## 2021-09-29 DIAGNOSIS — D68.59 HYPERCOAGULABLE STATE (HCC): ICD-10-CM

## 2021-09-29 DIAGNOSIS — Z79.01 ANTICOAGULATED: ICD-10-CM

## 2021-09-29 DIAGNOSIS — R09.02 HYPOXIA: ICD-10-CM

## 2021-09-29 DIAGNOSIS — D84.9 IMMUNOSUPPRESSED STATUS (HCC): ICD-10-CM

## 2021-09-29 PROCEDURE — 99214 OFFICE O/P EST MOD 30 MIN: CPT | Performed by: FAMILY MEDICINE

## 2021-09-29 RX ORDER — ATORVASTATIN CALCIUM 40 MG/1
TABLET, FILM COATED ORAL
COMMUNITY
Start: 2021-09-22 | End: 2022-08-15

## 2021-09-29 NOTE — PROGRESS NOTES
Chief Complaint   Patient presents with    Follow-up     COVID    1. Have you been to the ER, urgent care clinic since your last visit? Hospitalized since your last visit? No    2. Have you seen or consulted any other health care providers outside of the [address] [place] [address] since your last visit? Include any pap smears or colon screening.  No

## 2021-09-29 NOTE — PROGRESS NOTES
Shreyas Brown (: 1970) is a 46 y.o. female, established patient, here for evaluation of the following chief complaint(s):  Follow-up (COVID )       ASSESSMENT/PLAN:  With hypoxia, encouraged monitoring but home o2 with a few readings < 88% at rest on 21 as well. Ordering home O2 for 30 days and reordering steroids. Below is the assessment and plan developed based on review of pertinent history, physical exam, labs, studies, and medications. 1. Pneumonia due to COVID-19 virus  -     dexAMETHasone (DECADRON) 6 mg tablet; Take 1 Tablet by mouth Daily (before breakfast). , Normal, Disp-10 Tablet, R-0  -     AMB SUPPLY ORDER  2. Hypoxia  -     dexAMETHasone (DECADRON) 6 mg tablet; Take 1 Tablet by mouth Daily (before breakfast). , Normal, Disp-10 Tablet, R-0  -     AMB SUPPLY ORDER  3. Chest pain, unspecified type  -     dexAMETHasone (DECADRON) 6 mg tablet; Take 1 Tablet by mouth Daily (before breakfast). , Normal, Disp-10 Tablet, R-0  -     AMB SUPPLY ORDER  4. Immunosuppressed status ([facility]  5. Hypercoagulable state ([facility]  6. Anticoagulated    Return in about 5 days (around 10/4/2021), or if symptoms worsen or fail to improve, for virtual appt. SUBJECTIVE/OBJECTIVE:    Following up after Covid PNA. tested + on 21 and eventually ended up hospitalized. She was discharged in stable condition. Had neg CTA on 21. Still having some dyspnea and chest pain. Not on O2. Did not qualify for Actemra d/t long duration of symptoms prior to hospital admission. Discussed return to ER if pulse ox drops below 90% or if dyspnea/chest pain worsens. Finished dexamethasone and extended steroids with Prednisone. Ct Xarelto. Was vaccinated but immunosuppressed and has not had booster yet. ROS  Gen - no fever/chills  Resp - no dyspnea or cough  CV - no chest pain or [place]  Rest per HPI    Blood pressure 120/69, pulse 89, temperature 97.3 °F (36.3 °C), temperature source Oral, resp.  rate 18, height 5' 5\" (1.651 m), weight 229 lb (103.9 kg), SpO2 (!) 87 %. Physical Exam  General appearance - alert, well appearing, and in no distress  Eyes -sclera anicteric  Neck - supple, no significant adenopathy, no thyromegaly  Chest - clear to auscultation, no wheezes, rales or rhonchi, symmetric air entry  Heart - normal rate, regular rhythm, normal S1, S2, no murmurs, rubs, clicks or gallops  Neurological - alert, oriented, normal speech, no focal findings or movement disorder noted  Extr - no edema  Psych - normal mood and affect    On this date 09/29/2021 I have spent 30 minutes reviewing previous notes, test results and face to face with the patient discussing the diagnosis and importance of compliance with the treatment plan as well as documenting on the day of the visit. An electronic signature was used to authenticate this note.   -- Srinivas Thao MD

## 2021-09-30 VITALS
BODY MASS INDEX: 38.15 KG/M2 | TEMPERATURE: 97.3 F | RESPIRATION RATE: 18 BRPM | SYSTOLIC BLOOD PRESSURE: 120 MMHG | OXYGEN SATURATION: 87 % | HEART RATE: 89 BPM | HEIGHT: 65 IN | DIASTOLIC BLOOD PRESSURE: 69 MMHG | WEIGHT: 229 LBS

## 2021-09-30 RX ORDER — DEXAMETHASONE 6 MG/1
6 TABLET ORAL
Qty: 10 TABLET | Refills: 0 | Status: SHIPPED | OUTPATIENT
Start: 2021-09-30 | End: 2021-10-06 | Stop reason: ALTCHOICE

## 2021-10-05 ENCOUNTER — PATIENT OUTREACH (OUTPATIENT)
Dept: CASE MANAGEMENT | Age: 51
End: 2021-10-05

## 2021-10-06 ENCOUNTER — OFFICE VISIT (OUTPATIENT)
Dept: FAMILY MEDICINE CLINIC | Age: 51
End: 2021-10-06
Payer: COMMERCIAL

## 2021-10-06 VITALS
HEIGHT: 65 IN | WEIGHT: 226 LBS | RESPIRATION RATE: 20 BRPM | SYSTOLIC BLOOD PRESSURE: 132 MMHG | HEART RATE: 84 BPM | DIASTOLIC BLOOD PRESSURE: 83 MMHG | OXYGEN SATURATION: 79 % | TEMPERATURE: 97.1 F | BODY MASS INDEX: 37.65 KG/M2

## 2021-10-06 DIAGNOSIS — J12.82 PNEUMONIA DUE TO COVID-19 VIRUS: ICD-10-CM

## 2021-10-06 DIAGNOSIS — R07.9 CHEST PAIN, UNSPECIFIED TYPE: ICD-10-CM

## 2021-10-06 DIAGNOSIS — U07.1 PNEUMONIA DUE TO COVID-19 VIRUS: ICD-10-CM

## 2021-10-06 DIAGNOSIS — J12.82 PNEUMONIA DUE TO COVID-19 VIRUS: Primary | ICD-10-CM

## 2021-10-06 DIAGNOSIS — R09.02 HYPOXIA: ICD-10-CM

## 2021-10-06 DIAGNOSIS — U07.1 PNEUMONIA DUE TO COVID-19 VIRUS: Primary | ICD-10-CM

## 2021-10-06 DIAGNOSIS — R09.02 HYPOXIA: Primary | ICD-10-CM

## 2021-10-06 PROCEDURE — 99214 OFFICE O/P EST MOD 30 MIN: CPT | Performed by: FAMILY MEDICINE

## 2021-10-06 NOTE — PROGRESS NOTES
Goals      Prevent complications post hospitalization. 9/13/2021  - Spoke to patient today. Patient reports she is ok, tired, little energy. Patient upset that she can not get GI appt until December, Ctn offered to contact Dr. Gregoria Green office to see if patient able to get sooner appt, VM was left. - patient will see PCP on 9/16   - no HH at this time, if patient feels like she has a need for it by Thursday she will ask pcp for order and contact CTN with road blocks. CTN to follow up tomorrow. AR    09/14/21  - CTN did not receive return call about GI appt yet. - Contacted patient to finish call   - patient will take meds as prescribed, patient did report that decadron was not received by pharmacy, this was confirmed with [facility] and Rx was called in as prescribed by Dr. Cassie Kinney patient will pick Rx up when it is ready and she will contact CTN if road blocks. - patient will adhere to quarantine from last date of fever, she reported she tested positive on 8/31  - encouraged atleast 2L water daily  - PMH of DVT, she will contact MD with any s/s of DVT  - Reviewed red flag s/s with patient, nausea, vomiting, inability to pass urine/stool, SOB, mental status change, chest pain, fever.   - patient will do deep breathing exercises every 1-2 hours   - patient states she has a pulse ox somewhere in her home, encouraged patient to find it and check 02 and to contact MD if less then 90%, she is currently not on 02.   - patient will take short walks as long as her SOB will allow her around her home a few times daily     Patient will contact Md/CTN if red flag s/s arise. CTN to f/u ~ 7-10 days. AR       10/06/21  Patient has PCP appt at [phone] today and it is currently 1217, Ctn to contact patient tomorrow for follow up call.  AR

## 2021-10-06 NOTE — PROGRESS NOTES
Chief Complaint   Patient presents with    Follow-up     2 week   1. Have you been to the ER, urgent care clinic since your last visit? Hospitalized since your last visit? No    2. Have you seen or consulted any other health care providers outside of the [address] [place] [address] since your last visit? Include any pap smears or colon screening.  No     Pulse ox after walking twice in hallway 81

## 2021-10-06 NOTE — PROGRESS NOTES
Physician order,notes,copy of insurance card and Demographics faxed to [employer] in [place] for oxygen and supplies today if possible.

## 2021-10-06 NOTE — PROGRESS NOTES
Todd Thomason (: 1970) is a 46 y.o. female, established patient, here for evaluation of the following chief complaint(s):  Follow-up (2 week)       ASSESSMENT/PLAN:  Ordered oxygen and working with medical supply company to get this rapidly since hypoxic in office to < 88% with minimal effort and needs home O2. Start at 2 L and will titrate up as needed to maintain O2 sats > 88%. Below is the assessment and plan developed based on review of pertinent history, physical exam, labs, studies, and medications. 1. Pneumonia due to COVID-19 virus  -     REFERRAL TO PULMONARY DISEASE  2. Hypoxia  -     REFERRAL TO PULMONARY DISEASE  3. Chest pain, unspecified type  -     REFERRAL TO PULMONARY DISEASE      Return in about 1 week (around 10/13/2021), or if symptoms worsen or fail to improve. SUBJECTIVE/OBJECTIVE:  Following up after Covid PNA. tested + on 21 and eventually ended up hospitalized. She was discharged in stable condition. Had neg CTA on 21. Still having dyspnea and chest pain. Not on O2. Did not qualify for Actemra d/t long duration of symptoms prior to hospital admission. Discussed return to ER if pulse ox drops below 90% or if dyspnea/chest pain worsens but she decided not to after a bad hospital/ER experience recently. Finished dexamethasone and extended steroids with Prednisone and then another round of dexamethasone which she just finished. Sx felt a little better with steroids. Ct Xarelto. Was vaccinated but immunosuppressed and has not had booster yet. Given her ongoing dyspnea and chest pain, she is here for 6-minute walk test.  She became short of breath after walking for less than 1 minute and her oxygen saturation dropped down to 79%. She continues to decline visit to emergency room. ROS  Gen - no fever/chills  Resp - per HPI  CV - per HPI  Rest per HPI    Blood pressure 132/83, pulse 84, temperature 97.1 °F (36.2 °C), temperature source Oral, resp.  rate 20, height 5' 5\" (1.651 m), weight 226 lb (102.5 kg), SpO2 (!) 79 %. Physical Exam  General appearance - alert, well appearing, and in no distress  Eyes -sclera anicteric  Neck - supple, no significant adenopathy, no thyromegaly  Chest - bilat lungs with some mild exp wheezing  Heart - normal rate, regular rhythm, normal S1, S2, no murmurs, rubs, clicks or gallops  Neurological - alert, oriented, normal speech, no focal findings or movement disorder noted  Extr - right LE with chronic edema and ttp from prev DVTs  Psych - normal mood and affect    On this date 10/06/2021 I have spent 30 minutes reviewing previous notes, test results and face to face with the patient discussing the diagnosis and importance of compliance with the treatment plan as well as documenting on the day of the visit. An electronic signature was used to authenticate this note.   -- Rd Alcocer MD

## 2021-10-07 ENCOUNTER — PATIENT OUTREACH (OUTPATIENT)
Dept: CASE MANAGEMENT | Age: 51
End: 2021-10-07

## 2021-10-08 ENCOUNTER — VIRTUAL VISIT (OUTPATIENT)
Dept: FAMILY MEDICINE CLINIC | Age: 51
End: 2021-10-08
Payer: COMMERCIAL

## 2021-10-08 ENCOUNTER — DOCUMENTATION ONLY (OUTPATIENT)
Dept: FAMILY MEDICINE CLINIC | Age: 51
End: 2021-10-08

## 2021-10-08 DIAGNOSIS — R09.02 HYPOXIA: ICD-10-CM

## 2021-10-08 DIAGNOSIS — R07.9 CHEST PAIN, UNSPECIFIED TYPE: ICD-10-CM

## 2021-10-08 DIAGNOSIS — U07.1 PNEUMONIA DUE TO COVID-19 VIRUS: Primary | ICD-10-CM

## 2021-10-08 DIAGNOSIS — J12.82 PNEUMONIA DUE TO COVID-19 VIRUS: Primary | ICD-10-CM

## 2021-10-08 PROCEDURE — 99214 OFFICE O/P EST MOD 30 MIN: CPT | Performed by: FAMILY MEDICINE

## 2021-10-08 NOTE — PROGRESS NOTES
Floyd Josue (: 1970) is a 46 y.o. female, established patient, here for evaluation of the following chief complaint(s):   Follow-up (Covid 19)       ASSESSMENT/PLAN:  Below is the assessment and plan developed based on review of pertinent history, labs, studies, and medications. 1. Pneumonia due to COVID-19 virus  2. Hypoxia  3. Chest pain, unspecified type      Return if symptoms worsen or fail to improve. SUBJECTIVE/OBJECTIVE:    Saw Dr. Herve Rachel and will get echo with bubble, PFTs, and 6 min walk test.  Ct oxygen. Started Symbicort. Reviewed overall picture again with patient and filled out FMLA paperwork today    ROS per HPI    No data recorded     No exam d/t audio    On this date 10/08/2021 I have spent 21 minutes reviewing previous notes, test results and face to face (virtual) with the patient discussing the diagnosis and importance of compliance with the treatment plan as well as documenting on the day of the visit. Floyd Josue, was evaluated through a synchronous (real-time) audio-video encounter. The patient (or guardian if applicable) is aware that this is a billable service. Verbal consent to proceed has been obtained within the past 12 months. The visit was conducted pursuant to the emergency declaration under the [facility], [address] [place] [address] authority and the [employer] General Act. Patient identification was verified, and a caregiver was present when appropriate. The patient was located in a state where the provider was credentialed to provide care. An electronic signature was used to authenticate this note.   -- Antonina Nelson MD

## 2021-10-08 NOTE — PROGRESS NOTES
Called today. Following up on oxygen levels - able to get O2 yesterday and already feeling much better. To see Dr. Jose Saucedo today for Pulm. Had sig chest pain last night and minor CP this am but did not go to ER. Rec if she has any further CP to get ER eval with CE and EKG even if this may be related to her PNA. Still planning for nuclear stress test on 10/21/21 - has been seeing Dr. Benavides Gravely. CTA 9/7/21  FINDINGS: This is a good quality study for the evaluation of pulmonary embolism to the first subsegmental arterial level. There is no pulmonary embolism to this level. THYROID: No nodule. MEDIASTINUM: No mass or lymphadenopathy. NOHELIA: No mass or lymphadenopathy. THORACIC AORTA: No aneurysm. HEART: Normal in size. ESOPHAGUS: No wall thickening or dilatation. TRACHEA/BRONCHI: Patent. PLEURA: No effusion or pneumothorax. LUNGS: Calcifications are dependent in the bilateral lower lobes, increased. Pleural-based lateral lingular left upper lobe airspace opacity is small in the. No lung mass or suspicious nodule. UPPER ABDOMEN: Partially imaged. No acute pathology. BONES: No aggressive bone lesion or fracture. IMPRESSION  1. Focal pleural-based pneumonia in the lingula of the left upper lobe. 2. No pulmonary embolism.

## 2021-10-11 NOTE — PROGRESS NOTES
Patient has graduated from the Transitions of Care Coordination  program on 10/11/21   Patient was not referred to the [facility] team for further management. Goals Addressed                 This Visit's Progress     COMPLETED: Prevent complications post hospitalization. 9/13/2021  - Spoke to patient today. Patient reports she is ok, tired, little energy. Patient upset that she can not get GI appt until December, Ctn offered to contact Dr. Mariah Law office to see if patient able to get sooner appt, VM was left. - patient will see PCP on 9/16   - no HH at this time, if patient feels like she has a need for it by Thursday she will ask pcp for order and contact CTN with road blocks. CTN to follow up tomorrow. AR    09/14/21  - CTN did not receive return call about GI appt yet. - Contacted patient to finish call   - patient will take meds as prescribed, patient did report that decadron was not received by pharmacy, this was confirmed with [employer] and Rx was called in as prescribed by Dr. Kvng Jean patient will pick Rx up when it is ready and she will contact CTN if road blocks. - patient will adhere to quarantine from last date of fever, she reported she tested positive on 8/31  - encouraged atleast 2L water daily  - PMH of DVT, she will contact MD with any s/s of DVT  - Reviewed red flag s/s with patient, nausea, vomiting, inability to pass urine/stool, SOB, mental status change, chest pain, fever.   - patient will do deep breathing exercises every 1-2 hours   - patient states she has a pulse ox somewhere in her home, encouraged patient to find it and check 02 and to contact MD if less then 90%, she is currently not on 02.   - patient will take short walks as long as her SOB will allow her around her home a few times daily     Patient will contact Md/CTN if red flag s/s arise. CTN to f/u ~ 7-10 days.  AR       10/06/21  Patient has PCP appt at [phone] today and it is currently 1212, Ctn to contact patient tomorrow for follow up call. AR    10/11/21  Patient last attended PCP appt on 10/8. CADE period ended, episode resolved at this time.  AR            Patients upcoming visits:    Future Appointments   Date Time Provider Gaurav Ceballos   10/12/2021  2:00 PM Mishel Jackson MD [facility] BS AMB   2/17/2022  9:10 AM Eagle Sumner MD [facility] [address] BS AMB

## 2021-10-12 ENCOUNTER — OFFICE VISIT (OUTPATIENT)
Dept: FAMILY MEDICINE CLINIC | Age: 51
End: 2021-10-12
Payer: COMMERCIAL

## 2021-10-12 VITALS
DIASTOLIC BLOOD PRESSURE: 76 MMHG | OXYGEN SATURATION: 89 % | HEART RATE: 77 BPM | HEIGHT: 65 IN | SYSTOLIC BLOOD PRESSURE: 122 MMHG | TEMPERATURE: 98.2 F | RESPIRATION RATE: 20 BRPM | WEIGHT: 222.8 LBS | BODY MASS INDEX: 37.12 KG/M2

## 2021-10-12 DIAGNOSIS — R07.9 CHEST PAIN, UNSPECIFIED TYPE: ICD-10-CM

## 2021-10-12 DIAGNOSIS — U07.1 PNEUMONIA DUE TO COVID-19 VIRUS: Primary | ICD-10-CM

## 2021-10-12 DIAGNOSIS — R09.02 HYPOXIA: ICD-10-CM

## 2021-10-12 DIAGNOSIS — J12.82 PNEUMONIA DUE TO COVID-19 VIRUS: Primary | ICD-10-CM

## 2021-10-12 PROCEDURE — 99213 OFFICE O/P EST LOW 20 MIN: CPT | Performed by: FAMILY MEDICINE

## 2021-10-12 PROCEDURE — 93000 ELECTROCARDIOGRAM COMPLETE: CPT | Performed by: FAMILY MEDICINE

## 2021-10-12 RX ORDER — BUDESONIDE AND FORMOTEROL FUMARATE DIHYDRATE 160; 4.5 UG/1; UG/1
1 AEROSOL RESPIRATORY (INHALATION) 2 TIMES DAILY
COMMUNITY
Start: 2021-10-08

## 2021-10-12 NOTE — PROGRESS NOTES
Martinez Ferraro (: 1970) is a 46 y.o. female, established patient, here for evaluation of the following chief complaint(s):  Follow-up (Hypoxia)       ASSESSMENT/PLAN:  Improving overall but still with chest pain so obtained EKG with some concern for TWI in precordials. Encouraged ER visit if CP worsens and to check in with Dr. Benjamín Rosenberg. Ct working with Dr. Miguel Zuluaga. Ct O2    Below is the assessment and plan developed based on review of pertinent history, physical exam, labs, studies, and medications. 1. Pneumonia due to COVID-19 virus  2. Hypoxia  3. Chest pain, unspecified type  -     AMB POC EKG ROUTINE W/ 12 LEADS, INTER & REP      Return in about 4 weeks (around 2021), or if symptoms worsen or fail to improve. SUBJECTIVE/OBJECTIVE:  Doing much better today since getting O2. Saw Dr. Miguel Zuluaga and awaiting echo with bubble, PFTs, and 6 min walk test.  Also started Symbicort. Feels like her breathing is getting back on track. Still having persistent chest pain. Had EKG and CTA in hospital recently with no concern for MI or PE. Also saw Cardiology and will have stress test in 1 week. Of note, she has a concerning family history with numerous members dying at younger ages from heart attacks. Chest pain is central and feels like a pressure. No radiation. No n/v. Some palpitations. No diaphoresis. Worse with position changes. Usually follows with Dr. Benjamín Rosenberg. No active chest pain currently. ROS per HPI    Blood pressure 122/76, pulse 77, temperature 98.2 °F (36.8 °C), temperature source Oral, resp. rate 20, height 5' 5\" (1.651 m), weight 222 lb 12.8 oz (101.1 kg), SpO2 (!) 89 %.     Physical Exam  General appearance - alert, well appearing, and in no distress  Eyes -sclera anicteric  Neck - supple, no significant adenopathy, no thyromegaly  Chest - clear to auscultation, no wheezes, rales or rhonchi, symmetric air entry, on O2  Heart - normal rate, regular rhythm, normal S1, S2, no murmurs, rubs, clicks or gallops  Neurological - alert, oriented, normal speech, no focal findings or movement disorder noted  Extr - no edema  Psych - normal mood and affect    EKG - TWI and low voltage in precordials    On this date 10/12/2021 I have spent 20 minutes reviewing previous notes, test results and face to face with the patient discussing the diagnosis and importance of compliance with the treatment plan as well as documenting on the day of the visit. An electronic signature was used to authenticate this note.   -- Richard Latham MD

## 2021-10-12 NOTE — PROGRESS NOTES
Chief Complaint   Patient presents with    Follow-up     Hypoxia   1. Have you been to the ER, urgent care clinic since your last visit? Hospitalized since your last visit? No    2. Have you seen or consulted any other health care providers outside of the [address] [place] [address] since your last visit? Include any pap smears or colon screening.  Yes Where: Pulmonary

## 2021-10-12 NOTE — LETTER
10/12/2021 2:26 PM    Ms. Savannah Wang  [address] [facility]      To Whom It May Concern:    Savannah Wang is currently under the care of Gaurav Ramos. Unfortunately, she continues having trouble with her breathing and is currently on oxygen therapy due to recent Covid pneumonia. It is medically necessary for her NOT to travel (including by plane) around 11/5/2021 while she recovers. If there are questions or concerns please have the patient contact our office.         Sincerely,      Ritika Orozco MD

## 2021-10-20 ENCOUNTER — TRANSCRIBE ORDER (OUTPATIENT)
Dept: SCHEDULING | Age: 51
End: 2021-10-20

## 2021-10-20 DIAGNOSIS — R10.11 ABDOMINAL PAIN, RIGHT UPPER QUADRANT: Primary | ICD-10-CM

## 2021-10-20 DIAGNOSIS — R93.3 ABNORMAL FINDINGS ON DIAGNOSTIC IMAGING OF DIGESTIVE SYSTEM: ICD-10-CM

## 2021-10-20 DIAGNOSIS — R10.814 ABDOMINAL TENDERNESS, LEFT LOWER QUADRANT: ICD-10-CM

## 2021-10-26 ENCOUNTER — TRANSCRIBE ORDER (OUTPATIENT)
Dept: SCHEDULING | Age: 51
End: 2021-10-26

## 2021-10-26 DIAGNOSIS — J45.909 ASTHMA: Primary | ICD-10-CM

## 2021-11-02 ENCOUNTER — HOSPITAL ENCOUNTER (OUTPATIENT)
Dept: NON INVASIVE DIAGNOSTICS | Age: 51
Discharge: HOME | End: 2021-11-02
Attending: INTERNAL MEDICINE
Payer: COMMERCIAL

## 2021-11-02 DIAGNOSIS — J45.909 ASTHMA: ICD-10-CM

## 2021-11-02 PROCEDURE — 93306 TTE W/DOPPLER COMPLETE: CPT

## 2021-11-02 PROCEDURE — 93306 TTE W/DOPPLER COMPLETE: CPT | Performed by: SPECIALIST

## 2021-11-02 RX ORDER — SODIUM CHLORIDE 0.9 % (FLUSH) 0.9 %
10 SYRINGE (ML) INJECTION AS NEEDED
Status: DISCONTINUED | OUTPATIENT
Start: 2021-11-02 | End: 2021-11-06 | Stop reason: HOSPADM

## 2021-11-02 RX ADMIN — Medication 10 ML: at 15:29

## 2021-11-02 RX ADMIN — Medication 10 ML: at 15:32

## 2021-11-03 ENCOUNTER — HOSPITAL ENCOUNTER (OUTPATIENT)
Dept: NUCLEAR MEDICINE | Age: 51
Discharge: HOME | End: 2021-11-03
Attending: SPECIALIST
Payer: COMMERCIAL

## 2021-11-03 VITALS — BODY MASS INDEX: 39.27 KG/M2 | WEIGHT: 236 LBS

## 2021-11-03 DIAGNOSIS — R93.3 ABNORMAL FINDINGS ON DIAGNOSTIC IMAGING OF DIGESTIVE SYSTEM: ICD-10-CM

## 2021-11-03 DIAGNOSIS — R10.814 ABDOMINAL TENDERNESS, LEFT LOWER QUADRANT: ICD-10-CM

## 2021-11-03 DIAGNOSIS — R10.11 ABDOMINAL PAIN, RIGHT UPPER QUADRANT: ICD-10-CM

## 2021-11-03 LAB
ECHO AO ROOT DIAM: 2.8 CM
ECHO AV AREA PLAN: 2.37 CM2
ECHO EST RA PRESSURE: 5 MMHG
ECHO LA AREA 4C: 17.66 CM2
ECHO LA MAJOR AXIS: 3.55 CM
ECHO LA VOL 4C: 44.02 ML (ref 22–52)
ECHO LV EDV A4C: 121.67 ML
ECHO LV EJECTION FRACTION A4C: 56 PERCENT
ECHO LV ESV A4C: 53.04 ML
ECHO LV INTERNAL DIMENSION DIASTOLIC: 4.26 CM (ref 3.9–5.3)
ECHO LV INTERNAL DIMENSION SYSTOLIC: 3.07 CM
ECHO LV IVSD: 0.99 CM (ref 0.6–0.9)
ECHO LV MASS 2D: 162.7 G (ref 67–162)
ECHO LV POSTERIOR WALL DIASTOLIC: 1.23 CM (ref 0.6–0.9)
ECHO LVOT DIAM: 1.48 CM
ECHO LVOT PEAK GRADIENT: 6.56 MMHG
ECHO LVOT PEAK VELOCITY: 128.04 CM/S
ECHO MV A VELOCITY: 61.22 CM/S
ECHO MV AREA PHT: 3.79 CM2
ECHO MV AREA PHT: 5.91 CM2
ECHO MV AREA PLAN: 6.91 CM2
ECHO MV E DECELERATION TIME (DT): 128.32 MS
ECHO MV E VELOCITY: 48.82 CM/S
ECHO MV E/A RATIO: 0.8
ECHO MV MAX VELOCITY: 86.1 CM/S
ECHO MV MEAN GRADIENT: 1.59 MMHG
ECHO MV PEAK GRADIENT: 2.97 MMHG
ECHO MV PRESSURE HALF TIME (PHT): 37.21 MS
ECHO MV PRESSURE HALF TIME (PHT): 58.08 MS
ECHO MV VTI: 25.57 CM
ECHO PV PEAK INSTANTANEOUS GRADIENT SYSTOLIC: 3.01 MMHG
ECHO RA AREA 4C: 12.35 CM2
ECHO RIGHT VENTRICULAR SYSTOLIC PRESSURE (RVSP): 26.44 MMHG
ECHO TV REGURGITANT MAX VELOCITY: 122.21 CM/S
ECHO TV REGURGITANT MAX VELOCITY: 231.43 CM/S
ECHO TV REGURGITANT PEAK GRADIENT: 21.44 MMHG

## 2021-11-03 PROCEDURE — 78227 HEPATOBIL SYST IMAGE W/DRUG: CPT

## 2021-11-03 PROCEDURE — 74011250636 HC RX REV CODE- 250/636: Performed by: SPECIALIST

## 2021-11-03 RX ORDER — KIT FOR THE PREPARATION OF TECHNETIUM TC 99M MEBROFENIN 45 MG/10ML
5 INJECTION, POWDER, LYOPHILIZED, FOR SOLUTION INTRAVENOUS
Status: COMPLETED | OUTPATIENT
Start: 2021-11-03 | End: 2021-11-03

## 2021-11-03 RX ADMIN — SINCALIDE 2.14 MCG: 5 INJECTION, POWDER, LYOPHILIZED, FOR SOLUTION INTRAVENOUS at 11:21

## 2021-11-03 RX ADMIN — KIT FOR THE PREPARATION OF TECHNETIUM TC 99M MEBROFENIN 5 MILLICURIE: 45 INJECTION, POWDER, LYOPHILIZED, FOR SOLUTION INTRAVENOUS at 11:00

## 2021-11-09 ENCOUNTER — OFFICE VISIT (OUTPATIENT)
Dept: FAMILY MEDICINE CLINIC | Age: 51
End: 2021-11-09
Payer: COMMERCIAL

## 2021-11-09 VITALS
OXYGEN SATURATION: 100 % | WEIGHT: 231.6 LBS | DIASTOLIC BLOOD PRESSURE: 64 MMHG | TEMPERATURE: 97 F | HEART RATE: 76 BPM | SYSTOLIC BLOOD PRESSURE: 118 MMHG | RESPIRATION RATE: 18 BRPM | HEIGHT: 65 IN | BODY MASS INDEX: 38.59 KG/M2

## 2021-11-09 DIAGNOSIS — R49.0 HOARSENESS: ICD-10-CM

## 2021-11-09 DIAGNOSIS — R09.02 HYPOXIA: ICD-10-CM

## 2021-11-09 DIAGNOSIS — D84.9 IMMUNOSUPPRESSED STATUS (HCC): ICD-10-CM

## 2021-11-09 DIAGNOSIS — D68.59 HYPERCOAGULABLE STATE (HCC): ICD-10-CM

## 2021-11-09 DIAGNOSIS — K21.9 GASTROESOPHAGEAL REFLUX DISEASE, UNSPECIFIED WHETHER ESOPHAGITIS PRESENT: ICD-10-CM

## 2021-11-09 DIAGNOSIS — J30.2 SEASONAL ALLERGIC RHINITIS, UNSPECIFIED TRIGGER: ICD-10-CM

## 2021-11-09 DIAGNOSIS — J45.20 MILD INTERMITTENT ASTHMA WITHOUT COMPLICATION: Primary | ICD-10-CM

## 2021-11-09 DIAGNOSIS — Z86.16 HISTORY OF COVID-19: ICD-10-CM

## 2021-11-09 DIAGNOSIS — Z87.11 HISTORY OF PEPTIC ULCER DISEASE: ICD-10-CM

## 2021-11-09 DIAGNOSIS — R07.9 CHEST PAIN, UNSPECIFIED TYPE: ICD-10-CM

## 2021-11-09 PROCEDURE — 99214 OFFICE O/P EST MOD 30 MIN: CPT | Performed by: FAMILY MEDICINE

## 2021-11-09 RX ORDER — SPIRONOLACTONE 25 MG/1
25 TABLET ORAL DAILY
COMMUNITY
Start: 2021-11-02

## 2021-11-09 RX ORDER — INHALER, ASSIST DEVICES
SPACER (EA) MISCELLANEOUS
COMMUNITY
Start: 2021-10-12

## 2021-11-09 RX ORDER — PREDNISONE 5 MG/1
TABLET ORAL
Qty: 30 TABLET | Refills: 0 | Status: SHIPPED | OUTPATIENT
Start: 2021-11-09 | End: 2021-12-01 | Stop reason: ALTCHOICE

## 2021-11-09 NOTE — PROGRESS NOTES
Chief Complaint   Patient presents with    Follow-up     4 week   1. Have you been to the ER, urgent care clinic since your last visit? Hospitalized since your last visit? No    2. Have you seen or consulted any other health care providers outside of the [address] [place] [address] since your last visit? Include any pap smears or colon screening.  Yes Where: Pulmonary ,Cardiology     Discuss ear pain and nose pain

## 2021-11-09 NOTE — PROGRESS NOTES
Oniel Mallory (: 1970) is a 46 y.o. female, established patient, here for evaluation of the following chief complaint(s):  Follow-up (4 week)       ASSESSMENT/PLAN: Continues with mild improvement overall. Working with Dr. Anayeli Guo and Dr. Anne Robbins on this. With current wheezing, hoarseness, and ongoing pulmonary symptoms since Covid pneumonia, discussed trial of a low-dose prednisone taper for 1 week. She has ongoing symptoms but it has not been 3 months since her initial diagnosis with Covid so does not fall into the long Covid category. Has known GERD and PUD and has been struggling with her weight previously but she did tolerate lower dose taper. Encouraged nasal saline and Vaseline as needed to help with nasal dryness from oxygen. Also encouraged Flonase and Zyrtec for allergic rhinitis type symptoms. Suspect hoarseness related more to allergies but also potentially from GERD. Working with Dr. Maggie Banda on this. Below is the assessment and plan developed based on review of pertinent history, physical exam, labs, studies, and medications. 1. Mild intermittent asthma without complication  -     predniSONE (DELTASONE) 5 mg tablet; Take 3 pills for 2 days, then 2 pills for 2 days, then 1 pill for 3 days, Normal, Disp-30 Tablet, R-0  2. History of COVID-19  -     predniSONE (DELTASONE) 5 mg tablet; Take 3 pills for 2 days, then 2 pills for 2 days, then 1 pill for 3 days, Normal, Disp-30 Tablet, R-0  3. Hypoxia  -     predniSONE (DELTASONE) 5 mg tablet; Take 3 pills for 2 days, then 2 pills for 2 days, then 1 pill for 3 days, Normal, Disp-30 Tablet, R-0  4. Chest pain, unspecified type  5. Immunosuppressed status ([address]  6. Hypercoagulable state ([address]  7. History of peptic ulcer disease  8. Gastroesophageal reflux disease, unspecified whether esophagitis present  9. Seasonal allergic rhinitis, unspecified trigger  -     predniSONE (DELTASONE) 5 mg tablet;  Take 3 pills for 2 days, then 2 pills for 2 days, then 1 pill for 3 days, Normal, Disp-30 Tablet, R-0  10. Hoarseness  -     predniSONE (DELTASONE) 5 mg tablet; Take 3 pills for 2 days, then 2 pills for 2 days, then 1 pill for 3 days, Normal, Disp-30 Tablet, R-0      Return in about 6 weeks (around 12/21/2021). SUBJECTIVE/OBJECTIVE:  Doing much better today since getting O2. Continues following with Dr. Evette Vargas. Saw Dr. Taj Ron recently. Had echo with bubble which was normal.  Had PFTs and 6 min walk test which she reports were both significantly abnormal.  She was told her lungs were \"about 73%\" and her oxygen significantly dropped with her 6-minute walk test.  Has known history of asthma but has not had any significant flaring in many years. Continues on Symbicort and albuterol. Feels like her breathing is improving. Was also having some chest pain so had stress test but unable to see results on this. Saw Dr. Felicia Ga and also had gallbladder imaging which came back normal.    She has continued on Lipitor. Blood pressure was running high recently so started on spironolactone as well. Planning to start cardiopulmonary rehab. She reports discussing possibility of Holter monitor and cardiac cath as well. No plans for this test yet. Today she is concerned about ear pain and nose pain. Has been having some mild wheezing being treated as above. Also endorses some nasal dryness with use of oxygen and recent hoarseness of her voice. Has known GERD and feels like this has significantly flared up despite use of Protonix and Pepcid. She has stopped Ozempic so this is not contributing. ROS per HPI    Blood pressure 118/64, pulse 76, temperature 97 °F (36.1 °C), temperature source Oral, resp. rate 18, height 5' 5\" (1.651 m), weight 231 lb 9.6 oz (105.1 kg), SpO2 100 %.     Physical Exam  General appearance - alert, well appearing, and in no distress  Eyes -sclera anicteric  Neck - supple, no significant adenopathy, no thyromegaly  Chest -using O2, mild end expiratory wheezing diffusely. Good effort and good air movement  Heart - normal rate, regular rhythm, normal S1, S2, no murmurs, rubs, clicks or gallops  Neurological - alert, oriented, normal speech, no focal findings or movement disorder noted  Psych - normal mood and affect    On this date 11/09/2021 I have spent 30 minutes reviewing previous notes, test results and face to face with the patient discussing the diagnosis and importance of compliance with the treatment plan as well as documenting on the day of the visit. An electronic signature was used to authenticate this note.   -- Rd Alcocer MD

## 2021-11-16 ENCOUNTER — VIRTUAL VISIT (OUTPATIENT)
Dept: FAMILY MEDICINE CLINIC | Age: 51
End: 2021-11-16
Payer: COMMERCIAL

## 2021-11-16 DIAGNOSIS — F51.02 ADJUSTMENT INSOMNIA: ICD-10-CM

## 2021-11-16 DIAGNOSIS — R09.02 HYPOXIA: ICD-10-CM

## 2021-11-16 DIAGNOSIS — F32.A ANXIETY AND DEPRESSION: Primary | ICD-10-CM

## 2021-11-16 DIAGNOSIS — F41.9 ANXIETY AND DEPRESSION: Primary | ICD-10-CM

## 2021-11-16 PROCEDURE — 99213 OFFICE O/P EST LOW 20 MIN: CPT | Performed by: FAMILY MEDICINE

## 2021-11-16 RX ORDER — ESCITALOPRAM OXALATE 10 MG/1
TABLET ORAL
Qty: 30 TABLET | Refills: 0 | Status: SHIPPED | OUTPATIENT
Start: 2021-11-16 | End: 2021-12-01

## 2021-11-16 NOTE — PROGRESS NOTES
Rosalie Chen (: 1970) is a 46 y.o. female, established patient, here for evaluation of the following chief complaint(s):   Depression (Related to Health issues)       ASSESSMENT/PLAN: Struggling with her depression currently largely related to her bout with COVID-19 pneumonia and ongoing hypoxia with need for oxygen. We discussed this and restarted her on Lexapro. She is not interested in doing therapy at this time but would consider it in the future. Below is the assessment and plan developed based on review of pertinent history, labs, studies, and medications. 1. Anxiety and depression  -     escitalopram oxalate (LEXAPRO) 10 mg tablet; Take 1/2 tab by mouth daily x 1 week and then increase to 1 tab daily, Normal, Disp-30 Tablet, R-0  2. Hypoxia  3. Adjustment insomnia  -     escitalopram oxalate (LEXAPRO) 10 mg tablet; Take 1/2 tab by mouth daily x 1 week and then increase to 1 tab daily, Normal, Disp-30 Tablet, R-0      Return in about 4 weeks (around 2021), or if symptoms worsen or fail to improve. SUBJECTIVE/OBJECTIVE:    Struggling with ongoing sx from [facility]. She is not quite yet a long hauler as her sx and test were < 3 months ago. She is feeling very overwhelmed with anxiety and depression largely in relation to this illness. She denies any HI/SI but is asking herself questions about why she is even alive. She feels constantly on edge, tearful, more irritable, depressed, fatigued, having trouble with concentration and attention. She has had bouts of anxiety and depression before and has done well on Lexapro in the past.  She is done therapy in the past as well.     ROS per HPI    Patient-Reported Systolic (Top): 487  Patient-Reported Diastolic (Bottom): 90     No exam due to audio only    On this date 2021 I have spent 15 minutes reviewing previous notes, test results and face to face (virtual) with the patient discussing the diagnosis and importance of compliance with the treatment plan as well as documenting on the day of the visit. Lawrance Ebbing, was evaluated through a synchronous (real-time) audio-video encounter. The patient (or guardian if applicable) is aware that this is a billable service. Verbal consent to proceed has been obtained within the past 12 months. The visit was conducted pursuant to the emergency declaration under the [address] [place] [address], [address] [place] [address] authority and the [employer] and [employer] Act. Patient identification was verified, and a caregiver was present when appropriate. The patient was located in a state where the provider was credentialed to provide care. An electronic signature was used to authenticate this note.   -- Farzana Buckner MD

## 2021-11-16 NOTE — PROGRESS NOTES
Chief Complaint   Patient presents with    Depression     Related to Health issues   1. Have you been to the ER, urgent care clinic since your last visit? Hospitalized since your last visit? No    2. Have you seen or consulted any other health care providers outside of the [address] [place] [address] since your last visit? Include any pap smears or colon screening.  No

## 2021-11-21 DIAGNOSIS — Z86.718 HISTORY OF RECURRENT DEEP VEIN THROMBOSIS (DVT): ICD-10-CM

## 2021-11-21 DIAGNOSIS — D68.59 HYPERCOAGULABLE STATE (HCC): ICD-10-CM

## 2021-11-21 RX ORDER — RIVAROXABAN 20 MG/1
TABLET, FILM COATED ORAL
Qty: 90 TABLET | Refills: 0 | Status: SHIPPED | OUTPATIENT
Start: 2021-11-21 | End: 2022-02-18 | Stop reason: SDUPTHER

## 2021-12-01 ENCOUNTER — VIRTUAL VISIT (OUTPATIENT)
Dept: FAMILY MEDICINE CLINIC | Age: 51
End: 2021-12-01
Payer: COMMERCIAL

## 2021-12-01 DIAGNOSIS — J45.20 MILD INTERMITTENT ASTHMA WITHOUT COMPLICATION: ICD-10-CM

## 2021-12-01 DIAGNOSIS — R09.02 HYPOXIA: Primary | ICD-10-CM

## 2021-12-01 DIAGNOSIS — Z86.16 HISTORY OF COVID-19: ICD-10-CM

## 2021-12-01 DIAGNOSIS — F41.9 ANXIETY AND DEPRESSION: ICD-10-CM

## 2021-12-01 DIAGNOSIS — F32.A ANXIETY AND DEPRESSION: ICD-10-CM

## 2021-12-01 DIAGNOSIS — F51.02 ADJUSTMENT INSOMNIA: ICD-10-CM

## 2021-12-01 PROCEDURE — 99213 OFFICE O/P EST LOW 20 MIN: CPT | Performed by: FAMILY MEDICINE

## 2021-12-01 RX ORDER — ESCITALOPRAM OXALATE 20 MG/1
20 TABLET ORAL DAILY
Qty: 90 TABLET | Refills: 0 | Status: SHIPPED | OUTPATIENT
Start: 2021-12-01 | End: 2022-03-19

## 2021-12-01 NOTE — PROGRESS NOTES
No Dumont (: 1970) is a 46 y.o. female, established patient, here for evaluation of the following chief complaint(s):   Depression (2 week follow-up)       ASSESSMENT/PLAN:  Improving with rehab, needs to switch to oxygen concentrator. Anxiety and depression ongoing but improving with lexapro and will titrate up on dose. Setting up for therapy too. Below is the assessment and plan developed based on review of pertinent history, labs, studies, and medications. 1. Hypoxia  -     AMB SUPPLY ORDER  2. History of COVID-19  -     AMB SUPPLY ORDER  3. Mild intermittent asthma without complication  -     AMB SUPPLY ORDER  4. Anxiety and depression  -     escitalopram oxalate (LEXAPRO) 20 mg tablet; Take 1 Tablet by mouth daily. , Normal, Disp-90 Tablet, R-0  -     REFERRAL TO SOCIAL WORK  5. Adjustment insomnia  -     escitalopram oxalate (LEXAPRO) 20 mg tablet; Take 1 Tablet by mouth daily. , Normal, Disp-90 Tablet, R-0  -     REFERRAL TO SOCIAL WORK      Return in about 4 weeks (around 2021), or if symptoms worsen or fail to improve. SUBJECTIVE/OBJECTIVE:    Doing cardiopulm rehab regularly since last appt. Was able to do 16 min on treadmill. Ct on O2 daily. 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 Doing much better today since getting O2. Continues following with Dr. Mariama Chiang. Saw Dr. Amaya Dc recently. Continues on Symbicort and albuterol. Feels like her breathing is improving. Was also having some chest pain so had stress test but unable to see results on this.   Saw Dr. Karen Capone and also had gallbladder imaging which came back normal.    Patient-Reported Systolic (Top): 046  Patient-Reported Diastolic (Bottom): 80  Patient-Reported Pulse: 77  Patient-Reported Weight: 228lbs     Physical exam:  General appearance - alert, well appearing, and in no distress  Eyes -sclera anicteric, no discharge  HEENT normocephalic, atraumatic, moist mucous membranes, no visualized neck mass  Chest -normal respiratory effort, no visualized signs of respiratory distress  Neurological - alert, awake, normal speech, no focal findings or movement disorder noted  Psych - normal mood and affect  Skin no apparent lesions    On this date 12/01/2021 I have spent 20 minutes reviewing previous notes, test results and face to face (virtual) with the patient discussing the diagnosis and importance of compliance with the treatment plan as well as documenting on the day of the visit. Jos Zachary was evaluated through a synchronous (real-time) audio-video encounter. The patient (or guardian if applicable) is aware that this is a billable service. Verbal consent to proceed has been obtained within the past 12 months. The visit was conducted pursuant to the emergency declaration under the [address] [place] [address] authority and the [employer] and [employer] General Act. Patient identification was verified, and a caregiver was present when appropriate. The patient was located in a state where the provider was credentialed to provide care. An electronic signature was used to authenticate this note.   -- Alessandro Quevedo MD

## 2021-12-01 NOTE — PROGRESS NOTES
Chief Complaint   Patient presents with    Depression     2 week follow-up   1. Have you been to the ER, urgent care clinic since your last visit? Hospitalized since your last visit? No    2. Have you seen or consulted any other health care providers outside of the [address] [place] [address] since your last visit? Include any pap smears or colon screening.  No

## 2021-12-21 ENCOUNTER — OFFICE VISIT (OUTPATIENT)
Dept: FAMILY MEDICINE CLINIC | Age: 51
End: 2021-12-21
Payer: COMMERCIAL

## 2021-12-21 VITALS
TEMPERATURE: 97 F | SYSTOLIC BLOOD PRESSURE: 147 MMHG | DIASTOLIC BLOOD PRESSURE: 75 MMHG | RESPIRATION RATE: 18 BRPM | HEIGHT: 65 IN | HEART RATE: 89 BPM | BODY MASS INDEX: 37.82 KG/M2 | OXYGEN SATURATION: 97 % | WEIGHT: 227 LBS

## 2021-12-21 DIAGNOSIS — Z23 NEEDS FLU SHOT: ICD-10-CM

## 2021-12-21 DIAGNOSIS — F41.9 ANXIETY AND DEPRESSION: ICD-10-CM

## 2021-12-21 DIAGNOSIS — F32.A ANXIETY AND DEPRESSION: ICD-10-CM

## 2021-12-21 DIAGNOSIS — F51.02 ADJUSTMENT INSOMNIA: ICD-10-CM

## 2021-12-21 DIAGNOSIS — Z86.16 HISTORY OF COVID-19: ICD-10-CM

## 2021-12-21 DIAGNOSIS — I10 PRIMARY HYPERTENSION: ICD-10-CM

## 2021-12-21 DIAGNOSIS — R09.02 HYPOXIA: Primary | ICD-10-CM

## 2021-12-21 PROCEDURE — 99214 OFFICE O/P EST MOD 30 MIN: CPT | Performed by: FAMILY MEDICINE

## 2021-12-21 PROCEDURE — 90471 IMMUNIZATION ADMIN: CPT | Performed by: FAMILY MEDICINE

## 2021-12-21 PROCEDURE — 90686 IIV4 VACC NO PRSV 0.5 ML IM: CPT | Performed by: FAMILY MEDICINE

## 2021-12-21 RX ORDER — HYDRALAZINE HYDROCHLORIDE 25 MG/1
25 TABLET, FILM COATED ORAL
COMMUNITY
Start: 2021-12-14

## 2021-12-21 RX ORDER — PREDNISONE 5 MG/1
TABLET ORAL
COMMUNITY
Start: 2021-12-15 | End: 2022-02-17

## 2021-12-21 NOTE — PROGRESS NOTES
Edna Jimenez (: 1970) is a 46 y.o. female, established patient, here for evaluation of the following chief complaint(s):   Follow-up (Hypoxia)       ASSESSMENT/PLAN:  Slowly improving with cardiopulmonary rehab, needs to switch to oxygen concentrator or other device to help with O2 that she can take to work when able to return. Anxiety and depression ongoing but improving with lexapro, for therapy. BP is running high but did not take hydralazine today. To monitor at home. Forms filled out for pt    Below is the assessment and plan developed based on review of pertinent history, labs, studies, and medications. 1. Hypoxia  2. History of COVID-19  3. Anxiety and depression  4. Adjustment insomnia  5. Primary hypertension  6. Needs flu shot  -     INFLUENZA VIRUS VAC QUAD,SPLIT,PRESV FREE SYRINGE IM      Return in about 6 weeks (around 2022), or if symptoms worsen or fail to improve. SUBJECTIVE/OBJECTIVE:    Doing cardiopulm rehab regularly since last appt. Was able to do 16 min on treadmill. Ct on O2 daily. 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 Still working on getting a portable oxygen concentrator. Much better since getting O2 but still dyspneic with talking at times and with minimal effort. Continues following with Dr. Lobo Valdovinos. Saw Dr. Tristin Mcclain recently. Continues on Symbicort and albuterol. BP recently an issue. She is taking spironolactone and hydralazine but missed hydralazine dose this am.    ROS  Gen - no fever/chills  Resp - no cough  CV - no chest pain  Rest per HPI    Blood pressure (!) 147/75, pulse 89, temperature 97 °F (36.1 °C), temperature source Temporal, resp. rate 18, height 5' 5\" (1.651 m), weight 227 lb (103 kg), SpO2 97 %.     Physical Exam  General appearance - alert, well appearing, and in no distress, using O2 canister  Eyes -sclera anicteric  Neck - supple, no significant adenopathy, no thyromegaly  Chest - clear to auscultation, no wheezes, rales or rhonchi, symmetric air entry  Heart - normal rate, regular rhythm, normal S1, S2, no murmurs, rubs, clicks or gallops  Neurological - alert, oriented, normal speech, no focal findings or movement disorder noted  Extr - no edema  Psych - normal mood and affect    On this date 12/21/2021 I have spent 30 minutes reviewing previous notes, test results and face to face with the patient discussing the diagnosis and importance of compliance with the treatment plan as well as documenting on the day of the visit. An electronic signature was used to authenticate this note.   -- Antonina Nelson MD

## 2021-12-21 NOTE — PROGRESS NOTES
Chief Complaint   Patient presents with    Follow-up     Hypoxia   1. Have you been to the ER, urgent care clinic since your last visit? Hospitalized since your last visit? No    2. Have you seen or consulted any other health care providers outside of the [address] [place] [address] since your last visit? Include any pap smears or colon screening. Yes Cardiology,      She denies any symptoms , reactions or allergies that would exclude them from being immunized today. Risks and adverse reactions were discussed and the VIS was given to them. All questions were addressed. She was observed for 15 min post injection. There were no reactions observed.     Reema Marr LPN

## 2021-12-22 ENCOUNTER — VIRTUAL VISIT (OUTPATIENT)
Dept: BEHAVIORAL/MENTAL HEALTH CLINIC | Age: 51
End: 2021-12-22
Payer: COMMERCIAL

## 2021-12-22 DIAGNOSIS — F43.23 ADJUSTMENT DISORDER WITH MIXED ANXIETY AND DEPRESSED MOOD: Primary | ICD-10-CM

## 2021-12-22 PROCEDURE — 90791 PSYCH DIAGNOSTIC EVALUATION: CPT | Performed by: SOCIAL WORKER

## 2021-12-22 NOTE — PROGRESS NOTES
Outpatient Initial Assessment and Psychotherapy Note     Chief Complaint:     Chief Complaint   Patient presents with    Anxiety    Depression         History of Present Illness: Lamont Pham is a 46 y.o. female who presents with symptoms of depression and anxiety. Client is referred for individual psychotherapy by her PCP, Dr. Brandon Maradiaga MD. Client reports experiencing the following clinical symptoms:  Depressed mood, anxiety, sleep and appetite disturbance, lack of energy/motivation, ruminating and anhedonia. She denies both suicidal and homicidal ideation. Psychosocial stressors that impact mood include: significant health issues after having COVID19, inability to work and concerns re: finances. Significant Social History:  Client was born and raised in [place], [place]. Her parents were never  and she lived with mother growing up, although father was active in her life until he passed away from a brain tumor when she was 9years old. Client is an only child. She was raised in multigenerational home with mother, grandmother and aunt. She shared that she had a good childhood--free of abuse. .     Client reports experiencing some bullying when she was younger. She states she was overweight and the other children would make fun of her. This has an impact on her self esteem. Academically, she was very gifted. After high school, she attended a college in [place] and received her undergraduate degree in Biology. She later went to [employer] and obtained her Master's degree in Education. 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 At the encouragement of her mother and grandmother, she later took a job as a . 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 Client is . She and  have been  for 28 years and she describes it as a supportive and loving relationship. They have one son, age 32, who has a degree in Chemistry and works in research. This past August, client contracted [address] despite being vaccinated. She was in hospital for 4 days. Since marry COVID19, she has to be on oxygen. She get winded very easily. She has not been able to go back to work. This has been a tremendous loss for her. In addition, the loss of income has an affect on the family budget and this, too, is source of stress. All of these stressors have affected her mood. It is these issues that prompted a referral for counseling. Substance Abuse History:    Denies      Current  Medication List:   Current Outpatient Medications   Medication Sig Dispense Refill    predniSONE (DELTASONE) 5 mg tablet       hydrALAZINE (APRESOLINE) 25 mg tablet       escitalopram oxalate (LEXAPRO) 20 mg tablet Take 1 Tablet by mouth daily. 90 Tablet 0    Xarelto 20 mg tab tablet Take 1 tablet by mouth once daily 90 Tablet 0    spironolactone (ALDACTONE) 25 mg tablet Take 25 mg by mouth daily.  Aerochamber Max with Flow-VU       Symbicort 160-4.5 mcg/actuation HFAA       atorvastatin (LIPITOR) 40 mg tablet TAKE 1 TABLET BY MOUTH ONCE DAILY AT BEDTIME      famotidine (PEPCID) 40 mg tablet TAKE 1 TABLET BY MOUTH TWICE DAILY AS DIRECTED AND AS NEEDED      semaglutide (OZEMPIC) 0.25 mg or 0.5 mg/dose (2 mg/1.5 ml) subq pen 0.5 mg by SubCUTAneous route every seven (7) days. (Patient not taking: Reported on 9/16/2021) 3 Box 3    pantoprazole (PROTONIX) 40 mg tablet Take 1 Tablet by mouth daily. 90 Tablet 1    amitriptyline (ELAVIL) 10 mg tablet Take 1 Tab by mouth nightly. 30 Tab 0    folic acid (FOLVITE) 1 mg tablet TAKE 1 TABLET BY MOUTH ONCE A DAY (Patient taking differently: 2 mg.  TAKE 1 TABLET BY MOUTH ONCE A DAY) 90 Tab 6    furosemide (LASIX) 40 mg tablet TAKE 1 TABLET BY MOUTH TWICE DAILY AS NEEDED 180 Tab 1    triamcinolone acetonide (KENALOG) 0.5 % ointment Apply  to affected area two (2) times a day. use thin layer 30 g 0    CERTOLIZUMAB PEGOL (CIMZIA SC) by SubCUTAneous route. Injection:  Every 4 weeks      methotrexate (RHEUMATREX) 2.5 mg tablet 6 tablets once weekly  2    albuterol (PROVENTIL HFA, VENTOLIN HFA, PROAIR HFA) 90 mcg/actuation inhaler Take 1 Puff by inhalation every four (4) hours as needed for Wheezing. 1 Inhaler 5          Family Psychiatric History:   Family History   Problem Relation Age of Onset    Hypertension Mother     Thyroid Disease Mother         Hypothyroid    Diabetes Mother     Other Mother         GALLSTONES    Cancer Father 27        brain    High Cholesterol Maternal Grandmother     Diabetes Maternal Grandmother     Hypertension Maternal Grandmother     Stroke Maternal Grandmother 80        massive.  Cancer Maternal Grandmother         STOMACH.  Heart Disease Maternal Grandmother     Other Maternal Grandfather         SEVERE ANAPHYLACTIC REACTIONS.  FROM BEE STINGS.  Hypertension Paternal Grandmother     Hypertension Paternal Grandfather     Obesity Paternal Aunt           Family medical problems:  Family History   Problem Relation Age of Onset   [facility] Hypertension Mother     Thyroid Disease Mother         Hypothyroid    Diabetes Mother     Other Mother         GALLSTONES    Cancer Father 27        brain    High Cholesterol Maternal Grandmother     Diabetes Maternal Grandmother     Hypertension Maternal Grandmother     Stroke Maternal Grandmother 80        massive.  Cancer Maternal Grandmother         STOMACH.  Heart Disease Maternal Grandmother     Other Maternal Grandfather         SEVERE ANAPHYLACTIC REACTIONS.  FROM BEE STINGS.     Hypertension Paternal Grandmother     Hypertension Paternal Grandfather     Obesity Paternal Aunt        Social History:      Social History     Socioeconomic History    Marital status:      Spouse name: Not on file    Number of children: Not on file    Years of education: Not on file    Highest education level: Not on file   Occupational History    Not on file   Tobacco Use    Smoking status: Never Smoker    Smokeless tobacco: Never Used   Vaping Use    Vaping Use: Never used   Substance and Sexual Activity    Alcohol use: Yes     Comment: not weekly    Drug use: No    Sexual activity: Yes     Partners: Male     Birth control/protection: Surgical     Comment: PHILL   Other Topics Concern    Not on file   Social History Narrative    Not on file     Social Determinants of Health     Financial Resource Strain:     Difficulty of Paying Living Expenses: Not on file   Food Insecurity:     Worried About Running Out of Food in the Last Year: Not on file    Sobia of Food in the Last Year: Not on file   Transportation Needs:     Lack of Transportation (Medical): Not on file    Lack of Transportation (Non-Medical): Not on file   Physical Activity:     Days of Exercise per Week: Not on file    Minutes of Exercise per Session: Not on file   Stress:     Feeling of Stress : Not on file   Social Connections:     Frequency of Communication with Friends and Family: Not on file    Frequency of Social Gatherings with Friends and Family: Not on file    Attends Episcopal Services: Not on file    Active Member of [address] [place] [address] [employer] or Organizations: Not on file    Attends Club or Organization Meetings: Not on file    Marital Status: Not on file   Intimate Partner Violence:     Fear of Current or Ex-Partner: Not on file    Emotionally Abused: Not on file    Physically Abused: Not on file    Sexually Abused: Not on file   Housing Stability:     Unable to Pay for Housing in the Last Year: Not on file    Number of [place] in the Last Year: Not on file    Unstable Housing in the Last Year: Not on file       Allergies:    Allergies   Allergen Reactions    Humira [Adalimumab] Rash     Large red knots    Sulfa (Sulfonamide Antibiotics) Anaphylaxis          Psychiatric/Mental Status Examination:     MENTAL STATUS EXAM:  Sensorium  oriented to time, place and person   Orientation person, place, time/date, situation, day of week, month of year and year   Relations cooperative   Eye Contact appropriate   Appearance:  casually dressed   Motor Behavior:  limited due to issues with breathing   Speech:  normal pitch and normal volume   Vocabulary average   Thought Process: goal directed and logical   Thought Content free of delusions and free of hallucinations   Suicidal ideations none   Homicidal ideations none   Mood:  anxious and depressed   Affect:  anxious and depressed   Memory recent  adequate   Memory remote:  adequate   Concentration:  adequate   Abstraction:  abstract   Insight:  fair   Reliability fair   Judgment:  fair   Diagnoses:   Axis I: Adjustment Disorder with Mixed Emotional Features  Axis II: Deferred  Axis III:   Past Medical History:   Diagnosis Date    Anemia associated with acute blood loss 2017    Txd with Blood transfusions x 2. Dr. Jeremiah Vines.  Asthma childhood    DDD (degenerative disc disease), lumbar     DJD (degenerative joint disease) of knee     JANAE (generalized anxiety disorder) 2018    Dr. Masterson Said    GERD (gastroesophageal reflux disease)     GI bleeding 2017    Dr. Osito Trevino. Txd with Blood transfusions.  History of tuberculosis exposure 2013    POSITIVE PPD TEST. Txd x 6 months; last dose either 11/13 or 12/13    Lower leg DVT (deep venous thromboembolism), acute, right ([address] 2008, 4/27/2016, 08/03/2017    x 3. Initially due to trauma to leg then plane ride home. Dr. Claire Saldaña. Chronic Anticoagulation.  Major depression 2018    Dr. Masterson Said    Morbid obesity ([address]     Orthostatic syncope 2017    due to anemia from blood loss. Dr. Jeremiah Vines.     Post-thrombotic syndrome of right lower extremity 09/14/2017    w  R leg swelling and pain. Dr. Sammy Arndt.  Pseudogout 2016    R knee. Dr. Tiffany Toussaint.  Psoriatic arthritis ([facility] 2000s    Dr. Lindsey Washington. Txd w Destiny Mattes injections monthly.  PUD (peptic ulcer disease)     Dr. Irina Bardales.  Shingles 03/2019    R ACW. R upper back previously. Betsy Sorto.  Skin abrasion 01/28/2014    After loosing 125#, Bilateral Inner thigh friction flareup monthly with skin breakdown    Thromboembolus ([address] 2008    Rt leg,  pt states she fell and had a plane ride home and developed blood clots    Ulcerative colitis ([facility]     Uterine fibroid 2017    x 4. Dr. Christiana Hannah. Axis IV: Problems with primary support group, Problems related to social environment, Occupational problems, Economic problems, Problems with access to health care services and Other psychosocial or environmental problems  Axis V:51-60 moderate symptoms      Clinical Impressions:   Jerrica Gray is a 46 y.o. female who presents with symptoms of depression and anxiety. Client is referred for individual psychotherapy by her PCP, Dr. Sheldon Bowman MD. Client reports experiencing the following clinical symptoms:  Depressed mood, anxiety, sleep and appetite disturbance, lack of energy/motivation, ruminating and anhedonia. She denies both suicidal and homicidal ideation. Psychosocial stressors that impact mood include: significant health issues after having COVID19, inability to work and concerns re: finances. As goals, client would like to focus on improving mood, reducing anxiety and improving overall coping strategies to help her better manage stressors. Will work with client on desired goals in individual psychotherapy utilizing CBT approach. Follow up established.        Pursuant to the emergency declaration under the [facility], [facility] and the [employer] and [employer] General Act, this Virtual Visit was conducted, with patient and parent and/or guardian's consent, to reduce the patient's risk of exposure to COVID-19 and provide continuity of care for an established patient. Due to the state of emergency related to the coronavirus, the [place] of [employer] and the [facility] is allowing practitioners holding my licensure and/or certification status the ability to provide telehealth services without the usual requirements. The nature and course of the patient's psychiatric diagnosis were discussed with the patient. Office and confidentiality policies and procedures were discussed with patient. The patient has my contact information for routine or urgent concerns.       Mariama Brunner LCSW

## 2022-01-02 DIAGNOSIS — K21.9 GASTROESOPHAGEAL REFLUX DISEASE, UNSPECIFIED WHETHER ESOPHAGITIS PRESENT: ICD-10-CM

## 2022-01-05 ENCOUNTER — VIRTUAL VISIT (OUTPATIENT)
Dept: BEHAVIORAL/MENTAL HEALTH CLINIC | Age: 52
End: 2022-01-05
Payer: COMMERCIAL

## 2022-01-05 DIAGNOSIS — F43.23 ADJUSTMENT DISORDER WITH MIXED ANXIETY AND DEPRESSED MOOD: Primary | ICD-10-CM

## 2022-01-05 PROCEDURE — 90834 PSYTX W PT 45 MINUTES: CPT | Performed by: SOCIAL WORKER

## 2022-01-05 RX ORDER — PANTOPRAZOLE SODIUM 40 MG/1
TABLET, DELAYED RELEASE ORAL
Qty: 90 TABLET | Refills: 0 | Status: SHIPPED | OUTPATIENT
Start: 2022-01-05 | End: 2022-03-19

## 2022-01-05 NOTE — PROGRESS NOTES
PSYCHOTHERAPY NOTE      Alexsandra Hilliard is a 46 y.o. female who presents with anxiety/depression. Client was seen for a virtual individual psychotherapy session that lasted for 50 minutes. Progress:  Mood remains anxious and depressed related to present issues with health issues and work issues. Client is not able to work due to her health. 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 She feels like she is getting the runaround and no help. This is concerning as now she is out of leave and this can have financial ramifications. This is source of anxiety and affects mood. We processed all of this in session today. Encouraged her to make timeline of all of her efforts and also to CC the HR and their supervisor detailing efforts. Also suggested she reach out to [employer] to see if there is any way that they can advocate on her behalf. Shared with client some Mortgage Relief programs for those impacted by [address], too. As finances are issue, I also shared with client opportunity for her to receive counseling through her EAP that would be no charge as another option. Client is trying to stay busy. She is making wreaths and this helps as a distraction tool for her anxiety. Mood mostly impacted by concerns re: finances and this was focus of today's session.      Mental Status exam:         Sensorium  oriented to time, place and person   Relations cooperative   Appearance:  casually dressed   Motor Behavior:  within normal limits   Speech:  normal pitch   Thought Process: goal directed and logical   Thought Content free of delusions and free of hallucinations   Suicidal ideations none   Homicidal ideations none   Mood:  anxious and depressed   Affect:  mood-congruent   Memory recent  adequate   Memory remote:  adequate Concentration:  adequate   Abstraction:  abstract   Insight:  fair   Reliability fair   Judgment:  fair         DIAGNOSIS AND IMPRESSION:    Axis I: Adjustment Disorder with Mixed Emotional Features  Axis II: Deferred    Axis III:   Past Medical History:   Diagnosis Date    Anemia associated with acute blood loss 2017    Txd with Blood transfusions x 2. Dr. Chayo Villanueva.  Asthma childhood    DDD (degenerative disc disease), lumbar     DJD (degenerative joint disease) of knee     JANAE (generalized anxiety disorder) 2018    Dr. Zoila Kaur    GERD (gastroesophageal reflux disease)     GI bleeding 2017    Dr. Candice Litten. Txd with Blood transfusions.  History of tuberculosis exposure 2013    POSITIVE PPD TEST. Txd x 6 months; last dose either 11/13 or 12/13    Lower leg DVT (deep venous thromboembolism), acute, right ([address] 2008, 4/27/2016, 08/03/2017    x 3. Initially due to trauma to leg then plane ride home. Dr. Rosana Howell. Chronic Anticoagulation.  Major depression 2018    Dr. Zoila Kaur    Morbid obesity ([address]     Orthostatic syncope 2017    due to anemia from blood loss. Dr. Chayo Villanueva.  Post-thrombotic syndrome of right lower extremity 09/14/2017    w  R leg swelling and pain. Dr. Jeny Bunch.  Pseudogout 2016    R knee. Dr. Goran Mercedes.  Psoriatic arthritis ([address] 2000s    Dr. Margret Denton. Txd w Esequiel Bulls injections monthly.  PUD (peptic ulcer disease)     Dr. Lily Yoder.  Shingles 03/2019    R ACW. R upper back previously. Bob Webster.  Skin abrasion 01/28/2014    After loosing 125#, Bilateral Inner thigh friction flareup monthly with skin breakdown    Thromboembolus ([address] 2008    Rt leg,  pt states she fell and had a plane ride home and developed blood clots    Ulcerative colitis ([address]     Uterine fibroid 2017    x 4. Dr. Ema Lorenzo.      Axis IV: Problems with primary support group, Problems related to social environment, Occupational problems, Economic problems, Problems with access to health care services and Other psychosocial or environmental problems  Axis V:  51-60 moderate symptoms    Interventions/plans: Follow up in 2 weeks      Pursuant to the emergency declaration under the [address] [place] [address] waiver authority and the [employer] and [employer] Act, this Virtual Visit was conducted, with patient and parent and/or guardian's consent, to reduce the patient's risk of exposure to COVID-19 and provide continuity of care for an established patient. Due to the state of emergency related to the coronavirus, the [place] and the [place] is allowing practitioners holding my licensure and/or certification status the ability to provide telehealth services without the usual requirements.

## 2022-01-19 ENCOUNTER — VIRTUAL VISIT (OUTPATIENT)
Dept: BEHAVIORAL/MENTAL HEALTH CLINIC | Age: 52
End: 2022-01-19
Payer: COMMERCIAL

## 2022-01-19 DIAGNOSIS — F43.23 ADJUSTMENT DISORDER WITH MIXED ANXIETY AND DEPRESSED MOOD: Primary | ICD-10-CM

## 2022-01-19 PROCEDURE — 90834 PSYTX W PT 45 MINUTES: CPT | Performed by: SOCIAL WORKER

## 2022-01-19 NOTE — PROGRESS NOTES
PSYCHOTHERAPY NOTE      Clau Verduzco is a 46 y.o. female who presents with anxiety/depression. Client was seen for a virtual individual psychotherapy session that lasted for 50 minutes. Progress:  Mood and affect are improve. She is smiling and engaged in conversation  She shared that she did follow through with recommendations. She submitted a time line of all of her efforts with [employer] and identified all of her efforts with getting her medical leave approved. She finally did get a response and was informed that she has been approved for medical leave with pay. She is hopeful that she will begin to get her pay soon so as to keep up with her bills and not have any financial distress. This approval has reduced her sense of anxiety and she felt accomplished that her due diligence paid off. Client continues to need oxygen and gets winded very easily which makes ability to work difficult and limiting. 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 She shared how in the past she was awarded the [employer] award for [employer] and got to travel to the [place]. 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 Mood is improved. Follow up in 2 weeks.        Mental Status exam:         Sensorium  oriented to time, place and person   Relations cooperative   Appearance:  casually dressed   Motor Behavior:  within normal limits   Speech:  normal pitch and normal volume   Thought Process: goal directed and logical   Thought Content free of delusions and free of hallucinations   Suicidal ideations none   Homicidal ideations none   Mood:  euthymic   Affect:  mood-congruent   Memory recent  adequate   Memory remote:  adequate   Concentration:  adequate   Abstraction:  abstract   Insight:  fair   Reliability fair   Judgment:  fair         DIAGNOSIS AND IMPRESSION:      Axis I: Adjustment Disorder with Mixed Emotional Features  Axis II: Deferred  Axis III:   Past Medical History:   Diagnosis Date    Anemia associated with acute blood loss 2017    Txd with Blood transfusions x 2. Dr. Janeth Granger.  Asthma childhood    DDD (degenerative disc disease), lumbar     DJD (degenerative joint disease) of knee     JANAE (generalized anxiety disorder) 2018    Dr. Isabel Togn    GERD (gastroesophageal reflux disease)     GI bleeding 2017    Dr. Katy Caal. Txd with Blood transfusions.  History of tuberculosis exposure 2013    POSITIVE PPD TEST. Txd x 6 months; last dose either 11/13 or 12/13    Lower leg DVT (deep venous thromboembolism), acute, right ([address] 2008, 4/27/2016, 08/03/2017    x 3. Initially due to trauma to leg then plane ride home. Dr. Martine Hughes. Chronic Anticoagulation.  Major depression 2018    Dr. Isabel Tong    Morbid obesity ([address]     Orthostatic syncope 2017    due to anemia from blood loss. Dr. Janeth Granger.  Post-thrombotic syndrome of right lower extremity 09/14/2017    w  R leg swelling and pain. Dr. Rhonda John.  Pseudogout 2016    R knee. Dr. Ayden Welch.  Psoriatic arthritis ([address] 2000s    Dr. Aquilino Mahmood. Txd w Odetta Isaac injections monthly.  PUD (peptic ulcer disease)     Dr. Hilda Bui.  Shingles 03/2019    R ACW. R upper back previously. Mildred Coombs.  Skin abrasion 01/28/2014    After loosing 125#, Bilateral Inner thigh friction flareup monthly with skin breakdown    Thromboembolus ([address] 2008    Rt leg,  pt states she fell and had a plane ride home and developed blood clots    Ulcerative colitis ([address]     Uterine fibroid 2017    x 4. Dr. Golden Guerrero. Axis IV: Problems with primary support group, Problems related to social environment, Occupational problems and Other psychosocial or environmental problems  Axis V:  61-70 mild symptoms    Interventions/plans:   Follow up in 2 weeks      Pursuant to the emergency declaration under the [facility] waiver authority and the Coronavirus Preparedness and Response Supplemental Appropriations Act, this Virtual Visit was conducted, with patient and parent and/or guardian's consent, to reduce the patient's risk of exposure to COVID-19 and provide continuity of care for an established patient. Due to the state of emergency related to the coronavirus, the [place] and the [place] is allowing practitioners holding my licensure and/or certification status the ability to provide telehealth services without the usual requirements.

## 2022-02-01 ENCOUNTER — VIRTUAL VISIT (OUTPATIENT)
Dept: FAMILY MEDICINE CLINIC | Age: 52
End: 2022-02-01
Payer: COMMERCIAL

## 2022-02-01 DIAGNOSIS — R09.02 HYPOXIA: Primary | ICD-10-CM

## 2022-02-01 DIAGNOSIS — F51.02 ADJUSTMENT INSOMNIA: ICD-10-CM

## 2022-02-01 DIAGNOSIS — D84.9 IMMUNOSUPPRESSED STATUS (HCC): ICD-10-CM

## 2022-02-01 DIAGNOSIS — D68.59 HYPERCOAGULABLE STATE (HCC): ICD-10-CM

## 2022-02-01 DIAGNOSIS — I82.5Z1 CHRONIC DEEP VEIN THROMBOSIS (DVT) OF DISTAL VEIN OF RIGHT LOWER EXTREMITY (HCC): ICD-10-CM

## 2022-02-01 DIAGNOSIS — K51.919 ULCERATIVE COLITIS WITH COMPLICATION, UNSPECIFIED LOCATION (HCC): ICD-10-CM

## 2022-02-01 DIAGNOSIS — Z86.16 HISTORY OF COVID-19: ICD-10-CM

## 2022-02-01 DIAGNOSIS — L40.50 PSORIATIC ARTHRITIS (HCC): ICD-10-CM

## 2022-02-01 PROCEDURE — 99213 OFFICE O/P EST LOW 20 MIN: CPT | Performed by: FAMILY MEDICINE

## 2022-02-01 RX ORDER — PHENTERMINE HYDROCHLORIDE 37.5 MG/1
TABLET ORAL
COMMUNITY
Start: 2022-01-02 | End: 2022-02-17 | Stop reason: SDUPTHER

## 2022-02-01 NOTE — PROGRESS NOTES
Chief Complaint   Patient presents with    Follow-up     Hypoxia   1. Have you been to the ER, urgent care clinic since your last visit? Hospitalized since your last visit? No    2. Have you seen or consulted any other health care providers outside of the [address] [place] [address] since your last visit? Include any pap smears or colon screening.  No     Saw Cardiology last week and Hematology awaiting test results

## 2022-02-01 NOTE — PROGRESS NOTES
Bon Cordero (: 1970) is a 46 y.o. female, established patient, here for evaluation of the following chief complaint(s):   Follow-up (Hypoxia)       ASSESSMENT/PLAN: Largely stable and continues with O2 requirement. Still with significant difficulty getting portable oxygen concentrator through [employer] company is despite numerous orders from myself and pulmonology. Encouraged her to restart cardiopulmonary rehab. Below is the assessment and plan developed based on review of pertinent history, labs, studies, and medications. 1. Hypoxia  2. History of COVID-19  3. Immunosuppressed status ([facility]  4. Psoriatic arthritis ([facility]  5. Ulcerative colitis with complication, unspecified location ([facility]  6. Chronic deep vein thrombosis (DVT) of distal vein of right lower extremity (HCC)  7. Hypercoagulable state ([facility]  8. Adjustment insomnia      No follow-ups on file. SUBJECTIVE/OBJECTIVE:    Doing cardiopulm rehab but stopped recently until she was sure about insurance coverage. Last was able to do 16-17 min on treadmill. Ct on O2 daily. 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 Still working on getting a portable oxygen concentrator but continue running to have difficulty with supply from [employer] companies. Ex. Was told there are oxygen concentrators available    Saw Dr. Myra Castelan recently and pt reports starting on Mg. Much better since getting O2 but still dyspneic with minimal exertion. Continues following with Dr. Osmin Hollis. Continues on Symbicort and albuterol. HTN - recently with elevated BPs. Prev well controlled. Unable to eval in office today so will plan to recheck soon. Saw Hematology recently and awaiting blood work. ROS  Gen - no fever/chills  Resp - no cough  CV - no chest pain  Rest per HPI    No data recorded     Physical exam:  General appearance - alert, well appearing, and in no distress.   On O2  Eyes -sclera anicteric, no discharge  HEENT normocephalic, atraumatic, moist mucous membranes, no visualized neck mass  Chest -normal respiratory effort, no visualized signs of respiratory distress  Neurological - alert, awake, normal speech, no focal findings or movement disorder noted  Psych - normal mood and affect  Skin no apparent lesions    On this date 02/01/2022 I have spent 20 minutes reviewing previous notes, test results and face to face (virtual) with the patient discussing the diagnosis and importance of compliance with the treatment plan as well as documenting on the day of the visit. Stu Anaya, was evaluated through a synchronous (real-time) audio-video encounter. The patient (or guardian if applicable) is aware that this is a billable service, which includes applicable co-pays. Verbal consent to proceed has been obtained. The visit was conducted pursuant to the emergency declaration under the [facility], [address] [place] [address] authority and the [employer] and [employer] Act. Patient identification was verified, and a caregiver was present when appropriate. The patient was located at home in a state where the provider was licensed to provide care. An electronic signature was used to authenticate this note.   -- Roberto Carlos Jordan MD

## 2022-02-02 ENCOUNTER — DOCUMENTATION ONLY (OUTPATIENT)
Dept: FAMILY MEDICINE CLINIC | Age: 52
End: 2022-02-02

## 2022-02-02 ENCOUNTER — VIRTUAL VISIT (OUTPATIENT)
Dept: BEHAVIORAL/MENTAL HEALTH CLINIC | Age: 52
End: 2022-02-02
Payer: COMMERCIAL

## 2022-02-02 DIAGNOSIS — F43.23 ADJUSTMENT DISORDER WITH MIXED ANXIETY AND DEPRESSED MOOD: Primary | ICD-10-CM

## 2022-02-02 PROCEDURE — 90834 PSYTX W PT 45 MINUTES: CPT | Performed by: SOCIAL WORKER

## 2022-02-02 NOTE — PROGRESS NOTES
Ranjit Kirkland [phone] spoke with Artur Vilchis regarding oxygen concentrator order. She stated order was cancelled because the do not have any available units and has no idea when they will. Asked to speak with manager because office nor patient was notified of cancellation. Left message for manager to please call office. Patient was notified and advised to call insurance for alternative.

## 2022-02-02 NOTE — PROGRESS NOTES
PSYCHOTHERAPY NOTE      Stu Julien is a 46 y.o. female who presents with anxiety/depression. Client was seen for a virtual individual psychotherapy session that lasted for 50 minutes. Progress:  Mood and affect continue to improve. She did receive the payments for her medical leave and this has greatly helped her family to get caught up on their financial obligations. This has helped to reduce her anxiety significantly. She continues to have difficulty with breathing and getting easily winded. She is very careful with COVID19 and stays in. Limits any potential exposure. 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 This loss and inability to teach is very difficult for her and we processed this in today's session. Explored virtual volunteerism as possible option to help her have sense of purpose and structure. Client also shared concerns re: eating mindlessly out of boredom and thus gaining weight. She lost a lot of weight before in [employer] program. Discussed this and other potential programs to consider and to do with [employer] to build accountability.   Also suggested [facility] nutritional program.       Mental Status exam:         Sensorium  oriented to time, place and person   Relations cooperative   Appearance:  casually dressed   Motor Behavior:  within normal limits   Speech:  normal pitch and normal volume   Thought Process: goal directed and logical   Thought Content free of delusions and free of hallucinations   Suicidal ideations none   Homicidal ideations none   Mood:  anxious and depressed   Affect:  anxious and depressed   Memory recent  adequate   Memory remote:  adequate   Concentration:  adequate   Abstraction:  abstract   Insight:  fair   Reliability fair   Judgment:  fair         DIAGNOSIS AND IMPRESSION:  Axis I: Adjustment Disorder with Mixed Emotional Features  Axis II: Deferred    Axis III:   Past Medical History:   Diagnosis Date    Anemia associated with acute blood loss 2017    Txd with Blood transfusions x 2. Dr. Maulik Zabala.  Asthma childhood    DDD (degenerative disc disease), lumbar     DJD (degenerative joint disease) of knee     JANAE (generalized anxiety disorder) 2018    Dr. Nikki Karimi    GERD (gastroesophageal reflux disease)     GI bleeding 2017    Dr. David Torres. Txd with Blood transfusions.  History of tuberculosis exposure 2013    POSITIVE PPD TEST. Txd x 6 months; last dose either 11/13 or 12/13    Lower leg DVT (deep venous thromboembolism), acute, right ([address] 2008, 4/27/2016, 08/03/2017    x 3. Initially due to trauma to leg then plane ride home. Dr. Samanta José. Chronic Anticoagulation.  Major depression 2018    Dr. Nikki Karimi    Morbid obesity ([facility]     Orthostatic syncope 2017    due to anemia from blood loss. Dr. Maulik Zabala.  Post-thrombotic syndrome of right lower extremity 09/14/2017    w  R leg swelling and pain. Dr. Sharath Nur.  Pseudogout 2016    R knee. Dr. Nuno Pond.  Psoriatic arthritis ([facility] 2000s    Dr. Phong Brown. Txd w Carley Mumtaz injections monthly.  PUD (peptic ulcer disease)     Dr. Asha Cruz.  Shingles 03/2019    R ACW. R upper back previously. Glenora Servant.  Skin abrasion 01/28/2014    After loosing 125#, Bilateral Inner thigh friction flareup monthly with skin breakdown    Thromboembolus ([facility] 2008    Rt leg,  pt states she fell and had a plane ride home and developed blood clots    Ulcerative colitis ([facility]     Uterine fibroid 2017    x 4. Dr. Pro Anton. Axis IV: Problems with primary support group, Problems related to social environment, Problems with access to health care services and Other psychosocial or environmental problems  Axis V:  61-70 mild symptoms    Interventions/plans:   Follow up in 2 weeks      Pursuant to the emergency declaration under the [facility], [facility] waiver authority and the [employer] and [employer] Act, this Virtual Visit was conducted, with patient and parent and/or guardian's consent, to reduce the patient's risk of exposure to COVID-19 and provide continuity of care for an established patient. Due to the state of emergency related to the coronavirus, the [place] and the [place] is allowing practitioners holding my licensure and/or certification status the ability to provide telehealth services without the usual requirements.

## 2022-02-16 ENCOUNTER — VIRTUAL VISIT (OUTPATIENT)
Dept: BEHAVIORAL/MENTAL HEALTH CLINIC | Age: 52
End: 2022-02-16
Payer: COMMERCIAL

## 2022-02-16 DIAGNOSIS — F43.23 ADJUSTMENT DISORDER WITH MIXED ANXIETY AND DEPRESSED MOOD: Primary | ICD-10-CM

## 2022-02-16 PROCEDURE — 90834 PSYTX W PT 45 MINUTES: CPT | Performed by: SOCIAL WORKER

## 2022-02-17 ENCOUNTER — OFFICE VISIT (OUTPATIENT)
Dept: ENDOCRINOLOGY | Age: 52
End: 2022-02-17
Payer: COMMERCIAL

## 2022-02-17 VITALS
SYSTOLIC BLOOD PRESSURE: 134 MMHG | WEIGHT: 236.6 LBS | HEART RATE: 72 BPM | DIASTOLIC BLOOD PRESSURE: 77 MMHG | BODY MASS INDEX: 39.42 KG/M2 | HEIGHT: 65 IN

## 2022-02-17 DIAGNOSIS — E66.01 MORBID OBESITY, UNSPECIFIED OBESITY TYPE (HCC): Primary | ICD-10-CM

## 2022-02-17 DIAGNOSIS — R73.02 IGT (IMPAIRED GLUCOSE TOLERANCE): ICD-10-CM

## 2022-02-17 PROCEDURE — 99214 OFFICE O/P EST MOD 30 MIN: CPT | Performed by: INTERNAL MEDICINE

## 2022-02-17 RX ORDER — PHENTERMINE HYDROCHLORIDE 37.5 MG/1
TABLET ORAL
Qty: 90 TABLET | Refills: 2 | Status: SHIPPED | OUTPATIENT
Start: 2022-02-17 | End: 2022-05-19 | Stop reason: SDUPTHER
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
## 2022-02-17 NOTE — LETTER
2/17/2022    Patient: Erik Davidson   YOB: 1970   Date of Visit: 2/17/2022     [place] [id number]  Via In Basket    Dear Justen Sharma MD,      Thank you for referring Ms. Shalini Murillo to [facility] for evaluation. My notes for this consultation are attached. If you have questions, please do not hesitate to call me. I look forward to following your patient along with you.       Sincerely,    Renay Crook MD

## 2022-02-18 DIAGNOSIS — D68.59 HYPERCOAGULABLE STATE (HCC): ICD-10-CM

## 2022-02-18 DIAGNOSIS — Z86.718 HISTORY OF RECURRENT DEEP VEIN THROMBOSIS (DVT): ICD-10-CM

## 2022-02-18 LAB
ALBUMIN SERPL-MCNC: 4.2 G/DL (ref 3.8–4.9)
ALBUMIN/GLOB SERPL: 1.4 {RATIO} (ref 1.2–2.2)
ALP SERPL-CCNC: 67 IU/L (ref 44–121)
ALT SERPL-CCNC: 16 IU/L (ref 0–32)
AST SERPL-CCNC: 15 IU/L (ref 0–40)
BILIRUB SERPL-MCNC: 0.9 MG/DL (ref 0–1.2)
BUN SERPL-MCNC: 18 MG/DL (ref 6–24)
BUN/CREAT SERPL: 18 (ref 9–23)
CALCIUM SERPL-MCNC: 9.8 MG/DL (ref 8.7–10.2)
CHLORIDE SERPL-SCNC: 100 MMOL/L (ref 96–106)
CO2 SERPL-SCNC: 25 MMOL/L (ref 20–29)
CREAT SERPL-MCNC: 0.99 MG/DL (ref 0.57–1)
ERYTHROCYTE [DISTWIDTH] IN BLOOD BY AUTOMATED COUNT: 13.3 % (ref 11.7–15.4)
EST. AVERAGE GLUCOSE BLD GHB EST-MCNC: 97 MG/DL
GLOBULIN SER CALC-MCNC: 3.1 G/DL (ref 1.5–4.5)
GLUCOSE SERPL-MCNC: 91 MG/DL (ref 65–99)
HBA1C MFR BLD: 5 % (ref 4.8–5.6)
HCT VFR BLD AUTO: 41.3 % (ref 34–46.6)
HGB BLD-MCNC: 14 G/DL (ref 11.1–15.9)
MCH RBC QN AUTO: 31.6 PG (ref 26.6–33)
MCHC RBC AUTO-ENTMCNC: 33.9 G/DL (ref 31.5–35.7)
MCV RBC AUTO: 93 FL (ref 79–97)
PLATELET # BLD AUTO: 285 X10E3/UL (ref 150–450)
POTASSIUM SERPL-SCNC: 4.3 MMOL/L (ref 3.5–5.2)
PROT SERPL-MCNC: 7.3 G/DL (ref 6–8.5)
RBC # BLD AUTO: 4.43 X10E6/UL (ref 3.77–5.28)
SODIUM SERPL-SCNC: 140 MMOL/L (ref 134–144)
WBC # BLD AUTO: 3.2 X10E3/UL (ref 3.4–10.8)

## 2022-03-02 ENCOUNTER — OFFICE VISIT (OUTPATIENT)
Dept: FAMILY MEDICINE CLINIC | Age: 52
End: 2022-03-02
Payer: COMMERCIAL

## 2022-03-02 VITALS
RESPIRATION RATE: 18 BRPM | SYSTOLIC BLOOD PRESSURE: 130 MMHG | OXYGEN SATURATION: 97 % | TEMPERATURE: 97.7 F | WEIGHT: 234.2 LBS | HEIGHT: 65 IN | DIASTOLIC BLOOD PRESSURE: 71 MMHG | HEART RATE: 65 BPM | BODY MASS INDEX: 39.02 KG/M2

## 2022-03-02 DIAGNOSIS — J45.21 MILD INTERMITTENT ASTHMA WITH ACUTE EXACERBATION: ICD-10-CM

## 2022-03-02 DIAGNOSIS — R00.0 TACHYCARDIA: ICD-10-CM

## 2022-03-02 DIAGNOSIS — R09.02 HYPOXIA: ICD-10-CM

## 2022-03-02 DIAGNOSIS — K51.919 ULCERATIVE COLITIS WITH COMPLICATION, UNSPECIFIED LOCATION (HCC): ICD-10-CM

## 2022-03-02 DIAGNOSIS — I82.5Z1 CHRONIC DEEP VEIN THROMBOSIS (DVT) OF DISTAL VEIN OF RIGHT LOWER EXTREMITY (HCC): ICD-10-CM

## 2022-03-02 DIAGNOSIS — Z86.16 HISTORY OF COVID-19: ICD-10-CM

## 2022-03-02 DIAGNOSIS — D68.59 HYPERCOAGULABLE STATE (HCC): ICD-10-CM

## 2022-03-02 DIAGNOSIS — L40.50 PSORIATIC ARTHRITIS (HCC): Primary | ICD-10-CM

## 2022-03-02 PROCEDURE — 99214 OFFICE O/P EST MOD 30 MIN: CPT | Performed by: FAMILY MEDICINE

## 2022-03-02 RX ORDER — PREDNISONE 20 MG/1
TABLET ORAL
Qty: 13 TABLET | Refills: 0 | Status: SHIPPED | OUTPATIENT
Start: 2022-03-02 | End: 2022-03-16 | Stop reason: ALTCHOICE

## 2022-03-02 NOTE — PROGRESS NOTES
Chief Complaint   Patient presents with    Follow-up     4 week fu        1. \"Have you been to the ER, urgent care clinic since your last visit? Hospitalized since your last visit? \" No    2. \"Have you seen or consulted any other health care providers outside of the [address] [place] [address] since your last visit? \" No     3. For patients aged 39-70: Has the patient had a colonoscopy / FIT/ Cologuard? Yes - no Care Gap present      If the patient is female:    4. For patients aged 41-77: Has the patient had a mammogram within the past 2 years? Yes - no Care Gap present      5. For patients aged 21-65: Has the patient had a pap smear? Yes - no Care Gap present    Health Maintenance Due   Topic Date Due    Hepatitis C Screening  Never done    Shingrix Vaccine Age 50> (1 of 2) Never done    COVID-19 Vaccine (3 - Pfizer risk 4-dose series) 03/31/2021    Pneumococcal 0-64 years (3 of 4 - PPSV23) 04/11/2021     Pt is here for 4 week follow up today. She would like to discuss spasms when breathing. Describes as gasping for air. Would also like to discuss dry cough x 2 weeks. Describes as random coughing throughout the day.  Pt denies any new medications, lifestyle changes

## 2022-03-02 NOTE — PROGRESS NOTES
Chai White (: 1970) is a 46 y.o. female, established patient, here for evaluation of the following chief complaint(s):  Follow-up (4 week fu )       ASSESSMENT/PLAN: Inflammatory arthritis flare and wheezing as will so using another round of Prednisone and will need to keep working with specialists -rheum and pulm. Encouraged restarting cardiopulm rehab-referring to cardiac rehab here at [facility] due to challenges with location for cardiopulmonary event. She is due for another dose of Pneumovax 23 as well so we will plan to have her do this at next appointment if not already done elsewhere. Below is the assessment and plan developed based on review of pertinent history, physical exam, labs, studies, and medications. 1. Psoriatic arthritis (HCC)  -     predniSONE (DELTASONE) 20 mg tablet; Take 3 pills for 2 days, then 2 pills for 2 days, then 1 pill for 3 days, Normal, Disp-13 Tablet, R-0  2. Ulcerative colitis with complication, unspecified location (HCC)  -     predniSONE (DELTASONE) 20 mg tablet; Take 3 pills for 2 days, then 2 pills for 2 days, then 1 pill for 3 days, Normal, Disp-13 Tablet, R-0  3. History of COVID-19  -     REFERRAL TO CARDIAC REHAB  4. Hypoxia  -     REFERRAL TO CARDIAC REHAB  5. Chronic deep vein thrombosis (DVT) of distal vein of right lower extremity (HCC)  6. Hypercoagulable state ([facility]  -     REFERRAL TO CARDIAC REHAB  7. Mild intermittent asthma with acute exacerbation  -     predniSONE (DELTASONE) 20 mg tablet; Take 3 pills for 2 days, then 2 pills for 2 days, then 1 pill for 3 days, Normal, Disp-13 Tablet, R-0  -     REFERRAL TO CARDIAC REHAB  8. Tachycardia  -     REFERRAL TO CARDIAC REHAB      Return in about 6 weeks (around 2022), or if symptoms worsen or fail to improve. SUBJECTIVE/OBJECTIVE:    Psoaritic arthritis sig flaring. Working with Dr. Jamaal Luna on this.   Reports a reaction to Cimzia recently - described urticaria patches at site of injections (thighs) but has resolved. Inflammatory arthritis sx improving slightly with steroids and finished last week. DM and obesity doing ok with ozempic. Doing cardiopulm rehab but stopped recently until she was sure about insurance coverage - she has not been back yet. Last was able to do 16-17 min on treadmill. Ct on O2 daily. 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 Still working on getting a portable oxygen concentrator but continue running to have difficulty with supply from [employer]. Ex. Was told there are oxygen concentrators available. Much better since getting O2 but still dyspneic with minimal exertion. Continues following with Dr. Lizeth Espinal. Continues on Symbicort and albuterol. HTN - recently with elevated BPs. Prev well controlled. Unable to eval in office today so will plan to recheck soon. Seeing Dr. Trice Shoemaker and put on Mg for palpitations which has helped. Working with Dr. Jacinda Mueller and had labs done. She rec ct with Xarelto. To repeat labs soon and may need to switch back to coumadin. ROS  Gen - no fever/chills  Resp - no dyspnea or cough  CV - no chest pain or CARRASQUILLO  Rest per HPI    Blood pressure 130/71, pulse 65, temperature 97.7 °F (36.5 °C), temperature source Temporal, resp. rate 18, height 5' 5\" (1.651 m), weight 234 lb 3.2 oz (106.2 kg), SpO2 97 %.     Physical Exam  General appearance - alert, well appearing, and in no distress  Eyes -sclera anicteric  Neck - supple, no significant adenopathy, no thyromegaly  Chest - + wheezing, rales or rhonchi, symmetric air entry, good effort  Heart - normal rate, regular rhythm, normal S1, S2, no murmurs, rubs, clicks or gallops  Neurological - alert, oriented, normal speech, no focal findings or movement disorder noted  Extr - no edema  Psych - normal mood and affect    On this date 03/02/2022 I have spent 30 minutes reviewing previous notes, test results and face to face with the patient discussing the diagnosis and importance of compliance with the treatment plan as well as documenting on the day of the visit. An electronic signature was used to authenticate this note.   -- Flori Bradford MD

## 2022-03-02 NOTE — LETTER
NOTIFICATION RETURN TO WORK / SCHOOL    3/2/2022 11:06 AM    Ms. Lewis Blood  [address] [facility]      To Whom It May Concern:    Lewis Blood is currently under the care of Gaurav Ramos. She is still out of work due to severe complications of COVID 19 pneumonia. She is working with numerous specialists and remains on oxygen so is currently unable to return to work. Based on her progress, I am hopeful that she may be able to return around 7/1/22 but may need restrictions at that time. If there are questions or concerns please have the patient contact our office.         Sincerely,      Latasha Leo MD

## 2022-03-09 ENCOUNTER — TRANSCRIBE ORDER (OUTPATIENT)
Dept: CARDIAC REHAB | Age: 52
End: 2022-03-09

## 2022-03-09 DIAGNOSIS — Z86.16 HISTORY OF COVID-19: Primary | ICD-10-CM

## 2022-03-10 ENCOUNTER — VIRTUAL VISIT (OUTPATIENT)
Dept: BEHAVIORAL/MENTAL HEALTH CLINIC | Age: 52
End: 2022-03-10
Payer: COMMERCIAL

## 2022-03-10 DIAGNOSIS — F43.23 ADJUSTMENT DISORDER WITH MIXED ANXIETY AND DEPRESSED MOOD: Primary | ICD-10-CM

## 2022-03-10 PROCEDURE — 90834 PSYTX W PT 45 MINUTES: CPT | Performed by: SOCIAL WORKER

## 2022-03-10 NOTE — PROGRESS NOTES
PSYCHOTHERAPY NOTE      Rehana Mora is a 46 y.o. female who presents with anxiety/depression. Client was seen for a virtual individual psychotherapy session that lasted for 50 minutes. **telephonic appt due to technical issues**    Progress:  Client continues to present with improved mood and affect. She is less depressed and less anxious. Dr. Jamir Curtis has extended her medical leave to July. She continues to have labored breathing and gets fatigued easily. 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 She has turned in her doctor's note, but has not heard back. She is continuing to receive medical leave and financial compensation, but wonders if she needs to access short or long term disability at work due to length of time she will remain out. She continues to inquire and advocate for self within the system. Client is trying to keep busy--learning new skills at home (cooking/crafts) and attending webinars to keep skill set up. She has also had friend from out of town visit and this was beneficial to mood. Client is able to remain more positive despite health challenges and successfully reframe thoughts. Mutually agreed to follow up in one month as we only have 2 more sessions remaining.      Mental Status exam:         Sensorium  oriented to time, place and person   Relations cooperative   Appearance:  N/A telephonic appt   Motor Behavior:  N/A telephonic appt   Speech:  normal pitch and normal volume   Thought Process: goal directed and logical   Thought Content free of delusions and free of hallucinations   Suicidal ideations none   Homicidal ideations none   Mood:  stable   Affect:  mood-congruent   Memory recent  adequate   Memory remote:  adequate   Concentration:  adequate   Abstraction:  abstract   Insight:  fair   Reliability fair   Judgment:  fair         DIAGNOSIS AND IMPRESSION:      Axis I: Adjustment Disorder with Mixed Emotional Features  Axis II: Deferred  Axis III: Past Medical History:   Diagnosis Date    Anemia associated with acute blood loss 2017    Txd with Blood transfusions x 2. Dr. Chandni Dover.  Asthma childhood    DDD (degenerative disc disease), lumbar     DJD (degenerative joint disease) of knee     JANAE (generalized anxiety disorder) 2018    Dr. Lily Reynolds    GERD (gastroesophageal reflux disease)     GI bleeding 2017    Dr. Bishop Green. Txd with Blood transfusions.  History of tuberculosis exposure 2013    POSITIVE PPD TEST. Txd x 6 months; last dose either 11/13 or 12/13    Lower leg DVT (deep venous thromboembolism), acute, right ([address] 2008, 4/27/2016, 08/03/2017    x 3. Initially due to trauma to leg then plane ride home. Dr. Nusrat Thorpe. Chronic Anticoagulation.  Major depression 2018    Dr. Lily Reynolds    Morbid obesity ([facility]     Orthostatic syncope 2017    due to anemia from blood loss. Dr. Chandni Dover.  Post-thrombotic syndrome of right lower extremity 09/14/2017    w  R leg swelling and pain. Dr. Faustina Vo.  Pseudogout 2016    R knee. Dr. Alice Raymundo.  Psoriatic arthritis ([facility] 2000s    Dr. Marquita Elaine. Txd w Terry Litten injections monthly.  PUD (peptic ulcer disease)     Dr. Lisa Call.  Shingles 03/2019    R ACW. R upper back previously. Charo Ricardo.  Skin abrasion 01/28/2014    After loosing 125#, Bilateral Inner thigh friction flareup monthly with skin breakdown    Thromboembolus ([facility] 2008    Rt leg,  pt states she fell and had a plane ride home and developed blood clots    Ulcerative colitis ([facility]     Uterine fibroid 2017    x 4. Dr. Carlos Sandoval. Axis IV: Problems with primary support group, Problems related to social environment and Other psychosocial or environmental problems  Axis V:  61-70 mild symptoms    Interventions/plans:       Follow up in one month    Pursuant to the emergency declaration under the [address] [facility] [address] waiver authority and the Coronavirus Preparedness and Response Supplemental Appropriations Act, this Virtual Visit was conducted, with patient and parent and/or guardian's consent, to reduce the patient's risk of exposure to COVID-19 and provide continuity of care for an established patient. Due to the state of emergency related to the coronavirus, the [place] of [employer] [place] and the [place] [employer] is allowing practitioners holding my licensure and/or certification status the ability to provide telehealth services without the usual requirements.

## 2022-03-15 ENCOUNTER — HOSPITAL ENCOUNTER (OUTPATIENT)
Dept: CARDIAC REHAB | Age: 52
Discharge: HOME | End: 2022-03-15
Payer: COMMERCIAL

## 2022-03-15 VITALS — BODY MASS INDEX: 39.12 KG/M2 | WEIGHT: 234.8 LBS | HEIGHT: 65 IN

## 2022-03-15 PROCEDURE — 94625 PHY/QHP OP PULM RHB W/O MNTR: CPT

## 2022-03-15 NOTE — CARDIO/PULMONARY
[facility]    Pulmonary Rehabilitation Program    Intake Appointment  3/15/2022           NAME: Rad Jade : 1970 AGE: 46 y.o. GENDER: female    PULMONARY REHAB ADMITTING DIAGNOSIS: Post covid-19 condition, unspecified [U09.9]    REFERRING PHYSICIAN: Kitty Lozano MD    MEDICAL HX:  Past Medical History:   Diagnosis Date    Anemia associated with acute blood loss 2017    Txd with Blood transfusions x 2. Dr. Joseph Juarez.  Asthma childhood    DDD (degenerative disc disease), lumbar     DJD (degenerative joint disease) of knee     JANAE (generalized anxiety disorder) 2018    Dr. Claudia Mansfield    GERD (gastroesophageal reflux disease)     GI bleeding 2017    Dr. Carmella Borges. Txd with Blood transfusions.  History of tuberculosis exposure     POSITIVE PPD TEST. Txd x 6 months; last dose either  or     Lower leg DVT (deep venous thromboembolism), acute, right ([address] , 2016, 08/03/2017    x 3. Initially due to trauma to leg then plane ride home. Dr. Earline Catalan. Chronic Anticoagulation.  Major depression     Dr. Claudia Mansfield    Morbid obesity ([address]     Orthostatic syncope 2017    due to anemia from blood loss. Dr. Joseph Juarez.  Post-thrombotic syndrome of right lower extremity 2017    w  R leg swelling and pain. Dr. David Renee.  Pseudogout     R knee. Dr. Lyla Durán.  Psoriatic arthritis ([address]     Dr. Paula Segura. Txd w Roslynn Klaus injections monthly.  PUD (peptic ulcer disease)     Dr. Jeramy Hammond.  Shingles 2019    R ACW. R upper back previously. Alaina Carrero.  Skin abrasion 2014    After loosing 125#, Bilateral Inner thigh friction flareup monthly with skin breakdown    Thromboembolus ([address]     Rt leg,  pt states she fell and had a plane ride home and developed blood clots    Ulcerative colitis ([address]     Uterine fibroid 2017    x 4. Dr. Con Abraham.        LABS:     Lab Results Component Value Date/Time    Hemoglobin A1c 5.0 02/17/2022 12:00 AM    Hemoglobin A1c (POC) 4.7 11/21/2019 02:20 AM     Lab Results   Component Value Date/Time    Cholesterol, total 207 (H) 08/18/2021 08:59 AM    HDL Cholesterol 72 08/18/2021 08:59 AM    LDL, calculated 122 (H) 08/18/2021 08:59 AM    LDL, calculated 132 (H) 03/15/2019 07:20 AM    VLDL, calculated 13 08/18/2021 08:59 AM    VLDL, calculated 12 06/08/2018 07:56 AM    Triglyceride 70 08/18/2021 08:59 AM         MEDICATIONS/SUPPLEMENTS:     Prior to Admission medications    Medication Sig Start Date End Date Taking? Authorizing Provider   predniSONE (DELTASONE) 20 mg tablet Take 3 pills for 2 days, then 2 pills for 2 days, then 1 pill for 3 days 3/2/22   Carolee Kennedy MD   rivaroxaban (Xarelto) 20 mg tab tablet Take 1 Tablet by mouth daily. 2/21/22   Carolee Kennedy MD   semaglutide (OZEMPIC) 0.25 mg or 0.5 mg/dose (2 mg/1.5 ml) subq pen 0.5 mg by SubCUTAneous route every seven (7) days. 2/17/22   Rico Elliott MD   phentermine (ADIPEX-P) 37.5 mg tablet TAKE 1 TABLET BY MOUTH BEFORE BREAKFAST 2/17/22   Rico Elliott MD   pantoprazole (PROTONIX) 40 mg tablet Take 1 tablet by mouth once daily 1/5/22   Carolee Kennedy MD   hydrALAZINE (APRESOLINE) 25 mg tablet Take 25 mg by mouth. Only if BP is above 160 on top 12/14/21   Provider, Historical   escitalopram oxalate (LEXAPRO) 20 mg tablet Take 1 Tablet by mouth daily. 12/1/21   Carolee Kennedy MD   spironolactone (ALDACTONE) 25 mg tablet Take 25 mg by mouth daily. 11/2/21   Provider, Historical   Aerochamber Max with Flow-VU  10/12/21   Provider, Historical   Symbicort 160-4.5 mcg/actuation HFAA 1 Puff two (2) times a day.  10/8/21   Provider, Historical   atorvastatin (LIPITOR) 40 mg tablet TAKE 1 TABLET BY MOUTH ONCE DAILY AT BEDTIME 9/22/21   Provider, Historical   famotidine (PEPCID) 40 mg tablet TAKE 1 TABLET BY MOUTH TWICE DAILY AS DIRECTED AND AS NEEDED 8/18/21   Provider, Historical   amitriptyline (ELAVIL) 10 mg tablet Take 1 Tab by mouth nightly. 4/21/21   Marina Aden MD   folic acid (FOLVITE) 1 mg tablet TAKE 1 TABLET BY MOUTH ONCE A DAY  Patient taking differently: 2 mg. TAKE 1 TABLET BY MOUTH ONCE A DAY 3/19/21   Marina Aden MD   furosemide (LASIX) 40 mg tablet TAKE 1 TABLET BY MOUTH TWICE DAILY AS NEEDED 8/10/20   Marina Aden MD   triamcinolone acetonide (KENALOG) 0.5 % ointment Apply  to affected area two (2) times a day. use thin layer  Patient not taking: Reported on 3/15/2022 10/18/19   Marina Aden MD   [facility]) by SubCUTAneous route. Injection:  Every 4 weeks    Provider, Historical   methotrexate (RHEUMATREX) 2.5 mg tablet 6 tablets once weekly 11/21/17   Provider, Historical   albuterol (PROVENTIL HFA, VENTOLIN HFA, PROAIR HFA) 90 mcg/actuation inhaler Take 1 Puff by inhalation every four (4) hours as needed for Wheezing. 3/8/16   Marina Aden MD       ANTHROPOMETRICS:      Ht Readings from Last 1 Encounters:   03/15/22 5' 5\" (1.651 m)      Wt Readings from Last 1 Encounters:   03/15/22 106.5 kg (234 lb 12.8 oz)        WAIST:  39    SCREENING SCORES:                       CAT  27  Rate Your Plate: 45  PHQ-9:  13  [facility] Dyspnea: 64     VISIT SUMMARY:    Chrystal Arvizu FRANK Lopez 46 y.o. presented to [address] [place] [address] Pulmonary Rehab for program orientation and 6 minute walk test today with a primary diagnosis of Post covid-19 condition, unspecified [U09.9]. Gloria Martinez has a history that includes: asthma, ulcerative colitis. Cardiac risk factors include family history, dyslipidemia, obesity, stress. Gloria Martinez is   and lives with her  and mother. She is also cared for by her aunt since her Covid hospitalization. She is on leave from work due to symptoms including supplemental oxygen via [place].  PHQ9, depression score, is 13    and this is considered moderate .  The result was discussed with patient who affirms score to be accurate and results were faxed to Dr. Maura Olson along with the Psychosocial assessment. Patient denied chest pain during 6 minute walk test and was in SR with BBB  . Patient walked 320   meters at a speed of 2.2   and grade of 0   for a final MET level of 2.6. Exercise prescription developed around patient stated goals, to be supplemented with home exercise recommendations. Education manual given to patient and reviewed. Patient will attend pulmonary rehab 2times/week and also plans to participate in educational classes and 1:1 consultation with our dietitian.         Iris Westbrook

## 2022-03-16 ENCOUNTER — VIRTUAL VISIT (OUTPATIENT)
Dept: FAMILY MEDICINE CLINIC | Age: 52
End: 2022-03-16

## 2022-03-16 DIAGNOSIS — L40.50 PSORIATIC ARTHRITIS (HCC): ICD-10-CM

## 2022-03-16 DIAGNOSIS — F32.A ANXIETY AND DEPRESSION: Primary | ICD-10-CM

## 2022-03-16 DIAGNOSIS — F41.9 ANXIETY AND DEPRESSION: Primary | ICD-10-CM

## 2022-03-16 DIAGNOSIS — Z86.16 HISTORY OF COVID-19: ICD-10-CM

## 2022-03-16 DIAGNOSIS — F51.02 ADJUSTMENT INSOMNIA: ICD-10-CM

## 2022-03-16 NOTE — PROGRESS NOTES
Yakov Millard (: 1970) is a 46 y.o. female, established patient, here for evaluation of the following chief complaint(s):   Depression (Follow-up)       ASSESSMENT/PLAN:     Her depression is not new and she is under treatment. Ct with Lexapro and doing therapy. Ct working with Pulm on chronic ongoing sx from [address] picture. Ct working with Rheum on psoriatic arthritis. Below is the assessment and plan developed based on review of pertinent history, labs, studies, and medications. 1. Anxiety and depression  2. Adjustment insomnia  3. Psoriatic arthritis ([facility]  4. History of COVID-19      Return in about 6 weeks (around 2022), or if symptoms worsen or fail to improve. SUBJECTIVE/OBJECTIVE:  Dealing with depression - already being treated and taking Lexapro and doing therapy. Some concerns from cardiopulmonary rehab. Sx largely related to having COVID PNA with ongoing issues with hypoxia on O2 chronically now. No AH/VH  No SI/HI    Had another reaction to Cimzia recently. Planning to switch off this - working with Dr. Konrad Andrea. ROS per HPI    No data recorded     No exam d/t audio only    Yakov Millard, was evaluated through a synchronous (real-time) audio-video encounter. The patient (or guardian if applicable) is aware that this is a billable service, which includes applicable co-pays. Verbal consent to proceed has been obtained. The visit was conducted pursuant to the emergency declaration under the [facility], [address] [place] [address] authority and the [employer] Act. Patient identification was verified, and a caregiver was present when appropriate. The patient was located at home in a state where the provider was licensed to provide care. An electronic signature was used to authenticate this note.   -- Segundo Davila MD

## 2022-03-16 NOTE — PROGRESS NOTES
Chief Complaint   Patient presents with    Depression     Follow-up   1. Have you been to the ER, urgent care clinic since your last visit? Hospitalized since your last visit? No    2. Have you seen or consulted any other health care providers outside of the [address] [place] [address] since your last visit? Include any pap smears or colon screening.  No

## 2022-03-18 DIAGNOSIS — K21.9 GASTROESOPHAGEAL REFLUX DISEASE, UNSPECIFIED WHETHER ESOPHAGITIS PRESENT: ICD-10-CM

## 2022-03-18 DIAGNOSIS — F32.A ANXIETY AND DEPRESSION: ICD-10-CM

## 2022-03-18 DIAGNOSIS — F41.9 ANXIETY AND DEPRESSION: ICD-10-CM

## 2022-03-18 DIAGNOSIS — F51.02 ADJUSTMENT INSOMNIA: ICD-10-CM

## 2022-03-18 PROBLEM — Z82.3 FAMILY HISTORY OF STROKE: Status: ACTIVE | Noted: 2019-03-13

## 2022-03-18 PROBLEM — E89.40 POSTSURGICAL MENOPAUSE: Status: ACTIVE | Noted: 2019-03-13

## 2022-03-18 PROBLEM — Z80.8 FAMILY HISTORY OF BRAIN CANCER: Status: ACTIVE | Noted: 2019-03-13

## 2022-03-18 PROBLEM — Z83.49 FAMILY HISTORY OF HYPOTHYROIDISM: Status: ACTIVE | Noted: 2019-03-13

## 2022-03-19 PROBLEM — R09.02 HYPOXIA: Status: ACTIVE | Noted: 2021-09-08

## 2022-03-19 PROBLEM — U07.1 PNEUMONIA DUE TO COVID-19 VIRUS: Status: ACTIVE | Noted: 2021-09-08

## 2022-03-19 PROBLEM — Z83.3 FAMILY HISTORY OF DIABETES MELLITUS (DM): Status: ACTIVE | Noted: 2019-03-13

## 2022-03-19 PROBLEM — Z79.01 ANTICOAGULATED ON COUMADIN: Status: ACTIVE | Noted: 2021-09-08

## 2022-03-19 PROBLEM — D25.9 FIBROID UTERUS: Status: ACTIVE | Noted: 2017-06-19

## 2022-03-19 PROBLEM — F32.A ANXIETY AND DEPRESSION: Status: ACTIVE | Noted: 2019-03-13

## 2022-03-19 PROBLEM — B02.9 SHINGLES: Status: ACTIVE | Noted: 2019-03-01

## 2022-03-19 PROBLEM — F41.9 ANXIETY AND DEPRESSION: Status: ACTIVE | Noted: 2019-03-13

## 2022-03-19 PROBLEM — R07.81 PLEURITIC CHEST PAIN: Status: ACTIVE | Noted: 2021-09-08

## 2022-03-19 PROBLEM — R06.09 DYSPNEA ON EXERTION: Status: ACTIVE | Noted: 2021-09-08

## 2022-03-19 PROBLEM — J12.82 PNEUMONIA DUE TO COVID-19 VIRUS: Status: ACTIVE | Noted: 2021-09-08

## 2022-03-19 PROBLEM — K59.04 CHRONIC IDIOPATHIC CONSTIPATION: Status: ACTIVE | Noted: 2019-03-13

## 2022-03-19 PROBLEM — U07.1 COVID-19: Status: ACTIVE | Noted: 2021-09-09

## 2022-03-19 PROBLEM — Z90.710 S/P TAH (TOTAL ABDOMINAL HYSTERECTOMY): Status: ACTIVE | Noted: 2019-03-13

## 2022-03-19 PROBLEM — R73.02 IGT (IMPAIRED GLUCOSE TOLERANCE): Status: ACTIVE | Noted: 2017-07-12

## 2022-03-19 PROBLEM — E73.9 LACTOSE INTOLERANCE: Status: ACTIVE | Noted: 2019-03-13

## 2022-03-19 PROBLEM — Z82.49 FAMILY HISTORY OF HEART DISEASE: Status: ACTIVE | Noted: 2019-03-13

## 2022-03-19 RX ORDER — ESCITALOPRAM OXALATE 20 MG/1
TABLET ORAL
Qty: 90 TABLET | Refills: 0 | Status: SHIPPED | OUTPATIENT
Start: 2022-03-19 | End: 2022-10-17

## 2022-03-19 RX ORDER — PANTOPRAZOLE SODIUM 40 MG/1
TABLET, DELAYED RELEASE ORAL
Qty: 90 TABLET | Refills: 0 | Status: SHIPPED | OUTPATIENT
Start: 2022-03-19

## 2022-03-20 PROBLEM — Z83.79 FAMILY HISTORY OF GALLSTONES: Status: ACTIVE | Noted: 2019-03-13

## 2022-03-20 PROBLEM — I83.893 VARICOSE VEINS OF BOTH LEGS WITH EDEMA: Status: ACTIVE | Noted: 2019-03-13

## 2022-03-20 PROBLEM — I87.001 POST-THROMBOTIC SYNDROME OF RIGHT LOWER EXTREMITY: Status: ACTIVE | Noted: 2017-09-14

## 2022-03-20 PROBLEM — Z90.49 S/P COLECTOMY: Status: ACTIVE | Noted: 2019-03-13

## 2022-03-22 ENCOUNTER — HOSPITAL ENCOUNTER (OUTPATIENT)
Dept: CARDIAC REHAB | Age: 52
Discharge: HOME | End: 2022-03-22
Payer: COMMERCIAL

## 2022-03-22 VITALS — WEIGHT: 236.9 LBS | BODY MASS INDEX: 39.42 KG/M2

## 2022-03-22 PROCEDURE — 94625 PHY/QHP OP PULM RHB W/O MNTR: CPT

## 2022-03-24 ENCOUNTER — HOSPITAL ENCOUNTER (OUTPATIENT)
Dept: CARDIAC REHAB | Age: 52
Discharge: HOME | End: 2022-03-24
Payer: COMMERCIAL

## 2022-03-24 VITALS — WEIGHT: 235.8 LBS | BODY MASS INDEX: 39.24 KG/M2

## 2022-03-24 PROCEDURE — 94625 PHY/QHP OP PULM RHB W/O MNTR: CPT

## 2022-03-24 NOTE — CARDIO/PULMONARY
Cardiac Rehab Nutrition Assessment - 1:1 Evaluation         NAME: Taylor Shone : 1970 AGE: 46 y.o. GENDER: female  CARDIAC REHAB ADMITTING DIAGNOSIS: s/p COVID    PROBLEM LIST:  Patient Active Problem List   Diagnosis Code    DJD (degenerative joint disease) M19.90    Asthma J45.909    Obesities, morbid ([facility] E66.01    Monitoring for long-term anticoagulant use Z51.81, Z79.01    GERD (gastroesophageal reflux disease) K21.9    Pseudogout M11.20    DJD (degenerative joint disease) of knee M17.10    DDD (degenerative disc disease), lumbar M51.36    Inflammatory polyarthritis ([facility] M06.4    Small bowel anastomotic stricture K91.89, K91.30    Syncope and collapse R55    GI bleed K92.2    Hypokalemia E87.6    Chronic deep vein thrombosis (DVT) of distal vein of right lower extremity (HCC) I82.5Z1    History of GI bleed Z87.19    Fibroid uterus D25.9    IGT (impaired glucose tolerance) R73.02    Post-thrombotic syndrome of right lower extremity I87.001    Lower leg DVT (deep venous thromboembolism), acute, right (HCC) I82.4Z1    Psoriatic arthritis (HCC) L40.50    Heartburn R12    PUD (peptic ulcer disease) K27.9    Skin abrasion T14. 8XXA    Shingles B02.9    Postsurgical menopause E89.40    S/P colectomy Z90.49    Chronic idiopathic constipation K59.04    Varicose veins of both legs with edema I83.893    Anxiety and depression F41.9, F32. A    S/P PHILL (total abdominal hysterectomy) Z90.710    Lactose intolerance E73.9    Family history of hypothyroidism Z83.49    Family history of heart disease Z80.55    Family history of stroke Z80.3    Family history of diabetes mellitus (DM) Z83.3    Family history of gallstones Z83.79    Family history of brain cancer Z80.8    Pleuritic chest pain R07.81    Dyspnea on exertion R06.00    Hypoxia R09.02    Anticoagulated on Coumadin Z79.01    Pneumonia due to COVID-19 virus U07.1, J12.82    COVID-19 U07.1       LABS:   Lab Results Component Value Date/Time    Hemoglobin A1c 5.0 02/17/2022 12:00 AM    Hemoglobin A1c (POC) 4.7 11/21/2019 02:20 AM     Lab Results   Component Value Date/Time    Cholesterol, total 207 (H) 08/18/2021 08:59 AM    HDL Cholesterol 72 08/18/2021 08:59 AM    LDL, calculated 122 (H) 08/18/2021 08:59 AM    LDL, calculated 132 (H) 03/15/2019 07:20 AM    VLDL, calculated 13 08/18/2021 08:59 AM    VLDL, calculated 12 06/08/2018 07:56 AM    Triglyceride 70 08/18/2021 08:59 AM       MEDICATIONS/SUPPLEMENTS:   Prior to Admission medications    Medication Sig Start Date End Date Taking? Authorizing Provider   pantoprazole (PROTONIX) 40 mg tablet Take 1 tablet by mouth once daily 3/19/22   Nilda Roy MD   escitalopram oxalate (LEXAPRO) 20 mg tablet Take 1 tablet by mouth once daily 3/19/22   Nilda Roy MD   rivaroxaban (Xarelto) 20 mg tab tablet Take 1 Tablet by mouth daily. 2/21/22   Nilda Roy MD   semaglutide (OZEMPIC) 0.25 mg or 0.5 mg/dose (2 mg/1.5 ml) subq pen 0.5 mg by SubCUTAneous route every seven (7) days. 2/17/22   Briseyda Lo MD   phentermine (ADIPEX-P) 37.5 mg tablet TAKE 1 TABLET BY MOUTH BEFORE BREAKFAST 2/17/22   Briseyda Lo MD   hydrALAZINE (APRESOLINE) 25 mg tablet Take 25 mg by mouth. Only if BP is above 160 on top 12/14/21   Provider, Historical   spironolactone (ALDACTONE) 25 mg tablet Take 25 mg by mouth daily. 11/2/21   Provider, Historical   Aerochamber Max with Flow-VU  10/12/21   Provider, Historical   Symbicort 160-4.5 mcg/actuation HFAA 1 Puff two (2) times a day. 10/8/21   Provider, Historical   atorvastatin (LIPITOR) 40 mg tablet TAKE 1 TABLET BY MOUTH ONCE DAILY AT BEDTIME 9/22/21   Provider, Historical   famotidine (PEPCID) 40 mg tablet TAKE 1 TABLET BY MOUTH TWICE DAILY AS DIRECTED AND AS NEEDED 8/18/21   Provider, Historical   amitriptyline (ELAVIL) 10 mg tablet Take 1 Tab by mouth nightly.  4/21/21   Nilda Roy MD   folic acid (FOLVITE) 1 mg tablet TAKE 1 TABLET BY MOUTH ONCE A DAY  Patient taking differently: 2 mg. TAKE 1 TABLET BY MOUTH ONCE A DAY 3/19/21   Lazarus Broaden, MD   furosemide (LASIX) 40 mg tablet TAKE 1 TABLET BY MOUTH TWICE DAILY AS NEEDED 8/10/20   Lazarus Broaden, MD   triamcinolone acetonide (KENALOG) 0.5 % ointment Apply  to affected area two (2) times a day. use thin layer  Patient not taking: Reported on 3/15/2022 10/18/19   Lazarus Broaden, MD   CERTOLIZUMAB PEGOL [facility]) by SubCUTAneous route. Injection:  Every 4 weeks    Provider, Historical   methotrexate (RHEUMATREX) 2.5 mg tablet 6 tablets once weekly 11/21/17   Provider, Historical   albuterol (PROVENTIL HFA, VENTOLIN HFA, PROAIR HFA) 90 mcg/actuation inhaler Take 1 Puff by inhalation every four (4) hours as needed for Wheezing. 3/8/16   Lazarus Broaden, MD       ANTHROPOMETRICS:    Ht Readings from Last 1 Encounters:   03/15/22 5' 5\" (1.651 m)      Wt Readings from Last 10 Encounters:   03/22/22 107.5 kg (236 lb 14.4 oz)   03/15/22 106.5 kg (234 lb 12.8 oz)   03/02/22 106.2 kg (234 lb 3.2 oz)   02/17/22 107.3 kg (236 lb 9.6 oz)   12/21/21 103 kg (227 lb)   11/09/21 105.1 kg (231 lb 9.6 oz)   11/03/21 107 kg (236 lb)   10/12/21 101.1 kg (222 lb 12.8 oz)   10/06/21 102.5 kg (226 lb)   09/29/21 103.9 kg (229 lb)       BMI: 39.24 kg/M2 Category: obese (class I)  Waist: 39 inches    Reported Wt Hx: Started at 265# right before the pandemic; previously lost 150# with a UBW = 311#. Pt hopes to get back to her pre-pandemic weight. Reported Diet Hx: Made dietary changes at the start of the pandemic when she noticed her weight was creeping up. Consumes a lot of baked/grilled chicken and likes salads. Hx of UC, so many high fiber foods do not settle well.  Typically skips breakfast and eats first meal around 12pm...discussed drinking a protein shake first thing in the morning if not hungry for a full meal.     Rate Your Plate Score: 45  (Score 58-72: Making many healthy choices; 41-57: Some choices need improving 24-40: many choices need improving)    24 HOUR DIET RECALL  Breakfast    Lunch Toast with avocado, a hard boiled egg, and everything bagel seasoning    Dinner Grilled chicken, peppers, onion in a low carb wrap with a little cheese with guacamole on the side    Snacks Pork skins, sugar free candy (M&Ms, pb cups, pecan turtles)   Beverages Water, Protein shake      Seema A Easter     Environmental/Social: Lives with mother and . Mom does the cooking and grocery shopping. NUTRITION INTERVENTION:  Nutrition 60 minute one-on-one education & goal setting with Ainsley with Laverta Sea relevant labs compared to ideals. Reviewed weight history and patient's verbalized weight goal as well as any real or perceived barriers to obtaining the goal. Collaborated with patient to set a specific short and long term weight goal.     Reviewed Rate Your Plate and conducted a verbal diet recall. Assessed for environmental, financial, psychosocial, physical and comorbidities that may impact the food and eating patterns / behaviors of Dave 83 with patient to set specific nutrient goals as well as specific food / behavior changes that will help patient meet the overall goal of following a heart healthy eating pattern (using guidelines as set forth by the American Heart Association and modeled after healthful eating patterns as recognized by the USDA Dietary Guidelines such as DASH, Mediterranean or plant-based). Briefly reviewed with Luis Holm the nutrition information in the Cardiac Rehab patient education book and encouraged Luis Holm to read thoroughly, ask questions as needed, and use for future reference for heart healthy nutrition information. Luis Holm is scheduled to participate in Cardiac Rehab group nutrition classes.       PATIENT GOALS:    Weight Goals:  Short Term Weight Goal:225 lbs  Long Term Weight Goal:200 lbs    Nutrition Goals:  Daily Recommendations:  Calories: 3239-0785 /day  (using Mahesh Bateman with AF 1.2-1.3-500)    Saturated Fat: no more than 10 g/day  Trans Fat: 0 g/day  Sodium: no more than 2626-4792 mg/day  Fruit: 2 cups / day  Vegetables: 2-3 cups/day    Other:  - read and compare food labels  - protein shake in the morning       Keeping a food diary was recommended. Questions addressed. Follow-up plans discussed. Seema Lopez verbalized understanding.             Kelly Casper RD

## 2022-03-29 ENCOUNTER — HOSPITAL ENCOUNTER (OUTPATIENT)
Dept: CARDIAC REHAB | Age: 52
Discharge: HOME | End: 2022-03-29
Payer: COMMERCIAL

## 2022-03-29 VITALS — BODY MASS INDEX: 39.27 KG/M2 | WEIGHT: 236 LBS

## 2022-03-29 PROCEDURE — 94625 PHY/QHP OP PULM RHB W/O MNTR: CPT

## 2022-03-31 ENCOUNTER — HOSPITAL ENCOUNTER (OUTPATIENT)
Dept: CARDIAC REHAB | Age: 52
Discharge: HOME | End: 2022-03-31
Payer: COMMERCIAL

## 2022-03-31 VITALS — BODY MASS INDEX: 39.27 KG/M2 | WEIGHT: 236 LBS

## 2022-03-31 PROCEDURE — 94625 PHY/QHP OP PULM RHB W/O MNTR: CPT

## 2022-04-05 ENCOUNTER — HOSPITAL ENCOUNTER (OUTPATIENT)
Dept: CARDIAC REHAB | Age: 52
Discharge: HOME | End: 2022-04-05
Payer: COMMERCIAL

## 2022-04-05 ENCOUNTER — APPOINTMENT (OUTPATIENT)
Dept: CARDIAC REHAB | Age: 52
End: 2022-04-05
Payer: COMMERCIAL

## 2022-04-05 VITALS — WEIGHT: 235 LBS | BODY MASS INDEX: 39.11 KG/M2

## 2022-04-05 PROCEDURE — 94625 PHY/QHP OP PULM RHB W/O MNTR: CPT

## 2022-04-07 ENCOUNTER — HOSPITAL ENCOUNTER (OUTPATIENT)
Dept: CARDIAC REHAB | Age: 52
Discharge: HOME | End: 2022-04-07
Payer: COMMERCIAL

## 2022-04-07 ENCOUNTER — VIRTUAL VISIT (OUTPATIENT)
Dept: BEHAVIORAL/MENTAL HEALTH CLINIC | Age: 52
End: 2022-04-07
Payer: COMMERCIAL

## 2022-04-07 VITALS — BODY MASS INDEX: 39.11 KG/M2 | WEIGHT: 235 LBS

## 2022-04-07 DIAGNOSIS — F43.23 ADJUSTMENT DISORDER WITH MIXED ANXIETY AND DEPRESSED MOOD: Primary | ICD-10-CM

## 2022-04-07 PROCEDURE — 90834 PSYTX W PT 45 MINUTES: CPT | Performed by: SOCIAL WORKER

## 2022-04-07 PROCEDURE — 94625 PHY/QHP OP PULM RHB W/O MNTR: CPT

## 2022-04-07 NOTE — PROGRESS NOTES
PSYCHOTHERAPY NOTE      Isai Barry is a 46 y.o. female who presents with anxiety/depression. Client was seen for a virtual individual psychotherapy session that lasted for 50 minutes. Progress:  Mood is noted to be a little sad. 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 She continues to have health issues (breathing difficulties and easily fatigued) due to Debora Melton. 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 We addressed this loss in session and the various stages of grief/loss and it's impact. Client was also upset as her Principal did not reach out to her and Human Resources did instead and this was frustrating. We also processed this in session. Client plans to begin process to apply for disability assisted as instructed by HR. Client is trying to adjust to all these losses and is engaging in activity of baking as a means to distract self. We focused today on ways to self soothe and manage loss. Client is aware that my role is short term and we only have one additional session. She has had a difficult time to find long term therapists as many are not taking on new clients.  Provided her with more information on local providers and will continue to see her until we can establish and transition to long term therapist.    Mental Status exam:         Sensorium  oriented to time, place and person   Relations cooperative   Appearance:  casually dressed   Motor Behavior:  within normal limits   Speech:  normal pitch and normal volume   Thought Process: goal directed and logical   Thought Content free of delusions and free of hallucinations   Suicidal ideations none   Homicidal ideations none   Mood:  sad   Affect:  mood-congruent   Memory recent  adequate   Memory remote:  adequate   Concentration:  adequate   Abstraction: abstract   Insight:  fair   Reliability fair   Judgment:  fair         DIAGNOSIS AND IMPRESSION:    Axis I: Adjustment Disorder with Mixed Emotional Features  Axis II: Deferred    Axis III:   Past Medical History:   Diagnosis Date    Anemia associated with acute blood loss 2017    Txd with Blood transfusions x 2. Dr. Michelle Laird.  Asthma childhood    DDD (degenerative disc disease), lumbar     DJD (degenerative joint disease) of knee     JANAE (generalized anxiety disorder) 2018    Dr. Efrain Jay    GERD (gastroesophageal reflux disease)     GI bleeding 2017    Dr. Fredrick Zavaleta. Txd with Blood transfusions.  History of tuberculosis exposure 2013    POSITIVE PPD TEST. Txd x 6 months; last dose either 11/13 or 12/13    Lower leg DVT (deep venous thromboembolism), acute, right ([address] 2008, 4/27/2016, 08/03/2017    x 3. Initially due to trauma to leg then plane ride home. Dr. Zeke Bravo. Chronic Anticoagulation.  Major depression 2018    Dr. Efrain Jay    Morbid obesity ([address]     Orthostatic syncope 2017    due to anemia from blood loss. Dr. Michelle Laird.  Post-thrombotic syndrome of right lower extremity 09/14/2017    w  R leg swelling and pain. Dr. Sofie Cruz.  Pseudogout 2016    R knee. Dr. Lolis Rees.  Psoriatic arthritis ([address] 2000s    Dr. Frankie Vora. Txd w Ilean Rummage injections monthly.  PUD (peptic ulcer disease)     Dr. Sofya Evans.  Shingles 03/2019    R ACW. R upper back previously. [place].  Skin abrasion 01/28/2014    After loosing 125#, Bilateral Inner thigh friction flareup monthly with skin breakdown    Thromboembolus ([address] 2008    Rt leg,  pt states she fell and had a plane ride home and developed blood clots    Ulcerative colitis ([address]     Uterine fibroid 2017    x 4. Dr. Yumiko Thomas.      Axis IV: Problems with primary support group, Problems related to social environment and Other psychosocial or environmental problems  Axis V:  61-70 mild symptoms    Interventions/plans: Follow up in 3-4 weeks      Pursuant to the emergency declaration under the [facility], [facility] waiver authority and the [employer] Act, this Virtual Visit was conducted, with patient and parent and/or guardian's consent, to reduce the patient's risk of exposure to COVID-19 and provide continuity of care for an established patient. Due to the state of emergency related to the coronavirus, the [place] [employer] and the [place] [employer] is allowing practitioners holding my licensure and/or certification status the ability to provide telehealth services without the usual requirements.

## 2022-04-11 ENCOUNTER — OFFICE VISIT (OUTPATIENT)
Dept: FAMILY MEDICINE CLINIC | Age: 52
End: 2022-04-11
Payer: COMMERCIAL

## 2022-04-11 VITALS
SYSTOLIC BLOOD PRESSURE: 139 MMHG | TEMPERATURE: 97.3 F | RESPIRATION RATE: 18 BRPM | HEART RATE: 80 BPM | HEIGHT: 65 IN | OXYGEN SATURATION: 100 % | BODY MASS INDEX: 38.72 KG/M2 | DIASTOLIC BLOOD PRESSURE: 77 MMHG | WEIGHT: 232.4 LBS

## 2022-04-11 DIAGNOSIS — R06.00 DYSPNEA, UNSPECIFIED TYPE: ICD-10-CM

## 2022-04-11 DIAGNOSIS — K51.919 ULCERATIVE COLITIS WITH COMPLICATION, UNSPECIFIED LOCATION (HCC): ICD-10-CM

## 2022-04-11 DIAGNOSIS — L40.50 PSORIATIC ARTHRITIS (HCC): Primary | ICD-10-CM

## 2022-04-11 DIAGNOSIS — F51.02 ADJUSTMENT INSOMNIA: ICD-10-CM

## 2022-04-11 DIAGNOSIS — D17.0 LIPOMA OF NECK: ICD-10-CM

## 2022-04-11 DIAGNOSIS — Z86.16 HISTORY OF COVID-19: ICD-10-CM

## 2022-04-11 DIAGNOSIS — R09.02 HYPOXIA: ICD-10-CM

## 2022-04-11 DIAGNOSIS — F32.A ANXIETY AND DEPRESSION: ICD-10-CM

## 2022-04-11 DIAGNOSIS — F41.9 ANXIETY AND DEPRESSION: ICD-10-CM

## 2022-04-11 PROCEDURE — 99214 OFFICE O/P EST MOD 30 MIN: CPT | Performed by: FAMILY MEDICINE

## 2022-04-11 RX ORDER — PREDNISONE 5 MG/1
5 TABLET ORAL DAILY
COMMUNITY
Start: 2022-04-06 | End: 2022-07-26 | Stop reason: ALTCHOICE

## 2022-04-11 NOTE — PROGRESS NOTES
Dena Babin (: 1970) is a 46 y.o. female, established patient, here for evaluation of the following chief complaint(s):  Follow-up (6 week)       ASSESSMENT/PLAN:  Long discussion today. Hopefully, will do well with Remicaide. Letter of support for disability. Ct working on depression with therapy and ct lexapro and Elavil. Ct doing cardiopulm rehab and working on exercise tolerance. Ct with O2. Below is the assessment and plan developed based on review of pertinent history, physical exam, labs, studies, and medications. 1. Psoriatic arthritis ([facility] [address]  2. Ulcerative colitis with complication, unspecified location ([facility] [address]  3. Adjustment insomnia  4. Anxiety and depression  5. Hypoxia  6. Dyspnea, unspecified type  7. History of COVID-19  8. Lipoma of neck      Return in about 6 weeks (around 2022), or if symptoms worsen or fail to improve. SUBJECTIVE/OBJECTIVE:  Stable overall today. Today concerns with Lipoma on neck. Working with Dr. Jyoti Courtney. Had allergic reaction to Cimzia. Struggling with depression - job was given away and she does not expect to be able to return anytime soon. Tearful and overwhelmed. Doing therapy. Doing cardio/pulm rehab and able to walk at 2.2 mph x 11 min on treadmill. She is exhausted when she gets home and often falls asleep. Seeing Pulm and Cardio. ROS  Gen - no fever/chills  Resp - no dyspnea or cough  CV - no chest pain or CARRASQUILLO  Rest per HPI    Blood pressure 139/77, pulse 80, temperature 97.3 °F (36.3 °C), temperature source Temporal, resp. rate 18, height 5' 5\" (1.651 m), weight 232 lb 6.4 oz (105.4 kg), SpO2 100 %.     Physical Exam  General appearance - alert, well appearing, and in no distress  Eyes -sclera anicteric  Neck - supple, no significant adenopathy, no thyromegaly  Chest - clear to auscultation, no wheezes, rales or rhonchi, symmetric air entry  Heart - normal rate, regular rhythm, normal S1, S2, no murmurs, rubs, clicks or terell  Neurological - alert, oriented, normal speech, no focal findings or movement disorder noted  Extr - no edema  Psych - normal mood and affect    On this date 04/11/2022 I have spent 30 minutes reviewing previous notes, test results and face to face with the patient discussing the diagnosis and importance of compliance with the treatment plan as well as documenting on the day of the visit. An electronic signature was used to authenticate this note.   -- Michael Jorge MD

## 2022-04-11 NOTE — LETTER
4/18/2022 1:01 PM    Ms. Shaun Arrieta [id number]  [address] [facility] [address]      To whom it may concern,    I fully support this patients pursuit of disability status. 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 Unfortunately, despite vaccination with her immunosuppression, she ended up marry a severe case of COVID 19 pneumonia leading to chronic hypoxia and requiring constant oxygen. She is working with Pulmonology and Cardiology but continued to struggle with her breathing and endurance. In my professional medical opinion, she is a very appropriate candidate for disability since after > 6 months from the time she contracted COVID-19, she has shown little improvement in her breathing and oxygen status. Please feel free to reach out with any questions.         Sincerely,          Cain Lance MD

## 2022-04-11 NOTE — PROGRESS NOTES
Chief Complaint   Patient presents with    Follow-up     6 week   1. Have you been to the ER, urgent care clinic since your last visit? Hospitalized since your last visit? No    2. Have you seen or consulted any other health care providers outside of the [address] [place] [address] since your last visit? Include any pap smears or colon screening.  No

## 2022-04-12 ENCOUNTER — HOSPITAL ENCOUNTER (OUTPATIENT)
Dept: CARDIAC REHAB | Age: 52
Discharge: HOME | End: 2022-04-12
Payer: COMMERCIAL

## 2022-04-12 VITALS — BODY MASS INDEX: 38.94 KG/M2 | WEIGHT: 234 LBS

## 2022-04-12 PROCEDURE — 94625 PHY/QHP OP PULM RHB W/O MNTR: CPT

## 2022-04-14 ENCOUNTER — HOSPITAL ENCOUNTER (OUTPATIENT)
Dept: CARDIAC REHAB | Age: 52
Discharge: HOME | End: 2022-04-14
Payer: COMMERCIAL

## 2022-04-14 VITALS — BODY MASS INDEX: 38.77 KG/M2 | WEIGHT: 233 LBS

## 2022-04-14 PROCEDURE — 94625 PHY/QHP OP PULM RHB W/O MNTR: CPT

## 2022-04-19 ENCOUNTER — HOSPITAL ENCOUNTER (OUTPATIENT)
Dept: CARDIAC REHAB | Age: 52
Discharge: HOME | End: 2022-04-19
Payer: COMMERCIAL

## 2022-04-19 VITALS — WEIGHT: 231 LBS | BODY MASS INDEX: 38.44 KG/M2

## 2022-04-19 PROCEDURE — 94625 PHY/QHP OP PULM RHB W/O MNTR: CPT

## 2022-04-21 ENCOUNTER — HOSPITAL ENCOUNTER (OUTPATIENT)
Dept: CARDIAC REHAB | Age: 52
Discharge: HOME | End: 2022-04-21
Payer: COMMERCIAL

## 2022-04-21 VITALS — WEIGHT: 234 LBS | BODY MASS INDEX: 38.94 KG/M2

## 2022-04-21 PROCEDURE — 94625 PHY/QHP OP PULM RHB W/O MNTR: CPT

## 2022-04-26 ENCOUNTER — HOSPITAL ENCOUNTER (OUTPATIENT)
Dept: CARDIAC REHAB | Age: 52
Discharge: HOME | End: 2022-04-26
Payer: COMMERCIAL

## 2022-04-26 VITALS — WEIGHT: 240 LBS | BODY MASS INDEX: 39.94 KG/M2

## 2022-04-26 PROCEDURE — 94625 PHY/QHP OP PULM RHB W/O MNTR: CPT

## 2022-04-26 PROCEDURE — 93798 PHYS/QHP OP CAR RHAB W/ECG: CPT

## 2022-04-28 ENCOUNTER — HOSPITAL ENCOUNTER (OUTPATIENT)
Dept: CARDIAC REHAB | Age: 52
Discharge: HOME | End: 2022-04-28
Payer: COMMERCIAL

## 2022-04-28 VITALS — BODY MASS INDEX: 39.61 KG/M2 | WEIGHT: 238 LBS

## 2022-04-28 PROCEDURE — 94625 PHY/QHP OP PULM RHB W/O MNTR: CPT

## 2022-05-03 ENCOUNTER — HOSPITAL ENCOUNTER (OUTPATIENT)
Dept: CARDIAC REHAB | Age: 52
Discharge: HOME | End: 2022-05-03
Payer: COMMERCIAL

## 2022-05-03 ENCOUNTER — VIRTUAL VISIT (OUTPATIENT)
Dept: BEHAVIORAL/MENTAL HEALTH CLINIC | Age: 52
End: 2022-05-03
Payer: COMMERCIAL

## 2022-05-03 VITALS — BODY MASS INDEX: 38.77 KG/M2 | WEIGHT: 233 LBS

## 2022-05-03 DIAGNOSIS — F43.23 ADJUSTMENT DISORDER WITH MIXED ANXIETY AND DEPRESSED MOOD: Primary | ICD-10-CM

## 2022-05-03 PROCEDURE — 90834 PSYTX W PT 45 MINUTES: CPT | Performed by: SOCIAL WORKER

## 2022-05-03 PROCEDURE — 94625 PHY/QHP OP PULM RHB W/O MNTR: CPT

## 2022-05-03 NOTE — PROGRESS NOTES
PSYCHOTHERAPY NOTE      Isai Barry is a 46 y.o. female who presents with anxiety/depression. Client was seen for a virtual individual psychotherapy session that lasted for 50 minutes. Progress:  Mood is noted to be frustrated and melancholy. She is now in process of applying for disability and working with [employer]. Her PCP has sent letter on her behalf. She states that this writer was suppose to receive something from them, but to date, have not. Client is anxious about it as her family is dependent on her income to help maintain household bills. She states she is to receive her last check on 6/15 and the thought of not having anything beyond it is increasing her anxiety. We processed all of this in today's session and explored options and ways to manage anxiety. Client is also having difficulty with significant knee pain which affect mobility. Shared with her information on local orthopedic. Client has had difficulty finding long term therapist,  Provided her with some additional resources and will follow up in 3-4 weeks    Mental Status exam:         Sensorium  oriented to time, place and person   Relations cooperative   Appearance:  casually dressed   Motor Behavior:  within normal limits   Speech:  normal pitch and normal volume   Thought Process: goal directed and logical   Thought Content free of delusions and free of hallucinations   Suicidal ideations none   Homicidal ideations none   Mood:  Frustrated    Affect:  mood-congruent   Memory recent  adequate   Memory remote:  adequate   Concentration:  adequate   Abstraction:  abstract   Insight:  fair   Reliability fair   Judgment:  fair         DIAGNOSIS AND IMPRESSION:      Axis I: Adjustment Disorder with Mixed Emotional Features  Axis II: Deferred  Axis III:   Past Medical History:   Diagnosis Date    Anemia associated with acute blood loss 2017    Txd with Blood transfusions x 2. Dr. Ophelia Dorado.     Asthma childhood    DDD (degenerative disc disease), lumbar     DJD (degenerative joint disease) of knee     JANAE (generalized anxiety disorder) 2018    Dr. Fozia Jack    GERD (gastroesophageal reflux disease)     GI bleeding 2017    Dr. David Sarabia. Txd with Blood transfusions.  History of tuberculosis exposure 2013    POSITIVE PPD TEST. Txd x 6 months; last dose either 11/13 or 12/13    Lower leg DVT (deep venous thromboembolism), acute, right ([address] 2008, 4/27/2016, 08/03/2017    x 3. Initially due to trauma to leg then plane ride home. Dr. Lina Barney. Chronic Anticoagulation.  Major depression 2018    Dr. Fozia Jack    Morbid obesity ([address]     Orthostatic syncope 2017    due to anemia from blood loss. Dr. Norbert Mcpherson.  Post-thrombotic syndrome of right lower extremity 09/14/2017    w  R leg swelling and pain. Dr. Kemal Sneed.  Pseudogout 2016    R knee. Dr. Asael Rivera.  Psoriatic arthritis ([address] 2000s    Dr. Nadia Matta. Txd w Chioma Snowball injections monthly.  PUD (peptic ulcer disease)     Dr. Luis A Segovia.  Shingles 03/2019    R ACW. R upper back previously. Ivan Lang.  Skin abrasion 01/28/2014    After loosing 125#, Bilateral Inner thigh friction flareup monthly with skin breakdown    Thromboembolus ([address] 2008    Rt leg,  pt states she fell and had a plane ride home and developed blood clots    Ulcerative colitis ([address]     Uterine fibroid 2017    x 4. Dr. Kellie Ozuna. Axis IV: Problems with primary support group, Problems related to social environment and Other psychosocial or environmental problems  Axis V:  61-70 mild symptoms    Interventions/plans:   Follow up in 3-4 weeks      Pursuant to the emergency declaration under the [facility] waiver authority and the [employer] Act, this Virtual Visit was conducted, with patient and parent and/or guardian's consent, to reduce the patient's risk of exposure to COVID-19 and provide continuity of care for an established patient. Due to the state of emergency related to the coronavirus, the [place] [employer] and the [place] [employer] is allowing practitioners holding my licensure and/or certification status the ability to provide telehealth services without the usual requirements.

## 2022-05-05 ENCOUNTER — HOSPITAL ENCOUNTER (OUTPATIENT)
Dept: CARDIAC REHAB | Age: 52
Discharge: HOME | End: 2022-05-05
Payer: COMMERCIAL

## 2022-05-05 VITALS — WEIGHT: 235 LBS | BODY MASS INDEX: 39.11 KG/M2

## 2022-05-05 PROCEDURE — 94625 PHY/QHP OP PULM RHB W/O MNTR: CPT

## 2022-05-10 ENCOUNTER — APPOINTMENT (OUTPATIENT)
Dept: CARDIAC REHAB | Age: 52
End: 2022-05-10
Payer: COMMERCIAL

## 2022-05-10 ENCOUNTER — HOSPITAL ENCOUNTER (OUTPATIENT)
Dept: CARDIAC REHAB | Age: 52
Discharge: HOME | End: 2022-05-10
Payer: COMMERCIAL

## 2022-05-10 VITALS — WEIGHT: 240 LBS | BODY MASS INDEX: 39.94 KG/M2

## 2022-05-10 PROCEDURE — 94625 PHY/QHP OP PULM RHB W/O MNTR: CPT

## 2022-05-10 PROCEDURE — 93798 PHYS/QHP OP CAR RHAB W/ECG: CPT

## 2022-05-12 ENCOUNTER — HOSPITAL ENCOUNTER (OUTPATIENT)
Dept: CARDIAC REHAB | Age: 52
Discharge: HOME | End: 2022-05-12
Payer: COMMERCIAL

## 2022-05-12 VITALS — BODY MASS INDEX: 39.77 KG/M2 | WEIGHT: 239 LBS

## 2022-05-12 PROCEDURE — 94625 PHY/QHP OP PULM RHB W/O MNTR: CPT

## 2022-05-17 ENCOUNTER — APPOINTMENT (OUTPATIENT)
Dept: CARDIAC REHAB | Age: 52
End: 2022-05-17
Payer: COMMERCIAL

## 2022-05-19 ENCOUNTER — OFFICE VISIT (OUTPATIENT)
Dept: ENDOCRINOLOGY | Age: 52
End: 2022-05-19
Payer: COMMERCIAL

## 2022-05-19 VITALS
WEIGHT: 237 LBS | HEIGHT: 65 IN | BODY MASS INDEX: 39.49 KG/M2 | SYSTOLIC BLOOD PRESSURE: 111 MMHG | HEART RATE: 80 BPM | DIASTOLIC BLOOD PRESSURE: 61 MMHG

## 2022-05-19 DIAGNOSIS — E66.01 MORBID OBESITY, UNSPECIFIED OBESITY TYPE (HCC): ICD-10-CM

## 2022-05-19 DIAGNOSIS — E66.01 CLASS 2 SEVERE OBESITY WITH SERIOUS COMORBIDITY AND BODY MASS INDEX (BMI) OF 39.0 TO 39.9 IN ADULT, UNSPECIFIED OBESITY TYPE (HCC): Primary | ICD-10-CM

## 2022-05-19 DIAGNOSIS — R73.02 IGT (IMPAIRED GLUCOSE TOLERANCE): ICD-10-CM

## 2022-05-19 PROCEDURE — 99214 OFFICE O/P EST MOD 30 MIN: CPT | Performed by: INTERNAL MEDICINE

## 2022-05-19 RX ORDER — PHENTERMINE HYDROCHLORIDE 37.5 MG/1
TABLET ORAL
Qty: 90 TABLET | Refills: 2 | Status: SHIPPED | OUTPATIENT
Start: 2022-05-19 | End: 2022-10-19 | Stop reason: SDUPTHER

## 2022-05-19 RX ORDER — SEMAGLUTIDE 1.34 MG/ML
1 INJECTION, SOLUTION SUBCUTANEOUS
Qty: 1 EACH | Refills: 4 | Status: SHIPPED | OUTPATIENT
Start: 2022-05-19 | End: 2022-10-19
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
## 2022-05-19 NOTE — LETTER
2022    Patient: Drew Barrios   YOB: 1970   Date of Visit: 2022     Yanely Dennis [id number]  Via In Basket    Dear Michel Gutierrez MD,      Thank you for referring Ms. Jena Li to [facility] for evaluation. My notes for this consultation are attached. If you have questions, please do not hesitate to call me. I look forward to following your patient along with you.       Sincerely,    Ollen Apgar, MD

## 2022-05-21 LAB
ALBUMIN SERPL-MCNC: 4 G/DL (ref 3.8–4.9)
ALBUMIN/GLOB SERPL: 1.8 {RATIO} (ref 1.2–2.2)
ALP SERPL-CCNC: 66 IU/L (ref 44–121)
ALT SERPL-CCNC: 9 IU/L (ref 0–32)
AST SERPL-CCNC: 7 IU/L (ref 0–40)
BILIRUB SERPL-MCNC: 0.8 MG/DL (ref 0–1.2)
BUN SERPL-MCNC: 14 MG/DL (ref 6–24)
BUN/CREAT SERPL: 13 (ref 9–23)
CALCIUM SERPL-MCNC: 9.2 MG/DL (ref 8.7–10.2)
CHLORIDE SERPL-SCNC: 103 MMOL/L (ref 96–106)
CO2 SERPL-SCNC: 24 MMOL/L (ref 20–29)
CREAT SERPL-MCNC: 1.05 MG/DL (ref 0.57–1)
EGFR: 64 ML/MIN/1.73
ERYTHROCYTE [DISTWIDTH] IN BLOOD BY AUTOMATED COUNT: 12.9 % (ref 11.7–15.4)
EST. AVERAGE GLUCOSE BLD GHB EST-MCNC: 94 MG/DL
GLOBULIN SER CALC-MCNC: 2.2 G/DL (ref 1.5–4.5)
GLUCOSE SERPL-MCNC: 73 MG/DL (ref 65–99)
HBA1C MFR BLD: 4.9 % (ref 4.8–5.6)
HCT VFR BLD AUTO: 37.6 % (ref 34–46.6)
HGB BLD-MCNC: 12.3 G/DL (ref 11.1–15.9)
MCH RBC QN AUTO: 31.1 PG (ref 26.6–33)
MCHC RBC AUTO-ENTMCNC: 32.7 G/DL (ref 31.5–35.7)
MCV RBC AUTO: 95 FL (ref 79–97)
PLATELET # BLD AUTO: 269 X10E3/UL (ref 150–450)
POTASSIUM SERPL-SCNC: 4.3 MMOL/L (ref 3.5–5.2)
PROT SERPL-MCNC: 6.2 G/DL (ref 6–8.5)
RBC # BLD AUTO: 3.95 X10E6/UL (ref 3.77–5.28)
SODIUM SERPL-SCNC: 140 MMOL/L (ref 134–144)
WBC # BLD AUTO: 3.8 X10E3/UL (ref 3.4–10.8)

## 2022-05-23 DIAGNOSIS — R73.02 IGT (IMPAIRED GLUCOSE TOLERANCE): ICD-10-CM

## 2022-05-23 DIAGNOSIS — E66.01 CLASS 2 SEVERE OBESITY WITH SERIOUS COMORBIDITY AND BODY MASS INDEX (BMI) OF 39.0 TO 39.9 IN ADULT, UNSPECIFIED OBESITY TYPE (HCC): Primary | ICD-10-CM

## 2022-05-24 ENCOUNTER — OFFICE VISIT (OUTPATIENT)
Dept: FAMILY MEDICINE CLINIC | Age: 52
End: 2022-05-24
Payer: COMMERCIAL

## 2022-05-24 VITALS
HEART RATE: 79 BPM | RESPIRATION RATE: 18 BRPM | TEMPERATURE: 97 F | OXYGEN SATURATION: 96 % | BODY MASS INDEX: 39.55 KG/M2 | DIASTOLIC BLOOD PRESSURE: 54 MMHG | SYSTOLIC BLOOD PRESSURE: 125 MMHG | HEIGHT: 65 IN | WEIGHT: 237.4 LBS

## 2022-05-24 DIAGNOSIS — Z87.828 HX OF TEAR OF ACL (ANTERIOR CRUCIATE LIGAMENT): ICD-10-CM

## 2022-05-24 DIAGNOSIS — R09.02 HYPOXIA: ICD-10-CM

## 2022-05-24 DIAGNOSIS — L40.50 PSORIATIC ARTHRITIS (HCC): Primary | ICD-10-CM

## 2022-05-24 DIAGNOSIS — Z86.16 HISTORY OF COVID-19: ICD-10-CM

## 2022-05-24 DIAGNOSIS — F32.A ANXIETY AND DEPRESSION: ICD-10-CM

## 2022-05-24 DIAGNOSIS — D68.59 HYPERCOAGULABLE STATE (HCC): ICD-10-CM

## 2022-05-24 DIAGNOSIS — F41.9 ANXIETY AND DEPRESSION: ICD-10-CM

## 2022-05-24 DIAGNOSIS — R06.00 DYSPNEA, UNSPECIFIED TYPE: ICD-10-CM

## 2022-05-24 DIAGNOSIS — F51.02 ADJUSTMENT INSOMNIA: ICD-10-CM

## 2022-05-24 DIAGNOSIS — M17.11 PRIMARY OSTEOARTHRITIS OF RIGHT KNEE: ICD-10-CM

## 2022-05-24 DIAGNOSIS — K51.919 ULCERATIVE COLITIS WITH COMPLICATION, UNSPECIFIED LOCATION (HCC): ICD-10-CM

## 2022-05-24 DIAGNOSIS — I87.001 POST-THROMBOTIC SYNDROME OF RIGHT LOWER EXTREMITY: ICD-10-CM

## 2022-05-24 PROCEDURE — 99214 OFFICE O/P EST MOD 30 MIN: CPT | Performed by: FAMILY MEDICINE

## 2022-05-24 RX ORDER — DICLOFENAC SODIUM 10 MG/G
2 GEL TOPICAL 4 TIMES DAILY
Qty: 100 G | Refills: 1 | Status: SHIPPED | OUTPATIENT
Start: 2022-05-24 | End: 2022-10-19

## 2022-05-24 RX ORDER — HYDROCODONE BITARTRATE AND ACETAMINOPHEN 5; 325 MG/1; MG/1
1 TABLET ORAL
Qty: 20 TABLET | Refills: 0 | Status: SHIPPED | OUTPATIENT
Start: 2022-05-24 | End: 2022-07-26 | Stop reason: SDUPTHER

## 2022-05-24 NOTE — PROGRESS NOTES
Chief Complaint   Patient presents with    Follow-up     4 week   1. Have you been to the ER, urgent care clinic since your last visit? Hospitalized since your last visit? No    2. Have you seen or consulted any other health care providers outside of the [address] [place] [address] since your last visit? Include any pap smears or colon screening.  Yes Dr Guerrier Second right leg pain and swelling
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
## 2022-05-31 ENCOUNTER — VIRTUAL VISIT (OUTPATIENT)
Dept: BEHAVIORAL/MENTAL HEALTH CLINIC | Age: 52
End: 2022-05-31
Payer: COMMERCIAL

## 2022-05-31 DIAGNOSIS — F43.23 ADJUSTMENT DISORDER WITH MIXED ANXIETY AND DEPRESSED MOOD: Primary | ICD-10-CM

## 2022-05-31 PROCEDURE — 90832 PSYTX W PT 30 MINUTES: CPT | Performed by: SOCIAL WORKER

## 2022-05-31 NOTE — PROGRESS NOTES
PSYCHOTHERAPY NOTE      Dacia Aguirre is a 46 y.o. female who presents with anxiety/depression. Client was seen for a virtual individual psychotherapy session that lasted for 30 minutes. Progress:  Client presents with an improved mood and affect. She is compliant with Lexapro and recognizes benefit. Client shared that she was approved for Social Security Disability and should be receiving her first check in June. This has been helpful to reduce her overall anxiety as she was worried about finances. This disability will help her and family to maintain their bills and help client as she is not able to return to work and continues to have ongoing respiratory issues. Client is also working on getting her [employer] disability snf and hopes that will also be favorable. She is trying to keep self distracted with reading and baking. These have been good distraction tools. Today is our last appt due to short term nature of my role. Client is aware and has been provided listing of local long term therapist for continued counseling It is felt that continued counseling can be beneficial as she navigates this new chapter of her live and managing this disability.       Mental Status exam:         Sensorium  oriented to time, place and person   Relations cooperative   Appearance:  casually dressed   Motor Behavior:  within normal limits   Speech:  normal pitch and normal volume   Thought Process: goal directed and logical   Thought Content free of delusions and free of hallucinations   Suicidal ideations none   Homicidal ideations none   Mood:  euthymic   Affect:  euthymic   Memory recent  adequate   Memory remote:  adequate   Concentration:  adequate   Abstraction:  abstract   Insight:  fair   Reliability fair   Judgment:  fair         DIAGNOSIS AND IMPRESSION:    Axis I: Adjustment Disorder with Mixed Emotional Features  Axis II: No dx  Axis III:   Past Medical History:   Diagnosis Date    Anemia associated with acute blood loss 2017    Txd with Blood transfusions x 2. Dr. Rashad Prabhakar.  Asthma childhood    DDD (degenerative disc disease), lumbar     DJD (degenerative joint disease) of knee     JANAE (generalized anxiety disorder) 2018    Dr. Enrique Freitas    GERD (gastroesophageal reflux disease)     GI bleeding 2017    Dr. Freda Noble. Txd with Blood transfusions.  History of tuberculosis exposure 2013    POSITIVE PPD TEST. Txd x 6 months; last dose either 11/13 or 12/13    Lower leg DVT (deep venous thromboembolism), acute, right ([address] 2008, 4/27/2016, 08/03/2017    x 3. Initially due to trauma to leg then plane ride home. Dr. Kayode Escalante. Chronic Anticoagulation.  Major depression 2018    Dr. Enrique Freitas    Morbid obesity ([address]     Orthostatic syncope 2017    due to anemia from blood loss. Dr. Rashad Prabhakar.  Post-thrombotic syndrome of right lower extremity 09/14/2017    w  R leg swelling and pain. Dr. Tod Macias.  Pseudogout 2016    R knee. Dr. Rudolph Garza.  Psoriatic arthritis ([address] 2000s    Dr. Juan Stanfodr. Txd w Juan Carlos Angel injections monthly.  PUD (peptic ulcer disease)     Dr. Rakel Nayak.  Shingles 03/2019    R ACW. R upper back previously. Andie Navin.  Skin abrasion 01/28/2014    After loosing 125#, Bilateral Inner thigh friction flareup monthly with skin breakdown    Thromboembolus ([address] 2008    Rt leg,  pt states she fell and had a plane ride home and developed blood clots    Ulcerative colitis ([address]     Uterine fibroid 2017    x 4. Dr. Ira Reed.      Cost IV: Problems with primary support group, Problems related to social environment and Other psychosocial or environmental problems  Axis V:  71-80 if symptoms are present, they are transient and expectable reactions to stressors      Pursuant to the emergency declaration under the [facility] waiver authority and the [place] [employer] Supplemental Appropriations Act, this Virtual Visit was conducted, with patient and parent and/or guardian's consent, to reduce the patient's risk of exposure to COVID-19 and provide continuity of care for an established patient. Due to the state of emergency related to the coronavirus, the [place] [employer] and the [place] [employer] is allowing practitioners holding my licensure and/or certification status the ability to provide telehealth services without the usual requirements.

## 2022-06-15 ENCOUNTER — TRANSCRIBE ORDER (OUTPATIENT)
Dept: SCHEDULING | Age: 52
End: 2022-06-15

## 2022-06-15 DIAGNOSIS — K51.90 ULCERATIVE COLITIS (HCC): Primary | ICD-10-CM

## 2022-06-15 DIAGNOSIS — K21.9 ESOPHAGEAL REFLUX: ICD-10-CM

## 2022-06-15 DIAGNOSIS — R93.5 ABNORMAL CT OF THE ABDOMEN: ICD-10-CM

## 2022-06-18 ENCOUNTER — HOSPITAL ENCOUNTER (EMERGENCY)
Age: 52
Discharge: HOME | End: 2022-06-19
Attending: EMERGENCY MEDICINE
Payer: COMMERCIAL

## 2022-06-18 DIAGNOSIS — K51.90 ULCERATIVE COLITIS WITHOUT COMPLICATIONS, UNSPECIFIED LOCATION (HCC): Primary | ICD-10-CM

## 2022-06-18 DIAGNOSIS — N39.0 URINARY TRACT INFECTION WITHOUT HEMATURIA, SITE UNSPECIFIED: ICD-10-CM

## 2022-06-18 DIAGNOSIS — R10.9 ACUTE ABDOMINAL PAIN: ICD-10-CM

## 2022-06-18 DIAGNOSIS — K52.9 ENTERITIS: ICD-10-CM

## 2022-06-18 DIAGNOSIS — R11.10 ACUTE VOMITING: ICD-10-CM

## 2022-06-18 LAB
ALBUMIN SERPL-MCNC: 4.5 G/DL (ref 3.5–5)
ALBUMIN/GLOB SERPL: 1.1 {RATIO} (ref 1.1–2.2)
ALP SERPL-CCNC: 64 U/L (ref 45–117)
ALT SERPL-CCNC: 29 U/L (ref 12–78)
ANION GAP SERPL CALC-SCNC: 5 MMOL/L (ref 5–15)
AST SERPL-CCNC: 21 U/L (ref 15–37)
BASOPHILS # BLD: 0.1 K/UL (ref 0–0.1)
BASOPHILS NFR BLD: 1 % (ref 0–1)
BILIRUB SERPL-MCNC: 1.7 MG/DL (ref 0.2–1)
BUN SERPL-MCNC: 24 MG/DL (ref 6–20)
BUN/CREAT SERPL: 19 (ref 12–20)
CALCIUM SERPL-MCNC: 9.9 MG/DL (ref 8.5–10.1)
CHLORIDE SERPL-SCNC: 102 MMOL/L (ref 97–108)
CO2 SERPL-SCNC: 26 MMOL/L (ref 21–32)
CREAT SERPL-MCNC: 1.25 MG/DL (ref 0.55–1.02)
DIFFERENTIAL METHOD BLD: NORMAL
EOSINOPHIL # BLD: 0.1 K/UL (ref 0–0.4)
EOSINOPHIL NFR BLD: 1 % (ref 0–7)
ERYTHROCYTE [DISTWIDTH] IN BLOOD BY AUTOMATED COUNT: 13.2 % (ref 11.5–14.5)
GLOBULIN SER CALC-MCNC: 4.2 G/DL (ref 2–4)
GLUCOSE SERPL-MCNC: 90 MG/DL (ref 65–100)
HCT VFR BLD AUTO: 45.8 % (ref 35–47)
HGB BLD-MCNC: 15.5 G/DL (ref 11.5–16)
IMM GRANULOCYTES # BLD AUTO: 0 K/UL (ref 0–0.04)
IMM GRANULOCYTES NFR BLD AUTO: 0 % (ref 0–0.5)
LIPASE SERPL-CCNC: 134 U/L (ref 73–393)
LYMPHOCYTES # BLD: 2.8 K/UL (ref 0.8–3.5)
LYMPHOCYTES NFR BLD: 40 % (ref 12–49)
MCH RBC QN AUTO: 32 PG (ref 26–34)
MCHC RBC AUTO-ENTMCNC: 33.8 G/DL (ref 30–36.5)
MCV RBC AUTO: 94.4 FL (ref 80–99)
MONOCYTES # BLD: 0.6 K/UL (ref 0–1)
MONOCYTES NFR BLD: 9 % (ref 5–13)
NEUTS SEG # BLD: 3.5 K/UL (ref 1.8–8)
NEUTS SEG NFR BLD: 49 % (ref 32–75)
NRBC # BLD: 0 K/UL (ref 0–0.01)
NRBC BLD-RTO: 0 PER 100 WBC
PLATELET # BLD AUTO: 302 K/UL (ref 150–400)
PMV BLD AUTO: 9.4 FL (ref 8.9–12.9)
POTASSIUM SERPL-SCNC: 3.9 MMOL/L (ref 3.5–5.1)
PROT SERPL-MCNC: 8.7 G/DL (ref 6.4–8.2)
RBC # BLD AUTO: 4.85 M/UL (ref 3.8–5.2)
SODIUM SERPL-SCNC: 133 MMOL/L (ref 136–145)
WBC # BLD AUTO: 7 K/UL (ref 3.6–11)

## 2022-06-18 PROCEDURE — 96375 TX/PRO/DX INJ NEW DRUG ADDON: CPT

## 2022-06-18 PROCEDURE — 85025 COMPLETE CBC W/AUTO DIFF WBC: CPT

## 2022-06-18 PROCEDURE — 36415 COLL VENOUS BLD VENIPUNCTURE: CPT

## 2022-06-18 PROCEDURE — 80053 COMPREHEN METABOLIC PANEL: CPT

## 2022-06-18 PROCEDURE — 99285 EMERGENCY DEPT VISIT HI MDM: CPT

## 2022-06-18 PROCEDURE — 96365 THER/PROPH/DIAG IV INF INIT: CPT

## 2022-06-18 PROCEDURE — 83690 ASSAY OF LIPASE: CPT

## 2022-06-19 ENCOUNTER — APPOINTMENT (OUTPATIENT)
Dept: CT IMAGING | Age: 52
End: 2022-06-19
Attending: EMERGENCY MEDICINE
Payer: COMMERCIAL

## 2022-06-19 VITALS
BODY MASS INDEX: 35.89 KG/M2 | WEIGHT: 215.39 LBS | OXYGEN SATURATION: 95 % | HEIGHT: 65 IN | TEMPERATURE: 97.7 F | SYSTOLIC BLOOD PRESSURE: 126 MMHG | DIASTOLIC BLOOD PRESSURE: 72 MMHG | RESPIRATION RATE: 18 BRPM | HEART RATE: 81 BPM

## 2022-06-19 LAB
AMORPH CRY URNS QL MICRO: ABNORMAL
APPEARANCE UR: CLEAR
BACTERIA URNS QL MICRO: ABNORMAL /HPF
BILIRUB UR QL: NEGATIVE
COLOR UR: ABNORMAL
EPITH CASTS URNS QL MICRO: ABNORMAL /LPF
GLUCOSE UR STRIP.AUTO-MCNC: NEGATIVE MG/DL
HGB UR QL STRIP: ABNORMAL
HYALINE CASTS URNS QL MICRO: ABNORMAL /LPF (ref 0–5)
KETONES UR QL STRIP.AUTO: NEGATIVE MG/DL
LEUKOCYTE ESTERASE UR QL STRIP.AUTO: ABNORMAL
NITRITE UR QL STRIP.AUTO: NEGATIVE
PH UR STRIP: 5 [PH] (ref 5–8)
PROT UR STRIP-MCNC: NEGATIVE MG/DL
RBC #/AREA URNS HPF: ABNORMAL /HPF (ref 0–5)
SP GR UR REFRACTOMETRY: 1.02 (ref 1–1.03)
UA: UC IF INDICATED,UAUC: ABNORMAL
UROBILINOGEN UR QL STRIP.AUTO: 0.2 EU/DL (ref 0.2–1)
WBC URNS QL MICRO: ABNORMAL /HPF (ref 0–4)

## 2022-06-19 PROCEDURE — 74011000258 HC RX REV CODE- 258: Performed by: EMERGENCY MEDICINE

## 2022-06-19 PROCEDURE — 81001 URINALYSIS AUTO W/SCOPE: CPT

## 2022-06-19 PROCEDURE — 96365 THER/PROPH/DIAG IV INF INIT: CPT

## 2022-06-19 PROCEDURE — 74011000636 HC RX REV CODE- 636: Performed by: EMERGENCY MEDICINE

## 2022-06-19 PROCEDURE — 74177 CT ABD & PELVIS W/CONTRAST: CPT

## 2022-06-19 PROCEDURE — 96375 TX/PRO/DX INJ NEW DRUG ADDON: CPT

## 2022-06-19 PROCEDURE — 74011250636 HC RX REV CODE- 250/636: Performed by: EMERGENCY MEDICINE

## 2022-06-19 RX ORDER — ONDANSETRON 2 MG/ML
4 INJECTION INTRAMUSCULAR; INTRAVENOUS
Status: COMPLETED | OUTPATIENT
Start: 2022-06-19 | End: 2022-06-19

## 2022-06-19 RX ORDER — ONDANSETRON 4 MG/1
4 TABLET, FILM COATED ORAL
Qty: 20 TABLET | Refills: 0 | Status: SHIPPED | OUTPATIENT
Start: 2022-06-19

## 2022-06-19 RX ORDER — METOCLOPRAMIDE HYDROCHLORIDE 5 MG/ML
10 INJECTION INTRAMUSCULAR; INTRAVENOUS
Status: COMPLETED | OUTPATIENT
Start: 2022-06-19 | End: 2022-06-19

## 2022-06-19 RX ORDER — MORPHINE SULFATE 2 MG/ML
4 INJECTION, SOLUTION INTRAMUSCULAR; INTRAVENOUS
Status: COMPLETED | OUTPATIENT
Start: 2022-06-19 | End: 2022-06-19

## 2022-06-19 RX ORDER — METOCLOPRAMIDE 10 MG/1
10 TABLET ORAL
Qty: 12 TABLET | Refills: 0 | Status: SHIPPED | OUTPATIENT
Start: 2022-06-19 | End: 2022-06-29

## 2022-06-19 RX ORDER — CEPHALEXIN 500 MG/1
500 CAPSULE ORAL 4 TIMES DAILY
Qty: 28 CAPSULE | Refills: 0 | Status: SHIPPED | OUTPATIENT
Start: 2022-06-19 | End: 2022-06-26

## 2022-06-19 RX ORDER — DICYCLOMINE HYDROCHLORIDE 20 MG/1
20 TABLET ORAL EVERY 6 HOURS
Qty: 20 TABLET | Refills: 0 | Status: SHIPPED | OUTPATIENT
Start: 2022-06-19 | End: 2022-07-26 | Stop reason: ALTCHOICE

## 2022-06-19 RX ORDER — HEPARIN 100 UNIT/ML
300 SYRINGE INTRAVENOUS
Status: COMPLETED | OUTPATIENT
Start: 2022-06-19 | End: 2022-06-19

## 2022-06-19 RX ADMIN — METOCLOPRAMIDE 10 MG: 5 INJECTION, SOLUTION INTRAMUSCULAR; INTRAVENOUS at 03:03

## 2022-06-19 RX ADMIN — METHYLPREDNISOLONE SODIUM SUCCINATE 125 MG: 125 INJECTION, POWDER, FOR SOLUTION INTRAMUSCULAR; INTRAVENOUS at 03:03

## 2022-06-19 RX ADMIN — ONDANSETRON 4 MG: 2 INJECTION INTRAMUSCULAR; INTRAVENOUS at 01:30

## 2022-06-19 RX ADMIN — SODIUM CHLORIDE 1000 ML: 9 INJECTION, SOLUTION INTRAVENOUS at 01:30

## 2022-06-19 RX ADMIN — CEFTRIAXONE 1 G: 1 INJECTION, POWDER, FOR SOLUTION INTRAMUSCULAR; INTRAVENOUS at 03:49

## 2022-06-19 RX ADMIN — MORPHINE SULFATE 4 MG: 2 INJECTION, SOLUTION INTRAMUSCULAR; INTRAVENOUS at 01:30

## 2022-06-19 RX ADMIN — IOPAMIDOL 100 ML: 755 INJECTION, SOLUTION INTRAVENOUS at 01:18

## 2022-06-19 RX ADMIN — Medication 300 UNITS: at 04:27

## 2022-06-19 NOTE — ED PROVIDER NOTES
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 accurate and complete.

## 2022-06-19 NOTE — DISCHARGE INSTRUCTIONS
Thank You! It was a pleasure taking care of you in our Emergency Department today. We know that when you come to [facility], you are entrusting us with your health, comfort, and safety. Our physicians and nurses honor that trust, and truly appreciate the opportunity to care for you and your loved ones. We also value your feedback. If you receive a survey about your Emergency Department experience today, please fill it out. We care about our patients' feedback, and we listen to what you have to say. Thank you. Dr. Cleveland Apley, M.D.      ________________________________________________________________________  I have included a copy of your lab results and/or radiologic studies from today's visit so you can have them easily available at your follow-up visit. We hope you feel better and please do not hesitate to contact the ED if you have any questions at all! Recent Results (from the past 12 hour(s))   CBC WITH AUTOMATED DIFF    Collection Time: 06/18/22 10:53 PM   Result Value Ref Range    WBC 7.0 3.6 - 11.0 K/uL    RBC 4.85 3.80 - 5.20 M/uL    HGB 15.5 11.5 - 16.0 g/dL    HCT 45.8 35.0 - 47.0 %    MCV 94.4 80.0 - 99.0 FL    MCH 32.0 26.0 - 34.0 PG    MCHC 33.8 30.0 - 36.5 g/dL    RDW 13.2 11.5 - 14.5 %    PLATELET 621 545 - 866 K/uL    MPV 9.4 8.9 - 12.9 FL    NRBC 0.0 0  WBC    ABSOLUTE NRBC 0.00 0.00 - 0.01 K/uL    NEUTROPHILS 49 32 - 75 %    LYMPHOCYTES 40 12 - 49 %    MONOCYTES 9 5 - 13 %    EOSINOPHILS 1 0 - 7 %    BASOPHILS 1 0 - 1 %    IMMATURE GRANULOCYTES 0 0.0 - 0.5 %    ABS. NEUTROPHILS 3.5 1.8 - 8.0 K/UL    ABS. LYMPHOCYTES 2.8 0.8 - 3.5 K/UL    ABS. MONOCYTES 0.6 0.0 - 1.0 K/UL    ABS. EOSINOPHILS 0.1 0.0 - 0.4 K/UL    ABS. BASOPHILS 0.1 0.0 - 0.1 K/UL    ABS. IMM.  GRANS. 0.0 0.00 - 0.04 K/UL    DF AUTOMATED     METABOLIC PANEL, COMPREHENSIVE    Collection Time: 06/18/22 10:53 PM   Result Value Ref Range    Sodium 133 (L) 136 - 145 mmol/L Potassium 3.9 3.5 - 5.1 mmol/L    Chloride 102 97 - 108 mmol/L    CO2 26 21 - 32 mmol/L    Anion gap 5 5 - 15 mmol/L    Glucose 90 65 - 100 mg/dL    BUN 24 (H) 6 - 20 MG/DL    Creatinine 1.25 (H) 0.55 - 1.02 MG/DL    BUN/Creatinine ratio 19 12 - 20      GFR est AA 54 (L) >60 ml/min/1.73m2    GFR est non-AA 45 (L) >60 ml/min/1.73m2    Calcium 9.9 8.5 - 10.1 MG/DL    Bilirubin, total 1.7 (H) 0.2 - 1.0 MG/DL    ALT (SGPT) 29 12 - 78 U/L    AST (SGOT) 21 15 - 37 U/L    Alk. phosphatase 64 45 - 117 U/L    Protein, total 8.7 (H) 6.4 - 8.2 g/dL    Albumin 4.5 3.5 - 5.0 g/dL    Globulin 4.2 (H) 2.0 - 4.0 g/dL    A-G Ratio 1.1 1.1 - 2.2     LIPASE    Collection Time: 06/18/22 10:53 PM   Result Value Ref Range    Lipase 134 73 - 393 U/L   URINALYSIS W/ REFLEX CULTURE    Collection Time: 06/19/22  2:31 AM    Specimen: Urine   Result Value Ref Range    Color YELLOW/STRAW      Appearance CLEAR CLEAR      Specific gravity 1.025 1.003 - 1.030      pH (UA) 5.0 5.0 - 8.0      Protein Negative NEG mg/dL    Glucose Negative NEG mg/dL    Ketone Negative NEG mg/dL    Bilirubin Negative NEG      Blood MODERATE (A) NEG      Urobilinogen 0.2 0.2 - 1.0 EU/dL    Nitrites Negative NEG      Leukocyte Esterase MODERATE (A) NEG      WBC 5-10 0 - 4 /hpf    RBC 5-10 0 - 5 /hpf    Epithelial cells MODERATE (A) FEW /lpf    Bacteria 1+ (A) NEG /hpf    UA:UC IF INDICATED CULTURE NOT INDICATED BY UA RESULT CNI      Amorphous Crystals 1+ (A) NEG    Hyaline cast 5-10 0 - 5 /lpf       CT ABD PELV W CONT   Final Result   Surgical changes following colectomy with a distal anastomosis. Dilated   fluid-filled bowel loops throughout the abdomen without a distinct transition   point can be seen with enteritis due to nonspecific infection or inflammation or   ileus. The distal bowel anastomosis is widely patent. The absence of a bowel   transition point makes obstruction less likely.         CT Results  (Last 48 hours)                 06/19/22 0153  CT ABD PELV W CONT Final result    Impression:  Surgical changes following colectomy with a distal anastomosis. Dilated   fluid-filled bowel loops throughout the abdomen without a distinct transition   point can be seen with enteritis due to nonspecific infection or inflammation or   ileus. The distal bowel anastomosis is widely patent. The absence of a bowel   transition point makes obstruction less likely. Narrative:  EXAM:  CT abdomen pelvis with contrast       INDICATION: Abdominal pain with vomiting and diarrhea       COMPARISON: CT 8/26/2021. TECHNIQUE: Helical CT of the abdomen  and pelvis  following the uneventful   intravenous administration of nonionic contrast.  Coronal and sagittal reformats   are performed. CT dose reduction was achieved through use of a standardized   protocol tailored for this examination and automatic exposure control for dose   modulation. FINDINGS:    The visualized lung bases demonstrate no mass or consolidation. The heart size   is normal. There is no pericardial or pleural effusion. The liver, spleen, pancreas, and adrenal glands are normal. The gall bladder is   present  without intra- or extra-hepatic biliary dilatation. The kidneys are symmetric without hydronephrosis. There are stable tiny kidney   cysts. Surgical changes are again seen following colectomy with a distal anastomosis. There are dilated fluid-filled bowel loops throughout the abdomen without a   distinct transition point. There are no enlarged lymph nodes. There is no free fluid or free air. The   aorta tapers without aneurysm. The urinary bladder is normal.  There is no pelvic mass. Uterus is surgically   absent. There is degenerative disc change at L4-5. There is no aggressive bony lesion. The exam and treatment you received in the Emergency Department were for an urgent problem and are not intended as complete care.  It is important that you follow up with a doctor, nurse practitioner, or physician assistant for ongoing care. If your symptoms become worse or you do not improve as expected and you are unable to reach your usual health care provider, you should return to the Emergency Department. We are available 24 hours a day. Please take your discharge instructions with you when you go to your follow-up appointment. If a prescription has been provided, please have it filled as soon as possible to prevent a delay in treatment. Read the entire medication instruction sheet provided to you by the pharmacy. If you have any questions or reservations about taking the medication due to side effects or interactions with other medications, please call your primary care physician or contact the ER to speak with the charge nurse. Please make an appointment with your family doctor or the physician you were referred to for follow-up of this visit as instructed on your discharge paperwork. Return to the ER if you are unable to be seen or if you are unable to be seen in a timely manner. If you have any problem arranging the follow-up visit, contact the Emergency Department immediately.

## 2022-06-19 NOTE — ED NOTES
At time of discharged pt states she vomited twice large amounts when she was getting dressed still feels better since arrival and is ready to go home

## 2022-06-25 ENCOUNTER — HOSPITAL ENCOUNTER (OUTPATIENT)
Dept: ULTRASOUND IMAGING | Age: 52
Discharge: HOME | End: 2022-06-25
Attending: NURSE PRACTITIONER
Payer: COMMERCIAL

## 2022-06-25 DIAGNOSIS — R93.5 ABNORMAL CT OF THE ABDOMEN: ICD-10-CM

## 2022-06-25 DIAGNOSIS — K21.9 ESOPHAGEAL REFLUX: ICD-10-CM

## 2022-06-25 DIAGNOSIS — K51.90 ULCERATIVE COLITIS (HCC): ICD-10-CM

## 2022-06-25 PROCEDURE — 76705 ECHO EXAM OF ABDOMEN: CPT

## 2022-07-26 ENCOUNTER — OFFICE VISIT (OUTPATIENT)
Dept: FAMILY MEDICINE CLINIC | Age: 52
End: 2022-07-26
Payer: COMMERCIAL

## 2022-07-26 VITALS
DIASTOLIC BLOOD PRESSURE: 71 MMHG | HEART RATE: 72 BPM | SYSTOLIC BLOOD PRESSURE: 133 MMHG | BODY MASS INDEX: 37.85 KG/M2 | TEMPERATURE: 97.3 F | RESPIRATION RATE: 16 BRPM | HEIGHT: 65 IN | WEIGHT: 227.2 LBS | OXYGEN SATURATION: 99 %

## 2022-07-26 DIAGNOSIS — Z23 ENCOUNTER FOR IMMUNIZATION: ICD-10-CM

## 2022-07-26 DIAGNOSIS — R06.09 COVID-19 LONG HAULER MANIFESTING CHRONIC DYSPNEA: ICD-10-CM

## 2022-07-26 DIAGNOSIS — K51.919 ULCERATIVE COLITIS WITH COMPLICATION, UNSPECIFIED LOCATION (HCC): ICD-10-CM

## 2022-07-26 DIAGNOSIS — F32.A ANXIETY AND DEPRESSION: ICD-10-CM

## 2022-07-26 DIAGNOSIS — F41.9 ANXIETY AND DEPRESSION: ICD-10-CM

## 2022-07-26 DIAGNOSIS — Z79.899 ENCOUNTER FOR LONG-TERM (CURRENT) USE OF MEDICATIONS: ICD-10-CM

## 2022-07-26 DIAGNOSIS — B02.9 HERPES ZOSTER WITHOUT COMPLICATION: ICD-10-CM

## 2022-07-26 DIAGNOSIS — R76.12 POSITIVE QUANTIFERON-TB GOLD TEST: ICD-10-CM

## 2022-07-26 DIAGNOSIS — F51.02 ADJUSTMENT INSOMNIA: ICD-10-CM

## 2022-07-26 DIAGNOSIS — Z12.31 ENCOUNTER FOR SCREENING MAMMOGRAM FOR MALIGNANT NEOPLASM OF BREAST: ICD-10-CM

## 2022-07-26 DIAGNOSIS — J96.11 CHRONIC RESPIRATORY FAILURE WITH HYPOXIA, ON HOME O2 THERAPY (HCC): ICD-10-CM

## 2022-07-26 DIAGNOSIS — M17.11 PRIMARY OSTEOARTHRITIS OF RIGHT KNEE: ICD-10-CM

## 2022-07-26 DIAGNOSIS — N17.9 AKI (ACUTE KIDNEY INJURY) (HCC): ICD-10-CM

## 2022-07-26 DIAGNOSIS — L40.50 PSORIATIC ARTHRITIS (HCC): Primary | ICD-10-CM

## 2022-07-26 DIAGNOSIS — Z99.81 CHRONIC RESPIRATORY FAILURE WITH HYPOXIA, ON HOME O2 THERAPY (HCC): ICD-10-CM

## 2022-07-26 DIAGNOSIS — U09.9 COVID-19 LONG HAULER MANIFESTING CHRONIC DYSPNEA: ICD-10-CM

## 2022-07-26 DIAGNOSIS — E87.1 HYPONATREMIA: ICD-10-CM

## 2022-07-26 PROCEDURE — 90677 PCV20 VACCINE IM: CPT | Performed by: FAMILY MEDICINE

## 2022-07-26 PROCEDURE — 90471 IMMUNIZATION ADMIN: CPT | Performed by: FAMILY MEDICINE

## 2022-07-26 PROCEDURE — 99214 OFFICE O/P EST MOD 30 MIN: CPT | Performed by: FAMILY MEDICINE

## 2022-07-26 RX ORDER — HYDROCODONE BITARTRATE AND ACETAMINOPHEN 5; 325 MG/1; MG/1
1 TABLET ORAL
Qty: 20 TABLET | Refills: 0 | Status: SHIPPED | OUTPATIENT
Start: 2022-07-26 | End: 2022-08-02

## 2022-07-26 RX ORDER — IPRATROPIUM BROMIDE 42 UG/1
SPRAY, METERED NASAL
COMMUNITY
Start: 2022-06-11 | End: 2022-10-27 | Stop reason: ALTCHOICE

## 2022-07-26 RX ORDER — VALACYCLOVIR HYDROCHLORIDE 1 G/1
1000 TABLET, FILM COATED ORAL 3 TIMES DAILY
Qty: 21 TABLET | Refills: 0 | Status: SHIPPED | OUTPATIENT
Start: 2022-07-26 | End: 2022-08-02

## 2022-07-26 NOTE — PROGRESS NOTES
Marlene Eckert (: 1970) is a 46 y.o. female, established patient, here for evaluation of the following chief complaint(s):  Inflammatory Bowel Disease (Follow-up)     ASSESSMENT/PLAN:    Ct working with Dr. Amaris Taylor on PsA (on Remicaide), issues with Cimzia recently. Following with GI and CRS for UC. Recent issue with GI sx leading to CHRISTOFER. Sx resolved, to repeat labs. Planning to have with colonosocpy and EGD in 2022. Depression improving on meds. Finished cardiopulm rehab and working on exercise tolerance. Ct with O2  ? Latent Tb. Pt to discuss results with Rheum  Recent shingles noted, tx with Valtrex    Below is the assessment and plan developed based on review of pertinent history, physical exam, labs, studies, and medications. 1. Psoriatic arthritis ([facility]  2. Ulcerative colitis with complication, unspecified location ([facility]  3. Adjustment insomnia  4. Anxiety and depression  -     TSH 3RD GENERATION; Future  5. Chronic respiratory failure with hypoxia, on home O2 therapy (HCC)  6. COVID-19 long hauler manifesting chronic dyspnea  7. CHRISTOFER (acute kidney injury) ([facility]  -     METABOLIC PANEL, COMPREHENSIVE; Future  8. Hyponatremia  -     METABOLIC PANEL, COMPREHENSIVE; Future  9. Herpes zoster without complication  -     valACYclovir (Valtrex) 1 gram tablet; Take 1 Tablet by mouth three (3) times daily for 7 days. Indications: shingles, Normal, Disp-21 Tablet, R-0  -     HYDROcodone-acetaminophen (NORCO) 5-325 mg per tablet; Take 1 Tablet by mouth every six (6) hours as needed for Pain for up to 7 days. , Normal, Disp-20 Tablet, R-0  10. Primary osteoarthritis of right knee  11. Encounter for long-term (current) use of medications  -     METABOLIC PANEL, COMPREHENSIVE; Future  -     LIPID PANEL; Future  -     TSH 3RD GENERATION; Future  12. Positive QuantiFERON-TB Gold test  13.  Encounter for screening mammogram for malignant neoplasm of breast  -     MARLENE MAMMO BI SCREENING INCL CAD; Future      Return in about 3 months (around 10/26/2022). SUBJECTIVE/OBJECTIVE:    45 yo AAF with PMH sig for PsA, UC, Anxiety and depression, right leg DVT x 2, PUD, chronic hypoxia related to COVID 19, and obesity who presents for routine f/u on chronic conditions. Stable overall today. She was recently approved for disability. Had ER visit on 6/18/22 for abd pain with vomiting and diarrhea. Sx resolved. CT with concerns for enteritis. Had [address] afterwards with no concerns. Working with Dr. Maki Washington. Had allergic reaction to Cimzia and switched to another biologic (not Humira as expected d/t insurance) with good tolerance. Of note, pt did find a recent Quant Gold + on her labs and will discuss with Dr. Maki Washington next week. 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 Finished short term therapy and ct on Lexapro with improvement. No SI/HI. Has finished cardio/pulm rehab. Ct to feel exhausted after doing minimal exercise. Seeing Pulm and Cardio. Hx of DVTs in right leg and ct on Xarelto. Still dealing with post-thrombotic syndrome  Seeing Dr. Louann Garcia for Hematology and no recent changes to plan. May retest labs when pt comes off xarelto for any procedures to further evaluate. Following with Dr. Lobo Valdovinos for [address]. Still working on getting portable oxygen concentrator - currently still rolling O2 canisters and has home oxygen concentrator. Did walk test at last appt. Recent shingles on buttocks. Had this in past and responded to meds. To get shingles vaccine in near future. ROS  Gen - no fever/chills  Resp - per HPI  CV - no chest pain  Rest per HPI    Blood pressure 133/71, pulse 72, temperature 97.3 °F (36.3 °C), temperature source Temporal, resp. rate 16, height 5' 5\" (1.651 m), weight 227 lb 3.2 oz (103.1 kg), SpO2 99 %.     Physical Exam  General appearance - alert, well appearing, and in no distress  Eyes -sclera anicteric  Neck - supple, no significant adenopathy, no thyromegaly  Chest - clear to auscultation, no wheezes, rales or rhonchi, symmetric air entry  Heart - normal rate, regular rhythm, normal S1, S2, no murmurs, rubs, clicks or gallops  Neurological - alert, oriented, normal speech, no focal findings or movement disorder noted  Extr - no edema  Psych - normal mood and affect    On this date 07/26/2022 I have spent 30 minutes reviewing previous notes, test results and face to face with the patient discussing the diagnosis and importance of compliance with the treatment plan as well as documenting on the day of the visit. An electronic signature was used to authenticate this note.   -- Lucita Kay MD

## 2022-07-26 NOTE — PROGRESS NOTES
Chief Complaint   Patient presents with    Inflammatory Bowel Disease     Follow-up    1. Have you been to the ER, urgent care clinic since your last visit? Hospitalized since your last visit? Yes Where: [facility] ER Abdominal pain    2. Have you seen or consulted any other health care providers outside of the [address] [place] [address] since your last visit? Include any pap smears or colon screening. No      Discuss rash left hip     She denies any symptoms , reactions or allergies that would exclude them from being immunized today. Risks and adverse reactions were discussed and the VIS was given to them. All questions were addressed. She was observed for 15 min post injection. There were no reactions observed.     Aliyah Pate LPN

## 2022-07-27 ENCOUNTER — DOCUMENTATION ONLY (OUTPATIENT)
Dept: FAMILY MEDICINE CLINIC | Age: 52
End: 2022-07-27

## 2022-07-27 DIAGNOSIS — J96.11 CHRONIC RESPIRATORY FAILURE WITH HYPOXIA, ON HOME O2 THERAPY (HCC): Primary | ICD-10-CM

## 2022-07-27 DIAGNOSIS — R06.09 COVID-19 LONG HAULER MANIFESTING CHRONIC DYSPNEA: ICD-10-CM

## 2022-07-27 DIAGNOSIS — U09.9 COVID-19 LONG HAULER MANIFESTING CHRONIC DYSPNEA: ICD-10-CM

## 2022-07-27 DIAGNOSIS — R09.02 HYPOXIA: ICD-10-CM

## 2022-07-27 DIAGNOSIS — Z99.81 CHRONIC RESPIRATORY FAILURE WITH HYPOXIA, ON HOME O2 THERAPY (HCC): Primary | ICD-10-CM

## 2022-07-27 NOTE — PROGRESS NOTES
Physicians order for oxygen concentrator with demo,insurance card and notes faxed to [place] [id number]. Patient was advised.

## 2022-08-13 LAB
ALBUMIN SERPL-MCNC: 4.6 G/DL (ref 3.8–4.9)
ALBUMIN/GLOB SERPL: 1.8 {RATIO} (ref 1.2–2.2)
ALP SERPL-CCNC: 59 IU/L (ref 44–121)
ALT SERPL-CCNC: 9 IU/L (ref 0–32)
AST SERPL-CCNC: 13 IU/L (ref 0–40)
BILIRUB SERPL-MCNC: 1.2 MG/DL (ref 0–1.2)
BUN SERPL-MCNC: 14 MG/DL (ref 6–24)
BUN/CREAT SERPL: 16 (ref 9–23)
CALCIUM SERPL-MCNC: 9.5 MG/DL (ref 8.7–10.2)
CHLORIDE SERPL-SCNC: 102 MMOL/L (ref 96–106)
CHOLEST SERPL-MCNC: 250 MG/DL (ref 100–199)
CO2 SERPL-SCNC: 20 MMOL/L (ref 20–29)
CREAT SERPL-MCNC: 0.88 MG/DL (ref 0.57–1)
EGFR: 78 ML/MIN/1.73
GLOBULIN SER CALC-MCNC: 2.5 G/DL (ref 1.5–4.5)
GLUCOSE SERPL-MCNC: 79 MG/DL (ref 65–99)
HDLC SERPL-MCNC: 98 MG/DL
LDLC SERPL CALC-MCNC: 142 MG/DL (ref 0–99)
POTASSIUM SERPL-SCNC: 4.2 MMOL/L (ref 3.5–5.2)
PROT SERPL-MCNC: 7.1 G/DL (ref 6–8.5)
SODIUM SERPL-SCNC: 139 MMOL/L (ref 134–144)
TRIGL SERPL-MCNC: 63 MG/DL (ref 0–149)
TSH SERPL DL<=0.005 MIU/L-ACNC: 0.69 UIU/ML (ref 0.45–4.5)
VLDLC SERPL CALC-MCNC: 10 MG/DL (ref 5–40)

## 2022-08-15 DIAGNOSIS — E78.00 PURE HYPERCHOLESTEROLEMIA: Primary | ICD-10-CM

## 2022-08-15 RX ORDER — ATORVASTATIN CALCIUM 80 MG/1
80 TABLET, FILM COATED ORAL
Qty: 90 TABLET | Refills: 1 | Status: SHIPPED | OUTPATIENT
Start: 2022-08-15 | End: 2022-08-30 | Stop reason: ALTCHOICE

## 2022-08-17 ENCOUNTER — HOSPITAL ENCOUNTER (OUTPATIENT)
Dept: MAMMOGRAPHY | Age: 52
Discharge: HOME | End: 2022-08-17
Attending: FAMILY MEDICINE
Payer: COMMERCIAL

## 2022-08-17 DIAGNOSIS — Z12.31 ENCOUNTER FOR SCREENING MAMMOGRAM FOR MALIGNANT NEOPLASM OF BREAST: ICD-10-CM

## 2022-08-17 PROCEDURE — 77067 SCR MAMMO BI INCL CAD: CPT

## 2022-08-30 ENCOUNTER — VIRTUAL VISIT (OUTPATIENT)
Dept: FAMILY MEDICINE CLINIC | Age: 52
End: 2022-08-30
Payer: COMMERCIAL

## 2022-08-30 DIAGNOSIS — Z99.81 CHRONIC RESPIRATORY FAILURE WITH HYPOXIA, ON HOME O2 THERAPY (HCC): ICD-10-CM

## 2022-08-30 DIAGNOSIS — J96.11 CHRONIC RESPIRATORY FAILURE WITH HYPOXIA, ON HOME O2 THERAPY (HCC): ICD-10-CM

## 2022-08-30 DIAGNOSIS — U07.1 COVID-19: Primary | ICD-10-CM

## 2022-08-30 DIAGNOSIS — R06.09 COVID-19 LONG HAULER MANIFESTING CHRONIC DYSPNEA: ICD-10-CM

## 2022-08-30 DIAGNOSIS — U09.9 COVID-19 LONG HAULER MANIFESTING CHRONIC DYSPNEA: ICD-10-CM

## 2022-08-30 PROCEDURE — 99213 OFFICE O/P EST LOW 20 MIN: CPT | Performed by: FAMILY MEDICINE

## 2022-08-30 RX ORDER — ROSUVASTATIN CALCIUM 10 MG/1
10 TABLET, COATED ORAL DAILY
COMMUNITY
Start: 2022-08-24
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
## 2022-08-30 NOTE — PROGRESS NOTES
Chief Complaint   Patient presents with    Cough     And scratchy throat x 2 days    1. Have you been to the ER, urgent care clinic since your last visit? Hospitalized since your last visit? No    2. Have you seen or consulted any other health care providers outside of the [address] [place] [address] since your last visit? Include any pap smears or colon screening.  No

## 2022-08-31 ENCOUNTER — APPOINTMENT (OUTPATIENT)
Dept: CT IMAGING | Age: 52
End: 2022-08-31
Attending: PHYSICIAN ASSISTANT
Payer: COMMERCIAL

## 2022-08-31 ENCOUNTER — APPOINTMENT (OUTPATIENT)
Dept: GENERAL RADIOLOGY | Age: 52
End: 2022-08-31
Attending: PHYSICIAN ASSISTANT
Payer: COMMERCIAL

## 2022-08-31 ENCOUNTER — APPOINTMENT (OUTPATIENT)
Dept: ULTRASOUND IMAGING | Age: 52
End: 2022-08-31
Attending: PHYSICIAN ASSISTANT
Payer: COMMERCIAL

## 2022-08-31 ENCOUNTER — HOSPITAL ENCOUNTER (EMERGENCY)
Age: 52
Discharge: OTHER INSTITUTION | End: 2022-09-01
Attending: EMERGENCY MEDICINE
Payer: COMMERCIAL

## 2022-08-31 DIAGNOSIS — U07.1 COVID-19: ICD-10-CM

## 2022-08-31 DIAGNOSIS — R93.89 ABNORMAL COMPUTED TOMOGRAPHY ANGIOGRAPHY (CTA): ICD-10-CM

## 2022-08-31 DIAGNOSIS — I10 ESSENTIAL HYPERTENSION: ICD-10-CM

## 2022-08-31 DIAGNOSIS — J02.9 SORE THROAT: Primary | ICD-10-CM

## 2022-08-31 DIAGNOSIS — R49.0 VOICE HOARSENESS: ICD-10-CM

## 2022-08-31 LAB
ALBUMIN SERPL-MCNC: 3.7 G/DL (ref 3.5–5)
ALBUMIN/GLOB SERPL: 1.1 {RATIO} (ref 1.1–2.2)
ALP SERPL-CCNC: 58 U/L (ref 45–117)
ALT SERPL-CCNC: 38 U/L (ref 12–78)
ANION GAP SERPL CALC-SCNC: 7 MMOL/L (ref 5–15)
AST SERPL-CCNC: 24 U/L (ref 15–37)
BASOPHILS # BLD: 0 K/UL (ref 0–0.1)
BASOPHILS NFR BLD: 1 % (ref 0–1)
BILIRUB SERPL-MCNC: 1.6 MG/DL (ref 0.2–1)
BUN SERPL-MCNC: 16 MG/DL (ref 6–20)
BUN/CREAT SERPL: 16 (ref 12–20)
CALCIUM SERPL-MCNC: 9 MG/DL (ref 8.5–10.1)
CHLORIDE SERPL-SCNC: 104 MMOL/L (ref 97–108)
CO2 SERPL-SCNC: 27 MMOL/L (ref 21–32)
CREAT SERPL-MCNC: 0.97 MG/DL (ref 0.55–1.02)
DIFFERENTIAL METHOD BLD: ABNORMAL
EOSINOPHIL # BLD: 0 K/UL (ref 0–0.4)
EOSINOPHIL NFR BLD: 1 % (ref 0–7)
ERYTHROCYTE [DISTWIDTH] IN BLOOD BY AUTOMATED COUNT: 13.2 % (ref 11.5–14.5)
GLOBULIN SER CALC-MCNC: 3.4 G/DL (ref 2–4)
GLUCOSE SERPL-MCNC: 74 MG/DL (ref 65–100)
HCT VFR BLD AUTO: 41.8 % (ref 35–47)
HGB BLD-MCNC: 13.7 G/DL (ref 11.5–16)
IMM GRANULOCYTES # BLD AUTO: 0 K/UL (ref 0–0.04)
IMM GRANULOCYTES NFR BLD AUTO: 0 % (ref 0–0.5)
LYMPHOCYTES # BLD: 1.5 K/UL (ref 0.8–3.5)
LYMPHOCYTES NFR BLD: 38 % (ref 12–49)
MCH RBC QN AUTO: 31.1 PG (ref 26–34)
MCHC RBC AUTO-ENTMCNC: 32.8 G/DL (ref 30–36.5)
MCV RBC AUTO: 94.8 FL (ref 80–99)
MONOCYTES # BLD: 0.6 K/UL (ref 0–1)
MONOCYTES NFR BLD: 14 % (ref 5–13)
NEUTS SEG # BLD: 1.9 K/UL (ref 1.8–8)
NEUTS SEG NFR BLD: 46 % (ref 32–75)
NRBC # BLD: 0 K/UL (ref 0–0.01)
NRBC BLD-RTO: 0 PER 100 WBC
PLATELET # BLD AUTO: 246 K/UL (ref 150–400)
PMV BLD AUTO: 9.8 FL (ref 8.9–12.9)
POTASSIUM SERPL-SCNC: 3.9 MMOL/L (ref 3.5–5.1)
PROT SERPL-MCNC: 7.1 G/DL (ref 6.4–8.2)
RBC # BLD AUTO: 4.41 M/UL (ref 3.8–5.2)
SODIUM SERPL-SCNC: 138 MMOL/L (ref 136–145)
WBC # BLD AUTO: 4 K/UL (ref 3.6–11)

## 2022-08-31 PROCEDURE — 71045 X-RAY EXAM CHEST 1 VIEW: CPT

## 2022-08-31 PROCEDURE — 80053 COMPREHEN METABOLIC PANEL: CPT

## 2022-08-31 PROCEDURE — 71275 CT ANGIOGRAPHY CHEST: CPT

## 2022-08-31 PROCEDURE — 99285 EMERGENCY DEPT VISIT HI MDM: CPT

## 2022-08-31 PROCEDURE — 85025 COMPLETE CBC W/AUTO DIFF WBC: CPT

## 2022-08-31 PROCEDURE — 74011000636 HC RX REV CODE- 636: Performed by: INTERNAL MEDICINE

## 2022-08-31 PROCEDURE — 70491 CT SOFT TISSUE NECK W/DYE: CPT

## 2022-08-31 PROCEDURE — 93971 EXTREMITY STUDY: CPT

## 2022-08-31 PROCEDURE — 36415 COLL VENOUS BLD VENIPUNCTURE: CPT

## 2022-08-31 PROCEDURE — 74011000636 HC RX REV CODE- 636: Performed by: RADIOLOGY

## 2022-08-31 RX ORDER — HYDROCODONE BITARTRATE AND ACETAMINOPHEN 5; 325 MG/1; MG/1
1 TABLET ORAL
Qty: 12 TABLET | Refills: 0 | Status: SHIPPED | OUTPATIENT
Start: 2022-08-31 | End: 2022-09-01

## 2022-08-31 RX ADMIN — IOPAMIDOL 70 ML: 755 INJECTION, SOLUTION INTRAVENOUS at 18:11

## 2022-08-31 RX ADMIN — IOPAMIDOL 100 ML: 755 INJECTION, SOLUTION INTRAVENOUS at 21:20

## 2022-08-31 NOTE — Clinical Note
[address]  [facility] EMERGENCY DEPT  [address] [facility]  [place] [address]  [phone]    Work/School Note    Date: 8/31/2022    To Whom It May concern:      Beth Freed was seen and treated today in the emergency room by the following provider(s):  Attending Provider: Hosea Shah MD  Physician Assistant: SUNI Wilson. Beth Freed is excused from work/school on 08/31/22. She is clear to return to work/school on 09/01/22.         Sincerely,          SUNI Keyes
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
## 2022-09-01 VITALS
HEART RATE: 69 BPM | BODY MASS INDEX: 36.78 KG/M2 | SYSTOLIC BLOOD PRESSURE: 127 MMHG | HEIGHT: 66 IN | TEMPERATURE: 98.2 F | OXYGEN SATURATION: 94 % | DIASTOLIC BLOOD PRESSURE: 88 MMHG | RESPIRATION RATE: 18 BRPM | WEIGHT: 228.84 LBS

## 2022-09-01 NOTE — ED NOTES
TRANSFER - OUT REPORT:    Verbal report given to ELLIS Vargas on Keren  being transferred to [facility] ED for routine progression of care       Report consisted of patients Situation, Background, Assessment and   Recommendations(SBAR). Information from the following report(s) SBAR, ED Summary, and MAR was reviewed with the receiving nurse. Lines:       Opportunity for questions and clarification was provided. Patient transported with:  Self      Next labs due: No     The following personal items will be sent with the patient during transfer to the floor: All valuables:    Cardiac monitoring ordered: No     The following CURRENT information was reported to the receiving RN:    Code status: Prior at time of transfer    Last set of vital signs:  Vital Signs  Level of Consciousness: Alert (0) (09/01/22 0000)  Temp: 98.2 °F (36.8 °C) (09/01/22 0000)  Temp Source: Oral (09/01/22 0000)  Pulse (Heart Rate): 69 (09/01/22 0000)  Heart Rate Source: Monitor (09/01/22 0000)  Resp Rate: 18 (09/01/22 0000)  BP: 127/88 (09/01/22 0000)  MAP (Calculated): 101 (09/01/22 0000)  BP 1 Location: Right upper arm (09/01/22 0000)  BP 1 Method: Automatic (09/01/22 0000)  BP Patient Position: At rest (09/01/22 0000)  MEWS Score: 1 (09/01/22 0000)         Oxygen Therapy  O2 Sat (%): 94 % (09/01/22 0000)  O2 Device: None (09/01/22 0000)      Last pain assessment:  Pain 1  Pain Scale 1: Numeric (0 - 10)  Pain Intensity 1: 5  Pain Onset 1: yesterday  Pain Location 1: Neck (radiating to back)  Pain Description 1: Sore      Wounds: No     Urinary catheter: voiding  Is there a campbell order: No     LDAs:            Opportunity for questions and clarification was provided.     Negra Cole RN

## 2022-09-13 ENCOUNTER — OFFICE VISIT (OUTPATIENT)
Dept: FAMILY MEDICINE CLINIC | Age: 52
End: 2022-09-13
Payer: COMMERCIAL

## 2022-09-13 VITALS
HEIGHT: 66 IN | TEMPERATURE: 97.3 F | RESPIRATION RATE: 16 BRPM | WEIGHT: 230.8 LBS | SYSTOLIC BLOOD PRESSURE: 121 MMHG | HEART RATE: 88 BPM | DIASTOLIC BLOOD PRESSURE: 67 MMHG | BODY MASS INDEX: 37.09 KG/M2 | OXYGEN SATURATION: 98 %

## 2022-09-13 DIAGNOSIS — U07.1 COVID-19: ICD-10-CM

## 2022-09-13 DIAGNOSIS — Z99.81 CHRONIC RESPIRATORY FAILURE WITH HYPOXIA, ON HOME O2 THERAPY (HCC): ICD-10-CM

## 2022-09-13 DIAGNOSIS — J96.11 CHRONIC RESPIRATORY FAILURE WITH HYPOXIA, ON HOME O2 THERAPY (HCC): ICD-10-CM

## 2022-09-13 DIAGNOSIS — K51.919 ULCERATIVE COLITIS WITH COMPLICATION, UNSPECIFIED LOCATION (HCC): ICD-10-CM

## 2022-09-13 DIAGNOSIS — Z79.899 ENCOUNTER FOR LONG-TERM (CURRENT) USE OF MEDICATIONS: ICD-10-CM

## 2022-09-13 DIAGNOSIS — Z09 HOSPITAL DISCHARGE FOLLOW-UP: Primary | ICD-10-CM

## 2022-09-13 DIAGNOSIS — L40.50 PSORIATIC ARTHRITIS (HCC): ICD-10-CM

## 2022-09-13 DIAGNOSIS — J39.0 RETROPHARYNGEAL ABSCESS: ICD-10-CM

## 2022-09-13 PROCEDURE — 1111F DSCHRG MED/CURRENT MED MERGE: CPT | Performed by: FAMILY MEDICINE

## 2022-09-13 PROCEDURE — 99214 OFFICE O/P EST MOD 30 MIN: CPT | Performed by: FAMILY MEDICINE

## 2022-09-13 NOTE — PROGRESS NOTES
Chief Complaint   Patient presents with    Hospital Follow Up     Seen in ER @ ED [facility] on 8/31/22 , Transferred to [facility] Discharged 9/4/22      1. Have you been to the ER, urgent care clinic since your last visit? Hospitalized since your last visit? Yes Reason for visit: Sorethroat    2. Have you seen or consulted any other health care providers outside of the [address] [place] [address] since your last visit? Include any pap smears or colon screening.  Yes Where: [facility]

## 2022-09-13 NOTE — PROGRESS NOTES
Chief Complaint   Patient presents with    Hospital Follow Up     Seen in ER @ ED [facility] on 8/31/22 , Transferred to [facility] Discharged 9/4/22    1. Have you been to the ER, urgent care clinic since your last visit? Hospitalized since your last visit? Yes Where: [facility]    2. Have you seen or consulted any other health care providers outside of the [address] [place] [address] since your last visit? Include any pap smears or colon screening.  No

## 2022-09-13 NOTE — PROGRESS NOTES
Transitional Care Management Progress Note    Patient: Maura Santillan  : 1970  PCP: Rupal Christian MD      Admission Date: 2022   Discharge Date: 2022 4:28 PM    Patient was contacted by Transitional Care Management services within two days after her discharge: No. This encounter and supporting documentation was reviewed if available. Medication reconciliation was performed today (2022). Assessment/Plan: Doing much better after abx for retropharyngeal abscess. Planning for EGD and colonoscopy so checking labs today given ongoing mild pain in the right neck. Did test positive for COVID about 2 weeks ago but these symptoms have cleared and she did end up having 3 days of Paxlovid prior to hospitalization. Able to review the labs which showed normal CBC and CMP. As right neck pain has improved overall after treatment for retropharyngeal abscess, she should be able to proceed with EGD with dilation. Diagnoses and all orders for this visit:    1. Hospital discharge follow-up  -     IL DISCHARGE MEDS RECONCILED W/ CURRENT OUTPATIENT MED LIST  -     CBC WITH AUTOMATED DIFF; Future  -     METABOLIC PANEL, COMPREHENSIVE; Future    2. COVID-19    3. Chronic respiratory failure with hypoxia, on home O2 therapy ([facility]    4. Psoriatic arthritis ([facility]  -     CBC WITH AUTOMATED DIFF; Future  -     METABOLIC PANEL, COMPREHENSIVE; Future    5. Ulcerative colitis with complication, unspecified location ([facility]    6. Retropharyngeal abscess  -     CBC WITH AUTOMATED DIFF; Future  -     METABOLIC PANEL, COMPREHENSIVE; Future    7. Encounter for long-term (current) use of medications  -     CBC WITH AUTOMATED DIFF; Future  -     METABOLIC PANEL, COMPREHENSIVE; Future    The complexity of medical decision making for this patient's transitional care is moderate   Follow-up and Dispositions    Return in 1 month (on 10/13/2022), or if symptoms worsen or fail to improve.           Subjective:   Trish MARIE Natali is a 45 y.o. AAF with PMH sig for PsA, UC, Anxiety and depression, right leg DVT x 2, PUD, chronic hypoxia related to COVID 19, and obesity presenting today for follow-up after being discharged from [facility]. The discharge summary was reviewed or requested. The main problem requiring admission was odynophagia with retropharyngeal abscess. Throat is improving but still sore. Getting coughing spells episodically. Able to eat and chew ok. Feels like food is almost getting stuck if too big of a bite/not chewed well. Breathing was a little worse yesterday but stable overall. Purulent sputum much better. Went on IV zosyn and then PO augmentin and discharged - finished Augmentin on 9/11/22. Feels better overall and has EGD set up for 9/15/22 with GI. Seen in ER on 9/1/2022 for throat swelling and pain. Had recently tested positive for COVID and put on Paxlovid by me - took 3 days worth prior to admission. Reviewed notes from Dr. Rubia Lea as part of [facility] care clinic visit through [facility]:    Imaging at OSH \"was suspicious for retropharyngeal abscess with possible perforation of the esophagus and the patient was accepted by thoracic surgery at [facility]. Upon arrival to the emergency department at [facility] patient was seen by thoracic surgery and deemed stable and admitted to the CDU for possible retropharyngeal infection and initially treated with IV Zosyn that was then transitioned to p.o. Augmentin and the patient was discharged home. \"    Reviewed notes from hospitalist discharge summary as well with highlights as follows: \"Thoracic surgery and ENT were both consulted and evaluated patient. UGI study showed no evidence of esophageal leak and thoracic surgery signed off given low suspicion for esophageal perforation as the cause. ENT was able to scope patient in the ED and findings were benign.  After three doses of IV antibiotics, patient is feeling improved and has been able to tolerate and soft diet as well as oral medications. She was transitioned to Augmentin and cleared for discharge by ENT. \"    There were also concerns that symptoms may be related to port but IR was consulted and further investigation revealed no concerns about port. Medications marked \"taking\" at this time:  Home Medications    Medication Sig Start Date End Date Taking? Authorizing Provider   rosuvastatin (CRESTOR) 10 mg tablet Take 10 mg by mouth daily. 8/24/22  Yes Provider, Historical   ipratropium (ATROVENT) 42 mcg (0.06 %) nasal spray  6/11/22  Yes Provider, Historical   ondansetron hcl (Zofran) 4 mg tablet Take 1 Tablet by mouth every eight (8) hours as needed for Nausea or Vomiting. 6/19/22  Yes Adriana Keller MD   diclofenac (VOLTAREN) 1 % gel Apply 2 g to affected area four (4) times daily. 5/24/22  Yes Calvin Bains MD   semaglutide (Ozempic) 1 mg/dose (4 mg/3 mL) pnij 1 mg by SubCUTAneous route every seven (7) days. 5/19/22  Yes Elise Hay MD   phentermine (ADIPEX-P) 37.5 mg tablet TAKE 1 TABLET BY MOUTH BEFORE BREAKFAST 5/19/22  Yes Elise Hay MD   pantoprazole (PROTONIX) 40 mg tablet Take 1 tablet by mouth once daily 3/19/22  Yes Calvin Bains MD   escitalopram oxalate (LEXAPRO) 20 mg tablet Take 1 tablet by mouth once daily 3/19/22  Yes Calvin Bains MD   rivaroxaban (Xarelto) 20 mg tab tablet Take 1 Tablet by mouth daily. 2/21/22  Yes Calvin Bains MD   hydrALAZINE (APRESOLINE) 25 mg tablet Take 25 mg by mouth. Only if BP is above 160 on top 12/14/21  Yes Provider, Historical   spironolactone (ALDACTONE) 25 mg tablet Take 25 mg by mouth daily. 11/2/21  Yes Provider, Historical   Aerochamber Max with Flow-VU  10/12/21  Yes Provider, Historical   Symbicort 160-4.5 mcg/actuation HFAA 1 Puff two (2) times a day.  10/8/21  Yes Provider, Historical   famotidine (PEPCID) 40 mg tablet TAKE 1 TABLET BY MOUTH TWICE DAILY AS DIRECTED AND AS NEEDED 8/18/21  Yes Provider, Historical   amitriptyline (ELAVIL) 10 mg tablet Take 1 Tab by mouth nightly. 4/21/21  Yes Varun Vogel MD   folic acid (FOLVITE) 1 mg tablet TAKE 1 TABLET BY MOUTH ONCE A DAY  Patient taking differently: 2 mg. TAKE 1 TABLET BY MOUTH ONCE A DAY 3/19/21  Yes Varun Vogel MD   furosemide (LASIX) 40 mg tablet TAKE 1 TABLET BY MOUTH TWICE DAILY AS NEEDED 8/10/20  Yes Varun Vogel MD   methotrexate (RHEUMATREX) 2.5 mg tablet 6 tablets once weekly 11/21/17  Yes Provider, Historical   albuterol (PROVENTIL HFA, VENTOLIN HFA, PROAIR HFA) 90 mcg/actuation inhaler Take 1 Puff by inhalation every four (4) hours as needed for Wheezing. 3/8/16  Yes Varun Vogel MD   nirmatrelvir-ritonavir (Paxlovid, EUA,) 300 mg (150 mg x 2)-100 mg Take 3 Tablets by mouth every twelve (12) hours. 8/30/22 9/13/22  Varun Vogel MD      ROS per HPI    Objective:   /67 (BP 1 Location: Left upper arm, BP Patient Position: Sitting, BP Cuff Size: Large adult)   Pulse 88   Temp 97.3 °F (36.3 °C) (Temporal)   Resp 16   Ht 5' 5.5\" (1.664 m)   Wt 230 lb 12.8 oz (104.7 kg)   LMP  (LMP Unknown) Comment: IUD  SpO2 98%   BMI 37.82 kg/m²      Physical Exam  General appearance - alert, well appearing, and in no distress, using O2  Eyes -sclera anicteric  Neck - supple, no significant adenopathy, no thyromegaly  Mouth -moist mucous membranes, no apparent retropharyngeal abscess, no significant adenopathy but  to palpation over right cervical chain and SCM  Chest - clear to auscultation, no wheezes, rales or rhonchi, symmetric air entry  Heart - normal rate, regular rhythm, normal S1, S2, no murmurs, rubs, clicks or gallops  Neurological - alert, oriented, normal speech, no focal findings or movement disorder noted  Extr - no edema  Psych - normal mood and affect    We discussed the expected course, resolution and complications of the diagnosis(es) in detail.   Medication risks, benefits, costs, interactions, and alternatives were discussed as indicated. I advised her to contact the office if her condition worsens, changes or fails to improve as anticipated. She expressed understanding with the diagnosis(es) and plan.      Snehal Narvaez MD

## 2022-09-14 LAB
ALBUMIN SERPL-MCNC: 4.2 G/DL (ref 3.8–4.9)
ALBUMIN/GLOB SERPL: 1.7 {RATIO} (ref 1.2–2.2)
ALP SERPL-CCNC: 54 IU/L (ref 44–121)
ALT SERPL-CCNC: 14 IU/L (ref 0–32)
AST SERPL-CCNC: 13 IU/L (ref 0–40)
BASOPHILS # BLD AUTO: 0 X10E3/UL (ref 0–0.2)
BASOPHILS NFR BLD AUTO: 1 %
BILIRUB SERPL-MCNC: 1.1 MG/DL (ref 0–1.2)
BUN SERPL-MCNC: 12 MG/DL (ref 6–24)
BUN/CREAT SERPL: 13 (ref 9–23)
CALCIUM SERPL-MCNC: 9.7 MG/DL (ref 8.7–10.2)
CHLORIDE SERPL-SCNC: 102 MMOL/L (ref 96–106)
CO2 SERPL-SCNC: 25 MMOL/L (ref 20–29)
CREAT SERPL-MCNC: 0.94 MG/DL (ref 0.57–1)
EGFR: 72 ML/MIN/1.73
EOSINOPHIL # BLD AUTO: 0 X10E3/UL (ref 0–0.4)
EOSINOPHIL NFR BLD AUTO: 1 %
ERYTHROCYTE [DISTWIDTH] IN BLOOD BY AUTOMATED COUNT: 13.2 % (ref 11.7–15.4)
GLOBULIN SER CALC-MCNC: 2.5 G/DL (ref 1.5–4.5)
GLUCOSE SERPL-MCNC: 80 MG/DL (ref 65–99)
HCT VFR BLD AUTO: 39 % (ref 34–46.6)
HGB BLD-MCNC: 12.8 G/DL (ref 11.1–15.9)
IMM GRANULOCYTES NFR BLD AUTO: 0 %
LYMPHOCYTES # BLD AUTO: 1.7 X10E3/UL (ref 0.7–3.1)
LYMPHOCYTES NFR BLD AUTO: 36 %
MCH RBC QN AUTO: 30.8 PG (ref 26.6–33)
MCHC RBC AUTO-ENTMCNC: 32.8 G/DL (ref 31.5–35.7)
MCV RBC AUTO: 94 FL (ref 79–97)
MONOCYTES # BLD AUTO: 0.5 X10E3/UL (ref 0.1–0.9)
MONOCYTES NFR BLD AUTO: 10 %
NEUTROPHILS # BLD AUTO: 2.6 X10E3/UL (ref 1.4–7)
NEUTROPHILS NFR BLD AUTO: 52 %
PLATELET # BLD AUTO: 387 X10E3/UL (ref 150–450)
POTASSIUM SERPL-SCNC: 4.1 MMOL/L (ref 3.5–5.2)
PROT SERPL-MCNC: 6.7 G/DL (ref 6–8.5)
RBC # BLD AUTO: 4.15 X10E6/UL (ref 3.77–5.28)
SODIUM SERPL-SCNC: 138 MMOL/L (ref 134–144)
WBC # BLD AUTO: 4.9 X10E3/UL (ref 3.4–10.8)

## 2022-09-15 ENCOUNTER — ANESTHESIA EVENT (OUTPATIENT)
Dept: ENDOSCOPY | Age: 52
End: 2022-09-15

## 2022-09-15 ENCOUNTER — ANESTHESIA (OUTPATIENT)
Dept: ENDOSCOPY | Age: 52
End: 2022-09-15

## 2022-09-15 ENCOUNTER — HOSPITAL ENCOUNTER (OUTPATIENT)
Age: 52
Setting detail: OUTPATIENT SURGERY
Discharge: HOME | End: 2022-09-15
Attending: INTERNAL MEDICINE | Admitting: INTERNAL MEDICINE

## 2022-09-15 VITALS
HEIGHT: 65 IN | RESPIRATION RATE: 16 BRPM | TEMPERATURE: 97 F | OXYGEN SATURATION: 98 % | SYSTOLIC BLOOD PRESSURE: 149 MMHG | DIASTOLIC BLOOD PRESSURE: 79 MMHG | WEIGHT: 229 LBS | BODY MASS INDEX: 38.15 KG/M2 | HEART RATE: 74 BPM

## 2022-09-15 PROCEDURE — C1726 CATH, BAL DIL, NON-VASCULAR: HCPCS | Performed by: INTERNAL MEDICINE

## 2022-09-15 PROCEDURE — 88305 TISSUE EXAM BY PATHOLOGIST: CPT

## 2022-09-15 PROCEDURE — 74011000250 HC RX REV CODE- 250: Performed by: ANESTHESIOLOGY

## 2022-09-15 PROCEDURE — 77030019988 HC FCPS ENDOSC DISP BSC -B: Performed by: INTERNAL MEDICINE

## 2022-09-15 PROCEDURE — 2709999900 HC NON-CHARGEABLE SUPPLY: Performed by: INTERNAL MEDICINE

## 2022-09-15 PROCEDURE — 76060000032 HC ANESTHESIA 0.5 TO 1 HR: Performed by: INTERNAL MEDICINE

## 2022-09-15 PROCEDURE — 77030039825 HC MSK NSL PAP SUPERNO2VA VYRM -B: Performed by: ANESTHESIOLOGY

## 2022-09-15 PROCEDURE — 77030021593 HC FCPS BIOP ENDOSC BSC -A: Performed by: INTERNAL MEDICINE

## 2022-09-15 PROCEDURE — 77030018712 HC DEV BLLN INFL BSC -B: Performed by: INTERNAL MEDICINE

## 2022-09-15 PROCEDURE — 74011250636 HC RX REV CODE- 250/636: Performed by: INTERNAL MEDICINE

## 2022-09-15 PROCEDURE — 76040000007: Performed by: INTERNAL MEDICINE

## 2022-09-15 PROCEDURE — 74011250636 HC RX REV CODE- 250/636: Performed by: ANESTHESIOLOGY

## 2022-09-15 RX ORDER — FLUMAZENIL 0.1 MG/ML
0.2 INJECTION INTRAVENOUS
Status: DISCONTINUED | OUTPATIENT
Start: 2022-09-15 | End: 2022-09-15 | Stop reason: HOSPADM

## 2022-09-15 RX ORDER — SODIUM CHLORIDE 0.9 % (FLUSH) 0.9 %
5-40 SYRINGE (ML) INJECTION EVERY 8 HOURS
Status: DISCONTINUED | OUTPATIENT
Start: 2022-09-15 | End: 2022-09-15 | Stop reason: HOSPADM

## 2022-09-15 RX ORDER — SODIUM CHLORIDE 0.9 % (FLUSH) 0.9 %
5-40 SYRINGE (ML) INJECTION AS NEEDED
Status: DISCONTINUED | OUTPATIENT
Start: 2022-09-15 | End: 2022-09-15 | Stop reason: HOSPADM

## 2022-09-15 RX ORDER — PROPOFOL 10 MG/ML
INJECTION, EMULSION INTRAVENOUS AS NEEDED
Status: DISCONTINUED | OUTPATIENT
Start: 2022-09-15 | End: 2022-09-15 | Stop reason: HOSPADM

## 2022-09-15 RX ORDER — DEXTROMETHORPHAN/PSEUDOEPHED 2.5-7.5/.8
1.2 DROPS ORAL
Status: DISCONTINUED | OUTPATIENT
Start: 2022-09-15 | End: 2022-09-15 | Stop reason: HOSPADM

## 2022-09-15 RX ORDER — ATROPINE SULFATE 0.1 MG/ML
0.5 INJECTION INTRAVENOUS
Status: DISCONTINUED | OUTPATIENT
Start: 2022-09-15 | End: 2022-09-15 | Stop reason: HOSPADM

## 2022-09-15 RX ORDER — SODIUM CHLORIDE 9 MG/ML
75 INJECTION, SOLUTION INTRAVENOUS CONTINUOUS
Status: DISCONTINUED | OUTPATIENT
Start: 2022-09-15 | End: 2022-09-15 | Stop reason: HOSPADM

## 2022-09-15 RX ORDER — LIDOCAINE HYDROCHLORIDE 20 MG/ML
INJECTION, SOLUTION EPIDURAL; INFILTRATION; INTRACAUDAL; PERINEURAL AS NEEDED
Status: DISCONTINUED | OUTPATIENT
Start: 2022-09-15 | End: 2022-09-15 | Stop reason: HOSPADM

## 2022-09-15 RX ORDER — NALOXONE HYDROCHLORIDE 0.4 MG/ML
0.4 INJECTION, SOLUTION INTRAMUSCULAR; INTRAVENOUS; SUBCUTANEOUS
Status: DISCONTINUED | OUTPATIENT
Start: 2022-09-15 | End: 2022-09-15 | Stop reason: HOSPADM

## 2022-09-15 RX ORDER — GLYCOPYRROLATE 0.2 MG/ML
INJECTION INTRAMUSCULAR; INTRAVENOUS AS NEEDED
Status: DISCONTINUED | OUTPATIENT
Start: 2022-09-15 | End: 2022-09-15 | Stop reason: HOSPADM

## 2022-09-15 RX ORDER — FENTANYL CITRATE 50 UG/ML
25 INJECTION, SOLUTION INTRAMUSCULAR; INTRAVENOUS
Status: DISCONTINUED | OUTPATIENT
Start: 2022-09-15 | End: 2022-09-15 | Stop reason: HOSPADM

## 2022-09-15 RX ORDER — MIDAZOLAM HYDROCHLORIDE 1 MG/ML
.25-5 INJECTION, SOLUTION INTRAMUSCULAR; INTRAVENOUS
Status: DISCONTINUED | OUTPATIENT
Start: 2022-09-15 | End: 2022-09-15 | Stop reason: HOSPADM

## 2022-09-15 RX ORDER — EPINEPHRINE 0.1 MG/ML
1 INJECTION INTRACARDIAC; INTRAVENOUS
Status: DISCONTINUED | OUTPATIENT
Start: 2022-09-15 | End: 2022-09-15 | Stop reason: HOSPADM

## 2022-09-15 RX ADMIN — LIDOCAINE HYDROCHLORIDE 100 MG: 20 INJECTION, SOLUTION EPIDURAL; INFILTRATION; INTRACAUDAL; PERINEURAL at 12:21

## 2022-09-15 RX ADMIN — PROPOFOL 300 MG: 10 INJECTION, EMULSION INTRAVENOUS at 12:44

## 2022-09-15 RX ADMIN — SODIUM CHLORIDE 75 ML/HR: 9 INJECTION, SOLUTION INTRAVENOUS at 11:55

## 2022-09-15 RX ADMIN — GLYCOPYRROLATE 0.2 MG: 0.2 INJECTION, SOLUTION INTRAMUSCULAR; INTRAVENOUS at 12:21

## 2022-09-15 NOTE — DISCHARGE INSTRUCTIONS
Arian Distance  [id number]  1970    EGD/Pouchoscopy DISCHARGE INSTRUCTIONS  Discomfort:  Redness at IV site- apply warm compress to area; if redness or soreness persist- contact your physician  There may be a slight amount of blood passed from the rectum  Gaseous discomfort- walking, belching will help relieve any discomfort  You may not operate a vehicle for 12 hours  You may not engage in an occupation involving machinery or appliances for rest of today  You may not drink alcoholic beverages for at least 12 hours  Avoid making any critical decisions for at least 24 hour  DIET:   Regular diet. - however -  remember your colon is empty and a heavy meal will produce gas. Avoid these foods:  vegetables, fried / greasy foods, carbonated drinks for today  MEDICATION:         ACTIVITY:  You may not resume your normal daily activities until tomorrow AM; it is recommended that you spend the remainder of the day resting -  avoid any strenuous activity. CALL M.D.   ANY SIGN OF:   Increasing pain, nausea, vomiting  Abdominal distension (swelling)  New increased bleeding (oral or rectal)  Fever (chills)  Pain in chest area  Bloody discharge from nose or mouth  Shortness of breath    IMPRESSION:  -Normal esophageal mucosa without masses, polyps, or stricture; biopsied at gastroesophageal junction, followed by balloon dilatation with 18mm balloon held for 1 minute with endoscopic evaluation afterwards  -Normal stomach mucosa  -Normal duodenal mucosa  -Normal appearing ileoanal pouch without active inflammation; biopsies obtained  -Small internal hemorrhoids    Follow-up Instructions:   Call Dr. Aleksandr Allen for the results of procedure / biopsy in 7-10 days  Telephone # [phone]  Repeat pouchoscopy in 3 years    Selwyn Villaseñor MD

## 2022-09-15 NOTE — ROUTINE PROCESS
TRANSFER - IN REPORT:    Verbal report received from Iván (name) on [address]  being received from [facility](unit) for routine progression of care      Report consisted of patients Situation, Background, Assessment and   Recommendations(SBAR). Information from the following report(s) SBAR, Procedure Summary, and MAR was reviewed with the receiving nurse. Opportunity for questions and clarification was provided. Assessment completed upon patients arrival to unit and care assumed.

## 2022-09-15 NOTE — PROCEDURES
NAME:  Todd Thomason   :   1970   MRN:   [id number]     Date/Time:  9/15/2022 12:46 PM    Esophagogastroduodenoscopy (EGD) Procedure Note    Procedure: Esophagogastroduodenoscopy with biopsy, esophageal dilation with balloon    Indication:  GERD, Dyspahgia  Pre-operative Diagnosis: see indication above  Post-operative Diagnosis: see findings below  :  Merlin Coop, MD  Referring Provider:   Saran Oviedo MD    Exam:  Airway: clear, no airway problems anticipated  Heart: RRR, without gallops or rubs  Lungs: clear bilaterally without wheezes, crackles, or rhonchi  Abdomen: soft, nontender, nondistended, bowel sounds present  Mental Status: awake, alert and oriented to person, place and time     Anethesia/Sedation:  MAC anesthesia Propofol as per colonoscopy  Procedure Details   After informed consent was obtained for the procedure, with all risks and benefits of procedure explained the patient was taken to the endoscopy suite and placed in the left lateral decubitus position. Following sequential administration of sedation as per above, the [id number] gastroscope was inserted into the mouth and advanced under direct vision to second portion of the duodenum. A careful inspection was made as the gastroscope was withdrawn, including a retroflexed view of the proximal stomach; findings and interventions are described below. Findings:    -Normal esophageal mucosa without masses, polyps, or stricture; biopsied at gastroesophageal junction, followed by balloon dilatation with 18mm balloon held for 1 minute with endoscopic evaluation afterwards  -Normal stomach mucosa  -Normal duodenal mucosa    Therapies:  esophageal dilation with 18mm sized balloon; biopsy of esophagus  Specimens: #1 g-e junction  EBL:  None. Complications:   None; patient tolerated the procedure well.            Impression:    -Normal esophageal mucosa without masses, polyps, or stricture; biopsied at gastroesophageal junction, followed by balloon dilatation with 18mm balloon held for 1 minute with endoscopic evaluation afterwards  -Normal stomach mucosa  -Normal duodenal mucosa    Recommendations:  -Continue acid suppression. , -Await pathology.     Discharge disposition:  Home in the company of  when able to ambulate after colonoscopy completed    Lyn Tamez MD
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
## 2022-09-15 NOTE — PERIOP NOTES
1228: CRE balloon dilatation of the esophagus   18 mm Balloon inflated to 7 ATMs and held for 60 seconds. 1229: No subcutaneous crepitus of the chest or cervical region was noted post dilatation. 1243: See anesthesia note and MAR for medications given during procedure. Received report from anesthesia staff on vital signs and status of patient.      Endoscope was pre-cleaned at the bedside immediately following procedure by LT, ET

## 2022-09-15 NOTE — PROCEDURES
NAME:  Ranjan Quinones   :   1970   MRN:   [id number]     Date/Time:  9/15/2022 12:51 PM    Colonoscopy Operative Report    Procedure Type:   Pouchoscopy with biopsy     Indications:    Ulcerative Colitis  s/p ileoanal pouch   Pre-operative Diagnosis: see indication above  Post-operative Diagnosis:  See findings below  :  Jack Truong MD  Referring Provider: --Alia Arreguin MD    Exam:  Airway: clear, no airway problems anticipated  Heart: RRR, without gallops or rubs  Lungs: clear bilaterally without wheezes, crackles, or rhonchi  Abdomen: soft, nontender, nondistended, bowel sounds present  Mental Status: awake, alert and oriented to person, place and time    Sedation:  MAC anesthesia Propofol IV  Procedure Details:  After informed consent was obtained with all risks and benefits of procedure explained and preoperative exam completed, the patient was taken to the endoscopy suite and placed in the left lateral decubitus position. Upon sequential sedation as per above, a digital rectal exam was performed demonstrating small internal hemorrhoids. The Olympus videocolonoscope  was inserted in the rectum and carefully advanced to a distance of 60cm into the ileum thought the pouch. The quality of preparation was adequate. The colonoscope was slowly withdrawn with careful evaluation between folds. Retroflexion in the pouch was completed demonstrating small internal hemorrhoids. Findings:     -Normal appearing ileoanal pouch without active inflammation; biopsies obtained  -Small internal hemorrhoids    Specimen Removed:  #2 pouch  Complications: None. EBL:  None. Impression:    -Normal appearing ileoanal pouch without active inflammation; biopsies obtained  -Small internal hemorrhoids    Recommendations: --Await pathology. , -Repeat pouchoscopy in 3 years. Regular diet. Resume normal medication(s). You will receive a letter about the biopsy results in about 10 days.   You may be asked to call your doctor's office for the results. Discharge Disposition:  Home in the company of a  when able to ambulate.     Debbe Duane, MD

## 2022-09-15 NOTE — ANESTHESIA PREPROCEDURE EVALUATION
Anesthetic History   No history of anesthetic complications            Review of Systems / Medical History  Patient summary reviewed, nursing notes reviewed and pertinent labs reviewed    Pulmonary            Asthma        Neuro/Psych         Psychiatric history ( anxiety d/o)     Cardiovascular  Within defined limits                Exercise tolerance: >4 METS  Comments: Hypercoagulable  On Eliquis (stopped x 3 days)    H/o multiple DVT  Has post thrombotic syndrome right leg   GI/Hepatic/Renal     GERD ( started back on meds)      PUD    Comments: Ulcerative colitis, s/p multiple surgeries Endo/Other        Morbid obesity and arthritis     Other Findings   Comments: Psoriatic arthritis; controlled  On supplemental Oxygen due to Covid infection 2021.            Physical Exam    Airway  Mallampati: II  TM Distance: 4 - 6 cm  Neck ROM: normal range of motion   Mouth opening: Normal     Cardiovascular    Rhythm: regular  Rate: normal         Dental    Dentition: Caps/crowns     Pulmonary  Breath sounds clear to auscultation               Abdominal  GI exam deferred       Other Findings            Anesthetic Plan    ASA: 3  Anesthesia type: general and total IV anesthesia          Induction: Intravenous  Anesthetic plan and risks discussed with: Patient      Propofol MAC/Supernova

## 2022-09-16 NOTE — ANESTHESIA POSTPROCEDURE EVALUATION
Procedure(s):  ESOPHAGOGASTRODUODENOSCOPY (EGD)  COLONOSCOPY  ESOPHAGOGASTRODUODENAL (EGD) BIOPSY  ESOPHAGEAL DILATION  COLON BIOPSY. total IV anesthesia    Anesthesia Post Evaluation        Patient location during evaluation: PACU  Note status: Adequate. Level of consciousness: responsive to verbal stimuli and sleepy but conscious  Pain management: satisfactory to patient  Airway patency: patent  Anesthetic complications: no  Cardiovascular status: acceptable  Respiratory status: acceptable  Hydration status: acceptable  Comments: +Post-Anesthesia Evaluation and Assessment    Patient: Adelia Barron MRN: [id number]  SSN: [id number]   YOB: 1970  Age: 45 y.o. Sex: female      Cardiovascular Function/Vital Signs    BP (!) 149/79   Pulse 74   Temp 36.1 °C (97 °F)   Resp 16   Ht 5' 5\" (1.651 m)   Wt 103.9 kg (229 lb)   SpO2 98%   Breastfeeding No   BMI 38.11 kg/m²     Patient is status post Procedure(s):  ESOPHAGOGASTRODUODENOSCOPY (EGD)  COLONOSCOPY  ESOPHAGOGASTRODUODENAL (EGD) BIOPSY  ESOPHAGEAL DILATION  COLON BIOPSY. Nausea/Vomiting: Controlled. Postoperative hydration reviewed and adequate. Pain:  Pain Scale 1: Numeric (0 - 10) (09/15/22 1309)  Pain Intensity 1: 0 (09/15/22 1309)   Managed. Neurological Status: At baseline. Mental Status and Level of Consciousness: Arousable. Pulmonary Status:   O2 Device: None (Room air) (09/15/22 1309)   Adequate oxygenation and airway patent. Complications related to anesthesia: None    Post-anesthesia assessment completed. No concerns. Signed By: Elin Choi DO    9/16/2022  Post anesthesia nausea and vomiting:  controlled      INITIAL Post-op Vital signs:   Vitals Value Taken Time   /79 09/15/22 1309   Temp 36.1 °C (97 °F) 09/15/22 1254   Pulse 89 09/15/22 1337   Resp 21 09/15/22 1337   SpO2 98 % 09/15/22 1337   Vitals shown include unvalidated device data.

## 2022-10-14 ENCOUNTER — OFFICE VISIT (OUTPATIENT)
Dept: FAMILY MEDICINE CLINIC | Age: 52
End: 2022-10-14
Payer: COMMERCIAL

## 2022-10-14 VITALS
OXYGEN SATURATION: 100 % | HEIGHT: 65 IN | WEIGHT: 234 LBS | RESPIRATION RATE: 18 BRPM | SYSTOLIC BLOOD PRESSURE: 115 MMHG | HEART RATE: 74 BPM | DIASTOLIC BLOOD PRESSURE: 65 MMHG | TEMPERATURE: 97.1 F | BODY MASS INDEX: 38.99 KG/M2

## 2022-10-14 DIAGNOSIS — K51.919 ULCERATIVE COLITIS WITH COMPLICATION, UNSPECIFIED LOCATION (HCC): ICD-10-CM

## 2022-10-14 DIAGNOSIS — I87.001 POST-THROMBOTIC SYNDROME OF RIGHT LOWER EXTREMITY: ICD-10-CM

## 2022-10-14 DIAGNOSIS — R06.09 COVID-19 LONG HAULER MANIFESTING CHRONIC DYSPNEA: ICD-10-CM

## 2022-10-14 DIAGNOSIS — R60.0 BILATERAL LOWER EXTREMITY EDEMA: ICD-10-CM

## 2022-10-14 DIAGNOSIS — L40.50 PSORIATIC ARTHRITIS (HCC): Primary | ICD-10-CM

## 2022-10-14 DIAGNOSIS — U09.9 COVID-19 LONG HAULER MANIFESTING CHRONIC DYSPNEA: ICD-10-CM

## 2022-10-14 DIAGNOSIS — D84.9 IMMUNOSUPPRESSED STATUS (HCC): ICD-10-CM

## 2022-10-14 DIAGNOSIS — D68.59 HYPERCOAGULABLE STATE (HCC): ICD-10-CM

## 2022-10-14 PROCEDURE — 99213 OFFICE O/P EST LOW 20 MIN: CPT | Performed by: FAMILY MEDICINE

## 2022-10-14 RX ORDER — HYDROCODONE BITARTRATE AND ACETAMINOPHEN 5; 325 MG/1; MG/1
1 TABLET ORAL
Qty: 30 TABLET | Refills: 0 | Status: SHIPPED | OUTPATIENT
Start: 2022-10-14 | End: 2022-10-19

## 2022-10-14 RX ORDER — GABAPENTIN 300 MG/1
300 CAPSULE ORAL
Qty: 30 CAPSULE | Refills: 0 | Status: SHIPPED | OUTPATIENT
Start: 2022-10-14

## 2022-10-14 NOTE — PROGRESS NOTES
Chief Complaint   Patient presents with    Follow-up     Discuss leg swelling/Right    1. Have you been to the ER, urgent care clinic since your last visit? Hospitalized since your last visit? No    2. Have you seen or consulted any other health care providers outside of the [address] [place] [address] since your last visit? Include any pap smears or colon screening.  No
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
## 2022-10-16 DIAGNOSIS — F32.A ANXIETY AND DEPRESSION: ICD-10-CM

## 2022-10-16 DIAGNOSIS — F51.02 ADJUSTMENT INSOMNIA: ICD-10-CM

## 2022-10-16 DIAGNOSIS — F41.9 ANXIETY AND DEPRESSION: ICD-10-CM

## 2022-10-17 RX ORDER — ESCITALOPRAM OXALATE 20 MG/1
TABLET ORAL
Qty: 90 TABLET | Refills: 0 | Status: SHIPPED | OUTPATIENT
Start: 2022-10-17

## 2022-10-19 ENCOUNTER — OFFICE VISIT (OUTPATIENT)
Dept: ENDOCRINOLOGY | Age: 52
End: 2022-10-19
Payer: COMMERCIAL

## 2022-10-19 VITALS
SYSTOLIC BLOOD PRESSURE: 124 MMHG | DIASTOLIC BLOOD PRESSURE: 78 MMHG | HEIGHT: 65 IN | BODY MASS INDEX: 38.55 KG/M2 | WEIGHT: 231.4 LBS | HEART RATE: 73 BPM

## 2022-10-19 DIAGNOSIS — R73.02 IGT (IMPAIRED GLUCOSE TOLERANCE): ICD-10-CM

## 2022-10-19 DIAGNOSIS — E66.01 MORBID OBESITY, UNSPECIFIED OBESITY TYPE (HCC): ICD-10-CM

## 2022-10-19 DIAGNOSIS — E66.01 CLASS 2 SEVERE OBESITY WITH SERIOUS COMORBIDITY AND BODY MASS INDEX (BMI) OF 39.0 TO 39.9 IN ADULT, UNSPECIFIED OBESITY TYPE (HCC): Primary | ICD-10-CM

## 2022-10-19 PROCEDURE — 99214 OFFICE O/P EST MOD 30 MIN: CPT | Performed by: INTERNAL MEDICINE

## 2022-10-19 RX ORDER — PHENTERMINE HYDROCHLORIDE 37.5 MG/1
TABLET ORAL
Qty: 90 TABLET | Refills: 2 | Status: SHIPPED | OUTPATIENT
Start: 2022-10-19

## 2022-10-19 RX ORDER — SEMAGLUTIDE 1.34 MG/ML
1 INJECTION, SOLUTION SUBCUTANEOUS
Qty: 1 EACH | Refills: 5 | Status: SHIPPED | OUTPATIENT
Start: 2022-10-19

## 2022-10-19 NOTE — LETTER
10/19/2022    Patient: Bon Cordero   YOB: 1970   Date of Visit: 10/19/2022     Vivek Milesharleykasi [id number]  Via In Basket    Dear Aly Boyer MD,      Thank you for referring Ms. Tre Anderson to [facility] for evaluation. My notes for this consultation are attached. If you have questions, please do not hesitate to call me. I look forward to following your patient along with you.       Sincerely,    Jj Hart MD
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
## 2022-10-27 ENCOUNTER — OFFICE VISIT (OUTPATIENT)
Dept: FAMILY MEDICINE CLINIC | Age: 52
End: 2022-10-27

## 2022-10-27 VITALS
WEIGHT: 227.4 LBS | SYSTOLIC BLOOD PRESSURE: 114 MMHG | DIASTOLIC BLOOD PRESSURE: 76 MMHG | BODY MASS INDEX: 37.89 KG/M2 | HEART RATE: 78 BPM | HEIGHT: 65 IN | OXYGEN SATURATION: 96 % | RESPIRATION RATE: 20 BRPM | TEMPERATURE: 97.9 F

## 2022-10-27 DIAGNOSIS — L40.50 PSORIATIC ARTHRITIS (HCC): Primary | ICD-10-CM

## 2022-10-27 DIAGNOSIS — U09.9 COVID-19 LONG HAULER MANIFESTING CHRONIC DYSPNEA: ICD-10-CM

## 2022-10-27 DIAGNOSIS — F51.02 ADJUSTMENT INSOMNIA: ICD-10-CM

## 2022-10-27 DIAGNOSIS — Z23 ENCOUNTER FOR IMMUNIZATION: ICD-10-CM

## 2022-10-27 DIAGNOSIS — R30.0 DYSURIA: ICD-10-CM

## 2022-10-27 DIAGNOSIS — M17.11 PRIMARY OSTEOARTHRITIS OF RIGHT KNEE: ICD-10-CM

## 2022-10-27 DIAGNOSIS — F32.A ANXIETY AND DEPRESSION: ICD-10-CM

## 2022-10-27 DIAGNOSIS — R06.09 COVID-19 LONG HAULER MANIFESTING CHRONIC DYSPNEA: ICD-10-CM

## 2022-10-27 DIAGNOSIS — F41.9 ANXIETY AND DEPRESSION: ICD-10-CM

## 2022-10-27 DIAGNOSIS — I87.001 POST-THROMBOTIC SYNDROME OF RIGHT LOWER EXTREMITY: ICD-10-CM

## 2022-10-27 DIAGNOSIS — Z87.828 HX OF TEAR OF ACL (ANTERIOR CRUCIATE LIGAMENT): ICD-10-CM

## 2022-10-27 DIAGNOSIS — D68.59 HYPERCOAGULABLE STATE (HCC): ICD-10-CM

## 2022-10-27 DIAGNOSIS — K51.919 ULCERATIVE COLITIS WITH COMPLICATION, UNSPECIFIED LOCATION (HCC): ICD-10-CM

## 2022-10-27 DIAGNOSIS — U07.1 COVID-19: ICD-10-CM
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
## 2022-10-27 NOTE — PROGRESS NOTES
Chief Complaint   Patient presents with    Hospital Follow Up     1. \"Have you been to the ER, urgent care clinic since your last visit? Hospitalized since your last visit? \" No    2. \"Have you seen or consulted any other health care providers outside of the [address] [place] [address] since your last visit? \" No     3. For patients aged 39-70: Has the patient had a colonoscopy / FIT/ Cologuard? Yes      If the patient is female:    4. For patients aged 41-77: Has the patient had a mammogram within the past 2 years? Yes      5. For patients aged 21-65: Has the patient had a pap smear? Yes    Pt have is having pain in her righr leg 6 out of 10 today. Right risk pain and pain across the neck.

## 2022-11-17 ENCOUNTER — TELEPHONE (OUTPATIENT)
Dept: FAMILY MEDICINE CLINIC | Age: 52
End: 2022-11-17

## 2022-11-17 NOTE — TELEPHONE ENCOUNTER
Regarding: Knee pain  ----- Message from Jessica Solo LPN sent at [id number]  3:17 PM EST -----       ----- Message from Laquita Cantu to Damion Mckeon MD sent at 11/8/2022  1:57 PM -----   Helelise Olson. My knee pain is worse. My leg is sore and hurting. My leg feel tired. I think its the circulation. The pain doesnt want to go away   Should I call Dr. J Carlos Wheat?

## 2022-11-30 ENCOUNTER — NURSE TRIAGE (OUTPATIENT)
Dept: OTHER | Facility: CLINIC | Age: 52
End: 2022-11-30

## 2022-11-30 NOTE — TELEPHONE ENCOUNTER
[facility]    Received call from Taya Reis at [facility] with Red Flag Complaint. Subjective: Caller states \"I have right side abdominal pain and diarrhea and nausea. Has been belching frequently with a foul odor. It hurts when I eat\"     Current Symptoms:     Onset: 6 days ago; unchanged    Associated Symptoms:     Pain Severity: 7/10; pain, sharp, and tender; intermittent    Temperature: denies fever    What has been tried: Hailey [place]    LMP:  Pregnant: JOSH    Recommended disposition: Go to [facility]    Care advice provided, patient verbalizes understanding; denies any other questions or concerns; instructed to call back for any new or worsening symptoms. Patient/Caller agrees with recommended disposition; writer provided warm transfer to [employer] at [facility] for appointment scheduling    Attention Provider: Thank you for allowing me to participate in the care of your patient. The patient was connected to triage in response to information provided to the ECC. Please do not respond through this encounter as the response is not directed to a shared pool.       Reason for Disposition   Blood in urine (red, pink, or tea-colored)    Protocols used: Abdominal Pain - Female-ADULT-[place]

## 2022-12-01 ENCOUNTER — OFFICE VISIT (OUTPATIENT)
Dept: FAMILY MEDICINE CLINIC | Age: 52
End: 2022-12-01
Payer: COMMERCIAL

## 2022-12-01 VITALS
RESPIRATION RATE: 20 BRPM | WEIGHT: 218 LBS | SYSTOLIC BLOOD PRESSURE: 136 MMHG | OXYGEN SATURATION: 100 % | TEMPERATURE: 97.8 F | HEIGHT: 65 IN | BODY MASS INDEX: 36.32 KG/M2 | HEART RATE: 84 BPM | DIASTOLIC BLOOD PRESSURE: 77 MMHG

## 2022-12-01 DIAGNOSIS — R14.0 ABDOMINAL BLOATING: ICD-10-CM

## 2022-12-01 DIAGNOSIS — R11.0 NAUSEA: ICD-10-CM

## 2022-12-01 DIAGNOSIS — R14.0 FLATULENCE/GAS PAIN/BELCHING: ICD-10-CM

## 2022-12-01 DIAGNOSIS — A09 DIARRHEA, INFECTIOUS, ADULT: Primary | ICD-10-CM

## 2022-12-01 PROBLEM — R07.0 THROAT PAIN: Status: ACTIVE | Noted: 2022-09-01

## 2022-12-01 PROBLEM — R13.10 ODYNOPHAGIA: Status: ACTIVE | Noted: 2022-09-01

## 2022-12-01 RX ORDER — METRONIDAZOLE 500 MG/1
500 TABLET ORAL
Qty: 10 TABLET | Refills: 0 | Status: SHIPPED | OUTPATIENT
Start: 2022-12-01 | End: 2022-12-06

## 2022-12-01 RX ORDER — CIPROFLOXACIN 500 MG/1
500 TABLET ORAL 2 TIMES DAILY
Qty: 10 TABLET | Refills: 0 | Status: SHIPPED | OUTPATIENT
Start: 2022-12-01 | End: 2022-12-06

## 2022-12-01 RX ORDER — ONDANSETRON 4 MG/1
4 TABLET, FILM COATED ORAL
Qty: 20 TABLET | Refills: 0 | Status: SHIPPED | OUTPATIENT
Start: 2022-12-01
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
## 2022-12-01 NOTE — PROGRESS NOTES
1. \"Have you been to the ER, urgent care clinic since your last visit? Hospitalized since your last visit? \" No    2. \"Have you seen or consulted any other health care providers outside of the [address] [place] [address] since your last visit? \" No     3. For patients aged 39-70: Has the patient had a colonoscopy / FIT/ Cologuard? No      If the patient is female:    4. For patients aged 41-77: Has the patient had a mammogram within the past 2 years? Yes - no Care Gap present      5. For patients aged 21-65: Has the patient had a pap smear?  No

## 2023-01-02 DIAGNOSIS — K21.9 GASTROESOPHAGEAL REFLUX DISEASE, UNSPECIFIED WHETHER ESOPHAGITIS PRESENT: ICD-10-CM

## 2023-01-04 RX ORDER — PANTOPRAZOLE SODIUM 40 MG/1
TABLET, DELAYED RELEASE ORAL
Qty: 90 TABLET | Refills: 0 | Status: SHIPPED | OUTPATIENT
Start: 2023-01-04

## 2023-01-06 ENCOUNTER — VIRTUAL VISIT (OUTPATIENT)
Dept: FAMILY MEDICINE CLINIC | Age: 53
End: 2023-01-06
Payer: COMMERCIAL

## 2023-01-06 DIAGNOSIS — K91.850 POUCHITIS (HCC): Primary | ICD-10-CM

## 2023-01-06 DIAGNOSIS — K51.919 ULCERATIVE COLITIS WITH COMPLICATION, UNSPECIFIED LOCATION (HCC): ICD-10-CM

## 2023-01-06 DIAGNOSIS — R14.0 FLATULENCE/GAS PAIN/BELCHING: ICD-10-CM

## 2023-01-06 DIAGNOSIS — R14.0 ABDOMINAL BLOATING: ICD-10-CM

## 2023-01-06 PROCEDURE — 99442 PR PHYS/QHP TELEPHONE EVALUATION 11-20 MIN: CPT | Performed by: FAMILY MEDICINE

## 2023-01-06 RX ORDER — CIPROFLOXACIN 500 MG/1
500 TABLET ORAL 2 TIMES DAILY
Qty: 28 TABLET | Refills: 0 | Status: SHIPPED | OUTPATIENT
Start: 2023-01-06 | End: 2023-01-20

## 2023-01-06 RX ORDER — METRONIDAZOLE 500 MG/1
500 TABLET ORAL 2 TIMES DAILY
Qty: 28 TABLET | Refills: 0 | Status: SHIPPED | OUTPATIENT
Start: 2023-01-06 | End: 2023-01-20
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
## 2023-01-06 NOTE — PROGRESS NOTES
1. \"Have you been to the ER, urgent care clinic since your last visit? Hospitalized since your last visit? \" No    2. \"Have you seen or consulted any other health care providers outside of the [address] [place] [address] since your last visit? \" No     3. For patients aged 39-70: Has the patient had a colonoscopy / FIT/ Cologuard? Yes - no Care Gap present      If the patient is female:    4. For patients aged 41-77: Has the patient had a mammogram within the past 2 years? Yes - no Care Gap present      5. For patients aged 21-65: Has the patient had a pap smear?  No

## 2023-01-10 ENCOUNTER — DOCUMENTATION ONLY (OUTPATIENT)
Dept: ENDOCRINOLOGY | Age: 53
End: 2023-01-10

## 2023-01-27 ENCOUNTER — DOCUMENTATION ONLY (OUTPATIENT)
Dept: ENDOCRINOLOGY | Age: 53
End: 2023-01-27

## 2023-01-31 ENCOUNTER — OFFICE VISIT (OUTPATIENT)
Dept: FAMILY MEDICINE CLINIC | Age: 53
End: 2023-01-31
Payer: COMMERCIAL

## 2023-01-31 VITALS
RESPIRATION RATE: 18 BRPM | SYSTOLIC BLOOD PRESSURE: 119 MMHG | OXYGEN SATURATION: 98 % | TEMPERATURE: 97.7 F | HEIGHT: 65 IN | DIASTOLIC BLOOD PRESSURE: 73 MMHG | WEIGHT: 208.8 LBS | HEART RATE: 76 BPM | BODY MASS INDEX: 34.79 KG/M2

## 2023-01-31 DIAGNOSIS — I82.5Z1 CHRONIC DEEP VEIN THROMBOSIS (DVT) OF DISTAL VEIN OF RIGHT LOWER EXTREMITY (HCC): ICD-10-CM

## 2023-01-31 DIAGNOSIS — D68.59 HYPERCOAGULABLE STATE (HCC): ICD-10-CM

## 2023-01-31 DIAGNOSIS — L40.50 PSORIATIC ARTHRITIS (HCC): ICD-10-CM

## 2023-01-31 DIAGNOSIS — U09.9 POST-COVID SYNDROME: Primary | ICD-10-CM

## 2023-01-31 DIAGNOSIS — R14.0 FLATULENCE/GAS PAIN/BELCHING: ICD-10-CM

## 2023-01-31 DIAGNOSIS — R09.02 HYPOXIA: ICD-10-CM

## 2023-01-31 DIAGNOSIS — K51.919 ULCERATIVE COLITIS WITH COMPLICATION, UNSPECIFIED LOCATION (HCC): ICD-10-CM

## 2023-01-31 DIAGNOSIS — R14.0 ABDOMINAL BLOATING: ICD-10-CM

## 2023-01-31 DIAGNOSIS — D84.9 IMMUNOSUPPRESSED STATUS (HCC): ICD-10-CM

## 2023-01-31 PROCEDURE — 99213 OFFICE O/P EST LOW 20 MIN: CPT | Performed by: FAMILY MEDICINE

## 2023-01-31 RX ORDER — FAMOTIDINE 20 MG/1
20 TABLET, FILM COATED ORAL 2 TIMES DAILY
COMMUNITY
Start: 2023-01-28
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
## 2023-01-31 NOTE — PROGRESS NOTES
Chief Complaint   Patient presents with    Follow-up     Burping and flatulence    1. Have you been to the ER, urgent care clinic since your last visit? Hospitalized since your last visit? No    2. Have you seen or consulted any other health care providers outside of the [address] [place] [address] since your last visit? Include any pap smears or colon screening.  No

## 2023-02-06 ENCOUNTER — HOSPITAL ENCOUNTER (OUTPATIENT)
Dept: CT IMAGING | Age: 53
Discharge: HOME | End: 2023-02-06
Attending: FAMILY MEDICINE
Payer: COMMERCIAL

## 2023-02-06 DIAGNOSIS — R14.0 FLATULENCE/GAS PAIN/BELCHING: ICD-10-CM

## 2023-02-06 DIAGNOSIS — R14.0 ABDOMINAL BLOATING: ICD-10-CM

## 2023-02-06 LAB — CREAT BLD-MCNC: 0.9 MG/DL (ref 0.6–1.3)

## 2023-02-06 PROCEDURE — 74177 CT ABD & PELVIS W/CONTRAST: CPT

## 2023-02-06 PROCEDURE — 74011000250 HC RX REV CODE- 250: Performed by: FAMILY MEDICINE

## 2023-02-06 PROCEDURE — 82565 ASSAY OF CREATININE: CPT

## 2023-02-06 PROCEDURE — 74011000636 HC RX REV CODE- 636: Performed by: FAMILY MEDICINE

## 2023-02-06 RX ORDER — BARIUM SULFATE 20 MG/ML
900 SUSPENSION ORAL
Status: COMPLETED | OUTPATIENT
Start: 2023-02-06 | End: 2023-02-06

## 2023-02-06 RX ADMIN — IOPAMIDOL 100 ML: 755 INJECTION, SOLUTION INTRAVENOUS at 12:48

## 2023-02-06 RX ADMIN — BARIUM SULFATE 900 ML: 20 SUSPENSION ORAL at 12:47

## 2023-02-08 ENCOUNTER — OFFICE VISIT (OUTPATIENT)
Dept: FAMILY MEDICINE CLINIC | Age: 53
End: 2023-02-08
Payer: COMMERCIAL

## 2023-02-08 VITALS
RESPIRATION RATE: 16 BRPM | HEART RATE: 81 BPM | OXYGEN SATURATION: 98 % | HEIGHT: 65 IN | SYSTOLIC BLOOD PRESSURE: 123 MMHG | BODY MASS INDEX: 34.99 KG/M2 | DIASTOLIC BLOOD PRESSURE: 56 MMHG | WEIGHT: 210 LBS | TEMPERATURE: 97 F

## 2023-02-08 DIAGNOSIS — L40.50 PSORIATIC ARTHRITIS (HCC): ICD-10-CM

## 2023-02-08 DIAGNOSIS — B02.9 HERPES ZOSTER WITHOUT COMPLICATION: Primary | ICD-10-CM

## 2023-02-08 DIAGNOSIS — M17.11 PRIMARY OSTEOARTHRITIS OF RIGHT KNEE: ICD-10-CM

## 2023-02-08 PROCEDURE — 99213 OFFICE O/P EST LOW 20 MIN: CPT | Performed by: FAMILY MEDICINE

## 2023-02-08 RX ORDER — VALACYCLOVIR HYDROCHLORIDE 1 G/1
1000 TABLET, FILM COATED ORAL 3 TIMES DAILY
Qty: 21 TABLET | Refills: 0 | Status: SHIPPED | OUTPATIENT
Start: 2023-02-08 | End: 2023-02-15

## 2023-02-08 NOTE — PROGRESS NOTES
Chief Complaint   Patient presents with    Rash     Back    1. Have you been to the ER, urgent care clinic since your last visit? Hospitalized since your last visit? No    2. Have you seen or consulted any other health care providers outside of the [address] [place] [address] since your last visit? Include any pap smears or colon screening.  No    Started Monday
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
## 2023-02-09 ENCOUNTER — DOCUMENTATION ONLY (OUTPATIENT)
Dept: FAMILY MEDICINE CLINIC | Age: 53
End: 2023-02-09

## 2023-02-09 DIAGNOSIS — L40.50 PSORIATIC ARTHRITIS (HCC): ICD-10-CM

## 2023-02-09 DIAGNOSIS — B02.9 HERPES ZOSTER WITHOUT COMPLICATION: Primary | ICD-10-CM

## 2023-02-09 RX ORDER — HYDROCODONE BITARTRATE AND ACETAMINOPHEN 5; 325 MG/1; MG/1
1 TABLET ORAL
Qty: 15 TABLET | Refills: 0 | Status: SHIPPED | OUTPATIENT
Start: 2023-02-09 | End: 2023-02-14

## 2023-02-09 NOTE — TELEPHONE ENCOUNTER
Patient called and having spreading of shingles and hip pain with chills but no fever. Spoke with Dr Lisette Narvaez and patient advised to go to nearest ER if develops fever with chills. She was advised he sent pain medication to pharmacy and she states needs refill of gabapentin. Will forward to Dr Lisette Narvaez.

## 2023-02-10 ENCOUNTER — HOSPITAL ENCOUNTER (EMERGENCY)
Age: 53
Discharge: HOME | End: 2023-02-10
Attending: STUDENT IN AN ORGANIZED HEALTH CARE EDUCATION/TRAINING PROGRAM
Payer: COMMERCIAL

## 2023-02-10 VITALS
SYSTOLIC BLOOD PRESSURE: 131 MMHG | DIASTOLIC BLOOD PRESSURE: 84 MMHG | HEART RATE: 74 BPM | RESPIRATION RATE: 18 BRPM | TEMPERATURE: 98.1 F | OXYGEN SATURATION: 100 %

## 2023-02-10 DIAGNOSIS — F51.02 ADJUSTMENT INSOMNIA: ICD-10-CM

## 2023-02-10 DIAGNOSIS — F32.A ANXIETY AND DEPRESSION: ICD-10-CM

## 2023-02-10 DIAGNOSIS — F41.9 ANXIETY AND DEPRESSION: ICD-10-CM

## 2023-02-10 DIAGNOSIS — N39.0 URINARY TRACT INFECTION WITHOUT HEMATURIA, SITE UNSPECIFIED: Primary | ICD-10-CM

## 2023-02-10 DIAGNOSIS — R11.2 NAUSEA AND VOMITING, UNSPECIFIED VOMITING TYPE: ICD-10-CM

## 2023-02-10 LAB
ALBUMIN SERPL-MCNC: 3.9 G/DL (ref 3.5–5)
ALBUMIN/GLOB SERPL: 1.1 (ref 1.1–2.2)
ALP SERPL-CCNC: 48 U/L (ref 45–117)
ALT SERPL-CCNC: 20 U/L (ref 12–78)
ANION GAP SERPL CALC-SCNC: 7 MMOL/L (ref 5–15)
APPEARANCE UR: CLEAR
AST SERPL-CCNC: 16 U/L (ref 15–37)
BACTERIA URNS QL MICRO: NEGATIVE /HPF
BASOPHILS # BLD: 0.1 K/UL (ref 0–0.1)
BASOPHILS NFR BLD: 1 % (ref 0–1)
BILIRUB SERPL-MCNC: 2 MG/DL (ref 0.2–1)
BILIRUB UR QL: NEGATIVE
BUN SERPL-MCNC: 12 MG/DL (ref 6–20)
BUN/CREAT SERPL: 12 (ref 12–20)
CALCIUM SERPL-MCNC: 9.3 MG/DL (ref 8.5–10.1)
CHLORIDE SERPL-SCNC: 105 MMOL/L (ref 97–108)
CO2 SERPL-SCNC: 25 MMOL/L (ref 21–32)
COLOR UR: ABNORMAL
COMMENT, HOLDF: NORMAL
CREAT SERPL-MCNC: 1 MG/DL (ref 0.55–1.02)
DIFFERENTIAL METHOD BLD: ABNORMAL
EOSINOPHIL # BLD: 0 K/UL (ref 0–0.4)
EOSINOPHIL NFR BLD: 0 % (ref 0–7)
EPITH CASTS URNS QL MICRO: ABNORMAL /LPF
ERYTHROCYTE [DISTWIDTH] IN BLOOD BY AUTOMATED COUNT: 13.6 % (ref 11.5–14.5)
GLOBULIN SER CALC-MCNC: 3.7 G/DL (ref 2–4)
GLUCOSE SERPL-MCNC: 84 MG/DL (ref 65–100)
GLUCOSE UR STRIP.AUTO-MCNC: NEGATIVE MG/DL
HCT VFR BLD AUTO: 42.4 % (ref 35–47)
HGB BLD-MCNC: 13.5 G/DL (ref 11.5–16)
HGB UR QL STRIP: ABNORMAL
IMM GRANULOCYTES # BLD AUTO: 0 K/UL
IMM GRANULOCYTES NFR BLD AUTO: 0 %
KETONES UR QL STRIP.AUTO: NEGATIVE MG/DL
LEUKOCYTE ESTERASE UR QL STRIP.AUTO: ABNORMAL
LYMPHOCYTES # BLD: 1.3 K/UL (ref 0.8–3.5)
LYMPHOCYTES NFR BLD: 26 % (ref 12–49)
MCH RBC QN AUTO: 30.7 PG (ref 26–34)
MCHC RBC AUTO-ENTMCNC: 31.8 G/DL (ref 30–36.5)
MCV RBC AUTO: 96.4 FL (ref 80–99)
MONOCYTES # BLD: 0.7 K/UL (ref 0–1)
MONOCYTES NFR BLD: 14 % (ref 5–13)
MUCOUS THREADS URNS QL MICRO: ABNORMAL /LPF
NEUTS SEG # BLD: 2.9 K/UL (ref 1.8–8)
NEUTS SEG NFR BLD: 59 % (ref 32–75)
NITRITE UR QL STRIP.AUTO: NEGATIVE
NRBC # BLD: 0 K/UL (ref 0–0.01)
NRBC BLD-RTO: 0 PER 100 WBC
PH UR STRIP: 5 (ref 5–8)
PLATELET # BLD AUTO: 229 K/UL (ref 150–400)
PMV BLD AUTO: 10.1 FL (ref 8.9–12.9)
POTASSIUM SERPL-SCNC: 3.8 MMOL/L (ref 3.5–5.1)
PROT SERPL-MCNC: 7.6 G/DL (ref 6.4–8.2)
PROT UR STRIP-MCNC: ABNORMAL MG/DL
RBC # BLD AUTO: 4.4 M/UL (ref 3.8–5.2)
RBC #/AREA URNS HPF: ABNORMAL /HPF (ref 0–5)
RBC MORPH BLD: ABNORMAL
SAMPLES BEING HELD,HOLD: NORMAL
SODIUM SERPL-SCNC: 137 MMOL/L (ref 136–145)
SP GR UR REFRACTOMETRY: 1.02 (ref 1–1.03)
UR CULT HOLD, URHOLD: NORMAL
UROBILINOGEN UR QL STRIP.AUTO: 0.2 EU/DL (ref 0.2–1)
WBC # BLD AUTO: 5 K/UL (ref 3.6–11)
WBC MORPH BLD: ABNORMAL
WBC URNS QL MICRO: ABNORMAL /HPF (ref 0–4)

## 2023-02-10 PROCEDURE — 80053 COMPREHEN METABOLIC PANEL: CPT

## 2023-02-10 PROCEDURE — 85025 COMPLETE CBC W/AUTO DIFF WBC: CPT

## 2023-02-10 PROCEDURE — 36415 COLL VENOUS BLD VENIPUNCTURE: CPT

## 2023-02-10 PROCEDURE — 99283 EMERGENCY DEPT VISIT LOW MDM: CPT

## 2023-02-10 PROCEDURE — 87086 URINE CULTURE/COLONY COUNT: CPT

## 2023-02-10 PROCEDURE — 74011250636 HC RX REV CODE- 250/636

## 2023-02-10 PROCEDURE — 81001 URINALYSIS AUTO W/SCOPE: CPT

## 2023-02-10 RX ORDER — ESCITALOPRAM OXALATE 20 MG/1
TABLET ORAL
Qty: 90 TABLET | Refills: 0 | Status: SHIPPED | OUTPATIENT
Start: 2023-02-10

## 2023-02-10 RX ORDER — ONDANSETRON 4 MG/1
4 TABLET, ORALLY DISINTEGRATING ORAL
Status: DISCONTINUED | OUTPATIENT
Start: 2023-02-10 | End: 2023-02-10 | Stop reason: HOSPADM

## 2023-02-10 RX ORDER — GABAPENTIN 300 MG/1
300 CAPSULE ORAL
Qty: 90 CAPSULE | Refills: 0 | Status: SHIPPED | OUTPATIENT
Start: 2023-02-10

## 2023-02-10 RX ORDER — NITROFURANTOIN 25; 75 MG/1; MG/1
100 CAPSULE ORAL 2 TIMES DAILY
Qty: 6 CAPSULE | Refills: 0 | Status: SHIPPED | OUTPATIENT
Start: 2023-02-10 | End: 2023-02-13

## 2023-02-10 RX ADMIN — ONDANSETRON 4 MG: 4 TABLET, ORALLY DISINTEGRATING ORAL at 11:49

## 2023-02-10 NOTE — DISCHARGE INSTRUCTIONS
Discussed your results today. Please  Gabapentin and Macrobid today from the pharmacy. Finish out your Valtrex. Please make a follow-up appointment with your PCP after this visit and return to the ER if you develop shortness of breath, chest pain, or current symptoms are not resolving.

## 2023-02-10 NOTE — ED TRIAGE NOTES
Pt c/o back pain and rash to left side of back since Monday. Seen at PCP Wednesday and dx with shingles. Last night/today c/o headache and fevers (101 this am). Denies cp/sob.     On 2L NC at baseline
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
## 2023-02-11 LAB
BACTERIA SPEC CULT: NORMAL
CC UR VC: NORMAL
SERVICE CMNT-IMP: NORMAL

## 2023-02-13 ENCOUNTER — VIRTUAL VISIT (OUTPATIENT)
Dept: FAMILY MEDICINE CLINIC | Age: 53
End: 2023-02-13
Payer: COMMERCIAL

## 2023-02-13 DIAGNOSIS — R30.0 DYSURIA: ICD-10-CM

## 2023-02-13 DIAGNOSIS — B02.9 HERPES ZOSTER WITHOUT COMPLICATION: Primary | ICD-10-CM

## 2023-02-13 PROCEDURE — 99213 OFFICE O/P EST LOW 20 MIN: CPT | Performed by: FAMILY MEDICINE

## 2023-02-13 RX ORDER — DICLOFENAC SODIUM 10 MG/G
2 GEL TOPICAL 4 TIMES DAILY
Qty: 100 G | Refills: 0 | Status: SHIPPED | OUTPATIENT
Start: 2023-02-13

## 2023-02-13 RX ORDER — LIDOCAINE 50 MG/G
1 PATCH TOPICAL EVERY 24 HOURS
Qty: 30 PATCH | Refills: 0 | Status: SHIPPED | OUTPATIENT
Start: 2023-02-13

## 2023-02-13 NOTE — PROGRESS NOTES
Chief Complaint   Patient presents with    Follow-up     ER Visit/Shingles/UTI    1. Have you been to the ER, urgent care clinic since your last visit? Hospitalized since your last visit? Yes [facility] ER    2. Have you seen or consulted any other health care providers outside of the [address] [place] [address] since your last visit? Include any pap smears or colon screening.  No    Feeling better ,currently taking antibiotics

## 2023-02-20 ENCOUNTER — OFFICE VISIT (OUTPATIENT)
Dept: ENDOCRINOLOGY | Age: 53
End: 2023-02-20
Payer: COMMERCIAL

## 2023-02-20 VITALS
WEIGHT: 208.6 LBS | BODY MASS INDEX: 34.75 KG/M2 | DIASTOLIC BLOOD PRESSURE: 66 MMHG | HEIGHT: 65 IN | HEART RATE: 74 BPM | SYSTOLIC BLOOD PRESSURE: 122 MMHG

## 2023-02-20 DIAGNOSIS — R73.02 IGT (IMPAIRED GLUCOSE TOLERANCE): ICD-10-CM

## 2023-02-20 DIAGNOSIS — E66.01 CLASS 2 SEVERE OBESITY WITH SERIOUS COMORBIDITY AND BODY MASS INDEX (BMI) OF 39.0 TO 39.9 IN ADULT, UNSPECIFIED OBESITY TYPE (HCC): Primary | ICD-10-CM

## 2023-02-20 DIAGNOSIS — E66.01 MORBID OBESITY, UNSPECIFIED OBESITY TYPE (HCC): ICD-10-CM

## 2023-02-20 PROCEDURE — 99214 OFFICE O/P EST MOD 30 MIN: CPT | Performed by: INTERNAL MEDICINE

## 2023-02-20 RX ORDER — SEMAGLUTIDE 1.34 MG/ML
1 INJECTION, SOLUTION SUBCUTANEOUS
Qty: 1 EACH | Refills: 5 | Status: SHIPPED | OUTPATIENT
Start: 2023-02-20

## 2023-02-20 RX ORDER — PHENTERMINE HYDROCHLORIDE 37.5 MG/1
TABLET ORAL
Qty: 90 TABLET | Refills: 2 | Status: SHIPPED | OUTPATIENT
Start: 2023-02-20

## 2023-02-20 NOTE — LETTER
2/20/2023    Patient: Raúl Arriaza   YOB: 1970   Date of Visit: 2/20/2023     Yanely Rossi [address]  Via In Basket    Dear Simran Tatum MD,      Thank you for referring Ms. Shalini Hobson to [facility] for evaluation. My notes for this consultation are attached. If you have questions, please do not hesitate to call me. I look forward to following your patient along with you.       Sincerely,    Hipolito Haq MD
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
## 2023-03-24 ENCOUNTER — DOCUMENTATION ONLY (OUTPATIENT)
Dept: ENDOCRINOLOGY | Age: 53
End: 2023-03-24

## 2023-04-06 ENCOUNTER — OFFICE VISIT (OUTPATIENT)
Dept: FAMILY MEDICINE CLINIC | Age: 53
End: 2023-04-06
Payer: COMMERCIAL

## 2023-04-06 PROCEDURE — 99213 OFFICE O/P EST LOW 20 MIN: CPT | Performed by: INTERNAL MEDICINE

## 2023-04-06 NOTE — PROGRESS NOTES
Chief Complaint   Patient presents with    Abdominal Pain     Diarrhea and vomiting     1. Have you been to the ER, urgent care clinic since your last visit? Hospitalized since your last visit? No    2. Have you seen or consulted any other health care providers outside of the [address] [place] [address] since your last visit? Include any pap smears or colon screening.  No
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
## 2023-04-07 PROBLEM — N17.9 AKI (ACUTE KIDNEY INJURY) (HCC): Status: ACTIVE | Noted: 2023-04-07

## 2023-04-14 ENCOUNTER — HOSPITAL ENCOUNTER (OUTPATIENT)
Dept: ULTRASOUND IMAGING | Age: 53
Discharge: HOME | End: 2023-04-14
Attending: FAMILY MEDICINE
Payer: COMMERCIAL

## 2023-04-14 DIAGNOSIS — D68.59 HYPERCOAGULABLE STATE (HCC): ICD-10-CM

## 2023-04-14 DIAGNOSIS — I82.5Z1 CHRONIC DEEP VEIN THROMBOSIS (DVT) OF DISTAL VEIN OF RIGHT LOWER EXTREMITY (HCC): ICD-10-CM

## 2023-04-14 DIAGNOSIS — M79.604 ACUTE PAIN OF RIGHT LOWER EXTREMITY: ICD-10-CM

## 2023-04-14 PROCEDURE — 93971 EXTREMITY STUDY: CPT

## 2023-04-21 ENCOUNTER — PATIENT OUTREACH (OUTPATIENT)
Dept: CASE MANAGEMENT | Age: 53
End: 2023-04-21

## 2023-04-21 NOTE — PROGRESS NOTES
Care Transitions Follow Up Call    Patient Current Location: [address] [place] [address] to be reviewed by the provider   Additional needs identified to be addressed with provider: no  none           Method of communication with provider : none    Care Transition Nurse (CTN) contacted the patient by telephone to follow up after admission on 23. Verified name and  with patient as identifiers. Addressed changes since last contact: none  Follow up appointment completed? yes. Was follow up appointment scheduled within 7 days of discharge? yes. Advance Care Planning:   Does patient have an Advance Directive:  yes; reviewed and current     CTN reviewed discharge instructions, medical action plan and red flags with patient and discussed any barriers to care and/or understanding of plan of care after discharge. Discussed appropriate site of care based on symptoms and resources available to patient including: PCP, Specialist, and Urgent Care Clinics. The patient agrees to contact the PCP office for questions related to their healthcare. Patients top risk factors for readmission: functional physical ability and medical condition-diarrhea and history of colectomy    Interventions to address risk factors: Obtained and reviewed discharge summary and/or continuity of care documents    [address] follow up appointment(s):   Future Appointments   Date Time Provider Gaurav Ceballos   2023  9:00 AM Matthew Mcdonnell MD [facility] BS AMB   2023  9:30 AM Temo Singleton MD [facility] BS AMB     [facility] follow up appointment(s): none at this time    CTN provided contact information for future needs. Plan for follow-up call in 5-7 days based on severity of symptoms and risk factors. Plan for next call: symptom management-diarrhea resolved       Goals Addressed                   This Visit's Progress     Prevent complications post hospitalization.    On track     23  Using teachback patient confirmed understanding of needing to push po fluids to ensure 3 to 4 voids per day. Using teachback patient confirmed understanding of dispatch health and ctn placing request for care using dispatch health express. Patient feels weaker today with more nausea and feels she can't drink enough. Using teachback patient confirmed understanding of using zofran prior to eating  Using teachback patient confirmed understanding of monitoring diarrhea and keeping tract of how many times a day she is going. Ctn to follow up in 3 days post dispatch health visit. Marie Angulo RN    4/21/23  Using teachback patient confirmed understanding of monitoring diarrhea and keeping tract of how many times a day she is going. Using teachback patient confirmed understanding of needing to push po fluids to ensure 3 to 4 voids per day. Using teachback patient confirmed understanding of low fiber diet in order to rest bowels if irritated. Ctn will follow up in one week.    Marie Angulo RN

## 2023-04-22 DIAGNOSIS — E66.01 CLASS 2 SEVERE OBESITY WITH SERIOUS COMORBIDITY AND BODY MASS INDEX (BMI) OF 39.0 TO 39.9 IN ADULT, UNSPECIFIED OBESITY TYPE (HCC): Primary | ICD-10-CM

## 2023-04-23 DIAGNOSIS — E66.01 CLASS 2 SEVERE OBESITY WITH SERIOUS COMORBIDITY AND BODY MASS INDEX (BMI) OF 39.0 TO 39.9 IN ADULT, UNSPECIFIED OBESITY TYPE (HCC): Primary | ICD-10-CM

## 2023-04-23 DIAGNOSIS — R73.02 IGT (IMPAIRED GLUCOSE TOLERANCE): ICD-10-CM

## 2023-04-30 RX ORDER — DICYCLOMINE HCL 20 MG
20 TABLET ORAL EVERY 6 HOURS PRN
COMMUNITY
Start: 2023-04-09

## 2023-04-30 RX ORDER — BISACODYL 5 MG/1
5 TABLET, DELAYED RELEASE ORAL DAILY PRN
COMMUNITY
Start: 2023-04-09 | End: 2023-05-08 | Stop reason: SDUPTHER

## 2023-05-03 ENCOUNTER — PATIENT OUTREACH (OUTPATIENT)
Dept: CASE MANAGEMENT | Age: 53
End: 2023-05-03

## 2023-05-08 ENCOUNTER — OFFICE VISIT (OUTPATIENT)
Age: 53
End: 2023-05-08

## 2023-05-08 VITALS
HEART RATE: 66 BPM | TEMPERATURE: 97.1 F | OXYGEN SATURATION: 100 % | WEIGHT: 205.6 LBS | BODY MASS INDEX: 34.26 KG/M2 | HEIGHT: 65 IN | SYSTOLIC BLOOD PRESSURE: 114 MMHG | DIASTOLIC BLOOD PRESSURE: 53 MMHG | RESPIRATION RATE: 16 BRPM

## 2023-05-08 DIAGNOSIS — Z00.00 PREVENTATIVE HEALTH CARE: ICD-10-CM

## 2023-05-08 DIAGNOSIS — L40.50 ARTHROPATHIC PSORIASIS, UNSPECIFIED (HCC): ICD-10-CM

## 2023-05-08 DIAGNOSIS — K21.9 GASTRO-ESOPHAGEAL REFLUX DISEASE WITHOUT ESOPHAGITIS: ICD-10-CM

## 2023-05-08 DIAGNOSIS — F33.0 MILD EPISODE OF RECURRENT MAJOR DEPRESSIVE DISORDER (HCC): ICD-10-CM

## 2023-05-08 DIAGNOSIS — I80.9 PHLEBITIS: ICD-10-CM

## 2023-05-08 DIAGNOSIS — I87.001 POSTTHROMBOTIC SYNDROME WITHOUT COMPLICATIONS OF RIGHT LOWER EXTREMITY: ICD-10-CM

## 2023-05-08 DIAGNOSIS — Z23 ENCOUNTER FOR IMMUNIZATION: ICD-10-CM

## 2023-05-08 DIAGNOSIS — F41.9 ANXIETY DISORDER, UNSPECIFIED TYPE: ICD-10-CM

## 2023-05-08 DIAGNOSIS — U09.9 POST COVID-19 CONDITION, UNSPECIFIED: Primary | ICD-10-CM

## 2023-05-08 RX ORDER — BISACODYL 5 MG/1
5 TABLET, DELAYED RELEASE ORAL DAILY PRN
Qty: 90 TABLET | Refills: 1 | Status: SHIPPED | OUTPATIENT
Start: 2023-05-08 | End: 2023-06-07

## 2023-05-08 RX ORDER — PHENTERMINE HYDROCHLORIDE 37.5 MG/1
TABLET ORAL
Qty: 30 TABLET | Refills: 0 | Status: SHIPPED | OUTPATIENT
Start: 2023-05-08 | End: 2023-06-07

## 2023-05-08 RX ORDER — OMEPRAZOLE 10 MG/1
10 CAPSULE, DELAYED RELEASE ORAL DAILY
COMMUNITY
End: 2023-05-08 | Stop reason: SDUPTHER

## 2023-05-08 RX ORDER — BUPROPION HYDROCHLORIDE 150 MG/1
150 TABLET, EXTENDED RELEASE ORAL DAILY
Qty: 90 TABLET | Refills: 0 | Status: SHIPPED | OUTPATIENT
Start: 2023-05-08

## 2023-05-08 RX ORDER — OMEPRAZOLE 10 MG/1
10 CAPSULE, DELAYED RELEASE ORAL DAILY
Qty: 90 CAPSULE | Refills: 1 | Status: SHIPPED | OUTPATIENT
Start: 2023-05-08

## 2023-05-08 SDOH — ECONOMIC STABILITY: FOOD INSECURITY: WITHIN THE PAST 12 MONTHS, THE FOOD YOU BOUGHT JUST DIDN'T LAST AND YOU DIDN'T HAVE MONEY TO GET MORE.: NEVER TRUE

## 2023-05-08 SDOH — ECONOMIC STABILITY: FOOD INSECURITY: WITHIN THE PAST 12 MONTHS, YOU WORRIED THAT YOUR FOOD WOULD RUN OUT BEFORE YOU GOT MONEY TO BUY MORE.: NEVER TRUE

## 2023-05-08 SDOH — ECONOMIC STABILITY: HOUSING INSECURITY
IN THE LAST 12 MONTHS, WAS THERE A TIME WHEN YOU DID NOT HAVE A STEADY PLACE TO SLEEP OR SLEPT IN A SHELTER (INCLUDING NOW)?: NO

## 2023-05-08 SDOH — ECONOMIC STABILITY: INCOME INSECURITY: HOW HARD IS IT FOR YOU TO PAY FOR THE VERY BASICS LIKE FOOD, HOUSING, MEDICAL CARE, AND HEATING?: NOT HARD AT ALL

## 2023-05-08 ASSESSMENT — ANXIETY QUESTIONNAIRES
6. BECOMING EASILY ANNOYED OR IRRITABLE: 1
1. FEELING NERVOUS, ANXIOUS, OR ON EDGE: 1
4. TROUBLE RELAXING: 1
2. NOT BEING ABLE TO STOP OR CONTROL WORRYING: 1
7. FEELING AFRAID AS IF SOMETHING AWFUL MIGHT HAPPEN: 1
GAD7 TOTAL SCORE: 7
3. WORRYING TOO MUCH ABOUT DIFFERENT THINGS: 1
5. BEING SO RESTLESS THAT IT IS HARD TO SIT STILL: 1
IF YOU CHECKED OFF ANY PROBLEMS ON THIS QUESTIONNAIRE, HOW DIFFICULT HAVE THESE PROBLEMS MADE IT FOR YOU TO DO YOUR WORK, TAKE CARE OF THINGS AT HOME, OR GET ALONG WITH OTHER PEOPLE: SOMEWHAT DIFFICULT

## 2023-05-08 ASSESSMENT — PATIENT HEALTH QUESTIONNAIRE - PHQ9
1. LITTLE INTEREST OR PLEASURE IN DOING THINGS: 0
5. POOR APPETITE OR OVEREATING: 0
7. TROUBLE CONCENTRATING ON THINGS, SUCH AS READING THE NEWSPAPER OR WATCHING TELEVISION: 0
SUM OF ALL RESPONSES TO PHQ QUESTIONS 1-9: 0
SUM OF ALL RESPONSES TO PHQ QUESTIONS 1-9: 0
8. MOVING OR SPEAKING SO SLOWLY THAT OTHER PEOPLE COULD HAVE NOTICED. OR THE OPPOSITE, BEING SO FIGETY OR RESTLESS THAT YOU HAVE BEEN MOVING AROUND A LOT MORE THAN USUAL: 0
SUM OF ALL RESPONSES TO PHQ9 QUESTIONS 1 & 2: 0
2. FEELING DOWN, DEPRESSED OR HOPELESS: 0
SUM OF ALL RESPONSES TO PHQ QUESTIONS 1-9: 0
4. FEELING TIRED OR HAVING LITTLE ENERGY: 0
6. FEELING BAD ABOUT YOURSELF - OR THAT YOU ARE A FAILURE OR HAVE LET YOURSELF OR YOUR FAMILY DOWN: 0
9. THOUGHTS THAT YOU WOULD BE BETTER OFF DEAD, OR OF HURTING YOURSELF: 0
10. IF YOU CHECKED OFF ANY PROBLEMS, HOW DIFFICULT HAVE THESE PROBLEMS MADE IT FOR YOU TO DO YOUR WORK, TAKE CARE OF THINGS AT HOME, OR GET ALONG WITH OTHER PEOPLE: 0
3. TROUBLE FALLING OR STAYING ASLEEP: 0
SUM OF ALL RESPONSES TO PHQ QUESTIONS 1-9: 0

## 2023-05-08 NOTE — PROGRESS NOTES
Chief Complaint   Patient presents with    Leg Pain     Right/Follow-up    1. Have you been to the ER, urgent care clinic since your last visit? Hospitalized since your last visit? No    2. Have you seen or consulted any other health care providers outside of the [address] [place] [address] since your last visit? Include any pap smears or colon screening.  No

## 2023-05-08 NOTE — PROGRESS NOTES
After obtaining consent, and per orders of Dr. Haley Dover, injection of Shingrix given by Latrice Coles MA. Patient instructed to remain in clinic for 20 minutes afterwards, and to report any adverse reaction to me immediately. Patient shows no signs of a reaction.

## 2023-05-16 LAB
ALBUMIN SERPL-MCNC: 4 G/DL (ref 3.8–4.9)
ALBUMIN SERPL-MCNC: 4 G/DL (ref 3.8–4.9)
ALBUMIN/GLOB SERPL: 1.6 {RATIO} (ref 1.2–2.2)
ALBUMIN/GLOB SERPL: 1.7 {RATIO} (ref 1.2–2.2)
ALP SERPL-CCNC: 62 IU/L (ref 44–121)
ALP SERPL-CCNC: 65 IU/L (ref 44–121)
ALT SERPL-CCNC: 8 IU/L (ref 0–32)
ALT SERPL-CCNC: 8 IU/L (ref 0–32)
AST SERPL-CCNC: 15 IU/L (ref 0–40)
AST SERPL-CCNC: 9 IU/L (ref 0–40)
BILIRUB SERPL-MCNC: 0.7 MG/DL (ref 0–1.2)
BILIRUB SERPL-MCNC: 0.7 MG/DL (ref 0–1.2)
BUN SERPL-MCNC: 19 MG/DL (ref 6–24)
BUN SERPL-MCNC: 19 MG/DL (ref 6–24)
BUN/CREAT SERPL: 18 (ref 9–23)
BUN/CREAT SERPL: 20 (ref 9–23)
CALCIUM SERPL-MCNC: 9.1 MG/DL (ref 8.7–10.2)
CALCIUM SERPL-MCNC: 9.2 MG/DL (ref 8.7–10.2)
CHLORIDE SERPL-SCNC: 104 MMOL/L (ref 96–106)
CHLORIDE SERPL-SCNC: 105 MMOL/L (ref 96–106)
CO2 SERPL-SCNC: 26 MMOL/L (ref 20–29)
CO2 SERPL-SCNC: 27 MMOL/L (ref 20–29)
CREAT SERPL-MCNC: 0.95 MG/DL (ref 0.57–1)
CREAT SERPL-MCNC: 1.07 MG/DL (ref 0.57–1)
EGFRCR SERPLBLD CKD-EPI 2021: 62 ML/MIN/1.73
EGFRCR SERPLBLD CKD-EPI 2021: 71 ML/MIN/1.73
ERYTHROCYTE [DISTWIDTH] IN BLOOD BY AUTOMATED COUNT: 12.3 % (ref 11.7–15.4)
EST. AVERAGE GLUCOSE BLD GHB EST-MCNC: 94 MG/DL
GLOBULIN SER CALC-MCNC: 2.3 G/DL (ref 1.5–4.5)
GLOBULIN SER CALC-MCNC: 2.5 G/DL (ref 1.5–4.5)
GLUCOSE SERPL-MCNC: 61 MG/DL (ref 70–99)
GLUCOSE SERPL-MCNC: 62 MG/DL (ref 70–99)
HBA1C MFR BLD: 4.9 % (ref 4.8–5.6)
HCT VFR BLD AUTO: 35.2 % (ref 34–46.6)
HGB BLD-MCNC: 11.7 G/DL (ref 11.1–15.9)
MCH RBC QN AUTO: 31.8 PG (ref 26.6–33)
MCHC RBC AUTO-ENTMCNC: 33.2 G/DL (ref 31.5–35.7)
MCV RBC AUTO: 96 FL (ref 79–97)
PHOSPHATE SERPL-MCNC: 3.3 MG/DL (ref 3–4.3)
PLATELET # BLD AUTO: 253 X10E3/UL (ref 150–450)
POTASSIUM SERPL-SCNC: 4.1 MMOL/L (ref 3.5–5.2)
POTASSIUM SERPL-SCNC: 4.2 MMOL/L (ref 3.5–5.2)
PROT SERPL-MCNC: 6.3 G/DL (ref 6–8.5)
PROT SERPL-MCNC: 6.5 G/DL (ref 6–8.5)
RBC # BLD AUTO: 3.68 X10E6/UL (ref 3.77–5.28)
SODIUM SERPL-SCNC: 142 MMOL/L (ref 134–144)
SODIUM SERPL-SCNC: 143 MMOL/L (ref 134–144)
WBC # BLD AUTO: 3.8 X10E3/UL (ref 3.4–10.8)

## 2023-06-01 DIAGNOSIS — R73.02 IGT (IMPAIRED GLUCOSE TOLERANCE): ICD-10-CM

## 2023-06-01 DIAGNOSIS — E66.01 CLASS 2 SEVERE OBESITY WITH SERIOUS COMORBIDITY AND BODY MASS INDEX (BMI) OF 39.0 TO 39.9 IN ADULT, UNSPECIFIED OBESITY TYPE (HCC): ICD-10-CM

## 2023-06-26 ENCOUNTER — OFFICE VISIT (OUTPATIENT)
Age: 53
End: 2023-06-26
Payer: COMMERCIAL

## 2023-06-26 VITALS
HEART RATE: 73 BPM | WEIGHT: 202 LBS | HEIGHT: 65 IN | SYSTOLIC BLOOD PRESSURE: 127 MMHG | BODY MASS INDEX: 33.66 KG/M2 | DIASTOLIC BLOOD PRESSURE: 72 MMHG

## 2023-06-26 DIAGNOSIS — R73.02 IMPAIRED GLUCOSE TOLERANCE (ORAL): ICD-10-CM

## 2023-06-26 DIAGNOSIS — E66.01 MORBID (SEVERE) OBESITY DUE TO EXCESS CALORIES (HCC): Primary | ICD-10-CM

## 2023-06-26 PROCEDURE — 99214 OFFICE O/P EST MOD 30 MIN: CPT | Performed by: INTERNAL MEDICINE

## 2023-06-26 RX ORDER — PHENTERMINE HYDROCHLORIDE 37.5 MG/1
37.5 CAPSULE ORAL EVERY MORNING
Qty: 90 CAPSULE | Refills: 1 | Status: SHIPPED | OUTPATIENT
Start: 2023-06-26 | End: 2023-12-23

## 2023-06-26 RX ORDER — INFLIXIMAB 100 MG/10ML
5 INJECTION, POWDER, LYOPHILIZED, FOR SOLUTION INTRAVENOUS SEE ADMIN INSTRUCTIONS
COMMUNITY

## 2023-08-08 ENCOUNTER — OFFICE VISIT (OUTPATIENT)
Age: 53
End: 2023-08-08
Payer: COMMERCIAL

## 2023-08-08 VITALS
BODY MASS INDEX: 31.99 KG/M2 | SYSTOLIC BLOOD PRESSURE: 106 MMHG | RESPIRATION RATE: 18 BRPM | TEMPERATURE: 97.8 F | OXYGEN SATURATION: 99 % | WEIGHT: 192 LBS | HEIGHT: 65 IN | DIASTOLIC BLOOD PRESSURE: 83 MMHG | HEART RATE: 88 BPM

## 2023-08-08 DIAGNOSIS — R00.2 PALPITATION: ICD-10-CM

## 2023-08-08 DIAGNOSIS — L40.50 PSORIATIC ARTHRITIS (HCC): ICD-10-CM

## 2023-08-08 DIAGNOSIS — I95.1 ORTHOSTATIC HYPOTENSION: ICD-10-CM

## 2023-08-08 DIAGNOSIS — U09.9 POST COVID-19 CONDITION, UNSPECIFIED: Primary | ICD-10-CM

## 2023-08-08 PROCEDURE — 93000 ELECTROCARDIOGRAM COMPLETE: CPT | Performed by: FAMILY MEDICINE

## 2023-08-08 PROCEDURE — 99214 OFFICE O/P EST MOD 30 MIN: CPT | Performed by: FAMILY MEDICINE

## 2023-08-08 SDOH — ECONOMIC STABILITY: INCOME INSECURITY: HOW HARD IS IT FOR YOU TO PAY FOR THE VERY BASICS LIKE FOOD, HOUSING, MEDICAL CARE, AND HEATING?: NOT HARD AT ALL

## 2023-08-08 SDOH — ECONOMIC STABILITY: FOOD INSECURITY: WITHIN THE PAST 12 MONTHS, THE FOOD YOU BOUGHT JUST DIDN'T LAST AND YOU DIDN'T HAVE MONEY TO GET MORE.: NEVER TRUE

## 2023-08-08 SDOH — ECONOMIC STABILITY: FOOD INSECURITY: WITHIN THE PAST 12 MONTHS, YOU WORRIED THAT YOUR FOOD WOULD RUN OUT BEFORE YOU GOT MONEY TO BUY MORE.: NEVER TRUE

## 2023-08-08 ASSESSMENT — ANXIETY QUESTIONNAIRES
5. BEING SO RESTLESS THAT IT IS HARD TO SIT STILL: 0
4. TROUBLE RELAXING: 0
1. FEELING NERVOUS, ANXIOUS, OR ON EDGE: 0
3. WORRYING TOO MUCH ABOUT DIFFERENT THINGS: 0
2. NOT BEING ABLE TO STOP OR CONTROL WORRYING: 0
6. BECOMING EASILY ANNOYED OR IRRITABLE: 0
IF YOU CHECKED OFF ANY PROBLEMS ON THIS QUESTIONNAIRE, HOW DIFFICULT HAVE THESE PROBLEMS MADE IT FOR YOU TO DO YOUR WORK, TAKE CARE OF THINGS AT HOME, OR GET ALONG WITH OTHER PEOPLE: NOT DIFFICULT AT ALL
GAD7 TOTAL SCORE: 0
7. FEELING AFRAID AS IF SOMETHING AWFUL MIGHT HAPPEN: 0

## 2023-08-08 NOTE — PROGRESS NOTES
Chief Complaint   Patient presents with    Post-COVID Symptoms     Follow-up    1. Have you been to the ER, urgent care clinic since your last visit? Hospitalized since your last visit? No    2. Have you seen or consulted any other health care providers outside of the [address] [place] [address] Avenue since your last visit? Include any pap smears or colon screening.  No

## 2023-08-11 RX ORDER — PANTOPRAZOLE SODIUM 40 MG/1
40 TABLET, DELAYED RELEASE ORAL DAILY
Qty: 90 TABLET | Refills: 1 | Status: SHIPPED | OUTPATIENT
Start: 2023-08-11

## 2023-08-11 RX ORDER — FAMOTIDINE 20 MG/1
20 TABLET, FILM COATED ORAL 2 TIMES DAILY
Qty: 180 TABLET | Refills: 1 | Status: SHIPPED | OUTPATIENT
Start: 2023-08-11

## 2023-08-11 RX ORDER — ESCITALOPRAM OXALATE 20 MG/1
20 TABLET ORAL DAILY
Qty: 90 TABLET | Refills: 1 | Status: SHIPPED | OUTPATIENT
Start: 2023-08-11

## 2023-08-23 DIAGNOSIS — F41.9 ANXIETY DISORDER, UNSPECIFIED TYPE: ICD-10-CM

## 2023-08-23 DIAGNOSIS — F33.0 MILD EPISODE OF RECURRENT MAJOR DEPRESSIVE DISORDER (HCC): ICD-10-CM

## 2023-08-25 RX ORDER — BUPROPION HYDROCHLORIDE 150 MG/1
TABLET, EXTENDED RELEASE ORAL
Qty: 90 TABLET | Refills: 1 | Status: SHIPPED | OUTPATIENT
Start: 2023-08-25

## 2023-08-25 NOTE — TELEPHONE ENCOUNTER
Last appointment: 8/8/23  Next appointment: 9/19/23  Previous refill encounter(s): 5/8/23 #90    Requested Prescriptions     Pending Prescriptions Disp Refills    buPROPion (WELLBUTRIN SR) 150 MG extended release tablet [Pharmacy Med Name: buPROPion HCl ER (SR) 150 MG Oral Tablet Extended Release 12 Hour] 90 tablet 1     Sig: Take 1 tablet by mouth once daily         For Pharmacy Admin Tracking Only    Program: Medication Refill  CPA in place:    Recommendation Provided To:    Intervention Detail: New Rx: 1, reason: Patient Preference  Intervention Accepted By:   Keri Rowell Closed?:    Time Spent (min): 5

## 2023-09-12 RX ORDER — SEMAGLUTIDE 1.34 MG/ML
INJECTION, SOLUTION SUBCUTANEOUS
Qty: 3 ML | Refills: 3 | Status: SHIPPED | OUTPATIENT
Start: 2023-09-12

## 2023-09-16 ENCOUNTER — HOSPITAL ENCOUNTER (INPATIENT)
Facility: HOSPITAL | Age: 53
LOS: 6 days | Discharge: HOME | End: 2023-09-22
Attending: EMERGENCY MEDICINE | Admitting: STUDENT IN AN ORGANIZED HEALTH CARE EDUCATION/TRAINING PROGRAM
Payer: COMMERCIAL

## 2023-09-16 ENCOUNTER — APPOINTMENT (OUTPATIENT)
Facility: HOSPITAL | Age: 53
End: 2023-09-16
Payer: COMMERCIAL

## 2023-09-16 DIAGNOSIS — K51.819 OTHER ULCERATIVE COLITIS WITH COMPLICATION (HCC): ICD-10-CM

## 2023-09-16 DIAGNOSIS — K56.7 ILEUS (HCC): Primary | ICD-10-CM

## 2023-09-16 LAB
ALBUMIN SERPL-MCNC: 3.8 G/DL (ref 3.5–5)
ALBUMIN/GLOB SERPL: 1 (ref 1.1–2.2)
ALP SERPL-CCNC: 60 U/L (ref 45–117)
ALT SERPL-CCNC: 16 U/L (ref 12–78)
ANION GAP SERPL CALC-SCNC: 5 MMOL/L (ref 5–15)
APPEARANCE UR: CLEAR
AST SERPL-CCNC: 11 U/L (ref 15–37)
BACTERIA URNS QL MICRO: NEGATIVE /HPF
BASOPHILS # BLD: 0 K/UL (ref 0–0.1)
BASOPHILS NFR BLD: 0 % (ref 0–1)
BILIRUB SERPL-MCNC: 1.7 MG/DL (ref 0.2–1)
BILIRUB UR QL: NEGATIVE
BUN SERPL-MCNC: 14 MG/DL (ref 6–20)
BUN/CREAT SERPL: 12 (ref 12–20)
CALCIUM SERPL-MCNC: 9.2 MG/DL (ref 8.5–10.1)
CHLORIDE SERPL-SCNC: 106 MMOL/L (ref 97–108)
CO2 SERPL-SCNC: 28 MMOL/L (ref 21–32)
COLOR UR: ABNORMAL
CREAT SERPL-MCNC: 1.15 MG/DL (ref 0.55–1.02)
DIFFERENTIAL METHOD BLD: NORMAL
EOSINOPHIL # BLD: 0.1 K/UL (ref 0–0.4)
EOSINOPHIL NFR BLD: 2 % (ref 0–7)
EPITH CASTS URNS QL MICRO: ABNORMAL /LPF
ERYTHROCYTE [DISTWIDTH] IN BLOOD BY AUTOMATED COUNT: 13.1 % (ref 11.5–14.5)
GLOBULIN SER CALC-MCNC: 4 G/DL (ref 2–4)
GLUCOSE SERPL-MCNC: 90 MG/DL (ref 65–100)
GLUCOSE UR STRIP.AUTO-MCNC: NEGATIVE MG/DL
HCT VFR BLD AUTO: 40.7 % (ref 35–47)
HGB BLD-MCNC: 13.2 G/DL (ref 11.5–16)
HGB UR QL STRIP: ABNORMAL
HYALINE CASTS URNS QL MICRO: ABNORMAL /LPF (ref 0–2)
IMM GRANULOCYTES # BLD AUTO: 0 K/UL (ref 0–0.04)
IMM GRANULOCYTES NFR BLD AUTO: 0 % (ref 0–0.5)
KETONES UR QL STRIP.AUTO: NEGATIVE MG/DL
LEUKOCYTE ESTERASE UR QL STRIP.AUTO: ABNORMAL
LIPASE SERPL-CCNC: 72 U/L (ref 73–393)
LYMPHOCYTES # BLD: 1.2 K/UL (ref 0.8–3.5)
LYMPHOCYTES NFR BLD: 22 % (ref 12–49)
MCH RBC QN AUTO: 30.8 PG (ref 26–34)
MCHC RBC AUTO-ENTMCNC: 32.4 G/DL (ref 30–36.5)
MCV RBC AUTO: 95.1 FL (ref 80–99)
MONOCYTES # BLD: 0.4 K/UL (ref 0–1)
MONOCYTES NFR BLD: 8 % (ref 5–13)
NEUTS SEG # BLD: 3.7 K/UL (ref 1.8–8)
NEUTS SEG NFR BLD: 68 % (ref 32–75)
NITRITE UR QL STRIP.AUTO: NEGATIVE
NRBC # BLD: 0 K/UL (ref 0–0.01)
NRBC BLD-RTO: 0 PER 100 WBC
PH UR STRIP: 5 (ref 5–8)
PLATELET # BLD AUTO: 287 K/UL (ref 150–400)
PMV BLD AUTO: 9.8 FL (ref 8.9–12.9)
POTASSIUM SERPL-SCNC: 3.2 MMOL/L (ref 3.5–5.1)
PROT SERPL-MCNC: 7.8 G/DL (ref 6.4–8.2)
PROT UR STRIP-MCNC: NEGATIVE MG/DL
RBC # BLD AUTO: 4.28 M/UL (ref 3.8–5.2)
RBC #/AREA URNS HPF: ABNORMAL /HPF (ref 0–5)
SODIUM SERPL-SCNC: 139 MMOL/L (ref 136–145)
SP GR UR REFRACTOMETRY: 1.02 (ref 1–1.03)
URINE CULTURE IF INDICATED: ABNORMAL
UROBILINOGEN UR QL STRIP.AUTO: 0.2 EU/DL (ref 0.2–1)
WBC # BLD AUTO: 5.4 K/UL (ref 3.6–11)
WBC URNS QL MICRO: ABNORMAL /HPF (ref 0–4)

## 2023-09-16 PROCEDURE — C9113 INJ PANTOPRAZOLE SODIUM, VIA: HCPCS | Performed by: EMERGENCY MEDICINE

## 2023-09-16 PROCEDURE — 85025 COMPLETE CBC W/AUTO DIFF WBC: CPT

## 2023-09-16 PROCEDURE — 87086 URINE CULTURE/COLONY COUNT: CPT

## 2023-09-16 PROCEDURE — 96374 THER/PROPH/DIAG INJ IV PUSH: CPT

## 2023-09-16 PROCEDURE — 6360000002 HC RX W HCPCS: Performed by: STUDENT IN AN ORGANIZED HEALTH CARE EDUCATION/TRAINING PROGRAM

## 2023-09-16 PROCEDURE — A4216 STERILE WATER/SALINE, 10 ML: HCPCS | Performed by: EMERGENCY MEDICINE

## 2023-09-16 PROCEDURE — 96375 TX/PRO/DX INJ NEW DRUG ADDON: CPT

## 2023-09-16 PROCEDURE — 2580000003 HC RX 258: Performed by: STUDENT IN AN ORGANIZED HEALTH CARE EDUCATION/TRAINING PROGRAM

## 2023-09-16 PROCEDURE — 36415 COLL VENOUS BLD VENIPUNCTURE: CPT

## 2023-09-16 PROCEDURE — 6370000000 HC RX 637 (ALT 250 FOR IP): Performed by: STUDENT IN AN ORGANIZED HEALTH CARE EDUCATION/TRAINING PROGRAM

## 2023-09-16 PROCEDURE — 6360000002 HC RX W HCPCS: Performed by: EMERGENCY MEDICINE

## 2023-09-16 PROCEDURE — 83690 ASSAY OF LIPASE: CPT

## 2023-09-16 PROCEDURE — 99285 EMERGENCY DEPT VISIT HI MDM: CPT

## 2023-09-16 PROCEDURE — 81001 URINALYSIS AUTO W/SCOPE: CPT

## 2023-09-16 PROCEDURE — 6360000004 HC RX CONTRAST MEDICATION: Performed by: EMERGENCY MEDICINE

## 2023-09-16 PROCEDURE — 2580000003 HC RX 258: Performed by: EMERGENCY MEDICINE

## 2023-09-16 PROCEDURE — 80053 COMPREHEN METABOLIC PANEL: CPT

## 2023-09-16 PROCEDURE — 74177 CT ABD & PELVIS W/CONTRAST: CPT

## 2023-09-16 PROCEDURE — 1100000003 HC PRIVATE W/ TELEMETRY

## 2023-09-16 RX ORDER — SODIUM CHLORIDE 9 MG/ML
INJECTION, SOLUTION INTRAVENOUS PRN
Status: DISCONTINUED | OUTPATIENT
Start: 2023-09-16 | End: 2023-09-22 | Stop reason: HOSPADM

## 2023-09-16 RX ORDER — ONDANSETRON 2 MG/ML
4 INJECTION INTRAMUSCULAR; INTRAVENOUS ONCE
Status: COMPLETED | OUTPATIENT
Start: 2023-09-16 | End: 2023-09-16

## 2023-09-16 RX ORDER — MORPHINE SULFATE 2 MG/ML
2 INJECTION, SOLUTION INTRAMUSCULAR; INTRAVENOUS EVERY 4 HOURS PRN
Status: DISCONTINUED | OUTPATIENT
Start: 2023-09-16 | End: 2023-09-22 | Stop reason: HOSPADM

## 2023-09-16 RX ORDER — GABAPENTIN 300 MG/1
300 CAPSULE ORAL DAILY PRN
Status: DISCONTINUED | OUTPATIENT
Start: 2023-09-16 | End: 2023-09-22 | Stop reason: HOSPADM

## 2023-09-16 RX ORDER — POLYETHYLENE GLYCOL 3350 17 G/17G
17 POWDER, FOR SOLUTION ORAL DAILY PRN
Status: DISCONTINUED | OUTPATIENT
Start: 2023-09-16 | End: 2023-09-22 | Stop reason: HOSPADM

## 2023-09-16 RX ORDER — MORPHINE SULFATE 4 MG/ML
4 INJECTION, SOLUTION INTRAMUSCULAR; INTRAVENOUS
Status: DISCONTINUED | OUTPATIENT
Start: 2023-09-16 | End: 2023-09-16

## 2023-09-16 RX ORDER — PHENTERMINE HYDROCHLORIDE 37.5 MG/1
37.5 CAPSULE ORAL EVERY MORNING
Status: DISCONTINUED | OUTPATIENT
Start: 2023-09-17 | End: 2023-09-16 | Stop reason: ALTCHOICE

## 2023-09-16 RX ORDER — ESCITALOPRAM OXALATE 10 MG/1
20 TABLET ORAL DAILY
Status: DISCONTINUED | OUTPATIENT
Start: 2023-09-17 | End: 2023-09-22 | Stop reason: HOSPADM

## 2023-09-16 RX ORDER — ACETAMINOPHEN 325 MG/1
650 TABLET ORAL EVERY 6 HOURS PRN
Status: DISCONTINUED | OUTPATIENT
Start: 2023-09-16 | End: 2023-09-22 | Stop reason: HOSPADM

## 2023-09-16 RX ORDER — HYDROCODONE BITARTRATE AND ACETAMINOPHEN 10; 325 MG/1; MG/1
1 TABLET ORAL EVERY 6 HOURS PRN
Status: DISCONTINUED | OUTPATIENT
Start: 2023-09-16 | End: 2023-09-22 | Stop reason: HOSPADM

## 2023-09-16 RX ORDER — AMITRIPTYLINE HYDROCHLORIDE 10 MG/1
10 TABLET, FILM COATED ORAL NIGHTLY
Status: DISCONTINUED | OUTPATIENT
Start: 2023-09-16 | End: 2023-09-22 | Stop reason: HOSPADM

## 2023-09-16 RX ORDER — 0.9 % SODIUM CHLORIDE 0.9 %
1000 INTRAVENOUS SOLUTION INTRAVENOUS ONCE
Status: COMPLETED | OUTPATIENT
Start: 2023-09-16 | End: 2023-09-16

## 2023-09-16 RX ORDER — FUROSEMIDE 40 MG/1
40 TABLET ORAL 2 TIMES DAILY PRN
Status: DISCONTINUED | OUTPATIENT
Start: 2023-09-16 | End: 2023-09-22 | Stop reason: HOSPADM

## 2023-09-16 RX ORDER — ONDANSETRON 2 MG/ML
4 INJECTION INTRAMUSCULAR; INTRAVENOUS EVERY 6 HOURS PRN
Status: DISCONTINUED | OUTPATIENT
Start: 2023-09-16 | End: 2023-09-22 | Stop reason: HOSPADM

## 2023-09-16 RX ORDER — ROSUVASTATIN CALCIUM 10 MG/1
10 TABLET, COATED ORAL NIGHTLY
Status: DISCONTINUED | OUTPATIENT
Start: 2023-09-17 | End: 2023-09-22 | Stop reason: HOSPADM

## 2023-09-16 RX ORDER — ACETAMINOPHEN 650 MG/1
650 SUPPOSITORY RECTAL EVERY 6 HOURS PRN
Status: DISCONTINUED | OUTPATIENT
Start: 2023-09-16 | End: 2023-09-22 | Stop reason: HOSPADM

## 2023-09-16 RX ORDER — SODIUM CHLORIDE 0.9 % (FLUSH) 0.9 %
5-40 SYRINGE (ML) INJECTION EVERY 12 HOURS SCHEDULED
Status: DISCONTINUED | OUTPATIENT
Start: 2023-09-16 | End: 2023-09-22 | Stop reason: HOSPADM

## 2023-09-16 RX ORDER — BUPROPION HYDROCHLORIDE 150 MG/1
150 TABLET, EXTENDED RELEASE ORAL DAILY
Status: DISCONTINUED | OUTPATIENT
Start: 2023-09-17 | End: 2023-09-22 | Stop reason: HOSPADM

## 2023-09-16 RX ORDER — DICYCLOMINE HCL 20 MG
20 TABLET ORAL EVERY 6 HOURS PRN
Status: DISCONTINUED | OUTPATIENT
Start: 2023-09-16 | End: 2023-09-22 | Stop reason: HOSPADM

## 2023-09-16 RX ORDER — ONDANSETRON 4 MG/1
4 TABLET, ORALLY DISINTEGRATING ORAL EVERY 8 HOURS PRN
Status: DISCONTINUED | OUTPATIENT
Start: 2023-09-16 | End: 2023-09-22 | Stop reason: HOSPADM

## 2023-09-16 RX ORDER — SODIUM CHLORIDE 9 MG/ML
INJECTION, SOLUTION INTRAVENOUS CONTINUOUS
Status: DISCONTINUED | OUTPATIENT
Start: 2023-09-16 | End: 2023-09-20

## 2023-09-16 RX ORDER — ENOXAPARIN SODIUM 100 MG/ML
40 INJECTION SUBCUTANEOUS DAILY
Status: DISCONTINUED | OUTPATIENT
Start: 2023-09-16 | End: 2023-09-16

## 2023-09-16 RX ORDER — SODIUM CHLORIDE 0.9 % (FLUSH) 0.9 %
5-40 SYRINGE (ML) INJECTION PRN
Status: DISCONTINUED | OUTPATIENT
Start: 2023-09-16 | End: 2023-09-22 | Stop reason: HOSPADM

## 2023-09-16 RX ADMIN — IOPAMIDOL 100 ML: 755 INJECTION, SOLUTION INTRAVENOUS at 13:04

## 2023-09-16 RX ADMIN — AMITRIPTYLINE HYDROCHLORIDE 10 MG: 10 TABLET, FILM COATED ORAL at 21:55

## 2023-09-16 RX ADMIN — SODIUM CHLORIDE: 9 INJECTION, SOLUTION INTRAVENOUS at 21:58

## 2023-09-16 RX ADMIN — SODIUM CHLORIDE 1000 ML: 9 INJECTION, SOLUTION INTRAVENOUS at 13:17

## 2023-09-16 RX ADMIN — SODIUM CHLORIDE 4500 MG: 900 INJECTION INTRAVENOUS at 16:25

## 2023-09-16 RX ADMIN — MORPHINE SULFATE 4 MG: 4 INJECTION, SOLUTION INTRAMUSCULAR; INTRAVENOUS at 13:40

## 2023-09-16 RX ADMIN — HYDROCODONE BITARTRATE AND ACETAMINOPHEN 1 TABLET: 10; 325 TABLET ORAL at 18:51

## 2023-09-16 RX ADMIN — SODIUM CHLORIDE, PRESERVATIVE FREE 10 ML: 5 INJECTION INTRAVENOUS at 21:57

## 2023-09-16 RX ADMIN — PIPERACILLIN AND TAZOBACTAM 3375 MG: 3; .375 INJECTION, POWDER, LYOPHILIZED, FOR SOLUTION INTRAVENOUS at 21:54

## 2023-09-16 RX ADMIN — SODIUM CHLORIDE: 9 INJECTION, SOLUTION INTRAVENOUS at 15:20

## 2023-09-16 RX ADMIN — MORPHINE SULFATE 2 MG: 2 INJECTION, SOLUTION INTRAMUSCULAR; INTRAVENOUS at 21:53

## 2023-09-16 RX ADMIN — ACETAMINOPHEN 650 MG: 325 TABLET ORAL at 18:51

## 2023-09-16 RX ADMIN — ONDANSETRON 4 MG: 2 INJECTION INTRAMUSCULAR; INTRAVENOUS at 13:13

## 2023-09-16 RX ADMIN — SODIUM CHLORIDE 40 MG: 9 INJECTION INTRAMUSCULAR; INTRAVENOUS; SUBCUTANEOUS at 13:13

## 2023-09-16 ASSESSMENT — PAIN - FUNCTIONAL ASSESSMENT
PAIN_FUNCTIONAL_ASSESSMENT: ACTIVITIES ARE NOT PREVENTED
PAIN_FUNCTIONAL_ASSESSMENT: PREVENTS OR INTERFERES SOME ACTIVE ACTIVITIES AND ADLS
PAIN_FUNCTIONAL_ASSESSMENT: ACTIVITIES ARE NOT PREVENTED

## 2023-09-16 ASSESSMENT — PAIN SCALES - GENERAL
PAINLEVEL_OUTOF10: 8
PAINLEVEL_OUTOF10: 7
PAINLEVEL_OUTOF10: 2
PAINLEVEL_OUTOF10: 6
PAINLEVEL_OUTOF10: 4
PAINLEVEL_OUTOF10: 4

## 2023-09-16 ASSESSMENT — PAIN DESCRIPTION - ORIENTATION
ORIENTATION: LEFT;RIGHT;LOWER
ORIENTATION: RIGHT;LEFT;MID;LOWER
ORIENTATION: RIGHT;LEFT;LOWER
ORIENTATION: LEFT;RIGHT;LOWER
ORIENTATION: RIGHT;LEFT;MID;LOWER

## 2023-09-16 ASSESSMENT — PAIN DESCRIPTION - DESCRIPTORS
DESCRIPTORS: SHARP
DESCRIPTORS: ACHING
DESCRIPTORS: SHARP
DESCRIPTORS: ACHING
DESCRIPTORS: SHARP

## 2023-09-16 ASSESSMENT — PAIN DESCRIPTION - PAIN TYPE
TYPE: ACUTE PAIN

## 2023-09-16 ASSESSMENT — PAIN DESCRIPTION - LOCATION
LOCATION: ABDOMEN

## 2023-09-16 ASSESSMENT — PAIN DESCRIPTION - FREQUENCY
FREQUENCY: INTERMITTENT

## 2023-09-16 ASSESSMENT — PAIN DESCRIPTION - ONSET
ONSET: SUDDEN

## 2023-09-16 NOTE — H&P
Hospitalist Admission Note    NAME:   Thor Ford   : 1970   MRN: [id number]     Date/Time: 2023 5:05 PM    Patient PCP: Matt Foley MD    Given the patient's current clinical presentation, I have a high level of concern for decompensation if discharged from the emergency department. Complex decision making was performed, which includes reviewing the patient's available past medical records, laboratory results, and x-ray films. My assessment of this patient's clinical condition and my plan of care is as follows. Assessment / Plan:    Acute viral gastroenteritis:  Ileus:  -Clear liquid diet. -IV fluids.  -Less likely to be ulcerative colitis exacerbation.  -Ordered ESR and CRP. -Continue IV antibiotics. -Reviewed CT abdomen. Ulcerative colitis:  -Patient receives Remicade every 8 weeks.  -Hold methotrexate in the hospital.    Hypokalemia:  -Will monitor and supplement as needed. Acute kidney injury:  -Continue IV fluids. History of deep vein thrombosis:  -Continue Xarelto. Anxiety:  Depression:  Psoriatic arthritis:  Peptic ulcer disease:  Chronic hypoxia respiratory failure 2/2 Covid infection  Mild intermittent asthma:  -Continue prior to admission home medications.  -Patient is on 2 L home oxygen as needed at bedtime. Medical Decision Making:   I personally reviewed labs: cbc, bmp  I personally reviewed imaging:CT  I personally reviewed EKG:  Toxic drug monitoring: none  Discussed case with: ED provider. After discussion I am in agreement that acuity of patient's medical condition necessitates hospital stay. Code Status: Full  DVT Prophylaxis: Xarelto  GI Prophylaxis: none  Baseline: Functional and independent at baseline.     Subjective:   CHIEF COMPLAINT: Abdominal pain    HISTORY OF PRESENT ILLNESS:     She is a 29-year-old lady with past medical history significant for psoriatic arthritis, ulcerative colitis, anxiety, depression, right lower extremity DVT Range    Lipase 72 (L) 73 - 393 U/L   Urinalysis with Reflex to Culture    Collection Time: 09/16/23  1:42 PM    Specimen: Urine   Result Value Ref Range    Color, UA YELLOW/STRAW      Appearance CLEAR CLEAR      Specific Gravity, UA 1.020 1.003 - 1.030      pH, Urine 5.0 5.0 - 8.0      Protein, UA Negative NEG mg/dL    Glucose, UA Negative NEG mg/dL    Ketones, Urine Negative NEG mg/dL    Bilirubin Urine Negative NEG      Blood, Urine MODERATE (A) NEG      Urobilinogen, Urine 0.2 0.2 - 1.0 EU/dL    Nitrite, Urine Negative NEG      Leukocyte Esterase, Urine MODERATE (A) NEG      Urine Culture if Indicated URINE CULTURE ORDERED (A) CNI      WBC, UA 10-20 0 - 4 /hpf    RBC, UA 5-10 0 - 5 /hpf    Epithelial Cells UA MODERATE (A) FEW /lpf    BACTERIA, URINE Negative NEG /hpf    Hyaline Casts, UA 0-2 0 - 2 /lpf         CT ABDOMEN PELVIS W IV CONTRAST Additional Contrast? None    Result Date: 9/16/2023  EXAM: CT ABDOMEN PELVIS W IV CONTRAST INDICATION: abd pain, n./v/d, h/o ulcerative colitis s/p colectomy COMPARISON: 4/7/2023 CONTRAST: 100 mL of Isovue-370. ORAL CONTRAST: None. TECHNIQUE: Following the intravenous administration of intravenous contrast, axial images were obtained through the abdomen and pelvis. Coronal and sagittal reconstructions were generated. CT dose reduction was achieved through use of a standardized protocol tailored for this examination and automatic exposure control for dose modulation. FINDINGS: No acute abnormalities in the visualized lung bases. The liver enhances homogeneously. The spleen enhances appropriately. The pancreas enhances homogeneously. No discrete masses. No large calcified stones in the gallbladder. No significant biliary dilatation. The adrenal glands appear normal. The kidneys enhance symmetrically. No hydronephrosis. Small cyst in the upper left kidney is noted. No stones in the bladder. No bladder wall thickening. The uterus is surgically absent.  No free air, free

## 2023-09-16 NOTE — PROGRESS NOTES
End of Shift Note    Bedside shift change report given to En Choudhury RN (oncoming nurse) by Wang Gaspar RN (offgoing nurse). Report included the following information SBAR, Kardex, ED Summary, Intake/Output, MAR, Accordion, and Recent Results    Shift worked:  6p-7p     Shift summary and any significant changes:    Pt admit from ED at 18:00, tele applied, no pain, no n/v, pt states very watery diarrhea, +flatus. Dual skin completed. Concerns for physician to address:  none     Zone phone for oncoming shift:   [phone]       Activity:     Number times ambulated in hallways past shift: 0  Number of times OOB to chair past shift: 0    Cardiac:   Cardiac Monitoring: Yes           Access:  Current line(s): PIV     Genitourinary:   Urinary status: voiding    Respiratory:      Chronic home O2 use?: YES  Incentive spirometer at bedside: NO       GI:     Current diet:  ADULT DIET;  Full Liquid  Passing flatus: YES  Tolerating current diet: YES       Pain Management:   Patient states pain is manageable on current regimen: YES    Skin:     Interventions: increase time out of bed    Patient Safety:  Fall Score:    Interventions: gripper socks and pt to call before getting OOB       Length of Stay:  Expected LOS: 3  Actual LOS: 0      Wang Gaspar RN

## 2023-09-17 LAB
ANION GAP SERPL CALC-SCNC: 7 MMOL/L (ref 5–15)
BACTERIA SPEC CULT: NORMAL
BASOPHILS # BLD: 0 K/UL (ref 0–0.1)
BASOPHILS NFR BLD: 0 % (ref 0–1)
BUN SERPL-MCNC: 10 MG/DL (ref 6–20)
BUN/CREAT SERPL: 9 (ref 12–20)
CALCIUM SERPL-MCNC: 8.4 MG/DL (ref 8.5–10.1)
CC UR VC: NORMAL
CHLORIDE SERPL-SCNC: 110 MMOL/L (ref 97–108)
CO2 SERPL-SCNC: 24 MMOL/L (ref 21–32)
CREAT SERPL-MCNC: 1.08 MG/DL (ref 0.55–1.02)
CRP SERPL-MCNC: 1.46 MG/DL (ref 0–0.6)
DIFFERENTIAL METHOD BLD: ABNORMAL
EOSINOPHIL # BLD: 0.1 K/UL (ref 0–0.4)
EOSINOPHIL NFR BLD: 2 % (ref 0–7)
ERYTHROCYTE [DISTWIDTH] IN BLOOD BY AUTOMATED COUNT: 12.7 % (ref 11.5–14.5)
ERYTHROCYTE [SEDIMENTATION RATE] IN BLOOD: 32 MM/HR (ref 0–30)
GLUCOSE SERPL-MCNC: 83 MG/DL (ref 65–100)
HCT VFR BLD AUTO: 33.9 % (ref 35–47)
HGB BLD-MCNC: 11.3 G/DL (ref 11.5–16)
IMM GRANULOCYTES # BLD AUTO: 0 K/UL (ref 0–0.04)
IMM GRANULOCYTES NFR BLD AUTO: 0 % (ref 0–0.5)
LYMPHOCYTES # BLD: 1.5 K/UL (ref 0.8–3.5)
LYMPHOCYTES NFR BLD: 31 % (ref 12–49)
MCH RBC QN AUTO: 31.2 PG (ref 26–34)
MCHC RBC AUTO-ENTMCNC: 33.3 G/DL (ref 30–36.5)
MCV RBC AUTO: 93.6 FL (ref 80–99)
MONOCYTES # BLD: 0.6 K/UL (ref 0–1)
MONOCYTES NFR BLD: 13 % (ref 5–13)
NEUTS SEG # BLD: 2.5 K/UL (ref 1.8–8)
NEUTS SEG NFR BLD: 54 % (ref 32–75)
NRBC # BLD: 0 K/UL (ref 0–0.01)
NRBC BLD-RTO: 0 PER 100 WBC
PLATELET # BLD AUTO: 231 K/UL (ref 150–400)
PMV BLD AUTO: 9.7 FL (ref 8.9–12.9)
POTASSIUM SERPL-SCNC: 3.3 MMOL/L (ref 3.5–5.1)
RBC # BLD AUTO: 3.62 M/UL (ref 3.8–5.2)
SERVICE CMNT-IMP: NORMAL
SODIUM SERPL-SCNC: 141 MMOL/L (ref 136–145)
WBC # BLD AUTO: 4.7 K/UL (ref 3.6–11)

## 2023-09-17 PROCEDURE — 2580000003 HC RX 258: Performed by: STUDENT IN AN ORGANIZED HEALTH CARE EDUCATION/TRAINING PROGRAM

## 2023-09-17 PROCEDURE — 6360000002 HC RX W HCPCS: Performed by: STUDENT IN AN ORGANIZED HEALTH CARE EDUCATION/TRAINING PROGRAM

## 2023-09-17 PROCEDURE — 36415 COLL VENOUS BLD VENIPUNCTURE: CPT

## 2023-09-17 PROCEDURE — 94761 N-INVAS EAR/PLS OXIMETRY MLT: CPT

## 2023-09-17 PROCEDURE — 94640 AIRWAY INHALATION TREATMENT: CPT

## 2023-09-17 PROCEDURE — 1100000003 HC PRIVATE W/ TELEMETRY

## 2023-09-17 PROCEDURE — 94760 N-INVAS EAR/PLS OXIMETRY 1: CPT

## 2023-09-17 PROCEDURE — 85025 COMPLETE CBC W/AUTO DIFF WBC: CPT

## 2023-09-17 PROCEDURE — 85652 RBC SED RATE AUTOMATED: CPT

## 2023-09-17 PROCEDURE — 86140 C-REACTIVE PROTEIN: CPT

## 2023-09-17 PROCEDURE — 80048 BASIC METABOLIC PNL TOTAL CA: CPT

## 2023-09-17 PROCEDURE — 6370000000 HC RX 637 (ALT 250 FOR IP): Performed by: STUDENT IN AN ORGANIZED HEALTH CARE EDUCATION/TRAINING PROGRAM

## 2023-09-17 RX ADMIN — SODIUM CHLORIDE, PRESERVATIVE FREE 10 ML: 5 INJECTION INTRAVENOUS at 08:34

## 2023-09-17 RX ADMIN — ARFORMOTEROL TARTRATE: 15 SOLUTION RESPIRATORY (INHALATION) at 20:23

## 2023-09-17 RX ADMIN — ONDANSETRON 4 MG: 2 INJECTION INTRAMUSCULAR; INTRAVENOUS at 10:53

## 2023-09-17 RX ADMIN — ESCITALOPRAM OXALATE 20 MG: 10 TABLET ORAL at 08:33

## 2023-09-17 RX ADMIN — PIPERACILLIN AND TAZOBACTAM 3375 MG: 3; .375 INJECTION, POWDER, LYOPHILIZED, FOR SOLUTION INTRAVENOUS at 13:30

## 2023-09-17 RX ADMIN — ARFORMOTEROL TARTRATE: 15 SOLUTION RESPIRATORY (INHALATION) at 08:18

## 2023-09-17 RX ADMIN — MORPHINE SULFATE 2 MG: 2 INJECTION, SOLUTION INTRAMUSCULAR; INTRAVENOUS at 17:42

## 2023-09-17 RX ADMIN — RIVAROXABAN 20 MG: 20 TABLET, FILM COATED ORAL at 17:41

## 2023-09-17 RX ADMIN — PIPERACILLIN AND TAZOBACTAM 3375 MG: 3; .375 INJECTION, POWDER, LYOPHILIZED, FOR SOLUTION INTRAVENOUS at 05:42

## 2023-09-17 RX ADMIN — HYDROCODONE BITARTRATE AND ACETAMINOPHEN 1 TABLET: 10; 325 TABLET ORAL at 19:33

## 2023-09-17 RX ADMIN — PIPERACILLIN AND TAZOBACTAM 3375 MG: 3; .375 INJECTION, POWDER, LYOPHILIZED, FOR SOLUTION INTRAVENOUS at 21:58

## 2023-09-17 RX ADMIN — SODIUM CHLORIDE, PRESERVATIVE FREE 10 ML: 5 INJECTION INTRAVENOUS at 21:58

## 2023-09-17 RX ADMIN — BUPROPION HYDROCHLORIDE 150 MG: 150 TABLET, EXTENDED RELEASE ORAL at 08:38

## 2023-09-17 RX ADMIN — MORPHINE SULFATE 2 MG: 2 INJECTION, SOLUTION INTRAMUSCULAR; INTRAVENOUS at 13:19

## 2023-09-17 RX ADMIN — ACETAMINOPHEN 650 MG: 325 TABLET ORAL at 19:33

## 2023-09-17 RX ADMIN — POTASSIUM BICARBONATE 40 MEQ: 782 TABLET, EFFERVESCENT ORAL at 13:19

## 2023-09-17 ASSESSMENT — PAIN DESCRIPTION - ORIENTATION
ORIENTATION: ANTERIOR

## 2023-09-17 ASSESSMENT — PAIN SCALES - GENERAL
PAINLEVEL_OUTOF10: 6
PAINLEVEL_OUTOF10: 6
PAINLEVEL_OUTOF10: 0
PAINLEVEL_OUTOF10: 6

## 2023-09-17 ASSESSMENT — PAIN DESCRIPTION - DESCRIPTORS
DESCRIPTORS: ACHING

## 2023-09-17 ASSESSMENT — PAIN DESCRIPTION - LOCATION
LOCATION: ABDOMEN

## 2023-09-17 NOTE — PROGRESS NOTES
End of Shift Note     Bedside shift change report given to Dai Martin RN (oncoming nurse) by Alka Meredith RN (offgoing nurse). Report included the following information SBAR, Kardex, ED Summary, Intake/Output, MAR, Accordion, and Recent Results     Shift worked:  7pm-7am   Shift summary and any significant changes:     Pt. Complaining of abd on and off throughout the night. PRN morphine given with positive effect. IV abx infusing at this time. Pt. Is tolerating a full liquid diet. Ambulating in the room with assistace. Concerns for physician to address:  none      Zone phone for oncoming shift:   [phone]         Activity:  Number times ambulated in hallways past shift: 0  Number of times OOB to chair past shift: 0     Cardiac:   Cardiac Monitoring: Yes         Access:  Current line(s): PIV      Genitourinary:   Urinary status: voiding     Respiratory:   Chronic home O2 use?: YES  Incentive spirometer at bedside: NO     GI:  Current diet:  ADULT DIET;  Full Liquid  Passing flatus: YES  Tolerating current diet: YES     Pain Management:   Patient states pain is manageable on current regimen: YES     Skin:  Interventions: increase time out of bed    Patient Safety:  Fall Score:    Interventions: gripper socks and pt to call before getting OOB     Length of Stay:  Expected LOS: 3  Actual LOS: 0    Alka Meredith RN

## 2023-09-17 NOTE — PROGRESS NOTES
End of Shift Note    Bedside shift change report given to PARTHA Guajardo (oncoming nurse) by Maria E Feldman LPN (offgoing nurse). Report included the following information SBAR    Shift worked:  7A-7P     Shift summary and any significant changes:     Patient complained of pain 7/10 in the abdomen throughout the day. PRN morphine was given with positive effect. Patient is tolerating a full liquid diet. Concerns for physician to address:  None      Zone phone for oncoming shift:   [phone]       Activity:     Number times ambulated in hallways past shift: 0  Number of times OOB to chair past shift: 0    Cardiac:   Cardiac Monitoring: No           Access:  Current line(s): PIV     Genitourinary:   Urinary status: voiding    Respiratory:      Chronic home O2 use?: NO  Incentive spirometer at bedside: YES       GI:     Current diet:  ADULT DIET;  Full Liquid  Passing flatus: YES  Tolerating current diet: YES       Pain Management:   Patient states pain is manageable on current regimen: YES    Patient Safety:  Fall Score:    Interventions: gripper socks       Length of Stay:  Expected LOS: 3  Actual LOS: 1      Maria E Feldman LPN

## 2023-09-18 LAB
ANION GAP SERPL CALC-SCNC: 4 MMOL/L (ref 5–15)
BASOPHILS # BLD: 0 K/UL (ref 0–0.1)
BASOPHILS NFR BLD: 0 % (ref 0–1)
BUN SERPL-MCNC: 11 MG/DL (ref 6–20)
BUN/CREAT SERPL: 10 (ref 12–20)
CALCIUM SERPL-MCNC: 8.6 MG/DL (ref 8.5–10.1)
CHLORIDE SERPL-SCNC: 109 MMOL/L (ref 97–108)
CO2 SERPL-SCNC: 25 MMOL/L (ref 21–32)
CREAT SERPL-MCNC: 1.09 MG/DL (ref 0.55–1.02)
DIFFERENTIAL METHOD BLD: ABNORMAL
EOSINOPHIL # BLD: 0.1 K/UL (ref 0–0.4)
EOSINOPHIL NFR BLD: 2 % (ref 0–7)
ERYTHROCYTE [DISTWIDTH] IN BLOOD BY AUTOMATED COUNT: 12.7 % (ref 11.5–14.5)
GLUCOSE SERPL-MCNC: 90 MG/DL (ref 65–100)
HCT VFR BLD AUTO: 34.4 % (ref 35–47)
HGB BLD-MCNC: 11.3 G/DL (ref 11.5–16)
IMM GRANULOCYTES # BLD AUTO: 0 K/UL (ref 0–0.04)
IMM GRANULOCYTES NFR BLD AUTO: 0 % (ref 0–0.5)
LYMPHOCYTES # BLD: 1.6 K/UL (ref 0.8–3.5)
LYMPHOCYTES NFR BLD: 26 % (ref 12–49)
MCH RBC QN AUTO: 30.6 PG (ref 26–34)
MCHC RBC AUTO-ENTMCNC: 32.8 G/DL (ref 30–36.5)
MCV RBC AUTO: 93.2 FL (ref 80–99)
MONOCYTES # BLD: 0.7 K/UL (ref 0–1)
MONOCYTES NFR BLD: 13 % (ref 5–13)
NEUTS SEG # BLD: 3.5 K/UL (ref 1.8–8)
NEUTS SEG NFR BLD: 59 % (ref 32–75)
NRBC # BLD: 0 K/UL (ref 0–0.01)
NRBC BLD-RTO: 0 PER 100 WBC
PLATELET # BLD AUTO: 233 K/UL (ref 150–400)
PMV BLD AUTO: 9.9 FL (ref 8.9–12.9)
POTASSIUM SERPL-SCNC: 3.7 MMOL/L (ref 3.5–5.1)
RBC # BLD AUTO: 3.69 M/UL (ref 3.8–5.2)
SODIUM SERPL-SCNC: 138 MMOL/L (ref 136–145)
WBC # BLD AUTO: 5.9 K/UL (ref 3.6–11)

## 2023-09-18 PROCEDURE — 6360000002 HC RX W HCPCS: Performed by: STUDENT IN AN ORGANIZED HEALTH CARE EDUCATION/TRAINING PROGRAM

## 2023-09-18 PROCEDURE — 87324 CLOSTRIDIUM AG IA: CPT

## 2023-09-18 PROCEDURE — 87449 NOS EACH ORGANISM AG IA: CPT

## 2023-09-18 PROCEDURE — 80048 BASIC METABOLIC PNL TOTAL CA: CPT

## 2023-09-18 PROCEDURE — 87506 IADNA-DNA/RNA PROBE TQ 6-11: CPT

## 2023-09-18 PROCEDURE — 94761 N-INVAS EAR/PLS OXIMETRY MLT: CPT

## 2023-09-18 PROCEDURE — 1100000003 HC PRIVATE W/ TELEMETRY

## 2023-09-18 PROCEDURE — 85025 COMPLETE CBC W/AUTO DIFF WBC: CPT

## 2023-09-18 PROCEDURE — 6370000000 HC RX 637 (ALT 250 FOR IP): Performed by: STUDENT IN AN ORGANIZED HEALTH CARE EDUCATION/TRAINING PROGRAM

## 2023-09-18 PROCEDURE — 2580000003 HC RX 258: Performed by: STUDENT IN AN ORGANIZED HEALTH CARE EDUCATION/TRAINING PROGRAM

## 2023-09-18 PROCEDURE — 94640 AIRWAY INHALATION TREATMENT: CPT

## 2023-09-18 PROCEDURE — 36415 COLL VENOUS BLD VENIPUNCTURE: CPT

## 2023-09-18 RX ADMIN — RIVAROXABAN 20 MG: 20 TABLET, FILM COATED ORAL at 19:25

## 2023-09-18 RX ADMIN — ARFORMOTEROL TARTRATE: 15 SOLUTION RESPIRATORY (INHALATION) at 19:28

## 2023-09-18 RX ADMIN — SODIUM CHLORIDE: 9 INJECTION, SOLUTION INTRAVENOUS at 21:05

## 2023-09-18 RX ADMIN — BUPROPION HYDROCHLORIDE 150 MG: 150 TABLET, EXTENDED RELEASE ORAL at 09:24

## 2023-09-18 RX ADMIN — SODIUM CHLORIDE, PRESERVATIVE FREE 10 ML: 5 INJECTION INTRAVENOUS at 21:03

## 2023-09-18 RX ADMIN — MORPHINE SULFATE 2 MG: 2 INJECTION, SOLUTION INTRAMUSCULAR; INTRAVENOUS at 05:13

## 2023-09-18 RX ADMIN — ROSUVASTATIN CALCIUM 10 MG: 10 TABLET, COATED ORAL at 21:02

## 2023-09-18 RX ADMIN — SODIUM CHLORIDE, PRESERVATIVE FREE 10 ML: 5 INJECTION INTRAVENOUS at 08:43

## 2023-09-18 RX ADMIN — AMITRIPTYLINE HYDROCHLORIDE 10 MG: 10 TABLET, FILM COATED ORAL at 21:02

## 2023-09-18 RX ADMIN — HYDROCODONE BITARTRATE AND ACETAMINOPHEN 1 TABLET: 10; 325 TABLET ORAL at 12:21

## 2023-09-18 RX ADMIN — PIPERACILLIN AND TAZOBACTAM 3375 MG: 3; .375 INJECTION, POWDER, LYOPHILIZED, FOR SOLUTION INTRAVENOUS at 12:43

## 2023-09-18 RX ADMIN — ARFORMOTEROL TARTRATE: 15 SOLUTION RESPIRATORY (INHALATION) at 09:28

## 2023-09-18 RX ADMIN — ESCITALOPRAM OXALATE 20 MG: 10 TABLET ORAL at 08:34

## 2023-09-18 RX ADMIN — PIPERACILLIN AND TAZOBACTAM 3375 MG: 3; .375 INJECTION, POWDER, LYOPHILIZED, FOR SOLUTION INTRAVENOUS at 05:03

## 2023-09-18 RX ADMIN — SODIUM CHLORIDE: 9 INJECTION, SOLUTION INTRAVENOUS at 12:42

## 2023-09-18 RX ADMIN — PIPERACILLIN AND TAZOBACTAM 3375 MG: 3; .375 INJECTION, POWDER, LYOPHILIZED, FOR SOLUTION INTRAVENOUS at 21:02

## 2023-09-18 ASSESSMENT — PAIN SCALES - GENERAL
PAINLEVEL_OUTOF10: 3
PAINLEVEL_OUTOF10: 7
PAINLEVEL_OUTOF10: 0
PAINLEVEL_OUTOF10: 5

## 2023-09-18 ASSESSMENT — PAIN - FUNCTIONAL ASSESSMENT: PAIN_FUNCTIONAL_ASSESSMENT: ACTIVITIES ARE NOT PREVENTED

## 2023-09-18 ASSESSMENT — PAIN DESCRIPTION - DESCRIPTORS
DESCRIPTORS: ACHING
DESCRIPTORS: SHARP

## 2023-09-18 ASSESSMENT — PAIN DESCRIPTION - ORIENTATION: ORIENTATION: LOWER

## 2023-09-18 ASSESSMENT — PAIN DESCRIPTION - LOCATION
LOCATION: ABDOMEN
LOCATION: ABDOMEN

## 2023-09-18 NOTE — CONSULTS
GI CONSULTATION NOTE  Tiago Crowder NP  [phone] NP in-hospital cell phone M-F until 4:30  After 5pm or on weekends, please call  for physician on call    NAME: Yecenia Penny   :  1970   MRN:  [id number]   Attending: Dr. Esme Mckenzie  Primary GI: Dr. Milla Woodward  Date/Time:  2023 2:08 PM  Assessment:   Acute n/v/d  Hx of UC  3-4 days of n/v/d and abd pain following food at a [address]  Hx of UC on Remicade q 8 weeks  CRP 1.46, Sed rate 32  No leukocytosis  CT abd/pelvis: Multiple dilated loops of small bowel with air-fluid levels. No transition point is identified. Findings may represent ileus. Pouchoscopy 2022: normal appearing ileoanal pouch without active inflammation, small internal hemorrhoids - evidence of pouchitis with erosions - treated with Cipro    Hx of DVT - on Xarelto  Plan:   Suspect symptoms are secondary to viral gastroenteritis vs UC flare  Ordered stool studies to r/o infectious process  Agree with IV abx  IVF  Diet as tolerated  Rest of supportive care per primary team    Plan discussed with Dr. Ashley Arora:     HISTORY OF PRESENT ILLNESS:     Yecenia Penny is an 52 y.o. female who we are asked to see for complaint of UC vs viral gastroenteritis. Patient presented to the ED with 3-4 days of acute nausea/vomiting/diarrhea and abd pain after eating at a [address]. No fevers. Prior to this, she felt things were going well with regards to her UC. Hx of episodic pouchitis and stenosis which was dilated back in  by Dr. Zulema Mtz. Also hx of proctocolectomy with ileoanal pouch at [facility] in . She is currently being managed with Remicade. She has not missed any Remicade infusions. She is due for one today though. She did have one episode of blood in her stool a couple weeks ago. Usually has about 6-8 BMs per day. Patient was admitted in April of this year with similar symptoms following eating from a fast food restaurant.  She feels she is finding

## 2023-09-18 NOTE — PROGRESS NOTES
End of Shift Note    Bedside shift change report given to Araceli Sprague RN (oncoming nurse) by Eyad Sepulveda RN (offgoing nurse). Report included the following information SBAR, Kardex, Intake/Output, MAR, and Recent Results    Shift worked:  [id number]     Shift summary and any significant changes:     Complained of abdominal pain - Morphine IV given as ordered. Bowels are hyperactive with 2 episodes of watery stool overnight. Concerns for physician to address:  Plan of care.      Zone phone for oncoming shift:   [phone]              Length of Stay:  Expected LOS: 3  Actual LOS: 2      Eyad Sepulveda RN

## 2023-09-18 NOTE — CARE COORDINATION
Care Management Initial Assessment       RUR: 10%  Readmission? No  1st IM letter given? N/A  1st  letter given: N/A    Initial note - 1435 PM: Chart reviewed. CM met with pt at the bedside to introduce self and role. Verified contact information and demographics. Pt resides with her mother and spouse in a two level home with 5 GIANCARLO. Pt PCP is Dr. Katie Helm with last visit being in July of 2023. Pt has a PCP appointment scheduled for tomorrow. Preferred pharmacy is [facility] on [address] Pt has no hx of HH services or SNF stay. Pt is independent with ADL's and has no DME needs. Pt occasionally uses 2L of O2 at home when she is active and feels weak. Pt is not an active  and has not driven for the past two years. Pt family to transport her home upon d/c. Full assessment below:      09/18/23 1430   Service Assessment   Patient Orientation Alert and Oriented;Person;Place;Situation;Self   Cognition Alert   History Provided By Patient   Primary [address] Parent;Spouse/Significant Other   Patient's Healthcare Decision Maker is: Named in [address]    PCP Verified by CM Yes  Orcindy Wright)   Last Visit to PCP Within last 3 months  (pt has an appointment scheduled tomorrow (9/19))   Prior Functional Level Independent in ADLs/IADLs   Current Functional Level Independent in ADLs/IADLs   Can patient return to prior living arrangement Yes   Ability to make needs known: Good   Family able to assist with home care needs: Yes   Would you like for me to discuss the discharge plan with any other family members/significant others, and if so, who?  Yes   Financial Resources Other (Comment)  ([employer])   Social/Functional History   Lives With Spouse;Parent   Type of Home House   Home Layout Two level   Home Access Stairs to enter with rails   Entrance Stairs - Number of Steps 5 GIANCARLO   Home Equipment Oxygen  (Pt uses 2L of O2 when needed at home)   Receives Help From [facility]   ADL Assistance [address]   Ambulation Assistance Independent   Transfer Assistance Independent   Active  No  (Pt has not driven in two years)   Discharge Planning   Type of Residence House   Living Arrangements Spouse/Significant Other;Parent   Current Services Prior To Admission Durable Medical Equipment   Current DME Prior to [address] (Comment)  (2L at home as needed)   Potential Assistance Needed N/A   DME Ordered?  No   Potential Assistance Purchasing Medications No   Type of Home Care Services None   Patient expects to be discharged to: [facility]    [phone]

## 2023-09-19 LAB
ANION GAP SERPL CALC-SCNC: 4 MMOL/L (ref 5–15)
BASOPHILS # BLD: 0 K/UL (ref 0–0.1)
BASOPHILS NFR BLD: 0 % (ref 0–1)
BUN SERPL-MCNC: 6 MG/DL (ref 6–20)
BUN/CREAT SERPL: 8 (ref 12–20)
C COLI+JEJUNI TUF STL QL NAA+PROBE: NEGATIVE
C DIFF GDH STL QL: NEGATIVE
C DIFF TOX A+B STL QL IA: NEGATIVE
C DIFF TOXIN INTERPRETATION: NORMAL
CALCIUM SERPL-MCNC: 8.4 MG/DL (ref 8.5–10.1)
CHLORIDE SERPL-SCNC: 112 MMOL/L (ref 97–108)
CO2 SERPL-SCNC: 26 MMOL/L (ref 21–32)
CREAT SERPL-MCNC: 0.8 MG/DL (ref 0.55–1.02)
DIFFERENTIAL METHOD BLD: ABNORMAL
EC STX1+STX2 GENES STL QL NAA+PROBE: NEGATIVE
EOSINOPHIL # BLD: 0.1 K/UL (ref 0–0.4)
EOSINOPHIL NFR BLD: 1 % (ref 0–7)
ERYTHROCYTE [DISTWIDTH] IN BLOOD BY AUTOMATED COUNT: 12.3 % (ref 11.5–14.5)
ETEC ELTA+ESTB GENES STL QL NAA+PROBE: NEGATIVE
GLUCOSE SERPL-MCNC: 125 MG/DL (ref 65–100)
HCT VFR BLD AUTO: 32.7 % (ref 35–47)
HGB BLD-MCNC: 10.9 G/DL (ref 11.5–16)
IMM GRANULOCYTES # BLD AUTO: 0 K/UL (ref 0–0.04)
IMM GRANULOCYTES NFR BLD AUTO: 0 % (ref 0–0.5)
LYMPHOCYTES # BLD: 1.4 K/UL (ref 0.8–3.5)
LYMPHOCYTES NFR BLD: 28 % (ref 12–49)
MCH RBC QN AUTO: 30.8 PG (ref 26–34)
MCHC RBC AUTO-ENTMCNC: 33.3 G/DL (ref 30–36.5)
MCV RBC AUTO: 92.4 FL (ref 80–99)
METAMYELOCYTES NFR BLD MANUAL: 1 %
MONOCYTES # BLD: 0.7 K/UL (ref 0–1)
MONOCYTES NFR BLD: 13 % (ref 5–13)
MYELOCYTES NFR BLD MANUAL: 1 %
NEUTS SEG # BLD: 2.8 K/UL (ref 1.8–8)
NEUTS SEG NFR BLD: 56 % (ref 32–75)
NRBC # BLD: 0 K/UL (ref 0–0.01)
NRBC BLD-RTO: 0 PER 100 WBC
P SHIGELLOIDES DNA STL QL NAA+PROBE: NEGATIVE
PLATELET # BLD AUTO: 220 K/UL (ref 150–400)
PMV BLD AUTO: 9.5 FL (ref 8.9–12.9)
POTASSIUM SERPL-SCNC: 3 MMOL/L (ref 3.5–5.1)
RBC # BLD AUTO: 3.54 M/UL (ref 3.8–5.2)
RBC MORPH BLD: ABNORMAL
SALMONELLA SP SPAO STL QL NAA+PROBE: NEGATIVE
SHIGELLA SP+EIEC IPAH STL QL NAA+PROBE: NEGATIVE
SODIUM SERPL-SCNC: 142 MMOL/L (ref 136–145)
V CHOL+PARA+VUL DNA STL QL NAA+NON-PROBE: NEGATIVE
WBC # BLD AUTO: 5 K/UL (ref 3.6–11)
WBC MORPH BLD: ABNORMAL
Y ENTEROCOL DNA STL QL NAA+NON-PROBE: NEGATIVE

## 2023-09-19 PROCEDURE — 85025 COMPLETE CBC W/AUTO DIFF WBC: CPT

## 2023-09-19 PROCEDURE — 6370000000 HC RX 637 (ALT 250 FOR IP): Performed by: STUDENT IN AN ORGANIZED HEALTH CARE EDUCATION/TRAINING PROGRAM

## 2023-09-19 PROCEDURE — 6360000002 HC RX W HCPCS: Performed by: STUDENT IN AN ORGANIZED HEALTH CARE EDUCATION/TRAINING PROGRAM

## 2023-09-19 PROCEDURE — 94761 N-INVAS EAR/PLS OXIMETRY MLT: CPT

## 2023-09-19 PROCEDURE — 80048 BASIC METABOLIC PNL TOTAL CA: CPT

## 2023-09-19 PROCEDURE — 1100000003 HC PRIVATE W/ TELEMETRY

## 2023-09-19 PROCEDURE — 2580000003 HC RX 258: Performed by: STUDENT IN AN ORGANIZED HEALTH CARE EDUCATION/TRAINING PROGRAM

## 2023-09-19 PROCEDURE — 36415 COLL VENOUS BLD VENIPUNCTURE: CPT

## 2023-09-19 PROCEDURE — 94640 AIRWAY INHALATION TREATMENT: CPT

## 2023-09-19 RX ADMIN — POTASSIUM BICARBONATE 50 MEQ: 782 TABLET, EFFERVESCENT ORAL at 11:06

## 2023-09-19 RX ADMIN — MORPHINE SULFATE 2 MG: 2 INJECTION, SOLUTION INTRAMUSCULAR; INTRAVENOUS at 08:57

## 2023-09-19 RX ADMIN — SODIUM CHLORIDE, PRESERVATIVE FREE 10 ML: 5 INJECTION INTRAVENOUS at 21:35

## 2023-09-19 RX ADMIN — MORPHINE SULFATE 2 MG: 2 INJECTION, SOLUTION INTRAMUSCULAR; INTRAVENOUS at 18:10

## 2023-09-19 RX ADMIN — ARFORMOTEROL TARTRATE: 15 SOLUTION RESPIRATORY (INHALATION) at 07:46

## 2023-09-19 RX ADMIN — ROSUVASTATIN CALCIUM 10 MG: 10 TABLET, COATED ORAL at 21:35

## 2023-09-19 RX ADMIN — SODIUM CHLORIDE: 9 INJECTION, SOLUTION INTRAVENOUS at 21:39

## 2023-09-19 RX ADMIN — PIPERACILLIN AND TAZOBACTAM 3375 MG: 3; .375 INJECTION, POWDER, LYOPHILIZED, FOR SOLUTION INTRAVENOUS at 04:55

## 2023-09-19 RX ADMIN — ESCITALOPRAM OXALATE 20 MG: 10 TABLET ORAL at 09:00

## 2023-09-19 RX ADMIN — PIPERACILLIN AND TAZOBACTAM 3375 MG: 3; .375 INJECTION, POWDER, LYOPHILIZED, FOR SOLUTION INTRAVENOUS at 13:15

## 2023-09-19 RX ADMIN — BUPROPION HYDROCHLORIDE 150 MG: 150 TABLET, EXTENDED RELEASE ORAL at 09:00

## 2023-09-19 RX ADMIN — AMITRIPTYLINE HYDROCHLORIDE 10 MG: 10 TABLET, FILM COATED ORAL at 21:35

## 2023-09-19 RX ADMIN — ARFORMOTEROL TARTRATE: 15 SOLUTION RESPIRATORY (INHALATION) at 19:36

## 2023-09-19 RX ADMIN — RIVAROXABAN 20 MG: 20 TABLET, FILM COATED ORAL at 18:06

## 2023-09-19 ASSESSMENT — PAIN DESCRIPTION - ORIENTATION
ORIENTATION: MID;LOWER
ORIENTATION: MID;LOWER
ORIENTATION: ANTERIOR
ORIENTATION: MID;LOWER
ORIENTATION: LOWER;MID;UPPER

## 2023-09-19 ASSESSMENT — PAIN SCALES - GENERAL
PAINLEVEL_OUTOF10: 0
PAINLEVEL_OUTOF10: 5
PAINLEVEL_OUTOF10: 2
PAINLEVEL_OUTOF10: 5

## 2023-09-19 ASSESSMENT — PAIN DESCRIPTION - DESCRIPTORS
DESCRIPTORS: ACHING;CRAMPING
DESCRIPTORS: ACHING;TENDER;PRESSURE
DESCRIPTORS: ACHING
DESCRIPTORS: ACHING;CRAMPING;PRESSURE
DESCRIPTORS: ACHING;CRAMPING

## 2023-09-19 ASSESSMENT — PAIN - FUNCTIONAL ASSESSMENT
PAIN_FUNCTIONAL_ASSESSMENT: ACTIVITIES ARE NOT PREVENTED
PAIN_FUNCTIONAL_ASSESSMENT: ACTIVITIES ARE NOT PREVENTED

## 2023-09-19 ASSESSMENT — PAIN DESCRIPTION - ONSET
ONSET: PROGRESSIVE
ONSET: ON-GOING

## 2023-09-19 ASSESSMENT — PAIN DESCRIPTION - LOCATION
LOCATION: ABDOMEN

## 2023-09-19 ASSESSMENT — PAIN DESCRIPTION - FREQUENCY
FREQUENCY: CONTINUOUS
FREQUENCY: CONTINUOUS

## 2023-09-19 ASSESSMENT — PAIN DESCRIPTION - PAIN TYPE: TYPE: ACUTE PAIN

## 2023-09-19 NOTE — PROGRESS NOTES
Physician Progress Note      Bud Resendiz  [facility] #:                  [id number]  :                       1970  ADMIT DATE:       2023 11:30 AM  [address] [facility] DATE:  RESPONDING  PROVIDER #:        Kita Mcnamara MD          QUERY TEXT:    45yoF patient admitted with Acute viral gastroenteritis. Noted documentation   of Acute Kidney Injury. Creatinine on admission of 1.15 and prior Creatinine   1.07 on 5/15/23. In order to support the diagnosis of SUSANA, please include   additional clinical indicators in your documentation. ? Or please document if   the diagnosis of SUSANA has been ruled out after further study. The medical record reflects the following:  Risk Factors: hx ulcerative colitis on Remicade and Methotrexate, viral   gastroenteritis with abdominal pain & nausea  Clinical Indicators: presented w/ worsening vomiting, lower abd pain and   distension, diarrhea, dysuria, dyspepsia, and chills;  Creatinine 1.15 (*) >   1.08 > 1.09. Prior Crea 1.07 in May 15, 2023. Treatment: given 1L IVF bolus in ED, monitor labs. Defined by Kidney Disease Improving Global Outcomes (KDIGO) clinical practice   guideline for acute kidney injury:  -Increase in SCr by greater than or equal to 0.3 mg/dl within 48 hours; or  -Increase or decrease in SCr to greater than or equal to 1.5 times baseline,   which is known or presumed to have occurred within the prior 7 days; or  -Urine volume < 0.5ml/kg/h for 6 hours. Options provided:  -- Acute kidney injury evidenced by, Please document evidence as well as a   numerical baseline creatinine, if known.   -- Acute kidney injury ruled out after study  -- Other - I will add my own diagnosis  -- Disagree - Not applicable / Not valid  -- Disagree - Clinically unable to determine / Unknown  -- Refer to Clinical Documentation Reviewer    PROVIDER RESPONSE TEXT:    This patient has acute kidney injury as evidenced by Serum Cr improved from   1.15 to 0.80    Query

## 2023-09-19 NOTE — PROGRESS NOTES
GI PROGRESS NOTE  Isabel Winter, NP  [phone] NP in-hospital cell phone M-F until 4:30  After 5pm or on weekends, please call  for physician on call    NAME:Jacey Coburn :1970 JODI:[id number]   ATTG: [unfilled]   PCP: Becca Aleman MD  Date/Time:  2023 10:17 AM     Primary GI: Dr. Spencer Larsen    Reason for following: Hx of UC    Assessment:   Acute n/v/d  Hx of UC  3-4 days of n/v/d and abd pain following food at a [address] [place] [address]  Hx of UC on Remicade q 8 weeks  CRP 1.46, Sed rate 32  No leukocytosis  CT abd/pelvis: Multiple dilated loops of small bowel with air-fluid levels. No transition point is identified. Findings may represent ileus. Pouchoscopy 2022: normal appearing ileoanal pouch without active inflammation, small internal hemorrhoids - evidence of pouchitis with erosions - treated with Cipro     Hx of DVT - on Xarelto    Plan:   Suspect symptoms are secondary to viral gastroenteritis vs UC flare  F/U stool studies  Agree with IV abx  IVF  Diet as tolerated  If no improvement in the course as expected, can consider pouchoscopy in the future  Rest of supportive care per primary team     Plan discussed with Dr. Carola Cervantes    Subjective:   Discussed with RN events overnight. Continues to endorse abd tenderness and diarrhea. Tolerating a full liquid diet      Review of Systems:  Symptom Y/N Comments  Symptom Y/N Comments   Fever/Chills N   Chest Pain N    Cough N   Headaches N    Sputum N   Joint Pain N    SOB/ALEJANDRO N   Pruritis/Rash N    Tolerating Diet Y   Other       Could NOT obtain due to:      Objective:   VITALS:   Last 24hrs VS reviewed since prior progress note. Most recent are:  Vitals:    23 0746   BP:    Pulse: 82   Resp: 18   Temp:    SpO2: 96%     No intake or output data in the 24 hours ending 23 1017  PHYSICAL EXAM:  General: Resting in bed, Alert, cooperative, no acute distress    HEENT: NC, Atraumatic. Anicteric sclerae. Lungs:  CTA Bilaterally.  No chloride infusion   IntraVENous PRN    ondansetron (ZOFRAN-ODT) disintegrating tablet 4 mg  4 mg Oral Q8H PRN    Or    ondansetron (ZOFRAN) injection 4 mg  4 mg IntraVENous Q6H PRN    polyethylene glycol (GLYCOLAX) packet 17 g  17 g Oral Daily PRN    acetaminophen (TYLENOL) tablet 650 mg  650 mg Oral Q6H PRN    Or    acetaminophen (TYLENOL) suppository 650 mg  650 mg Rectal Q6H PRN    0.9 % sodium chloride infusion   IntraVENous Continuous    amitriptyline (ELAVIL) tablet 10 mg  10 mg Oral Nightly    buPROPion (WELLBUTRIN SR) extended release tablet 150 mg  150 mg Oral Daily    dicyclomine (BENTYL) tablet 20 mg  20 mg Oral Q6H PRN    escitalopram (LEXAPRO) tablet 20 mg  20 mg Oral Daily    furosemide (LASIX) tablet 40 mg  40 mg Oral BID PRN    gabapentin (NEURONTIN) capsule 300 mg  300 mg Oral Daily PRN    rivaroxaban (XARELTO) tablet 20 mg  20 mg Oral Daily    rosuvastatin (CRESTOR) tablet 10 mg  10 mg Oral Nightly    HYDROcodone-acetaminophen (NORCO)  MG per tablet 1 tablet  1 tablet Oral Q6H PRN    morphine (PF) injection 2 mg  2 mg IntraVENous Q4H PRN    piperacillin-tazobactam (ZOSYN) 3,375 mg in sodium chloride 0.9 % 50 mL IVPB (mini-bag)  3,375 mg IntraVENous Q8H    arformoterol 15 mcg-budesonide 0.25 mg neb solution   Nebulization BID RT

## 2023-09-19 NOTE — PROGRESS NOTES
End of Shift Note    Bedside shift change report given to Jf Banks (oncoming nurse) by Priscilla Wallace RN (offgoing nurse). Report included the following information SBAR, Kardex, and MAR    Shift worked:  [id number]     Shift summary and any significant changes:          Concerns for physician to address:  none     Zone phone for oncoming shift:   [phone]       Activity:     Number times ambulated in hallways past shift: 0  Number of times OOB to chair past shift: 0    Cardiac:   Cardiac Monitoring: No           Access:  Current line(s): PIV     Genitourinary:   Urinary status: voiding    Respiratory:      Chronic home O2 use?: NO  Incentive spirometer at bedside: YES       GI:     Current diet:  ADULT DIET;  Full Liquid  Passing flatus: YES  Tolerating current diet: YES       Pain Management:   Patient states pain is manageable on current regimen: YES    Skin:     Interventions: increase time out of bed    Patient Safety:  Fall Score:    Interventions: gripper socks       Length of Stay:  Expected LOS: 3  Actual LOS: 2      Priscilla Wallace RN

## 2023-09-19 NOTE — PROGRESS NOTES
End of Shift Note    Bedside shift change report given to PARTHA Nails (oncoming nurse) by Erika Chu RN (offgoing nurse). Report included the following information SBAR, Kardex, Intake/Output, MAR, and Recent Results    Shift worked:  0011-1361     Shift summary and any significant changes:     Patient had no complaints overnight.      Concerns for physician to address:       Zone phone for oncoming shift:              Length of Stay:  Expected LOS: 3  Actual LOS: 3      Erika Chu RN

## 2023-09-20 LAB
ANION GAP SERPL CALC-SCNC: 5 MMOL/L (ref 5–15)
BASOPHILS # BLD: 0.1 K/UL (ref 0–0.1)
BASOPHILS NFR BLD: 1 % (ref 0–1)
BUN SERPL-MCNC: 8 MG/DL (ref 6–20)
BUN/CREAT SERPL: 11 (ref 12–20)
CALCIUM SERPL-MCNC: 8.2 MG/DL (ref 8.5–10.1)
CHLORIDE SERPL-SCNC: 110 MMOL/L (ref 97–108)
CO2 SERPL-SCNC: 27 MMOL/L (ref 21–32)
CREAT SERPL-MCNC: 0.74 MG/DL (ref 0.55–1.02)
DIFFERENTIAL METHOD BLD: ABNORMAL
EOSINOPHIL # BLD: 0.1 K/UL (ref 0–0.4)
EOSINOPHIL NFR BLD: 1 % (ref 0–7)
ERYTHROCYTE [DISTWIDTH] IN BLOOD BY AUTOMATED COUNT: 12.6 % (ref 11.5–14.5)
GLUCOSE SERPL-MCNC: 91 MG/DL (ref 65–100)
HCT VFR BLD AUTO: 31.8 % (ref 35–47)
HGB BLD-MCNC: 10.5 G/DL (ref 11.5–16)
IMM GRANULOCYTES # BLD AUTO: 0 K/UL (ref 0–0.04)
IMM GRANULOCYTES NFR BLD AUTO: 0 % (ref 0–0.5)
LYMPHOCYTES # BLD: 2.2 K/UL (ref 0.8–3.5)
LYMPHOCYTES NFR BLD: 48 % (ref 12–49)
MCH RBC QN AUTO: 31.2 PG (ref 26–34)
MCHC RBC AUTO-ENTMCNC: 33 G/DL (ref 30–36.5)
MCV RBC AUTO: 94.4 FL (ref 80–99)
MONOCYTES # BLD: 0.5 K/UL (ref 0–1)
MONOCYTES NFR BLD: 10 % (ref 5–13)
NEUTS SEG # BLD: 2 K/UL (ref 1.8–8)
NEUTS SEG NFR BLD: 40 % (ref 32–75)
NRBC # BLD: 0 K/UL (ref 0–0.01)
NRBC BLD-RTO: 0 PER 100 WBC
PLATELET # BLD AUTO: 228 K/UL (ref 150–400)
PMV BLD AUTO: 9.5 FL (ref 8.9–12.9)
POTASSIUM SERPL-SCNC: 3.5 MMOL/L (ref 3.5–5.1)
RBC # BLD AUTO: 3.37 M/UL (ref 3.8–5.2)
RBC MORPH BLD: ABNORMAL
SODIUM SERPL-SCNC: 142 MMOL/L (ref 136–145)
WBC # BLD AUTO: 4.9 K/UL (ref 3.6–11)

## 2023-09-20 PROCEDURE — 80048 BASIC METABOLIC PNL TOTAL CA: CPT

## 2023-09-20 PROCEDURE — 2580000003 HC RX 258: Performed by: STUDENT IN AN ORGANIZED HEALTH CARE EDUCATION/TRAINING PROGRAM

## 2023-09-20 PROCEDURE — 94640 AIRWAY INHALATION TREATMENT: CPT

## 2023-09-20 PROCEDURE — 6370000000 HC RX 637 (ALT 250 FOR IP): Performed by: STUDENT IN AN ORGANIZED HEALTH CARE EDUCATION/TRAINING PROGRAM

## 2023-09-20 PROCEDURE — 94761 N-INVAS EAR/PLS OXIMETRY MLT: CPT

## 2023-09-20 PROCEDURE — 36415 COLL VENOUS BLD VENIPUNCTURE: CPT

## 2023-09-20 PROCEDURE — 6360000002 HC RX W HCPCS: Performed by: STUDENT IN AN ORGANIZED HEALTH CARE EDUCATION/TRAINING PROGRAM

## 2023-09-20 PROCEDURE — 85025 COMPLETE CBC W/AUTO DIFF WBC: CPT

## 2023-09-20 PROCEDURE — 1100000003 HC PRIVATE W/ TELEMETRY

## 2023-09-20 RX ADMIN — ARFORMOTEROL TARTRATE: 15 SOLUTION RESPIRATORY (INHALATION) at 20:01

## 2023-09-20 RX ADMIN — MORPHINE SULFATE 2 MG: 2 INJECTION, SOLUTION INTRAMUSCULAR; INTRAVENOUS at 15:00

## 2023-09-20 RX ADMIN — MORPHINE SULFATE 2 MG: 2 INJECTION, SOLUTION INTRAMUSCULAR; INTRAVENOUS at 06:21

## 2023-09-20 RX ADMIN — RIVAROXABAN 20 MG: 20 TABLET, FILM COATED ORAL at 18:59

## 2023-09-20 RX ADMIN — SODIUM CHLORIDE, PRESERVATIVE FREE 10 ML: 5 INJECTION INTRAVENOUS at 21:35

## 2023-09-20 RX ADMIN — BUPROPION HYDROCHLORIDE 150 MG: 150 TABLET, EXTENDED RELEASE ORAL at 09:34

## 2023-09-20 RX ADMIN — SODIUM CHLORIDE: 9 INJECTION, SOLUTION INTRAVENOUS at 06:18

## 2023-09-20 RX ADMIN — MORPHINE SULFATE 2 MG: 2 INJECTION, SOLUTION INTRAMUSCULAR; INTRAVENOUS at 20:46

## 2023-09-20 RX ADMIN — ESCITALOPRAM OXALATE 20 MG: 10 TABLET ORAL at 09:34

## 2023-09-20 RX ADMIN — ARFORMOTEROL TARTRATE: 15 SOLUTION RESPIRATORY (INHALATION) at 09:00

## 2023-09-20 RX ADMIN — ROSUVASTATIN CALCIUM 10 MG: 10 TABLET, COATED ORAL at 20:46

## 2023-09-20 RX ADMIN — AMITRIPTYLINE HYDROCHLORIDE 10 MG: 10 TABLET, FILM COATED ORAL at 21:35

## 2023-09-20 ASSESSMENT — PAIN - FUNCTIONAL ASSESSMENT
PAIN_FUNCTIONAL_ASSESSMENT: ACTIVITIES ARE NOT PREVENTED
PAIN_FUNCTIONAL_ASSESSMENT: ACTIVITIES ARE NOT PREVENTED
PAIN_FUNCTIONAL_ASSESSMENT: PREVENTS OR INTERFERES SOME ACTIVE ACTIVITIES AND ADLS

## 2023-09-20 ASSESSMENT — PAIN DESCRIPTION - LOCATION
LOCATION: ABDOMEN

## 2023-09-20 ASSESSMENT — PAIN DESCRIPTION - PAIN TYPE
TYPE: ACUTE PAIN

## 2023-09-20 ASSESSMENT — PAIN DESCRIPTION - ORIENTATION
ORIENTATION: MID;RIGHT
ORIENTATION: MID
ORIENTATION: LOWER;MID
ORIENTATION: LOWER;MID;ANTERIOR

## 2023-09-20 ASSESSMENT — PAIN SCALES - GENERAL
PAINLEVEL_OUTOF10: 7
PAINLEVEL_OUTOF10: 4
PAINLEVEL_OUTOF10: 5
PAINLEVEL_OUTOF10: 7
PAINLEVEL_OUTOF10: 0

## 2023-09-20 ASSESSMENT — PAIN DESCRIPTION - ONSET
ONSET: ON-GOING
ONSET: ON-GOING
ONSET: GRADUAL

## 2023-09-20 ASSESSMENT — PAIN DESCRIPTION - DESCRIPTORS
DESCRIPTORS: ACHING;TENDER
DESCRIPTORS: SHARP
DESCRIPTORS: ACHING;CRAMPING
DESCRIPTORS: ACHING

## 2023-09-20 ASSESSMENT — PAIN DESCRIPTION - FREQUENCY
FREQUENCY: CONTINUOUS
FREQUENCY: INTERMITTENT

## 2023-09-21 LAB
ANION GAP SERPL CALC-SCNC: 6 MMOL/L (ref 5–15)
BASOPHILS # BLD: 0 K/UL (ref 0–0.1)
BASOPHILS NFR BLD: 1 % (ref 0–1)
BUN SERPL-MCNC: 10 MG/DL (ref 6–20)
BUN/CREAT SERPL: 12 (ref 12–20)
CALCIUM SERPL-MCNC: 8.8 MG/DL (ref 8.5–10.1)
CHLORIDE SERPL-SCNC: 106 MMOL/L (ref 97–108)
CO2 SERPL-SCNC: 27 MMOL/L (ref 21–32)
CREAT SERPL-MCNC: 0.84 MG/DL (ref 0.55–1.02)
DIFFERENTIAL METHOD BLD: ABNORMAL
EOSINOPHIL # BLD: 0.1 K/UL (ref 0–0.4)
EOSINOPHIL NFR BLD: 2 % (ref 0–7)
ERYTHROCYTE [DISTWIDTH] IN BLOOD BY AUTOMATED COUNT: 12.7 % (ref 11.5–14.5)
GLUCOSE SERPL-MCNC: 83 MG/DL (ref 65–100)
HCT VFR BLD AUTO: 33 % (ref 35–47)
HGB BLD-MCNC: 10.8 G/DL (ref 11.5–16)
IMM GRANULOCYTES # BLD AUTO: 0 K/UL (ref 0–0.04)
IMM GRANULOCYTES NFR BLD AUTO: 0 % (ref 0–0.5)
LYMPHOCYTES # BLD: 2.2 K/UL (ref 0.8–3.5)
LYMPHOCYTES NFR BLD: 51 % (ref 12–49)
MCH RBC QN AUTO: 30.7 PG (ref 26–34)
MCHC RBC AUTO-ENTMCNC: 32.7 G/DL (ref 30–36.5)
MCV RBC AUTO: 93.8 FL (ref 80–99)
MONOCYTES # BLD: 0.4 K/UL (ref 0–1)
MONOCYTES NFR BLD: 10 % (ref 5–13)
NEUTS SEG # BLD: 1.5 K/UL (ref 1.8–8)
NEUTS SEG NFR BLD: 36 % (ref 32–75)
NRBC # BLD: 0 K/UL (ref 0–0.01)
NRBC BLD-RTO: 0 PER 100 WBC
PLATELET # BLD AUTO: 240 K/UL (ref 150–400)
PMV BLD AUTO: 9.6 FL (ref 8.9–12.9)
POTASSIUM SERPL-SCNC: 3.5 MMOL/L (ref 3.5–5.1)
RBC # BLD AUTO: 3.52 M/UL (ref 3.8–5.2)
SODIUM SERPL-SCNC: 139 MMOL/L (ref 136–145)
WBC # BLD AUTO: 4.3 K/UL (ref 3.6–11)

## 2023-09-21 PROCEDURE — 94640 AIRWAY INHALATION TREATMENT: CPT

## 2023-09-21 PROCEDURE — 6370000000 HC RX 637 (ALT 250 FOR IP): Performed by: STUDENT IN AN ORGANIZED HEALTH CARE EDUCATION/TRAINING PROGRAM

## 2023-09-21 PROCEDURE — 6360000002 HC RX W HCPCS: Performed by: STUDENT IN AN ORGANIZED HEALTH CARE EDUCATION/TRAINING PROGRAM

## 2023-09-21 PROCEDURE — 1100000003 HC PRIVATE W/ TELEMETRY

## 2023-09-21 PROCEDURE — 2580000003 HC RX 258: Performed by: STUDENT IN AN ORGANIZED HEALTH CARE EDUCATION/TRAINING PROGRAM

## 2023-09-21 PROCEDURE — 80048 BASIC METABOLIC PNL TOTAL CA: CPT

## 2023-09-21 PROCEDURE — 94761 N-INVAS EAR/PLS OXIMETRY MLT: CPT

## 2023-09-21 PROCEDURE — 85025 COMPLETE CBC W/AUTO DIFF WBC: CPT

## 2023-09-21 PROCEDURE — 36415 COLL VENOUS BLD VENIPUNCTURE: CPT

## 2023-09-21 RX ADMIN — AMITRIPTYLINE HYDROCHLORIDE 10 MG: 10 TABLET, FILM COATED ORAL at 22:26

## 2023-09-21 RX ADMIN — MORPHINE SULFATE 2 MG: 2 INJECTION, SOLUTION INTRAMUSCULAR; INTRAVENOUS at 10:35

## 2023-09-21 RX ADMIN — RIVAROXABAN 20 MG: 20 TABLET, FILM COATED ORAL at 17:40

## 2023-09-21 RX ADMIN — ROSUVASTATIN CALCIUM 10 MG: 10 TABLET, COATED ORAL at 22:26

## 2023-09-21 RX ADMIN — MORPHINE SULFATE 2 MG: 2 INJECTION, SOLUTION INTRAMUSCULAR; INTRAVENOUS at 04:14

## 2023-09-21 RX ADMIN — ARFORMOTEROL TARTRATE: 15 SOLUTION RESPIRATORY (INHALATION) at 19:51

## 2023-09-21 RX ADMIN — BUPROPION HYDROCHLORIDE 150 MG: 150 TABLET, EXTENDED RELEASE ORAL at 08:14

## 2023-09-21 RX ADMIN — ESCITALOPRAM OXALATE 20 MG: 10 TABLET ORAL at 08:14

## 2023-09-21 RX ADMIN — ARFORMOTEROL TARTRATE: 15 SOLUTION RESPIRATORY (INHALATION) at 09:11

## 2023-09-21 RX ADMIN — SODIUM CHLORIDE, PRESERVATIVE FREE 10 ML: 5 INJECTION INTRAVENOUS at 22:26

## 2023-09-21 ASSESSMENT — PAIN DESCRIPTION - ONSET: ONSET: GRADUAL

## 2023-09-21 ASSESSMENT — PAIN DESCRIPTION - LOCATION
LOCATION: ABDOMEN
LOCATION: ABDOMEN

## 2023-09-21 ASSESSMENT — PAIN DESCRIPTION - ORIENTATION: ORIENTATION: MID

## 2023-09-21 ASSESSMENT — PAIN DESCRIPTION - DESCRIPTORS
DESCRIPTORS: SHARP
DESCRIPTORS: ACHING

## 2023-09-21 ASSESSMENT — PAIN SCALES - GENERAL
PAINLEVEL_OUTOF10: 0
PAINLEVEL_OUTOF10: 5
PAINLEVEL_OUTOF10: 7

## 2023-09-21 ASSESSMENT — PAIN - FUNCTIONAL ASSESSMENT: PAIN_FUNCTIONAL_ASSESSMENT: PREVENTS OR INTERFERES SOME ACTIVE ACTIVITIES AND ADLS

## 2023-09-21 ASSESSMENT — PAIN DESCRIPTION - PAIN TYPE: TYPE: ACUTE PAIN

## 2023-09-21 NOTE — PROGRESS NOTES
Spiritual Care Assessment/Progress Note  [place]    Name: Curt Meyer MRN: [id number]    Age: 52 y.o. Sex: female   Language: English     Date: 9/21/2023            Total Time Calculated: 31 min              Spiritual Assessment begun in [facility] 3 SURG TELE  Service Provided For[de-identified] Patient  Referral/Consult From[de-identified] Rounding  Encounter Overview/Reason : Initial Encounter    Spiritual beliefs:      [x] Involved in a tejas tradition/spiritual practice:      [x] Supported by a tejas community:      [] Claims no spiritual orientation:      [] Seeking spiritual identity:           [] Adheres to an individual form of spirituality:      [] Not able to assess:                Identified resources for coping and support system:   Support System: Family members, Friends/neighbors, Restoration/tejas community       [x] Prayer                  [] Devotional reading               [] Music                  [] Guided Imagery     [] Pet visits                                        [] Other: (COMMENT)     Specific area/focus of visit   Encounter:    Crisis:    Spiritual/Emotional needs: Type: Spiritual Support  Ritual, Rites and Sacraments:    Grief, Loss, and Adjustments: Type: Life Adjustments, Adjustment to illness  Ethics/Mediation:    Behavioral Health:    Palliative Care: Advance Care Planning:      Plan/Referrals: No future visits requested    Narrative:    Visit was in 21  for initial spiritual assessment. Patient appeared comfortable in bed and was receptive of visit. She spoke about her medical condition and shared what led to her hospitalization she stated that she is doing better today. 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 Tejas is her main go to during crises. She has been in [employer] from childhood and is deeply rooted. She expressed no particular need for support when   asked.  Calm supportive presence offered, words of encouragement spoken, patient assured of chaplains continuous availability. She seemed encouraged by the visit and expressed appreciation. Visited by: Ray Stevens.    Paging Service: [phone])

## 2023-09-21 NOTE — PROGRESS NOTES
GI PROGRESS NOTE  Isabel Winter, FRANK  [phone] NP in-hospital cell phone M-F until 4:30  After 5pm or on weekends, please call  for physician on call    NAME:Jacey Coburn :1970 NQZ:[id number]   ATTG: [unfilled]   PCP: Becca Aleman MD  Date/Time:  2023 10:15 AM     Primary GI: Dr. Spencer Larsen    Reason for following: Hx of UC    Assessment:   Acute n/v/d  Hx of UC  3-4 days of n/v/d and abd pain following food at a [employer] restaurant  Hx of UC on Remicade q 8 weeks  CRP 1.46, Sed rate 32  No leukocytosis  CT abd/pelvis: Multiple dilated loops of small bowel with air-fluid levels. No transition point is identified. Findings may represent ileus. Pouchoscopy 2022: normal appearing ileoanal pouch without active inflammation, small internal hemorrhoids - evidence of pouchitis with erosions - treated with Cipro     Hx of DVT - on Xarelto    Plan:   Suspect symptoms are secondary to viral gastroenteritis vs UC flare  Cdiff and Enteric Bact panel neg  Tolerating a regular diet  IVF  If no improvement in the course as expected, can consider pouchoscopy in the future  Rest of supportive care per primary team     Plan discussed with Dr. Carola Cervantes    Subjective:   Discussed with RN events overnight. Continues to feel a little better. Unfortunately had a fall last night. Review of Systems:  Symptom Y/N Comments  Symptom Y/N Comments   Fever/Chills N   Chest Pain N    Cough N   Headaches N    Sputum N   Joint Pain N    SOB/ALEJANDRO N   Pruritis/Rash N    Tolerating Diet Y   Other       Could NOT obtain due to:      Objective:   VITALS:   Last 24hrs VS reviewed since prior progress note.  Most recent are:  Vitals:    23 0912   BP:    Pulse: 71   Resp: 16   Temp:    SpO2: 96%       Intake/Output Summary (Last 24 hours) at 2023 1015  Last data filed at 2023 0305  Gross per 24 hour   Intake 420 ml   Output 600 ml   Net -180 ml     PHYSICAL EXAM:  General: Resting in bed, Alert, cooperative, IntraVENous 2 times per day    sodium chloride flush 0.9 % injection 5-40 mL  5-40 mL IntraVENous PRN    0.9 % sodium chloride infusion   IntraVENous PRN    ondansetron (ZOFRAN-ODT) disintegrating tablet 4 mg  4 mg Oral Q8H PRN    Or    ondansetron (ZOFRAN) injection 4 mg  4 mg IntraVENous Q6H PRN    polyethylene glycol (GLYCOLAX) packet 17 g  17 g Oral Daily PRN    acetaminophen (TYLENOL) tablet 650 mg  650 mg Oral Q6H PRN    Or    acetaminophen (TYLENOL) suppository 650 mg  650 mg Rectal Q6H PRN    amitriptyline (ELAVIL) tablet 10 mg  10 mg Oral Nightly    buPROPion (WELLBUTRIN SR) extended release tablet 150 mg  150 mg Oral Daily    dicyclomine (BENTYL) tablet 20 mg  20 mg Oral Q6H PRN    escitalopram (LEXAPRO) tablet 20 mg  20 mg Oral Daily    furosemide (LASIX) tablet 40 mg  40 mg Oral BID PRN    gabapentin (NEURONTIN) capsule 300 mg  300 mg Oral Daily PRN    rivaroxaban (XARELTO) tablet 20 mg  20 mg Oral Daily    rosuvastatin (CRESTOR) tablet 10 mg  10 mg Oral Nightly    HYDROcodone-acetaminophen (NORCO)  MG per tablet 1 tablet  1 tablet Oral Q6H PRN    morphine (PF) injection 2 mg  2 mg IntraVENous Q4H PRN    arformoterol 15 mcg-budesonide 0.25 mg neb solution   Nebulization BID RT

## 2023-09-21 NOTE — PROGRESS NOTES
POST FALL MANAGEMENT    Susanne Bone  MEDICAL RECORD NUMBER:  [id number]  AGE: 52 y.o. GENDER: female  : 1970  TODAYS DATE:  2023    Details     Fall Occurred: Yes    Was the Fall Witnessed:  No      Brief Review of Event: Nurse came in patient room to draw morning labs and patient stated she rolled out of the bed when she was asleep. Pt stated she rolled out of the bed and landed on her stomach. Patient got herself back into bed and went back to sleep        Who found the patient: None      Where was the patient at the time of the fall: on floor bedside the bed       Patient Comments: \" I was sleeping so good and I didn't notice I was to close to the edge of the bed. \"       Date Fall Occurred:  2023 . Time Fall Occurred: 4:00a.m.      Assessment     Post Fall Head to Toe Assessment Completed: Yes    Post Fall Predictive Analytic Score Reviewed: Yes     Post Fall Vitals Completed: Yes    Post Fall Neuro Checks Completed: Yes    Injury Occurred(if yes, describe injury):  no           Did the Patient Experience:(Check Min Sees all that apply)    [] Patient hit head  [] Loss of consciousness  [] Change in mental status following the fall  [] Patient is on an anticoagulant medication      CT Performed:  no    Follow-up     Persons Notified of Fall:  (Provide names of persons notified)   [x] Physician:   [] VALERIA:  [x] Nursing Supervisior:  [] Manager:  [] Pharmacist:  [] Family:  [] Other:      Electronically signed by Oracio Knight LPN 3/16/6211 at 8:96 AM

## 2023-09-21 NOTE — PLAN OF CARE
Problem: Pain  Goal: Verbalizes/displays adequate comfort level or baseline comfort level  Outcome: Progressing     Problem: Safety - Adult  Goal: Free from fall injury  Outcome: Progressing     Problem: Respiratory - Adult  Goal: Achieves optimal ventilation and oxygenation  9/20/2023 2206 by Gerhardt Richter, LPN  Outcome: Progressing  9/20/2023 2131 by Zoe Anna RCP  Outcome: Progressing  9/20/2023 0907 by Tiago Anderson, RT  Outcome: Progressing

## 2023-09-21 NOTE — PROGRESS NOTES
End of Shift Note    Bedside shift change report given to Loreland RN  (oncoming nurse) by Raul Lazaro LPN (offgoing nurse). Report included the following information SBAR, Intake/Output, MAR, Accordion, Recent Results, Med Rec Status, and Cardiac Rhythm      Shift worked:  7p-7a     Shift summary and any significant changes:     Pt c/o pain x2, given morphine x1. Pt ambulated to bathroom x2, 600cc of urine. Pt turned self and foot of bed elevated. Labs completed. Pt had a fall tonight, see note. Concerns for physician to address:  none     Zone phone for oncoming shift:  [phone]       Activity:     Number times ambulated in hallways past shift: 0  Number of times OOB to chair past shift: 0    Cardiac:   Cardiac Monitoring: No           Access:  Current line(s): PIV     Genitourinary:   Urinary status: voiding    Respiratory:      Chronic home O2 use?: NO  Incentive spirometer at bedside: NO       GI:     Current diet:  ADULT DIET;  Regular; GI [place] (GERD/Peptic Ulcer)  Passing flatus: YES  Tolerating current diet: YES       Pain Management:   Patient states pain is manageable on current regimen: YES    Skin:     Interventions: turn team and increase time out of bed    Patient Safety:  Fall Score:    Interventions: gripper socks and pt to call before getting OOB       Length of Stay:  Expected LOS: 3  Actual LOS: [place], LPN

## 2023-09-21 NOTE — PROGRESS NOTES
End of Shift Note    Bedside shift change report given to Kashif Jones (oncoming nurse) by Mary Covarrubias RN (offgoing nurse).   Report included the following information SBAR, Kardex, Intake/Output, MAR, and Recent Results    Shift worked:  7a-7p     Shift summary and any significant changes:     Medicated for pain once this shift, weight obtain per patient request, ambulating to bathroom, up in chair this shift     Concerns for physician to address:  none     Zone phone for oncoming shift:   [phone]         Mary Covarrubias, RN

## 2023-09-21 NOTE — SIGNIFICANT EVENT
MEDICAL ALERT NOTE    Medical alert called overhead at [phone] for unwitnessed patient fall. Provider to bedside. Primary nurse and RRT RN at bedside when provider arrived. Patient alert and oriented to self, time, place. She reported that while she was sleeping she rolled over and rolled off the bed; denies hitting her head. Provider performed physical assessment; no open areas of skin observed, no body bruising or redness observed. Patient denies pain; vital signs stable. Per primary nurse, patient reported fall to her 30 minutes following the fall when the nurse went into the room to obtain morning lab work. Patient did not lose consciousness. Currently lying in bed alert and calm; no respiratory distress observed.

## 2023-09-22 VITALS
HEART RATE: 72 BPM | BODY MASS INDEX: 31.85 KG/M2 | WEIGHT: 191.14 LBS | OXYGEN SATURATION: 97 % | TEMPERATURE: 98.2 F | SYSTOLIC BLOOD PRESSURE: 119 MMHG | HEIGHT: 65 IN | RESPIRATION RATE: 17 BRPM | DIASTOLIC BLOOD PRESSURE: 67 MMHG

## 2023-09-22 PROCEDURE — 2580000003 HC RX 258: Performed by: STUDENT IN AN ORGANIZED HEALTH CARE EDUCATION/TRAINING PROGRAM

## 2023-09-22 PROCEDURE — 94640 AIRWAY INHALATION TREATMENT: CPT

## 2023-09-22 PROCEDURE — 6360000002 HC RX W HCPCS: Performed by: STUDENT IN AN ORGANIZED HEALTH CARE EDUCATION/TRAINING PROGRAM

## 2023-09-22 PROCEDURE — 6370000000 HC RX 637 (ALT 250 FOR IP): Performed by: STUDENT IN AN ORGANIZED HEALTH CARE EDUCATION/TRAINING PROGRAM

## 2023-09-22 RX ADMIN — ESCITALOPRAM OXALATE 20 MG: 10 TABLET ORAL at 08:03

## 2023-09-22 RX ADMIN — SODIUM CHLORIDE, PRESERVATIVE FREE 10 ML: 5 INJECTION INTRAVENOUS at 08:03

## 2023-09-22 RX ADMIN — BUPROPION HYDROCHLORIDE 150 MG: 150 TABLET, EXTENDED RELEASE ORAL at 08:06

## 2023-09-22 RX ADMIN — ARFORMOTEROL TARTRATE: 15 SOLUTION RESPIRATORY (INHALATION) at 09:04

## 2023-09-22 RX ADMIN — HYDROCODONE BITARTRATE AND ACETAMINOPHEN 1 TABLET: 10; 325 TABLET ORAL at 08:02

## 2023-09-22 ASSESSMENT — PAIN DESCRIPTION - DIRECTION: RADIATING_TOWARDS: BACK

## 2023-09-22 ASSESSMENT — PAIN DESCRIPTION - LOCATION
LOCATION: ABDOMEN;BACK
LOCATION: ABDOMEN

## 2023-09-22 ASSESSMENT — PAIN DESCRIPTION - FREQUENCY: FREQUENCY: CONTINUOUS

## 2023-09-22 ASSESSMENT — PAIN - FUNCTIONAL ASSESSMENT: PAIN_FUNCTIONAL_ASSESSMENT: ACTIVITIES ARE NOT PREVENTED

## 2023-09-22 ASSESSMENT — PAIN DESCRIPTION - ONSET: ONSET: GRADUAL

## 2023-09-22 ASSESSMENT — PAIN SCALES - GENERAL
PAINLEVEL_OUTOF10: 3
PAINLEVEL_OUTOF10: 4
PAINLEVEL_OUTOF10: 1

## 2023-09-22 ASSESSMENT — PAIN DESCRIPTION - PAIN TYPE: TYPE: ACUTE PAIN

## 2023-09-22 ASSESSMENT — PAIN DESCRIPTION - ORIENTATION: ORIENTATION: RIGHT;UPPER;MID

## 2023-09-22 NOTE — CARE COORDINATION
Transition of Care Plan:    RUR: 9%  Prior Level of Functioning:   Disposition: Home w/family  If SNF or IPR: Date FOC offered:   Date FOC received:   Accepting facility:   Date authorization started with reference number:   Date authorization received and expires: Follow up appointments:   DME needed: n/a  Transportation at discharge: family  IM/IMM Medicare/ letter given:   Is patient a  and connected with VA? If yes, was [employer] transfer form completed and [facility] notified? Caregiver Contact:   Discharge Caregiver contacted prior to discharge? Care Conference needed?    Barriers to discharge:     Patient

## 2023-09-22 NOTE — PROGRESS NOTES
DISCHARGE NOTE FROM [facility] NURSE    Patient determined to be stable for discharge by attending provider. I have reviewed the discharge instructions and follow-up appointments with the Patient. They verbalized understanding and all questions were answered to their satisfaction. No complaints or further questions were expressed. No new medication scripts Appropriate educational materials and medication side effect teaching were provided. PIV were removed prior to discharge. Patient did not discharge with any line, freedman, or drain. All personal items collected during admission were returned to the patient prior to discharge.     Post-op patient: No,  N/A      Brad DOYLE RN

## 2023-09-22 NOTE — PROGRESS NOTES
GI PROGRESS NOTE  Jlluigi Singleton, NP  [phone] NP in-hospital cell phone M-F until 4:30  After 5pm or on weekends, please call  for physician on call    NAME:Jacey Coburn :1970 YZN:[id number]   ATTG: [unfilled]   PCP: Andrea Wright MD  Date/Time:  2023 9:47 AM     Primary GI: Dr. Tatyana Allen    Reason for following: Hx of UC    Assessment:   Acute n/v/d  Hx of UC  3-4 days of n/v/d and abd pain following food at a [employer] restaurant  Hx of UC on Remicade q 8 weeks  CRP 1.46, Sed rate 32  No leukocytosis  CT abd/pelvis: Multiple dilated loops of small bowel with air-fluid levels. No transition point is identified. Findings may represent ileus. Pouchoscopy 2022: normal appearing ileoanal pouch without active inflammation, small internal hemorrhoids - evidence of pouchitis with erosions - treated with Cipro     Hx of DVT - on Xarelto    Plan:   Ok to discharge from GI standpoint  Will try to arrange reschedule of Remicade infusion  Can resume methotrexate next week   Nothing further to add from a GI standpoint. GI signing-off. Please call with any questions. Thank you for this consult. Plan discussed with Dr. Lina Sanders    Subjective:   Discussed with RN events overnight. Continues to feel better and eager to go home today. Review of Systems:  Symptom Y/N Comments  Symptom Y/N Comments   Fever/Chills N   Chest Pain N    Cough N   Headaches N    Sputum N   Joint Pain N    SOB/ALEJANDRO N   Pruritis/Rash N    Tolerating Diet Y   Other       Could NOT obtain due to:      Objective:   VITALS:   Last 24hrs VS reviewed since prior progress note.  Most recent are:  Vitals:    23 0905   BP:    Pulse:    Resp:    Temp:    SpO2: 97%       Intake/Output Summary (Last 24 hours) at 2023 0947  Last data filed at 2023 0800  Gross per 24 hour   Intake 120 ml   Output --   Net 120 ml       PHYSICAL EXAM:  General: Resting in bed, Alert, cooperative, no acute distress    HEENT: NC, Atraumatic. Anicteric sclerae. Lungs:  CTA Bilaterally. No Wheezing/Rhonchi/Rales. Heart:  Regular  rhythm,  No murmur (), No Rubs, No Gallops  Abdomen: Soft, Non distended, Non tender. +Bowel sounds, no HSM  Extremities: No c/c/e  Neurologic:  Alert and oriented X 3. No acute neurological distress   Psych:   Good insight. Not anxious nor agitated. Lab and Radiology Data Reviewed: (see below)    Medications Reviewed: (see below)  PMH/SH reviewed - no change compared to H&P  ________________________________________________________________________  Total time spent with patient: 15 minutes ________________________________________________________________________  Care Plan discussed with:  Patient Y   Family     RN Y              Consultant:       JIE Quintero CNP     Procedures: see electronic medical records for all procedures/Xrays and details which were not copied into this note but were reviewed prior to creation of Plan. LABS:  Recent Labs     09/20/23 0347 09/21/23  0414   WBC 4.9 4.3   HGB 10.5* 10.8*   HCT 31.8* 33.0*    240       Recent Labs     09/20/23  0347 09/21/23  0414    139   K 3.5 3.5   * 106   CO2 27 27   BUN 8 10       No results for input(s): \"TP\", \"ALB\", \"GLOB\", \"GGT\", \"AML\" in the last 72 hours. Invalid input(s): \"SGOT\", \"GPT\", \"AP\", \"TBIL\", \"AMYP\", \"LPSE\", \"HLPSE\"    No results for input(s): \"INR\", \"APTT\" in the last 72 hours. Invalid input(s): \"PTP\"   No results for input(s): \"TIBC\", \"FERR\" in the last 72 hours. Invalid input(s): \"FE\", \"PSAT\"   No results found for: \"FOL\", \"RBCF\"  No results for input(s): \"PH\", \"PCO2\", \"PO2\" in the last 72 hours. No results for input(s): \"CPK\", \"CKMB\", \"TROPONINI\" in the last 72 hours.   Anil    MEDICATIONS:  Current Facility-Administered Medications   Medication Dose Route Frequency    sodium chloride flush 0.9 % injection 5-40 mL  5-40 mL IntraVENous 2 times per day    sodium chloride flush 0.9 %

## 2023-09-22 NOTE — DISCHARGE SUMMARY
Hospitalist Discharge Summary     Patient ID:  Franci Renteria  [id number]  15 y.o.  1970  9/16/2023    PCP on record: Melvin Moody MD    Admit date: 9/16/2023  Discharge date and time: 9/22/2023    DISCHARGE DIAGNOSIS:    Acute viral gastroenteritis:  Ileus:  -Resolved on discharge     Ulcerative colitis:  -Patient receives Remicade every 8 weeks.  -Resume methotrexate next week     Hypokalemia:  -Will monitor and supplement as needed. Acute kidney injury:  -Improved     History of deep vein thrombosis:  -Continue Xarelto. Anxiety:  Depression:  Psoriatic arthritis:  Peptic ulcer disease:  Chronic hypoxia respiratory failure 2/2 Covid infection  Mild intermittent asthma:  -Continue prior to admission home medications.  -Patient is on 2 L home oxygen as needed at bedtime. CONSULTATIONS:  IP CONSULT TO GI    Excerpted HPI from H&P of Angela Reardon MD:  She is a 52-year-old lady with past medical history significant for psoriatic arthritis, ulcerative colitis, anxiety, depression, right lower extremity DVT and peptic ulcer disease, chronic hypoxia on as needed home oxygen 2 L, asthma presented to the hospital complaining of nausea, vomiting, diarrhea, abdominal pain for 3 to 4 days prior to admission. Her vomiting is nonbloody and nonbilious. Patient stated that she ate at a [facility]) prior to onset of symptoms. Patient follows with Dr. Tomi Luis for her ulcerative colitis as outpatient. Patient is on Remicade and methotrexate as outpatient. In the ER, vital signs within the normal limits. Labs significant for potassium 3.2, creatinine 1.15.  CT abdomen showed multiple dilated loops of small bowel with air-fluid levels. No transition  point is identified. Findings may represent ileus.    ______________________________________________________________________  DISCHARGE SUMMARY/HOSPITAL COURSE:  for full details see H&P, daily progress notes, labs, consult morning for 180 days. Max Daily Amount: 37.5 mg     rivaroxaban 20 MG Tabs tablet  Commonly known as: XARELTO     rosuvastatin 10 MG tablet  Commonly known as: CRESTOR            STOP taking these medications      omeprazole 10 MG delayed release capsule  Commonly known as: PRILOSEC                Patient Follow Up Instructions: Activity: activity as tolerated  Diet: regular diet  Wound Care: none needed                          Follow-up with PCP, GI in 2 weeks.   Follow-up tests/labs none    Follow-up Information       Follow up With Specialties Details Why Contact Yung Robison MD Family Medicine Go on 9/25/2023 8:15 as previously scheduled [facility] [id number], MD Internal Medicine, Gastroenterology Schedule an appointment as soon as possible for a visit in 2 week(s)  [address], [address] [place] [address]  [phone]            ________________________________________________________________    Risk of deterioration: Low    Condition at Discharge:  Stable  __________________________________________________________________    Disposition  Home with family, no needs    ____________________________________________________________________    Code Status: Full Code  ___________________________________________________________________      Total time in minutes spent coordinating this discharge (includes going over instructions, follow-up, prescriptions, and preparing report for sign off to her PCP) :  35 minutes    Signed:  Abbi Winston MD

## 2023-09-25 ENCOUNTER — OFFICE VISIT (OUTPATIENT)
Age: 53
End: 2023-09-25
Payer: COMMERCIAL

## 2023-09-25 VITALS
OXYGEN SATURATION: 100 % | BODY MASS INDEX: 33.02 KG/M2 | WEIGHT: 198.2 LBS | SYSTOLIC BLOOD PRESSURE: 129 MMHG | TEMPERATURE: 97.5 F | DIASTOLIC BLOOD PRESSURE: 65 MMHG | HEIGHT: 65 IN | HEART RATE: 73 BPM | RESPIRATION RATE: 16 BRPM

## 2023-09-25 DIAGNOSIS — K51.919 ULCERATIVE COLITIS WITH COMPLICATION, UNSPECIFIED LOCATION (HCC): ICD-10-CM

## 2023-09-25 DIAGNOSIS — F41.9 ANXIETY DISORDER, UNSPECIFIED TYPE: ICD-10-CM

## 2023-09-25 DIAGNOSIS — F33.0 MILD EPISODE OF RECURRENT MAJOR DEPRESSIVE DISORDER (HCC): ICD-10-CM

## 2023-09-25 DIAGNOSIS — K91.850 POUCHITIS (HCC): ICD-10-CM

## 2023-09-25 DIAGNOSIS — Z23 ENCOUNTER FOR IMMUNIZATION: ICD-10-CM

## 2023-09-25 DIAGNOSIS — Z09 HOSPITAL DISCHARGE FOLLOW-UP: Primary | ICD-10-CM

## 2023-09-25 DIAGNOSIS — R10.84 GENERALIZED ABDOMINAL PAIN: ICD-10-CM

## 2023-09-25 DIAGNOSIS — R11.0 NAUSEA: ICD-10-CM

## 2023-09-25 PROCEDURE — 90471 IMMUNIZATION ADMIN: CPT | Performed by: FAMILY MEDICINE

## 2023-09-25 PROCEDURE — 99214 OFFICE O/P EST MOD 30 MIN: CPT | Performed by: FAMILY MEDICINE

## 2023-09-25 PROCEDURE — 90674 CCIIV4 VAC NO PRSV 0.5 ML IM: CPT | Performed by: FAMILY MEDICINE

## 2023-09-25 RX ORDER — METRONIDAZOLE 500 MG/1
500 TABLET ORAL 2 TIMES DAILY
Qty: 28 TABLET | Refills: 0 | Status: SHIPPED | OUTPATIENT
Start: 2023-09-25 | End: 2023-10-09

## 2023-09-25 RX ORDER — DICYCLOMINE HCL 20 MG
20 TABLET ORAL EVERY 6 HOURS PRN
Qty: 120 TABLET | Refills: 0 | Status: SHIPPED | OUTPATIENT
Start: 2023-09-25

## 2023-09-25 RX ORDER — CIPROFLOXACIN 500 MG/1
500 TABLET, FILM COATED ORAL 2 TIMES DAILY
Qty: 28 TABLET | Refills: 0 | Status: SHIPPED | OUTPATIENT
Start: 2023-09-25 | End: 2023-10-09

## 2023-09-25 RX ORDER — ONDANSETRON 4 MG/1
4 TABLET, FILM COATED ORAL DAILY PRN
Qty: 30 TABLET | Refills: 0 | Status: SHIPPED | OUTPATIENT
Start: 2023-09-25

## 2023-09-25 RX ORDER — ATORVASTATIN CALCIUM 80 MG/1
1 TABLET, FILM COATED ORAL NIGHTLY
COMMUNITY

## 2023-09-25 RX ORDER — METOCLOPRAMIDE 10 MG/1
TABLET ORAL
COMMUNITY

## 2023-09-25 NOTE — PROGRESS NOTES
Chief Complaint   Patient presents with    Follow-Up from Hospital    1. Have you been to the ER, urgent care clinic since your last visit? Hospitalized since your last visit? Yes Where: [address] Admitted 9/16/23 Discharged 9/22/23    2. Have you seen or consulted any other health care providers outside of the [address] [place] [address] since your last visit? Include any pap smears or colon screening. No     After obtaining consent, and per orders of Dr. Latonia Matias, injection of Flu vaccine given by Elizabeth Haddad LPN. Patient instructed to remain in clinic for 20 minutes afterwards, and to report any adverse reaction to me immediately.

## 2023-09-25 NOTE — PROGRESS NOTES
Kay Alford (: 1970) is a 52 y.o. female, established patient, here for evaluation of the following chief complaint(s):  Follow-Up from Hospital       ASSESSMENT/PLAN:  Yola Bazan was seen today for follow-up from hospital.    Diagnoses and all orders for this visit:    Hospital discharge follow-up - doing well after d/c    Anxiety disorder, unspecified type  Mild episode of recurrent major depressive disorder (HCC)    - stable, sig stressors alexandro with medical hx    BMI 31.0-31.9,adult - ct working on this with diet and exercise    Generalized abdominal pain  Ulcerative colitis with complication, unspecified location ([address])  Pouchitis ([address])  - admitted for gastroenteritis in setting of UC with hx of pouchitis in past, CT with no sig concerns. Sx returning so will cover for pouchitis with Cipro and Flagyl today. To see GI in a few weeks. -     ciprofloxacin (CIPRO) 500 MG tablet; Take 1 tablet by mouth 2 times daily for 14 days  -     metroNIDAZOLE (FLAGYL) 500 MG tablet; Take 1 tablet by mouth 2 times daily for 14 days  -     dicyclomine (BENTYL) 20 MG tablet; Take 1 tablet by mouth every 6 hours as needed (abd spasms)    Nausea  -     ondansetron (ZOFRAN) 4 MG tablet; Take 1 tablet by mouth daily as needed for Nausea or Vomiting    Encounter for immunization  -     Influenza, FLUCELVAX, (age 10 mo+), IM, Preservative Free, 0.5 mL      Return in about 4 weeks (around 10/23/2023), or if symptoms worsen or fail to improve. Subjective:   45 y.o. AAF with PMH sig for PsA, UC, Anxiety and depression, right leg DVT x 2, PUD, chronic hypoxia related to COVID 19, and obesity here for routine follow up after hospitalization for gastroenteritis with ileus    Admitted at [facility] from 23-.   Hospitalist d/c summary notes reviewed:    \"Acute viral gastroenteritis:  Ileus:  -Resolved on discharge     Ulcerative colitis:  -Patient receives Remicade every 8 weeks.  -Resume methotrexate next week

## 2023-09-28 ENCOUNTER — TELEPHONE (OUTPATIENT)
Age: 53
End: 2023-09-28

## 2023-09-28 NOTE — TELEPHONE ENCOUNTER
Eh Santana  [birth year] has a  named keny registered nurse  [employer] [facility]  [phone] [id number] and she wants an updated list of her medicine   fax  to [phone]

## 2023-10-11 ENCOUNTER — TELEPHONE (OUTPATIENT)
Age: 53
End: 2023-10-11

## 2023-10-11 NOTE — TELEPHONE ENCOUNTER
[facility] from 10/09/2023 until 12/31/2099. Carla has been notified.

## 2023-10-12 ENCOUNTER — TELEPHONE (OUTPATIENT)
Age: 53
End: 2023-10-12

## 2023-10-12 NOTE — TELEPHONE ENCOUNTER
Tang Arreola w/[employer]     Reports patient wants to change oxygen company:     [address] [place] [address],[address] for oxygen - fax [phone] (O)      Best number to reach her is [phone]

## 2023-10-23 ENCOUNTER — CLINICAL DOCUMENTATION (OUTPATIENT)
Age: 53
End: 2023-10-23

## 2023-10-23 ENCOUNTER — TELEPHONE (OUTPATIENT)
Age: 53
End: 2023-10-23

## 2023-10-23 ENCOUNTER — OFFICE VISIT (OUTPATIENT)
Age: 53
End: 2023-10-23
Payer: COMMERCIAL

## 2023-10-23 VITALS
OXYGEN SATURATION: 96 % | WEIGHT: 202 LBS | HEART RATE: 75 BPM | TEMPERATURE: 97.3 F | RESPIRATION RATE: 20 BRPM | BODY MASS INDEX: 33.66 KG/M2 | DIASTOLIC BLOOD PRESSURE: 66 MMHG | HEIGHT: 65 IN | SYSTOLIC BLOOD PRESSURE: 111 MMHG

## 2023-10-23 DIAGNOSIS — K21.9 GASTROESOPHAGEAL REFLUX DISEASE, UNSPECIFIED WHETHER ESOPHAGITIS PRESENT: Primary | ICD-10-CM

## 2023-10-23 DIAGNOSIS — J34.0 ULCER OF MUCOSA OF NOSE: ICD-10-CM

## 2023-10-23 DIAGNOSIS — K51.919 ULCERATIVE COLITIS WITH COMPLICATION, UNSPECIFIED LOCATION (HCC): ICD-10-CM

## 2023-10-23 DIAGNOSIS — R73.02 IGT (IMPAIRED GLUCOSE TOLERANCE): ICD-10-CM

## 2023-10-23 DIAGNOSIS — Z13.29 SCREENING FOR THYROID DISORDER: ICD-10-CM

## 2023-10-23 DIAGNOSIS — K21.9 GASTRO-ESOPHAGEAL REFLUX DISEASE WITHOUT ESOPHAGITIS: ICD-10-CM

## 2023-10-23 DIAGNOSIS — E55.9 VITAMIN D DEFICIENCY, UNSPECIFIED: ICD-10-CM

## 2023-10-23 DIAGNOSIS — Z99.81 ON HOME OXYGEN THERAPY: ICD-10-CM

## 2023-10-23 DIAGNOSIS — Z11.4 ENCOUNTER FOR SCREENING FOR HUMAN IMMUNODEFICIENCY VIRUS (HIV): ICD-10-CM

## 2023-10-23 DIAGNOSIS — F41.9 ANXIETY DISORDER, UNSPECIFIED TYPE: ICD-10-CM

## 2023-10-23 DIAGNOSIS — Z11.59 NEED FOR HEPATITIS C SCREENING TEST: ICD-10-CM

## 2023-10-23 DIAGNOSIS — E78.00 HYPERCHOLESTEROLEMIA: ICD-10-CM

## 2023-10-23 PROBLEM — I25.10 CARDIOVASCULAR DISEASE: Status: ACTIVE | Noted: 2023-08-11

## 2023-10-23 PROBLEM — Z79.1 NSAID LONG-TERM USE: Status: ACTIVE | Noted: 2023-07-28

## 2023-10-23 PROCEDURE — 99214 OFFICE O/P EST MOD 30 MIN: CPT | Performed by: INTERNAL MEDICINE

## 2023-10-23 RX ORDER — INFLIXIMAB 100 MG/10ML
5 INJECTION, POWDER, LYOPHILIZED, FOR SOLUTION INTRAVENOUS SEE ADMIN INSTRUCTIONS
COMMUNITY
End: 2023-10-23 | Stop reason: SDUPTHER

## 2023-10-23 NOTE — PROGRESS NOTES
Spoke with Gayathri Finder stating they need current O2 readings for new order. Spoke with patient last PFT done 6/2023 with pulmonary. Left message for  to call office.

## 2023-10-23 NOTE — PROGRESS NOTES
ED Provider Note      Patient : Julisa Luis Age: 2 year old Sex: male   MRN: [id number] Encounter Date: 10/10/2022    Chief Complaint   Patient presents with   • Flu Like Symptoms         History   HPI: SEE BELOW FOR DETAILS  CC: cough  Location: upper respiratory  Duration: x 3-4 days  Onset: Spontaneously  Quality: Cough  Severity: Mild  Radiation: None  Exacerbating factors: None  Alleviating factors: None  Associated symptoms: See below    2M born full term spontaneously with no PMHx presents with dry cough for the past 3-4 days as well as fever and vomiting since this morning.    Per mom, patient was able to tolerate small amounts of pizza, grapes, and chips this morning with vomiting starting 3 hours afterwards. Mom reports 5 episodes of emesis with last episode of emesis around 9:30pm tonight.     Mom reports fever since this morning up to 101.3F that mom states she has attempted to manage with tylenol and ibuprofen.     Per mom, patient is UTD on all vaccinations.    Mom reports normal urinary and bowel habits. Normal activity level.    MEDICATION LIST, ALLERGIES, ARE ALL COPIED BELOW AT BOTTOM OF CHART.  PMH/PSH: See assessement and plan below.  Social Hx: Lives at home.   Family Hx: Noncontributory.    Review of Systems  REVIEW OF SYMPTOMS  Denies weakness  Denies neck stiffness, eye redness  Denies difficulty breathing, wheezing  Denies abd pain, diarrhea  Denies fouls smelling urine or hematuria  Denies leg pains, swelling  Denies rash, skin changes  Denies easy bleeding, bruising  Denies joint swelling and redness    Physical Exam   Physical Exam  Well appearing patient in no distress  HEENT: MMM, tonsils without exudates.  Mild nasal congestion.  B/L TM's normal, nonbulging and nonerythematous. B/L external canal's neg.  Normal conjunctiva b/l. No discharge.  Neck: Supple  Heart: Regular rate and rhythm no murmurs  Lungs: CTAB, No wheezing, crackles, rhonchi.  Abdomen: Normal active bowel sounds, soft  Spoke with Rosa Tobin @ [facility]. Patient  has approval for oxygen  with [employer]. She will call Allie Leon [phone] to discuss. nontender nondistended, no rebound or guarding.  Extremities: No edema  Skin: No rash  Lymph: No Lymphadenopathy  Neuro: Nonfocal, appropriate for age    ED Course     Procedures      Clinical Impression   2yoM  w/ no PMHx, presents to the emergency department with subjective fever cough congestion. Mom also reports vomiting tonight.    No clinical concern for pneumonia, sepsis, cardiac cause of symptoms.  Lungs are clear.  No hypoxia.  No leg edema.  No hepatosplenomegaly.    Patient was febrile upon arrival to ED. Therapeutically, patient received ibuprofen, after which patient was afebrile.    Symptoms improved.  Patient was able to tolerate p.o. On reevaluation patient breathing comfortably.  Healthy appearing.  Discharged home in good condition with instructions to follow-up with primary care physician in 1 or 2 days for reevaluation.  Told to return to the emergency department immediately for shortness of breath, vomiting, weakness, difficulty breathing, passing out, headache, neck pain or stiffness, rash, or other symptoms as discussed in the emergency department.    DIAGNOSIS:  URI  Nausea/vomiting      ------------------------------------------------------------------------------------------------------------------------------  Discharge Medication List as of 10/10/2022  3:50 AM          Not on File    No past medical history on file.    No past surgical history on file.    No family history on file.    Social History     Tobacco Use   • Smoking status: Never Smoker   • Smokeless tobacco: Never Used       Lab Results     Results for orders placed or performed during the hospital encounter of 10/10/22   COVID/Flu/RSV panel   Result Value Ref Range    Rapid SARS-COV-2 by PCR Not Detected Not Detected / Detected / Presumptive Positive / Inhibitors present    Influenza A by PCR Not Detected Not Detected    Influenza B by PCR Not Detected Not Detected    RSV BY PCR Detected (A) Not Detected    Isolation  Guidelines      Procedural Comment     Streptococcus group A PCR    Specimen: Throat; Swab   Result Value Ref Range    STREPTOCOCCUS GROUP A PCR Not Detected Not Detected         Radiology Results     Imaging Results    None         ED Medication Orders (From admission, onward)    Ordered Start     Status Ordering Provider    10/09/22 2323 10/09/22 2323  ibuprofen (CHILDRENS ADVIL) 100 MG/5ML suspension 204 mg  ONCE         Last MAR action: Given LISETH HUYNH  Number of Diagnoses or Management Options  Acute bronchiolitis due to respiratory syncytial virus (RSV)  Acute cough  Nausea and vomiting in child  Viral upper respiratory tract infection  Diagnosis management comments: 2M here with fever and n/v since today, cough and congestion x 3 days. Per mom, patient has been going to  x 1 week. Pt initially febrile upon arrival, but after ibuprofen, temp was 99.7F. No vomiting since ED arrival. Pt is well appearing, non-toxic. Without any zofran, pt tolerated PO challenge in ED with PO juice without nausea and vomiting. Pt is awake and alert, active upon reevaluation. COVID/Flu/RSV significant for RSV. Strep neg. Discussed management of RSV at home with mom with humidifiers and nasal suctioning.    Pertinent Labs & Imaging studies reviewed.     Multiple differential diagnoses were considered. The patient / caregivers were apprised of diagnostic / treatment options including alternate modes of care, in addition to risks and benefits, for this medical condition. Based on this discussion the patient / caregiver agrees with this chosen diagnostic and treatment plan.    Discussed strict return precautions with patient's mother. Discussed options of sx management at home. Patient is stable and mother states they feel ready for discharge.        Disposition          Discharge Medication List as of 10/10/2022  3:50 AM      New Prescriptions    Details   ondansetron ORAL (Zofran) 4 MG/5ML oral  solution Take 5 mLs by mouth 1 time for 1 dose. As needed for nauseaEprescribe, Disp-5 mL, R-0      ibuprofen (Childrens Motrin) 100 MG/5ML suspension Take 10.2 mLs by mouth every 6 hours as needed for Fever.Eprescribe, Disp-120 mL, R-0      acetaminophen (Tylenol Childrens) 160 MG/5ML suspension Take 9.5 mLs by mouth every 6 hours as needed for Fever.Eprescribe, Disp-237 mL, R-0      guaifenesin (ROBITUSSIN) 100 MG/5ML liquid Take 5 mLs by mouth 3 times daily as needed for Cough. As needed for cough and congestionEprescribe, Disp-473 mL, R-0             Discharge 10/10/2022  3:48 AM  Julisa Luis discharge to home/self care.          Sara Murillo PA-C   11/11/2022 2:56 AM     WILLIAM Sanchez PA-C  11/11/22 1214

## 2023-10-23 NOTE — TELEPHONE ENCOUNTER
Mick Balling with [employer] calling back     RE: oxygen     [facility]     Best number to reach her is [phone]

## 2023-10-23 NOTE — PROGRESS NOTES
Chief Complaint   Patient presents with    Follow-Up from Hospital     Saw seen inpatient at [facility]     HPI:  Martin Florez is a 52 y.o. AA female a former patient of Dr. Gardenia Ulloa with Psoriatic arthritis, Peptic ulcer disease, Chronic hypoxia respiratory failure secondary to Covid infection, mild intermittent asthma on chronic oxygen therapy as needed presents for follow-Up. Patient was admitted 9/16/23-9/22/23 with Acute viral gastroenteritis,Ileus, Ulcerative colitis receives Remicade every 8 weeks, methotrexate next week, Hypokalemia and Acute kidney injury. She is doing better. She has endoscopy schedule 11/2/23. Treatment team:  Dr. Justice Lara for diabetes and weight management  Dr. Aubree Kessler: Ulcerative Colitis  Dr. Puneet Patel:  rheumatologist  Dr. Kay Garcia:  Pulmonalogist    Review of Systems  As per hpi    Past Medical History:   Diagnosis Date    Anemia associated with acute blood loss 2017    Txd with Blood transfusions x 2. Dr. Jen Lemus. Asthma childhood    DDD (degenerative disc disease), lumbar     DJD (degenerative joint disease) of knee     ROBLES (generalized anxiety disorder) 2018    Dr. Roberta Rios    GERD (gastroesophageal reflux disease)     GI bleeding 2017    Dr. Lashaun Garcia. Txd with Blood transfusions. History of tuberculosis exposure 2013    POSITIVE PPD TEST. Txd x 6 months; last dose either 11/13 or 12/13    Lower leg DVT (deep venous thromboembolism), acute, right ([address]) 2008, 4/27/2016, 08/03/2017    x 3. Initially due to trauma to leg then plane ride home. Dr. Anika Velázquez. Chronic Anticoagulation. Major depression 2018    Dr. Roberta Rios    Morbid obesity [facility])     Orthostatic syncope 2017    due to anemia from blood loss. Dr. Jen Lemus. Post-thrombotic syndrome of right lower extremity 09/14/2017    w  R leg swelling and pain. Dr. Latonia Virgen. Pseudogout 2016    R knee. Dr. Sage Hernandez. Psoriatic arthritis ([address]) 2000s    Dr. Lee Pena.   Txd w Donelda Quiet injections

## 2023-10-24 NOTE — TELEPHONE ENCOUNTER
Spoke with patient advised [place] was advised. Spoke with Rosalie Paul and she will call [employer] for information on Certificate of Medical Necessity.

## 2023-10-26 ENCOUNTER — TELEPHONE (OUTPATIENT)
Age: 53
End: 2023-10-26

## 2023-10-26 NOTE — TELEPHONE ENCOUNTER
Ying Parsons from [facility] called about this patient ,She is curently with a companty and wants to switch a different oxygen company and wants to know the status   [employer].     Willtosin Silvar [phone]  Ying Parsons [phone]

## 2023-11-01 ENCOUNTER — TELEPHONE (OUTPATIENT)
Age: 53
End: 2023-11-01

## 2023-11-01 NOTE — TELEPHONE ENCOUNTER
Arun Melgoza db [birth year]     Byron Ulloa [phone]   [employer]   Returning the phone      Oxygen supplies

## 2023-11-02 ENCOUNTER — ANESTHESIA (OUTPATIENT)
Facility: HOSPITAL | Age: 53
End: 2023-11-02
Payer: COMMERCIAL

## 2023-11-02 ENCOUNTER — ANESTHESIA EVENT (OUTPATIENT)
Facility: HOSPITAL | Age: 53
End: 2023-11-02
Payer: COMMERCIAL

## 2023-11-02 ENCOUNTER — HOSPITAL ENCOUNTER (OUTPATIENT)
Facility: HOSPITAL | Age: 53
Setting detail: OUTPATIENT SURGERY
Discharge: HOME | End: 2023-11-02
Attending: STUDENT IN AN ORGANIZED HEALTH CARE EDUCATION/TRAINING PROGRAM | Admitting: STUDENT IN AN ORGANIZED HEALTH CARE EDUCATION/TRAINING PROGRAM
Payer: COMMERCIAL

## 2023-11-02 VITALS
HEART RATE: 72 BPM | RESPIRATION RATE: 13 BRPM | BODY MASS INDEX: 32.9 KG/M2 | SYSTOLIC BLOOD PRESSURE: 140 MMHG | TEMPERATURE: 97.8 F | OXYGEN SATURATION: 99 % | DIASTOLIC BLOOD PRESSURE: 72 MMHG | WEIGHT: 197.5 LBS | HEIGHT: 65 IN

## 2023-11-02 PROCEDURE — 7100000010 HC PHASE II RECOVERY - FIRST 15 MIN: Performed by: STUDENT IN AN ORGANIZED HEALTH CARE EDUCATION/TRAINING PROGRAM

## 2023-11-02 PROCEDURE — 3700000000 HC ANESTHESIA ATTENDED CARE: Performed by: STUDENT IN AN ORGANIZED HEALTH CARE EDUCATION/TRAINING PROGRAM

## 2023-11-02 PROCEDURE — 6360000002 HC RX W HCPCS: Performed by: NURSE ANESTHETIST, CERTIFIED REGISTERED

## 2023-11-02 PROCEDURE — 2500000003 HC RX 250 WO HCPCS: Performed by: NURSE ANESTHETIST, CERTIFIED REGISTERED

## 2023-11-02 PROCEDURE — 2709999900 HC NON-CHARGEABLE SUPPLY: Performed by: STUDENT IN AN ORGANIZED HEALTH CARE EDUCATION/TRAINING PROGRAM

## 2023-11-02 PROCEDURE — 88305 TISSUE EXAM BY PATHOLOGIST: CPT

## 2023-11-02 PROCEDURE — 7100000011 HC PHASE II RECOVERY - ADDTL 15 MIN: Performed by: STUDENT IN AN ORGANIZED HEALTH CARE EDUCATION/TRAINING PROGRAM

## 2023-11-02 PROCEDURE — 3600007502: Performed by: STUDENT IN AN ORGANIZED HEALTH CARE EDUCATION/TRAINING PROGRAM

## 2023-11-02 RX ORDER — SODIUM CHLORIDE 0.9 % (FLUSH) 0.9 %
5-40 SYRINGE (ML) INJECTION PRN
Status: DISCONTINUED | OUTPATIENT
Start: 2023-11-02 | End: 2023-11-02 | Stop reason: HOSPADM

## 2023-11-02 RX ORDER — SODIUM CHLORIDE 9 MG/ML
25 INJECTION, SOLUTION INTRAVENOUS PRN
Status: DISCONTINUED | OUTPATIENT
Start: 2023-11-02 | End: 2023-11-02 | Stop reason: HOSPADM

## 2023-11-02 RX ORDER — SODIUM CHLORIDE 0.9 % (FLUSH) 0.9 %
5-40 SYRINGE (ML) INJECTION EVERY 12 HOURS SCHEDULED
Status: DISCONTINUED | OUTPATIENT
Start: 2023-11-02 | End: 2023-11-02 | Stop reason: HOSPADM

## 2023-11-02 RX ORDER — LIDOCAINE HYDROCHLORIDE 20 MG/ML
INJECTION, SOLUTION EPIDURAL; INFILTRATION; INTRACAUDAL; PERINEURAL PRN
Status: DISCONTINUED | OUTPATIENT
Start: 2023-11-02 | End: 2023-11-02 | Stop reason: SDUPTHER

## 2023-11-02 RX ADMIN — LIDOCAINE HYDROCHLORIDE 100 MG: 20 INJECTION, SOLUTION EPIDURAL; INFILTRATION; INTRACAUDAL; PERINEURAL at 12:32

## 2023-11-02 RX ADMIN — PROPOFOL 75 MG: 10 INJECTION, EMULSION INTRAVENOUS at 12:32

## 2023-11-02 RX ADMIN — PROPOFOL 50 MG: 10 INJECTION, EMULSION INTRAVENOUS at 12:34

## 2023-11-02 ASSESSMENT — ENCOUNTER SYMPTOMS: SHORTNESS OF BREATH: 1

## 2023-11-02 ASSESSMENT — PAIN - FUNCTIONAL ASSESSMENT: PAIN_FUNCTIONAL_ASSESSMENT: NONE - DENIES PAIN

## 2023-11-02 NOTE — PROGRESS NOTES
Endoscopy Case End Note:    1240:  Procedure scope was pre-cleaned, per protocol, at bedside by DERECK Mittal.      1241:  Report received from anesthesia Oriana Andersen CRNA. See anesthesia flowsheet for intra-procedure vital signs and events. Initial (On Arrival)

## 2023-11-02 NOTE — ANESTHESIA PRE PROCEDURE
Department of Anesthesiology  Preprocedure Note       Name:  Elicia Harman   Age:  52 y.o.  :  1970                                          MRN:  [id number]         Date:  2023      Surgeon: Lizbet Ferrera):  Finesse Gómez MD    Procedure: Procedure(s):  ILEOANAL POUCHOSCOPY    Medications prior to admission:   Prior to Admission medications    Medication Sig Start Date End Date Taking? Authorizing Provider   OXYGEN Inhale into the lungs as needed 2 L    Mara Smith MD   ondansetron (ZOFRAN) 4 MG tablet Take 1 tablet by mouth daily as needed for Nausea or Vomiting 23   Roel Saenz MD   dicyclomine (BENTYL) 20 MG tablet Take 1 tablet by mouth every 6 hours as needed (abd spasms) 23   Roel Saenz MD   OZEMPIC, 1 MG/DOSE, 4 MG/3ML SOPN INJECT 1 MG SUBCUTANEOUSLY ONCE A WEEK 23   Germán Joseph MD   buPROPion [facility]) 150 MG extended release tablet Take 1 tablet by mouth once daily 23   Roel Saenz MD   escitalopram (LEXAPRO) 20 MG tablet Take 1 tablet by mouth daily 23   Roel Saenz MD   famotidine (PEPCID) 20 MG tablet Take 1 tablet by mouth 2 times daily 23   Roel Saenz MD   pantoprazole (PROTONIX) 40 MG tablet Take 1 tablet by mouth daily 23   Roel Saenz MD   inFLIXimab (REMICADE) 100 MG injection Infuse 5 mg/kg intravenously See Admin Instructions Every 8 weeks    Mara Smith MD   phentermine 37.5 MG capsule Take 1 capsule by mouth every morning for 180 days.  Max Daily Amount: 37.5 mg 23  Germán Joseph MD   diclofenac sodium (VOLTAREN) 1 % GEL Apply 2 g topically 4 times daily 23   Roel Saenz MD   albuterol sulfate HFA (PROVENTIL;VENTOLIN;PROAIR) 108 (90 Base) MCG/ACT inhaler Inhale 1 puff into the lungs every 4 hours as needed 3/8/16   Automatic Reconciliation, Ar   amitriptyline (ELAVIL) 10 MG tablet Take by mouth 21   Automatic Reconciliation, Ar

## 2023-11-02 NOTE — OP NOTE
[address]                  Pouchscopy Operative Report    11/2/2023      Bhaskar Pod  [id number]  1970    Procedure Type:   Pouchoscopy with biopsy    Indications:   recurrent diarrhea, obstipation, pouchitis      Pre-operative Diagnosis: see indication above    Post-operative Diagnosis:  See findings below    :  Chloe Abdul MD    Referring Provider: Vega Gonzalez MD      Sedation:  MAC anesthesia Propofol    Pre-Procedural Exam:      Airway: clear,  No airway problems anticipated  Heart: RRR, without gallops or rubs  Lungs: clear bilaterally without wheezes, crackles, or rhonchi  Abdomen: soft, nontender, nondistended, bowel sounds present  Mental Status: awake, alert and oriented to person, place and time     Procedure Details:  After informed consent was obtained with all risks and benefits of procedure explained and preoperative exam completed, the patient was taken to the endoscopy suite and placed in the left lateral decubitus position. Upon sequential sedation as per above, a digital rectal exam was performed . The Olympus video upper endscope  was inserted in the rectum and carefully advanced to the prepouch ileum for about 40 cm. The prep was adequate. Retroflexion was not performed given the narrowed rectal anastomosis    Findings:   Rectum: there was mild erythema at the anastomosis between the J limb of the pouch and the prepouch ileum, biopsied. Otherwise normal appearance of the mucosa  Ileum: Normal appearance of the prepouch ileum without any inflammation, narrowing, or strictures. Biopsies obtained to exclude active IBD  Anal exam: stenosis of the anal canal with difficulty inserting and removing the upper endoscope    Specimen Removed:   Jar 1) Prepouch ileum     Jar 2) pouch/ileorectal  anastomosis    Complications: None. EBL:  None. Impression:     Mild erythema in the pouch without overt pouchitis.   Normal prepouch

## 2023-11-02 NOTE — PROGRESS NOTES
ARRIVAL INFORMATION:  Verified patient name and date of birth, scheduled procedure, and informed consent. : Brenda law aunt [phone]  contact number:    Physician and staff can share information with the . Belongings with patient include:  Clothing, nose ring    GI FOCUSED ASSESSMENT:  Neuro: Awake, alert, oriented x4  Respiratory: even and unlabored   GI: soft and non-distended  EKG Rhythm: normal sinus rhythm    Education:Reviewed general discharge instructions and  information.
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
## 2023-11-02 NOTE — ANESTHESIA POSTPROCEDURE EVALUATION
Department of Anesthesiology  Postprocedure Note    Patient: Warden Mcfarlane  MRN: [id number]  YOB: 1970  Date of evaluation: 11/2/2023      Procedure Summary     Date: 11/02/23 Room / Location: [facility] ENDO 03 / [facility] ENDOSCOPY    Anesthesia Start: 5184 Anesthesia Stop: 1240    Procedure: ILEOANAL POUCHOSCOPY Diagnosis:       Pouchitis ([address])      (Pouchitis ([address]) [K91.850])    Surgeons: Mariela Barker MD Responsible Provider: Kamila Carvajal MD    Anesthesia Type: MAC ASA Status: 3          Anesthesia Type: MAC    Carol Phase I: Carol Score: 10    Carol Phase II: Carol Score: 10      Anesthesia Post Evaluation    Patient location during evaluation: PACU  Patient participation: complete - patient participated  Level of consciousness: awake and alert  Airway patency: patent  Nausea & Vomiting: no nausea and no vomiting  Complications: no  Cardiovascular status: hemodynamically stable  Respiratory status: acceptable  Hydration status: stable

## 2023-11-29 ENCOUNTER — OFFICE VISIT (OUTPATIENT)
Age: 53
End: 2023-11-29
Payer: COMMERCIAL

## 2023-11-29 VITALS
BODY MASS INDEX: 33.45 KG/M2 | HEART RATE: 80 BPM | WEIGHT: 201 LBS | SYSTOLIC BLOOD PRESSURE: 121 MMHG | TEMPERATURE: 97.1 F | OXYGEN SATURATION: 100 % | DIASTOLIC BLOOD PRESSURE: 76 MMHG

## 2023-11-29 DIAGNOSIS — M17.11 UNILATERAL PRIMARY OSTEOARTHRITIS, RIGHT KNEE: Primary | ICD-10-CM

## 2023-11-29 PROCEDURE — 99203 OFFICE O/P NEW LOW 30 MIN: CPT | Performed by: ORTHOPAEDIC SURGERY

## 2023-11-29 RX ORDER — SODIUM FLUORIDE 0.1 MG/ML
RINSE ORAL DAILY
COMMUNITY

## 2023-11-29 NOTE — PROGRESS NOTES
11/29/2023    Chief Complaint: Right knee pain    Assessment: Osteoarthritis right knee    Plan: This patient and I did discuss the many options in treating knee osteoarthritis. We did discuss that we could continue to seek out nonoperative modalities, such as: NSAIDs, oral and topical analgesics, knee injections, knee braces, physical therapy, stretching, strengthening, and weight loss strategies, activity modification, ambulatory assistive devices. The patient stated their understanding with this, but would like to proceed with surgical management in the form of a total knee arthroplasty. We did discuss the risks of surgery which include but are not limited to infection, nerve or blood vessel damage, failure of fixation, failure of any possible implant, need for reoperation, postoperative pain and swelling, intra-or postoperative fracture, postoperative dislocation, leg length inequality, need for reoperation, implant failure, death, disability, organ dysfunction, wound healing issues, DVT, PE, and the need for further procedures. The patient did freely state their understanding and satisfaction with our discussion. We will proceed after medical clearances. The patient was counseled at length about the risks of tali Covid-19 during their perioperative period and any recovery window from their procedure. The patient was made aware that tali Covid-19  may worsen their prognosis for recovering from their procedure and lend to a higher morbidity and/or mortality risk. All material risks, benefits, and reasonable alternatives including postponing the procedure were discussed. The patient does  wish to proceed with the procedure at this time. HPI: This is a 52 y.o. female who complains of right knee pain. Onset was gradual.  The patient has had activity dependent pain for years.     The patient has tried activity modification, physical therapy exercises, injections have failed to provide

## 2023-12-01 ENCOUNTER — TELEPHONE (OUTPATIENT)
Age: 53
End: 2023-12-01

## 2023-12-01 DIAGNOSIS — E66.01 MORBID (SEVERE) OBESITY DUE TO EXCESS CALORIES (HCC): ICD-10-CM

## 2023-12-01 DIAGNOSIS — R73.02 IMPAIRED GLUCOSE TOLERANCE (ORAL): ICD-10-CM

## 2023-12-01 NOTE — TELEPHONE ENCOUNTER
Faxed to [facility] Medication Clearance Form. Attn: Will [phone] Patient seen by Dr. Cecilia Jimenes in 2021 for acute care. Dr. Reid Pratt listed as PCP currently.

## 2023-12-05 ENCOUNTER — OFFICE VISIT (OUTPATIENT)
Age: 53
End: 2023-12-05
Payer: COMMERCIAL

## 2023-12-05 VITALS
TEMPERATURE: 97.5 F | DIASTOLIC BLOOD PRESSURE: 88 MMHG | RESPIRATION RATE: 17 BRPM | HEART RATE: 76 BPM | BODY MASS INDEX: 32.87 KG/M2 | OXYGEN SATURATION: 99 % | SYSTOLIC BLOOD PRESSURE: 138 MMHG | HEIGHT: 65 IN | WEIGHT: 197.31 LBS

## 2023-12-05 DIAGNOSIS — J38.3 DISORDER OF VOCAL CORD: ICD-10-CM

## 2023-12-05 DIAGNOSIS — J45.30 MILD PERSISTENT ASTHMA WITHOUT COMPLICATION: Primary | ICD-10-CM

## 2023-12-05 PROCEDURE — 99213 OFFICE O/P EST LOW 20 MIN: CPT | Performed by: STUDENT IN AN ORGANIZED HEALTH CARE EDUCATION/TRAINING PROGRAM

## 2023-12-05 RX ORDER — CIPROFLOXACIN 500 MG/1
500 TABLET, FILM COATED ORAL 2 TIMES DAILY
COMMUNITY
Start: 2023-11-15

## 2023-12-05 RX ORDER — BUDESONIDE AND FORMOTEROL FUMARATE DIHYDRATE 160; 4.5 UG/1; UG/1
1 AEROSOL RESPIRATORY (INHALATION) 2 TIMES DAILY
Qty: 10.2 G | Refills: 3 | Status: SHIPPED | OUTPATIENT
Start: 2023-12-05

## 2023-12-05 RX ORDER — ALBUTEROL SULFATE 90 UG/1
1 AEROSOL, METERED RESPIRATORY (INHALATION) EVERY 4 HOURS PRN
Qty: 18 G | Refills: 3 | Status: SHIPPED | OUTPATIENT
Start: 2023-12-05

## 2023-12-05 RX ORDER — METRONIDAZOLE 500 MG/1
500 TABLET ORAL EVERY 12 HOURS
COMMUNITY
Start: 2023-11-15

## 2023-12-05 NOTE — PROGRESS NOTES
[address]  [address] [facility], [address] [facility]  [phone]    Chief Complaint: asthma and vocal cord problems    Subjective  Martin Florez is a 52 y.o. Black / [place] American female , established  patient, here for evaluation of the concern(s) above; PMHx:  PMH sig for Psoriasis Arthritis, UC, Anxiety and depression, right leg DVT x 2, PUD, chronic hypoxia related to COVID 19, intermittent asthma on chronic oxygen, and obesity here for routine follow up. Asthma:  Well controlled on albuterol and symbicort. Vocal cord disorder:    Chronic since she had covid    Pertinent negatives include no fever, no chest pain, no abdominal pain and no shortness of breath. Treatment team:  Dr. Justice Lara for diabetes and weight management  Dr. Aubree Kessler: Ulcerative Colitis  Dr. Puneet Patel:  rheumatologist  Dr. Kay Garcia:  Pulmonalogist      Social Hx;  - lives with her mother and   -no children of her own. Raised her 's brother son. Allergies - reviewed: Allergies   Allergen Reactions    Adalimumab Rash     Large red knots    Sulfa Antibiotics Anaphylaxis    Certolizumab Pegol Rash    Codeine Itching       Past Medical History - reviewed:  Past Medical History:   Diagnosis Date    Anemia associated with acute blood loss 2017    Txd with Blood transfusions x 2. Dr. Jen Lemus. Asthma childhood    DDD (degenerative disc disease), lumbar     DJD (degenerative joint disease) of knee     ROBLES (generalized anxiety disorder) 2018    Dr. Roberta Rios    GERD (gastroesophageal reflux disease)     GI bleeding 2017    Dr. Lashaun Garcia. Txd with Blood transfusions. History of tuberculosis exposure 2013    POSITIVE PPD TEST. Txd x 6 months; last dose either 11/13 or 12/13    Lower leg DVT (deep venous thromboembolism), acute, right ([address]) 2008, 4/27/2016, 08/03/2017    x 3. Initially due to trauma to leg then plane ride home. Dr. Anika Velázquez.

## 2023-12-06 ENCOUNTER — TELEPHONE (OUTPATIENT)
Age: 53
End: 2023-12-06

## 2023-12-06 NOTE — TELEPHONE ENCOUNTER
----- Message from Shikha Henderson DO sent at 11/29/2023  2:51 PM EST -----  Diagnosis: Unilateral Primary Osteoarthritis, right knee M17.11  Procedure: Right total knee arthroplasty   CPT: 84801  Operative minutes: 100  Inpatient  Location: [facility] Main OR  PAT: Yes  Class: Yes  Special Equipment: Regular table, Budny foot watt, Shante Triathlon, Plan for cemented TKA, foot watt for prepping, broken screw removal set  Staffing: Retractor watt  Anesthesia: General with adductor canal block  Surgical index: 1

## 2023-12-06 NOTE — TELEPHONE ENCOUNTER
Contacted patient to schedule surgery. Scheduled for 1/22/24. Advised patient that clearances from PCP(Shilo) and [facility]) would be required, and would need to be received no less than 2 business days before surgery. Patient advised to contact the office(s) to make pre op appts as soon as possible. Patient verbalized understanding and was encouraged to contact our office with any questions or concerns regarding upcoming surgery or required clearances.

## 2023-12-09 ENCOUNTER — TELEPHONE (OUTPATIENT)
Age: 53
End: 2023-12-09

## 2023-12-09 ENCOUNTER — PREP FOR PROCEDURE (OUTPATIENT)
Age: 53
End: 2023-12-09

## 2023-12-09 PROBLEM — M17.11 OSTEOARTHRITIS OF RIGHT KNEE: Status: ACTIVE | Noted: 2023-12-09

## 2023-12-14 ENCOUNTER — TELEMEDICINE (OUTPATIENT)
Age: 53
End: 2023-12-14
Payer: COMMERCIAL

## 2023-12-14 ENCOUNTER — HOSPITAL ENCOUNTER (OUTPATIENT)
Facility: HOSPITAL | Age: 53
Discharge: HOME | End: 2023-12-14
Attending: SURGERY
Payer: COMMERCIAL

## 2023-12-14 ENCOUNTER — TRANSCRIBE ORDERS (OUTPATIENT)
Facility: HOSPITAL | Age: 53
End: 2023-12-14

## 2023-12-14 ENCOUNTER — HOSPITAL ENCOUNTER (OUTPATIENT)
Facility: HOSPITAL | Age: 53
Discharge: HOME | End: 2023-12-14
Payer: COMMERCIAL

## 2023-12-14 VITALS
HEIGHT: 66 IN | BODY MASS INDEX: 32.42 KG/M2 | DIASTOLIC BLOOD PRESSURE: 46 MMHG | RESPIRATION RATE: 16 BRPM | OXYGEN SATURATION: 100 % | TEMPERATURE: 98.3 F | HEART RATE: 73 BPM | SYSTOLIC BLOOD PRESSURE: 115 MMHG | WEIGHT: 201.72 LBS

## 2023-12-14 DIAGNOSIS — R10.9 ABDOMINAL PAIN, UNSPECIFIED ABDOMINAL LOCATION: Primary | ICD-10-CM

## 2023-12-14 DIAGNOSIS — R10.9 ABDOMINAL PAIN, UNSPECIFIED ABDOMINAL LOCATION: ICD-10-CM

## 2023-12-14 DIAGNOSIS — Z01.818 PREOPERATIVE CLEARANCE: Primary | ICD-10-CM

## 2023-12-14 DIAGNOSIS — K59.00 CONSTIPATION, UNSPECIFIED CONSTIPATION TYPE: ICD-10-CM

## 2023-12-14 LAB
ABO + RH BLD: NORMAL
ALBUMIN SERPL-MCNC: 3.7 G/DL (ref 3.5–5)
ALBUMIN/GLOB SERPL: 1 (ref 1.1–2.2)
ALP SERPL-CCNC: 66 U/L (ref 45–117)
ALT SERPL-CCNC: 18 U/L (ref 12–78)
ANION GAP SERPL CALC-SCNC: 4 MMOL/L (ref 5–15)
APPEARANCE UR: CLEAR
AST SERPL-CCNC: 13 U/L (ref 15–37)
BACTERIA URNS QL MICRO: NEGATIVE /HPF
BILIRUB SERPL-MCNC: 0.8 MG/DL (ref 0.2–1)
BILIRUB UR QL: NEGATIVE
BLOOD GROUP ANTIBODIES SERPL: NORMAL
BUN SERPL-MCNC: 23 MG/DL (ref 6–20)
BUN/CREAT SERPL: 22 (ref 12–20)
CALCIUM SERPL-MCNC: 9.3 MG/DL (ref 8.5–10.1)
CHLORIDE SERPL-SCNC: 107 MMOL/L (ref 97–108)
CO2 SERPL-SCNC: 29 MMOL/L (ref 21–32)
COLOR UR: ABNORMAL
CREAT SERPL-MCNC: 1.06 MG/DL (ref 0.55–1.02)
EPITH CASTS URNS QL MICRO: ABNORMAL /LPF
ERYTHROCYTE [DISTWIDTH] IN BLOOD BY AUTOMATED COUNT: 13.2 % (ref 11.5–14.5)
EST. AVERAGE GLUCOSE BLD GHB EST-MCNC: 80 MG/DL
GLOBULIN SER CALC-MCNC: 3.7 G/DL (ref 2–4)
GLUCOSE SERPL-MCNC: 92 MG/DL (ref 65–100)
GLUCOSE UR STRIP.AUTO-MCNC: NEGATIVE MG/DL
HBA1C MFR BLD: 4.4 % (ref 4–5.6)
HCT VFR BLD AUTO: 39.5 % (ref 35–47)
HGB BLD-MCNC: 12.6 G/DL (ref 11.5–16)
HGB UR QL STRIP: ABNORMAL
INR PPP: 1.3 (ref 0.9–1.1)
KETONES UR QL STRIP.AUTO: NEGATIVE MG/DL
LEUKOCYTE ESTERASE UR QL STRIP.AUTO: NEGATIVE
MCH RBC QN AUTO: 30.3 PG (ref 26–34)
MCHC RBC AUTO-ENTMCNC: 31.9 G/DL (ref 30–36.5)
MCV RBC AUTO: 95 FL (ref 80–99)
NITRITE UR QL STRIP.AUTO: NEGATIVE
NRBC # BLD: 0 K/UL (ref 0–0.01)
NRBC BLD-RTO: 0 PER 100 WBC
PH UR STRIP: 5.5 (ref 5–8)
PLATELET # BLD AUTO: 260 K/UL (ref 150–400)
PMV BLD AUTO: 10 FL (ref 8.9–12.9)
POTASSIUM SERPL-SCNC: 3.7 MMOL/L (ref 3.5–5.1)
PROT SERPL-MCNC: 7.4 G/DL (ref 6.4–8.2)
PROT UR STRIP-MCNC: NEGATIVE MG/DL
PROTHROMBIN TIME: 13.4 SEC (ref 9–11.1)
RBC # BLD AUTO: 4.16 M/UL (ref 3.8–5.2)
RBC #/AREA URNS HPF: ABNORMAL /HPF (ref 0–5)
SODIUM SERPL-SCNC: 140 MMOL/L (ref 136–145)
SP GR UR REFRACTOMETRY: 1.02 (ref 1–1.03)
SPECIMEN EXP DATE BLD: NORMAL
URINE CULTURE IF INDICATED: ABNORMAL
UROBILINOGEN UR QL STRIP.AUTO: 0.2 EU/DL (ref 0.2–1)
WBC # BLD AUTO: 3.3 K/UL (ref 3.6–11)
WBC URNS QL MICRO: ABNORMAL /HPF (ref 0–4)

## 2023-12-14 PROCEDURE — 97161 PT EVAL LOW COMPLEX 20 MIN: CPT

## 2023-12-14 PROCEDURE — 81001 URINALYSIS AUTO W/SCOPE: CPT

## 2023-12-14 PROCEDURE — 80053 COMPREHEN METABOLIC PANEL: CPT

## 2023-12-14 PROCEDURE — 86850 RBC ANTIBODY SCREEN: CPT

## 2023-12-14 PROCEDURE — 86900 BLOOD TYPING SEROLOGIC ABO: CPT

## 2023-12-14 PROCEDURE — 85027 COMPLETE CBC AUTOMATED: CPT

## 2023-12-14 PROCEDURE — 36415 COLL VENOUS BLD VENIPUNCTURE: CPT

## 2023-12-14 PROCEDURE — 2500000003 HC RX 250 WO HCPCS: Performed by: SURGERY

## 2023-12-14 PROCEDURE — 97530 THERAPEUTIC ACTIVITIES: CPT

## 2023-12-14 PROCEDURE — 6360000004 HC RX CONTRAST MEDICATION: Performed by: SURGERY

## 2023-12-14 PROCEDURE — 83036 HEMOGLOBIN GLYCOSYLATED A1C: CPT

## 2023-12-14 PROCEDURE — 85610 PROTHROMBIN TIME: CPT

## 2023-12-14 PROCEDURE — 74177 CT ABD & PELVIS W/CONTRAST: CPT

## 2023-12-14 PROCEDURE — 86901 BLOOD TYPING SEROLOGIC RH(D): CPT

## 2023-12-14 PROCEDURE — 99243 OFF/OP CNSLTJ NEW/EST LOW 30: CPT | Performed by: STUDENT IN AN ORGANIZED HEALTH CARE EDUCATION/TRAINING PROGRAM

## 2023-12-14 RX ORDER — SODIUM CHLORIDE, SODIUM LACTATE, POTASSIUM CHLORIDE, CALCIUM CHLORIDE 600; 310; 30; 20 MG/100ML; MG/100ML; MG/100ML; MG/100ML
INJECTION, SOLUTION INTRAVENOUS CONTINUOUS
OUTPATIENT
Start: 2023-12-26

## 2023-12-14 RX ORDER — CIPROFLOXACIN 500 MG/1
500 TABLET, FILM COATED ORAL 2 TIMES DAILY
COMMUNITY

## 2023-12-14 RX ADMIN — BARIUM SULFATE 900 ML: 20 SUSPENSION ORAL at 13:14

## 2023-12-14 RX ADMIN — IOPAMIDOL 100 ML: 755 INJECTION, SOLUTION INTRAVENOUS at 13:00

## 2023-12-14 ASSESSMENT — PAIN SCALES - GENERAL: PAINLEVEL_OUTOF10: 4

## 2023-12-14 ASSESSMENT — PAIN DESCRIPTION - DESCRIPTORS: DESCRIPTORS: ACHING

## 2023-12-14 ASSESSMENT — PAIN DESCRIPTION - LOCATION: LOCATION: KNEE

## 2023-12-14 ASSESSMENT — PAIN DESCRIPTION - ORIENTATION: ORIENTATION: RIGHT

## 2023-12-14 NOTE — PROGRESS NOTES
Patient given joint education book from Dr. Julianne Davidson office. Patient and  shown the joint replacement education video at PAT appointment.

## 2023-12-14 NOTE — PROGRESS NOTES
Orthopedic and Spine Patients: Instructions on When You Can   Eat or Drink Before Surgery      You have been provided a pre-surgery drink received at your pre-admission testing appointment. Night before surgery: You should drink 1/2 bottle of the  pre-surgery drink at bedtime. No food after midnight! Day of Surgery:  Complete 2nd half of the bottle of the pre-surgery drink 1 hour prior to arrival at hospital.  For questions call Pre-Admission Testing at [phone]. They are available from 8:00am-5:00pm, Monday through Friday.

## 2023-12-14 NOTE — PROGRESS NOTES
Orthopedic and Spine Patients: Instructions on When You Can   Eat or Drink Before Surgery      You have been provided 2 pre-surgery drinks received at your pre-admission testing appointment. Night before surgery: You should drink one bottle of the  pre-surgery drink at bedtime. No food after midnight! Day of Surgery:  Complete 2nd bottle of the pre-surgery drink 1 hour prior to arrival at hospital.  For questions call Pre-Admission Testing at [phone]. They are available from 8:00am-5:00pm, Monday through Friday.

## 2023-12-14 NOTE — PROGRESS NOTES

## 2023-12-14 NOTE — PROGRESS NOTES
Called Dr. Sneha Arizmendi, patient's rheumatologist, and asked for message to be sent to Dr. Quan Armstrong for instructions on wether patient should get Remicade infusion on 12/19 since she is having surgery on 12/26. Asked for call back. Navarro Batista from Dr. Bryan Zabala office called back and states they are going to cancel patient's remicade infusion for 12/19. Navarro Batista states she will notify patient. Called patient to let her know that Dr. Bryan Zabala office is going to be cancelling her Remicade infusion.

## 2023-12-14 NOTE — PROGRESS NOTES
Called and spoke to patient's PCP, Dr. Jonh Beck and asked for instructions on when patient should stop her Xarelto prior to knee replacement on 12/26/23. He states patient needs a preop visit with him and he will give her instructions about the Xarelto. He states he will have his nurse call the patient and get an appointment for patient to be seen. 1629. Spoke with patient and she states she had a virtual visit this afternoon with Dr. Jonh Beck. Patient states the doctor told her to stop Xarelto 3-5 days prior to surgery. 12/15/23 1533. Called patient to make sure she had the correct instructions on when to stop her Xarelto from Dr. Jonh Beck. His note states to stop Xarelto for 2 days prior to surgery which makes last dose on 12/23/23. Patient states Dr. oJnh Beck called her last night to tell her that. Patient verbalized understanding.

## 2023-12-14 NOTE — PROGRESS NOTES
Patient states she has been having hoarseness off and on since her last diagnosis of COVID. She states her PCP wants her to see a ENT doctor but she has not seen one yet. Patient denies difficulty swallowing. Patient states in the past she has had her esophagus stretched for feeling like food was stuck but that was an issue prior to the hoarseness.

## 2023-12-14 NOTE — PROGRESS NOTES
[address]  [address] [place], [address] [facility]  [phone]    Chief Complaint: preop clearance    Subjective  Gil Joiner is a 52 y.o. Black / [place] American female , established patient, here for evaluation of the concern(s) above;    SUBJECTIVE:     Pt would like to be evaluated for the problem(s) listed above:    Type of surgery and indication: Total knee replacement planned for 12/26/2023  Surgery site : [facility]  Anesthesia type: General  Date of procedure:  12/26/2023    Patient denies any fever, chills, chest pain, sob, nausea, vomiting or abd pain. Of note, pt has history of lower extremity DVT noted about 5 years ago. Initially due to trauma to leg, then had another episode after plane ride home. On chronic anticoagulation. Follows with Dr. Arnold Burroughs. Previous intolerance to Anesthesia? No    Latex Allergies? No    Other Risk Factors:   Screening for ETOH use:  Done and low risk  Smoking status:  done and low risk  Personal or FH of bleeding problems:  no  Personal or FH of blood clots:  yes, pt has h/o DVT  Personal or FH of anesthesia problems:  no    ROS     A comprehensive review of systems was negative except for that written in the History of Present Illness. Inherent Risk of Surgery   Surgical risk:    Low:  Cataract (uses MAC topical), breast, dental, endoscopy, superficial  Intermediate:  Orthopedic, abdominal, thoracic, carotid endarterctomy, prostate, head and neck   High:  Vascular, aortic, emergent, high risk of blood loss, anticipated prolonged      Review of systems:       A comprehensive review of systems was negative except for that written in the History of Present Illness.         PHYSICAL EXAM:     General: alert, cooperative, no distress   Mental  status: mental status: alert, oriented to person, place, and time, normal mood, behavior, speech, dress, motor activity, and thought processes   Resp: resp: normal

## 2023-12-15 LAB
BACTERIA SPEC CULT: NORMAL
BACTERIA SPEC CULT: NORMAL
SERVICE CMNT-IMP: NORMAL

## 2023-12-24 NOTE — H&P
12/24/2023    Chief Complaint: Right knee pain    HPI: This is a 52 y.o. female who complains of right knee pain. Onset was gradual.  The patient has had activity dependent pain for years. The patient has tried activity modification, physical therapy exercises, injections have failed to provide relief. The pain is in the knee, it is severe in intensity. The patient feels unstable with the knee, fears falling, and has significant limitation with activities of daily living, recreation, and walks with a limp. Past Medical History:   Diagnosis Date    Anemia associated with acute blood loss 2017    Txd with Blood transfusions x 2. Dr. Sam Arroyo. Asthma childhood    DDD (degenerative disc disease), lumbar     DJD (degenerative joint disease) of knee     ROBLES (generalized anxiety disorder) 2018    Dr. Stefano Rodríguez    GERD (gastroesophageal reflux disease)     GI bleeding 2017    Dr. Shanon Mondragon. Txd with Blood transfusions. History of COVID-19     x2-on Oxygen since last COVID diagnosis    History of tuberculosis exposure 2013    POSITIVE PPD TEST. Txd x 6 months; last dose either 11/13 or 12/13    Hoarseness or changing voice 2023    since last COVID diagnosis-is going to be seeing ENT in the future    Hypertension     has been taken off HTN meds since weight loss    Lower leg DVT (deep venous thromboembolism), acute, right ([address]) 2008, 4/27/2016, 08/03/2017    x 3. Initially due to trauma to leg then plane ride home. Dr. Derek Bradford. Chronic Anticoagulation. Major depression 2018    Dr. Stefano Rodríguez    Morbid obesity [facility])     Orthostatic syncope 2017    due to anemia from blood loss. Dr. Sam Arroyo. Post-thrombotic syndrome of right lower extremity 09/14/2017    w  R leg swelling and pain. Dr. Katiuska Muñiz. Pseudogout 2016    R knee. Dr. Apple Tracy. Psoriatic arthritis ([address]) 2000s    Dr. Abhijeet Cordero. Txd w Felizardo Mary injections monthly.     PUD (peptic ulcer disease)     Dr. Sahra Alan every 4 hours as needed for Wheezing 18 g 3    MAGNESIUM PO Take 500 mg by mouth daily      Probiotic Product (ALIGN) CHEW Take by mouth daily      OXYGEN Inhale into the lungs as needed 2 L      ondansetron (ZOFRAN) 4 MG tablet Take 1 tablet by mouth daily as needed for Nausea or Vomiting 30 tablet 0    dicyclomine (BENTYL) 20 MG tablet Take 1 tablet by mouth every 6 hours as needed (abd spasms) 120 tablet 0    buPROPion (WELLBUTRIN SR) 150 MG extended release tablet Take 1 tablet by mouth once daily (Patient taking differently: Take 1 tablet by mouth daily) 90 tablet 1    escitalopram (LEXAPRO) 20 MG tablet Take 1 tablet by mouth daily 90 tablet 1    famotidine (PEPCID) 20 MG tablet Take 1 tablet by mouth 2 times daily 180 tablet 1    pantoprazole (PROTONIX) 40 MG tablet Take 1 tablet by mouth daily 90 tablet 1    inFLIXimab (REMICADE) 100 MG injection Infuse 5 mg/kg intravenously See Admin Instructions Every 8 weeks      diclofenac sodium (VOLTAREN) 1 % GEL Apply 2 g topically 4 times daily (Patient taking differently: Apply 2 g topically 4 times daily as needed for Pain) 100 g 3    amitriptyline (ELAVIL) 10 MG tablet Take 1 tablet by mouth nightly as needed for Sleep      folic acid (FOLVITE) 1 MG tablet Take 1 tablet by mouth daily      furosemide (LASIX) 40 MG tablet Take 1 tablet by mouth daily as needed (swelling)      rivaroxaban (XARELTO) 20 MG TABS tablet Take 1 tablet by mouth daily (with breakfast)      rosuvastatin (CRESTOR) 10 MG tablet Take 1 tablet by mouth daily         Allergies   Allergen Reactions    Adalimumab Rash     Large red knots    Sulfa Antibiotics Anaphylaxis    Certolizumab Pegol Rash    Codeine Itching       Family History   Problem Relation Age of Onset    Diabetes Mother     Hypertension Paternal Grandfather     Hypertension Mother     Other Mother         GALLSTONES    High Cholesterol Maternal Grandmother     Diabetes Maternal Grandmother     Hypertension Maternal Grandmother

## 2023-12-26 ENCOUNTER — ANESTHESIA EVENT (OUTPATIENT)
Facility: HOSPITAL | Age: 53
DRG: 470 | End: 2023-12-26
Payer: COMMERCIAL

## 2023-12-26 ENCOUNTER — APPOINTMENT (OUTPATIENT)
Facility: HOSPITAL | Age: 53
DRG: 470 | End: 2023-12-26
Attending: ORTHOPAEDIC SURGERY
Payer: COMMERCIAL

## 2023-12-26 ENCOUNTER — ANESTHESIA (OUTPATIENT)
Facility: HOSPITAL | Age: 53
DRG: 470 | End: 2023-12-26
Payer: COMMERCIAL

## 2023-12-26 ENCOUNTER — HOSPITAL ENCOUNTER (INPATIENT)
Facility: HOSPITAL | Age: 53
LOS: 1 days | Discharge: HOME HEALTH | DRG: 470 | End: 2023-12-28
Attending: ORTHOPAEDIC SURGERY | Admitting: ORTHOPAEDIC SURGERY
Payer: COMMERCIAL

## 2023-12-26 DIAGNOSIS — Z96.651 S/P TOTAL KNEE ARTHROPLASTY, RIGHT: Primary | ICD-10-CM

## 2023-12-26 PROBLEM — M17.11 UNILATERAL PRIMARY OSTEOARTHRITIS, RIGHT KNEE: Status: ACTIVE | Noted: 2023-12-26

## 2023-12-26 PROCEDURE — 2580000003 HC RX 258: Performed by: ORTHOPAEDIC SURGERY

## 2023-12-26 PROCEDURE — 3600000005 HC SURGERY LEVEL 5 BASE: Performed by: ORTHOPAEDIC SURGERY

## 2023-12-26 PROCEDURE — 6370000000 HC RX 637 (ALT 250 FOR IP): Performed by: ORTHOPAEDIC SURGERY

## 2023-12-26 PROCEDURE — 3700000001 HC ADD 15 MINUTES (ANESTHESIA): Performed by: ORTHOPAEDIC SURGERY

## 2023-12-26 PROCEDURE — 6360000002 HC RX W HCPCS: Performed by: ORTHOPAEDIC SURGERY

## 2023-12-26 PROCEDURE — 6360000002 HC RX W HCPCS: Performed by: ANESTHESIOLOGY

## 2023-12-26 PROCEDURE — APPNB60 APP NON BILLABLE TIME 46-60 MINS: Performed by: NURSE PRACTITIONER

## 2023-12-26 PROCEDURE — 73560 X-RAY EXAM OF KNEE 1 OR 2: CPT

## 2023-12-26 PROCEDURE — 6360000002 HC RX W HCPCS

## 2023-12-26 PROCEDURE — 64447 NJX AA&/STRD FEMORAL NRV IMG: CPT | Performed by: ANESTHESIOLOGY

## 2023-12-26 PROCEDURE — 3700000000 HC ANESTHESIA ATTENDED CARE: Performed by: ORTHOPAEDIC SURGERY

## 2023-12-26 PROCEDURE — 7100000000 HC PACU RECOVERY - FIRST 15 MIN: Performed by: ORTHOPAEDIC SURGERY

## 2023-12-26 PROCEDURE — 27447 TOTAL KNEE ARTHROPLASTY: CPT | Performed by: ORTHOPAEDIC SURGERY

## 2023-12-26 PROCEDURE — 2500000003 HC RX 250 WO HCPCS: Performed by: ANESTHESIOLOGY

## 2023-12-26 PROCEDURE — 3600000015 HC SURGERY LEVEL 5 ADDTL 15MIN: Performed by: ORTHOPAEDIC SURGERY

## 2023-12-26 PROCEDURE — 2580000003 HC RX 258: Performed by: ANESTHESIOLOGY

## 2023-12-26 PROCEDURE — 2500000003 HC RX 250 WO HCPCS

## 2023-12-26 PROCEDURE — 94640 AIRWAY INHALATION TREATMENT: CPT

## 2023-12-26 PROCEDURE — 7100000001 HC PACU RECOVERY - ADDTL 15 MIN: Performed by: ORTHOPAEDIC SURGERY

## 2023-12-26 PROCEDURE — C1776 JOINT DEVICE (IMPLANTABLE): HCPCS | Performed by: ORTHOPAEDIC SURGERY

## 2023-12-26 PROCEDURE — 2709999900 HC NON-CHARGEABLE SUPPLY: Performed by: ORTHOPAEDIC SURGERY

## 2023-12-26 DEVICE — TIBIAL COMPONENT
Type: IMPLANTABLE DEVICE | Site: KNEE | Status: FUNCTIONAL
Brand: TRIATHLON

## 2023-12-26 DEVICE — TIBIAL BEARING INSERT - PS
Type: IMPLANTABLE DEVICE | Site: KNEE | Status: FUNCTIONAL
Brand: TRIATHLON

## 2023-12-26 DEVICE — PATELLA
Type: IMPLANTABLE DEVICE | Site: KNEE | Status: FUNCTIONAL
Brand: TRIATHLON

## 2023-12-26 DEVICE — COMPONENT TOT KNEE CAPPED PRIMARY K2STRYKER] STRYKER CORP]: Type: IMPLANTABLE DEVICE | Status: FUNCTIONAL

## 2023-12-26 DEVICE — POSTERIOR STABILIZED FEMORAL
Type: IMPLANTABLE DEVICE | Site: KNEE | Status: FUNCTIONAL
Brand: TRIATHLON

## 2023-12-26 RX ORDER — ACETAMINOPHEN 500 MG
1000 TABLET ORAL ONCE
Status: COMPLETED | OUTPATIENT
Start: 2023-12-26 | End: 2023-12-26

## 2023-12-26 RX ORDER — ONDANSETRON 2 MG/ML
4 INJECTION INTRAMUSCULAR; INTRAVENOUS EVERY 6 HOURS PRN
Status: DISCONTINUED | OUTPATIENT
Start: 2023-12-26 | End: 2023-12-28 | Stop reason: HOSPADM

## 2023-12-26 RX ORDER — SODIUM CHLORIDE, SODIUM LACTATE, POTASSIUM CHLORIDE, CALCIUM CHLORIDE 600; 310; 30; 20 MG/100ML; MG/100ML; MG/100ML; MG/100ML
INJECTION, SOLUTION INTRAVENOUS CONTINUOUS
Status: DISCONTINUED | OUTPATIENT
Start: 2023-12-26 | End: 2023-12-26 | Stop reason: HOSPADM

## 2023-12-26 RX ORDER — SODIUM CHLORIDE 0.9 % (FLUSH) 0.9 %
5-40 SYRINGE (ML) INJECTION EVERY 12 HOURS SCHEDULED
Status: DISCONTINUED | OUTPATIENT
Start: 2023-12-26 | End: 2023-12-26 | Stop reason: HOSPADM

## 2023-12-26 RX ORDER — HYDROMORPHONE HYDROCHLORIDE 1 MG/ML
0.5 INJECTION, SOLUTION INTRAMUSCULAR; INTRAVENOUS; SUBCUTANEOUS EVERY 5 MIN PRN
Status: COMPLETED | OUTPATIENT
Start: 2023-12-26 | End: 2023-12-26

## 2023-12-26 RX ORDER — BISACODYL 5 MG/1
5 TABLET, DELAYED RELEASE ORAL DAILY
Status: DISCONTINUED | OUTPATIENT
Start: 2023-12-26 | End: 2023-12-28 | Stop reason: HOSPADM

## 2023-12-26 RX ORDER — SODIUM CHLORIDE 9 MG/ML
INJECTION, SOLUTION INTRAVENOUS PRN
Status: DISCONTINUED | OUTPATIENT
Start: 2023-12-26 | End: 2023-12-26 | Stop reason: HOSPADM

## 2023-12-26 RX ORDER — SUCCINYLCHOLINE CHLORIDE 20 MG/ML
INJECTION INTRAMUSCULAR; INTRAVENOUS PRN
Status: DISCONTINUED | OUTPATIENT
Start: 2023-12-26 | End: 2023-12-26 | Stop reason: SDUPTHER

## 2023-12-26 RX ORDER — ROPIVACAINE HYDROCHLORIDE 5 MG/ML
INJECTION, SOLUTION EPIDURAL; INFILTRATION; PERINEURAL PRN
Status: DISCONTINUED | OUTPATIENT
Start: 2023-12-26 | End: 2023-12-26 | Stop reason: ALTCHOICE

## 2023-12-26 RX ORDER — PROCHLORPERAZINE EDISYLATE 5 MG/ML
5 INJECTION INTRAMUSCULAR; INTRAVENOUS
Status: DISCONTINUED | OUTPATIENT
Start: 2023-12-26 | End: 2023-12-26 | Stop reason: HOSPADM

## 2023-12-26 RX ORDER — SODIUM CHLORIDE 9 MG/ML
INJECTION, SOLUTION INTRAVENOUS PRN
Status: DISCONTINUED | OUTPATIENT
Start: 2023-12-26 | End: 2023-12-28 | Stop reason: HOSPADM

## 2023-12-26 RX ORDER — LIDOCAINE HYDROCHLORIDE 10 MG/ML
1 INJECTION, SOLUTION EPIDURAL; INFILTRATION; INTRACAUDAL; PERINEURAL
Status: DISCONTINUED | OUTPATIENT
Start: 2023-12-26 | End: 2023-12-26 | Stop reason: HOSPADM

## 2023-12-26 RX ORDER — HYDROMORPHONE HYDROCHLORIDE 1 MG/ML
0.5 INJECTION, SOLUTION INTRAMUSCULAR; INTRAVENOUS; SUBCUTANEOUS
Status: DISCONTINUED | OUTPATIENT
Start: 2023-12-26 | End: 2023-12-28 | Stop reason: HOSPADM

## 2023-12-26 RX ORDER — DICYCLOMINE HYDROCHLORIDE 10 MG/1
20 CAPSULE ORAL EVERY 6 HOURS PRN
Status: DISCONTINUED | OUTPATIENT
Start: 2023-12-26 | End: 2023-12-28 | Stop reason: HOSPADM

## 2023-12-26 RX ORDER — ALBUTEROL SULFATE 2.5 MG/3ML
2.5 SOLUTION RESPIRATORY (INHALATION) EVERY 6 HOURS PRN
Status: DISCONTINUED | OUTPATIENT
Start: 2023-12-26 | End: 2023-12-28 | Stop reason: HOSPADM

## 2023-12-26 RX ORDER — FENTANYL CITRATE 50 UG/ML
INJECTION, SOLUTION INTRAMUSCULAR; INTRAVENOUS PRN
Status: DISCONTINUED | OUTPATIENT
Start: 2023-12-26 | End: 2023-12-26 | Stop reason: SDUPTHER

## 2023-12-26 RX ORDER — MEPERIDINE HYDROCHLORIDE 25 MG/ML
12.5 INJECTION INTRAMUSCULAR; INTRAVENOUS; SUBCUTANEOUS EVERY 5 MIN PRN
Status: DISCONTINUED | OUTPATIENT
Start: 2023-12-26 | End: 2023-12-26 | Stop reason: HOSPADM

## 2023-12-26 RX ORDER — DEXAMETHASONE SODIUM PHOSPHATE 4 MG/ML
8 INJECTION, SOLUTION INTRA-ARTICULAR; INTRALESIONAL; INTRAMUSCULAR; INTRAVENOUS; SOFT TISSUE ONCE
Status: DISCONTINUED | OUTPATIENT
Start: 2023-12-26 | End: 2023-12-26 | Stop reason: HOSPADM

## 2023-12-26 RX ORDER — MIDAZOLAM HYDROCHLORIDE 1 MG/ML
INJECTION INTRAMUSCULAR; INTRAVENOUS PRN
Status: DISCONTINUED | OUTPATIENT
Start: 2023-12-26 | End: 2023-12-26 | Stop reason: SDUPTHER

## 2023-12-26 RX ORDER — KETOROLAC TROMETHAMINE 30 MG/ML
30 INJECTION, SOLUTION INTRAMUSCULAR; INTRAVENOUS ONCE
Status: COMPLETED | OUTPATIENT
Start: 2023-12-26 | End: 2023-12-26

## 2023-12-26 RX ORDER — SODIUM CHLORIDE 9 MG/ML
INJECTION, SOLUTION INTRAVENOUS CONTINUOUS
Status: DISCONTINUED | OUTPATIENT
Start: 2023-12-26 | End: 2023-12-28 | Stop reason: HOSPADM

## 2023-12-26 RX ORDER — FENTANYL CITRATE 50 UG/ML
50 INJECTION, SOLUTION INTRAMUSCULAR; INTRAVENOUS EVERY 5 MIN PRN
Status: DISCONTINUED | OUTPATIENT
Start: 2023-12-26 | End: 2023-12-26 | Stop reason: HOSPADM

## 2023-12-26 RX ORDER — ESCITALOPRAM OXALATE 10 MG/1
20 TABLET ORAL DAILY
Status: DISCONTINUED | OUTPATIENT
Start: 2023-12-26 | End: 2023-12-28 | Stop reason: HOSPADM

## 2023-12-26 RX ORDER — ONDANSETRON 2 MG/ML
4 INJECTION INTRAMUSCULAR; INTRAVENOUS
Status: DISCONTINUED | OUTPATIENT
Start: 2023-12-26 | End: 2023-12-26 | Stop reason: HOSPADM

## 2023-12-26 RX ORDER — FAMOTIDINE 20 MG/1
20 TABLET, FILM COATED ORAL 2 TIMES DAILY
Status: DISCONTINUED | OUTPATIENT
Start: 2023-12-26 | End: 2023-12-28 | Stop reason: HOSPADM

## 2023-12-26 RX ORDER — TRAMADOL HYDROCHLORIDE 50 MG/1
50 TABLET ORAL EVERY 6 HOURS PRN
Status: DISCONTINUED | OUTPATIENT
Start: 2023-12-26 | End: 2023-12-27

## 2023-12-26 RX ORDER — AMITRIPTYLINE HYDROCHLORIDE 10 MG/1
10 TABLET, FILM COATED ORAL NIGHTLY PRN
Status: DISCONTINUED | OUTPATIENT
Start: 2023-12-26 | End: 2023-12-28 | Stop reason: HOSPADM

## 2023-12-26 RX ORDER — ONDANSETRON 4 MG/1
4 TABLET, ORALLY DISINTEGRATING ORAL EVERY 8 HOURS PRN
Status: DISCONTINUED | OUTPATIENT
Start: 2023-12-26 | End: 2023-12-28 | Stop reason: HOSPADM

## 2023-12-26 RX ORDER — PROPOFOL 10 MG/ML
INJECTION, EMULSION INTRAVENOUS PRN
Status: DISCONTINUED | OUTPATIENT
Start: 2023-12-26 | End: 2023-12-26 | Stop reason: SDUPTHER

## 2023-12-26 RX ORDER — SODIUM CHLORIDE 0.9 % (FLUSH) 0.9 %
5-40 SYRINGE (ML) INJECTION PRN
Status: DISCONTINUED | OUTPATIENT
Start: 2023-12-26 | End: 2023-12-26 | Stop reason: HOSPADM

## 2023-12-26 RX ORDER — DEXAMETHASONE SODIUM PHOSPHATE 4 MG/ML
INJECTION, SOLUTION INTRA-ARTICULAR; INTRALESIONAL; INTRAMUSCULAR; INTRAVENOUS; SOFT TISSUE PRN
Status: DISCONTINUED | OUTPATIENT
Start: 2023-12-26 | End: 2023-12-26 | Stop reason: SDUPTHER

## 2023-12-26 RX ORDER — SODIUM CHLORIDE 0.9 % (FLUSH) 0.9 %
5-40 SYRINGE (ML) INJECTION EVERY 12 HOURS SCHEDULED
Status: DISCONTINUED | OUTPATIENT
Start: 2023-12-26 | End: 2023-12-28 | Stop reason: HOSPADM

## 2023-12-26 RX ORDER — TRANEXAMIC ACID 100 MG/ML
INJECTION, SOLUTION INTRAVENOUS PRN
Status: DISCONTINUED | OUTPATIENT
Start: 2023-12-26 | End: 2023-12-26 | Stop reason: SDUPTHER

## 2023-12-26 RX ORDER — LIDOCAINE HYDROCHLORIDE 20 MG/ML
INJECTION, SOLUTION EPIDURAL; INFILTRATION; INTRACAUDAL; PERINEURAL PRN
Status: DISCONTINUED | OUTPATIENT
Start: 2023-12-26 | End: 2023-12-26 | Stop reason: SDUPTHER

## 2023-12-26 RX ORDER — SODIUM CHLORIDE 0.9 % (FLUSH) 0.9 %
5-40 SYRINGE (ML) INJECTION PRN
Status: DISCONTINUED | OUTPATIENT
Start: 2023-12-26 | End: 2023-12-28 | Stop reason: HOSPADM

## 2023-12-26 RX ORDER — KETAMINE HCL IN NACL, ISO-OSM 100MG/10ML
SYRINGE (ML) INJECTION PRN
Status: DISCONTINUED | OUTPATIENT
Start: 2023-12-26 | End: 2023-12-26 | Stop reason: SDUPTHER

## 2023-12-26 RX ORDER — ONDANSETRON 2 MG/ML
INJECTION INTRAMUSCULAR; INTRAVENOUS PRN
Status: DISCONTINUED | OUTPATIENT
Start: 2023-12-26 | End: 2023-12-26 | Stop reason: SDUPTHER

## 2023-12-26 RX ORDER — BUPROPION HYDROCHLORIDE 150 MG/1
150 TABLET ORAL DAILY
Status: DISCONTINUED | OUTPATIENT
Start: 2023-12-26 | End: 2023-12-28 | Stop reason: HOSPADM

## 2023-12-26 RX ORDER — ROSUVASTATIN CALCIUM 10 MG/1
10 TABLET, COATED ORAL DAILY
Status: DISCONTINUED | OUTPATIENT
Start: 2023-12-26 | End: 2023-12-28 | Stop reason: HOSPADM

## 2023-12-26 RX ORDER — ACETAMINOPHEN 325 MG/1
650 TABLET ORAL EVERY 6 HOURS
Status: DISCONTINUED | OUTPATIENT
Start: 2023-12-26 | End: 2023-12-28 | Stop reason: HOSPADM

## 2023-12-26 RX ORDER — ROPIVACAINE HYDROCHLORIDE 5 MG/ML
INJECTION, SOLUTION EPIDURAL; INFILTRATION; PERINEURAL PRN
Status: DISCONTINUED | OUTPATIENT
Start: 2023-12-26 | End: 2023-12-26 | Stop reason: SDUPTHER

## 2023-12-26 RX ADMIN — SODIUM CHLORIDE: 9 INJECTION, SOLUTION INTRAVENOUS at 19:52

## 2023-12-26 RX ADMIN — ONDANSETRON 4 MG: 2 INJECTION INTRAMUSCULAR; INTRAVENOUS at 11:26

## 2023-12-26 RX ADMIN — ARFORMOTEROL TARTRATE: 15 SOLUTION RESPIRATORY (INHALATION) at 19:57

## 2023-12-26 RX ADMIN — WATER 2000 MG: 1 INJECTION INTRAMUSCULAR; INTRAVENOUS; SUBCUTANEOUS at 10:18

## 2023-12-26 RX ADMIN — BISACODYL 5 MG: 5 TABLET, COATED ORAL at 17:38

## 2023-12-26 RX ADMIN — TRAMADOL HYDROCHLORIDE 50 MG: 50 TABLET ORAL at 21:53

## 2023-12-26 RX ADMIN — FENTANYL CITRATE 50 MCG: 50 INJECTION INTRAMUSCULAR; INTRAVENOUS at 12:50

## 2023-12-26 RX ADMIN — PROPOFOL 200 MG: 10 INJECTION, EMULSION INTRAVENOUS at 10:12

## 2023-12-26 RX ADMIN — PROPOFOL 30 MG: 10 INJECTION, EMULSION INTRAVENOUS at 11:42

## 2023-12-26 RX ADMIN — Medication 20 MG: at 10:45

## 2023-12-26 RX ADMIN — TRANEXAMIC ACID 1000 MG: 100 INJECTION, SOLUTION INTRAVENOUS at 10:28

## 2023-12-26 RX ADMIN — PROPOFOL 30 MG: 10 INJECTION, EMULSION INTRAVENOUS at 11:50

## 2023-12-26 RX ADMIN — KETOROLAC TROMETHAMINE 30 MG: 30 INJECTION, SOLUTION INTRAMUSCULAR; INTRAVENOUS at 09:32

## 2023-12-26 RX ADMIN — PROPOFOL 125 MCG/KG/MIN: 10 INJECTION, EMULSION INTRAVENOUS at 10:16

## 2023-12-26 RX ADMIN — ACETAMINOPHEN 650 MG: 325 TABLET ORAL at 17:38

## 2023-12-26 RX ADMIN — BUPROPION HYDROCHLORIDE 150 MG: 150 TABLET, EXTENDED RELEASE ORAL at 17:38

## 2023-12-26 RX ADMIN — FENTANYL CITRATE 50 MCG: 50 INJECTION INTRAMUSCULAR; INTRAVENOUS at 12:45

## 2023-12-26 RX ADMIN — FAMOTIDINE 20 MG: 20 TABLET ORAL at 21:48

## 2023-12-26 RX ADMIN — PROPOFOL 30 MG: 10 INJECTION, EMULSION INTRAVENOUS at 11:35

## 2023-12-26 RX ADMIN — HYDROMORPHONE HYDROCHLORIDE 0.5 MG: 1 INJECTION, SOLUTION INTRAMUSCULAR; INTRAVENOUS; SUBCUTANEOUS at 13:15

## 2023-12-26 RX ADMIN — PROPOFOL 30 MG: 10 INJECTION, EMULSION INTRAVENOUS at 11:54

## 2023-12-26 RX ADMIN — PROPOFOL 30 MG: 10 INJECTION, EMULSION INTRAVENOUS at 11:58

## 2023-12-26 RX ADMIN — ACETAMINOPHEN 650 MG: 325 TABLET ORAL at 21:48

## 2023-12-26 RX ADMIN — FAMOTIDINE 20 MG: 20 TABLET ORAL at 17:39

## 2023-12-26 RX ADMIN — ESCITALOPRAM OXALATE 20 MG: 10 TABLET ORAL at 17:38

## 2023-12-26 RX ADMIN — ROSUVASTATIN CALCIUM 10 MG: 10 TABLET, COATED ORAL at 17:38

## 2023-12-26 RX ADMIN — HYDROMORPHONE HYDROCHLORIDE 0.5 MG: 1 INJECTION, SOLUTION INTRAMUSCULAR; INTRAVENOUS; SUBCUTANEOUS at 13:00

## 2023-12-26 RX ADMIN — LIDOCAINE HYDROCHLORIDE 100 MG: 20 INJECTION, SOLUTION EPIDURAL; INFILTRATION; INTRACAUDAL; PERINEURAL at 10:12

## 2023-12-26 RX ADMIN — FENTANYL CITRATE 50 MCG: 50 INJECTION, SOLUTION INTRAMUSCULAR; INTRAVENOUS at 10:12

## 2023-12-26 RX ADMIN — MIDAZOLAM HYDROCHLORIDE 2 MG: 1 INJECTION, SOLUTION INTRAMUSCULAR; INTRAVENOUS at 09:46

## 2023-12-26 RX ADMIN — PROPOFOL 30 MG: 10 INJECTION, EMULSION INTRAVENOUS at 11:45

## 2023-12-26 RX ADMIN — SUCCINYLCHOLINE CHLORIDE 120 MG: 20 INJECTION, SOLUTION INTRAMUSCULAR; INTRAVENOUS at 10:13

## 2023-12-26 RX ADMIN — SODIUM CHLORIDE, POTASSIUM CHLORIDE, SODIUM LACTATE AND CALCIUM CHLORIDE: 600; 310; 30; 20 INJECTION, SOLUTION INTRAVENOUS at 10:04

## 2023-12-26 RX ADMIN — FENTANYL CITRATE 50 MCG: 50 INJECTION, SOLUTION INTRAMUSCULAR; INTRAVENOUS at 10:18

## 2023-12-26 RX ADMIN — CEFAZOLIN 2000 MG: 1 INJECTION, POWDER, FOR SOLUTION INTRAMUSCULAR; INTRAVENOUS at 17:39

## 2023-12-26 RX ADMIN — Medication 3 AMPULE: at 08:43

## 2023-12-26 RX ADMIN — DEXAMETHASONE SODIUM PHOSPHATE 8 MG: 4 INJECTION INTRA-ARTICULAR; INTRALESIONAL; INTRAMUSCULAR; INTRAVENOUS; SOFT TISSUE at 10:18

## 2023-12-26 RX ADMIN — Medication 1000 MG: at 08:43

## 2023-12-26 RX ADMIN — TRAMADOL HYDROCHLORIDE 50 MG: 50 TABLET ORAL at 12:45

## 2023-12-26 RX ADMIN — ROPIVACAINE HYDROCHLORIDE 20 ML: 5 INJECTION, SOLUTION EPIDURAL; INFILTRATION; PERINEURAL at 09:47

## 2023-12-26 RX ADMIN — Medication 30 MG: at 10:30

## 2023-12-26 NOTE — FLOWSHEET NOTE
12/26/23 1210   Handoff   Communication Given Transfer Handoff   Handoff phase Phase I receiving   Handoff Given To Thuan Leyva RN   Handoff Received From TOÑO Douglass RN and Saji Feldmans CRNA   Handoff Communication At bedside; Face to Face

## 2023-12-26 NOTE — ANESTHESIA PROCEDURE NOTES
Peripheral Block    Patient location during procedure: procedure area  Reason for block: procedure for pain, post-op pain management and at surgeon's request  Start time: 12/26/2023 9:45 AM  End time: 12/26/2023 9:49 AM  Staffing  Performed: anesthesiologist   Performed by: Shana Hebert MD  Authorized by: Shana Hebert MD    Preanesthetic Checklist  Completed: patient identified, IV checked, site marked, risks and benefits discussed, surgical/procedural consents, equipment checked, pre-op evaluation, timeout performed, anesthesia consent given, oxygen available, monitors applied/VS acknowledged, fire risk safety assessment completed and verbalized and blood product R/B/A discussed and consented  Peripheral Block   Patient position: supine  Prep: ChloraPrep  Provider prep: mask, sterile gloves and sterile gown  Patient monitoring: cardiac monitor, continuous pulse ox, frequent blood pressure checks, IV access, oxygen and responsive to questions  Block type: Femoral  Adductor canal  Laterality: right  Injection technique: single-shot  Guidance: ultrasound guided    Needle   Needle type: insulated echogenic nerve stimulator needle   Needle gauge: 22 G  Needle localization: anatomical landmarks and ultrasound guidance  Assessment   Injection assessment: negative aspiration for heme, no paresthesia on injection, local visualized surrounding nerve on ultrasound and no intravascular symptoms  Paresthesia pain: none  Slow fractionated injection: yes  Hemodynamics: stable  Outcomes: uncomplicated and patient tolerated procedure well

## 2023-12-26 NOTE — FLOWSHEET NOTE
12/26/23 1320   Handoff   Communication Given Transfer Handoff   Handoff phase Phase I transferring   Handoff Given To Saroj Virgen RN   Handoff Received From Deborah Uribe RN   Handoff Communication Telephone        12/26/23 1320   Handoff   Communication Given Transfer Handoff   Handoff phase Phase I transferring   Handoff Given To Saroj Virgen RN   Handoff Received From Deborah Uribe RN   Handoff Communication Telephone

## 2023-12-26 NOTE — ANESTHESIA PRE PROCEDURE
Department of Anesthesiology  Preprocedure Note       Name:  Kristopher Leonardo   Age:  52 y.o.  :  1970                                          MRN:  [id number]         Date:  2023      Surgeon: Zoey Martinez):  Rivka Genao DO    Procedure: Procedure(s):  RIGHT TOTAL KNEE ARTHROPLASTY- (GENERAL WITH ADDUCTOR CANAL BLOCK)    Medications prior to admission:   Prior to Admission medications    Medication Sig Start Date End Date Taking? Authorizing Provider   Semaglutide, 1 MG/DOSE, (OZEMPIC, 1 MG/DOSE,) 4 MG/3ML SOPN INJECT 1 MG SUBCUTANEOUSLY ONCE A WEEK 23   Alejandra Callahan MD   ciprofloxacin (CIPRO) 500 MG tablet Take 1 tablet by mouth 2 times daily    Mara Smith MD   metroNIDAZOLE (FLAGYL) 500 MG tablet Take 1 tablet by mouth in the morning and 1 tablet in the evening.  11/15/23   Maar Smith MD   budesonide-formoterol (SYMBICORT) 160-4.5 MCG/ACT AERO Inhale 1 puff into the lungs 2 times daily 23   Bonilla Mora MD   albuterol sulfate HFA (PROVENTIL;VENTOLIN;PROAIR) 108 (90 Base) MCG/ACT inhaler Inhale 1 puff into the lungs every 4 hours as needed for Wheezing 23   Bonilla Mora MD   MAGNESIUM PO Take 500 mg by mouth daily    Mara Smith MD   Probiotic Product (ALIGN) CHEW Take by mouth daily    Mara Smith MD   OXYGEN Inhale into the lungs as needed 2 L    Mara Smith MD   ondansetron (ZOFRAN) 4 MG tablet Take 1 tablet by mouth daily as needed for Nausea or Vomiting 23   Hemalatha Hooper MD   dicyclomine (BENTYL) 20 MG tablet Take 1 tablet by mouth every 6 hours as needed (abd spasms) 23   Hemalatha Hooper MD   buPROPion [facility] SR) 150 MG extended release tablet Take 1 tablet by mouth once daily  Patient taking differently: Take 1 tablet by mouth daily 23   Hemalatha Hooper MD   escitalopram (LEXAPRO) 20 MG tablet Take 1 tablet by mouth daily 23   Hemalatha Hooper MD   famotidine (PEPCID) 20

## 2023-12-26 NOTE — OP NOTE
Operative Note      Patient: Zaynab Hutchins  YOB: 1970  MRN: [id number]    Date of Procedure: 12/26/2023    Pre-Op Diagnosis Codes:     * Osteoarthritis of right knee [M17.11]    Post-Op Diagnosis: Same       Procedure(s):  RIGHT TOTAL KNEE ARTHROPLASTY    Surgeon(s):  Julius Matias DO    Assistant:   Surgical Assistant: Nadine Hewitt    Anesthesia: General    Estimated Blood Loss (mL): Minimal    Complications: None    Specimens:   * No specimens in log *    Implants:  Implant Name Type Inv. Item Serial No.  Lot No. LRB No. Used Action   COMPONENT PAT [id number] KNEE TRITANIUM MTL BK - SNA  COMPONENT PAT [id number] THK9MM KNEE TRITANIUM MTL BK NA [employer] [id number] Right 1 Implanted   COMPONENT FEM SZ 5 R KNEE TRITANIUM LEWIS APATITE POST STBL - SNA  COMPONENT FEM SZ 5 R KNEE TRITANIUM LEWIS APATITE POST STBL NA [employer] [id number] Right 1 Implanted   BASEPLATE TIB SZ 4 [id number] ML70MM KNEE TRITANIUM 4 CRUCFRM - SNA  BASEPLATE TIB SZ 4 [id number] ML70MM KNEE TRITANIUM 4 CRUCFRM NA [employer] [id number] Right 1 Implanted   INSERT TIB PS 4 9 MM KNEE 5/PK X3 TRIATHLON - SNA  INSERT TIB PS 4 9 MM KNEE 5/PK X3 TRIATHLON NA [employer] [id number] Right 1 Implanted         Drains: * No LDAs found *    Findings: severe OA, retained ACL hardware        Detailed Description of Procedure:   Date of Procedure: 12/26/2023    Preoperative range of motion: 5 degrees extension to 100 degrees flexion, 10 degrees valgus  Postoperative range of motion: 0 degrees extension to 130 degrees flexion     OPERATIVE INDICATIONS: This is a 52 y. o.female who failed nonoperative management of arthrosis of the left knee. They did elect for a total knee arthroplasty.  We did discuss the risks of surgery which include but are not limited to infection, nerve or blood vessel damage, need for reoperation, death, disability, DVT, PE, postoperative pain, swelling and

## 2023-12-26 NOTE — ANESTHESIA POSTPROCEDURE EVALUATION
Department of Anesthesiology  Postprocedure Note    Patient: Elicia Harman  MRN: [id number]  YOB: 1970  Date of evaluation: 12/26/2023    Procedure Summary       Date: 12/26/23 Room / Location: [facility] / [facility] MAIN OR    Anesthesia Start: 1004 Anesthesia Stop: 1209    Procedure: RIGHT TOTAL KNEE ARTHROPLASTY (Right: Knee) Diagnosis:       Osteoarthritis of right knee      (Osteoarthritis of right knee [M17.11])    Providers: Robby Patterson DO Responsible Provider: Sakina Lee MD    Anesthesia Type: General, Regional, TIVA ASA Status: 2            Anesthesia Type: General, Regional, TIVA    Carol Phase I: Carol Score: 10    Carol Phase II:      Anesthesia Post Evaluation    Patient location during evaluation: PACU  Patient participation: complete - patient participated  Level of consciousness: sleepy but conscious and responsive to verbal stimuli  Airway patency: patent  Nausea & Vomiting: no vomiting and no nausea  Cardiovascular status: blood pressure returned to baseline and hemodynamically stable  Respiratory status: acceptable  Hydration status: stable    No notable events documented.

## 2023-12-26 NOTE — PROGRESS NOTES
PT Noted    Order received and acknowledged. Chart reviewed and noted pt is very drowsy. Attempted to see pt x 2, but she continues with lethargy, awakens to voice but drifts right back to sleep after saying a few words. Will defer PT evaluation today and follow up with pt in the morning.

## 2023-12-27 LAB
ANION GAP SERPL CALC-SCNC: 6 MMOL/L (ref 5–15)
BUN SERPL-MCNC: 12 MG/DL (ref 6–20)
BUN/CREAT SERPL: 13 (ref 12–20)
CALCIUM SERPL-MCNC: 8.6 MG/DL (ref 8.5–10.1)
CHLORIDE SERPL-SCNC: 113 MMOL/L (ref 97–108)
CO2 SERPL-SCNC: 22 MMOL/L (ref 21–32)
CREAT SERPL-MCNC: 0.89 MG/DL (ref 0.55–1.02)
GLUCOSE SERPL-MCNC: 108 MG/DL (ref 65–100)
HCT VFR BLD AUTO: 32 % (ref 35–47)
HGB BLD-MCNC: 10.4 G/DL (ref 11.5–16)
POTASSIUM SERPL-SCNC: 3.6 MMOL/L (ref 3.5–5.1)
SODIUM SERPL-SCNC: 141 MMOL/L (ref 136–145)

## 2023-12-27 PROCEDURE — 96374 THER/PROPH/DIAG INJ IV PUSH: CPT

## 2023-12-27 PROCEDURE — 97530 THERAPEUTIC ACTIVITIES: CPT

## 2023-12-27 PROCEDURE — APPNB60 APP NON BILLABLE TIME 46-60 MINS: Performed by: NURSE PRACTITIONER

## 2023-12-27 PROCEDURE — 94640 AIRWAY INHALATION TREATMENT: CPT

## 2023-12-27 PROCEDURE — 85014 HEMATOCRIT: CPT

## 2023-12-27 PROCEDURE — 2500000003 HC RX 250 WO HCPCS: Performed by: ORTHOPAEDIC SURGERY

## 2023-12-27 PROCEDURE — 85018 HEMOGLOBIN: CPT

## 2023-12-27 PROCEDURE — 6360000002 HC RX W HCPCS: Performed by: ORTHOPAEDIC SURGERY

## 2023-12-27 PROCEDURE — 6370000000 HC RX 637 (ALT 250 FOR IP): Performed by: ORTHOPAEDIC SURGERY

## 2023-12-27 PROCEDURE — 80048 BASIC METABOLIC PNL TOTAL CA: CPT

## 2023-12-27 PROCEDURE — 97116 GAIT TRAINING THERAPY: CPT

## 2023-12-27 PROCEDURE — 6370000000 HC RX 637 (ALT 250 FOR IP): Performed by: NURSE PRACTITIONER

## 2023-12-27 PROCEDURE — 2580000003 HC RX 258: Performed by: ORTHOPAEDIC SURGERY

## 2023-12-27 PROCEDURE — 36415 COLL VENOUS BLD VENIPUNCTURE: CPT

## 2023-12-27 PROCEDURE — 97161 PT EVAL LOW COMPLEX 20 MIN: CPT

## 2023-12-27 RX ORDER — TRAMADOL HYDROCHLORIDE 50 MG/1
100 TABLET ORAL EVERY 6 HOURS PRN
Status: DISCONTINUED | OUTPATIENT
Start: 2023-12-27 | End: 2023-12-28 | Stop reason: HOSPADM

## 2023-12-27 RX ORDER — TRAMADOL HYDROCHLORIDE 50 MG/1
50-100 TABLET ORAL EVERY 6 HOURS PRN
Qty: 40 TABLET | Refills: 0 | Status: SHIPPED | OUTPATIENT
Start: 2023-12-27 | End: 2024-01-03

## 2023-12-27 RX ORDER — TRAMADOL HYDROCHLORIDE 50 MG/1
50 TABLET ORAL EVERY 6 HOURS PRN
Status: DISCONTINUED | OUTPATIENT
Start: 2023-12-27 | End: 2023-12-28 | Stop reason: HOSPADM

## 2023-12-27 RX ADMIN — TRAMADOL HYDROCHLORIDE 100 MG: 50 TABLET ORAL at 15:19

## 2023-12-27 RX ADMIN — TRAMADOL HYDROCHLORIDE 50 MG: 50 TABLET ORAL at 09:02

## 2023-12-27 RX ADMIN — FAMOTIDINE 20 MG: 20 TABLET ORAL at 09:02

## 2023-12-27 RX ADMIN — TRAMADOL HYDROCHLORIDE 100 MG: 50 TABLET ORAL at 21:54

## 2023-12-27 RX ADMIN — BUPROPION HYDROCHLORIDE 150 MG: 150 TABLET, EXTENDED RELEASE ORAL at 09:02

## 2023-12-27 RX ADMIN — ACETAMINOPHEN 650 MG: 325 TABLET ORAL at 13:19

## 2023-12-27 RX ADMIN — SODIUM CHLORIDE: 9 INJECTION, SOLUTION INTRAVENOUS at 21:59

## 2023-12-27 RX ADMIN — ESCITALOPRAM OXALATE 20 MG: 10 TABLET ORAL at 09:02

## 2023-12-27 RX ADMIN — HYDROMORPHONE HYDROCHLORIDE 0.5 MG: 1 INJECTION, SOLUTION INTRAMUSCULAR; INTRAVENOUS; SUBCUTANEOUS at 19:10

## 2023-12-27 RX ADMIN — CEFAZOLIN 2000 MG: 1 INJECTION, POWDER, FOR SOLUTION INTRAMUSCULAR; INTRAVENOUS at 02:15

## 2023-12-27 RX ADMIN — SODIUM CHLORIDE: 9 INJECTION, SOLUTION INTRAVENOUS at 04:50

## 2023-12-27 RX ADMIN — RIVAROXABAN 20 MG: 20 TABLET, FILM COATED ORAL at 09:02

## 2023-12-27 RX ADMIN — FAMOTIDINE 20 MG: 20 TABLET ORAL at 21:54

## 2023-12-27 RX ADMIN — SODIUM CHLORIDE: 9 INJECTION, SOLUTION INTRAVENOUS at 13:19

## 2023-12-27 RX ADMIN — ACETAMINOPHEN 650 MG: 325 TABLET ORAL at 02:15

## 2023-12-27 RX ADMIN — ACETAMINOPHEN 650 MG: 325 TABLET ORAL at 19:10

## 2023-12-27 RX ADMIN — BISACODYL 5 MG: 5 TABLET, COATED ORAL at 09:02

## 2023-12-27 RX ADMIN — ARFORMOTEROL TARTRATE: 15 SOLUTION RESPIRATORY (INHALATION) at 21:00

## 2023-12-27 RX ADMIN — ROSUVASTATIN CALCIUM 10 MG: 10 TABLET, COATED ORAL at 09:02

## 2023-12-27 RX ADMIN — ACETAMINOPHEN 650 MG: 325 TABLET ORAL at 09:02

## 2023-12-27 RX ADMIN — SODIUM CHLORIDE, PRESERVATIVE FREE 10 ML: 5 INJECTION INTRAVENOUS at 21:57

## 2023-12-27 RX ADMIN — ARFORMOTEROL TARTRATE: 15 SOLUTION RESPIRATORY (INHALATION) at 08:21

## 2023-12-27 NOTE — PLAN OF CARE
Problem: Physical Therapy - Adult  Goal: By Discharge: Performs mobility at highest level of function for planned discharge setting. See evaluation for individualized goals. Description: FUNCTIONAL STATUS PRIOR TO ADMISSION: Patient was modified independent  for functional mobility, distances limited due to pain and instability of right knee. HOME SUPPORT PRIOR TO ADMISSION: The patient lived with  who assisted with dressing tasks as needed. Physical Therapy Goals  Initiated 12/27/2023  1. Patient will move from supine to sit and sit to supine in bed with supervision/set-up within 4 day(s). 2.  Patient will perform sit to stand with supervision/set-up within 4 day(s). 3.  Patient will transfer from bed to chair and chair to bed with supervision/set-up using the least restrictive device within 4 day(s). 4.  Patient will ambulate with supervision/set-up for 200 feet with the least restrictive device within 4 day(s). 5.  Patient will ascend/descend 16 stairs with  handrail(s) with contact guard assist within 4 day(s). 6. Patient will perform  home exercise program per protocol with independence within 4 days. 7. Patient will demonstrate AROM 0-90 degrees in operative joint within 4 days. Outcome: Not Progressing   PHYSICAL THERAPY EVALUATION    Patient: Jeronimo Pace (52 y.o. female)  Date: 12/27/2023  Primary Diagnosis: Osteoarthritis of right knee [M17.11]  Unilateral primary osteoarthritis, right knee [M17.11]  Procedure(s) (LRB):  RIGHT TOTAL KNEE ARTHROPLASTY (Right) 1 Day Post-Op   Precautions: Weight Bearing Right Lower Extremity Weight Bearing: Weight Bearing As Tolerated                  ASSESSMENT :   DEFICITS/IMPAIRMENTS:   The patient is limited by decreased functional mobility, ROM, strength, balance, orthostatic hypotension, increased pain levels following right TKA.   Patient received in bed and agreeable to participate, good tolerance of supine knee exercises and educated

## 2023-12-27 NOTE — PROGRESS NOTES
Physical Therapy    PT bessy completed, was orthostatic and symptomatic in standing position but able to tolerate short ambulation and up in chair at end of session. Will see again after lunch, should be able to discharge with HHPT if BP stable.      12/27/23 1005 12/27/23 1012 12/27/23 1015   Vital Signs   /62 126/82 97/70   MAP (Calculated) 84 97 79   MAP (mmHg) 82  --   --    Patient Position Supine Sitting Standing      12/27/23 1018   Vital Signs   /74   MAP (Calculated) 84   MAP (mmHg)  --    Patient Position Sitting  (post activity)

## 2023-12-27 NOTE — CARE COORDINATION
Planned ortho surgery patient pre-arranged with HH through [facility]. AVS updated.      [address] [place] [address]

## 2023-12-27 NOTE — PROGRESS NOTES
Physical Therapy    Patient seen again for PT visit but BP still soft and patient is symptomatic. Ambulated in hallway x approx 100 feet with RW and slow pace,  not safe to attempt stairs. Will continue to follow.        12/27/23 1350 12/27/23 1355 12/27/23 1400   Vital Signs   /62 (!) 104/51 (!) 88/53   MAP (Calculated) 80 69 65   MAP (mmHg) 76  --   --    Patient Position Sitting Sitting Sitting  (post activity)     Benjamin Garcia

## 2023-12-27 NOTE — PLAN OF CARE
Problem: Physical Therapy - Adult  Goal: By Discharge: Performs mobility at highest level of function for planned discharge setting. See evaluation for individualized goals. Description: FUNCTIONAL STATUS PRIOR TO ADMISSION: Patient was modified independent  for functional mobility, distances limited due to pain and instability of right knee. HOME SUPPORT PRIOR TO ADMISSION: The patient lived with  who assisted with dressing tasks as needed. Physical Therapy Goals  Initiated 12/27/2023  1. Patient will move from supine to sit and sit to supine in bed with supervision/set-up within 4 day(s). 2.  Patient will perform sit to stand with supervision/set-up within 4 day(s). 3.  Patient will transfer from bed to chair and chair to bed with supervision/set-up using the least restrictive device within 4 day(s). 4.  Patient will ambulate with supervision/set-up for 200 feet with the least restrictive device within 4 day(s). 5.  Patient will ascend/descend 16 stairs with  handrail(s) with contact guard assist within 4 day(s). 6. Patient will perform  home exercise program per protocol with independence within 4 days. 7. Patient will demonstrate AROM 0-90 degrees in operative joint within 4 days. 12/27/2023 1522 by Kellie Cardoza PT  Outcome: Progressing  PHYSICAL THERAPY TREATMENT    Patient: Claus Jacobson (52 y.o. female)  Date: 12/27/2023  Diagnosis: Osteoarthritis of right knee [M17.11]  Unilateral primary osteoarthritis, right knee [M17.11] Osteoarthritis of right knee  Procedure(s) (LRB):  RIGHT TOTAL KNEE ARTHROPLASTY (Right) 1 Day Post-Op  Precautions: Weight Bearing Right Lower Extremity Weight Bearing: Weight Bearing As Tolerated                  ASSESSMENT:  Patient continues to benefit from skilled PT services and is slowly progressing towards goals. Patient received up in chair and agreeable to participate, able to stand with CGA and ambulated with RW into bathroom to use toilet. assistance  Stand to Sit: Contact-guard assistance  Bed to Chair: Contact-guard assistance;Minimum assistance  Balance:  Balance  Sitting: Intact  Standing: Impaired  Standing - Static: Constant support;Good  Standing - Dynamic: Constant support;Fair   Ambulation/Gait Training:     Gait  Overall Level of Assistance: Contact-guard assistance;Minimum assistance  Distance (ft): 100 Feet  Assistive Device: Gait belt;Walker, rolling  Interventions: Safety awareness training;Verbal cues  Base of Support: Widened  Speed/Marilee: Slow  Step Length: Left shortened;Right shortened  Gait Abnormalities: Antalgic  Neuro Re-Education:                      Activity Tolerance:   signs and symptoms of orthostatic hypotension    After treatment:   Patient left in no apparent distress in bed, Call bell within reach, Caregiver / family present, and Side rails x3      COMMUNICATION/EDUCATION:   The patient's plan of care was discussed with: registered nurse    Patient Education  Education Given To: Patient;Family  Education Provided: Plan of Care;Home Exercise Program;Fall Prevention Strategies;Transfer Training  Education Method: Demonstration;Verbal  Barriers to Learning: None  Education Outcome: Verbalized understanding;Demonstrated understanding      Rabia Lugo, PT  Minutes: 31

## 2023-12-28 VITALS
SYSTOLIC BLOOD PRESSURE: 127 MMHG | WEIGHT: 204.81 LBS | DIASTOLIC BLOOD PRESSURE: 67 MMHG | TEMPERATURE: 98.8 F | HEIGHT: 66 IN | HEART RATE: 83 BPM | BODY MASS INDEX: 32.92 KG/M2 | RESPIRATION RATE: 16 BRPM | OXYGEN SATURATION: 100 %

## 2023-12-28 DIAGNOSIS — Z47.1 AFTERCARE FOLLOWING RIGHT KNEE JOINT REPLACEMENT SURGERY: Primary | ICD-10-CM

## 2023-12-28 DIAGNOSIS — Z96.651 AFTERCARE FOLLOWING RIGHT KNEE JOINT REPLACEMENT SURGERY: Primary | ICD-10-CM

## 2023-12-28 PROCEDURE — 2500000003 HC RX 250 WO HCPCS: Performed by: ORTHOPAEDIC SURGERY

## 2023-12-28 PROCEDURE — G0378 HOSPITAL OBSERVATION PER HR: HCPCS

## 2023-12-28 PROCEDURE — 96376 TX/PRO/DX INJ SAME DRUG ADON: CPT

## 2023-12-28 PROCEDURE — 1100000000 HC RM PRIVATE

## 2023-12-28 PROCEDURE — 97530 THERAPEUTIC ACTIVITIES: CPT

## 2023-12-28 PROCEDURE — 6370000000 HC RX 637 (ALT 250 FOR IP): Performed by: ORTHOPAEDIC SURGERY

## 2023-12-28 PROCEDURE — 6360000002 HC RX W HCPCS: Performed by: ORTHOPAEDIC SURGERY

## 2023-12-28 PROCEDURE — 0SRC0JZ REPLACEMENT OF RIGHT KNEE JOINT WITH SYNTHETIC SUBSTITUTE, OPEN APPROACH: ICD-10-PCS | Performed by: ORTHOPAEDIC SURGERY

## 2023-12-28 PROCEDURE — 97110 THERAPEUTIC EXERCISES: CPT

## 2023-12-28 PROCEDURE — 94640 AIRWAY INHALATION TREATMENT: CPT

## 2023-12-28 PROCEDURE — 6370000000 HC RX 637 (ALT 250 FOR IP): Performed by: NURSE PRACTITIONER

## 2023-12-28 PROCEDURE — 97116 GAIT TRAINING THERAPY: CPT

## 2023-12-28 RX ORDER — HYDROCODONE BITARTRATE AND ACETAMINOPHEN 5; 325 MG/1; MG/1
1 TABLET ORAL EVERY 6 HOURS PRN
Qty: 28 TABLET | Refills: 0 | Status: SHIPPED | OUTPATIENT
Start: 2023-12-28 | End: 2024-01-04

## 2023-12-28 RX ADMIN — ACETAMINOPHEN 650 MG: 325 TABLET ORAL at 13:09

## 2023-12-28 RX ADMIN — ARFORMOTEROL TARTRATE: 15 SOLUTION RESPIRATORY (INHALATION) at 09:31

## 2023-12-28 RX ADMIN — TRAMADOL HYDROCHLORIDE 100 MG: 50 TABLET ORAL at 07:57

## 2023-12-28 RX ADMIN — RIVAROXABAN 20 MG: 20 TABLET, FILM COATED ORAL at 07:58

## 2023-12-28 RX ADMIN — HYDROMORPHONE HYDROCHLORIDE 0.5 MG: 1 INJECTION, SOLUTION INTRAMUSCULAR; INTRAVENOUS; SUBCUTANEOUS at 00:53

## 2023-12-28 RX ADMIN — FAMOTIDINE 20 MG: 20 TABLET ORAL at 07:58

## 2023-12-28 RX ADMIN — ROSUVASTATIN CALCIUM 10 MG: 10 TABLET, COATED ORAL at 07:58

## 2023-12-28 RX ADMIN — BUPROPION HYDROCHLORIDE 150 MG: 150 TABLET, EXTENDED RELEASE ORAL at 07:58

## 2023-12-28 RX ADMIN — ACETAMINOPHEN 650 MG: 325 TABLET ORAL at 00:34

## 2023-12-28 RX ADMIN — ESCITALOPRAM OXALATE 20 MG: 10 TABLET ORAL at 07:57

## 2023-12-28 RX ADMIN — ACETAMINOPHEN 650 MG: 325 TABLET ORAL at 05:45

## 2023-12-28 NOTE — PLAN OF CARE
Problem: Pain  Goal: Verbalizes/displays adequate comfort level or baseline comfort level  Outcome: Progressing     Problem: Safety - Adult  Goal: Free from fall injury  Outcome: Progressing     Problem: Physical Therapy - Adult  Goal: By Discharge: Performs mobility at highest level of function for planned discharge setting. See evaluation for individualized goals. Description: FUNCTIONAL STATUS PRIOR TO ADMISSION: Patient was modified independent  for functional mobility, distances limited due to pain and instability of right knee. HOME SUPPORT PRIOR TO ADMISSION: The patient lived with  who assisted with dressing tasks as needed. Physical Therapy Goals  Initiated 12/27/2023  1. Patient will move from supine to sit and sit to supine in bed with supervision/set-up within 4 day(s). 2.  Patient will perform sit to stand with supervision/set-up within 4 day(s). 3.  Patient will transfer from bed to chair and chair to bed with supervision/set-up using the least restrictive device within 4 day(s). 4.  Patient will ambulate with supervision/set-up for 200 feet with the least restrictive device within 4 day(s). 5.  Patient will ascend/descend 16 stairs with  handrail(s) with contact guard assist within 4 day(s). 6. Patient will perform  home exercise program per protocol with independence within 4 days. 7. Patient will demonstrate AROM 0-90 degrees in operative joint within 4 days.      12/27/2023 1522 by Chapito Paez, PT  Outcome: Progressing  12/27/2023 1244 by Chapito Paez, PT  Outcome: Not Progressing

## 2023-12-28 NOTE — PROGRESS NOTES
End of Shift Note    Bedside shift change report given to Marino Chin (oncoming nurse) by Marisa Callahan RN (offgoing nurse). Report included the following information SBAR and Kardex    Shift worked:  night     Shift summary and any significant changes:    Alert and oriented x3; voiding; up with assist on bedside commode; awaiting therapy clearance before discharge     Concerns for physician to address:       Zone phone for oncoming shift:          Activity:     Number times ambulated in hallways past shift: 0  Number of times OOB to chair past shift: 0    Cardiac:   Cardiac Monitoring: No           Access:  Current line(s): PIV     Genitourinary:   Urinary status: voiding    Respiratory:      Chronic home O2 use?: NO  Incentive spirometer at bedside: YES       GI:     Current diet:  ADULT DIET; Regular  Passing flatus: YES  Tolerating current diet: YES       Pain Management:   Patient states pain is manageable on current regimen: YES    Skin:     Interventions: float heels, increase time out of bed, and PT/OT consult    Patient Safety:  Fall Score:    Interventions: bed/chair alarm, assistive device (walker, cane.  etc), gripper socks, and pt to call before getting OOB       Length of Stay:  Expected LOS:   Actual LOS: 0      Marisa Callahan, RN

## 2023-12-28 NOTE — PLAN OF CARE
Problem: Physical Therapy - Adult  Goal: By Discharge: Performs mobility at highest level of function for planned discharge setting. See evaluation for individualized goals. Description: FUNCTIONAL STATUS PRIOR TO ADMISSION: Patient was modified independent  for functional mobility, distances limited due to pain and instability of right knee. HOME SUPPORT PRIOR TO ADMISSION: The patient lived with  who assisted with dressing tasks as needed. Physical Therapy Goals  Initiated 12/27/2023  1. Patient will move from supine to sit and sit to supine in bed with supervision/set-up within 4 day(s). 2.  Patient will perform sit to stand with supervision/set-up within 4 day(s). 3.  Patient will transfer from bed to chair and chair to bed with supervision/set-up using the least restrictive device within 4 day(s). 4.  Patient will ambulate with supervision/set-up for 200 feet with the least restrictive device within 4 day(s). 5.  Patient will ascend/descend 16 stairs with  handrail(s) with contact guard assist within 4 day(s). 6. Patient will perform  home exercise program per protocol with independence within 4 days. 7. Patient will demonstrate AROM 0-90 degrees in operative joint within 4 days. 12/28/2023 1533 by Mitchell Ham PT  Outcome: Progressing  PHYSICAL THERAPY TREATMENT    Patient: Timothy Casillas (52 y.o. female)  Date: 12/28/2023  Diagnosis: Osteoarthritis of right knee [M17.11]  Unilateral primary osteoarthritis, right knee [M17.11] Osteoarthritis of right knee  Procedure(s) (LRB):  RIGHT TOTAL KNEE ARTHROPLASTY (Right) 2 Days Post-Op  Precautions: Weight Bearing Right Lower Extremity Weight Bearing: Weight Bearing As Tolerated                  ASSESSMENT:  Patient continues to benefit from skilled PT services and is progressing towards goals.  Patient received up in chair and agreeable to participate, able to tolerate increased ROM right knee and practiced standing weight shifting,

## 2023-12-29 NOTE — DISCHARGE SUMMARY
Date of Admission: 12/26/2023  Date of Discharge: 12/28/2023  Attending on admission and discharge: Dr. Bianca Wesley    Admitting Diagnosis: Right knee osteoarthritis  Discharge Diagnosis: Right knee  Osteoarthritis  Procedure Performed: Right total knee arthroplasty    Hospital Course and Treatment:     The patient was admitted through the operating room, with a same day admssion after a total knee arthroplasty. The patient tolerated the procedure well and was taken to the surgical floor for further observation and treatment. The patient was maintained on IV fluids until tolerating a PO diet. They were also maintained on sequential compression devices and aspirin for DVT prophylaxis. The diet was advanced as tolerated. The patient was mobilized with physical therapy. There were no complications during the course of the hospital stay, and the patient was deemed medically stable for discharge to home. After consultation with physical therapy, the patient was cleared for discharge. Discharge instructions:  Patient is to be discharged to home, they will be weight bearing as tolerated with no restrictions on hip motion. Showering is allowed while dressing is intact. The dressing should remain in place for two weeks, then can be removed. The patient should not be in a pool or tub, or otherwise immerse the wound in water. The patient has been counseled on the importance of mobilizing and moving at least every two hours to help prevent blood clots. The patient should call Dr. Simba Chung office or go to an emergency department if they note a fever over 101.1 degrees fahrenheit, more or unrelieved pain, more or new swelling at the operative site, or any drainage from the wound.     Condition at Discharge: Stable    Discharge medications:  Resume regular home medications  Additional medications to be prescribed on discharge  Enterically coated aspirin 325 mg po BID for 1 month  Norco 5 mg 1 po q 4 h prn pain  Colace

## 2024-01-02 ENCOUNTER — TELEPHONE (OUTPATIENT)
Age: 54
End: 2024-01-02

## 2024-01-02 ENCOUNTER — HOSPITAL ENCOUNTER (OUTPATIENT)
Facility: HOSPITAL | Age: 54
Discharge: HOME | End: 2024-01-04
Payer: COMMERCIAL

## 2024-01-02 DIAGNOSIS — M79.89 PAIN AND SWELLING OF RIGHT LOWER LEG: Primary | ICD-10-CM

## 2024-01-02 DIAGNOSIS — M79.661 PAIN AND SWELLING OF RIGHT LOWER LEG: Primary | ICD-10-CM

## 2024-01-02 DIAGNOSIS — M79.661 PAIN AND SWELLING OF RIGHT LOWER LEG: ICD-10-CM

## 2024-01-02 DIAGNOSIS — M79.89 PAIN AND SWELLING OF RIGHT LOWER LEG: ICD-10-CM

## 2024-01-02 PROCEDURE — 93971 EXTREMITY STUDY: CPT

## 2024-01-02 NOTE — TELEPHONE ENCOUNTER
Patient called in stating she has had an increase in swelling of the right leg that began yesterday and continues today. She states that with the increase of swelling she has had an increase in bruising.  She says the that bruising is located on the back of the right leg behind the knee and on the inner right side of the knee. She says that there has been an increase in pain but the prescribed pain medication hydrocodone-acetaminophen 5-325 mg has helped. She also states that she has had a headache since surgery that will ease up for a few hours than comes back. The patient reports that her temp currently reads at 98.8. The patient had RT TKA surgery on 12/26/23.     The patient does report a history of blood clots.    The patient may be reached on her cell at [phone].

## 2024-01-02 NOTE — TELEPHONE ENCOUNTER
Patient was called and informed of the ultrasound order being placed and provided with the central scheduling number to call. Patient was asked to call back if she was unable to be scheduled for the ultrasound either today or tomorrow so the order could be upgraded to urgent. Patient stated her understanding and said she would call to get the ultrasound scheduled.

## 2024-01-03 PROCEDURE — 93971 EXTREMITY STUDY: CPT | Performed by: SURGERY

## 2024-01-03 NOTE — TELEPHONE ENCOUNTER
I spoke with the patient, she does not have a DVT, and she stated she did not have any further concerns except that home health had not been out to see her, we will work on that.

## 2024-01-08 ENCOUNTER — CLINICAL DOCUMENTATION (OUTPATIENT)
Age: 54
End: 2024-01-08

## 2024-01-09 ENCOUNTER — OFFICE VISIT (OUTPATIENT)
Age: 54
End: 2024-01-09

## 2024-01-09 VITALS
OXYGEN SATURATION: 98 % | SYSTOLIC BLOOD PRESSURE: 122 MMHG | RESPIRATION RATE: 18 BRPM | TEMPERATURE: 97.8 F | HEART RATE: 96 BPM | DIASTOLIC BLOOD PRESSURE: 72 MMHG | HEIGHT: 66 IN | WEIGHT: 194 LBS | BODY MASS INDEX: 31.18 KG/M2

## 2024-01-09 DIAGNOSIS — Z47.1 AFTERCARE FOLLOWING RIGHT KNEE JOINT REPLACEMENT SURGERY: Primary | ICD-10-CM

## 2024-01-09 DIAGNOSIS — Z96.651 AFTERCARE FOLLOWING RIGHT KNEE JOINT REPLACEMENT SURGERY: Primary | ICD-10-CM

## 2024-01-09 PROCEDURE — 99024 POSTOP FOLLOW-UP VISIT: CPT | Performed by: ORTHOPAEDIC SURGERY

## 2024-01-09 RX ORDER — HYDROCODONE BITARTRATE AND ACETAMINOPHEN 5; 325 MG/1; MG/1
1 TABLET ORAL EVERY 8 HOURS PRN
Qty: 21 TABLET | Refills: 0 | Status: SHIPPED | OUTPATIENT
Start: 2024-01-09 | End: 2024-01-16

## 2024-01-09 ASSESSMENT — PATIENT HEALTH QUESTIONNAIRE - PHQ9
1. LITTLE INTEREST OR PLEASURE IN DOING THINGS: 0
SUM OF ALL RESPONSES TO PHQ QUESTIONS 1-9: 0
SUM OF ALL RESPONSES TO PHQ9 QUESTIONS 1 & 2: 0
2. FEELING DOWN, DEPRESSED OR HOPELESS: 0

## 2024-01-09 NOTE — PROGRESS NOTES
is here for a follow up visit from a total knee arthroplasty on the right  side.  The patient is doing well, with no medical complications since discharge.  Pain has been appropriate since surgery,and the patient is progressing well with physical therapy.  Patient is ambulating with a walker.    Current Outpatient Medications on File Prior to Visit   Medication Sig Dispense Refill    Semaglutide, 1 MG/DOSE, (OZEMPIC, 1 MG/DOSE,) 4 MG/3ML SOPN INJECT 1 MG SUBCUTANEOUSLY ONCE A WEEK 3 mL 5    budesonide-formoterol (SYMBICORT) 160-4.5 MCG/ACT AERO Inhale 1 puff into the lungs 2 times daily 10.2 g 3    albuterol sulfate HFA (PROVENTIL;VENTOLIN;PROAIR) 108 (90 Base) MCG/ACT inhaler Inhale 1 puff into the lungs every 4 hours as needed for Wheezing 18 g 3    MAGNESIUM PO Take 500 mg by mouth daily      Probiotic Product (ALIGN) CHEW Take by mouth daily      OXYGEN Inhale into the lungs as needed 2 L      ondansetron (ZOFRAN) 4 MG tablet Take 1 tablet by mouth daily as needed for Nausea or Vomiting 30 tablet 0    dicyclomine (BENTYL) 20 MG tablet Take 1 tablet by mouth every 6 hours as needed (abd spasms) 120 tablet 0    buPROPion (WELLBUTRIN SR) 150 MG extended release tablet Take 1 tablet by mouth once daily 90 tablet 1    escitalopram (LEXAPRO) 20 MG tablet Take 1 tablet by mouth daily 90 tablet 1    famotidine (PEPCID) 20 MG tablet Take 1 tablet by mouth 2 times daily 180 tablet 1    pantoprazole (PROTONIX) 40 MG tablet Take 1 tablet by mouth daily 90 tablet 1    inFLIXimab (REMICADE) 100 MG injection Infuse 5 mg/kg intravenously See Admin Instructions Every 8 weeks      amitriptyline (ELAVIL) 10 MG tablet Take 1 tablet by mouth nightly as needed for Sleep      folic acid (FOLVITE) 1 MG tablet Take 1 tablet by mouth daily      furosemide (LASIX) 40 MG tablet Take 1 tablet by mouth daily as needed (swelling)      rivaroxaban (XARELTO) 20 MG TABS tablet Take 1 tablet by mouth daily (with breakfast)

## 2024-01-09 NOTE — PROGRESS NOTES
Room: 1A  I have reviewed all needed documentation in preparation for visit. Verified patient by name and date of birth  Chief Complaint   Patient presents with    Post-Op Check     2 weeks   Right TKA        Vitals:    01/09/24 1343   BP: 122/72   Site: Left Upper Arm   Position: Sitting   Cuff Size: Large Adult   Pulse: 96   Resp: 18   Temp: 97.8 °F (36.6 °C)   TempSrc: Oral   SpO2: 98%   Weight: 88 kg (194 lb)   Height: 1.676 m (5' 6\")        Pain Score:   4    Health Maintenance Review: Patient reminded of \"due or due soon\" health maintenance. I have asked the patient to contact his/her primary care provider (PCP) for follow-up on his/her health maintenance.    \"Have you been to the ER, urgent care clinic since your last visit?  Hospitalized since your last visit?\"    NO    “Have you seen or consulted any other health care providers outside of [facility] since your last visit?”    NO

## 2024-01-10 ENCOUNTER — TELEPHONE (OUTPATIENT)
Age: 54
End: 2024-01-10

## 2024-01-10 NOTE — TELEPHONE ENCOUNTER
Approved  PA Detail   Prior authorization approved Case ID: [id number]      Payer: [employer] - [employer]   Approval Details    Authorized from January 9, 2024 to February 8, 2024      Electronic appeal: Not supported   Prior auth initiated by: [address] [place] - [address] [facility] - P [phone] - F [phone]    [phone]   View History        Medication Being Authorized     HYDROcodone-acetaminophen (NORCO) 5-325 MG per tablet    Take 1 tablet by mouth every 8 hours as needed for Pain for up to 7 days. Max Daily Amount: 3 tablets    Dispense: 21 tablet Refills: 0     Start: 1/9/2024 End: 1/16/2024     Class: Normal Diagnoses: Aftercare following right knee joint replacement surgery     This order has been released to its destination.   To be filled at: [facility] [place] - [address] [facility] - P [phone] - F [phone]           PA approved

## 2024-01-30 ENCOUNTER — OFFICE VISIT (OUTPATIENT)
Age: 54
End: 2024-01-30

## 2024-01-30 VITALS
DIASTOLIC BLOOD PRESSURE: 86 MMHG | RESPIRATION RATE: 18 BRPM | TEMPERATURE: 98.5 F | HEART RATE: 82 BPM | BODY MASS INDEX: 32.59 KG/M2 | HEIGHT: 66 IN | WEIGHT: 202.8 LBS | OXYGEN SATURATION: 100 % | SYSTOLIC BLOOD PRESSURE: 126 MMHG

## 2024-01-30 DIAGNOSIS — Z96.651 AFTERCARE FOLLOWING RIGHT KNEE JOINT REPLACEMENT SURGERY: Primary | ICD-10-CM

## 2024-01-30 DIAGNOSIS — Z47.1 AFTERCARE FOLLOWING RIGHT KNEE JOINT REPLACEMENT SURGERY: Primary | ICD-10-CM

## 2024-01-30 PROCEDURE — 99024 POSTOP FOLLOW-UP VISIT: CPT | Performed by: ORTHOPAEDIC SURGERY

## 2024-01-30 RX ORDER — TRAMADOL HYDROCHLORIDE 50 MG/1
50 TABLET ORAL EVERY 6 HOURS PRN
Qty: 28 TABLET | Refills: 0 | Status: SHIPPED | OUTPATIENT
Start: 2024-01-30 | End: 2024-02-06

## 2024-01-30 ASSESSMENT — PATIENT HEALTH QUESTIONNAIRE - PHQ9
SUM OF ALL RESPONSES TO PHQ9 QUESTIONS 1 & 2: 0
SUM OF ALL RESPONSES TO PHQ QUESTIONS 1-9: 0
1. LITTLE INTEREST OR PLEASURE IN DOING THINGS: 0
SUM OF ALL RESPONSES TO PHQ QUESTIONS 1-9: 0
2. FEELING DOWN, DEPRESSED OR HOPELESS: 0
SUM OF ALL RESPONSES TO PHQ QUESTIONS 1-9: 0
SUM OF ALL RESPONSES TO PHQ QUESTIONS 1-9: 0

## 2024-01-30 NOTE — PROGRESS NOTES
Identified pt with two pt identifiers (name and ). Reviewed chart in preparation for visit and have obtained necessary documentation.    Jacey Coburn is a 53 y.o. female Knee Pain (Post Op Total Right knee)  .    Vitals:    24 1108   BP: 126/86   Site: Right Upper Arm   Position: Sitting   Cuff Size: Large Adult   Pulse: 82   Resp: 18   Temp: 98.5 °F (36.9 °C)   TempSrc: Oral   SpO2: 100%   Weight: 92 kg (202 lb 12.8 oz)   Height: 1.676 m (5' 6\")          1. Have you been to the ER, urgent care clinic since your last visit?  Hospitalized since your last visit?  no     2. Have you seen or consulted any other health care providers outside of the [facility] since your last visit?  Include any pap smears or colon screening.  no  
postop.    Wound care and dental prophylaxis instructions were reviewed  Continue to weight bear as tolerated without restriction, except to keep to the positions of comfort.  Emphasis was placed on range of motion and strength exercises daily.  Deep Venous Thrombosis Prophylaxis  Follow up will be at 4 weeks from now,  no xrays on follow up.        Ms. Coburn has a reminder for a \"due or due soon\" health maintenance. I have asked that she contact her primary care provider for follow-up on this health

## 2024-02-02 RX ORDER — PANTOPRAZOLE SODIUM 40 MG/1
40 TABLET, DELAYED RELEASE ORAL DAILY
Qty: 90 TABLET | Refills: 1 | Status: SHIPPED | OUTPATIENT
Start: 2024-02-02

## 2024-02-02 NOTE — TELEPHONE ENCOUNTER
Last appointment: 12/14/23  Next appointment: 3/7/24  Previous refill encounter(s): 8/11/23 #90 with 1 refill    Requested Prescriptions     Pending Prescriptions Disp Refills    pantoprazole (PROTONIX) 40 MG tablet 90 tablet 1     Sig: Take 1 tablet by mouth daily         For Pharmacy Admin Tracking Only    Program: Medication Refill  CPA in place:    Recommendation Provided To:   Intervention Detail: New Rx: 1, reason: Patient Preference  Intervention Accepted By:   Gap Closed?:    Time Spent (min): 5

## 2024-02-14 DIAGNOSIS — F41.9 ANXIETY DISORDER, UNSPECIFIED TYPE: ICD-10-CM

## 2024-02-14 DIAGNOSIS — F33.0 MILD EPISODE OF RECURRENT MAJOR DEPRESSIVE DISORDER (HCC): ICD-10-CM

## 2024-02-14 RX ORDER — BUPROPION HYDROCHLORIDE 150 MG/1
150 TABLET, EXTENDED RELEASE ORAL DAILY
Qty: 90 TABLET | Refills: 1 | Status: SHIPPED | OUTPATIENT
Start: 2024-02-14

## 2024-02-14 NOTE — TELEPHONE ENCOUNTER
Last appointment: 12/14/23  Next appointment: 3/7/24  Previous refill encounter(s): 8/25/23 #90 with 1 refill    Requested Prescriptions     Pending Prescriptions Disp Refills    buPROPion (WELLBUTRIN SR) 150 MG extended release tablet 90 tablet 1     Sig: Take 1 tablet by mouth daily         For Pharmacy Admin Tracking Only    Program: Medication Refill  CPA in place:    Recommendation Provided To:   Intervention Detail: New Rx: 1, reason: Patient Preference  Intervention Accepted By:   Gap Closed?:    Time Spent (min): 5

## 2024-02-29 ENCOUNTER — OFFICE VISIT (OUTPATIENT)
Age: 54
End: 2024-02-29

## 2024-02-29 VITALS — BODY MASS INDEX: 33.4 KG/M2 | HEIGHT: 66 IN | WEIGHT: 207.8 LBS

## 2024-02-29 DIAGNOSIS — Z96.651 AFTERCARE FOLLOWING RIGHT KNEE JOINT REPLACEMENT SURGERY: Primary | ICD-10-CM

## 2024-02-29 DIAGNOSIS — M17.11 UNILATERAL PRIMARY OSTEOARTHRITIS, RIGHT KNEE: ICD-10-CM

## 2024-02-29 DIAGNOSIS — Z47.1 AFTERCARE FOLLOWING RIGHT KNEE JOINT REPLACEMENT SURGERY: Primary | ICD-10-CM

## 2024-02-29 PROCEDURE — 99024 POSTOP FOLLOW-UP VISIT: CPT | Performed by: ORTHOPAEDIC SURGERY

## 2024-02-29 ASSESSMENT — PATIENT HEALTH QUESTIONNAIRE - PHQ9
SUM OF ALL RESPONSES TO PHQ QUESTIONS 1-9: 0
SUM OF ALL RESPONSES TO PHQ QUESTIONS 1-9: 0
2. FEELING DOWN, DEPRESSED OR HOPELESS: 0
SUM OF ALL RESPONSES TO PHQ9 QUESTIONS 1 & 2: 0
SUM OF ALL RESPONSES TO PHQ QUESTIONS 1-9: 0
1. LITTLE INTEREST OR PLEASURE IN DOING THINGS: 0
SUM OF ALL RESPONSES TO PHQ QUESTIONS 1-9: 0

## 2024-02-29 NOTE — PROGRESS NOTES
Identified pt with two pt identifiers (name and ). Reviewed chart in preparation for visit and have obtained necessary documentation.    Jacey Coburn is a 53 y.o. female Post-Op Check (Right Knee )  .    Vitals:    24 1305   Weight: 94.3 kg (207 lb 12.8 oz)   Height: 1.676 m (5' 5.98\")          1. Have you been to the ER, urgent care clinic since your last visit?  Hospitalized since your last visit?  no     2. Have you seen or consulted any other health care providers outside of the [facility] since your last visit?  Include any pap smears or colon screening.  no

## 2024-02-29 NOTE — PROGRESS NOTES
is here for a follow up visit from a total knee arthroplasty on the right  side.  The patient is doing well, with no medical complications since discharge.  Pain has been appropriate since surgery,and the patient is progressing well with physical therapy.  Patient is ambulating with a cane, improving daily.    Current Outpatient Medications on File Prior to Visit   Medication Sig Dispense Refill    buPROPion (WELLBUTRIN SR) 150 MG extended release tablet Take 1 tablet by mouth daily 90 tablet 1    pantoprazole (PROTONIX) 40 MG tablet Take 1 tablet by mouth daily 90 tablet 1    Semaglutide, 1 MG/DOSE, (OZEMPIC, 1 MG/DOSE,) 4 MG/3ML SOPN INJECT 1 MG SUBCUTANEOUSLY ONCE A WEEK 3 mL 5    ciprofloxacin (CIPRO) 500 MG tablet Take 1 tablet by mouth 2 times daily      metroNIDAZOLE (FLAGYL) 500 MG tablet Take 1 tablet by mouth in the morning and 1 tablet in the evening.      budesonide-formoterol (SYMBICORT) 160-4.5 MCG/ACT AERO Inhale 1 puff into the lungs 2 times daily 10.2 g 3    albuterol sulfate HFA (PROVENTIL;VENTOLIN;PROAIR) 108 (90 Base) MCG/ACT inhaler Inhale 1 puff into the lungs every 4 hours as needed for Wheezing 18 g 3    MAGNESIUM PO Take 500 mg by mouth daily      Probiotic Product (ALIGN) CHEW Take by mouth daily      OXYGEN Inhale into the lungs as needed 2 L      ondansetron (ZOFRAN) 4 MG tablet Take 1 tablet by mouth daily as needed for Nausea or Vomiting 30 tablet 0    dicyclomine (BENTYL) 20 MG tablet Take 1 tablet by mouth every 6 hours as needed (abd spasms) 120 tablet 0    escitalopram (LEXAPRO) 20 MG tablet Take 1 tablet by mouth daily 90 tablet 1    famotidine (PEPCID) 20 MG tablet Take 1 tablet by mouth 2 times daily 180 tablet 1    inFLIXimab (REMICADE) 100 MG injection Infuse 5 mg/kg intravenously See Admin Instructions Every 8 weeks      diclofenac sodium (VOLTAREN) 1 % GEL Apply 2 g topically 4 times daily 100 g 3    amitriptyline (ELAVIL) 10 MG tablet Take 1 tablet by mouth  No

## 2024-03-05 ENCOUNTER — TELEPHONE (OUTPATIENT)
Age: 54
End: 2024-03-05

## 2024-03-05 NOTE — TELEPHONE ENCOUNTER
Xander with [employer] called requesting the patient's last 3 office notes so they can be submitted to her insurance for authorization of her brace. The notes were faxed out to Xander Duran at [phone].

## 2024-03-07 ENCOUNTER — OFFICE VISIT (OUTPATIENT)
Age: 54
End: 2024-03-07
Payer: COMMERCIAL

## 2024-03-07 VITALS
SYSTOLIC BLOOD PRESSURE: 140 MMHG | WEIGHT: 210.1 LBS | DIASTOLIC BLOOD PRESSURE: 90 MMHG | OXYGEN SATURATION: 95 % | HEIGHT: 66 IN | TEMPERATURE: 96.9 F | RESPIRATION RATE: 17 BRPM | BODY MASS INDEX: 33.77 KG/M2 | HEART RATE: 69 BPM

## 2024-03-07 DIAGNOSIS — F33.0 MILD EPISODE OF RECURRENT MAJOR DEPRESSIVE DISORDER (HCC): ICD-10-CM

## 2024-03-07 DIAGNOSIS — J45.30 MILD PERSISTENT ASTHMA WITHOUT COMPLICATION: ICD-10-CM

## 2024-03-07 DIAGNOSIS — F41.9 ANXIETY DISORDER, UNSPECIFIED TYPE: ICD-10-CM

## 2024-03-07 PROCEDURE — 99213 OFFICE O/P EST LOW 20 MIN: CPT | Performed by: STUDENT IN AN ORGANIZED HEALTH CARE EDUCATION/TRAINING PROGRAM

## 2024-03-07 RX ORDER — ALBUTEROL SULFATE 90 UG/1
1 AEROSOL, METERED RESPIRATORY (INHALATION) EVERY 4 HOURS PRN
Qty: 18 G | Refills: 3
Start: 2024-03-07

## 2024-03-07 RX ORDER — BUPROPION HYDROCHLORIDE 150 MG/1
150 TABLET, EXTENDED RELEASE ORAL DAILY
Qty: 90 TABLET | Refills: 1
Start: 2024-03-07

## 2024-03-07 RX ORDER — ESCITALOPRAM OXALATE 20 MG/1
20 TABLET ORAL DAILY
Qty: 90 TABLET | Refills: 1
Start: 2024-03-07

## 2024-03-07 NOTE — PROGRESS NOTES
Chief Complaint   Patient presents with    Follow-up     3 Months 2023     \"Have you been to the ER, urgent care clinic since your last visit?  Hospitalized since your last visit?\"      Yes 2023 Right Knee Replacement    “Have you seen or consulted any other health care providers outside of [facility] since your last visit?”      Yes 2024 Dr. Marrero Post-Op             Vitals:    24 1004   BP: (!) 140/90   Pulse: 69   Resp: 17   Temp: 96.9 °F (36.1 °C)   SpO2: 95%     Health Maintenance Due   Topic Date Due    Hepatitis B vaccine (1 of 3 - 3-dose series) Never done    Shingles vaccine (2 of 2) 2023    COVID-19 Vaccine ( season) 2023    Annual Wellness Visit (Medicare Advantage)  Never done      The patient, Jacey DC Irish, identity was verified by name and , pharmacy verified  Labs:Yes  Fasting:Yes

## 2024-03-07 NOTE — PROGRESS NOTES
[facility]  [address] [place] [place] [address] [phone]    Chief Complaint: chronic med problems  Subjective  Jacey Coburn is a 53 y.o. Black /  female , established  patient, here for evaluation of the concern(s) above;    PMH sig for Psoriasis Arthritis, HLD, prediabetes, UC, Anxiety and depression, right leg DVT x 2 on anticoag, PUD, chronic hypoxia related to COVID 19 and intermittent asthma on chronic oxygen, s/p right TKA (12/2023) and obesity here for routine follow up.    Depression - Lexapro 20mg and wellbutrin    Asthma:  Well controlled on albuterol and symbicort.      Pertinent negatives include no fever, no chest pain, no abdominal pain and no shortness of breath.     Treatment team:  Dr. Marion for prediabetes and weight management  Dr. Spear: Ulcerative Colitis  Dr. Carmona:  rheumatologist  Dr. Tomlinson:  Pulmonalogist      Social Hx;  - lives with her mother and   -no children of her own. Raised her 's brother son.        Allergies - reviewed:   Allergies   Allergen Reactions    Adalimumab Rash     Large red knots    Sulfa Antibiotics Anaphylaxis    Certolizumab Pegol Rash    Codeine Itching       Past Medical History - reviewed:  Past Medical History:   Diagnosis Date    Anemia associated with acute blood loss 2017    Txd with Blood transfusions x 2.  Dr. Serrano.    Asthma childhood    DDD (degenerative disc disease), lumbar     DJD (degenerative joint disease) of knee     ROBLES (generalized anxiety disorder) 2018    Dr. French Chowdary    GERD (gastroesophageal reflux disease)     GI bleeding 2017    Dr. Robert Serrano.  Txd with Blood transfusions.    History of COVID-19     x2-on Oxygen since last COVID diagnosis    History of tuberculosis exposure 2013    POSITIVE PPD TEST. Txd x 6 months; last dose either 11/13 or 12/13    Hoarseness or changing voice 2023    since last COVID diagnosis-is going to be

## 2024-03-21 ENCOUNTER — TELEMEDICINE (OUTPATIENT)
Age: 54
End: 2024-03-21
Payer: COMMERCIAL

## 2024-03-21 ENCOUNTER — OFFICE VISIT (OUTPATIENT)
Age: 54
End: 2024-03-21

## 2024-03-21 VITALS — BODY MASS INDEX: 33.93 KG/M2 | HEIGHT: 66 IN

## 2024-03-21 DIAGNOSIS — J20.9 ACUTE BRONCHITIS, UNSPECIFIED ORGANISM: Primary | ICD-10-CM

## 2024-03-21 DIAGNOSIS — Z96.651 AFTERCARE FOLLOWING RIGHT KNEE JOINT REPLACEMENT SURGERY: Primary | ICD-10-CM

## 2024-03-21 DIAGNOSIS — Z47.1 AFTERCARE FOLLOWING RIGHT KNEE JOINT REPLACEMENT SURGERY: Primary | ICD-10-CM

## 2024-03-21 PROCEDURE — 99024 POSTOP FOLLOW-UP VISIT: CPT | Performed by: ORTHOPAEDIC SURGERY

## 2024-03-21 PROCEDURE — 99213 OFFICE O/P EST LOW 20 MIN: CPT | Performed by: STUDENT IN AN ORGANIZED HEALTH CARE EDUCATION/TRAINING PROGRAM

## 2024-03-21 RX ORDER — ALBUTEROL SULFATE 90 UG/1
2 AEROSOL, METERED RESPIRATORY (INHALATION) 4 TIMES DAILY PRN
Qty: 18 G | Refills: 0 | Status: SHIPPED | OUTPATIENT
Start: 2024-03-21

## 2024-03-21 RX ORDER — DOXYCYCLINE HYCLATE 100 MG
100 TABLET ORAL 2 TIMES DAILY
Qty: 20 TABLET | Refills: 0 | Status: SHIPPED | OUTPATIENT
Start: 2024-03-21 | End: 2024-03-31

## 2024-03-21 RX ORDER — PREDNISONE 20 MG/1
20 TABLET ORAL 2 TIMES DAILY
Qty: 14 TABLET | Refills: 0 | Status: SHIPPED | OUTPATIENT
Start: 2024-03-21 | End: 2024-03-28

## 2024-03-21 RX ORDER — BENZONATATE 200 MG/1
200 CAPSULE ORAL 3 TIMES DAILY PRN
Qty: 20 CAPSULE | Refills: 0 | Status: SHIPPED | OUTPATIENT
Start: 2024-03-21 | End: 2024-03-28

## 2024-03-21 ASSESSMENT — PATIENT HEALTH QUESTIONNAIRE - PHQ9
1. LITTLE INTEREST OR PLEASURE IN DOING THINGS: SEVERAL DAYS
SUM OF ALL RESPONSES TO PHQ QUESTIONS 1-9: 2
2. FEELING DOWN, DEPRESSED OR HOPELESS: SEVERAL DAYS
SUM OF ALL RESPONSES TO PHQ QUESTIONS 1-9: 2
SUM OF ALL RESPONSES TO PHQ9 QUESTIONS 1 & 2: 2

## 2024-03-21 NOTE — PROGRESS NOTES
Identified pt with two pt identifiers (name and ). Reviewed chart in preparation for visit and have obtained necessary documentation.    Jacey Coburn is a 53 y.o. female Follow-up (Right Total knee )  .    Vitals:    24 1339   Height: 1.676 m (5' 5.98\")          1. Have you been to the ER, urgent care clinic since your last visit?  Hospitalized since your last visit?  no     2. Have you seen or consulted any other health care providers outside of the [facility] since your last visit?  Include any pap smears or colon screening.  no

## 2024-03-21 NOTE — PROGRESS NOTES
[facility]  [address] [place] [address] [phone]    Chief Complaint: URI symptoms    Subjective  Jacey Coburn is a 53 y.o. Black /  female , established patient, here for evaluation of the concern(s) above;    SUBJECTIVE:     Pt would like to be evaluated for the problem(s) listed above:     URI symptoms  Over the past 10 days, the patient has developed a deep cough with associated chest discomfort. Patient has been achy and tired. Patient denies any fever, chills, chest pain at rest, SOB, nausea or vomiting.   Pt tried otc cough remedies without much improvement.         Review of systems:       A comprehensive review of systems was negative except for that written in the History of Present Illness.         PHYSICAL EXAM:     General: alert, cooperative, no distress   Mental  status: mental status: alert, oriented to person, place, and time, normal mood, behavior, speech, dress, motor activity, and thought processes   Resp: resp: normal effort and no respiratory distress   Neuro: neuro: no gross deficits   Skin: skin: no discoloration or lesions of concern on visible areas   Due to this being a TeleHealth evaluation, many elements of the physical examination are unable to be assessed.        ASSESSMENT/PLAN:      Diagnosis Orders   1. Acute bronchitis, unspecified organism  doxycycline hyclate (VIBRA-TABS) 100 MG tablet    predniSONE (DELTASONE) 20 MG tablet    albuterol sulfate HFA (VENTOLIN HFA) 108 (90 Base) MCG/ACT inhaler    benzonatate (TESSALON) 200 MG capsule        1. Acute bronchitis, unspecified organism    - doxycycline hyclate (VIBRA-TABS) 100 MG tablet; Take 1 tablet by mouth 2 times daily for 10 days  Dispense: 20 tablet; Refill: 0  - predniSONE (DELTASONE) 20 MG tablet; Take 1 tablet by mouth 2 times daily for 7 days  Dispense: 14 tablet; Refill: 0  - albuterol sulfate HFA (VENTOLIN HFA) 108 (90 Base) MCG/ACT

## 2024-03-21 NOTE — PROGRESS NOTES
Chief Complaint   Patient presents with    Cough    Chest Congestion     X 1 week       Patient stated symptoms started shortly after last visit in office 3/7/2024. The symptoms started to subside but the cam back a week later. Did not complete a home COVID test. Has been taking Mucinex with little relief.    \"Have you been to the ER, urgent care clinic since your last visit?  Hospitalized since your last visit?\"    NO    “Have you seen or consulted any other [facility] providers [facility]?”    NO             There were no vitals filed for this visit.  Health Maintenance Due   Topic Date Due    Hepatitis B vaccine (1 of 3 - 3-dose series) Never done    Shingles vaccine (2 of 2) 2023    COVID-19 Vaccine (2023- season) 2023    Annual Wellness Visit (Medicare Advantage)  Never done      The patient, Jacey DC Irish, identity was verified by name and , pharmacy verified  Labs:N/A  Fasting:N/A

## 2024-03-21 NOTE — PROGRESS NOTES
your hair loose or down, no ponytails, buns, sofía pins or clips.  All body piercings must be removed.  Please shower with antibacterial soap for three consecutive days before and on the morning of surgery, but do not apply any lotions, powders or deodorants after the shower on the day of surgery. Please use a fresh towels after each shower. Please sleep in clean clothes and change bed linens the night before surgery.  Please do not shave for 48 hours prior to surgery. Shaving of the face is acceptable.    7. You should understand that if you do not follow these instructions your surgery may be cancelled.  If your physical condition changes (I.e. fever, cold or flu) please contact your surgeon as soon as possible.    8. It is important that you be on time.  If a situation occurs where you may be late, please call [phone] (OR Holding Area).    9. If you have any questions and or problems, please call [phone] (Pre-admission Testing).    10. Your surgery time may be subject to change.  You will receive a phone call the evening prior if your time changes.    11.  If having outpatient surgery, you must have someone to drive you here, stay with you during the duration of your stay, and to drive you home at time of discharge.     Per Anesthesia-Stop Ozempic 1 week prior to surgery(do not take Manolo 3/24)  TAKE ALL MEDICATIONS DAY OF SURGERY EXCEPT:no exceptions      I understand a pre-operative phone call will be made to verify my surgery time.  In the event that I am not available, I give permission for a message to be left on my answering service and/or with another person?  yes         ___________________      __________   _________    (Signature of Patient)             (Witness)                (Date and Time)

## 2024-03-21 NOTE — PROGRESS NOTES
is here for a follow up visit from a total knee arthroplasty on the right  side.  The patient is doing well, with no medical complications since discharge.  Pain has been appropriate since surgery,and the patient is progressing well with physical therapy.  Patient is ambulating with no device.    Current Outpatient Medications on File Prior to Visit   Medication Sig Dispense Refill    albuterol sulfate HFA (PROVENTIL;VENTOLIN;PROAIR) 108 (90 Base) MCG/ACT inhaler Inhale 1 puff into the lungs every 4 hours as needed for Wheezing 18 g 3    buPROPion (WELLBUTRIN SR) 150 MG extended release tablet Take 1 tablet by mouth daily 90 tablet 1    escitalopram (LEXAPRO) 20 MG tablet Take 1 tablet by mouth daily 90 tablet 1    pantoprazole (PROTONIX) 40 MG tablet Take 1 tablet by mouth daily 90 tablet 1    Semaglutide, 1 MG/DOSE, (OZEMPIC, 1 MG/DOSE,) 4 MG/3ML SOPN INJECT 1 MG SUBCUTANEOUSLY ONCE A WEEK 3 mL 5    budesonide-formoterol (SYMBICORT) 160-4.5 MCG/ACT AERO Inhale 1 puff into the lungs 2 times daily 10.2 g 3    MAGNESIUM PO Take 500 mg by mouth daily      Probiotic Product (ALIGN) CHEW Take by mouth daily      OXYGEN Inhale into the lungs as needed 2 L      ondansetron (ZOFRAN) 4 MG tablet Take 1 tablet by mouth daily as needed for Nausea or Vomiting 30 tablet 0    dicyclomine (BENTYL) 20 MG tablet Take 1 tablet by mouth every 6 hours as needed (abd spasms) 120 tablet 0    famotidine (PEPCID) 20 MG tablet Take 1 tablet by mouth 2 times daily 180 tablet 1    inFLIXimab (REMICADE) 100 MG injection Infuse 5 mg/kg intravenously See Admin Instructions Every 8 weeks      diclofenac sodium (VOLTAREN) 1 % GEL Apply 2 g topically 4 times daily 100 g 3    amitriptyline (ELAVIL) 10 MG tablet Take 1 tablet by mouth nightly as needed for Sleep      folic acid (FOLVITE) 1 MG tablet Take 1 tablet by mouth daily      furosemide (LASIX) 40 MG tablet Take 1 tablet by mouth daily as needed (swelling)      rivaroxaban

## 2024-03-25 NOTE — H&P
is here for a follow up visit from a total knee arthroplasty on the right  side.  The patient is doing well, with no medical complications since discharge.  Pain has been appropriate since surgery,and the patient is progressing well with physical therapy.  Patient is ambulating with no device.            Current Outpatient Medications on File Prior to Visit   Medication Sig Dispense Refill    albuterol sulfate HFA (PROVENTIL;VENTOLIN;PROAIR) 108 (90 Base) MCG/ACT inhaler Inhale 1 puff into the lungs every 4 hours as needed for Wheezing 18 g 3    buPROPion (WELLBUTRIN SR) 150 MG extended release tablet Take 1 tablet by mouth daily 90 tablet 1    escitalopram (LEXAPRO) 20 MG tablet Take 1 tablet by mouth daily 90 tablet 1    pantoprazole (PROTONIX) 40 MG tablet Take 1 tablet by mouth daily 90 tablet 1    Semaglutide, 1 MG/DOSE, (OZEMPIC, 1 MG/DOSE,) 4 MG/3ML SOPN INJECT 1 MG SUBCUTANEOUSLY ONCE A WEEK 3 mL 5    budesonide-formoterol (SYMBICORT) 160-4.5 MCG/ACT AERO Inhale 1 puff into the lungs 2 times daily 10.2 g 3    MAGNESIUM PO Take 500 mg by mouth daily        Probiotic Product (ALIGN) CHEW Take by mouth daily        OXYGEN Inhale into the lungs as needed 2 L        ondansetron (ZOFRAN) 4 MG tablet Take 1 tablet by mouth daily as needed for Nausea or Vomiting 30 tablet 0    dicyclomine (BENTYL) 20 MG tablet Take 1 tablet by mouth every 6 hours as needed (abd spasms) 120 tablet 0    famotidine (PEPCID) 20 MG tablet Take 1 tablet by mouth 2 times daily 180 tablet 1    inFLIXimab (REMICADE) 100 MG injection Infuse 5 mg/kg intravenously See Admin Instructions Every 8 weeks        diclofenac sodium (VOLTAREN) 1 % GEL Apply 2 g topically 4 times daily 100 g 3    amitriptyline (ELAVIL) 10 MG tablet Take 1 tablet by mouth nightly as needed for Sleep        folic acid (FOLVITE) 1 MG tablet Take 1 tablet by mouth daily        furosemide (LASIX) 40 MG tablet Take 1 tablet by mouth daily as needed (swelling)

## 2024-03-26 ENCOUNTER — ANESTHESIA EVENT (OUTPATIENT)
Facility: HOSPITAL | Age: 54
End: 2024-03-26
Payer: MEDICARE

## 2024-03-26 ENCOUNTER — ANESTHESIA (OUTPATIENT)
Facility: HOSPITAL | Age: 54
End: 2024-03-26
Payer: MEDICARE

## 2024-03-26 ENCOUNTER — HOSPITAL ENCOUNTER (OUTPATIENT)
Facility: HOSPITAL | Age: 54
Setting detail: OUTPATIENT SURGERY
Discharge: HOME | End: 2024-03-26
Attending: ORTHOPAEDIC SURGERY | Admitting: ORTHOPAEDIC SURGERY
Payer: MEDICARE

## 2024-03-26 VITALS
OXYGEN SATURATION: 100 % | RESPIRATION RATE: 14 BRPM | HEART RATE: 74 BPM | DIASTOLIC BLOOD PRESSURE: 79 MMHG | BODY MASS INDEX: 34.23 KG/M2 | SYSTOLIC BLOOD PRESSURE: 156 MMHG | HEIGHT: 65 IN | TEMPERATURE: 98 F | WEIGHT: 205.47 LBS

## 2024-03-26 DIAGNOSIS — Z47.89 ORTHOPEDIC AFTERCARE: Primary | ICD-10-CM

## 2024-03-26 PROCEDURE — 7100000010 HC PHASE II RECOVERY - FIRST 15 MIN: Performed by: ORTHOPAEDIC SURGERY

## 2024-03-26 PROCEDURE — 2500000003 HC RX 250 WO HCPCS

## 2024-03-26 PROCEDURE — 3700000000 HC ANESTHESIA ATTENDED CARE: Performed by: ORTHOPAEDIC SURGERY

## 2024-03-26 PROCEDURE — 2709999900 HC NON-CHARGEABLE SUPPLY: Performed by: ORTHOPAEDIC SURGERY

## 2024-03-26 PROCEDURE — 3600000002 HC SURGERY LEVEL 2 BASE: Performed by: ORTHOPAEDIC SURGERY

## 2024-03-26 PROCEDURE — 7100000000 HC PACU RECOVERY - FIRST 15 MIN: Performed by: ORTHOPAEDIC SURGERY

## 2024-03-26 PROCEDURE — 6360000002 HC RX W HCPCS

## 2024-03-26 PROCEDURE — 7100000001 HC PACU RECOVERY - ADDTL 15 MIN: Performed by: ORTHOPAEDIC SURGERY

## 2024-03-26 PROCEDURE — 27570 FIXATION OF KNEE JOINT: CPT | Performed by: ORTHOPAEDIC SURGERY

## 2024-03-26 PROCEDURE — 6360000002 HC RX W HCPCS: Performed by: ORTHOPAEDIC SURGERY

## 2024-03-26 PROCEDURE — 6370000000 HC RX 637 (ALT 250 FOR IP): Performed by: ORTHOPAEDIC SURGERY

## 2024-03-26 PROCEDURE — 7100000011 HC PHASE II RECOVERY - ADDTL 15 MIN: Performed by: ORTHOPAEDIC SURGERY

## 2024-03-26 PROCEDURE — 2580000003 HC RX 258: Performed by: ANESTHESIOLOGY

## 2024-03-26 PROCEDURE — 6360000002 HC RX W HCPCS: Performed by: ANESTHESIOLOGY

## 2024-03-26 PROCEDURE — 2580000003 HC RX 258

## 2024-03-26 RX ORDER — SODIUM CHLORIDE, SODIUM LACTATE, POTASSIUM CHLORIDE, CALCIUM CHLORIDE 600; 310; 30; 20 MG/100ML; MG/100ML; MG/100ML; MG/100ML
INJECTION, SOLUTION INTRAVENOUS CONTINUOUS
Status: DISCONTINUED | OUTPATIENT
Start: 2024-03-26 | End: 2024-03-26 | Stop reason: HOSPADM

## 2024-03-26 RX ORDER — SODIUM CHLORIDE 0.9 % (FLUSH) 0.9 %
5-40 SYRINGE (ML) INJECTION EVERY 12 HOURS SCHEDULED
Status: DISCONTINUED | OUTPATIENT
Start: 2024-03-26 | End: 2024-03-26 | Stop reason: HOSPADM

## 2024-03-26 RX ORDER — SODIUM CHLORIDE 9 MG/ML
INJECTION, SOLUTION INTRAVENOUS PRN
Status: DISCONTINUED | OUTPATIENT
Start: 2024-03-26 | End: 2024-03-26 | Stop reason: HOSPADM

## 2024-03-26 RX ORDER — PROCHLORPERAZINE EDISYLATE 5 MG/ML
5 INJECTION INTRAMUSCULAR; INTRAVENOUS
Status: DISCONTINUED | OUTPATIENT
Start: 2024-03-26 | End: 2024-03-26 | Stop reason: HOSPADM

## 2024-03-26 RX ORDER — NALOXONE HYDROCHLORIDE 0.4 MG/ML
INJECTION, SOLUTION INTRAMUSCULAR; INTRAVENOUS; SUBCUTANEOUS PRN
Status: DISCONTINUED | OUTPATIENT
Start: 2024-03-26 | End: 2024-03-26 | Stop reason: HOSPADM

## 2024-03-26 RX ORDER — MEPERIDINE HYDROCHLORIDE 25 MG/ML
12.5 INJECTION INTRAMUSCULAR; INTRAVENOUS; SUBCUTANEOUS EVERY 5 MIN PRN
Status: DISCONTINUED | OUTPATIENT
Start: 2024-03-26 | End: 2024-03-26 | Stop reason: HOSPADM

## 2024-03-26 RX ORDER — KETOROLAC TROMETHAMINE 30 MG/ML
30 INJECTION, SOLUTION INTRAMUSCULAR; INTRAVENOUS ONCE
Status: COMPLETED | OUTPATIENT
Start: 2024-03-26 | End: 2024-03-26

## 2024-03-26 RX ORDER — LIDOCAINE HYDROCHLORIDE 10 MG/ML
1 INJECTION, SOLUTION EPIDURAL; INFILTRATION; INTRACAUDAL; PERINEURAL
Status: DISCONTINUED | OUTPATIENT
Start: 2024-03-26 | End: 2024-03-26 | Stop reason: HOSPADM

## 2024-03-26 RX ORDER — SODIUM CHLORIDE, SODIUM LACTATE, POTASSIUM CHLORIDE, CALCIUM CHLORIDE 600; 310; 30; 20 MG/100ML; MG/100ML; MG/100ML; MG/100ML
INJECTION, SOLUTION INTRAVENOUS CONTINUOUS PRN
Status: DISCONTINUED | OUTPATIENT
Start: 2024-03-26 | End: 2024-03-26 | Stop reason: SDUPTHER

## 2024-03-26 RX ORDER — FENTANYL CITRATE 50 UG/ML
50 INJECTION, SOLUTION INTRAMUSCULAR; INTRAVENOUS EVERY 5 MIN PRN
Status: DISCONTINUED | OUTPATIENT
Start: 2024-03-26 | End: 2024-03-26 | Stop reason: HOSPADM

## 2024-03-26 RX ORDER — SODIUM CHLORIDE 0.9 % (FLUSH) 0.9 %
5-40 SYRINGE (ML) INJECTION PRN
Status: DISCONTINUED | OUTPATIENT
Start: 2024-03-26 | End: 2024-03-26 | Stop reason: HOSPADM

## 2024-03-26 RX ORDER — TRAMADOL HYDROCHLORIDE 50 MG/1
50 TABLET ORAL
Status: COMPLETED | OUTPATIENT
Start: 2024-03-26 | End: 2024-03-26

## 2024-03-26 RX ORDER — ACETAMINOPHEN 500 MG
1000 TABLET ORAL ONCE
Status: COMPLETED | OUTPATIENT
Start: 2024-03-26 | End: 2024-03-26

## 2024-03-26 RX ORDER — TRAMADOL HYDROCHLORIDE 50 MG/1
50 TABLET ORAL EVERY 6 HOURS PRN
Qty: 20 TABLET | Refills: 0 | Status: SHIPPED | OUTPATIENT
Start: 2024-03-26 | End: 2024-03-31

## 2024-03-26 RX ORDER — DEXAMETHASONE SODIUM PHOSPHATE 4 MG/ML
8 INJECTION, SOLUTION INTRA-ARTICULAR; INTRALESIONAL; INTRAMUSCULAR; INTRAVENOUS; SOFT TISSUE ONCE
Status: DISCONTINUED | OUTPATIENT
Start: 2024-03-26 | End: 2024-03-26 | Stop reason: HOSPADM

## 2024-03-26 RX ORDER — ONDANSETRON 2 MG/ML
4 INJECTION INTRAMUSCULAR; INTRAVENOUS
Status: DISCONTINUED | OUTPATIENT
Start: 2024-03-26 | End: 2024-03-26 | Stop reason: HOSPADM

## 2024-03-26 RX ORDER — PROPOFOL 10 MG/ML
INJECTION, EMULSION INTRAVENOUS PRN
Status: DISCONTINUED | OUTPATIENT
Start: 2024-03-26 | End: 2024-03-26 | Stop reason: SDUPTHER

## 2024-03-26 RX ORDER — HYDROMORPHONE HYDROCHLORIDE 1 MG/ML
0.5 INJECTION, SOLUTION INTRAMUSCULAR; INTRAVENOUS; SUBCUTANEOUS EVERY 5 MIN PRN
Status: DISCONTINUED | OUTPATIENT
Start: 2024-03-26 | End: 2024-03-26 | Stop reason: HOSPADM

## 2024-03-26 RX ORDER — LIDOCAINE HYDROCHLORIDE 20 MG/ML
INJECTION, SOLUTION EPIDURAL; INFILTRATION; INTRACAUDAL; PERINEURAL PRN
Status: DISCONTINUED | OUTPATIENT
Start: 2024-03-26 | End: 2024-03-26 | Stop reason: SDUPTHER

## 2024-03-26 RX ADMIN — LIDOCAINE HYDROCHLORIDE 60 MG: 20 INJECTION, SOLUTION EPIDURAL; INFILTRATION; INTRACAUDAL; PERINEURAL at 11:48

## 2024-03-26 RX ADMIN — Medication 3 AMPULE: at 11:13

## 2024-03-26 RX ADMIN — FENTANYL CITRATE 50 MCG: 50 INJECTION INTRAMUSCULAR; INTRAVENOUS at 12:31

## 2024-03-26 RX ADMIN — KETOROLAC TROMETHAMINE 30 MG: 30 INJECTION, SOLUTION INTRAMUSCULAR at 11:31

## 2024-03-26 RX ADMIN — PROPOFOL 150 MG: 10 INJECTION, EMULSION INTRAVENOUS at 11:48

## 2024-03-26 RX ADMIN — PROPOFOL 100 MG: 10 INJECTION, EMULSION INTRAVENOUS at 11:52

## 2024-03-26 RX ADMIN — ACETAMINOPHEN 1000 MG: 500 TABLET ORAL at 11:13

## 2024-03-26 RX ADMIN — FENTANYL CITRATE 50 MCG: 50 INJECTION INTRAMUSCULAR; INTRAVENOUS at 12:18

## 2024-03-26 RX ADMIN — SODIUM CHLORIDE, SODIUM LACTATE, POTASSIUM CHLORIDE, AND CALCIUM CHLORIDE: 600; 310; 30; 20 INJECTION, SOLUTION INTRAVENOUS at 11:45

## 2024-03-26 RX ADMIN — TRAMADOL HYDROCHLORIDE 50 MG: 50 TABLET ORAL at 12:47

## 2024-03-26 RX ADMIN — SODIUM CHLORIDE, POTASSIUM CHLORIDE, SODIUM LACTATE AND CALCIUM CHLORIDE: 600; 310; 30; 20 INJECTION, SOLUTION INTRAVENOUS at 11:31

## 2024-03-26 ASSESSMENT — PAIN DESCRIPTION - ORIENTATION: ORIENTATION: RIGHT

## 2024-03-26 ASSESSMENT — PAIN SCALES - GENERAL
PAINLEVEL_OUTOF10: 5
PAINLEVEL_OUTOF10: 6

## 2024-03-26 ASSESSMENT — ENCOUNTER SYMPTOMS: SHORTNESS OF BREATH: 1

## 2024-03-26 ASSESSMENT — PAIN - FUNCTIONAL ASSESSMENT: PAIN_FUNCTIONAL_ASSESSMENT: 0-10

## 2024-03-26 ASSESSMENT — PAIN DESCRIPTION - DESCRIPTORS
DESCRIPTORS: ACHING
DESCRIPTORS: ACHING

## 2024-03-26 ASSESSMENT — PAIN DESCRIPTION - LOCATION: LOCATION: KNEE

## 2024-03-26 NOTE — FLOWSHEET NOTE
03/26/24 1200   Handoff   Communication Given Periop Handoff/Relief   Handoff phase Phase I receiving   Handoff Given To [facility] PACU RN   Handoff Received From OR RN/ Yamile CRNA   Handoff Communication Face to Face;At bedside   Time Handoff Given 1200

## 2024-03-26 NOTE — ANESTHESIA POSTPROCEDURE EVALUATION
Department of Anesthesiology  Postprocedure Note    Patient: Jacey Coburn  MRN: [id number]  YOB: 1970  Date of evaluation: 3/26/2024    Procedure Summary       Date: 03/26/24 Room / Location: [facility] MAIN OR M4 / [facility] MAIN OR    Anesthesia Start: 1145 Anesthesia Stop: 1202    Procedure: MANIPULATION UNDER ANES RIGHT KNEE (Right: Knee) Diagnosis:       Stiffness of right knee      (Stiffness of right knee [M25.661])    Providers: Chase Marrero DO Responsible Provider: Juan José Gould MD    Anesthesia Type: MAC ASA Status: 3            Anesthesia Type: No value filed.    Carol Phase I: Carol Score: 10    Carol Phase II:      Anesthesia Post Evaluation    Patient location during evaluation: PACU  Patient participation: complete - patient participated  Level of consciousness: sleepy but conscious and responsive to verbal stimuli  Airway patency: patent  Nausea & Vomiting: no vomiting and no nausea  Cardiovascular status: blood pressure returned to baseline and hemodynamically stable  Respiratory status: acceptable  Hydration status: stable    No notable events documented.

## 2024-03-26 NOTE — DISCHARGE INSTRUCTIONS
manipulation under anesthesia:      If you have specific questions: CALL OUR OFFICE: [phone]    DRESSING:  Keep dressing on, clean, and dry for three days.    Three days after your surgery, you may remove the dressing and wash leg with soap and water.  You are not allowed to place this underwater, and once it is cleaned, you should put an occlusive dressing over the wound.    ACTIVITY:  Keep the operative knee elevated as much as possible for the next two weeks until your swelling diminishes.    You have been provided crutches for safety.  While you will likely encounter some discomfort, the main structure of your knee is unchanged, and you can bear weight to your tolerance on this leg without restriction.  You should not operate heavy machinery, drive, do heavy lifting, squatting, or kneeling until otherwise instructed.      Once you are confident and safe off of crutches, you may begin to walk with a cane or no device, but this should be only when you are safe to walk and confident with the strength of your leg.    It is important to begin gentle range of motion activities to your tolerance as soon as possible.      GENERAL PAIN CONTROL:    IF YOU HAVE HAD A NERVE BLOCK: remember, you will have an increase in pain sometime in the first 24 hours after your surgery.  This can be significant, make sure you are consistently medicating and expect that increase in pain.  This is normal, but will require you to be proactive in your pain control.   If this increase in pain persists - call Dr. Marrero's office.    Take pain medications as needed and prescribed.  Never exceed the maximum dosage.  It is reasonable to take something for pain prior to night time on the first night to avoid severe pain in the night.    Ice is an important part of your recovery, and best if you do not apply ice or ice pack directly to skin, but use a towel or cloth on your skin.  Do this for twenty minutes on the skin, then at least twenty

## 2024-03-26 NOTE — OP NOTE
Operative Note      Patient: Jacey Coburn  YOB: 1970  MRN: [id number]    Date of Procedure: 3/26/2024    Pre-Op Diagnosis Codes:     * Stiffness of right knee [M25.661]    Post-Op Diagnosis: Same       Procedure(s):  MANIPULATION UNDER ANES RIGHT KNEE    Surgeon(s):  Chase Marrero DO    Assistant:   First Assistant: Denis Friedman PA    Anesthesia: Choice    Estimated Blood Loss (mL): 0    Complications: None    Specimens:   * No specimens in log *    Implants:  * No implants in log *      Drains: * No LDAs found *    Findings: stiffness right knee                  Detailed Description of Procedure:   Findings: preop range of motion 2-90, postop 2-125        Detailed Description of Procedure:   Indications: this is a 53 year old female who underwent a right total knee arthroplasty, after attempting to regain range of motion postop, due to deficient knee flexion, manipulation was indicated to improve function, range of motion, and pain.  We did discuss the risks of surgery which include, but are not limited to: fracture, stiffness, implant failure, death, disability, organ dysfunction, wound healing issues, DVT, PE, and the need for further procedures.  The patient did freely state their understanding and satisfaction with our discussion.     Procedure in detail:    After correct site and side were identified by myself, and adequate anesthesia obtained, I flexed the knee, checking range of motion, documenting, and inspecting coronal plane stability. I then flexed the knee to the point of resistance, then placed pressure on the distal femur anteriorly and the proximal tibia anteriorly.  I gently and progressively manipulated the knee into flexion, with palpable and audible breakup of scar tissue, no acute motions were performed. I performed this until I felt a hard endpoint that indicated no further feasible range of motion improvement.  I then checked stability in all planes.  Final range of motion  No

## 2024-03-26 NOTE — PERIOP NOTE
12:56 PM  Clarified with Idalia DUDLEY patient is ok to go home and does not need any PT consult. PA made aware pt has not yet set up outpatient therapy.  Pt aware she needs to call to have therapy set up and to start home exercises today.

## 2024-03-26 NOTE — DISCHARGE SUMMARY
Date of Admission: 3/26/2024  Date of Discharge: 3/26/2024    Attending on admission and discharge: Dr. Chase Marrero    Admitting Diagnosis:  stiffness of right knee, s/p TKA  Discharge Diagnosis:  stiffness of right knee, s/p TKA  Procedure Performed: manipulation under anesthesia    Hospital Course and Treatment:     The patient was admitted through the preop holding area.  The patient tolerated the procedure well and was taken to the pacu for further observation and treatment.  The patient was maintained on IV fluids until tolerating a PO diet. They were also maintained on sequential compression devices and DVT prophylaxis.  The diet was advanced as tolerated.  The patient was mobilized. There were no complications during the course of the hospital stay, and the patient was deemed medically stable for discharge to home.  After consultation with physical therapy, the patient was cleared for discharge.    Discharge instructions:  The patient should not be in a pool or tub, or otherwise immerse the wound in water.  The patient has been counseled on the importance of mobilizing and moving at least every two hours to help prevent blood clots.  The patient should call Dr. Marrero's office or go to an emergency department if they note a fever over 101.1 degrees fahrenheit, more or unrelieved pain, more or new swelling at the operative site, or any drainage from the wound.    Condition at Discharge: Stable    Discharge medications:  Resume regular home medications  Additional medications to be prescribed on discharge          Follow up instructions: The patient should follow up in 2  weeks for a reevaluation with Dr. Marrero.

## 2024-03-26 NOTE — ANESTHESIA PRE PROCEDURE
Department of Anesthesiology  Preprocedure Note       Name:  Jacey Coburn   Age:  53 y.o.  :  1970                                          MRN:  [id number]         Date:  3/26/2024      Surgeon: Surgeon(s):  Chase Marrero DO    Procedure: Procedure(s):  MANIPULATION UNDER ANES RIGHT KNEE    Medications prior to admission:   Prior to Admission medications    Medication Sig Start Date End Date Taking? Authorizing Provider   doxycycline hyclate (VIBRA-TABS) 100 MG tablet Take 1 tablet by mouth 2 times daily for 10 days 3/21/24 3/31/24  Boubacar Lao MD   predniSONE (DELTASONE) 20 MG tablet Take 1 tablet by mouth 2 times daily for 7 days 3/21/24 3/28/24  Boubacar Lao MD   albuterol sulfate HFA (VENTOLIN HFA) 108 (90 Base) MCG/ACT inhaler Inhale 2 puffs into the lungs 4 times daily as needed for Wheezing 3/21/24   Boubacar Lao MD   benzonatate (TESSALON) 200 MG capsule Take 1 capsule by mouth 3 times daily as needed for Cough 3/21/24 3/28/24  Boubacar Lao MD   albuterol sulfate HFA (PROVENTIL;VENTOLIN;PROAIR) 108 (90 Base) MCG/ACT inhaler Inhale 1 puff into the lungs every 4 hours as needed for Wheezing 3/7/24   Boubacar Lao MD   buPROPion (WELLBUTRIN SR) 150 MG extended release tablet Take 1 tablet by mouth daily 3/7/24   Boubacar Lao MD   escitalopram (LEXAPRO) 20 MG tablet Take 1 tablet by mouth daily 3/7/24   Boubacar Lao MD   pantoprazole (PROTONIX) 40 MG tablet Take 1 tablet by mouth daily 24   Boubacar Lao MD   Semaglutide, 1 MG/DOSE, (OZEMPIC, 1 MG/DOSE,) 4 MG/3ML SOPN INJECT 1 MG SUBCUTANEOUSLY ONCE A WEEK  Patient taking differently: Inject 1 mg into the skin once a week 23   Bruno Marion MD   budesonide-formoterol (SYMBICORT) 160-4.5 MCG/ACT AERO Inhale 1 puff into the lungs 2 times daily 23   Boubacar Lao MD   MAGNESIUM PO Take 500 mg by mouth daily  Patient not taking: Reported on 3/26/2024    Provider, MD Mara

## 2024-03-28 ENCOUNTER — TELEPHONE (OUTPATIENT)
Age: 54
End: 2024-03-28

## 2024-03-28 DIAGNOSIS — Z47.1 AFTERCARE FOLLOWING RIGHT KNEE JOINT REPLACEMENT SURGERY: Primary | ICD-10-CM

## 2024-03-28 DIAGNOSIS — Z96.651 AFTERCARE FOLLOWING RIGHT KNEE JOINT REPLACEMENT SURGERY: Primary | ICD-10-CM

## 2024-03-28 NOTE — TELEPHONE ENCOUNTER
Patient's physical therapy office [facility] called and is requesting a new PT order as the patient just under went a Rt Knee Manipulation on 3/26/24. The new order may be faxed to them at [phone] and if need be the office may be reached at [phone].

## 2024-04-05 DIAGNOSIS — J45.30 MILD PERSISTENT ASTHMA WITHOUT COMPLICATION: ICD-10-CM

## 2024-04-08 ENCOUNTER — OFFICE VISIT (OUTPATIENT)
Age: 54
End: 2024-04-08

## 2024-04-08 VITALS — BODY MASS INDEX: 34.19 KG/M2 | HEIGHT: 65 IN

## 2024-04-08 DIAGNOSIS — Z96.651 AFTERCARE FOLLOWING RIGHT KNEE JOINT REPLACEMENT SURGERY: Primary | ICD-10-CM

## 2024-04-08 DIAGNOSIS — Z47.1 AFTERCARE FOLLOWING RIGHT KNEE JOINT REPLACEMENT SURGERY: Primary | ICD-10-CM

## 2024-04-08 PROCEDURE — 99024 POSTOP FOLLOW-UP VISIT: CPT | Performed by: ORTHOPAEDIC SURGERY

## 2024-04-08 RX ORDER — BUDESONIDE AND FORMOTEROL FUMARATE 160; 4.5 UG/1; UG/1
1 AEROSOL, METERED RESPIRATORY (INHALATION) 2 TIMES DAILY
Qty: 1 EACH | Refills: 5 | Status: SHIPPED | OUTPATIENT
Start: 2024-04-08

## 2024-04-08 ASSESSMENT — PATIENT HEALTH QUESTIONNAIRE - PHQ9
SUM OF ALL RESPONSES TO PHQ QUESTIONS 1-9: 1
SUM OF ALL RESPONSES TO PHQ9 QUESTIONS 1 & 2: 1
SUM OF ALL RESPONSES TO PHQ QUESTIONS 1-9: 1
2. FEELING DOWN, DEPRESSED OR HOPELESS: SEVERAL DAYS
1. LITTLE INTEREST OR PLEASURE IN DOING THINGS: NOT AT ALL
SUM OF ALL RESPONSES TO PHQ QUESTIONS 1-9: 1
SUM OF ALL RESPONSES TO PHQ QUESTIONS 1-9: 1

## 2024-04-08 NOTE — TELEPHONE ENCOUNTER
Last appointment: 3/21/24  Next appointment: 6/10/24  Previous refill encounter(s): 12/5/23 #1 with 3 refills    Requested Prescriptions     Pending Prescriptions Disp Refills    BREYNA 160-4.5 MCG/ACT AERO [Pharmacy Med Name: Breyna 160-4.5 MCG/ACT Inhalation Aerosol] 1 each 5     Sig: INHALE 1 PUFF BY MOUTH TWICE DAILY         For Pharmacy Admin Tracking Only    Program: Medication Refill  CPA in place:    Recommendation Provided To:   Intervention Detail: New Rx: 1, reason: Patient Preference  Intervention Accepted By:   Gap Closed?:    Time Spent (min): 5

## 2024-04-08 NOTE — PROGRESS NOTES
is here for a follow up visit from a right knee manipulation under anesthesia.    Pain has been appropriate since surgery, no major medical complications since surgery.       Current Outpatient Medications on File Prior to Visit   Medication Sig Dispense Refill    albuterol sulfate HFA (VENTOLIN HFA) 108 (90 Base) MCG/ACT inhaler Inhale 2 puffs into the lungs 4 times daily as needed for Wheezing 18 g 0    albuterol sulfate HFA (PROVENTIL;VENTOLIN;PROAIR) 108 (90 Base) MCG/ACT inhaler Inhale 1 puff into the lungs every 4 hours as needed for Wheezing 18 g 3    buPROPion (WELLBUTRIN SR) 150 MG extended release tablet Take 1 tablet by mouth daily 90 tablet 1    escitalopram (LEXAPRO) 20 MG tablet Take 1 tablet by mouth daily 90 tablet 1    pantoprazole (PROTONIX) 40 MG tablet Take 1 tablet by mouth daily 90 tablet 1    Semaglutide, 1 MG/DOSE, (OZEMPIC, 1 MG/DOSE,) 4 MG/3ML SOPN INJECT 1 MG SUBCUTANEOUSLY ONCE A WEEK (Patient taking differently: Inject 1 mg into the skin once a week) 3 mL 5    budesonide-formoterol (SYMBICORT) 160-4.5 MCG/ACT AERO Inhale 1 puff into the lungs 2 times daily 10.2 g 3    Probiotic Product (ALIGN) CHEW Take by mouth daily      OXYGEN Inhale into the lungs as needed 2 L      dicyclomine (BENTYL) 20 MG tablet Take 1 tablet by mouth every 6 hours as needed (abd spasms) 120 tablet 0    famotidine (PEPCID) 20 MG tablet Take 1 tablet by mouth 2 times daily 180 tablet 1    inFLIXimab (REMICADE) 100 MG injection Infuse 5 mg/kg intravenously See Admin Instructions Every 8 weeks      folic acid (FOLVITE) 1 MG tablet Take 1 tablet by mouth daily      rivaroxaban (XARELTO) 20 MG TABS tablet Take 1 tablet by mouth daily (with breakfast)      rosuvastatin (CRESTOR) 10 MG tablet Take 1 tablet by mouth daily       No current facility-administered medications on file prior to visit.       ROS:  General: denies agitation, major chest pain, unexpected weakness  Patient states improved pain and range

## 2024-04-08 NOTE — PROGRESS NOTES
Identified pt with two pt identifiers (name and ). Reviewed chart in preparation for visit and have obtained necessary documentation.    Jacey Coburn is a 53 y.o. female Post-Op Check (Right Knee )  .    Vitals:    24 1300   Height: 1.651 m (5' 5\")          1. Have you been to the ER, urgent care clinic since your last visit?  Hospitalized since your last visit?  no     2. Have you seen or consulted any other health care providers outside of the [facility] since your last visit?  Include any pap smears or colon screening.  no

## 2024-05-09 ENCOUNTER — OFFICE VISIT (OUTPATIENT)
Age: 54
End: 2024-05-09

## 2024-05-09 VITALS — BODY MASS INDEX: 34.27 KG/M2 | HEIGHT: 65 IN | WEIGHT: 205.7 LBS

## 2024-05-09 DIAGNOSIS — Z96.651 AFTERCARE FOLLOWING RIGHT KNEE JOINT REPLACEMENT SURGERY: Primary | ICD-10-CM

## 2024-05-09 DIAGNOSIS — Z47.1 AFTERCARE FOLLOWING RIGHT KNEE JOINT REPLACEMENT SURGERY: Primary | ICD-10-CM

## 2024-05-09 PROCEDURE — 99024 POSTOP FOLLOW-UP VISIT: CPT | Performed by: ORTHOPAEDIC SURGERY

## 2024-05-09 ASSESSMENT — PATIENT HEALTH QUESTIONNAIRE - PHQ9
SUM OF ALL RESPONSES TO PHQ9 QUESTIONS 1 & 2: 0
SUM OF ALL RESPONSES TO PHQ QUESTIONS 1-9: 0
1. LITTLE INTEREST OR PLEASURE IN DOING THINGS: NOT AT ALL
SUM OF ALL RESPONSES TO PHQ QUESTIONS 1-9: 0
2. FEELING DOWN, DEPRESSED OR HOPELESS: NOT AT ALL
SUM OF ALL RESPONSES TO PHQ QUESTIONS 1-9: 0
SUM OF ALL RESPONSES TO PHQ QUESTIONS 1-9: 0

## 2024-05-09 NOTE — PROGRESS NOTES
is here for a follow up visit from a total knee arthroplasty on the right  side.  The patient is doing well, with no medical complications since discharge.  Pain has been appropriate since surgery,and the patient is progressing well with physical therapy.  Patient is ambulating with no device.    Current Outpatient Medications on File Prior to Visit   Medication Sig Dispense Refill    BREYNA 160-4.5 MCG/ACT AERO INHALE 1 PUFF BY MOUTH TWICE DAILY 1 each 5    albuterol sulfate HFA (VENTOLIN HFA) 108 (90 Base) MCG/ACT inhaler Inhale 2 puffs into the lungs 4 times daily as needed for Wheezing 18 g 0    albuterol sulfate HFA (PROVENTIL;VENTOLIN;PROAIR) 108 (90 Base) MCG/ACT inhaler Inhale 1 puff into the lungs every 4 hours as needed for Wheezing 18 g 3    buPROPion (WELLBUTRIN SR) 150 MG extended release tablet Take 1 tablet by mouth daily 90 tablet 1    escitalopram (LEXAPRO) 20 MG tablet Take 1 tablet by mouth daily 90 tablet 1    pantoprazole (PROTONIX) 40 MG tablet Take 1 tablet by mouth daily 90 tablet 1    Semaglutide, 1 MG/DOSE, (OZEMPIC, 1 MG/DOSE,) 4 MG/3ML SOPN INJECT 1 MG SUBCUTANEOUSLY ONCE A WEEK (Patient taking differently: Inject 1 mg into the skin once a week) 3 mL 5    Probiotic Product (ALIGN) CHEW Take by mouth daily      OXYGEN Inhale into the lungs as needed 2 L      dicyclomine (BENTYL) 20 MG tablet Take 1 tablet by mouth every 6 hours as needed (abd spasms) 120 tablet 0    famotidine (PEPCID) 20 MG tablet Take 1 tablet by mouth 2 times daily 180 tablet 1    inFLIXimab (REMICADE) 100 MG injection Infuse 5 mg/kg intravenously See Admin Instructions Every 8 weeks      folic acid (FOLVITE) 1 MG tablet Take 1 tablet by mouth daily      rivaroxaban (XARELTO) 20 MG TABS tablet Take 1 tablet by mouth daily (with breakfast)      rosuvastatin (CRESTOR) 10 MG tablet Take 1 tablet by mouth daily       No current facility-administered medications on file prior to visit.       ROS:  General: denies

## 2024-05-09 NOTE — PROGRESS NOTES
Identified pt with two pt identifiers (name and ). Reviewed chart in preparation for visit and have obtained necessary documentation.    Jacey Coburn is a 53 y.o. female Knee Pain (F/u rt knee pain)  .    Vitals:    24 1004   Weight: 93.3 kg (205 lb 11.2 oz)   Height: 1.651 m (5' 5\")          1. Have you been to the ER, urgent care clinic since your last visit?  Hospitalized since your last visit?  no     2. Have you seen or consulted any other health care providers outside of the [facility] since your last visit?  Include any pap smears or colon screening.  no

## 2024-06-01 DIAGNOSIS — E66.01 MORBID (SEVERE) OBESITY DUE TO EXCESS CALORIES (HCC): ICD-10-CM

## 2024-06-01 DIAGNOSIS — R73.02 IMPAIRED GLUCOSE TOLERANCE (ORAL): ICD-10-CM

## 2024-06-10 ENCOUNTER — TELEMEDICINE (OUTPATIENT)
Age: 54
End: 2024-06-10
Payer: MEDICARE

## 2024-06-10 DIAGNOSIS — R43.0 ANOSMIA: ICD-10-CM

## 2024-06-10 DIAGNOSIS — F33.0 MILD EPISODE OF RECURRENT MAJOR DEPRESSIVE DISORDER (HCC): ICD-10-CM

## 2024-06-10 DIAGNOSIS — L40.50 PSORIATIC ARTHRITIS (HCC): ICD-10-CM

## 2024-06-10 DIAGNOSIS — F41.9 ANXIETY DISORDER, UNSPECIFIED TYPE: Primary | ICD-10-CM

## 2024-06-10 PROCEDURE — 99213 OFFICE O/P EST LOW 20 MIN: CPT | Performed by: STUDENT IN AN ORGANIZED HEALTH CARE EDUCATION/TRAINING PROGRAM

## 2024-06-10 RX ORDER — ESCITALOPRAM OXALATE 20 MG/1
20 TABLET ORAL DAILY
Qty: 90 TABLET | Refills: 1
Start: 2024-06-10

## 2024-06-10 RX ORDER — BUPROPION HYDROCHLORIDE 150 MG/1
150 TABLET, EXTENDED RELEASE ORAL DAILY
Qty: 90 TABLET | Refills: 1
Start: 2024-06-10

## 2024-06-10 NOTE — PROGRESS NOTES
Chief Complaint   Patient presents with    Follow-up     3 Months 3/7/2024 Mild persistent asthma without complication     \"Have you been to the ER, urgent care clinic since your last visit?  Hospitalized since your last visit?\"    NO    “Have you seen or consulted any other health care providers outside of [facility] since your last visit?”      Yes Diego Borges M.D  Rheumatologist  2024       Dr.Elke Hollingsworth  Hematologist  6/10/2024    Dr. Emma Rodriguez  Cardiologist  3/2024    There were no vitals filed for this visit.  Health Maintenance Due   Topic Date Due    Hepatitis B vaccine (1 of 3 - 3-dose series) Never done    DTaP/Tdap/Td vaccine (1 - Tdap) Never done    Shingles vaccine (2 of 2) 2023    COVID-19 Vaccine (5 - - season) 2023    Annual Wellness Visit (Medicare Advantage)  Never done      The patient, Jacey Coburn, identity was verified by name and , pharmacy verified  Labs:N/A  Fasting:N/A         
public health emergency), evaluation of the following organ systems was limited: Vitals/Constitutional/EENT/Resp/CV/GI//MS/Neuro/Skin/Heme-Lymph-Imm.  Pursuant to the emergency declaration under the [place] Act and the National Emergencies Act, 1135 waiver authority and the Coronavirus Preparedness and Response Supplemental Appropriations Act, this Virtual Visit was conducted with patient's (and/or legal guardian's) consent, to reduce the patient's risk of exposure to COVID-19 and provide necessary medical care.  The patient (and/or legal guardian) has also been advised to contact this office for worsening conditions or problems, and seek emergency medical treatment and/or call 911 if deemed necessary.     Patient identification was verified at the start of the visit: YES     Services were provided through a video synchronous discussion virtually to substitute for in-person clinic visit. Patient was located at home and provider was located in office or at home.      An electronic signature was used to authenticate this note.     -- Boubacar Lao MD      CPT Codes 07243-55720 for Established Patients may apply to this Telehealth Visit.  POS code: 02.  Modifier GT

## 2024-06-12 LAB
ALBUMIN SERPL-MCNC: 3.4 G/DL (ref 3.5–5)
ALBUMIN/GLOB SERPL: 1.1 (ref 1.1–2.2)
ALP SERPL-CCNC: 81 U/L (ref 45–117)
ALT SERPL-CCNC: 13 U/L (ref 12–78)
ANION GAP SERPL CALC-SCNC: 5 MMOL/L (ref 5–15)
AST SERPL-CCNC: 11 U/L (ref 15–37)
BILIRUB SERPL-MCNC: 0.8 MG/DL (ref 0.2–1)
BUN SERPL-MCNC: 22 MG/DL (ref 6–20)
BUN/CREAT SERPL: 27 (ref 12–20)
CALCIUM SERPL-MCNC: 9.2 MG/DL (ref 8.5–10.1)
CHLORIDE SERPL-SCNC: 111 MMOL/L (ref 97–108)
CO2 SERPL-SCNC: 24 MMOL/L (ref 21–32)
CREAT SERPL-MCNC: 0.81 MG/DL (ref 0.55–1.02)
ERYTHROCYTE [DISTWIDTH] IN BLOOD BY AUTOMATED COUNT: 14.7 % (ref 11.5–14.5)
EST. AVERAGE GLUCOSE BLD GHB EST-MCNC: 97 MG/DL
GLOBULIN SER CALC-MCNC: 3 G/DL (ref 2–4)
GLUCOSE SERPL-MCNC: 87 MG/DL (ref 65–100)
HBA1C MFR BLD: 5 % (ref 4–5.6)
HCT VFR BLD AUTO: 36.7 % (ref 35–47)
HGB BLD-MCNC: 11.7 G/DL (ref 11.5–16)
MCH RBC QN AUTO: 28.7 PG (ref 26–34)
MCHC RBC AUTO-ENTMCNC: 31.9 G/DL (ref 30–36.5)
MCV RBC AUTO: 90.2 FL (ref 80–99)
NRBC # BLD: 0 K/UL (ref 0–0.01)
NRBC BLD-RTO: 0 PER 100 WBC
PLATELET # BLD AUTO: 257 K/UL (ref 150–400)
PMV BLD AUTO: 10.6 FL (ref 8.9–12.9)
POTASSIUM SERPL-SCNC: 4 MMOL/L (ref 3.5–5.1)
PROT SERPL-MCNC: 6.4 G/DL (ref 6.4–8.2)
RBC # BLD AUTO: 4.07 M/UL (ref 3.8–5.2)
SODIUM SERPL-SCNC: 140 MMOL/L (ref 136–145)
WBC # BLD AUTO: 3.5 K/UL (ref 3.6–11)

## 2024-06-17 ENCOUNTER — OFFICE VISIT (OUTPATIENT)
Age: 54
End: 2024-06-17
Payer: MEDICARE

## 2024-06-17 VITALS
HEART RATE: 72 BPM | WEIGHT: 226.6 LBS | HEIGHT: 65 IN | DIASTOLIC BLOOD PRESSURE: 68 MMHG | SYSTOLIC BLOOD PRESSURE: 127 MMHG | BODY MASS INDEX: 37.75 KG/M2

## 2024-06-17 DIAGNOSIS — R73.02 IMPAIRED GLUCOSE TOLERANCE (ORAL): ICD-10-CM

## 2024-06-17 DIAGNOSIS — E66.01 MORBID (SEVERE) OBESITY DUE TO EXCESS CALORIES (HCC): Primary | ICD-10-CM

## 2024-06-17 PROCEDURE — G2211 COMPLEX E/M VISIT ADD ON: HCPCS | Performed by: INTERNAL MEDICINE

## 2024-06-17 PROCEDURE — 99214 OFFICE O/P EST MOD 30 MIN: CPT | Performed by: INTERNAL MEDICINE

## 2024-06-17 RX ORDER — BUPROPION HYDROCHLORIDE 200 MG/1
200 TABLET, EXTENDED RELEASE ORAL EVERY MORNING
COMMUNITY
Start: 2024-05-20

## 2024-06-17 RX ORDER — SEMAGLUTIDE 1.7 MG/.75ML
1.7 INJECTION, SOLUTION SUBCUTANEOUS
Qty: 2 ML | Refills: 3 | Status: SHIPPED | OUTPATIENT
Start: 2024-06-17

## 2024-06-17 RX ORDER — PHENTERMINE HYDROCHLORIDE 37.5 MG/1
37.5 TABLET ORAL
Qty: 90 TABLET | Refills: 1 | Status: SHIPPED | OUTPATIENT
Start: 2024-06-17 | End: 2024-07-17

## 2024-06-17 NOTE — PROGRESS NOTES
Chief Complaint   Patient presents with    Obesity     Pcp and pharmacy verified    Blood Sugar Problem     History of Present Illness: Jacey Coburn is a 53 y.o. female here for follow up of obesity and Impaired Glucose Tolerance.     At our last visit in December 2023 her A1C was down to 4.5% and her weight was down to 201 pounds.  Pt was instructed to continue the Ozempic 1.0mg weekly.    \"I had a right knee replacement in December 2023. They had to go in an manipulate it. Since then it has been doing pretty good. Today is my last day of PT.\"    Pt notes she has been very tired and she saw her hematologist and \"she said my white count was very low. She suspected it could be from my autoimmune diseases.\"    She has not had anymore blood clots in her leg.  She is still taking Xarelto.    \"I have not had the Remicade in awhile and I can really tell it.\"    Pt is taking Ozempic 1.0mg every Sunday.  She is taking Buprion ER 200mg daily.    She notes that she has been out of the Phentermine in over a month.    Her weight today was up to 226 pounds.   Her A1C in June 2024 was 5.0%.    She is still seeing Dr. Tom Melgar of pulmonology.  She is followeing Dr. Eric Neil of Hematology and Dr. Diego Borges of Rheumatology.    Current Outpatient Medications   Medication Sig    buPROPion (WELLBUTRIN SR) 200 MG extended release tablet Take 1 tablet by mouth every morning    escitalopram (LEXAPRO) 20 MG tablet Take 1 tablet by mouth daily    BREYNA 160-4.5 MCG/ACT AERO INHALE 1 PUFF BY MOUTH TWICE DAILY    albuterol sulfate HFA (VENTOLIN HFA) 108 (90 Base) MCG/ACT inhaler Inhale 2 puffs into the lungs 4 times daily as needed for Wheezing    albuterol sulfate HFA (PROVENTIL;VENTOLIN;PROAIR) 108 (90 Base) MCG/ACT inhaler Inhale 1 puff into the lungs every 4 hours as needed for Wheezing    pantoprazole (PROTONIX) 40 MG tablet Take 1 tablet by mouth daily    Semaglutide, 1 MG/DOSE, (OZEMPIC, 1 MG/DOSE,) 4 MG/3ML SOPN

## 2024-06-24 NOTE — TELEPHONE ENCOUNTER
Last appointment: 6/10/24  Next appointment: 12/9/24  Previous refill encounter(s): 12/21/22    Requested Prescriptions     Pending Prescriptions Disp Refills    rivaroxaban (XARELTO) 20 MG TABS tablet 90 tablet 1     Sig: Take 1 tablet by mouth daily (with breakfast)         For Pharmacy Admin Tracking Only    Program: Medication Refill  CPA in place:    Recommendation Provided To:   Intervention Detail: New Rx: 1, reason: Patient Preference  Intervention Accepted By:   Gap Closed?:    Time Spent (min): 5

## 2024-06-24 NOTE — TELEPHONE ENCOUNTER
[facility]  on [address] requesting  a prescription for Xarelto 20 mg. Per [facility]  patient is out of medication now. Patient can be reached at [phone].

## 2024-06-26 ENCOUNTER — CLINICAL DOCUMENTATION (OUTPATIENT)
Age: 54
End: 2024-06-26

## 2024-07-05 ENCOUNTER — TELEPHONE (OUTPATIENT)
Age: 54
End: 2024-07-05

## 2024-07-05 ENCOUNTER — PATIENT MESSAGE (OUTPATIENT)
Age: 54
End: 2024-07-05

## 2024-07-10 ENCOUNTER — TELEPHONE (OUTPATIENT)
Age: 54
End: 2024-07-10

## 2024-08-09 ENCOUNTER — TELEPHONE (OUTPATIENT)
Age: 54
End: 2024-08-09

## 2024-08-09 ENCOUNTER — OFFICE VISIT (OUTPATIENT)
Age: 54
End: 2024-08-09
Payer: MEDICARE

## 2024-08-09 VITALS
RESPIRATION RATE: 16 BRPM | BODY MASS INDEX: 36.06 KG/M2 | OXYGEN SATURATION: 100 % | DIASTOLIC BLOOD PRESSURE: 83 MMHG | HEIGHT: 65 IN | WEIGHT: 216.4 LBS | SYSTOLIC BLOOD PRESSURE: 129 MMHG

## 2024-08-09 DIAGNOSIS — Z96.651 AFTERCARE FOLLOWING RIGHT KNEE JOINT REPLACEMENT SURGERY: Primary | ICD-10-CM

## 2024-08-09 DIAGNOSIS — Z47.1 AFTERCARE FOLLOWING RIGHT KNEE JOINT REPLACEMENT SURGERY: Primary | ICD-10-CM

## 2024-08-09 PROCEDURE — 99213 OFFICE O/P EST LOW 20 MIN: CPT | Performed by: ORTHOPAEDIC SURGERY

## 2024-08-09 RX ORDER — TIZANIDINE HYDROCHLORIDE 4 MG/1
4 CAPSULE, GELATIN COATED ORAL 3 TIMES DAILY PRN
Qty: 90 CAPSULE | Refills: 0 | Status: SHIPPED | OUTPATIENT
Start: 2024-08-09

## 2024-08-09 ASSESSMENT — PATIENT HEALTH QUESTIONNAIRE - PHQ9
SUM OF ALL RESPONSES TO PHQ QUESTIONS 1-9: 0
7. TROUBLE CONCENTRATING ON THINGS, SUCH AS READING THE NEWSPAPER OR WATCHING TELEVISION: NOT AT ALL
6. FEELING BAD ABOUT YOURSELF - OR THAT YOU ARE A FAILURE OR HAVE LET YOURSELF OR YOUR FAMILY DOWN: NOT AT ALL
3. TROUBLE FALLING OR STAYING ASLEEP: NOT AT ALL
2. FEELING DOWN, DEPRESSED OR HOPELESS: NOT AT ALL
1. LITTLE INTEREST OR PLEASURE IN DOING THINGS: NOT AT ALL
4. FEELING TIRED OR HAVING LITTLE ENERGY: NOT AT ALL
SUM OF ALL RESPONSES TO PHQ9 QUESTIONS 1 & 2: 0
5. POOR APPETITE OR OVEREATING: NOT AT ALL
SUM OF ALL RESPONSES TO PHQ QUESTIONS 1-9: 0
10. IF YOU CHECKED OFF ANY PROBLEMS, HOW DIFFICULT HAVE THESE PROBLEMS MADE IT FOR YOU TO DO YOUR WORK, TAKE CARE OF THINGS AT HOME, OR GET ALONG WITH OTHER PEOPLE: NOT DIFFICULT AT ALL
9. THOUGHTS THAT YOU WOULD BE BETTER OFF DEAD, OR OF HURTING YOURSELF: NOT AT ALL
8. MOVING OR SPEAKING SO SLOWLY THAT OTHER PEOPLE COULD HAVE NOTICED. OR THE OPPOSITE, BEING SO FIGETY OR RESTLESS THAT YOU HAVE BEEN MOVING AROUND A LOT MORE THAN USUAL: NOT AT ALL

## 2024-08-09 NOTE — PROGRESS NOTES
Identified pt with two pt identifiers (name and ). Reviewed chart in preparation for visit and have obtained necessary documentation.    Jacey Coburn is a 54 y.o. female  Chief Complaint   Patient presents with    Follow-up     3 month f/u rt knee NO XR       /83 (Site: Left Upper Arm, Position: Sitting, Cuff Size: Large Adult)   Resp 16   Ht 1.651 m (5' 5\")   Wt 98.2 kg (216 lb 6.4 oz)   SpO2 100%   BMI 36.01 kg/m²     1. Have you been to the ER, urgent care clinic since your last visit?  Hospitalized since your last visit?no    2. Have you seen or consulted any other health care providers outside of the [facility] since your last visit?  Include any pap smears or colon screening. no  
loss of consciousness.  Psychiatric: Denies depression, sleep pattern changes, anxiety, change in personality  Endocrine: Denies mood swings, heat or cold intolerance  Hematologic/Lymphatic: Denies anemia, purpura, petechia  Allergic/Immunologic: Denies swelling of throat, pain or swelling at lymph nodes      Physical Examination:    Vitals:    08/09/24 1002   BP: 129/83   Resp: 16   SpO2: 100%        General: AOX3, no apparent distress  Psychiatric: mood and affect appropriate  Lungs: breathing is symmetric and unlabored bilaterally  Heart: regular rate and rhythm  Abdomen: no guarding  Head: normocephalic, atraumatic  Skin: No significant abnormalities, good turgor  Sensation intact to light touch: L1-S1 dermatomes  Muscular exam: 5/5 strength in all major muscle groups unless noted in specialty exam.    Extremities:      Left upper extremity: Full active and passive range of motion without pain, deformity, no open wound, strength 5/5 in all major muscle groups.    Right upper extremity: Full active and passive range of motion without pain, deformity, no open wound, strength 5/5 in all major muscle groups.    Left lower extremity: Full active and passive range of motion without pain, deformity, no open wound, strength 5/5 in all major muscle groups.    Right  lower extremity:  Well healed anterior scar is noted.  Patient ambulates with no device and no limp.  No deformity is noted.  Range of motion of the knee is 0-120.   Ligamentous testing of the knee indicates stability of the the MCL and LCL.   Coronal plane stability throughout the range of motion is excellent.   No joint line tenderness to palpation medially or laterally.  Popliteal area is unremarkable.   Negative for effusion. No patellar crepitus.  Patella tracks centrally.  Strength testing is indicative of 5/5 strength at hip flexion, extension, knee flexion and extension, tibialis anterior, EHL, and FHL.  Sensation is intact to light touch in the L1-S1

## 2024-08-09 NOTE — TELEPHONE ENCOUNTER
Patient's pharmacy is requesting a return call back at [phone] to get a verbal okay for the patient's zanaflex prescription to be filled as tablets instead of capsules so the insurance will cover it.

## 2024-08-12 NOTE — TELEPHONE ENCOUNTER
Called patient's pharmacy([facility] in [place]). Spoke with pharmacist and gave verbal for Zanaflex tablets instead of capsules. No additional information was needed at this time.

## 2024-09-04 ENCOUNTER — TELEPHONE (OUTPATIENT)
Age: 54
End: 2024-09-04

## 2024-09-04 NOTE — TELEPHONE ENCOUNTER
Identified pt with two pt identifiers (name and ).spoke with pt and rescheduled pt upcoming appt to [facility].

## 2024-10-25 ENCOUNTER — HOSPITAL ENCOUNTER (OUTPATIENT)
Facility: HOSPITAL | Age: 54
Discharge: HOME | End: 2024-10-28
Attending: STUDENT IN AN ORGANIZED HEALTH CARE EDUCATION/TRAINING PROGRAM
Payer: MEDICARE

## 2024-10-25 ENCOUNTER — TRANSCRIBE ORDERS (OUTPATIENT)
Dept: SCHEDULING | Age: 54
End: 2024-10-25

## 2024-10-25 VITALS — HEIGHT: 65 IN | WEIGHT: 230 LBS | BODY MASS INDEX: 38.32 KG/M2

## 2024-10-25 DIAGNOSIS — Z12.31 VISIT FOR SCREENING MAMMOGRAM: ICD-10-CM

## 2024-10-25 DIAGNOSIS — Z12.31 VISIT FOR SCREENING MAMMOGRAM: Primary | ICD-10-CM

## 2024-10-25 PROCEDURE — 77063 BREAST TOMOSYNTHESIS BI: CPT

## 2024-12-01 DIAGNOSIS — E66.01 MORBID (SEVERE) OBESITY DUE TO EXCESS CALORIES: ICD-10-CM

## 2024-12-01 DIAGNOSIS — R73.02 IMPAIRED GLUCOSE TOLERANCE (ORAL): ICD-10-CM

## 2024-12-03 LAB
ALBUMIN SERPL-MCNC: 4.1 G/DL (ref 3.8–4.9)
ALP SERPL-CCNC: 77 IU/L (ref 44–121)
ALT SERPL-CCNC: 8 IU/L (ref 0–32)
AST SERPL-CCNC: 18 IU/L (ref 0–40)
BILIRUB SERPL-MCNC: 1 MG/DL (ref 0–1.2)
BUN SERPL-MCNC: 14 MG/DL (ref 6–24)
BUN/CREAT SERPL: 14 (ref 9–23)
CALCIUM SERPL-MCNC: 9.4 MG/DL (ref 8.7–10.2)
CHLORIDE SERPL-SCNC: 103 MMOL/L (ref 96–106)
CO2 SERPL-SCNC: 23 MMOL/L (ref 20–29)
CREAT SERPL-MCNC: 0.98 MG/DL (ref 0.57–1)
EGFRCR SERPLBLD CKD-EPI 2021: 68 ML/MIN/1.73
ERYTHROCYTE [DISTWIDTH] IN BLOOD BY AUTOMATED COUNT: 13.8 % (ref 11.7–15.4)
GLOBULIN SER CALC-MCNC: 2.5 G/DL (ref 1.5–4.5)
GLUCOSE SERPL-MCNC: 83 MG/DL (ref 70–99)
HBA1C MFR BLD: 5.1 % (ref 4.8–5.6)
HCT VFR BLD AUTO: 38 % (ref 34–46.6)
HGB BLD-MCNC: 12.3 G/DL (ref 11.1–15.9)
MCH RBC QN AUTO: 31.5 PG (ref 26.6–33)
MCHC RBC AUTO-ENTMCNC: 32.4 G/DL (ref 31.5–35.7)
MCV RBC AUTO: 97 FL (ref 79–97)
PLATELET # BLD AUTO: 266 X10E3/UL (ref 150–450)
POTASSIUM SERPL-SCNC: 4.2 MMOL/L (ref 3.5–5.2)
PROT SERPL-MCNC: 6.6 G/DL (ref 6–8.5)
RBC # BLD AUTO: 3.91 X10E6/UL (ref 3.77–5.28)
SODIUM SERPL-SCNC: 139 MMOL/L (ref 134–144)
WBC # BLD AUTO: 3.9 X10E3/UL (ref 3.4–10.8)

## 2024-12-06 SDOH — ECONOMIC STABILITY: FOOD INSECURITY: WITHIN THE PAST 12 MONTHS, THE FOOD YOU BOUGHT JUST DIDN'T LAST AND YOU DIDN'T HAVE MONEY TO GET MORE.: SOMETIMES TRUE

## 2024-12-06 SDOH — ECONOMIC STABILITY: TRANSPORTATION INSECURITY
IN THE PAST 12 MONTHS, HAS LACK OF TRANSPORTATION KEPT YOU FROM MEETINGS, WORK, OR FROM GETTING THINGS NEEDED FOR DAILY LIVING?: NO

## 2024-12-06 SDOH — ECONOMIC STABILITY: INCOME INSECURITY: HOW HARD IS IT FOR YOU TO PAY FOR THE VERY BASICS LIKE FOOD, HOUSING, MEDICAL CARE, AND HEATING?: SOMEWHAT HARD

## 2024-12-06 SDOH — ECONOMIC STABILITY: FOOD INSECURITY: WITHIN THE PAST 12 MONTHS, YOU WORRIED THAT YOUR FOOD WOULD RUN OUT BEFORE YOU GOT MONEY TO BUY MORE.: PATIENT DECLINED

## 2024-12-09 ENCOUNTER — OFFICE VISIT (OUTPATIENT)
Age: 54
End: 2024-12-09
Payer: MEDICARE

## 2024-12-09 VITALS
SYSTOLIC BLOOD PRESSURE: 129 MMHG | TEMPERATURE: 97.9 F | BODY MASS INDEX: 38.2 KG/M2 | OXYGEN SATURATION: 99 % | HEART RATE: 69 BPM | HEIGHT: 65 IN | RESPIRATION RATE: 17 BRPM | DIASTOLIC BLOOD PRESSURE: 79 MMHG | WEIGHT: 229.28 LBS

## 2024-12-09 DIAGNOSIS — Z00.00 WELCOME TO MEDICARE PREVENTIVE VISIT: Primary | ICD-10-CM

## 2024-12-09 DIAGNOSIS — R30.0 DYSURIA: ICD-10-CM

## 2024-12-09 DIAGNOSIS — Z23 NEEDS FLU SHOT: ICD-10-CM

## 2024-12-09 LAB
BILIRUBIN, URINE, POC: NEGATIVE
BLOOD URINE, POC: NORMAL
GLUCOSE URINE, POC: NEGATIVE
KETONES, URINE, POC: NEGATIVE
LEUKOCYTE ESTERASE, URINE, POC: NEGATIVE
NITRITE, URINE, POC: NEGATIVE
PH, URINE, POC: 5.5 (ref 4.6–8)
PROTEIN,URINE, POC: NEGATIVE
SPECIFIC GRAVITY, URINE, POC: 1.02 (ref 1–1.03)
URINALYSIS CLARITY, POC: CLEAR
URINALYSIS COLOR, POC: YELLOW
UROBILINOGEN, POC: NORMAL

## 2024-12-09 PROCEDURE — 90661 CCIIV3 VAC ABX FR 0.5 ML IM: CPT | Performed by: STUDENT IN AN ORGANIZED HEALTH CARE EDUCATION/TRAINING PROGRAM

## 2024-12-09 PROCEDURE — 99213 OFFICE O/P EST LOW 20 MIN: CPT | Performed by: STUDENT IN AN ORGANIZED HEALTH CARE EDUCATION/TRAINING PROGRAM

## 2024-12-09 PROCEDURE — 81003 URINALYSIS AUTO W/O SCOPE: CPT | Performed by: STUDENT IN AN ORGANIZED HEALTH CARE EDUCATION/TRAINING PROGRAM

## 2024-12-09 RX ORDER — PHENAZOPYRIDINE HYDROCHLORIDE 200 MG/1
200 TABLET, FILM COATED ORAL 3 TIMES DAILY PRN
Qty: 6 TABLET | Refills: 0 | Status: SHIPPED | OUTPATIENT
Start: 2024-12-09 | End: 2024-12-11

## 2024-12-09 RX ORDER — NITROFURANTOIN 25; 75 MG/1; MG/1
100 CAPSULE ORAL 2 TIMES DAILY
Qty: 14 CAPSULE | Refills: 0 | Status: SHIPPED | OUTPATIENT
Start: 2024-12-09 | End: 2024-12-16

## 2024-12-09 ASSESSMENT — LIFESTYLE VARIABLES
HOW OFTEN DO YOU HAVE A DRINK CONTAINING ALCOHOL: MONTHLY OR LESS
HOW MANY STANDARD DRINKS CONTAINING ALCOHOL DO YOU HAVE ON A TYPICAL DAY: 1 OR 2

## 2024-12-09 ASSESSMENT — PATIENT HEALTH QUESTIONNAIRE - PHQ9
1. LITTLE INTEREST OR PLEASURE IN DOING THINGS: NOT AT ALL
4. FEELING TIRED OR HAVING LITTLE ENERGY: NOT AT ALL
3. TROUBLE FALLING OR STAYING ASLEEP: NOT AT ALL
1. LITTLE INTEREST OR PLEASURE IN DOING THINGS: NOT AT ALL
7. TROUBLE CONCENTRATING ON THINGS, SUCH AS READING THE NEWSPAPER OR WATCHING TELEVISION: NOT AT ALL
SUM OF ALL RESPONSES TO PHQ QUESTIONS 1-9: 0
5. POOR APPETITE OR OVEREATING: NOT AT ALL
SUM OF ALL RESPONSES TO PHQ QUESTIONS 1-9: 0
SUM OF ALL RESPONSES TO PHQ QUESTIONS 1-9: 0
2. FEELING DOWN, DEPRESSED OR HOPELESS: NOT AT ALL
SUM OF ALL RESPONSES TO PHQ9 QUESTIONS 1 & 2: 0
6. FEELING BAD ABOUT YOURSELF - OR THAT YOU ARE A FAILURE OR HAVE LET YOURSELF OR YOUR FAMILY DOWN: NOT AT ALL
SUM OF ALL RESPONSES TO PHQ QUESTIONS 1-9: 0
SUM OF ALL RESPONSES TO PHQ9 QUESTIONS 1 & 2: 0
10. IF YOU CHECKED OFF ANY PROBLEMS, HOW DIFFICULT HAVE THESE PROBLEMS MADE IT FOR YOU TO DO YOUR WORK, TAKE CARE OF THINGS AT HOME, OR GET ALONG WITH OTHER PEOPLE: NOT DIFFICULT AT ALL
SUM OF ALL RESPONSES TO PHQ QUESTIONS 1-9: 0
2. FEELING DOWN, DEPRESSED OR HOPELESS: NOT AT ALL
SUM OF ALL RESPONSES TO PHQ QUESTIONS 1-9: 0
SUM OF ALL RESPONSES TO PHQ QUESTIONS 1-9: 0
8. MOVING OR SPEAKING SO SLOWLY THAT OTHER PEOPLE COULD HAVE NOTICED. OR THE OPPOSITE, BEING SO FIGETY OR RESTLESS THAT YOU HAVE BEEN MOVING AROUND A LOT MORE THAN USUAL: NOT AT ALL
9. THOUGHTS THAT YOU WOULD BE BETTER OFF DEAD, OR OF HURTING YOURSELF: NOT AT ALL
SUM OF ALL RESPONSES TO PHQ QUESTIONS 1-9: 0

## 2024-12-09 ASSESSMENT — VISUAL ACUITY
OS_CC: -0.50
OD_CC: +0.50

## 2024-12-09 NOTE — PROGRESS NOTES
Chief Complaint   Patient presents with    Medicare AWV    Dysuria     X 1 month     \"Have you been to the ER, urgent care clinic since your last visit?  Hospitalized since your last visit?\"    NO    “Have you seen or consulted any other health care providers outside of [facility] since your last visit?”        Yes  2024     Hematologist    Yes  10/2024  Dr. Diego Borges  Rheumatologist    Vitals:    24 1021   BP: 129/79   Pulse: 69   Resp: 17   Temp: 97.9 °F (36.6 °C)   TempSrc: Temporal   SpO2: 99%   Weight: 104 kg (229 lb 4.5 oz)   Height: 1.651 m (5' 5\")      Health Maintenance Due   Topic Date Due    Hepatitis B vaccine (1 of 3 - 19+ 3-dose series) Never done    DTaP/Tdap/Td vaccine (1 - Tdap) Never done    Shingles vaccine (2 of 2) 2023    Annual Wellness Visit (Medicare Advantage)  Never done    Flu vaccine (1) 2024    COVID-19 Vaccine (2023- season) 2024    Lipids  10/23/2024      The patient, Jacey Coburn, identity was verified by name and , pharmacy verified  Labs:Yes  Fasting:No    After obtaining consent, and per orders of Dr. Lao, injection of Flu VAcc given in  by JESSICA CORREA MA. Patient instructed to remain in clinic for 20 minutes afterwards, and to report any adverse reaction to me immediately.      
Medicare Annual Wellness Visit    Jacey Coburn is here for Medicare AWV and Dysuria (X 1 month)    Assessment & Plan   Welcome to Medicare preventive visit  Dysuria  Assessment & Plan:  Pt has UTI symptoms although UA shows no Nitrite or LE.  Will treat empirically based on symptoms.  Return to clinic or go to the ER if worsening.  Orders:  -     AMB POC URINALYSIS DIP STICK AUTO W/O MICRO  -     nitrofurantoin, macrocrystal-monohydrate, (MACROBID) 100 MG capsule; Take 1 capsule by mouth 2 times daily for 7 days, Disp-14 capsule, R-0Normal  -     phenazopyridine (PYRIDIUM) 200 MG tablet; Take 1 tablet by mouth 3 times daily as needed for Pain, Disp-6 tablet, R-0Normal  Needs flu shot  -     Influenza, FLUCELVAX Trivalent, (age 6 mo+) IM, Preservative Free, 0.5mL    Recommendations for Preventive Services Due: see orders and patient instructions/AVS.  Recommended screening schedule for the next 5-10 years is provided to the patient in written form: see Patient Instructions/AVS.     Return in 6 months (on 6/9/2025).     Subjective       Patient's complete Health Risk Assessment and screening values have been reviewed and are found in Flowsheets. The following problems were reviewed today and where indicated follow up appointments were made and/or referrals ordered.    Positive Risk Factor Screenings with Interventions:    Fall Risk:  Do you feel unsteady or are you worried about falling? : no  2 or more falls in past year?: (!) yes  Fall with injury in past year?: no     Interventions:    Reviewed medications, home hazards, visual acuity, and co-morbidities that can increase risk for falls            General HRA Questions:  Select all that apply: (!) New or Increased Pain, New or Increased Fatigue  Interventions - Pain:  Continue working on exercise. Tylenol or ibuprofen as needed for pain  Interventions Fatigue:  Continue daily exercise      Inactivity:  On average, how many days per week do you engage in moderate 
changes or fails to improve as anticipated. Pt expressed understanding with the diagnosis(es) and plan. Patient understands that this encounter was a temporary measure, and the importance of further follow up and examination was emphasized.  Patient verbalized understanding.      Signed By: Boubacar Lao MD     December 9, 2024

## 2024-12-17 ENCOUNTER — OFFICE VISIT (OUTPATIENT)
Age: 54
End: 2024-12-17
Payer: MEDICARE

## 2024-12-17 VITALS
WEIGHT: 234 LBS | SYSTOLIC BLOOD PRESSURE: 116 MMHG | DIASTOLIC BLOOD PRESSURE: 76 MMHG | BODY MASS INDEX: 38.99 KG/M2 | HEIGHT: 65 IN | HEART RATE: 74 BPM

## 2024-12-17 DIAGNOSIS — R73.02 IMPAIRED GLUCOSE TOLERANCE (ORAL): ICD-10-CM

## 2024-12-17 DIAGNOSIS — E66.01 MORBID (SEVERE) OBESITY DUE TO EXCESS CALORIES: Primary | ICD-10-CM

## 2024-12-17 PROCEDURE — G2211 COMPLEX E/M VISIT ADD ON: HCPCS | Performed by: INTERNAL MEDICINE

## 2024-12-17 PROCEDURE — 99214 OFFICE O/P EST MOD 30 MIN: CPT | Performed by: INTERNAL MEDICINE

## 2024-12-17 RX ORDER — PHENTERMINE HYDROCHLORIDE 37.5 MG/1
37.5 TABLET ORAL
Qty: 100 TABLET | Refills: 1 | Status: SHIPPED | OUTPATIENT
Start: 2024-12-17 | End: 2025-12-17

## 2024-12-17 RX ORDER — TIRZEPATIDE 2.5 MG/.5ML
2.5 INJECTION, SOLUTION SUBCUTANEOUS WEEKLY
Qty: 2 ML | Refills: 0 | Status: SHIPPED | OUTPATIENT
Start: 2024-12-17

## 2024-12-17 RX ORDER — METHOTREXATE 2.5 MG/1
10 TABLET ORAL WEEKLY
COMMUNITY
Start: 2024-10-23

## 2024-12-17 NOTE — PROGRESS NOTES
Chief Complaint   Patient presents with    Obesity    Blood Sugar Problem     Pcp and pharmacy verified     History of Present Illness: Jacey Coburn is a 54 y.o. female here for follow up of obesity and Impaired Glucose Tolerance.     At our last visit in December 2023 her A1C was down to 4.5% and her weight was down to 201 pounds.  Pt was instructed to continue the Ozempic 1.0mg weekly.    \"I had a right knee replacement in December 2023. They had to go in an manipulate it. Since then it has been doing pretty good. Today is my last day of PT.\"    \"I have been gaining weight because they will not approve my medication. I am having pain in my feet.\"    Pt denies any recent illnesses, injuries or hospitalizations.  She denies any steroid, or prednisone use since our last visit.    She has not had anymore blood clots in her leg.  She is still taking Xarelto.    Her weight today was up from 226 pounds in June 2024 to 234 pounds.     She is receiving the Remicade every 8 weeks.  She is taking Buprion ER 200mg daily.    She has not been on Phentermine or Wegovy.    She notes that she has been out of the Phentermine in over a month.    Her A1C in December 2024 was 5.1%.     She was seeing Dr. Tom Melgar of pulmonology \"He left the practice and I have not seen one since\".  She is followeing Dr. Eric Neil of Hematology and Dr. Diego Borges of Rheumatology.    Current Outpatient Medications   Medication Sig    methotrexate (RHEUMATREX) 2.5 MG chemo tablet Take 4 tablets by mouth once a week    tiZANidine (ZANAFLEX) 4 MG capsule Take 1 capsule by mouth 3 times daily as needed for Muscle spasms    rivaroxaban (XARELTO) 20 MG TABS tablet Take 1 tablet by mouth daily (with breakfast)    buPROPion (WELLBUTRIN SR) 200 MG extended release tablet Take 1 tablet by mouth every morning    escitalopram (LEXAPRO) 20 MG tablet Take 1 tablet by mouth daily    BREYNA 160-4.5 MCG/ACT AERO INHALE 1 PUFF BY MOUTH TWICE DAILY

## 2024-12-30 ENCOUNTER — TELEMEDICINE (OUTPATIENT)
Age: 54
End: 2024-12-30

## 2024-12-30 ENCOUNTER — OFFICE VISIT (OUTPATIENT)
Age: 54
End: 2024-12-30
Payer: MEDICARE

## 2024-12-30 VITALS — WEIGHT: 239.8 LBS | BODY MASS INDEX: 39.95 KG/M2 | HEIGHT: 65 IN

## 2024-12-30 DIAGNOSIS — B96.89 ACUTE BRONCHITIS, BACTERIAL: Primary | ICD-10-CM

## 2024-12-30 DIAGNOSIS — J20.8 ACUTE BRONCHITIS, BACTERIAL: Primary | ICD-10-CM

## 2024-12-30 DIAGNOSIS — Z47.1 AFTERCARE FOLLOWING RIGHT KNEE JOINT REPLACEMENT SURGERY: Primary | ICD-10-CM

## 2024-12-30 DIAGNOSIS — Z96.651 AFTERCARE FOLLOWING RIGHT KNEE JOINT REPLACEMENT SURGERY: Primary | ICD-10-CM

## 2024-12-30 PROCEDURE — 99213 OFFICE O/P EST LOW 20 MIN: CPT | Performed by: ORTHOPAEDIC SURGERY

## 2024-12-30 RX ORDER — AMOXICILLIN 500 MG/1
500 CAPSULE ORAL ONCE
Qty: 1 CAPSULE | Refills: 0 | Status: SHIPPED | OUTPATIENT
Start: 2024-12-30 | End: 2024-12-30

## 2024-12-30 RX ORDER — AZITHROMYCIN 250 MG/1
TABLET, FILM COATED ORAL
Qty: 6 TABLET | Refills: 0 | Status: SHIPPED | OUTPATIENT
Start: 2024-12-30 | End: 2025-01-09

## 2024-12-30 RX ORDER — GABAPENTIN 100 MG/1
100 CAPSULE ORAL 3 TIMES DAILY
COMMUNITY
Start: 2024-12-20

## 2024-12-30 RX ORDER — DEXTROMETHORPHAN HYDROBROMIDE AND PROMETHAZINE HYDROCHLORIDE 15; 6.25 MG/5ML; MG/5ML
5 SYRUP ORAL 4 TIMES DAILY PRN
Qty: 240 ML | Refills: 0 | Status: SHIPPED | OUTPATIENT
Start: 2024-12-30 | End: 2025-01-11

## 2024-12-30 ASSESSMENT — PATIENT HEALTH QUESTIONNAIRE - PHQ9
SUM OF ALL RESPONSES TO PHQ9 QUESTIONS 1 & 2: 2
3. TROUBLE FALLING OR STAYING ASLEEP: SEVERAL DAYS
SUM OF ALL RESPONSES TO PHQ QUESTIONS 1-9: 10
2. FEELING DOWN, DEPRESSED OR HOPELESS: SEVERAL DAYS
SUM OF ALL RESPONSES TO PHQ QUESTIONS 1-9: 10
8. MOVING OR SPEAKING SO SLOWLY THAT OTHER PEOPLE COULD HAVE NOTICED. OR THE OPPOSITE, BEING SO FIGETY OR RESTLESS THAT YOU HAVE BEEN MOVING AROUND A LOT MORE THAN USUAL: NOT AT ALL
1. LITTLE INTEREST OR PLEASURE IN DOING THINGS: SEVERAL DAYS
SUM OF ALL RESPONSES TO PHQ QUESTIONS 1-9: 10
10. IF YOU CHECKED OFF ANY PROBLEMS, HOW DIFFICULT HAVE THESE PROBLEMS MADE IT FOR YOU TO DO YOUR WORK, TAKE CARE OF THINGS AT HOME, OR GET ALONG WITH OTHER PEOPLE: SOMEWHAT DIFFICULT
9. THOUGHTS THAT YOU WOULD BE BETTER OFF DEAD, OR OF HURTING YOURSELF: NOT AT ALL
5. POOR APPETITE OR OVEREATING: MORE THAN HALF THE DAYS
7. TROUBLE CONCENTRATING ON THINGS, SUCH AS READING THE NEWSPAPER OR WATCHING TELEVISION: MORE THAN HALF THE DAYS
6. FEELING BAD ABOUT YOURSELF - OR THAT YOU ARE A FAILURE OR HAVE LET YOURSELF OR YOUR FAMILY DOWN: MORE THAN HALF THE DAYS
4. FEELING TIRED OR HAVING LITTLE ENERGY: SEVERAL DAYS
SUM OF ALL RESPONSES TO PHQ QUESTIONS 1-9: 10

## 2024-12-30 NOTE — PROGRESS NOTES
GALLSTONES    High Cholesterol Maternal Grandmother     Diabetes Maternal Grandmother     Hypertension Maternal Grandmother     Stroke Maternal Grandmother 82        massive.    Cancer Maternal Grandmother         STOMACH.    Heart Disease Maternal Grandmother     Hypertension Paternal Grandmother     Breast Cancer Maternal Aunt     Obesity Paternal Aunt     Cancer Father 30        brain    Other Maternal Grandfather         SEVERE ANAPHYLACTIC REACTIONS.   FROM BEE STINGS.    Thyroid Disease Mother         Hypothyroid       Social History     Socioeconomic History    Marital status:     Years of education: 17   Tobacco Use    Smoking status: Never    Smokeless tobacco: Never   Vaping Use    Vaping status: Never Used   Substance and Sexual Activity    Alcohol use: Not Currently     Alcohol/week: 1.0 standard drink of alcohol     Comment: 2-3 drinks a month    Drug use: No    Sexual activity: Yes     Partners: Male     Birth control/protection: Surgical     Social Determinants of Health     Financial Resource Strain: Medium Risk (2024)    Overall Financial Resource Strain (CARDIA)     Difficulty of Paying Living Expenses: Somewhat hard   Food Insecurity: Food Insecurity Present (2024)    Hunger Vital Sign     Worried About Running Out of Food in the Last Year: Patient declined     Ran Out of Food in the Last Year: Sometimes true   Transportation Needs: Unknown (2024)    PRAPARE - Transportation     Lack of Transportation (Non-Medical): No   Physical Activity: Inactive (2024)    Exercise Vital Sign     Days of Exercise per Week: 0 days     Minutes of Exercise per Session: 0 min   Stress: Stress Concern Present (3/13/2019)    Received from [facility]  (prior to 2023), [facility]  (prior to 2023)    [employer] - Occupational Stress Questionnaire     Feeling of Stress : To some extent   Housing Stability: Unknown

## 2024-12-30 NOTE — PROGRESS NOTES
Jacey Coburn, was evaluated through a synchronous (real-time) audio-video encounter. The patient (or guardian if applicable) is aware that this is a billable service, which includes applicable co-pays. This Virtual Visit was conducted with patient's (and/or legal guardian's) consent. Patient identification was verified, and a caregiver was present when appropriate.   The patient was located at Home: [address] [place] [id number]  Provider was located at Facility (Appt Dept): [address] [facility] [address]  [place] [address]  Confirm you are appropriately licensed, registered, or certified to deliver care in the state where the patient is located as indicated above. If you are not or unsure, please re-schedule the visit: Yes, I confirm.     Jacey Coburn (:  1970) is a Established patient, presenting virtually for evaluation of the following:      Below is the assessment and plan developed based on review of pertinent history, physical exam, labs, studies, and medications.     Assessment & Plan  Acute bronchitis, bacterial   Orders:    azithromycin (ZITHROMAX) 250 MG tablet; 500mg on day 1 followed by 250mg on days 2 - 5    promethazine-dextromethorphan (PROMETHAZINE-DM) 6.25-15 MG/5ML syrup; Take 5 mLs by mouth 4 times daily as needed for Cough      Return if symptoms worsen or fail to improve.       Subjective   Jacey Coburn is a 54 y.o. female is seen for worsening cough with productive grayish sputum, fever/chills that started on Xmas day. Home COVID test was negative. OTC medications have not helped.    Review of Systems   As per hpi    Objective   Patient-Reported Vitals  No data recorded     Physical Exam  [INSTRUCTIONS:  \"[x]\" Indicates a positive item  \"[]\" Indicates a negative item  -- DELETE ALL ITEMS NOT EXAMINED]    Constitutional: [x] Appears well-developed and well-nourished [] No apparent distress      [x] Abnormal -     Mental status: [x] Alert and awake

## 2024-12-30 NOTE — PROGRESS NOTES
Chief Complaint   Patient presents with    Cough     Congestion . Grayish green mucus started on Kealia Jacquie and has gotten worse      \"Have you been to the ER, urgent care clinic since your last visit?  Hospitalized since your last visit?\"    NO    “Have you seen or consulted any other health care providers outside our system since your last visit?”    NO

## 2025-02-13 NOTE — TELEPHONE ENCOUNTER
----- Message from [employer] sent at 6/22/2018 12:40 PM EDT -----  Regarding: Dr. Monica Shahid: [phone]  Homero Sarah, Pt mother, advised pt went to [address] [facility] ED for severe nose bleed. Please call pt for a f/u visit. Kari Stanford
Returned call no answer. Patient has appointment scheduled for Monday with Dr Malissa Osman.
Dyspnea on exertion
Dyspnea on exertion

## 2025-04-01 DIAGNOSIS — E66.01 MORBID (SEVERE) OBESITY DUE TO EXCESS CALORIES: ICD-10-CM

## 2025-04-01 DIAGNOSIS — R73.02 IMPAIRED GLUCOSE TOLERANCE (ORAL): ICD-10-CM

## 2025-04-07 LAB
ERYTHROCYTE [DISTWIDTH] IN BLOOD BY AUTOMATED COUNT: 12.9 % (ref 11.7–15.4)
HBA1C MFR BLD: 5 % (ref 4.8–5.6)
HCT VFR BLD AUTO: 39.9 % (ref 34–46.6)
HGB BLD-MCNC: 13 G/DL (ref 11.1–15.9)
MCH RBC QN AUTO: 30.1 PG (ref 26.6–33)
MCHC RBC AUTO-ENTMCNC: 32.6 G/DL (ref 31.5–35.7)
MCV RBC AUTO: 92 FL (ref 79–97)
PLATELET # BLD AUTO: 292 X10E3/UL (ref 150–450)
RBC # BLD AUTO: 4.32 X10E6/UL (ref 3.77–5.28)
WBC # BLD AUTO: 3.4 X10E3/UL (ref 3.4–10.8)

## 2025-04-08 LAB
ALBUMIN SERPL-MCNC: 4.4 G/DL (ref 3.8–4.9)
ALP SERPL-CCNC: 81 IU/L (ref 44–121)
ALT SERPL-CCNC: 8 IU/L (ref 0–32)
AST SERPL-CCNC: 13 IU/L (ref 0–40)
BILIRUB SERPL-MCNC: 1 MG/DL (ref 0–1.2)
BUN SERPL-MCNC: 22 MG/DL (ref 6–24)
BUN/CREAT SERPL: 22 (ref 9–23)
CALCIUM SERPL-MCNC: 9.5 MG/DL (ref 8.7–10.2)
CHLORIDE SERPL-SCNC: 102 MMOL/L (ref 96–106)
CO2 SERPL-SCNC: 21 MMOL/L (ref 20–29)
CREAT SERPL-MCNC: 0.98 MG/DL (ref 0.57–1)
EGFRCR SERPLBLD CKD-EPI 2021: 68 ML/MIN/1.73
GLOBULIN SER CALC-MCNC: 2.6 G/DL (ref 1.5–4.5)
GLUCOSE SERPL-MCNC: 82 MG/DL (ref 70–99)
POTASSIUM SERPL-SCNC: 4.2 MMOL/L (ref 3.5–5.2)
PROT SERPL-MCNC: 7 G/DL (ref 6–8.5)
SODIUM SERPL-SCNC: 138 MMOL/L (ref 134–144)

## 2025-04-17 ENCOUNTER — OFFICE VISIT (OUTPATIENT)
Age: 55
End: 2025-04-17
Payer: MEDICARE

## 2025-04-17 VITALS
WEIGHT: 234 LBS | BODY MASS INDEX: 38.99 KG/M2 | DIASTOLIC BLOOD PRESSURE: 76 MMHG | SYSTOLIC BLOOD PRESSURE: 126 MMHG | HEIGHT: 65 IN | HEART RATE: 63 BPM

## 2025-04-17 DIAGNOSIS — R73.02 IMPAIRED GLUCOSE TOLERANCE (ORAL): ICD-10-CM

## 2025-04-17 DIAGNOSIS — E66.01 MORBID (SEVERE) OBESITY DUE TO EXCESS CALORIES: Primary | ICD-10-CM

## 2025-04-17 PROCEDURE — 99214 OFFICE O/P EST MOD 30 MIN: CPT | Performed by: INTERNAL MEDICINE

## 2025-04-17 PROCEDURE — G2211 COMPLEX E/M VISIT ADD ON: HCPCS | Performed by: INTERNAL MEDICINE

## 2025-04-17 RX ORDER — PHENTERMINE HYDROCHLORIDE 37.5 MG/1
37.5 TABLET ORAL
Qty: 100 TABLET | Refills: 1 | Status: SHIPPED | OUTPATIENT
Start: 2025-04-17 | End: 2026-04-17

## 2025-04-17 NOTE — PROGRESS NOTES
Est, Glom Filt Rate      >59 mL/min/1.73 68    Bun/Cre      9 - 23  22    Sodium      134 - 144 mmol/L 138    Potassium      3.5 - 5.2 mmol/L 4.2    Chloride      96 - 106 mmol/L 102    CARBON DIOXIDE      20 - 29 mmol/L 21    Calcium      8.7 - 10.2 mg/dL 9.5    Total Protein      6.0 - 8.5 g/dL 7.0    Albumin      3.8 - 4.9 g/dL 4.4    Globulin, Total      1.5 - 4.5 g/dL 2.6    Total Bilirubin      0.0 - 1.2 mg/dL 1.0    Alkaline Phosphatase      44 - 121 IU/L 81    AST      0 - 40 IU/L 13    ALT      0 - 32 IU/L 8    WBC      3.4 - 10.8 x10E3/uL 3.4    RBC      3.77 - 5.28 x10E6/uL 4.32    Hemoglobin Quant      11.1 - 15.9 g/dL 13.0    Hematocrit      34.0 - 46.6 % 39.9    MCV      79 - 97 fL 92    MCH      26.6 - 33.0 pg 30.1    MCHC      31.5 - 35.7 g/dL 32.6    RDW      11.7 - 15.4 % 12.9    Platelet Count      150 - 450 x10E3/uL 292    Hemoglobin A1C      4.8 - 5.6 % 5.0          Assessment/Plan:   1) Obesity > Her weight today was  234 pounds. She has tried multiple dietary programs with no success. She has been working on lifestyle changes and exercise programs with no help. She has tried phentermine and Wegovy. Her insurance has denied Wegovy and Zepbound.  She can not take Contrave because she is on welbutrin and lexapro.    2) IGT > See #1. Her A1C in April 2025 was 5.0%.     Pt voices understanding and agreement with the plan.  Pain noted and pt was recommended to call her PCP for further evaluation and treatment, as needed      RTC 6 months.      Copy sent to:  Uriel Mcadams and Beatris.

## 2025-06-11 ENCOUNTER — OFFICE VISIT (OUTPATIENT)
Age: 55
End: 2025-06-11
Payer: MEDICARE

## 2025-06-11 VITALS
HEIGHT: 65 IN | TEMPERATURE: 97.3 F | WEIGHT: 220.9 LBS | OXYGEN SATURATION: 98 % | RESPIRATION RATE: 18 BRPM | BODY MASS INDEX: 36.8 KG/M2 | HEART RATE: 75 BPM | SYSTOLIC BLOOD PRESSURE: 129 MMHG | DIASTOLIC BLOOD PRESSURE: 86 MMHG

## 2025-06-11 DIAGNOSIS — F33.0 MILD EPISODE OF RECURRENT MAJOR DEPRESSIVE DISORDER: Primary | ICD-10-CM

## 2025-06-11 DIAGNOSIS — K21.9 GASTROESOPHAGEAL REFLUX DISEASE, UNSPECIFIED WHETHER ESOPHAGITIS PRESENT: ICD-10-CM

## 2025-06-11 DIAGNOSIS — D17.0 LIPOMA OF NECK: ICD-10-CM

## 2025-06-11 DIAGNOSIS — F41.9 ANXIETY DISORDER, UNSPECIFIED TYPE: ICD-10-CM

## 2025-06-11 PROCEDURE — 99214 OFFICE O/P EST MOD 30 MIN: CPT | Performed by: STUDENT IN AN ORGANIZED HEALTH CARE EDUCATION/TRAINING PROGRAM

## 2025-06-11 RX ORDER — FAMOTIDINE 20 MG/1
20 TABLET, FILM COATED ORAL
Qty: 90 TABLET | Refills: 1 | Status: SHIPPED | OUTPATIENT
Start: 2025-06-11

## 2025-06-11 RX ORDER — LEFLUNOMIDE 10 MG/1
10 TABLET ORAL DAILY
COMMUNITY
Start: 2025-06-09

## 2025-06-11 RX ORDER — PANTOPRAZOLE SODIUM 40 MG/1
40 TABLET, DELAYED RELEASE ORAL
Qty: 90 TABLET | Refills: 1 | Status: SHIPPED | OUTPATIENT
Start: 2025-06-11

## 2025-06-11 RX ORDER — ESCITALOPRAM OXALATE 20 MG/1
20 TABLET ORAL DAILY
Qty: 90 TABLET | Refills: 1 | Status: SHIPPED | OUTPATIENT
Start: 2025-06-11

## 2025-06-11 RX ORDER — BUPROPION HYDROCHLORIDE 200 MG/1
200 TABLET, EXTENDED RELEASE ORAL EVERY MORNING
Qty: 90 TABLET | Refills: 2 | Status: SHIPPED | OUTPATIENT
Start: 2025-06-11

## 2025-06-11 SDOH — ECONOMIC STABILITY: FOOD INSECURITY: WITHIN THE PAST 12 MONTHS, THE FOOD YOU BOUGHT JUST DIDN'T LAST AND YOU DIDN'T HAVE MONEY TO GET MORE.: SOMETIMES TRUE

## 2025-06-11 SDOH — ECONOMIC STABILITY: FOOD INSECURITY: WITHIN THE PAST 12 MONTHS, YOU WORRIED THAT YOUR FOOD WOULD RUN OUT BEFORE YOU GOT MONEY TO BUY MORE.: PATIENT DECLINED

## 2025-06-11 ASSESSMENT — PATIENT HEALTH QUESTIONNAIRE - PHQ9
SUM OF ALL RESPONSES TO PHQ QUESTIONS 1-9: 7
10. IF YOU CHECKED OFF ANY PROBLEMS, HOW DIFFICULT HAVE THESE PROBLEMS MADE IT FOR YOU TO DO YOUR WORK, TAKE CARE OF THINGS AT HOME, OR GET ALONG WITH OTHER PEOPLE: SOMEWHAT DIFFICULT
5. POOR APPETITE OR OVEREATING: SEVERAL DAYS
1. LITTLE INTEREST OR PLEASURE IN DOING THINGS: SEVERAL DAYS
2. FEELING DOWN, DEPRESSED OR HOPELESS: SEVERAL DAYS
SUM OF ALL RESPONSES TO PHQ QUESTIONS 1-9: 7
9. THOUGHTS THAT YOU WOULD BE BETTER OFF DEAD, OR OF HURTING YOURSELF: NOT AT ALL
7. TROUBLE CONCENTRATING ON THINGS, SUCH AS READING THE NEWSPAPER OR WATCHING TELEVISION: SEVERAL DAYS
SUM OF ALL RESPONSES TO PHQ QUESTIONS 1-9: 7
8. MOVING OR SPEAKING SO SLOWLY THAT OTHER PEOPLE COULD HAVE NOTICED. OR THE OPPOSITE, BEING SO FIGETY OR RESTLESS THAT YOU HAVE BEEN MOVING AROUND A LOT MORE THAN USUAL: NOT AT ALL
6. FEELING BAD ABOUT YOURSELF - OR THAT YOU ARE A FAILURE OR HAVE LET YOURSELF OR YOUR FAMILY DOWN: SEVERAL DAYS
3. TROUBLE FALLING OR STAYING ASLEEP: SEVERAL DAYS
SUM OF ALL RESPONSES TO PHQ QUESTIONS 1-9: 7
4. FEELING TIRED OR HAVING LITTLE ENERGY: SEVERAL DAYS

## 2025-06-11 NOTE — PROGRESS NOTES
[facility]  [address] [place] [address] [phone]    Chief Complaint: chronic med problems  Subjective  Jacey Coburn is a 54 y.o. Black /  female , established  patient, here for evaluation of the concern(s) above;    PMH sig for Psoriasis Arthritis, HLD, prediabetes, UC, Anxiety and depression, Low WBC, right leg DVT x 2 on anticoag, PUD, chronic hypoxia related to COVID 19 and intermittent asthma on chronic oxygen, s/p right TKA (12/2023) and obesity.    Here for routine follow up.    Depression - stable on Lexapro and wellbutrin.  States that she ran out of the wellbutrin about a month ago.  Denies any suicidal or homicidal ideation.    Mass on neck:  Pt states that she has had a small mass on back of neck and it seems to be getting bigger and sometimes painful. She would like it surgically removed.    Asthma:  Well controlled on albuterol and symbicort.    Other chronic problems:  Low WBC:  Had bone marrow biopsy 6/3/2025.    UC:  Follows with Dr. Spear.  Also sees Dr. Marcel Treadwell for rectal stenosis caused by UC.      GERD:  On Pepcid and pantoprazole.    Pertinent negatives include no fever, no chest pain, no abdominal pain and no shortness of breath.     Treatment team:  Dr. Marion for prediabetes and weight management  Dr. Spear: Ulcerative Colitis  Dr. Carmona:  rheumatologist  Dr. Tomlinson:  Pulmonologist  Dr. Sadaf Agosto - Hematologist -for low WBC.    Social Hx;  - lives with her mother and   -no children of her own. Raised her 's brother son.        Allergies - reviewed:   Allergies   Allergen Reactions    Adalimumab Rash     Large red knots    Sulfa Antibiotics Anaphylaxis    Certolizumab Pegol Rash    Codeine Itching       Past Medical History - reviewed:  Past Medical History:   Diagnosis Date    Acute bronchitis 03/21/2024    treated with antibiotics, prednisone, inhaler    Anemia associated with acute

## 2025-06-11 NOTE — ASSESSMENT & PLAN NOTE
Chronic, at goal (stable), continue current treatment plan, medication adherence emphasized, and lifestyle modifications recommended   Thoracic Surgery: Your Home Recovery    Keep incisions clean and dry. Wash daily with soap and warm water. Pat to dry  Watch for signs of infection:  Redness or swelling around incision(s)  Increased pain not relieved by pain medication   Abnormal drainage (pus, yellow/green or bright red blood) from your incision(s)  Temperature higher than 101.5 degrees F  Activity  For 6-8 weeks after your surgery, avoid any activity that might put stress on your healing incisions. No heavy lifting. Walking is one of the most important things that you can do for yourself to enhance your recovery. Do walk regularly to improve your circulation, lung capacity, and strength. Taking pain medicine if needed before activity will help make breathing more comfortable. You'll probably feel short of breath for several weeks. This is normal and will improve with time. As you begin to feel better, you can gradually add more strenuous activities.   Additional Instructions:  Continue to use your Incentive Spirometer 4-5 times per day for 2 weeks after discharge. Contact office for new or increasing SOB  Call with new onset of slow, fast or irregular heartbeat     When to call the doctor  · Draining or very red incision  · Sudden, severe shortness of breath  · Sudden, sharp chest pain  · Fever over 101.5°F  · Rapid heartbeat or \"fluttering\" in your chest    Continue soft diet until seen in follow up by Dr Hussein     Soft Diet  Your health care provider has prescribed a soft diet (also called gastrointestinal soft diet). This means eating foods that are soft in texture, low in fiber, and easily digested. A soft diet provides foods that are easy to chew and swallow. Foods should be bite-size and very soft or moist so they are easy to swallow. Follow any specific instructions from your health care provider about foods and beverages you can and cannot have. The general guidelines below can help you get started on this diet.       Beverages  OK: Milk,  tea, coffee, fruit juices, nutrition shakes and drinks (Note: Thin liquids might be difficult to swallow and may require thickening.)   Avoid: Carbonated beverages  Breads and crackers  OK: Refined white, wheat, or seedless rye bread, jluis or soda crackers that have been moistened, plain rolls or bagels, very soft tortillas  Avoid: Whole-grain breads, rolls, or bagels with nuts, raisins, or seeds, crackers, croutons, taco shells  Cereals and grains  OK: Cooked cereals, plain dry cereals that have been moistened, plain macaroni, spaghetti, noodles, rice  Avoid: Whole-grain cereals and granola, or cereals containing bran, raisins, seeds or nuts, coconut; brown or wild rice  Fruits  OK: Avocado, banana, baked peeled apple, applesauce, peeled ripe peaches or pears, canned fruit (apricots, cherries, peaches, pears), melons  Avoid: Raw apple, dried fruits, coconut, pineapple, grapes, fruit joslyn, fruit snacks  Meat and fish  OK: All fresh meat, poultry, or fish that is cooked until tender  Avoid: Meat, fish, or poultry that is fried; tough or stringy meat including oconnor, sausage, bratwurst, jerky, corned beef  Eggs and cheese  OK: Poached or scrambled eggs, cottage cheese, ricotta cheese, cream cheese, cheese sauces, or cheese melted in other dishes  Avoid: Crisp fried eggs, cheese slices and cubes  Other protein foods  OK: Tofu, baked beans, smooth peanut butter or other nut or seed butters  Avoid: Deep-fried tofu, crunchy peanut or other nut or seed butters, nuts or seeds that are whole or chopped  Soups  OK: All soups, but they may need to be thickened if the thin liquid is too difficult to swallow  Avoid: Soups made with stringy meat pieces or chunky vegetables  Vegetables  OK: Peeled and well-cooked potatoes or sweet potatoes; fresh, cooked, canned, or frozen vegetables without seeds, skin, or coarse fiber  Avoid: Raw vegetables, deep-fried vegetables (such as tempura); corn  Desserts and sweets  OK: Moist  cake, soft fruit pie with bottom crust only, soft cookies moistened in milk or other liquid, gelatin, custard, pudding, plain ice cream, plain sherbet, sugar, honey, clear jelly  Avoid: Pastries, desserts, and ice cream that have nuts, coconut, seeds, or dried fruit; popcorn, chips of any kind including potato and taco chips; jam, marmalade  © 4313-7320 [employer]. [address] [place] [address], [place] [address]. All rights reserved. This information is not intended as a substitute for professional medical care. Always follow your healthcare professional's instructions.

## 2025-06-11 NOTE — ASSESSMENT & PLAN NOTE
Chronic, at goal (stable), continue current treatment plan, medication adherence emphasized, and lifestyle modifications recommended.  Denies any Suicidal or homicidal ideation. Will send refills of medications

## 2025-06-11 NOTE — PROGRESS NOTES
Chief Complaint   Patient presents with    Follow-up     Patient is here today for a 6 month follow up. Patient has a lump on her neck.      \"Have you been to the ER, urgent care clinic since your last visit?  Hospitalized since your last visit?\"    NO    “Have you seen or consulted any other health care providers outside of [facility] since your last visit?”    Dr. Spears-Hematology, Emma Rodriguez-Cardiology            Click Here for Release of Records Request

## 2025-07-16 DIAGNOSIS — R73.02 IMPAIRED GLUCOSE TOLERANCE (ORAL): Primary | ICD-10-CM

## 2025-07-16 DIAGNOSIS — E66.01 MORBID (SEVERE) OBESITY DUE TO EXCESS CALORIES (HCC): ICD-10-CM

## 2025-07-29 ENCOUNTER — OFFICE VISIT (OUTPATIENT)
Age: 55
End: 2025-07-29
Payer: MEDICARE

## 2025-07-29 VITALS
TEMPERATURE: 97.5 F | HEIGHT: 65 IN | DIASTOLIC BLOOD PRESSURE: 83 MMHG | OXYGEN SATURATION: 95 % | SYSTOLIC BLOOD PRESSURE: 132 MMHG | WEIGHT: 218 LBS | HEART RATE: 71 BPM | BODY MASS INDEX: 36.32 KG/M2

## 2025-07-29 DIAGNOSIS — M79.89 SOFT TISSUE MASS: Primary | ICD-10-CM

## 2025-07-29 PROCEDURE — 99203 OFFICE O/P NEW LOW 30 MIN: CPT | Performed by: SURGERY

## 2025-07-29 RX ORDER — GRISEOFULVIN 125 MG/1
250 TABLET ORAL 2 TIMES DAILY
COMMUNITY

## 2025-07-29 ASSESSMENT — PATIENT HEALTH QUESTIONNAIRE - PHQ9
1. LITTLE INTEREST OR PLEASURE IN DOING THINGS: SEVERAL DAYS
SUM OF ALL RESPONSES TO PHQ QUESTIONS 1-9: 2
2. FEELING DOWN, DEPRESSED OR HOPELESS: SEVERAL DAYS

## (undated) DEVICE — SOLUTION IRRIG 1000ML H2O STRL BLT

## (undated) DEVICE — COVER LT HNDL PLAS RIG 1 PER PK

## (undated) DEVICE — (D)STRIP SKN CLSR 0.5X4IN WHT --

## (undated) DEVICE — SUTURE STRATAFIX SPRL SZ 1 L14IN ABSRB VLT L48CM CTX 1/2 SXPD2B405

## (undated) DEVICE — Z DISCONTINUED PER MEDLINE LINE GAS SAMPLING O2/CO2 LNG AD 13 FT NSL W/ TBNG FILTERLINE

## (undated) DEVICE — REM POLYHESIVE ADULT PATIENT RETURN ELECTRODE: Brand: VALLEYLAB

## (undated) DEVICE — Z INACTIVE USE 2527070 DRAPE SURG W40XL44IN UNDERBUTTOCK SMS POLYPR W/ PCH BK DISP

## (undated) DEVICE — HOOD: Brand: FLYTE

## (undated) DEVICE — BASIN EMSIS 16OZ GRAPHITE PLAS KID SHP MOLD GRAD FOR ORAL

## (undated) DEVICE — SOLUTION LACTATED RINGERS INJECTION USP

## (undated) DEVICE — TRAY PREP DRY W/ PREM GLV 2 APPL 6 SPNG 2 UNDPD 1 OVERWRAP

## (undated) DEVICE — Device

## (undated) DEVICE — BLADE ELECTRODE: Brand: EDGE

## (undated) DEVICE — FORCEPS BX L240CM JAW DIA2.8MM L CAP W/ NDL MIC MESH TOOTH

## (undated) DEVICE — SUTURE PLN GUT SZ 2-0 L27IN ABSRB YELLOWISH TAN L70MM XLH 53T

## (undated) DEVICE — BONE WAX WHITE: Brand: BONE WAX WHITE

## (undated) DEVICE — STRYKER PERFORMANCE SERIES SAGITTAL BLADE: Brand: STRYKER PERFORMANCE SERIES

## (undated) DEVICE — GLOVE ORANGE PI 7 1/2   MSG9075

## (undated) DEVICE — SOLUTION SCRB 4% CHG RED ANTIMIC SKIN CLN PREOPERATIVE DISP

## (undated) DEVICE — GLOVE SURG SZ 8 L12IN FNGR THK79MIL GRN LTX FREE

## (undated) DEVICE — MASTISOL ADHESIVE LIQ 2/3ML

## (undated) DEVICE — FORCEPS BX L240CM JAW DIA2.4MM ORNG L CAP W/ NDL DISP RAD

## (undated) DEVICE — SURGICAL PROCEDURE PACK BASIN MAJ SET CUST NO CAUT

## (undated) DEVICE — SOLUTION SURG PREP 26 CC PURPREP

## (undated) DEVICE — GLOVE ORANGE PI 8   MSG9080

## (undated) DEVICE — SOLUTION IRRIG 1000ML STRL H2O USP PLAS POUR BTL

## (undated) DEVICE — KIT,1200CC CANISTER,3/16"X6' TUBING: Brand: MEDLINE INDUSTRIES, INC.

## (undated) DEVICE — GLOVE SURG SZ 85 L12IN FNGR THK79MIL GRN LTX FREE

## (undated) DEVICE — SUTURE VCRL SZ 2-0 L36IN ABSRB UD L36MM CT-1 1/2 CIR J945H

## (undated) DEVICE — SUTURE VCRL SZ 0 L18IN ABSRB VLT L26MM CT-2 1/2 CIR J727D

## (undated) DEVICE — SUTURE MCRYL SZ 3-0 L27IN ABSRB UD L24MM PS-1 3/8 CIR PRIM Y936H

## (undated) DEVICE — INFECTION CONTROL KIT SYS

## (undated) DEVICE — HANDLE LT SNAP ON ULT DURABLE LENS FOR TRUMPF ALC DISPOSABLE

## (undated) DEVICE — SUTURE VCRL SZ 0 L54IN ABSRB VLT LIGAPAK REEL NDL J207G

## (undated) DEVICE — JELLY,LUBE,STERILE,FLIP TOP,TUBE,4-OZ: Brand: MEDLINE

## (undated) DEVICE — NEONATAL-ADULT SPO2 SENSOR: Brand: NELLCOR

## (undated) DEVICE — CONTINU-FLO SOLUTION SET, 2 INJECTION SITES, MALE LUER LOCK ADAPTER WITH RETRACTABLE COLLAR, LARGE BORE STOPCOCK WITH ROTATING MALE LUER LOCK EXTENSION SET, 2 INJECTION SITES, MALE LUER LOCK ADAPTER WITH RETRACTABLE COLLAR: Brand: INTERLINK/CONTINU-FLO

## (undated) DEVICE — TOTAL JOINT-MRMC: Brand: MEDLINE INDUSTRIES, INC.

## (undated) DEVICE — STERILE POLYISOPRENE POWDER-FREE SURGICAL GLOVES: Brand: PROTEXIS

## (undated) DEVICE — TOWEL SURG W17XL27IN STD BLU COT NONFENESTRATED PREWASHED

## (undated) DEVICE — 1200 GUARD II KIT W/5MM TUBE W/O VAC TUBE: Brand: GUARDIAN

## (undated) DEVICE — CATH IV AUTOGRD BC PNK 20GA 25 -- INSYTE

## (undated) DEVICE — 3M™ TEGADERM™ TRANSPARENT FILM DRESSING FRAME STYLE, 1627, 4 IN X 10 IN (10 CM X 25 CM), 20/CT 4CT/CASE: Brand: 3M™ TEGADERM™

## (undated) DEVICE — ESOPHAGEAL BALLOON DILATATION CATHETER: Brand: CRE FIXED WIRE

## (undated) DEVICE — YANKAUER,TAPERED BULBOUS TIP,W/O VENT: Brand: MEDLINE

## (undated) DEVICE — CONTAINER SPEC 20 ML LID NEUT BUFF FORMALIN 10 % POLYPR STS

## (undated) DEVICE — Device: Brand: JELCO

## (undated) DEVICE — DRAPE FLD WRM W44XL66IN C6L FOR INTRATEMP SYS THERMABASIN

## (undated) DEVICE — SUT CHRMC 2-0 27IN CT1 BRN --

## (undated) DEVICE — SOLIDIFIER FLD 2OZ 1500CC N DISINF IN BTL DISP SAFESORB

## (undated) DEVICE — TIP SUCT POOLE RIG 2-PART L LUMN BVL SL OPN AND SNAP FIT

## (undated) DEVICE — SUTURE PDS II SZ 0 L27IN ABSRB VLT L36MM CT-1 1/2 CIR Z340H

## (undated) DEVICE — GLOVE SURG SZ 85 L12IN FNGR ORTHO 126MIL CRM LTX FREE

## (undated) DEVICE — SUTURE PDS II SZ 0 L36IN ABSRB VLT L36MM CT-1 1/2 CIR Z346H

## (undated) DEVICE — ELECTRODE BLDE L4IN NONINSULATED EDGE

## (undated) DEVICE — GOWN,SIRUS,NONRNF,XLN/2XL,18/CS: Brand: MEDLINE

## (undated) DEVICE — SOLUTION IV 1000ML 0.9% SOD CHL

## (undated) DEVICE — SYR 10ML LUER LOK 1/5ML GRAD --

## (undated) DEVICE — DRAPE,LAPAROTOMY,PED,STERILE: Brand: MEDLINE

## (undated) DEVICE — FORCEPS BX L160CM DIA8MM GRSP DISECT CUP TIP NONLOCKING ROT

## (undated) DEVICE — HYPODERMIC SAFETY NEEDLE: Brand: MAGELLAN

## (undated) DEVICE — BAG SPEC BIOHZRD 10 X 10 IN --

## (undated) DEVICE — HANDPIECE SET WITH COAXIAL HIGH FLOW TIP AND SUCTION TUBE: Brand: INTERPULSE

## (undated) DEVICE — SOLUTION IV 1000ML 0.9% SOD CHL PH 5 INJ USP VIAFLX PLAS

## (undated) DEVICE — NEEDLE HYPO 25GA L1.5IN BVL ORIENTED ECLIPSE

## (undated) DEVICE — ROCKER SWITCH PENCIL BLADE ELECTRODE, HOLSTER: Brand: EDGE

## (undated) DEVICE — SET ADMIN 16ML TBNG L100IN 2 Y INJ SITE IV PIGGY BK DISP

## (undated) DEVICE — (D)PREP SKN CHLRAPRP APPL 26ML -- CONVERT TO ITEM 371833

## (undated) DEVICE — SPONGE LAP 18X18IN STRL -- 5/PK

## (undated) DEVICE — TOWEL 4 PLY TISS 19X30 SUE WHT

## (undated) DEVICE — BANDAGE COMPR M W6INXL10YD WHT BGE VELC E MTRX HK AND LOOP

## (undated) DEVICE — SUTURE MCRYL SZ 3-0 L27IN ABSRB UD L60MM KS STR REV CUT Y523H

## (undated) DEVICE — TRAY CATH 16F DRN BG LTX -- CONVERT TO ITEM 363158

## (undated) DEVICE — BLADE ASSEMB CLP HAIR FINE --

## (undated) DEVICE — 3M™ IOBAN™ 2 ANTIMICROBIAL INCISE DRAPE 6651EZ: Brand: IOBAN™ 2

## (undated) DEVICE — SUTURE VCRL SZ 1 L36IN ABSRB UD L36MM CT-1 1/2 CIR J947H

## (undated) DEVICE — DERMABOND SKIN ADH 0.7ML -- DERMABOND ADVANCED 12/BX

## (undated) DEVICE — KENDALL SCD EXPRESS SLEEVES, KNEE LENGTH, MEDIUM: Brand: KENDALL SCD

## (undated) DEVICE — LIQUIBAND RAPID ADHESIVE 36/CS 0.8ML: Brand: MEDLINE

## (undated) DEVICE — KENDALL DL ECG CABLE AND LEAD WIRE SYSTEM, 3-LEAD, SINGLE PATIENT USE: Brand: KENDALL

## (undated) DEVICE — STRIP,CLOSURE,WOUND,MEDI-STRIP,1/2X4: Brand: MEDLINE

## (undated) DEVICE — IV START KIT: Brand: MEDLINE

## (undated) DEVICE — 450 ML BOTTLE OF 0.05% CHLORHEXIDINE GLUCONATE IN 99.95% STERILE WATER FOR IRRIGATION, USP AND APPLICATOR.: Brand: IRRISEPT ANTIMICROBIAL WOUND LAVAGE

## (undated) DEVICE — YANKAUER,SMOOTH HANDLE,HIGH CAPACITY: Brand: MEDLINE INDUSTRIES, INC.

## (undated) DEVICE — ENDOSCOPIC KIT COMPLIANCE ENDOKIT

## (undated) DEVICE — SYR 3ML LL TIP 1/10ML GRAD --

## (undated) DEVICE — ZIMMER® STERILE DISPOSABLE TOURNIQUET CUFF WITH PROTECTIVE SLEEVE AND PLC, DUAL PORT, SINGLE BLADDER, 34 IN. (86 CM)

## (undated) DEVICE — SOLUTION SCRB 2OZ 10% POVIDONE IOD ANTIMIC BTL

## (undated) DEVICE — DEVON™ KNEE AND BODY STRAP 60" X 3" (1.5 M X 7.6 CM): Brand: DEVON

## (undated) DEVICE — TRANSFER SET 3": Brand: MEDLINE INDUSTRIES, INC.

## (undated) DEVICE — 3M™ TEGADERM™ TRANSPARENT FILM DRESSING FRAME STYLE, 1624W, 2-3/8 IN X 2-3/4 IN (6 CM X 7 CM), 100/CT 4CT/CASE: Brand: 3M™ TEGADERM™

## (undated) DEVICE — SET GRAV CK VLV NEEDLESS ST 3 GANGED 4WAY STPCOCK HI FLO 10

## (undated) DEVICE — BLOCK BITE ENDOSCP AD 21 MM W/ DIL BLU LF DISP

## (undated) DEVICE — CUFF BLD PRSS AD CLTH SGL TB W/ BAYNT CONN ROUNDED CORNER

## (undated) DEVICE — SYR ASSEMB INFL BLLN 60ML --

## (undated) DEVICE — ENDO CARRY-ON PROCEDURE KIT INCLUDES ENZYMATIC SPONGE, GAUZE, BIOHAZARD LABEL, TRAY, LUBRICANT, DIRTY SCOPE LABEL, WATER LABEL, TRAY, DRAWSTRING PAD, AND DEFENDO 4-PIECE KIT.: Brand: ENDO CARRY-ON PROCEDURE KIT

## (undated) DEVICE — DBD-PACK,LAPAROTOMY,2 REINFORCED GOWNS: Brand: MEDLINE

## (undated) DEVICE — ELECTRODE,RADIOTRANSLUCENT,FOAM,5PK: Brand: MEDLINE